# Patient Record
Sex: FEMALE | Race: WHITE | NOT HISPANIC OR LATINO | Employment: OTHER | URBAN - METROPOLITAN AREA
[De-identification: names, ages, dates, MRNs, and addresses within clinical notes are randomized per-mention and may not be internally consistent; named-entity substitution may affect disease eponyms.]

---

## 2017-01-17 ENCOUNTER — OFFICE VISIT (OUTPATIENT)
Dept: PSYCHIATRY | Facility: CLINIC | Age: 62
End: 2017-01-17
Payer: COMMERCIAL

## 2017-01-17 VITALS
HEIGHT: 63 IN | WEIGHT: 164 LBS | DIASTOLIC BLOOD PRESSURE: 72 MMHG | HEART RATE: 80 BPM | BODY MASS INDEX: 29.06 KG/M2 | SYSTOLIC BLOOD PRESSURE: 126 MMHG

## 2017-01-17 DIAGNOSIS — F31.74: Primary | ICD-10-CM

## 2017-01-17 PROCEDURE — 99499 UNLISTED E&M SERVICE: CPT | Mod: S$GLB,,, | Performed by: PSYCHIATRY & NEUROLOGY

## 2017-01-17 PROCEDURE — 99213 OFFICE O/P EST LOW 20 MIN: CPT | Mod: S$GLB,,, | Performed by: PSYCHIATRY & NEUROLOGY

## 2017-01-17 PROCEDURE — 99999 PR PBB SHADOW E&M-EST. PATIENT-LVL III: CPT | Mod: PBBFAC,,, | Performed by: PSYCHIATRY & NEUROLOGY

## 2017-01-17 PROCEDURE — 1159F MED LIST DOCD IN RCRD: CPT | Mod: S$GLB,,, | Performed by: PSYCHIATRY & NEUROLOGY

## 2017-01-17 RX ORDER — HALOPERIDOL 1 MG/1
1 TABLET ORAL NIGHTLY
Qty: 90 TABLET | Refills: 1 | Status: SHIPPED | OUTPATIENT
Start: 2017-01-17 | End: 2017-07-28 | Stop reason: SDUPTHER

## 2017-01-17 RX ORDER — BUPROPION HYDROCHLORIDE 100 MG/1
200 TABLET, EXTENDED RELEASE ORAL EVERY MORNING
Qty: 180 TABLET | Refills: 1 | Status: SHIPPED | OUTPATIENT
Start: 2017-01-17 | End: 2017-07-25 | Stop reason: SDUPTHER

## 2017-01-17 RX ORDER — BUPROPION HYDROCHLORIDE 150 MG/1
150 TABLET, EXTENDED RELEASE ORAL NIGHTLY
Qty: 90 TABLET | Refills: 1 | Status: SHIPPED | OUTPATIENT
Start: 2017-01-17 | End: 2017-08-04 | Stop reason: SDUPTHER

## 2017-01-17 RX ORDER — FOLIC ACID 1 MG/1
1000 TABLET ORAL DAILY
Qty: 90 TABLET | Refills: 3 | Status: SHIPPED | OUTPATIENT
Start: 2017-01-17 | End: 2017-08-04 | Stop reason: SDUPTHER

## 2017-01-17 RX ORDER — OXCARBAZEPINE 150 MG/1
150 TABLET, FILM COATED ORAL 2 TIMES DAILY
Qty: 180 TABLET | Refills: 1 | Status: SHIPPED | OUTPATIENT
Start: 2017-01-17 | End: 2017-08-04 | Stop reason: SDUPTHER

## 2017-01-17 NOTE — MR AVS SNAPSHOT
[facility] - Psychiatry  [address]  [facility] [address]  Phone: [phone]  Fax: [phone]                  Silvana Quezada   2017 1:30 PM   Office Visit    Description:  Female : 1955   Provider:  Don Munoz Jr., MD   Department:  [facility] - Psychiatry           Reason for Visit     Mood Swings           Diagnoses this Visit        Comments    Bipolar affective disorder, manic, in full remission    -  Primary            To Do List           Goals (5 Years of Data)     None      Follow-Up and Disposition     Return in about 3 months (around 2017).       These Medications        Disp Refills Start End    buPROPion (WELLBUTRIN SR) 100 MG TBSR 12 hr tablet 180 tablet 1 2017     Take 2 tablets (200 mg total) by mouth every morning. - Oral    Pharmacy: [facility] [place] [address] [address] AT [facility] of [facility] [place] Ph #: [phone]       Notes to Pharmacy: **Patient requests 90 days supply**    buPROPion (WELLBUTRIN SR) 150 MG TBSR 12 hr tablet 90 tablet 1 2017     Take 1 tablet (150 mg total) by mouth every evening. - Oral    Pharmacy: [facility] [place] [address] [address] AT [address] of [address] [place] Ph #: [phone]       Notes to Pharmacy: **Patient requests 90 days supply**    folic acid (FOLVITE) 1 MG tablet 90 tablet 3 2017     Take 1 tablet (1,000 mcg total) by mouth once daily. - Oral    Pharmacy: [facility] [place] [address] [address] AT [address] [facility] [address] [place] Ph #: [phone]       haloperidol (HALDOL) 1 MG tablet 90 tablet 1 2017     Take 1 tablet (1 mg total) by mouth nightly. - Oral    Pharmacy: [facility] [address] [place] [address] [address] AT [address] of [address]  [place] Ph #: [phone]       Notes to Pharmacy: **Patient requests 90 days supply**    oxcarbazepine (TRILEPTAL) 150 MG Tab 180 tablet 1 2017     Take 1 tablet (150 mg total) by  mouth 2 (two) times daily. - Oral    [facility] [id number] [facility] [address] AT [facility] [place] [facility] [place] Ph #: [phone]       Notes to Pharmacy: **Patient requests 90 days supply**      [facility]     [facility] On Call Nurse Care Line - 24/7 Assistance  Registered nurses in the [facility] Center provide clinical advisement, health education, appointment booking, and other advisory services.  Call for this free service at [phone].             Medications           Message regarding Medications     Verify the changes and/or additions to your medication regime listed below are the same as discussed with your clinician today.  If any of these changes or additions are incorrect, please notify your healthcare provider.             Verify that the below list of medications is an accurate representation of the medications you are currently taking.  If none reported, the list may be blank. If incorrect, please contact your healthcare provider. Carry this list with you in case of emergency.           Current Medications     buPROPion (WELLBUTRIN SR) 100 MG TBSR 12 hr tablet Take 2 tablets (200 mg total) by mouth every morning.    buPROPion (WELLBUTRIN SR) 150 MG TBSR 12 hr tablet Take 1 tablet (150 mg total) by mouth every evening.    folic acid (FOLVITE) 1 MG tablet Take 1 tablet (1,000 mcg total) by mouth once daily.    haloperidol (HALDOL) 1 MG tablet Take 1 tablet (1 mg total) by mouth nightly.    magnesium oxide (MAG-OX) 400 mg tablet Take 500 mg by mouth once daily.    multivitamin with minerals tablet Take 1 tablet by mouth once daily.      oxcarbazepine (TRILEPTAL) 150 MG Tab Take 1 tablet (150 mg total) by mouth 2 (two) times daily.    oxycodone-acetaminophen (PERCOCET) 5-325 mg per tablet Take 2 tablets by mouth every 4 (four) hours as needed for Pain.           Clinical Reference Information           Allergies as of 1/17/2017     Adhesive    Keflex [Cephalexin]       Immunizations Administered on Date of Encounter - 1/17/2017     None      Orders Placed During Today's Visit      Normal Orders This Visit    Psy Authorization: Pharm + Psychotherapy,  6 x/yr       [facility] Sign-Up     Activating your [facility] account is as easy as 1-2-3!     1) Visit [email or web address], select Sign Up Now, enter this activation code and your date of birth, then select Next.  [email or web address]  Expires: 3/3/2017  1:53 PM      2) Create a username and password to use when you visit [facility] in the future and select a security question in case you lose your password and select Next.    3) Enter your e-mail address and click Sign Up!    Additional Information  If you have questions, please e-mail [email or web address] or call [phone] to talk to our [facility] staff. Remember, [facility] is NOT to be used for urgent needs. For medical emergencies, dial 911.         Instructions    Call if problems arise.  Go to ER if in crisis.

## 2017-01-21 NOTE — PROGRESS NOTES
Outpatient Psychiatry Follow-Up Visit (MD/NP)    1/17/2017    Clinical Status of Patient:  Outpatient (Ambulatory)    Chief Complaint:  Silvana Quezada is a 61 y.o. female who presents today for follow-up of Mood Swings    Met with patient.      Interval History and Content of Current Session:  Interim Events/Subjective Report/Content of Current Session:  Pt returned at the expected interval.  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  She works one day of he week, which she enjoys.  She reports no new stressors and no mood swings since her last visit.    Medications were reviewed.  Wellbutrin dosing is unusual but works well.  No changes were requested or proposed.    Psychotherapy:  · Target symptoms: mood swings  · Why chosen therapy is appropriate versus another modality: relevant to diagnosis, patient responds to this modality  · Outcome monitoring methods: self-report, observation  · Therapeutic intervention type: insight oriented psychotherapy, supportive psychotherapy  · Topics discussed/themes: illness/death of a loved one, financial stressors, life stage transitional issues  · The patient's response to the intervention is motivated. The patient's progress toward treatment goals is fair.   · Duration of intervention: 17 min.    Review of Systems   · PSYCHIATRIC: Pertinant items are noted in the narrative.    Past Medical, Family and Social History: The patient's past medical, family and social history have been reviewed and updated as appropriate within the electronic medical record - see encounter notes.    Compliance: yes    Side effects: None    Risk Parameters:  Patient reports no suicidal ideation  Patient reports no homicidal ideation  Patient reports no self-injurious behavior  Patient reports no violent behavior    Exam (detailed: at least 9 elements;  comprehensive: all 15 elements)   Constitutional  Vitals:  Most recent vital signs, dated less than 90 days prior to this appointment, were reviewed.    Vitals:    01/17/17 1406   BP: 126/72   Pulse: 80        General:  age appropriate, overweight     Musculoskeletal  Muscle Strength/Tone:  no rigidity, no dyskinesia, no dystonia, no tremor   Gait & Station:  non-ataxic     Psychiatric  Speech:  articulation error, spontaneous   Mood & Affect:  euthymic  congruent and appropriate   Thought Process:  goal-directed, logical   Associations:  intact   Thought Content:  normal, no suicidality, no homicidality, delusions, or paranoia   Insight:  has awareness of illness   Judgement: behavior is adequate to circumstances   Orientation:  grossly intact   Memory: intact for content of interview   Language: grossly intact   Attention Span & Concentration:  able to focus   Fund of Knowledge:  intact and appropriate to age and level of education     Assessment and Diagnosis   Status/Progress: Based on the examination today, the patient's problem(s) is/are well controlled.  New problems have not been presented today.   Comorbidities are not complicating management of the primary condition.  There are no active rule-out diagnoses for this patient at this time.    General Impression:  Pt has adjusted well to a caregiver role.  Mood swings remain absent.    Axis I: MOOD DISORDERS; Bipolar I Disorder, Most Recent Episode Manic: 6.7.6.In Full Remission (296.46).  Axis II: NO DIAGNOSIS ON AXIS II (V71.09)  Axis III: healthy  Axis IV: economic problems and problems with primary support group  Axis V: 61-70 Some mild symptoms (e.g. depressed mood and mild insomnia) OR some difficulty in social, occupational, or school functioning (e.g. occasional truancy, or theft within the household), but generally functioning pretty well, has some meaningful interpersonal relationships    Intervention/Counseling/Treatment Plan   · Medication  Management: Continue current medications.      Return to Clinic: 3 months

## 2017-04-26 ENCOUNTER — OFFICE VISIT (OUTPATIENT)
Dept: ORTHOPEDICS | Facility: CLINIC | Age: 62
End: 2017-04-26
Payer: MEDICARE

## 2017-04-26 ENCOUNTER — HOSPITAL ENCOUNTER (OUTPATIENT)
Dept: RADIOLOGY | Facility: HOSPITAL | Age: 62
Discharge: HOME | End: 2017-04-26
Attending: ORTHOPAEDIC SURGERY
Payer: MEDICARE

## 2017-04-26 DIAGNOSIS — M79.672 BILATERAL FOOT PAIN: ICD-10-CM

## 2017-04-26 DIAGNOSIS — M79.672 BILATERAL FOOT PAIN: Primary | ICD-10-CM

## 2017-04-26 DIAGNOSIS — M79.671 BILATERAL FOOT PAIN: Primary | ICD-10-CM

## 2017-04-26 DIAGNOSIS — M79.671 BILATERAL FOOT PAIN: ICD-10-CM

## 2017-04-26 DIAGNOSIS — S92.352A CLOSED DISPLACED FRACTURE OF FIFTH METATARSAL BONE OF LEFT FOOT, INITIAL ENCOUNTER: ICD-10-CM

## 2017-04-26 PROCEDURE — 73630 X-RAY EXAM OF FOOT: CPT | Mod: 26,50,, | Performed by: RADIOLOGY

## 2017-04-26 PROCEDURE — 99999 PR PBB SHADOW E&M-EST. PATIENT-LVL II: CPT | Mod: PBBFAC,,, | Performed by: PHYSICIAN ASSISTANT

## 2017-04-26 PROCEDURE — 73630 X-RAY EXAM OF FOOT: CPT | Mod: 50,TC

## 2017-04-26 PROCEDURE — 1160F RVW MEDS BY RX/DR IN RCRD: CPT | Mod: S$GLB,,, | Performed by: PHYSICIAN ASSISTANT

## 2017-04-26 PROCEDURE — 99214 OFFICE O/P EST MOD 30 MIN: CPT | Mod: S$GLB,,, | Performed by: PHYSICIAN ASSISTANT

## 2017-04-26 NOTE — PROGRESS NOTES
SUBJECTIVE:     Chief Complaint & History of Present Illness:  Silvana Quezada is a  New  patient 61 y.o. female who is seen here today with a complaint of  left foot pain .  Patient relates she developed sudden pain in the lateral aspect of her left foot while going on her daily walk 2 days ago.  She tried to rest the foot for day but pain became more intense to an urgent care clinic x-rays demonstrated a minimally displaced fracture at the base of the fifth metatarsal she was placed in a short fracture boot is here today for follow on care  On a scale of 1-10, with 10 being worst pain imaginable, he rates this pain as 3 on good days and 5 on bad days.  she describes the pain as tender and sore.    Review of patient's allergies indicates:   Allergen Reactions    Adhesive      blisters    Keflex [cephalexin] Rash         Current Outpatient Prescriptions   Medication Sig Dispense Refill    buPROPion (WELLBUTRIN SR) 100 MG TBSR 12 hr tablet Take 2 tablets (200 mg total) by mouth every morning. 180 tablet 1    buPROPion (WELLBUTRIN SR) 150 MG TBSR 12 hr tablet Take 1 tablet (150 mg total) by mouth every evening. 90 tablet 1    folic acid (FOLVITE) 1 MG tablet Take 1 tablet (1,000 mcg total) by mouth once daily. 90 tablet 3    haloperidol (HALDOL) 1 MG tablet Take 1 tablet (1 mg total) by mouth nightly. 90 tablet 1    magnesium oxide (MAG-OX) 400 mg tablet Take 500 mg by mouth once daily.      multivitamin with minerals tablet Take 1 tablet by mouth once daily.        oxcarbazepine (TRILEPTAL) 150 MG Tab Take 1 tablet (150 mg total) by mouth 2 (two) times daily. 180 tablet 1    oxycodone-acetaminophen (PERCOCET) 5-325 mg per tablet Take 2 tablets by mouth every 4 (four) hours as needed for Pain. 31 tablet 0     No current facility-administered medications for this visit.        Past Medical History:   Diagnosis Date    Bipolar 1 disorder     Breast cancer     right    Depression     History of right  shoulder fracture     MS (multiple sclerosis)     Osteoporosis, unspecified        Past Surgical History:   Procedure Laterality Date    BRAIN SURGERY  1991    BREAST LUMPECTOMY      lymph nodes removed    FOOT SURGERY  october 2013    HYSTERECTOMY         Vital Signs (Most Recent)  There were no vitals filed for this visit.    Review of Systems:  ROS:  Constitutional: no fever or chills  Eyes: no visual changes  ENT: no nasal congestion or sore throat  Respiratory: no cough or shortness of breath  Cardiovascular: no chest pain or palpitations  Gastrointestinal: no nausea or vomiting, tolerating diet  Genitourinary: no hematuria or dysuria  Integument/Breast: no rash or pruritis, Positive history of breast cancer  Hematologic/Lymphatic: no easy bruising or lymphadenopathy  Musculoskeletal: no arthralgias or myalgias  Neurological: no seizures or tremors, Positive for multiple sclerosis  Behavioral/Psych: no auditory or visual hallucinations, Positive for bipolar disorder  Endocrine: no heat or cold intolerance      OBJECTIVE:     PHYSICAL EXAM:     , General Appearance: Well nourished, well developed, in no acute distress.  Neurological: Mood & affect are normal.  left Ankle  Exam Ankle: no effusion, full range of motion, no tenderness.  no bruising noted  ROM Ankle/foot: 90 degrees dorsiflexion, 180 degrees plantarflexion, 30 degrees inversion and 40 degrees eversion    Foot Exam: tenderness of the 5th metatarsal head  Erythema over the lateral aspect of the foot near the fracture site      RADIOGRAPHS:  X-rays taken today films reviewed by me demonstrate minimally displaced fracture the base of the fifth metatarsal in good alignment    ASSESSMENT/PLAN:     Plan:  Patient will continue in a fracture boot minimizing ambulation she may remove it for range of motion exercises 2-3 times a day and for sleep we'll see her back in 2 weeks with new x-rays sooner symptoms dictate    Note this was a low energy fracture  and she is at risk for osteoporotic issues in the future will be evaluated for osteoporosis

## 2017-05-10 ENCOUNTER — HOSPITAL ENCOUNTER (OUTPATIENT)
Dept: RADIOLOGY | Facility: HOSPITAL | Age: 62
Discharge: HOME | End: 2017-05-10
Attending: ORTHOPAEDIC SURGERY
Payer: MEDICARE

## 2017-05-10 ENCOUNTER — OFFICE VISIT (OUTPATIENT)
Dept: ORTHOPEDICS | Facility: CLINIC | Age: 62
End: 2017-05-10
Payer: MEDICARE

## 2017-05-10 VITALS
RESPIRATION RATE: 16 BRPM | HEIGHT: 63 IN | BODY MASS INDEX: 28.9 KG/M2 | WEIGHT: 163.13 LBS | DIASTOLIC BLOOD PRESSURE: 70 MMHG | SYSTOLIC BLOOD PRESSURE: 106 MMHG | HEART RATE: 69 BPM

## 2017-05-10 DIAGNOSIS — M79.672 LEFT FOOT PAIN: ICD-10-CM

## 2017-05-10 DIAGNOSIS — S42.294D OTHER CLOSED NONDISPLACED FRACTURE OF PROXIMAL END OF RIGHT HUMERUS WITH ROUTINE HEALING, SUBSEQUENT ENCOUNTER: ICD-10-CM

## 2017-05-10 DIAGNOSIS — M79.672 LEFT FOOT PAIN: Primary | ICD-10-CM

## 2017-05-10 PROCEDURE — 73630 X-RAY EXAM OF FOOT: CPT | Mod: TC,LT

## 2017-05-10 PROCEDURE — 73630 X-RAY EXAM OF FOOT: CPT | Mod: 26,LT,, | Performed by: RADIOLOGY

## 2017-05-10 PROCEDURE — 1160F RVW MEDS BY RX/DR IN RCRD: CPT | Mod: S$GLB,,, | Performed by: PHYSICIAN ASSISTANT

## 2017-05-10 PROCEDURE — 99213 OFFICE O/P EST LOW 20 MIN: CPT | Mod: S$GLB,,, | Performed by: PHYSICIAN ASSISTANT

## 2017-05-10 PROCEDURE — 99999 PR PBB SHADOW E&M-EST. PATIENT-LVL III: CPT | Mod: PBBFAC,,, | Performed by: PHYSICIAN ASSISTANT

## 2017-05-11 NOTE — PROGRESS NOTES
SUBJECTIVE:     Chief Complaint & History of Present Illness:  Silvana Quezada is a  Established  patient 61 y.o. female who is seen here today with a complaint of    Chief Complaint   Patient presents with    Left Foot - Pain, Follow-up    .  His patient well known to me is here today for continuing care for her left fifth metatarsal fracture last seen treated by me in the clinic for/26/2017 which time she was tolerating her boot well and making good progress she states she's having little to no pain in and about the foot today and has a had no difficulties with ambulating or swelling  On a scale of 1-10, with 10 being worst pain imaginable, he rates this pain as 1 on good days and 3 on bad days.  she describes the pain as sore.    Review of patient's allergies indicates:   Allergen Reactions    Adhesive      blisters    Keflex [cephalexin] Rash         Current Outpatient Prescriptions   Medication Sig Dispense Refill    buPROPion (WELLBUTRIN SR) 100 MG TBSR 12 hr tablet Take 2 tablets (200 mg total) by mouth every morning. 180 tablet 1    buPROPion (WELLBUTRIN SR) 150 MG TBSR 12 hr tablet Take 1 tablet (150 mg total) by mouth every evening. 90 tablet 1    folic acid (FOLVITE) 1 MG tablet Take 1 tablet (1,000 mcg total) by mouth once daily. 90 tablet 3    haloperidol (HALDOL) 1 MG tablet Take 1 tablet (1 mg total) by mouth nightly. 90 tablet 1    magnesium oxide (MAG-OX) 400 mg tablet Take 500 mg by mouth once daily.      multivitamin with minerals tablet Take 1 tablet by mouth once daily.        oxcarbazepine (TRILEPTAL) 150 MG Tab Take 1 tablet (150 mg total) by mouth 2 (two) times daily. 180 tablet 1    oxycodone-acetaminophen (PERCOCET) 5-325 mg per tablet Take 2 tablets by mouth every 4 (four) hours as needed for Pain. 31 tablet 0     No current facility-administered medications for this visit.        Past Medical History:   Diagnosis Date    Bipolar 1 disorder     Breast cancer     right     "Depression     History of right shoulder fracture     MS (multiple sclerosis)     Osteoporosis, unspecified        Past Surgical History:   Procedure Laterality Date    BRAIN SURGERY  1991    BREAST LUMPECTOMY      lymph nodes removed    FOOT SURGERY  october 2013    HYSTERECTOMY         Vital Signs (Most Recent)  Vitals:    05/10/17 1627   BP: 106/70   Pulse: 69   Resp: 16       Review of Systems:  ROS:  Constitutional: no fever or chills  Eyes: no visual changes  ENT: no nasal congestion or sore throat  Respiratory: no cough or shortness of breath  Cardiovascular: no chest pain or palpitations  Gastrointestinal: no nausea or vomiting, tolerating diet  Genitourinary: no hematuria or dysuria  Integument/Breast: no rash or pruritis, Positive history of breast cancer  Hematologic/Lymphatic: no easy bruising or lymphadenopathy  Musculoskeletal: no arthralgias or myalgias  Neurological: no seizures or tremors, Positive for multiple sclerosis  Behavioral/Psych: no auditory or visual hallucinations, Positive for bipolar disorder  Endocrine: no heat or cold intolerance    OBJECTIVE:     PHYSICAL EXAM:  Height: 5' 3" (160 cm) Weight: 74 kg (163 lb 2.3 oz), General Appearance: Well nourished, well developed, in no acute distress.  Neurological: Mood & affect are normal.  Exam Ankle: no effusion, full range of motion, no tenderness.  no bruising noted  ROM Ankle/foot: 90 degrees dorsiflexion, 180 degrees plantarflexion, 30 degrees inversion and 40 degrees eversion     Foot Exam: no  tenderness of the 5th metatarsal head      RADIOGRAPHS:  X-rays taken today films reviewed by me demonstrate fracture has a remained in good alignment unchanged in angulation or rotation no callus formation can be seen within the fracture site    ASSESSMENT/PLAN:     Plan:  We'll progress her out of of the fracture boot into a postoperative shoe weightbearing as tolerated removes to 3 times a day for active range of motion " exercises  Follow-up in 2 weeks with new x-rays with plans to discharge from care

## 2017-05-17 ENCOUNTER — HOSPITAL ENCOUNTER (OUTPATIENT)
Dept: RADIOLOGY | Facility: HOSPITAL | Age: 62
Discharge: HOME | End: 2017-05-17
Attending: ORTHOPAEDIC SURGERY
Payer: MEDICARE

## 2017-05-17 ENCOUNTER — OFFICE VISIT (OUTPATIENT)
Dept: ORTHOPEDICS | Facility: CLINIC | Age: 62
End: 2017-05-17
Payer: MEDICARE

## 2017-05-17 VITALS
RESPIRATION RATE: 16 BRPM | BODY MASS INDEX: 28.9 KG/M2 | WEIGHT: 163.13 LBS | HEART RATE: 76 BPM | DIASTOLIC BLOOD PRESSURE: 72 MMHG | HEIGHT: 63 IN | SYSTOLIC BLOOD PRESSURE: 116 MMHG

## 2017-05-17 DIAGNOSIS — M81.6 LOCALIZED OSTEOPOROSIS OF LEQUESNE: ICD-10-CM

## 2017-05-17 DIAGNOSIS — M80.80XS LOCALIZED OSTEOPOROSIS WITH CURRENT PATHOLOGICAL FRACTURE, SEQUELA: ICD-10-CM

## 2017-05-17 DIAGNOSIS — S92.352A CLOSED DISPLACED FRACTURE OF FIFTH METATARSAL BONE OF LEFT FOOT, INITIAL ENCOUNTER: Primary | ICD-10-CM

## 2017-05-17 DIAGNOSIS — S92.352A CLOSED DISPLACED FRACTURE OF FIFTH METATARSAL BONE OF LEFT FOOT, INITIAL ENCOUNTER: ICD-10-CM

## 2017-05-17 DIAGNOSIS — M80.80XA PATHOLOGICAL FRACTURE DUE TO OSTEOPOROSIS, UNSPECIFIED FRACTURE SITE, UNSPECIFIED OSTEOPOROSIS TYPE, INITIAL ENCOUNTER: ICD-10-CM

## 2017-05-17 PROCEDURE — 73630 X-RAY EXAM OF FOOT: CPT | Mod: 26,LT,, | Performed by: RADIOLOGY

## 2017-05-17 PROCEDURE — 99214 OFFICE O/P EST MOD 30 MIN: CPT | Mod: S$GLB,,, | Performed by: PHYSICIAN ASSISTANT

## 2017-05-17 PROCEDURE — 99499 UNLISTED E&M SERVICE: CPT | Mod: S$GLB,,, | Performed by: PHYSICIAN ASSISTANT

## 2017-05-17 PROCEDURE — 99999 PR PBB SHADOW E&M-EST. PATIENT-LVL III: CPT | Mod: PBBFAC,,, | Performed by: PHYSICIAN ASSISTANT

## 2017-05-17 PROCEDURE — 1160F RVW MEDS BY RX/DR IN RCRD: CPT | Mod: S$GLB,,, | Performed by: PHYSICIAN ASSISTANT

## 2017-05-17 PROCEDURE — 73630 X-RAY EXAM OF FOOT: CPT | Mod: TC,LT

## 2017-05-17 NOTE — PROGRESS NOTES
SUBJECTIVE:     Chief Complaint & History of Present Illness:  Silvana Quezada is a new patient 61 y.o. female who is seen here today for a bone health evaluation for osteoporosis, fracture prevention, and risk evaluation for future fractures.     she was appropriately identified and referred by Shai Bustamante PA* due to concerns for compromised bone quality, and risk of future fractures.  This visit is medically necessary to identify risk, investigate and initiative treatment as appropriate to improve bone quality and strength for the reduction of secondary fractures.     her medical history, medications and fracture history will be reviewed and summarized      Review of patient's allergies indicates:   Allergen Reactions    Adhesive      blisters    Keflex [cephalexin] Rash         Current Outpatient Prescriptions   Medication Sig Dispense Refill    buPROPion (WELLBUTRIN SR) 100 MG TBSR 12 hr tablet Take 2 tablets (200 mg total) by mouth every morning. 180 tablet 1    buPROPion (WELLBUTRIN SR) 150 MG TBSR 12 hr tablet Take 1 tablet (150 mg total) by mouth every evening. 90 tablet 1    folic acid (FOLVITE) 1 MG tablet Take 1 tablet (1,000 mcg total) by mouth once daily. 90 tablet 3    haloperidol (HALDOL) 1 MG tablet Take 1 tablet (1 mg total) by mouth nightly. 90 tablet 1    magnesium oxide (MAG-OX) 400 mg tablet Take 500 mg by mouth once daily.      multivitamin with minerals tablet Take 1 tablet by mouth once daily.        oxcarbazepine (TRILEPTAL) 150 MG Tab Take 1 tablet (150 mg total) by mouth 2 (two) times daily. 180 tablet 1    oxycodone-acetaminophen (PERCOCET) 5-325 mg per tablet Take 2 tablets by mouth every 4 (four) hours as needed for Pain. 31 tablet 0     No current facility-administered medications for this visit.        Past Medical History:   Diagnosis Date    Bipolar 1 disorder     Breast cancer     right    Depression     History of right shoulder fracture     MS (multiple  sclerosis)     Osteoporosis, unspecified        Past Surgical History:   Procedure Laterality Date    BRAIN SURGERY  1991    BREAST LUMPECTOMY      lymph nodes removed    FOOT SURGERY  october 2013    HYSTERECTOMY         Lab Results   Component Value Date     08/17/2015    K 4.7 08/17/2015     08/17/2015    CO2 30 (H) 08/17/2015    GLU 87 08/17/2015    BUN 18 08/17/2015    CREATININE 0.8 08/17/2015    CALCIUM 10.2 08/17/2015    PROT 6.6 06/14/2013    ALBUMIN 3.7 06/14/2013    BILITOT 0.5 06/14/2013    ALKPHOS 59 06/14/2013    AST 17 06/14/2013    ALT 16 06/14/2013    ANIONGAP 9 08/17/2015    ESTGFRAFRICA >60.0 08/17/2015    EGFRNONAA >60.0 08/17/2015      Lab Results   Component Value Date    WBC 4.53 (L) 06/14/2013    RBC 4.26 06/14/2013    HGB 13.0 06/14/2013    HCT 38.7 06/14/2013    MCV 90.8 06/14/2013    MCH 30.5 06/14/2013    MCHC 33.6 06/14/2013    RDW 12.4 06/14/2013     06/14/2013    MPV 10.7 06/14/2013    GRAN 2.7 06/14/2013    GRAN 59.4 06/14/2013    LYMPH 28.3 06/14/2013    LYMPH 1.3 06/14/2013    MONO 9.9 (H) 06/14/2013    MONO 0.5 06/14/2013    EOS 0.1 06/14/2013    BASO 0.0 06/14/2013    EOSINOPHIL 1.8 06/14/2013    BASOPHIL 0.4 06/14/2013      No results found for: MG   No results found for: PHOS   Lab Results   Component Value Date    [id number] 37 11/18/2013      No results found for: PTH   Lab Results   Component Value Date    TSH 1.408 06/14/2013      Lab Results   Component Value Date    FREET4 1.11 06/14/2013      No results found for: HGBA1C, ESTIMATEDAVG   No results found for: FREETESTOSTE         Vital Signs (Most Recent)  Vitals:    05/17/17 1538   BP: 116/72   Pulse: 76   Resp: 16         Today's Visit and Bone Health History     Have you had any loss of height or gotten shorter since your 20's?   2    Are you postmenopausal?   Surgical hysterectomy with overies removed    Please indicate at what age you became postmenopausal.  44    Have you ever taken any type  of hormone replacement therapy?  No    If you have had hormone replacement therapy please indicate which hormone therapy was used and for how long.  na    Do you currently smoke?  No    Have you ever smoked in the past?  No    How many alcoholic beverages do you drink per day?  1 to 3    How many caffeinated beverages do you drink per day?  more than 3    Have you had 2 or more falls in the past 12 months?  No    How active have you been in the past 12 months?  Somewhat active ( walk up to 1 mile per day )    Did either of your parents have a hip fracture after the age of 50?  Yes    Have you ever been diagnosed with any of the following?  Fracture    Do you currently have a fracture?  Yes    If you currently have a fracture please indicate where and when the fracture occured.  walking in my neighborhood    Have you broken any other bones since you turned 49 or older?  Yes    Please list all bones broken since you turned 49 or older.  broken sholder from fall in hospital lobby    Have you ever had a bone density test or DXA scan?  Yes    Are you currently taking or have you ever taken any of the following medications?  Actonel (Risedronate)    Do you take Calcium?  Yes    If you take Calcium what dose?  1200 mg for the last 2 weeks    Do you take Vitamin D?  Yes    If you take Vitamin D what dose?  1000 mgs    Have you ever been diagnosed with cancer?  Yes    Please indicate what type of cancer and when you were diagnosed.  breast cancer radiation and chemo    Have you ever been treated for cancer with high beam radiation or had radioactive implants?  Yes    If you have been treated for cancer with high beam radiation or had radioactive implants please indicate what type.  chemotherapy             she has medical history and medication use known to be associated with bone loss and osteoporosis to include low energy fractures right humerus, low energy fractures of the left metatarsal, as of history of chemotherapy,  "previous diagnosis of osteoporosis opioid pain medications, SSRIs,.     The last DXA was performed in 11/18/2013.          T-Score Hip: -2.2     T-Score Spine: -2.9           Review of Systems:  ROS:  Constitutional: no fever or chills  Eyes: no visual changes  ENT: no nasal congestion or sore throat  Respiratory: no cough or shortness of breath  Cardiovascular: no chest pain or palpitations  Gastrointestinal: no nausea or vomiting, tolerating diet  Genitourinary: no hematuria or dysuria  Integument/Breast: no rash or pruritis, Positive history of breast cancer  Hematologic/Lymphatic: no easy bruising or lymphadenopathy  Musculoskeletal: no arthralgias or myalgias  Neurological: no seizures or tremors, Positive for multiple sclerosis  Behavioral/Psych: no auditory or visual hallucinations, Positive for bipolar disorder  Endocrine: no heat or cold intolerance      OBJECTIVE:     PHYSICAL EXAM:  Height: 5' 3" (160 cm) Weight: 74 kg (163 lb 2.3 oz),                  General: no acute distress and well developed/well nourished  Behavioral/Psych: normal mood/affect  Skin: no rash  Head/Neck: atraumatic, normocephalic, without obvious abnormality  Eyes: pupil equally round and reactive to light (PERRL)  bilaterally  ENT: ENT exam normal, no neck nodes or sinus tenderness  Lymphatic: No abnormally enlarged lymph nodes.  Respiratory: normal respiratory effort  Cardiac: regular rate and rhythm  Vascular: normal  Abdomen: soft, non-tender  Musculoskeletal: no joint tenderness, deformity or swelling      Radiograph:  X-rays of the left foot taken today films reviewed by medium straight fracture remains in very good alignment with no change in angulation or rotation no new callus formation can begin to be seen within the fracture site    ASSESSMENT/PLAN:     Assessment:    Osteoporosis, at high risk for future fractures, history of low energy fracture.    Plan:   30-45 minutes spent in face-to-face consultation with patient and " her family members today, discussing the disease management of osteoporosis, for the reduction of future fractures.  I have explained that bone strength is equal to bone quality. A bone density / DEXA scan is important to complement her care since her fracture was by definition a fragility fracture/traumatic fracture.  Over half of the encounter was spent (50%) counseling the patient on the disease of osteoporosis evidence-based and best practice treatment options available as well as recommendations for improvement of bone quality, the importance of nutritional supplements to include:  Calcium 5633-9069 mg daily in divided doses   Vitamin D3  9800-7375 units daily in divided doses.   Fall prevention and personal safety for the reduction of future fractures.    Clinicians Guidelines for the treatment of Osteoporosis 2014 as part of the National Osteoporosis Foundation were utilized as the evidence-based information provided.    Discussed pharmaceutical treatment options to include Biphosphatases, Denosumab, and Teriparatide. Information to include indications for therapy, risks and benefits to treatment, and important safety information related to these treatments was provided and discussed.  Handouts were given to the patient for her review on osteoporosis and other pharmacological treatment information related to other available treatment options.    The importance of diet, impact exercise, and core strengthening with gait and balance exercise, through  both formal physical therapy programs and home exercise programs was discussed.     Bone density test recommended and ordered  Bone health labs recommended and ordered    She will remain in her postop shoe for an additional week to 10 days we'll see her back at that time    We will see her back following testing to discuss treatment options

## 2017-05-19 ENCOUNTER — TELEPHONE (OUTPATIENT)
Dept: UROLOGY | Facility: CLINIC | Age: 62
End: 2017-05-19

## 2017-05-19 NOTE — TELEPHONE ENCOUNTER
----- Message from Kanchan Yusuf sent at 5/19/2017  3:54 PM CDT -----  Contact: pt [phone]  Pt states she would like to schedule botox injection sometime in July. Please call pt to schedule.

## 2017-05-29 ENCOUNTER — TELEPHONE (OUTPATIENT)
Dept: NEUROLOGY | Facility: CLINIC | Age: 62
End: 2017-05-29

## 2017-05-30 ENCOUNTER — HOSPITAL ENCOUNTER (OUTPATIENT)
Dept: RADIOLOGY | Facility: HOSPITAL | Age: 62
Discharge: HOME | End: 2017-05-30
Attending: ORTHOPAEDIC SURGERY
Payer: MEDICARE

## 2017-05-30 ENCOUNTER — OFFICE VISIT (OUTPATIENT)
Dept: ORTHOPEDICS | Facility: CLINIC | Age: 62
End: 2017-05-30
Payer: MEDICARE

## 2017-05-30 ENCOUNTER — HOSPITAL ENCOUNTER (OUTPATIENT)
Dept: RADIOLOGY | Facility: CLINIC | Age: 62
Discharge: HOME | End: 2017-05-30
Attending: ORTHOPAEDIC SURGERY
Payer: MEDICARE

## 2017-05-30 VITALS
HEART RATE: 76 BPM | RESPIRATION RATE: 16 BRPM | HEIGHT: 63 IN | BODY MASS INDEX: 28.9 KG/M2 | SYSTOLIC BLOOD PRESSURE: 117 MMHG | DIASTOLIC BLOOD PRESSURE: 75 MMHG | WEIGHT: 163.13 LBS

## 2017-05-30 DIAGNOSIS — M80.80XS LOCALIZED OSTEOPOROSIS WITH CURRENT PATHOLOGICAL FRACTURE, SEQUELA: ICD-10-CM

## 2017-05-30 DIAGNOSIS — S92.352D CLOSED DISPLACED FRACTURE OF FIFTH METATARSAL BONE OF LEFT FOOT WITH ROUTINE HEALING, SUBSEQUENT ENCOUNTER: ICD-10-CM

## 2017-05-30 DIAGNOSIS — M80.80XA PATHOLOGICAL FRACTURE DUE TO OSTEOPOROSIS, UNSPECIFIED FRACTURE SITE, UNSPECIFIED OSTEOPOROSIS TYPE, INITIAL ENCOUNTER: ICD-10-CM

## 2017-05-30 DIAGNOSIS — S92.352D CLOSED DISPLACED FRACTURE OF FIFTH METATARSAL BONE OF LEFT FOOT WITH ROUTINE HEALING, SUBSEQUENT ENCOUNTER: Primary | ICD-10-CM

## 2017-05-30 DIAGNOSIS — S92.352A CLOSED DISPLACED FRACTURE OF FIFTH METATARSAL BONE OF LEFT FOOT, INITIAL ENCOUNTER: ICD-10-CM

## 2017-05-30 DIAGNOSIS — M81.6 LOCALIZED OSTEOPOROSIS OF LEQUESNE: ICD-10-CM

## 2017-05-30 PROCEDURE — 77080 DXA BONE DENSITY AXIAL: CPT | Mod: 26,,, | Performed by: INTERNAL MEDICINE

## 2017-05-30 PROCEDURE — 99499 UNLISTED E&M SERVICE: CPT | Mod: S$GLB,,, | Performed by: PHYSICIAN ASSISTANT

## 2017-05-30 PROCEDURE — 99999 PR PBB SHADOW E&M-EST. PATIENT-LVL III: CPT | Mod: PBBFAC,,, | Performed by: PHYSICIAN ASSISTANT

## 2017-05-30 PROCEDURE — 73630 X-RAY EXAM OF FOOT: CPT | Mod: TC,LT

## 2017-05-30 PROCEDURE — 73630 X-RAY EXAM OF FOOT: CPT | Mod: 26,LT,, | Performed by: RADIOLOGY

## 2017-05-30 PROCEDURE — 99213 OFFICE O/P EST LOW 20 MIN: CPT | Mod: S$GLB,,, | Performed by: PHYSICIAN ASSISTANT

## 2017-05-30 PROCEDURE — 77080 DXA BONE DENSITY AXIAL: CPT | Mod: TC

## 2017-05-30 NOTE — PROGRESS NOTES
Chief Complaint & History of Present Illness:  Silvana Quezada is a established patient 61 y.o. female who is seen here today for review of lab results, DEXA scan results, and determine a treatment plan for her osteoporosis.  she has reviewed the information provided at her initial visit.  After review of treatment options together we has elected to proceed with Denosumab as her treatment option today for her osteoporosis and to reduce risk of future fractures.        Review of patient's allergies indicates:   Allergen Reactions    Adhesive      blisters    Keflex [cephalexin] Rash         Current Outpatient Prescriptions   Medication Sig Dispense Refill    buPROPion (WELLBUTRIN SR) 100 MG TBSR 12 hr tablet Take 2 tablets (200 mg total) by mouth every morning. 180 tablet 1    buPROPion (WELLBUTRIN SR) 150 MG TBSR 12 hr tablet Take 1 tablet (150 mg total) by mouth every evening. 90 tablet 1    folic acid (FOLVITE) 1 MG tablet Take 1 tablet (1,000 mcg total) by mouth once daily. 90 tablet 3    haloperidol (HALDOL) 1 MG tablet Take 1 tablet (1 mg total) by mouth nightly. 90 tablet 1    magnesium oxide (MAG-OX) 400 mg tablet Take 500 mg by mouth once daily.      multivitamin with minerals tablet Take 1 tablet by mouth once daily.        oxcarbazepine (TRILEPTAL) 150 MG Tab Take 1 tablet (150 mg total) by mouth 2 (two) times daily. 180 tablet 1    oxycodone-acetaminophen (PERCOCET) 5-325 mg per tablet Take 2 tablets by mouth every 4 (four) hours as needed for Pain. 31 tablet 0     No current facility-administered medications for this visit.        Past Medical History:   Diagnosis Date    Bipolar 1 disorder     Breast cancer     right    Depression     History of right shoulder fracture     MS (multiple sclerosis)     Osteoporosis, unspecified        Past Surgical History:   Procedure Laterality Date    BRAIN SURGERY  1991    BREAST LUMPECTOMY      lymph nodes removed    FOOT SURGERY  october 2013     HYSTERECTOMY         Lab Results   Component Value Date     05/17/2017    K 4.5 05/17/2017     05/17/2017    CO2 29 05/17/2017    GLU 77 05/17/2017    BUN 12 05/17/2017    CREATININE 0.7 05/17/2017    CALCIUM 9.6 05/17/2017    PROT 6.6 05/17/2017    ALBUMIN 3.5 05/17/2017    BILITOT 0.2 05/17/2017    ALKPHOS 73 05/17/2017    AST 15 05/17/2017    ALT 13 05/17/2017    ANIONGAP 9 05/17/2017    ESTGFRAFRICA >60.0 05/17/2017    EGFRNONAA >60.0 05/17/2017      Lab Results   Component Value Date    WBC 4.53 (L) 06/14/2013    RBC 4.26 06/14/2013    HGB 13.0 06/14/2013    HCT 38.7 06/14/2013    MCV 90.8 06/14/2013    MCH 30.5 06/14/2013    MCHC 33.6 06/14/2013    RDW 12.4 06/14/2013     06/14/2013    MPV 10.7 06/14/2013    GRAN 2.7 06/14/2013    GRAN 59.4 06/14/2013    LYMPH 28.3 06/14/2013    LYMPH 1.3 06/14/2013    MONO 9.9 (H) 06/14/2013    MONO 0.5 06/14/2013    EOS 0.1 06/14/2013    BASO 0.0 06/14/2013    EOSINOPHIL 1.8 06/14/2013    BASOPHIL 0.4 06/14/2013      No results found for: MG   No results found for: PHOS   Lab Results   Component Value Date    [id number] 33 05/17/2017      Lab Results   Component Value Date    PTH 46.0 05/17/2017      Lab Results   Component Value Date    TSH 1.683 05/17/2017      Lab Results   Component Value Date    FREET4 0.93 05/17/2017      No results found for: HGBA1C, ESTIMATEDAVG   No results found for: FREETESTOSTE     DEXA Scan was reviewed and demonstrates :     T-Score Hip: -2.3     T-Score Spine: -2.6      FRAX:      Major Fx.: 11%         Hip Fx.: 1.7%      Vital Signs (Most Recent)  Vitals:    05/30/17 1441   BP: 117/75   Pulse: 76   Resp: 16         Review of Systems:  ROS:  Constitutional: no fever or chills  Eyes: no visual changes  ENT: no nasal congestion or sore throat  Respiratory: no cough or shortness of breath  Cardiovascular: no chest pain or palpitations  Gastrointestinal: no nausea or vomiting, tolerating diet  Genitourinary: no hematuria or  dysuria  Integument/Breast: no rash or pruritis, Positive history of breast cancer  Hematologic/Lymphatic: no easy bruising or lymphadenopathy  Musculoskeletal: no arthralgias or myalgias  Neurological: no seizures or tremors, Positive for multiple sclerosis  Behavioral/Psych: no auditory or visual hallucinations, Positive for bipolar disorder  Endocrine: no heat or cold intolerance      Silvana Quezada was given instructions on administration of Denosumab today.  She will be contacted by the speciality pharmacy when her medication is available, and will be scheduled to receive further detailed  instruction on  administration or when and where to receive her treatment.     She will continue with her calcium and vitamin D.  Fall prevention and personal safety have been reviewed.    .  Radiographs:  X-rays taken today films reviewed by me demonstrate fracture remains in good alignment with good callus formation around the site no changes in angulation or rotation    Assessment and plan:    Osteoporosis    Prolia ordered    Follow up 1-2 weeks after initiation of treatment to check Calcium, Vitamin D levels and access tolerance to medication.     With regards to the fracture itself she may discontinue the postop shoe wear good supportive lace up shoes for lateral protection for the next 2-3 weeks

## 2017-06-22 ENCOUNTER — OFFICE VISIT (OUTPATIENT)
Dept: INTERNAL MEDICINE | Facility: CLINIC | Age: 62
End: 2017-06-22
Payer: MEDICARE

## 2017-06-22 VITALS
RESPIRATION RATE: 18 BRPM | WEIGHT: 161.19 LBS | BODY MASS INDEX: 28.56 KG/M2 | HEIGHT: 63 IN | OXYGEN SATURATION: 97 % | SYSTOLIC BLOOD PRESSURE: 110 MMHG | HEART RATE: 89 BPM | TEMPERATURE: 98 F | DIASTOLIC BLOOD PRESSURE: 86 MMHG

## 2017-06-22 DIAGNOSIS — G35 MS (MULTIPLE SCLEROSIS): ICD-10-CM

## 2017-06-22 DIAGNOSIS — Z12.11 COLON CANCER SCREENING: ICD-10-CM

## 2017-06-22 DIAGNOSIS — Z12.31 OTHER SCREENING MAMMOGRAM: ICD-10-CM

## 2017-06-22 DIAGNOSIS — M81.8 OTHER OSTEOPOROSIS: ICD-10-CM

## 2017-06-22 DIAGNOSIS — S80.01XD CONTUSION OF RIGHT KNEE, SUBSEQUENT ENCOUNTER: Primary | ICD-10-CM

## 2017-06-22 PROBLEM — M81.0 OSTEOPOROSIS: Status: ACTIVE | Noted: 2017-06-22

## 2017-06-22 PROCEDURE — 99203 OFFICE O/P NEW LOW 30 MIN: CPT | Mod: S$GLB,,, | Performed by: INTERNAL MEDICINE

## 2017-06-22 PROCEDURE — 99999 PR PBB SHADOW E&M-EST. PATIENT-LVL III: CPT | Mod: PBBFAC,,, | Performed by: INTERNAL MEDICINE

## 2017-06-27 NOTE — PROGRESS NOTES
This office note has been dictated.  HISTORY OF PRESENT ILLNESS:  A 61-year-old female who we have not seen since   2013, who is in today following up on a couple of issues.  She fell about two   weeks ago having tripped, kind of a fall, sustained contusion on the right knee,   had a minor laceration on the left.  Seen at Urgent Care and she had x-rays   performed of the knee.  She reports improvement in that regard, but still sore.    No symptoms of instability.  No significant swelling.  She has also recently   been seeing Orthopedics regarding foot fracture and is now on Prolia injections   for osteoporosis.  Otherwise, she is generally doing well with no chest pain,   palpitations, syncope, cough, or shortness of breath.    CURRENT MEDICATIONS:  All noted and reviewed in the electronic medical record   medication list.    REVIEW OF SYSTEMS:  CONSTITUTIONAL:  No fever, no chills, no general body aches.  CARDIOVASCULAR:  No chest pain, palpitations or syncope.  RESPIRATORY:  No cough, no shortness of breath.  GASTROINTESTINAL:  No nausea, vomiting, abdominal pain or diarrhea, change in   bowel habits.  GENITOURINARY:  No dysuria, frequency, change in color or character of urine.    PAST MEDICAL HISTORY, PAST SURGICAL HISTORY, FAMILY AND SOCIAL HISTORY:  All   noted and reviewed in the electronic medical record history section.    PHYSICAL EXAMINATION:  GENERAL:  Alert, appropriately groomed lady in no acute distress.  VITAL SIGNS:  All noted and reviewed as normal.  HEENT:  Normocephalic.  NECK:  Supple.  No masses, no thyromegaly.  LUNGS:  Clear to auscultation and normal to percussion.  CARDIOVASCULAR:  Regular rate and rhythm.  There is no significant murmur.    Carotids are full bilaterally without bruits.  No peripheral extremity edema, 1+   pedal pulses.  MUSCULOSKELETAL:  Right knee, no gross deformity.  There is mild tenderness to   palpation over the inferior edge of the patella.  There is no swelling, no    crepitus.  The knee executes full range of motion, stability and strength.  MENTAL STATUS:  Alert and cooperative.  No psychotic thinking demonstrated.    LABORATORY DATA:  Reviewed lab data from 05/17/2017 including TSH, PTH,   metabolic profile, vitamin D.    IMPRESSION:  1.  Knee contusion, uncomplicated.  2.  Osteoporosis, currently under pharmacologic therapy.  Multiple sclerosis,   primarily involving  system, followed by Urology, appears to be stable   clinically.  3.  Bipolar disorder, followed by Psychiatry.  4.  History of breast cancer with no evidence of recurrent disease, followed by   Hematology/Oncology.    PLAN:    1.  Update mammogram.  2.  Colonoscopy screening.      PB/IN  dd: 06/26/2017 21:17:50 (CDT)  td: 07/12/2017 23:53:51 (CDT)  Doc ID   #[id number]  Job ID #[id number]    CC:

## 2017-07-14 ENCOUNTER — TELEPHONE (OUTPATIENT)
Dept: ENDOSCOPY | Facility: HOSPITAL | Age: 62
End: 2017-07-14

## 2017-07-21 ENCOUNTER — TELEPHONE (OUTPATIENT)
Dept: UROLOGY | Facility: CLINIC | Age: 62
End: 2017-07-21

## 2017-07-21 ENCOUNTER — OFFICE VISIT (OUTPATIENT)
Dept: UROLOGY | Facility: CLINIC | Age: 62
End: 2017-07-21
Payer: MEDICARE

## 2017-07-21 VITALS
WEIGHT: 164.88 LBS | HEART RATE: 75 BPM | HEIGHT: 63 IN | BODY MASS INDEX: 29.21 KG/M2 | DIASTOLIC BLOOD PRESSURE: 77 MMHG | SYSTOLIC BLOOD PRESSURE: 107 MMHG

## 2017-07-21 DIAGNOSIS — N32.81 URGENCY-FREQUENCY SYNDROME: Primary | ICD-10-CM

## 2017-07-21 DIAGNOSIS — R33.9 INCOMPLETE BLADDER EMPTYING: ICD-10-CM

## 2017-07-21 DIAGNOSIS — N39.0 ACUTE UTI: Primary | ICD-10-CM

## 2017-07-21 DIAGNOSIS — G35 MS (MULTIPLE SCLEROSIS): ICD-10-CM

## 2017-07-21 DIAGNOSIS — N32.81 URGENCY-FREQUENCY SYNDROME: ICD-10-CM

## 2017-07-21 DIAGNOSIS — G35 MULTIPLE SCLEROSIS: ICD-10-CM

## 2017-07-21 DIAGNOSIS — Z01.818 PREOP EXAMINATION: ICD-10-CM

## 2017-07-21 PROCEDURE — 99499 UNLISTED E&M SERVICE: CPT | Mod: S$GLB,,, | Performed by: UROLOGY

## 2017-07-21 PROCEDURE — 87088 URINE BACTERIA CULTURE: CPT

## 2017-07-21 PROCEDURE — 99214 OFFICE O/P EST MOD 30 MIN: CPT | Mod: 25,S$GLB,, | Performed by: UROLOGY

## 2017-07-21 PROCEDURE — 99999 PR PBB SHADOW E&M-EST. PATIENT-LVL III: CPT | Mod: PBBFAC,,, | Performed by: UROLOGY

## 2017-07-21 PROCEDURE — 87086 URINE CULTURE/COLONY COUNT: CPT

## 2017-07-21 PROCEDURE — 51701 INSERT BLADDER CATHETER: CPT | Mod: S$GLB,,, | Performed by: UROLOGY

## 2017-07-21 PROCEDURE — 87186 SC STD MICRODIL/AGAR DIL: CPT

## 2017-07-21 PROCEDURE — 87077 CULTURE AEROBIC IDENTIFY: CPT

## 2017-07-21 NOTE — PROGRESS NOTES
History & Physical  Surgery      SUBJECTIVE:     Chief Complaint/Reason for Admission: neurogenic overactive bladder    History of Present Illness: Silvana Quezada is a 61 y.o. female with history of MS who is here to discuss botox injections. She performs CIC at home and remains continent in between catheterizations. She does have occasional symptoms of urgency. She has received botox injections to her bladder before with very good results. Her last injection was in Sept. 2016. She would like to have the procedure done before school starts back up in mid August. She teaches PreK 3 days a week. She does not have any additional urinary symptoms at this time but her UA did dip nitrite and leukocyte positive in clinic this morning.           Current Outpatient Prescriptions on File Prior to Visit   Medication Sig    buPROPion (WELLBUTRIN SR) 100 MG TBSR 12 hr tablet Take 2 tablets (200 mg total) by mouth every morning.    buPROPion (WELLBUTRIN SR) 150 MG TBSR 12 hr tablet Take 1 tablet (150 mg total) by mouth every evening.    folic acid (FOLVITE) 1 MG tablet Take 1 tablet (1,000 mcg total) by mouth once daily.    haloperidol (HALDOL) 1 MG tablet Take 1 tablet (1 mg total) by mouth nightly.    multivitamin with minerals tablet Take 1 tablet by mouth once daily.      oxcarbazepine (TRILEPTAL) 150 MG Tab Take 1 tablet (150 mg total) by mouth 2 (two) times daily.    magnesium oxide (MAG-OX) 400 mg tablet Take 500 mg by mouth once daily.    [DISCONTINUED] oxycodone-acetaminophen (PERCOCET) 5-325 mg per tablet Take 2 tablets by mouth every 4 (four) hours as needed for Pain.     No current facility-administered medications on file prior to visit.        Review of patient's allergies indicates:   Allergen Reactions    Adhesive      blisters    Keflex [cephalexin] Rash       Past Medical History:   Diagnosis Date    Bipolar 1 disorder     Breast cancer     right    Depression     History of right shoulder fracture      MS (multiple sclerosis)     Osteoporosis, unspecified      Past Surgical History:   Procedure Laterality Date    BRAIN SURGERY  1991    BREAST LUMPECTOMY      lymph nodes removed    FOOT SURGERY  october 2013    HYSTERECTOMY       Family History   Problem Relation Age of Onset    Skin cancer Mother     Emphysema Father      Social History   Substance Use Topics    Smoking status: Never Smoker    Smokeless tobacco: Never Used    Alcohol use 1.0 oz/week     2 Standard drinks or equivalent per week      Comment: occaisionally        Review of Systems   Constitutional: Negative for activity change, appetite change, chills and fever.   HENT: Negative.    Eyes: Negative.    Respiratory: Negative for shortness of breath.    Cardiovascular: Negative for chest pain.   Gastrointestinal: Negative for abdominal distention, abdominal pain, nausea and vomiting.   Genitourinary: Positive for urgency. Negative for dysuria and frequency.   Musculoskeletal: Negative.    Skin: Negative.    Neurological: Negative.    Psychiatric/Behavioral: Negative.      OBJECTIVE:     Vital Signs (Most Recent)  Pulse: 75 (07/21/17 1011)  BP: 107/77 (07/21/17 1011)    Physical Exam   Constitutional: She is oriented to person, place, and time. She appears well-developed and well-nourished. No distress.   HENT:   Head: Normocephalic and atraumatic.   Eyes: EOM are normal. No scleral icterus.   Neck: Normal range of motion. Neck supple.   Cardiovascular: Normal rate and regular rhythm.    Pulmonary/Chest: Effort normal and breath sounds normal. No respiratory distress.   Abdominal: Soft. Bowel sounds are normal. She exhibits no distension. There is no tenderness.   Musculoskeletal: Normal range of motion.   Neurological: She is alert and oriented to person, place, and time.   Skin: Skin is warm and dry.   Psychiatric: She has a normal mood and affect. Her behavior is normal.       ASSESSMENT/PLAN:     A/P: 62yo F with history of MS and  neurogenic overactive bladder who presents to discuss botox injections    - will collect catheterized urine sample and send for culture  - if culture is positive will send patient appropriate antibiotics  - will set up for cystoscopy with botox injections      Yogi Chang MD    The patient indicates understanding of these issues and agrees with the plan.     Addendum:  Diagnoses and all orders for this visit:    Acute UTI  -     POCT urine dipstick without microscope  -     Urine culture    Urgency-frequency syndrome  -     EKG 12-lead; Future    MS (multiple sclerosis)    Preop examination    Incomplete bladder emptying      Seen and examed today.  Has been doing CIC 4 x a day.  Continue CIC 4 x a day indefinitely.    PVR per cath urine 100 ml  Showed persistent UTI on cath urine  Treat UTI first.  She would like to get cysto botox injection on 8/9/17.

## 2017-07-21 NOTE — TELEPHONE ENCOUNTER
Urgency-frequency syndrome  -     Case Request Operating Room: CYSTOSCOPY, INJECTION-BOTOX 200 UNITS    Multiple sclerosis  -     Case Request Operating Room: CYSTOSCOPY, INJECTION-BOTOX 200 UNITS

## 2017-07-24 ENCOUNTER — TELEPHONE (OUTPATIENT)
Dept: UROLOGY | Facility: CLINIC | Age: 62
End: 2017-07-24

## 2017-07-24 ENCOUNTER — TELEPHONE (OUTPATIENT)
Dept: UROLOGY | Facility: HOSPITAL | Age: 62
End: 2017-07-24

## 2017-07-24 DIAGNOSIS — N39.0 URINARY TRACT INFECTION WITHOUT HEMATURIA, SITE UNSPECIFIED: Primary | ICD-10-CM

## 2017-07-24 DIAGNOSIS — N39.0 RECURRENT UTI: Primary | ICD-10-CM

## 2017-07-24 LAB — BACTERIA UR CULT: NORMAL

## 2017-07-24 RX ORDER — SULFAMETHOXAZOLE AND TRIMETHOPRIM 800; 160 MG/1; MG/1
1 TABLET ORAL 2 TIMES DAILY
Qty: 20 TABLET | Refills: 0 | Status: SHIPPED | OUTPATIENT
Start: 2017-07-24 | End: 2017-08-03

## 2017-07-24 RX ORDER — NITROFURANTOIN (MACROCRYSTALS) 100 MG/1
100 CAPSULE ORAL DAILY
Qty: 30 CAPSULE | Refills: 0 | Status: SHIPPED | OUTPATIENT
Start: 2017-07-24 | End: 2017-08-23

## 2017-07-24 NOTE — TELEPHONE ENCOUNTER
----- Message from Alexandra Littlejohn LPN sent at 7/24/2017  9:54 AM CDT -----  Culture back-allergic to keflex   ----- Message -----  From: Rohit Garcia MA  Sent: 7/24/2017   9:51 AM  To: [facility] Staff    Pt calling about getting an antibiotic for her UTI

## 2017-07-24 NOTE — TELEPHONE ENCOUNTER
Diagnoses and all orders for this visit:    Urinary tract infection without hematuria, site unspecified  -     sulfamethoxazole-trimethoprim 800-160mg (BACTRIM DS) 800-160 mg Tab; Take 1 tablet by mouth 2 (two) times daily.  -     nitrofurantoin (MACRODANTIN) 100 MG capsule; Take 1 capsule (100 mg total) by mouth once daily.    please have her take Bactrim DS BID for 10 days, then take daily macrodantin as suppressive abx for her surgery.

## 2017-07-25 DIAGNOSIS — F31.74: ICD-10-CM

## 2017-07-25 RX ORDER — BUPROPION HYDROCHLORIDE 100 MG/1
200 TABLET, EXTENDED RELEASE ORAL EVERY MORNING
Qty: 60 TABLET | Refills: 0 | Status: SHIPPED | OUTPATIENT
Start: 2017-07-25 | End: 2017-08-04 | Stop reason: SDUPTHER

## 2017-07-25 RX ORDER — BUPROPION HYDROCHLORIDE 100 MG/1
TABLET, EXTENDED RELEASE ORAL
Qty: 180 TABLET | Refills: 0 | OUTPATIENT
Start: 2017-07-25

## 2017-07-28 DIAGNOSIS — F31.74: ICD-10-CM

## 2017-07-28 RX ORDER — HALOPERIDOL 1 MG/1
1 TABLET ORAL NIGHTLY
Qty: 30 TABLET | Refills: 0 | Status: SHIPPED | OUTPATIENT
Start: 2017-07-28 | End: 2017-07-28 | Stop reason: SDUPTHER

## 2017-07-28 RX ORDER — HALOPERIDOL 1 MG/1
TABLET ORAL
Qty: 30 TABLET | Refills: 0 | Status: SHIPPED | OUTPATIENT
Start: 2017-07-28 | End: 2017-08-04 | Stop reason: SDUPTHER

## 2017-08-03 ENCOUNTER — ANESTHESIA EVENT (OUTPATIENT)
Dept: SURGERY | Facility: HOSPITAL | Age: 62
End: 2017-08-03
Payer: MEDICARE

## 2017-08-03 RX ORDER — ASCORBIC ACID 500 MG
500 TABLET ORAL DAILY
COMMUNITY
End: 2019-03-27 | Stop reason: CLARIF

## 2017-08-03 RX ORDER — IBUPROFEN 200 MG
200 TABLET ORAL EVERY 6 HOURS PRN
COMMUNITY
End: 2020-02-10

## 2017-08-03 NOTE — ANESTHESIA PREPROCEDURE EVALUATION
Pre Admission Screening  Jacque Young RN      []Hide copied text  Anesthesia Assessment: Preoperative EQUATION     Planned Procedure: Procedure(s) (LRB):  CYSTOSCOPY (N/A)  INJECTION-BOTOX 200 UNITS (N/A)  Requested Anesthesia Type:Monitor Anesthesia Care  Surgeon: Jayesh Ross MD  Service: Urology  Known or anticipated Date of Surgery:8/9/2017     Surgeon notes: reviewed     Electronic QUestionnaire Assessment completed via nurse interview with patient.         NO AQ        Triage considerations:      The patient has no apparent active cardiac condition (No unstable coronary Syndrome such as severe unstable angina or recent [<1 month] myocardial infarction, decompensated CHF, severe valvular   disease or significant arrhythmia)     Previous anesthesia records:GETA, MAC and No problems cysto 9/2016  Airway/Jaw/Neck:  Airway Findings: Mouth Opening: Normal General Airway Assessment: Adult  Mallampati: II  Improves to II with phonation.  Jaw/Neck Findings:  Limited Ability to Prognath  Neck ROM: Normal ROM        Last PCP note: within 3 months , within [facility]   Subspecialty notes: n/a     Other important co-morbidities: MS     Tests already available:  Available tests,  within 3 months , within [facility] . 5/17/17 CMP                                                Instructions given. (See in Nurse's note)     Optimization:  Anesthesia Preop Clinic Assessment  Indicated-not required for this procedure                             Plan:              Testing:  none                                               Patient  has previously scheduled Medical Appointment:none     Navigation:                                    Straight Line to surgery.                                    No tests, anesthesia preop clinic visit, or consult required.                                                                                      Electronically signed by Jacque Young RN at 8/3/2017  8:27 AM        Pre-admit on 8/9/2017             Detailed Report                                                                                                                      08/03/2017  Silvana Quezada is a 61 y.o., female.    Anesthesia Evaluation         Review of Systems  Anesthesia Hx:  No problems with previous Anesthesia History of prior surgery of interest to airway management or planning: Previous anesthesia: MAC  9/2016 cysto with MAC.  Procedure performed at an [facility]. Denies Family Hx of Anesthesia complications.   Denies Personal Hx of Anesthesia complications.   Hematology/Oncology:  Hematology Normal       -- Cancer in past history:  Breast   EENT/Dental:EENT/Dental Normal   Cardiovascular:  Cardiovascular Normal Exercise tolerance: good   Denies Angina.    Pulmonary:  Pulmonary Normal  Denies Shortness of breath.  Denies Recent URI.    Renal/:  Renal Symptoms/Infections/Stones: frequency, urgency.  Urinary Tract Infection (UTI) Other Renal / Gu Conditions: (overactive bladder)   Hepatic/GI:  Hepatic/GI Normal    Musculoskeletal:  Musculoskeletal Normal    Neurological:  Neuromuscular Disease, Multiple Sclerosis   Endocrine:  Endocrine Normal    Psych:   Psychiatric History  Depression.  Psychotic Disorder and Bipolar disorder.          Physical Exam  General:  Well nourished    Airway/Jaw/Neck:  Airway Findings: Mouth Opening: Normal Tongue: Normal  General Airway Assessment: Adult  Mallampati: II  TM Distance: Normal, at least 6 cm        Eyes/Ears/Nose:  EYES/EARS/NOSE FINDINGS: Normal   Dental:  Dental Findings: In tact   Chest/Lungs:  Chest/Lungs Clear    Heart/Vascular:  Heart Findings: Normal Heart murmur: negative Vascular Findings: Normal    Abdomen:  Abdomen Findings: Normal    Musculoskeletal:  Musculoskeletal Findings: Normal   Skin:  Skin Findings: Normal    Mental Status:  Mental Status Findings: Normal        Anesthesia Plan  Type of Anesthesia, risks & benefits discussed:  Anesthesia Type:  general,  MAC  Patient's Preference:   Intra-op Monitoring Plan:   Intra-op Monitoring Plan Comments:   Post Op Pain Control Plan:   Post Op Pain Control Plan Comments:   Induction:   IV  Beta Blocker:  Patient is not currently on a Beta-Blocker (No further documentation required).       Informed Consent: Patient understands risks and agrees with Anesthesia plan.  Questions answered. Anesthesia consent signed with patient.  ASA Score: 3     Day of Surgery Review of History & Physical:    H&P update referred to the surgeon.         Ready For Surgery From Anesthesia Perspective.

## 2017-08-03 NOTE — PRE ADMISSION SCREENING
Anesthesia Assessment: Preoperative EQUATION    Planned Procedure: Procedure(s) (LRB):  CYSTOSCOPY (N/A)  INJECTION-BOTOX 200 UNITS (N/A)  Requested Anesthesia Type:Monitor Anesthesia Care  Surgeon: Jayesh Ross MD  Service: Urology  Known or anticipated Date of Surgery:8/9/2017    Surgeon notes: reviewed    Electronic QUestionnaire Assessment completed via nurse interview with patient.        NO AQ      Triage considerations:     The patient has no apparent active cardiac condition (No unstable coronary Syndrome such as severe unstable angina or recent [<1 month] myocardial infarction, decompensated CHF, severe valvular   disease or significant arrhythmia)    Previous anesthesia records:GETA, MAC and No problems cysto 9/2016  Airway/Jaw/Neck:  Airway Findings: Mouth Opening: Normal General Airway Assessment: Adult  Mallampati: II  Improves to II with phonation.  Jaw/Neck Findings:  Limited Ability to Prognath  Neck ROM: Normal ROM       [facility]   Subspecialty notes: n/a    Other important co-morbidities: MS     Tests already available:  Available tests,  within 3 months , [facility] . 5/17/17 CMP            Instructions given. (See in Nurse's note)    Optimization:  Anesthesia Preop Clinic Assessment  Indicated-not required for this procedure        Plan:    Testing:  none     Patient  has previously scheduled Medical Appointment:none    Navigation:                Straight Line to surgery.               No tests, anesthesia preop clinic visit, or consult required.

## 2017-08-04 ENCOUNTER — OFFICE VISIT (OUTPATIENT)
Dept: PSYCHIATRY | Facility: CLINIC | Age: 62
End: 2017-08-04
Payer: COMMERCIAL

## 2017-08-04 VITALS
SYSTOLIC BLOOD PRESSURE: 141 MMHG | DIASTOLIC BLOOD PRESSURE: 95 MMHG | HEIGHT: 63 IN | WEIGHT: 163 LBS | HEART RATE: 78 BPM | BODY MASS INDEX: 28.88 KG/M2

## 2017-08-04 DIAGNOSIS — F31.74: Primary | ICD-10-CM

## 2017-08-04 PROCEDURE — 90833 PSYTX W PT W E/M 30 MIN: CPT | Mod: S$GLB,,, | Performed by: PSYCHIATRY & NEUROLOGY

## 2017-08-04 PROCEDURE — 99499 UNLISTED E&M SERVICE: CPT | Mod: S$GLB,,, | Performed by: PSYCHIATRY & NEUROLOGY

## 2017-08-04 PROCEDURE — 99213 OFFICE O/P EST LOW 20 MIN: CPT | Mod: S$GLB,,, | Performed by: PSYCHIATRY & NEUROLOGY

## 2017-08-04 PROCEDURE — 99999 PR PBB SHADOW E&M-EST. PATIENT-LVL III: CPT | Mod: PBBFAC,,, | Performed by: PSYCHIATRY & NEUROLOGY

## 2017-08-04 PROCEDURE — 3008F BODY MASS INDEX DOCD: CPT | Mod: S$GLB,,, | Performed by: PSYCHIATRY & NEUROLOGY

## 2017-08-04 RX ORDER — BUPROPION HYDROCHLORIDE 150 MG/1
150 TABLET, EXTENDED RELEASE ORAL NIGHTLY
Qty: 90 TABLET | Refills: 1 | Status: SHIPPED | OUTPATIENT
Start: 2017-08-04 | End: 2017-11-03 | Stop reason: SDUPTHER

## 2017-08-04 RX ORDER — OXCARBAZEPINE 150 MG/1
150 TABLET, FILM COATED ORAL 2 TIMES DAILY
Qty: 180 TABLET | Refills: 1 | Status: SHIPPED | OUTPATIENT
Start: 2017-08-04 | End: 2017-11-03 | Stop reason: SDUPTHER

## 2017-08-04 RX ORDER — BUPROPION HYDROCHLORIDE 100 MG/1
200 TABLET, EXTENDED RELEASE ORAL EVERY MORNING
Qty: 180 TABLET | Refills: 1 | Status: SHIPPED | OUTPATIENT
Start: 2017-08-04 | End: 2017-11-03 | Stop reason: SDUPTHER

## 2017-08-04 RX ORDER — FOLIC ACID 1 MG/1
1000 TABLET ORAL DAILY
Qty: 90 TABLET | Refills: 3 | Status: SHIPPED | OUTPATIENT
Start: 2017-08-04 | End: 2017-11-03 | Stop reason: SDUPTHER

## 2017-08-04 RX ORDER — HALOPERIDOL 1 MG/1
1 TABLET ORAL NIGHTLY
Qty: 90 TABLET | Refills: 1 | Status: SHIPPED | OUTPATIENT
Start: 2017-08-04 | End: 2017-11-03 | Stop reason: SDUPTHER

## 2017-08-08 ENCOUNTER — TELEPHONE (OUTPATIENT)
Dept: UROLOGY | Facility: CLINIC | Age: 62
End: 2017-08-08

## 2017-08-09 ENCOUNTER — SURGERY (OUTPATIENT)
Age: 62
End: 2017-08-09

## 2017-08-09 ENCOUNTER — HOSPITAL ENCOUNTER (OUTPATIENT)
Facility: HOSPITAL | Age: 62
Discharge: HOME | End: 2017-08-09
Attending: UROLOGY | Admitting: UROLOGY
Payer: MEDICARE

## 2017-08-09 ENCOUNTER — ANESTHESIA (OUTPATIENT)
Dept: SURGERY | Facility: HOSPITAL | Age: 62
End: 2017-08-09
Payer: MEDICARE

## 2017-08-09 DIAGNOSIS — G35 MS (MULTIPLE SCLEROSIS): Primary | ICD-10-CM

## 2017-08-09 DIAGNOSIS — N32.81 OVERACTIVE BLADDER: ICD-10-CM

## 2017-08-09 PROCEDURE — D9220A PRA ANESTHESIA: Mod: CRNA,,, | Performed by: NURSE ANESTHETIST, CERTIFIED REGISTERED

## 2017-08-09 PROCEDURE — 52287 CYSTOSCOPY CHEMODENERVATION: CPT | Mod: ,,, | Performed by: UROLOGY

## 2017-08-09 PROCEDURE — 71000033 HC RECOVERY, INTIAL HOUR: Performed by: UROLOGY

## 2017-08-09 PROCEDURE — 63600175 PHARM REV CODE 636 W HCPCS: Performed by: STUDENT IN AN ORGANIZED HEALTH CARE EDUCATION/TRAINING PROGRAM

## 2017-08-09 PROCEDURE — 37000009 HC ANESTHESIA EA ADD 15 MINS: Performed by: UROLOGY

## 2017-08-09 PROCEDURE — 25000003 PHARM REV CODE 250: Performed by: NURSE ANESTHETIST, CERTIFIED REGISTERED

## 2017-08-09 PROCEDURE — 63600175 PHARM REV CODE 636 W HCPCS: Performed by: NURSE ANESTHETIST, CERTIFIED REGISTERED

## 2017-08-09 PROCEDURE — 63600175 PHARM REV CODE 636 W HCPCS: Performed by: UROLOGY

## 2017-08-09 PROCEDURE — 36000706: Performed by: UROLOGY

## 2017-08-09 PROCEDURE — 71000015 HC POSTOP RECOV 1ST HR: Performed by: UROLOGY

## 2017-08-09 PROCEDURE — 36000707: Performed by: UROLOGY

## 2017-08-09 PROCEDURE — 37000008 HC ANESTHESIA 1ST 15 MINUTES: Performed by: UROLOGY

## 2017-08-09 PROCEDURE — 25000003 PHARM REV CODE 250: Performed by: STUDENT IN AN ORGANIZED HEALTH CARE EDUCATION/TRAINING PROGRAM

## 2017-08-09 PROCEDURE — D9220A PRA ANESTHESIA: Mod: ANES,,, | Performed by: ANESTHESIOLOGY

## 2017-08-09 RX ORDER — SODIUM CHLORIDE 9 MG/ML
INJECTION, SOLUTION INTRAVENOUS CONTINUOUS PRN
Status: DISCONTINUED | OUTPATIENT
Start: 2017-08-09 | End: 2017-08-09

## 2017-08-09 RX ORDER — GENTAMICIN SULFATE 40 MG/ML
INJECTION, SOLUTION INTRAMUSCULAR; INTRAVENOUS
Status: DISCONTINUED
Start: 2017-08-09 | End: 2017-08-09 | Stop reason: HOSPADM

## 2017-08-09 RX ORDER — LIDOCAINE HCL/PF 100 MG/5ML
SYRINGE (ML) INTRAVENOUS
Status: DISCONTINUED | OUTPATIENT
Start: 2017-08-09 | End: 2017-08-09

## 2017-08-09 RX ORDER — HYDROCODONE BITARTRATE AND ACETAMINOPHEN 5; 325 MG/1; MG/1
1 TABLET ORAL EVERY 6 HOURS PRN
Qty: 15 TABLET | Refills: 0 | Status: SHIPPED | OUTPATIENT
Start: 2017-08-09 | End: 2017-08-19

## 2017-08-09 RX ORDER — PROPOFOL 10 MG/ML
VIAL (ML) INTRAVENOUS CONTINUOUS PRN
Status: DISCONTINUED | OUTPATIENT
Start: 2017-08-09 | End: 2017-08-09

## 2017-08-09 RX ORDER — MIDAZOLAM HYDROCHLORIDE 1 MG/ML
INJECTION, SOLUTION INTRAMUSCULAR; INTRAVENOUS
Status: DISCONTINUED | OUTPATIENT
Start: 2017-08-09 | End: 2017-08-09

## 2017-08-09 RX ORDER — GENTAMICIN SULFATE 80 MG/100ML
INJECTION, SOLUTION INTRAVENOUS
Status: DISCONTINUED
Start: 2017-08-09 | End: 2017-08-09 | Stop reason: HOSPADM

## 2017-08-09 RX ADMIN — ONABOTULINUMTOXINA 200 UNITS: 100 INJECTION, POWDER, LYOPHILIZED, FOR SOLUTION INTRADERMAL; INTRAMUSCULAR at 08:08

## 2017-08-09 RX ADMIN — MIDAZOLAM HYDROCHLORIDE 2 MG: 1 INJECTION, SOLUTION INTRAMUSCULAR; INTRAVENOUS at 07:08

## 2017-08-09 RX ADMIN — PROPOFOL 125 MCG/KG/MIN: 10 INJECTION, EMULSION INTRAVENOUS at 08:08

## 2017-08-09 RX ADMIN — SODIUM CHLORIDE: 0.9 INJECTION, SOLUTION INTRAVENOUS at 07:08

## 2017-08-09 RX ADMIN — LIDOCAINE HYDROCHLORIDE 50 MG: 20 INJECTION, SOLUTION INTRAVENOUS at 08:08

## 2017-08-09 RX ADMIN — GENTAMICIN SULFATE 240 MG: 40 INJECTION, SOLUTION INTRAMUSCULAR; INTRAVENOUS at 07:08

## 2017-08-09 NOTE — OP NOTE
[facility]  Operative Note    Date: 08/09/2017    Pre-Op Diagnosis: Multiple sclerosis, neurogenic bladder  Patient Active Problem List   Diagnosis    Frequency of urination    Urinary urgency    MS (multiple sclerosis)    Neurogenic bladder    Urgency-frequency syndrome    Malignant neoplasm of other specified sites of female breast    Closed fracture of proximal end of right humerus    Acute UTI    Localized osteoporosis with current pathological fracture    Osteoporosis    Incomplete bladder emptying    Overactive bladder       Post-Op Diagnosis: same    Procedure(s) Performed:   1.  Cystoscopy with bladder botox injection    Specimen(s): none    Staff Surgeon:  Dr. Jayesh Ross MD    Assistant Surgeon: Yogi Chang MD    Anesthesia: Monitored Local Anesthesia with Sedation    Indications: Silvana Quezada is a 61 y.o. female with MS who has a neurogenic bladder and performs CIC 4x/day. She has had good relief from bladder spasms with botox injections in the past.    Findings:   1. Cystoscopy significant for grade III trabeculations. Erythematous changes diffusely throughout bladder.   2. 200u Botox injected throughout bladder detrusor. Good wheals raised.     Estimated Blood Loss: min    Drains: none    Procedure in Detail:  After informed consent was obtained the patient was brought to the cystoscopy suite and placed in the supine position.  SCDs were applied and working.  Anesthesia was administered.  When the patient was adequately sedated she was placed in the dorsal lithotomy position and prepped and draped in the usual sterile fashion.      A rigid cystoscope in a 22 Fr sheath was introduced into the patients's bladder via the urethra.  This passed easily.  Cystoscopy was performed which revealed the ureteral orifices in their normal anatomic position bilaterally.  There was evidence of grade III trabeculations and diffuse erythematous changes to the bladder mucosa.     200  units of botox was injected into the detrusor muscle throughout the bladder.  Good wheals were raised.  The patient's bladder was drained and the cystoscope was removed.     The patient tolerated the procedure well and was transferred to the recovery room in stable condition.      Disposition:  The patient will follow up with  6 months. She was given prescriptions for hydrocodone.  She knows how to self-cath should she have any issues with retention.      Yogi Chang MD

## 2017-08-09 NOTE — PLAN OF CARE
Discharge instructions reviewed with pt and spouse, handouts and prescription given,  verbalized understanding with no further questions at this time.  Pt will follow up with Dr. Ross in 6 months per AVS sheet with MD telephone number provided. VSS on RA, no pain or nausea noted, tolerating po fluids without difficulty, no other complaints noted.  Pt self caths daily at home, no need to urinate prior to discharge per Dr. Ross. Fall precautions reviewed, consents in chart, PIV removed.

## 2017-08-09 NOTE — ANESTHESIA POSTPROCEDURE EVALUATION
"Anesthesia Post Evaluation    Patient: Silvana Quezada    Procedure(s) Performed: Procedure(s) (LRB):  CYSTOSCOPY (N/A)  INJECTION-BOTOX 200 UNITS (N/A)    Final Anesthesia Type: general  Patient location during evaluation: PACU  Patient participation: Yes- Able to Participate  Level of consciousness: awake and alert  Post-procedure vital signs: reviewed and stable  Pain management: adequate  Airway patency: patent  PONV status at discharge: No PONV  Anesthetic complications: no      Cardiovascular status: blood pressure returned to baseline  Respiratory status: spontaneous ventilation and room air  Hydration status: euvolemic  Follow-up not needed.        Visit Vitals  /86 (BP Location: Left arm, Patient Position: Sitting, BP Method: Automatic)   Pulse 71   Temp 36.7 °C (98.1 °F) (Temporal)   Resp 15   Ht 5' 3" (1.6 m)   Wt 73.9 kg (163 lb)   SpO2 100%   Breastfeeding? No   BMI 28.87 kg/m²       Pain/Yaakov Score: Pain Assessment Performed: Yes (8/9/2017  8:37 AM)  Presence of Pain: non-verbal indicators absent (8/9/2017  8:37 AM)  Yaakov Score: 9 (8/9/2017  8:37 AM)      "

## 2017-08-09 NOTE — ANESTHESIA RELEASE NOTE
Post Anesthetic Evaluation    Patient: Silvana Quezada    Procedure(s) Performed: Procedure(s) (LRB):  CYSTOSCOPY (N/A)  INJECTION-BOTOX 200 UNITS (N/A)    Anesthesia type: GA    Patient location: PACU    Post pain: Adequate analgesia    Post assessment: no apparent anesthetic complications    Last Vitals:   Vitals:    08/09/17 0900   BP: 126/86   Pulse: 71   Resp: 15   Temp:        Post vital signs: stable    Level of consciousness: awake    Complications: none    Follow-up Needed: No

## 2017-08-09 NOTE — INTERVAL H&P NOTE
The patient has been examined and the H&P has been reviewed:    I concur with the findings and no changes have occurred since H&P was written.     UA negative for all components. Patient still performing CIC 4x/day.     Anesthesia/Surgery risks, benefits and alternative options discussed and understood by patient/family.          Active Hospital Problems    Diagnosis  POA    Overactive bladder [N32.81]  Yes      Resolved Hospital Problems    Diagnosis Date Resolved POA   No resolved problems to display.

## 2017-08-09 NOTE — TRANSFER OF CARE
"Anesthesia Transfer of Care Note    Patient: Silvana Quezada    Procedure(s) Performed: Procedure(s) (LRB):  CYSTOSCOPY (N/A)  INJECTION-BOTOX 200 UNITS (N/A)    Patient location: [facility]    Anesthesia Type: general    Transport from OR: Transported from OR on room air with adequate spontaneous ventilation    Post pain: adequate analgesia    Post assessment: no apparent anesthetic complications and tolerated procedure well    Post vital signs: stable    Level of consciousness: awake    Nausea/Vomiting: no nausea/vomiting    Complications: none    Transfer of care protocol was followed      Last vitals:   Visit Vitals  /67 (BP Location: Left arm, Patient Position: Lying, BP Method: Automatic)   Pulse 86   Temp 36.7 °C (98.1 °F) (Temporal)   Resp 14   Ht 5' 3" (1.6 m)   Wt 73.9 kg (163 lb)   SpO2 98%   Breastfeeding? No   BMI 28.87 kg/m²     "

## 2017-08-09 NOTE — DISCHARGE INSTRUCTIONS
Cystoscopy    Cystoscopy is a procedure that lets your doctor look directly inside your urethra and bladder. It can be used to:  · Help diagnose a problem with your urethra, bladder, or kidneys.  · Take a sample (biopsy) of bladder or urethral tissue.  · Treat certain problems (such as removing kidney stones).  · Place a stent to bypass an obstruction.  · Take special X-rays of the kidneys.  Based on the findings, your doctor may recommend other tests or treatments.  What is a cystoscope?  A cystoscope is a telescope-like instrument that contains lenses and fiberoptics (small glass wires that make bright light). The cystoscope may be straight and rigid, or flexible to bend around curves in the urethra. The doctor may look directly into the cystoscope, or project the image onto a monitor.  Getting ready  · Ask your doctor if you should stop taking any medications prior to the procedure.  · Ask whether you should avoid eating or drinking anything after midnight before the procedure.  · Follow any other instructions your doctor gives you.  Tell your doctor before the exam if you:  · Take any medications, such as aspirin or blood thinners  · Have allergies to any medications  · Are pregnant   The procedure  Cystoscopy is done in the doctors office or hospital. The doctor and a nurse are present during the procedure. It takes only a few minutes, longer if a biopsy, X-ray, or treatment needs to be done.  During the procedure:  · You lie on an exam table on your back, knees bent and legs apart. You are covered with a drape.  · Your urethra and the area around it are washed. Anesthetic jelly may be applied to numb the urethra. Other pain medication is usually not needed. In some cases, you may be offered a mild sedative to help you relax. If a more extensive procedure is to be done, such as a biopsy or kidney stone removal, general anesthesia may be needed.  · The cystoscope is inserted. A sterile fluid is put into the  bladder to expand it. You may feel pressure from this fluid.  · When the procedure is done, the cystoscope is removed.  After the procedure  If you had a sedative, general anesthesia, or spinal anesthesia, you must have someone drive you home. Once youre home:  · Drink plenty of fluids.  · You may have burning or light bleeding when you urinate--this is normal.  · Medications may be prescribed to ease any discomfort or prevent infection. Take these as directed.  · Call your doctor if you have heavy bleeding or blood clots, burning that lasts more than a day, a fever over 100°F  (38° C), or trouble urinating.  Date Last Reviewed: 9/8/2014  © 8061-2638 [employer]. [address] [place] [address], [place] [address]. All rights reserved. This information is not intended as a substitute for professional medical care. Always follow your healthcare professional's instructions.      Discharge Instructions: Self-Catheterization for Women  Your doctor has prescribed self-catheterization for you because you are having trouble urinating naturally. This problem can be caused by injury, disease, infection, recent surgery (especially urinary incontinence or prolapse procedures) hysterectomy, or other conditions.  Many people urinate by self-catheterization (also called intermittent catheterization). Self-catheterization simply means inserting a clean catheter (a thin, flexible tube) into the bladder to empty urine. This helps you empty your bladder when it wont empty by itself or empty all the way. You were shown in the hospital how to do this procedure. The steps below should help you remember how to do it properly.     Lubricate catheter       Insert catheter       Empty urine      Gather your supplies  You will need the following:  · Soap and warm water or a moist towelette  · Clean catheter  · Water-soluble lubricating jelly (not petroleum jelly)  · Mirror  · Toilet or basin  Get ready  · Wash your hands and your  genital area. Use warm soapy water. You can also use a moist towelette. As always, wash from front to back.  · Lubricate the catheter with the water-soluble lubricating jelly.  ¨ Lubricate 2 to 4 inches of the catheter tip.  ¨ Place the other end of the catheter over the toilet or basin.  Empty your bladder  · Spread the labia (the lips or folds at the opening of your vagina). Use a mirror or your index finger to find the urethra (urinary tract opening).  · Slowly insert the catheter into your urethra. If it doesnt go in, take a deep breath and bear down as if to trying to urinate.  · If you feel a sharp pain, remove the catheter and try again.  · Empty your bladder.  ¨ When the urine starts to flow, stop inserting the catheter.  ¨ When the urine stops flowing, slowly remove the catheter.  Clean up  · Wash the catheter in mild soap and water.  · Rinse the catheter well.  · Run water through the catheter. Then let it air-dry.  · Wash your hands. If you used a basin, wash it out.  Follow-up care  Make a follow-up appointment as directed by our staff.  When to seek medical care  Call your doctor right away if you have any of the following:  · Fever of 100.4°F (38.0°C) or higher, or chills  · Burning in the urinary tract or pubic area  · Nausea and vomiting  · Aching in the lower back  · Sediment or mucus in the urine  · Cloudy urine  · Bloody (pink or red) or foul-smelling urine   Date Last Reviewed: 9/29/2014 © 2000-2016 [employer]. [address] [place] [address], [place] [address]. All rights reserved. This information is not intended as a substitute for professional medical care. Always follow your healthcare professional's instructions.

## 2017-08-09 NOTE — DISCHARGE SUMMARY
[facility]  Discharge Note  Short Stay    Admit Date: 8/9/2017    Discharge Date and Time: 08/09/2017 8:35 AM     Attending Physician: Jayesh Ross MD     Discharge Provider: Yogi Chang MD    Diagnoses:  Active Hospital Problems    Diagnosis  POA    *Overactive bladder [N32.81]  Yes      Resolved Hospital Problems    Diagnosis Date Resolved POA   No resolved problems to display.       Discharged Condition: good    Hospital Course: Patient was admitted for an outpatient procedure and tolerated the procedure well with no complications.    Final Diagnoses: Same as principal problem.    Disposition: Home or Self Care    Follow up/Patient Instructions:    Medications:  Reconciled Home Medications:   Current Discharge Medication List      START taking these medications    Details   hydrocodone-acetaminophen 5-325mg (NORCO) 5-325 mg per tablet Take 1 tablet by mouth every 6 (six) hours as needed for Pain.  Qty: 15 tablet, Refills: 0         CONTINUE these medications which have NOT CHANGED    Details   ascorbic acid, vitamin C, (VITAMIN C) 500 MG tablet Take 500 mg by mouth once daily.      !! buPROPion (WELLBUTRIN SR) 100 MG TBSR 12 hr tablet Take 2 tablets (200 mg total) by mouth every morning.  Qty: 180 tablet, Refills: 1    Associated Diagnoses: Bipolar affective disorder, manic, in full remission      !! buPROPion (WELLBUTRIN SR) 150 MG TBSR 12 hr tablet Take 1 tablet (150 mg total) by mouth every evening.  Qty: 90 tablet, Refills: 1    Comments: **Patient requests 90 days supply**  Associated Diagnoses: Bipolar affective disorder, manic, in full remission      folic acid (FOLVITE) 1 MG tablet Take 1 tablet (1,000 mcg total) by mouth once daily.  Qty: 90 tablet, Refills: 3    Associated Diagnoses: Bipolar affective disorder, manic, in full remission      haloperidol (HALDOL) 1 MG tablet Take 1 tablet (1 mg total) by mouth every evening.  Qty: 90 tablet, Refills: 1    Comments: **Patient requests 90  days supply**  Associated Diagnoses: Bipolar affective disorder, manic, in full remission      ibuprofen (ADVIL,MOTRIN) 200 MG tablet Take 200 mg by mouth every 6 (six) hours as needed for Pain.      magnesium oxide (MAG-OX) 400 mg tablet Take 500 mg by mouth once daily.      multivitamin with minerals tablet Take 1 tablet by mouth once daily.        nitrofurantoin (MACRODANTIN) 100 MG capsule Take 1 capsule (100 mg total) by mouth once daily.  Qty: 30 capsule, Refills: 0    Associated Diagnoses: Urinary tract infection without hematuria, site unspecified      oxcarbazepine (TRILEPTAL) 150 MG Tab Take 1 tablet (150 mg total) by mouth 2 (two) times daily.  Qty: 180 tablet, Refills: 1    Comments: **Patient requests 90 days supply**  Associated Diagnoses: Bipolar affective disorder, manic, in full remission       !! - Potential duplicate medications found. Please discuss with provider.        No discharge procedures on file.      Discharge Procedure Orders (must include Diet, Follow-up, Activity):  No discharge procedures on file.

## 2017-08-10 VITALS
BODY MASS INDEX: 28.88 KG/M2 | HEIGHT: 63 IN | SYSTOLIC BLOOD PRESSURE: 151 MMHG | DIASTOLIC BLOOD PRESSURE: 86 MMHG | OXYGEN SATURATION: 100 % | HEART RATE: 70 BPM | WEIGHT: 163 LBS | RESPIRATION RATE: 16 BRPM | TEMPERATURE: 98 F

## 2017-08-12 NOTE — PROGRESS NOTES
Outpatient Psychiatry Follow-Up Visit (MD/NP)    8/4/2017    Clinical Status of Patient:  Outpatient (Ambulatory)    Chief Complaint:  Silvana Quezada is a 61 y.o. female who presents today for follow-up of Mood Swings    Met with patient.      Interval History and Content of Current Session:  Interim Events/Subjective Report/Content of Current Session:  Pt returned casually dressed and groomed.  She was initially smiling but became tearful as she decribed the frustrations of taking care of her mother at home.  She said that she and her  are exhausted from demands and their inability to lift and move their bedridden relative.  She worries about her 's health.  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      Medications were reviewed, but this was quickly determined not to be a medication issue.  They were refilled unchanged.  Options to provide the safest outcome for all concerned, including nursing home placement, were discussed.  Pt faced similar decisions in the past regarding her son.  Pt will take this conversation home to her .  She was asked to return here in 3 months because of this new stressor.    Psychotherapy:  · Target symptoms: mood swings  · Why chosen therapy is appropriate versus another modality: relevant to diagnosis, patient responds to this modality  · Outcome monitoring methods: self-report, observation  · Therapeutic intervention type: insight oriented psychotherapy, supportive psychotherapy  · Topics discussed/themes: illness/death of a loved one, financial stressors, life stage transitional issues  · The patient's response to the intervention is motivated. The patient's progress toward treatment goals is fair.   · Duration of intervention: 28 min.    Review of Systems   · PSYCHIATRIC: Pertinant items are noted in the narrative.    Past Medical, Family and Social History: The patient's past medical, family and social history have been reviewed and updated as  appropriate within the electronic medical record - see encounter notes.    Compliance: yes    Side effects: None    Risk Parameters:  Patient reports no suicidal ideation  Patient reports no homicidal ideation  Patient reports no self-injurious behavior  Patient reports no violent behavior    Exam (detailed: at least 9 elements; comprehensive: all 15 elements)   Constitutional  Vitals:  Most recent vital signs, dated less than 90 days prior to this appointment, were reviewed.    Vitals:    08/04/17 1358   BP: (!) 141/95   Pulse: 78        General:  age appropriate, overweight     Musculoskeletal  Muscle Strength/Tone:  no rigidity, no dyskinesia, no dystonia, no tremor   Gait & Station:  non-ataxic     Psychiatric  Speech:  articulation error, spontaneous   Mood & Affect:  dysthymic  congruent and appropriate   Thought Process:  goal-directed, logical   Associations:  intact   Thought Content:  normal, no suicidality, no homicidality, delusions, or paranoia   Insight:  has awareness of illness   Judgement: behavior is adequate to circumstances   Orientation:  grossly intact   Memory: intact for content of interview   Language: grossly intact   Attention Span & Concentration:  able to focus   Fund of Knowledge:  intact and appropriate to age and level of education     Assessment and Diagnosis   Status/Progress: Based on the examination today, the patient's problem(s) is/are failing to respond as expected to treatment.  New problems have been presented today.   Comorbidities are not complicating management of the primary condition.  The working differential for this patient includes family problem.    General Impression:  Pt reports that she and her  are mentally and physically stressed by caregiver responsibilities.    Axis I: MOOD DISORDERS; Bipolar I Disorder, Most Recent Episode Manic: 6.7.6.In Full Remission (296.46).  Axis II: NO DIAGNOSIS ON AXIS II (V71.09)  Axis III: healthy  Axis IV: economic problems  and problems with primary support group  Axis V: 61-70 Some mild symptoms (e.g. depressed mood and mild insomnia) OR some difficulty in social, occupational, or school functioning (e.g. occasional truancy, or theft within the household), but generally functioning pretty well, has some meaningful interpersonal relationships    Intervention/Counseling/Treatment Plan   · Medication Management: Continue current medications.   · Pt and her  will discuss other care options for Pt's mother.      Return to Clinic: 3 months

## 2017-08-31 DIAGNOSIS — F31.74: ICD-10-CM

## 2017-08-31 RX ORDER — BUPROPION HYDROCHLORIDE 100 MG/1
TABLET, EXTENDED RELEASE ORAL
Qty: 60 TABLET | Refills: 2 | Status: SHIPPED | OUTPATIENT
Start: 2017-08-31 | End: 2017-11-03

## 2017-09-05 DIAGNOSIS — F31.74: ICD-10-CM

## 2017-09-05 RX ORDER — BUPROPION HYDROCHLORIDE 150 MG/1
TABLET, EXTENDED RELEASE ORAL
Qty: 30 TABLET | Refills: 0 | Status: SHIPPED | OUTPATIENT
Start: 2017-09-05 | End: 2017-11-03

## 2017-09-18 ENCOUNTER — OFFICE VISIT (OUTPATIENT)
Dept: INTERNAL MEDICINE | Facility: CLINIC | Age: 62
End: 2017-09-18
Payer: MEDICARE

## 2017-09-18 VITALS
HEART RATE: 70 BPM | HEIGHT: 63 IN | DIASTOLIC BLOOD PRESSURE: 77 MMHG | SYSTOLIC BLOOD PRESSURE: 102 MMHG | WEIGHT: 168.63 LBS | RESPIRATION RATE: 16 BRPM | BODY MASS INDEX: 29.88 KG/M2 | TEMPERATURE: 98 F

## 2017-09-18 DIAGNOSIS — R31.9 URINARY TRACT INFECTION WITH HEMATURIA, SITE UNSPECIFIED: Primary | ICD-10-CM

## 2017-09-18 DIAGNOSIS — N39.0 URINARY TRACT INFECTION WITH HEMATURIA, SITE UNSPECIFIED: Primary | ICD-10-CM

## 2017-09-18 DIAGNOSIS — R35.0 URINARY FREQUENCY: ICD-10-CM

## 2017-09-18 LAB
BACTERIA #/AREA URNS AUTO: ABNORMAL /HPF
BILIRUB SERPL-MCNC: ABNORMAL MG/DL
BILIRUB UR QL STRIP: NEGATIVE
BLOOD URINE, POC: ABNORMAL
CLARITY UR REFRACT.AUTO: ABNORMAL
COLOR UR AUTO: YELLOW
COLOR, POC UA: ABNORMAL
GLUCOSE UR QL STRIP: ABNORMAL
GLUCOSE UR QL STRIP: NEGATIVE
HGB UR QL STRIP: ABNORMAL
KETONES UR QL STRIP: ABNORMAL
KETONES UR QL STRIP: NEGATIVE
LEUKOCYTE ESTERASE UR QL STRIP: ABNORMAL
LEUKOCYTE ESTERASE URINE, POC: ABNORMAL
MICROSCOPIC COMMENT: ABNORMAL
NITRITE UR QL STRIP: POSITIVE
NITRITE, POC UA: ABNORMAL
PH UR STRIP: 7 [PH] (ref 5–8)
PH, POC UA: 8
PROT UR QL STRIP: NEGATIVE
PROTEIN, POC: ABNORMAL
RBC #/AREA URNS AUTO: 2 /HPF (ref 0–4)
SP GR UR STRIP: 1.01 (ref 1–1.03)
SPECIFIC GRAVITY, POC UA: 1
URN SPEC COLLECT METH UR: ABNORMAL
UROBILINOGEN UR STRIP-ACNC: NEGATIVE EU/DL
UROBILINOGEN, POC UA: ABNORMAL
WBC #/AREA URNS AUTO: >100 /HPF (ref 0–5)

## 2017-09-18 PROCEDURE — 87186 SC STD MICRODIL/AGAR DIL: CPT

## 2017-09-18 PROCEDURE — 87088 URINE BACTERIA CULTURE: CPT

## 2017-09-18 PROCEDURE — 3008F BODY MASS INDEX DOCD: CPT | Mod: S$GLB,,, | Performed by: INTERNAL MEDICINE

## 2017-09-18 PROCEDURE — 99999 PR PBB SHADOW E&M-EST. PATIENT-LVL V: CPT | Mod: PBBFAC,,, | Performed by: INTERNAL MEDICINE

## 2017-09-18 PROCEDURE — 81002 URINALYSIS NONAUTO W/O SCOPE: CPT | Mod: S$GLB,,, | Performed by: INTERNAL MEDICINE

## 2017-09-18 PROCEDURE — 81001 URINALYSIS AUTO W/SCOPE: CPT

## 2017-09-18 PROCEDURE — 87086 URINE CULTURE/COLONY COUNT: CPT

## 2017-09-18 PROCEDURE — 99214 OFFICE O/P EST MOD 30 MIN: CPT | Mod: 25,S$GLB,, | Performed by: INTERNAL MEDICINE

## 2017-09-18 PROCEDURE — 87077 CULTURE AEROBIC IDENTIFY: CPT

## 2017-09-18 RX ORDER — PHENAZOPYRIDINE HYDROCHLORIDE 200 MG/1
200 TABLET, FILM COATED ORAL 3 TIMES DAILY PRN
Qty: 21 TABLET | Refills: 0 | Status: SHIPPED | OUTPATIENT
Start: 2017-09-18 | End: 2017-09-25

## 2017-09-18 RX ORDER — NITROFURANTOIN 25; 75 MG/1; MG/1
100 CAPSULE ORAL 2 TIMES DAILY
Qty: 10 CAPSULE | Refills: 0 | Status: SHIPPED | OUTPATIENT
Start: 2017-09-18 | End: 2017-09-23

## 2017-09-18 NOTE — PROGRESS NOTES
Subjective:       Patient ID: Silvana Quezada is a 61 y.o. female who presents for Urinary Tract Infection      Dysuria    This is a new problem. The current episode started in the past 7 days (started in last 2-3 days). The problem occurs every urination. The problem has been unchanged. The quality of the pain is described as burning. The pain is moderate. There has been no fever. Associated symptoms include frequency. Pertinent negatives include no chills, discharge, flank pain, hematuria, nausea, sweats, urgency, vomiting or rash. She has tried nothing for the symptoms. Her past medical history is significant for recurrent UTIs. There is no history of diabetes mellitus.        Review of Systems   Constitutional: Negative for chills, fatigue and fever.   HENT: Negative for congestion, rhinorrhea and sinus pressure.    Eyes: Negative for visual disturbance.   Respiratory: Negative for cough and shortness of breath.    Cardiovascular: Negative for chest pain and leg swelling.   Gastrointestinal: Negative for abdominal pain, diarrhea, nausea and vomiting.   Genitourinary: Positive for dysuria and frequency. Negative for decreased urine volume, flank pain, hematuria and urgency.   Musculoskeletal: Negative for arthralgias and myalgias.   Skin: Negative for rash.   Neurological: Negative for dizziness, weakness and headaches.       Objective:      Physical Exam   Constitutional: She is oriented to person, place, and time. Vital signs are normal. She appears well-developed and well-nourished. No distress.   HENT:   Head: Normocephalic and atraumatic.   Right Ear: Hearing and external ear normal.   Left Ear: Hearing and external ear normal.   Nose: Nose normal.   Mouth/Throat: Uvula is midline.   Eyes: EOM and lids are normal.   Neck: Normal range of motion.   Cardiovascular: Normal rate, regular rhythm, normal heart sounds and intact distal pulses.    No murmur heard.  Pulmonary/Chest: Effort normal and breath sounds  normal. She has no wheezes.   Abdominal: Soft. Bowel sounds are normal. There is no tenderness. There is no rigidity, no rebound, no guarding and no CVA tenderness.   Musculoskeletal: She exhibits no edema.   Neurological: She is alert and oriented to person, place, and time. She has normal reflexes. Coordination and gait normal.   Skin: Skin is warm, dry and intact. No rash noted. She is not diaphoretic.   Psychiatric: She has a normal mood and affect.   Vitals reviewed.      Assessment:       1. Urinary tract infection with hematuria, site unspecified    2. Urinary frequency        Plan:       -- send for U/A and culture  -- ordered Macrobid 100mg po bid for 5 days  -- Pyridium 200mg po tid as needed for dysuria  -- will adjust antibiotics if needed once C&S results available  -- previously seen by Dr. Ross but unable to complete workup after losing insurance, advised to return for follow up for recurrent UTI's    Tierra Stark MD

## 2017-09-21 LAB — BACTERIA UR CULT: NORMAL

## 2017-10-04 ENCOUNTER — HOSPITAL ENCOUNTER (OUTPATIENT)
Dept: RADIOLOGY | Facility: HOSPITAL | Age: 62
Discharge: HOME | End: 2017-10-04
Attending: INTERNAL MEDICINE
Payer: MEDICARE

## 2017-10-04 VITALS — HEIGHT: 63 IN | WEIGHT: 168 LBS | BODY MASS INDEX: 29.77 KG/M2

## 2017-10-04 DIAGNOSIS — Z12.31 OTHER SCREENING MAMMOGRAM: ICD-10-CM

## 2017-10-04 PROCEDURE — 77067 SCR MAMMO BI INCL CAD: CPT | Mod: TC

## 2017-10-04 PROCEDURE — 77067 SCR MAMMO BI INCL CAD: CPT | Mod: 26,,, | Performed by: RADIOLOGY

## 2017-10-04 PROCEDURE — 77063 BREAST TOMOSYNTHESIS BI: CPT | Mod: 26,,, | Performed by: RADIOLOGY

## 2017-11-03 ENCOUNTER — OFFICE VISIT (OUTPATIENT)
Dept: PSYCHIATRY | Facility: CLINIC | Age: 62
End: 2017-11-03
Payer: MEDICARE

## 2017-11-03 VITALS
HEIGHT: 63 IN | BODY MASS INDEX: 29.95 KG/M2 | DIASTOLIC BLOOD PRESSURE: 78 MMHG | HEART RATE: 95 BPM | WEIGHT: 169 LBS | SYSTOLIC BLOOD PRESSURE: 140 MMHG

## 2017-11-03 DIAGNOSIS — F31.75 BIPOLAR I DISORDER, MOST RECENT EPISODE DEPRESSED, IN PARTIAL REMISSION: Primary | ICD-10-CM

## 2017-11-03 DIAGNOSIS — F31.74: ICD-10-CM

## 2017-11-03 DIAGNOSIS — Z79.899 ENCOUNTER FOR LONG-TERM (CURRENT) USE OF HIGH-RISK MEDICATION: ICD-10-CM

## 2017-11-03 PROCEDURE — 99999 PR PBB SHADOW E&M-EST. PATIENT-LVL III: CPT | Mod: PBBFAC,,, | Performed by: PSYCHIATRY & NEUROLOGY

## 2017-11-03 PROCEDURE — 99213 OFFICE O/P EST LOW 20 MIN: CPT | Mod: S$GLB,,, | Performed by: PSYCHIATRY & NEUROLOGY

## 2017-11-03 PROCEDURE — 99499 UNLISTED E&M SERVICE: CPT | Mod: S$GLB,,, | Performed by: PSYCHIATRY & NEUROLOGY

## 2017-11-03 PROCEDURE — 90833 PSYTX W PT W E/M 30 MIN: CPT | Mod: S$GLB,,, | Performed by: PSYCHIATRY & NEUROLOGY

## 2017-11-03 RX ORDER — OXCARBAZEPINE 150 MG/1
150 TABLET, FILM COATED ORAL 2 TIMES DAILY
Qty: 180 TABLET | Refills: 1 | Status: SHIPPED | OUTPATIENT
Start: 2017-11-03 | End: 2018-08-02 | Stop reason: SDUPTHER

## 2017-11-03 RX ORDER — BUPROPION HYDROCHLORIDE 150 MG/1
150 TABLET, EXTENDED RELEASE ORAL NIGHTLY
Qty: 90 TABLET | Refills: 1 | Status: SHIPPED | OUTPATIENT
Start: 2017-11-03 | End: 2018-08-02 | Stop reason: SDUPTHER

## 2017-11-03 RX ORDER — HALOPERIDOL 1 MG/1
1 TABLET ORAL NIGHTLY
Qty: 90 TABLET | Refills: 1 | Status: SHIPPED | OUTPATIENT
Start: 2017-11-03 | End: 2018-08-02 | Stop reason: SDUPTHER

## 2017-11-03 RX ORDER — FOLIC ACID 1 MG/1
1000 TABLET ORAL DAILY
Qty: 90 TABLET | Refills: 3 | Status: SHIPPED | OUTPATIENT
Start: 2017-11-03 | End: 2018-08-02 | Stop reason: SDUPTHER

## 2017-11-03 RX ORDER — BUPROPION HYDROCHLORIDE 100 MG/1
200 TABLET, EXTENDED RELEASE ORAL EVERY MORNING
Qty: 180 TABLET | Refills: 1 | Status: SHIPPED | OUTPATIENT
Start: 2017-11-03 | End: 2018-05-22 | Stop reason: SDUPTHER

## 2017-11-11 NOTE — PROGRESS NOTES
Outpatient Psychiatry Follow-Up Visit (MD/NP)    11/3/2017    Clinical Status of Patient:  Outpatient (Ambulatory)    Chief Complaint:  Silvana Quezada is a 62 y.o. female who presents today for follow-up of Mood Swings    Met with patient.      Interval History and Content of Current Session:  Interim Events/Subjective Report/Content of Current Session:  Pt returned casually dressed and groomed, with smiling affect.  She reported that she and her  continue to care for her mother in her home.  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  She admitted that economics makes this current arrangement much better than any practical alternative.    Medications were reviewed and were refilled unchanged.  She will return here in 6 months, or call prn problems.    Psychotherapy:  · Target symptoms: mood swings  · Why chosen therapy is appropriate versus another modality: relevant to diagnosis, patient responds to this modality  · Outcome monitoring methods: self-report, observation  · Therapeutic intervention type: insight oriented psychotherapy, supportive psychotherapy  · Topics discussed/themes: illness/death of a loved one, financial stressors, life stage transitional issues  · The patient's response to the intervention is motivated. The patient's progress toward treatment goals is good.   · Duration of intervention: 24 min.    Review of Systems   · PSYCHIATRIC: Pertinant items are noted in the narrative.    Past Medical, Family and Social History: The patient's past medical, family and social history have been reviewed and updated as appropriate within the electronic medical record - see encounter notes.    Compliance: yes    Side effects: None    Risk Parameters:  Patient reports no  suicidal ideation  Patient reports no homicidal ideation  Patient reports no self-injurious behavior  Patient reports no violent behavior    Exam (detailed: at least 9 elements; comprehensive: all 15 elements)   Constitutional  Vitals:  Most recent vital signs, dated less than 90 days prior to this appointment, were reviewed.    Vitals:    11/03/17 1330   BP: (!) 140/78   Pulse: 95        General:  age appropriate, overweight     Musculoskeletal  Muscle Strength/Tone:  no rigidity, no dyskinesia, no dystonia, no tremor   Gait & Station:  non-ataxic     Psychiatric  Speech:  articulation error, spontaneous   Mood & Affect:  euthymic  congruent and appropriate   Thought Process:  goal-directed, logical   Associations:  intact   Thought Content:  normal, no suicidality, no homicidality, delusions, or paranoia   Insight:  has awareness of illness   Judgement: behavior is adequate to circumstances   Orientation:  grossly intact   Memory: intact for content of interview   Language: grossly intact   Attention Span & Concentration:  able to focus   Fund of Knowledge:  intact and appropriate to age and level of education     Assessment and Diagnosis   Status/Progress: Based on the examination today, the patient's problem(s) is/are improved.  New problems have not been presented today.   Comorbidities are not complicating management of the primary condition.  The working differential for this patient includes caregiver status.    General Impression:  Pt says she and her  have reached a comfortable acceptance of their roles as caregivers.    Axis I: MOOD DISORDERS; Bipolar I Disorder, Most Recent Episode Manic: 6.7.6.In Full Remission (296.46).  Axis II: NO DIAGNOSIS ON AXIS II (V71.09)  Axis III: healthy  Axis IV: economic problems and problems with primary support group  Axis V: 81-90 Absent or minimal symptoms (e.g. mild anxiety before an exam), good functioning in all areas, interested in a wide variety of  activities, socially effective, generally satisfied with life, no more than everyday problems or concerns (e.g. an occasional argument with family members)    Intervention/Counseling/Treatment Plan   · Medication Management: Continue current medications.       Return to Clinic: 6 months

## 2018-01-09 DIAGNOSIS — F31.74: ICD-10-CM

## 2018-01-09 RX ORDER — FOLIC ACID 1 MG/1
TABLET ORAL
Qty: 90 TABLET | Refills: 0 | Status: SHIPPED | OUTPATIENT
Start: 2018-01-09 | End: 2018-05-24 | Stop reason: SDUPTHER

## 2018-03-09 DIAGNOSIS — F31.74: ICD-10-CM

## 2018-03-09 RX ORDER — BUPROPION HYDROCHLORIDE 150 MG/1
TABLET, EXTENDED RELEASE ORAL
Qty: 90 TABLET | Refills: 0 | OUTPATIENT
Start: 2018-03-09

## 2018-03-09 RX ORDER — HALOPERIDOL 1 MG/1
TABLET ORAL
Qty: 30 TABLET | Refills: 0 | OUTPATIENT
Start: 2018-03-09

## 2018-05-08 ENCOUNTER — PES CALL (OUTPATIENT)
Dept: ADMINISTRATIVE | Facility: CLINIC | Age: 63
End: 2018-05-08

## 2018-05-22 DIAGNOSIS — F31.75 BIPOLAR I DISORDER, MOST RECENT EPISODE DEPRESSED, IN PARTIAL REMISSION: ICD-10-CM

## 2018-05-22 RX ORDER — BUPROPION HYDROCHLORIDE 100 MG/1
TABLET, EXTENDED RELEASE ORAL
Qty: 180 TABLET | Refills: 0 | Status: SHIPPED | OUTPATIENT
Start: 2018-05-22 | End: 2018-08-02 | Stop reason: SDUPTHER

## 2018-05-24 ENCOUNTER — OFFICE VISIT (OUTPATIENT)
Dept: INTERNAL MEDICINE | Facility: CLINIC | Age: 63
End: 2018-05-24
Payer: MEDICARE

## 2018-05-24 VITALS
SYSTOLIC BLOOD PRESSURE: 118 MMHG | HEART RATE: 68 BPM | WEIGHT: 160.25 LBS | HEIGHT: 63 IN | BODY MASS INDEX: 28.39 KG/M2 | DIASTOLIC BLOOD PRESSURE: 70 MMHG

## 2018-05-24 DIAGNOSIS — Z00.00 ENCOUNTER FOR PREVENTIVE HEALTH EXAMINATION: Primary | ICD-10-CM

## 2018-05-24 DIAGNOSIS — I70.0 THORACIC AORTA ATHEROSCLEROSIS: ICD-10-CM

## 2018-05-24 DIAGNOSIS — N31.9 NEUROGENIC BLADDER: ICD-10-CM

## 2018-05-24 DIAGNOSIS — Z85.3 HISTORY OF BREAST CANCER: ICD-10-CM

## 2018-05-24 DIAGNOSIS — Z12.11 SCREENING FOR COLORECTAL CANCER: ICD-10-CM

## 2018-05-24 DIAGNOSIS — M81.8 OTHER OSTEOPOROSIS, UNSPECIFIED PATHOLOGICAL FRACTURE PRESENCE: ICD-10-CM

## 2018-05-24 DIAGNOSIS — Z11.59 NEED FOR HEPATITIS C SCREENING TEST: ICD-10-CM

## 2018-05-24 DIAGNOSIS — F31.9 BIPOLAR AFFECTIVE DISORDER, REMISSION STATUS UNSPECIFIED: ICD-10-CM

## 2018-05-24 DIAGNOSIS — R33.9 INCOMPLETE BLADDER EMPTYING: ICD-10-CM

## 2018-05-24 DIAGNOSIS — G35 MS (MULTIPLE SCLEROSIS): ICD-10-CM

## 2018-05-24 DIAGNOSIS — Z12.12 SCREENING FOR COLORECTAL CANCER: ICD-10-CM

## 2018-05-24 DIAGNOSIS — N32.81 URGENCY-FREQUENCY SYNDROME: ICD-10-CM

## 2018-05-24 PROCEDURE — 99499 UNLISTED E&M SERVICE: CPT | Mod: S$GLB,,, | Performed by: NURSE PRACTITIONER

## 2018-05-24 PROCEDURE — G0439 PPPS, SUBSEQ VISIT: HCPCS | Mod: S$GLB,,, | Performed by: NURSE PRACTITIONER

## 2018-05-24 PROCEDURE — 99999 PR PBB SHADOW E&M-EST. PATIENT-LVL IV: CPT | Mod: PBBFAC,,, | Performed by: NURSE PRACTITIONER

## 2018-05-24 RX ORDER — FERROUS SULFATE, DRIED 160(50) MG
1 TABLET, EXTENDED RELEASE ORAL 2 TIMES DAILY WITH MEALS
COMMUNITY

## 2018-05-24 NOTE — PATIENT INSTRUCTIONS
Counseling and Referral of Other Preventative  (Italic type indicates deductible and co-insurance are waived)    Patient Name: Silvana Quezada  Today's Date: 5/24/2018    Health Maintenance       Date Due Completion Date    Hepatitis C Screening 1955 ---    Fecal Occult Blood Test (FOBT)/FitKit 1955 ---    TETANUS VACCINE 06/22/2018 (Originally 9/25/1972) ---    Zoster Vaccine 06/29/2018 (Originally 9/25/2014) ---    Influenza Vaccine 08/01/2018 8/30/2017    Lipid Panel 11/18/2018 11/18/2013    Mammogram 10/04/2019 10/4/2017        Orders Placed This Encounter   Procedures    Hepatitis C antibody    Fecal Immunochemical Test (iFOBT)     The following information is provided to all patients.  This information is to help you find resources for any of the problems found today that may be affecting your health:                Living healthy guide: www.[email or web address].[email or web address]      Understanding Diabetes: www.diabetes.org      Eating healthy: www.cdc.gov/healthyweight      [facility] home safety checklist: www.cdc.gov/steadi/patient.html      Agency on Aging: www.goea.[place]      Alcoholics anonymous (AA): www.aa.org      Physical Activity: www.kj.nih.gov/zb0uipl      Tobacco use: www.quitwithusla.org

## 2018-05-24 NOTE — PROGRESS NOTES
"Silvana Quezada presented for a  Medicare AWV and comprehensive Health Risk Assessment today. The following components were reviewed and updated:    · Medical history  · Family History  · Social history  · Allergies and Current Medications  · Health Risk Assessment  · Health Maintenance  · Care Team     ** See Completed Assessments for Annual Wellness Visit within the encounter summary.**       The following assessments were completed:  · Living Situation  · CAGE  · Depression Screening  · Timed Get Up and Go  · Whisper Test  · Cognitive Function Screening  · Nutrition Screening  · ADL Screening  · PAQ Screening            Vitals:    05/24/18 1419   BP: 118/70   BP Location: Left arm   Patient Position: Sitting   Pulse: 68   Weight: 72.7 kg (160 lb 4.4 oz)   Height: 5' 3" (1.6 m)     Body mass index is 28.39 kg/m².     Physical Exam   Constitutional: She is oriented to person, place, and time. She appears well-developed and well-nourished. No distress.   HENT:   Head: Normocephalic and atraumatic.   Cardiovascular: Normal rate, regular rhythm and normal heart sounds.    No murmur heard.  Pulmonary/Chest: Effort normal and breath sounds normal. No respiratory distress. She has no wheezes. She has no rales.   Musculoskeletal: She exhibits no edema, tenderness or deformity.   Neurological: She is alert and oriented to person, place, and time.   Skin: Skin is warm and dry. She is not diaphoretic.   Psychiatric: She has a normal mood and affect. Her behavior is normal. She expresses no homicidal and no suicidal ideation. She expresses no suicidal plans and no homicidal plans.   Vitals reviewed.        Diagnoses and health risks identified today and associated recommendations/orders:    1. Encounter for preventive health examination  Health Maintenance reviewed.  Declined Colonoscopy. Will try Fit/Kit.  Tetanus discussed. Patient reports receiving Td about 4-5 years ago.    2. Thoracic aorta atherosclerosis  Stable.   Noted " on imaging 3/9/2015.  Followed by PCP.     3. Bipolar affective disorder, remission status unspecified  Stable.   Continue current medications.  Followed by Psych.     4. MS (multiple sclerosis)  Stable.   Followed by PCP.     5. Need for hepatitis C screening test  - Hepatitis C antibody; Future    6. Screening for colorectal cancer  - Fecal Immunochemical Test (iFOBT); Future  FitKit was given to patient on 5/24/2018 3:10 PM     7. Neurogenic bladder  Stable.   Followed by Urology.     8. Urgency-frequency syndrome  Stable.   Followed by Urology.     9. History of breast cancer  Stable.   Followed by PCP.     10. Other osteoporosis, unspecified pathological fracture presence  Stable.   Continue current medication.  Followed by PCP.     11. Incomplete bladder emptying  Stable.   Followed by Urology.       Provided Silvana with a 5-10 year written screening schedule and personal prevention plan. Recommendations were developed using the USPSTF age appropriate recommendations. Education, counseling, and referrals were provided as needed. After Visit Summary printed and given to patient which includes a list of additional screenings\tests needed.    Follow-up for annual with PCP. Return in 1 year for AWV.    Janeen Pink NP

## 2018-05-24 NOTE — PROGRESS NOTES
I offered to discuss end of life issues, including information on how to make advance directives that the patient could use to name someone who would make medical decisions on their behalf if they became too ill to make themselves.    ___Patient declined  _X_Patient has Advance Directive in chart.

## 2018-05-31 ENCOUNTER — LAB VISIT (OUTPATIENT)
Dept: LAB | Facility: HOSPITAL | Age: 63
End: 2018-05-31
Attending: INTERNAL MEDICINE
Payer: MEDICARE

## 2018-05-31 DIAGNOSIS — Z12.12 SCREENING FOR COLORECTAL CANCER: ICD-10-CM

## 2018-05-31 DIAGNOSIS — Z12.11 SCREENING FOR COLORECTAL CANCER: ICD-10-CM

## 2018-05-31 LAB — HEMOCCULT STL QL IA: NEGATIVE

## 2018-05-31 PROCEDURE — 82274 ASSAY TEST FOR BLOOD FECAL: CPT

## 2018-06-11 ENCOUNTER — TELEPHONE (OUTPATIENT)
Dept: UROLOGY | Facility: CLINIC | Age: 63
End: 2018-06-11

## 2018-06-11 DIAGNOSIS — N31.9 NEUROGENIC BLADDER: Primary | ICD-10-CM

## 2018-06-11 NOTE — TELEPHONE ENCOUNTER
Neurogenic bladder  -     Case Request Operating Room: CYSTOSCOPY, INJECTION, BOTULINUM TOXIN, TYPE A 200 UNITS

## 2018-06-28 ENCOUNTER — OFFICE VISIT (OUTPATIENT)
Dept: UROLOGY | Facility: CLINIC | Age: 63
End: 2018-06-28
Payer: MEDICARE

## 2018-06-28 VITALS — HEIGHT: 63 IN | BODY MASS INDEX: 28.35 KG/M2 | WEIGHT: 160 LBS

## 2018-06-28 DIAGNOSIS — R39.15 URINARY URGENCY: Primary | ICD-10-CM

## 2018-06-28 DIAGNOSIS — N32.81 OVERACTIVE BLADDER: ICD-10-CM

## 2018-06-28 DIAGNOSIS — N31.9 NEUROGENIC BLADDER: ICD-10-CM

## 2018-06-28 DIAGNOSIS — G35 MS (MULTIPLE SCLEROSIS): ICD-10-CM

## 2018-06-28 PROCEDURE — 87186 SC STD MICRODIL/AGAR DIL: CPT

## 2018-06-28 PROCEDURE — 99214 OFFICE O/P EST MOD 30 MIN: CPT | Mod: S$GLB,,, | Performed by: UROLOGY

## 2018-06-28 PROCEDURE — 87077 CULTURE AEROBIC IDENTIFY: CPT

## 2018-06-28 PROCEDURE — 87086 URINE CULTURE/COLONY COUNT: CPT

## 2018-06-28 PROCEDURE — 99999 PR PBB SHADOW E&M-EST. PATIENT-LVL III: CPT | Mod: PBBFAC,,, | Performed by: UROLOGY

## 2018-06-28 PROCEDURE — 99499 UNLISTED E&M SERVICE: CPT | Mod: S$GLB,,, | Performed by: UROLOGY

## 2018-06-28 PROCEDURE — 3008F BODY MASS INDEX DOCD: CPT | Mod: CPTII,S$GLB,, | Performed by: UROLOGY

## 2018-06-28 PROCEDURE — 87088 URINE BACTERIA CULTURE: CPT

## 2018-06-28 NOTE — PROGRESS NOTES
CC: s/p botox injection on 8/9/17    Silvana Quezada is a 62 y.o. woman who is here for the evaluation of Follow-up (discuss botox)  hx of MS affecting her bladder function.  She is able to void on her own but has been doing CIC 4 x a day.  Reports that she is dry in-between catheterization.    After underwent botox injection to the bladder, she has no problem with bladder control with less urgency and frequency.  Last botox injection was on 8/9/17.  Procedure(s) Performed:   1.  Cystoscopy with bladder botox injection  Findings:   1. Cystoscopy significant for grade III trabeculations. Erythematous changes diffusely throughout bladder.   2. 200u Botox injected throughout bladder detrusor. Good wheals raised.     Currently, she is doing SIC 4 x a day and voids on her own.  She remains perfectly continent between catheterization.  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    Past Medical History:   Diagnosis Date    Bipolar 1 disorder     Breast cancer     right    Closed fracture of proximal end of right humerus 3/9/2015    Depression     History of right shoulder fracture     MS (multiple sclerosis)     Osteoporosis, unspecified      Past Surgical History:   Procedure Laterality Date    BRAIN SURGERY  1991    BREAST BIOPSY Left 2009    core    BREAST LUMPECTOMY Right 2001    FOOT SURGERY  october 2013    HYSTERECTOMY       Social History   Substance Use Topics    Smoking status: Never Smoker    Smokeless tobacco: Never Used    Alcohol use 1.0 oz/week     2 Standard drinks or equivalent per week      Comment: occaisionally     Family History   Problem Relation Age of Onset    Skin cancer Mother     Breast cancer Mother     Stroke Mother     Emphysema Father     Breast cancer Maternal Aunt     Dementia Brother     Diabetes Brother     No Known Problems Daughter     Autism Son     No Known Problems Brother     Autism Daughter      Allergy:  Review of patient's allergies indicates:   Allergen  Reactions    Adhesive      blisters    Keflex [cephalexin] Rash     Outpatient Encounter Prescriptions as of 6/28/2018   Medication Sig Dispense Refill    ascorbic acid, vitamin C, (VITAMIN C) 500 MG tablet Take 500 mg by mouth once daily.      buPROPion (WELLBUTRIN SR) 100 MG TBSR 12 hr tablet TAKE 2 TABLETS BY MOUTH EVERY MORNING 180 tablet 0    buPROPion (WELLBUTRIN SR) 150 MG TBSR 12 hr tablet Take 1 tablet (150 mg total) by mouth every evening. 90 tablet 1    calcium-vitamin D3 (CALCIUM 500 + D) 500 mg(1,250mg) -200 unit per tablet Take 1 tablet by mouth 2 (two) times daily with meals.      folic acid (FOLVITE) 1 MG tablet Take 1 tablet (1,000 mcg total) by mouth once daily. 90 tablet 3    haloperidol (HALDOL) 1 MG tablet Take 1 tablet (1 mg total) by mouth every evening. 90 tablet 1    ibuprofen (ADVIL,MOTRIN) 200 MG tablet Take 200 mg by mouth every 6 (six) hours as needed for Pain.      magnesium oxide (MAG-OX) 400 mg tablet Take 500 mg by mouth once daily.      OXcarbazepine (TRILEPTAL) 150 MG Tab Take 1 tablet (150 mg total) by mouth 2 (two) times daily. 180 tablet 1    [DISCONTINUED] multivitamin with minerals tablet Take 1 tablet by mouth once daily.         No facility-administered encounter medications on file as of 6/28/2018.      Review of Systems   General ROS: GENERAL:  No weight gain or loss  SKIN:  No rashes or lacerations  HEAD:  No headaches  EYES:  No exophthalmos, jaundice or blindness  EARS:  No dizziness, tinnitus or hearing loss  NOSE:  No changes in smell  MOUTH & THROAT:  No dyskinetic movements or obvious goiter  CHEST:  No shortness of breath, hyperventilation or cough  CARDIOVASCULAR:  No tachycardia or chest pain  ABDOMEN:  No nausea, vomiting, pain, constipation or diarrhea  URINARY:  No frequency, dysuria or sexual dysfunction  ENDOCRINE:  No polydipsia, polyuria  MUSCULOSKELETAL:  No pain or stiffness of the joints  NEUROLOGIC:  No weakness, sensory changes, seizures,  confusion, memory loss, tremor or other abnormal movements  Physical Exam     There were no vitals filed for this visit.  Physical Examination:   General appearance - alert, well appearing, and in no distress  Mental status - alert, oriented to person, place, and time  Eyes - pupils equal and reactive, extraocular eye movements intact  Ears - bilateral TM's and external ear canals normal  Nose - normal and patent, no erythema, discharge or polyps  Mouth - mucous membranes moist, pharynx normal without lesions  Neck - supple, no significant adenopathy  Chest - clear to auscultation, no wheezes, rales or rhonchi, symmetric air entry  Breast- no mass or lumps or tenderness  Heart - normal rate, regular rhythm, normal S1, S2, no murmurs, rubs, clicks or gallops  Abdomen - soft, nontender, nondistended, no masses or organomegaly of liver and spleen, no hernia noted.  Pelvic - normal external genitalia   Urethra normal meatus with no lesion or prolapse. No tenderness or discharge  Bladder- no tenderness  atrophic mucosa  Rectal - normal rectal, no masses  Back exam - full range of motion, no tenderness, palpable spasm or pain on motion  LE - No edema noted.  Neurological - alert, oriented, normal speech, no focal findings or movement disorder noted  Musculoskeletal - no joint tenderness, deformity or swelling    LABS:  Lab Results   Component Value Date    CREATININE 0.7 05/17/2017    CREATININE 0.8 08/17/2015    CREATININE 0.9 06/14/2013     Urine Culture, Routine   Date Value Ref Range Status   09/18/2017 ESCHERICHIA COLI  >100,000 cfu/ml    Final     UA positive for leukocytes and nitrites    Assessment and Plan:  Silvana was seen today for follow-up.    Diagnoses and all orders for this visit:    Urinary urgency  -     Urine culture  -     POCT urine dipstick without microscope    Overactive bladder    MS (multiple sclerosis)    Neurogenic bladder  -     US Retroperitoneal Limited; Future  -     Basic metabolic panel;  Future  -     Comprehensive metabolic panel; Future    continue SIC 4 x a day indefinitely.  Initial botox injection in 12/2013, lasted 2 years, and the last one lasted almost 1 year.  Will check her urine for culture today and will schedule her botox injection in July, 2018.  I asked her to keep a voiding diary measuring cath PVR after voiding BID prior to her next botox injection.  Blood tests and US are ordered today.    cysto botox injection (200 Units) discussed in detail.  I spent 25 minutes with the patient of which more than half was spent in direct consultation with the patient in regards to our treatment and plan.      Follow-up:  Follow-up in about 2 weeks (around 7/11/2018) for cysto and botox injection 200 units.

## 2018-06-29 ENCOUNTER — ANESTHESIA EVENT (OUTPATIENT)
Dept: SURGERY | Facility: HOSPITAL | Age: 63
End: 2018-06-29
Payer: MEDICARE

## 2018-06-29 RX ORDER — ASPIRIN 325 MG
325 TABLET ORAL EVERY 4 HOURS PRN
COMMUNITY
End: 2019-12-26

## 2018-06-29 NOTE — ANESTHESIA PREPROCEDURE EVALUATION
Pre Admission Screening  Jacque Young RN      []Hide copied text  Anesthesia Assessment: Preoperative EQUATION     Planned Procedure: Procedure(s) (LRB):  CYSTOSCOPY (N/A)  INJECTION, BOTULINUM TOXIN, TYPE A 200 UNITS (N/A)  Requested Anesthesia Type:Monitor Anesthesia Care  Surgeon: Jayesh Ross MD  Service: Urology  Known or anticipated Date of Surgery:7/11/2018     Surgeon notes: reviewed     Electronic QUestionnaire Assessment completed via nurse interview with patient.         NO AQ     Triage considerations:      The patient has no apparent active cardiac condition (No unstable coronary Syndrome such as severe unstable angina or recent [<1 month] myocardial infarction, decompensated CHF, severe valvular   disease or significant arrhythmia)     Previous anesthesia records:GETA, MAC and No problems8/9/17 cysto  Airway/Jaw/Neck:  Airway Findings: Mouth Opening: Normal Tongue: Normal  General Airway Assessment: Adult  Mallampati: II  TM Distance: Normal, at least 6 cm           Last PCP note: within 3 months , within [facility]   Subspecialty notes: Psychiatry     Other important co-morbidities: HX breast cancer, MS     Tests already available:  Available tests,  > 1 year ago . 5/2017 CMP, TSH, PTH. NO EKG.                            Instructions given. (See in Nurse's note)     Optimization:  Anesthesia Preop Clinic Assessment  Indicated-not required for this procedure                Plan:    Testing:  BMP                           Patient  has previously scheduled Medical Appointment: 7/5 U/S     Navigation: Tests Scheduled. Surgeon ordered CMP-will schedule on 7/5                                    Results will be tracked by Preop Clinic.                                Electronically signed by Jacque Young RN at 6/29/2018  2:37 PM        Pre-admit on 7/11/2018            Detailed Report      7/10/18-lab results noted                                                                                                             06/29/2018  Silvana Quezada is a 62 y.o., female.  Past Medical History:   Diagnosis Date    Bipolar 1 disorder     Breast cancer     right    Closed fracture of proximal end of right humerus 3/9/2015    Depression     History of right shoulder fracture     MS (multiple sclerosis)     Osteoporosis, unspecified      Patient Active Problem List   Diagnosis    Frequency of urination    Urinary urgency    MS (multiple sclerosis)    Neurogenic bladder    Urgency-frequency syndrome    History of breast cancer    Localized osteoporosis with current pathological fracture    Osteoporosis    Incomplete bladder emptying    Overactive bladder    Thoracic aorta atherosclerosis    Bipolar affective disorder     Past Surgical History:   Procedure Laterality Date    BRAIN SURGERY  1991    BREAST BIOPSY Left 2009    core    BREAST LUMPECTOMY Right 2001    FOOT SURGERY  october 2013    HYSTERECTOMY       Review of patient's allergies indicates:   Allergen Reactions    Adhesive      blisters    Keflex [cephalexin] Rash       Anesthesia Evaluation    I have reviewed the Patient Summary Reports.    I have reviewed the Nursing Notes.   I have reviewed the Medications.     Review of Systems  Anesthesia Hx:  No problems with previous Anesthesia  History of prior surgery of interest to airway management or planning: Previous anesthesia: MAC  8/9/17 cysto with MAC.  Procedure performed at an [facility]. Denies Family Hx of Anesthesia complications.   Denies Personal Hx of Anesthesia complications.   Social:  Non-Smoker    Hematology/Oncology:  Hematology Normal       -- Cancer in past history:  Breast   EENT/Dental:EENT/Dental Normal   Cardiovascular:    Denies Angina.  Functional Capacity 3.5 METS    Pulmonary:  Pulmonary Normal  Denies Shortness of breath.  Denies Recent URI.    Renal/:  Other Renal / Gu Conditions: (neurogenic bladder-intermittent caths)   Hepatic/GI:  Hepatic/GI  Normal    Neurological:  Neuromuscular Disease, Multiple Sclerosis   Endocrine:  Endocrine Normal    Psych:  Psychotic Disorder and Bipolar disorder.          Physical Exam  General:  Well nourished    Airway/Jaw/Neck:  Airway Findings: Mouth Opening: Normal Tongue: Normal  General Airway Assessment: Adult  Mallampati: II  TM Distance: Normal, at least 6 cm  Jaw/Neck Findings:  Neck ROM: Normal ROM      Dental:  Dental Findings: In tact        Mental Status:  Mental Status Findings:  Cooperative         Anesthesia Plan  Type of Anesthesia, risks & benefits discussed:  Anesthesia Type:  general, MAC  Patient's Preference:   Intra-op Monitoring Plan: standard ASA monitors  Intra-op Monitoring Plan Comments:   Post Op Pain Control Plan: multimodal analgesia  Post Op Pain Control Plan Comments:   Induction:   IV  Beta Blocker:  Patient is not currently on a Beta-Blocker (No further documentation required).       Informed Consent: Patient understands risks and agrees with Anesthesia plan.  Questions answered.   ASA Score: 3     Day of Surgery Review of History & Physical:    H&P update referred to the surgeon.         Ready For Surgery From Anesthesia Perspective.

## 2018-06-29 NOTE — PRE ADMISSION SCREENING
Anesthesia Assessment: Preoperative EQUATION    Planned Procedure: Procedure(s) (LRB):  CYSTOSCOPY (N/A)  INJECTION, BOTULINUM TOXIN, TYPE A 200 UNITS (N/A)  Requested Anesthesia Type:Monitor Anesthesia Care  Surgeon: Jayesh Ross MD  Service: Urology  Known or anticipated Date of Surgery:7/11/2018    Surgeon notes: reviewed    Electronic QUestionnaire Assessment completed via nurse interview with patient.        NO AQ    Triage considerations:     The patient has no apparent active cardiac condition (No unstable coronary Syndrome such as severe unstable angina or recent [<1 month] myocardial infarction, decompensated CHF, severe valvular   disease or significant arrhythmia)    Previous anesthesia records:GETA, MAC and No problems8/9/17 cysto  Airway/Jaw/Neck:  Airway Findings: Mouth Opening: Normal Tongue: Normal  General Airway Assessment: Adult  Mallampati: II  TM Distance: Normal, at least 6 cm          Last PCP note: within 3 months , within [facility]   Subspecialty notes: Psychiatry    Other important co-morbidities: HX breast cancer, MS     Tests already available:  Available tests,  > 1 year ago . 5/2017 CMP, TSH, PTH. NO EKG.            Instructions given. (See in Nurse's note)    Optimization:  Anesthesia Preop Clinic Assessment  Indicated-not required for this procedure      Plan:    Testing:  BMP     Patient  has previously scheduled Medical Appointment: 7/5 U/S    Navigation: Tests Scheduled. Surgeon ordered CMP-will schedule on 7/5                    Results will be tracked by Preop Clinic.

## 2018-06-30 LAB — BACTERIA UR CULT: NORMAL

## 2018-07-02 ENCOUNTER — TELEPHONE (OUTPATIENT)
Dept: UROLOGY | Facility: CLINIC | Age: 63
End: 2018-07-02

## 2018-07-02 DIAGNOSIS — N39.0 CHRONIC UTI: Primary | ICD-10-CM

## 2018-07-02 RX ORDER — NITROFURANTOIN 25; 75 MG/1; MG/1
100 CAPSULE ORAL 2 TIMES DAILY
Qty: 20 CAPSULE | Refills: 0 | Status: SHIPPED | OUTPATIENT
Start: 2018-07-02 | End: 2018-07-12

## 2018-07-02 RX ORDER — NITROFURANTOIN (MACROCRYSTALS) 100 MG/1
100 CAPSULE ORAL DAILY
Qty: 100 CAPSULE | Refills: 0 | Status: SHIPPED | OUTPATIENT
Start: 2018-07-02 | End: 2018-10-10

## 2018-07-03 NOTE — TELEPHONE ENCOUNTER
Diagnoses and all orders for this visit:    Chronic UTI  -     nitrofurantoin, macrocrystal-monohydrate, (MACROBID) 100 MG capsule; Take 1 capsule (100 mg total) by mouth 2 (two) times daily. for 10 days  -     nitrofurantoin (MACRODANTIN) 100 MG capsule; Take 1 capsule (100 mg total) by mouth once daily.    eRxed to the pharmacy.  Please call and advise the patient to take the medication as given.after 10 days of Macrobid, continue macrodantin 100 mg daily until her botox injection.

## 2018-07-05 ENCOUNTER — HOSPITAL ENCOUNTER (OUTPATIENT)
Dept: RADIOLOGY | Facility: HOSPITAL | Age: 63
Discharge: HOME | End: 2018-07-05
Attending: UROLOGY
Payer: MEDICARE

## 2018-07-05 DIAGNOSIS — N31.9 NEUROGENIC BLADDER: ICD-10-CM

## 2018-07-05 PROCEDURE — 76775 US EXAM ABDO BACK WALL LIM: CPT | Mod: 26,,, | Performed by: RADIOLOGY

## 2018-07-05 PROCEDURE — 76775 US EXAM ABDO BACK WALL LIM: CPT | Mod: TC

## 2018-07-09 ENCOUNTER — LAB VISIT (OUTPATIENT)
Dept: LAB | Facility: HOSPITAL | Age: 63
End: 2018-07-09
Attending: UROLOGY
Payer: MEDICARE

## 2018-07-09 DIAGNOSIS — N31.9 NEUROGENIC BLADDER: ICD-10-CM

## 2018-07-09 LAB
ALBUMIN SERPL BCP-MCNC: 3.8 G/DL
ALP SERPL-CCNC: 76 U/L
ALT SERPL W/O P-5'-P-CCNC: 14 U/L
ANION GAP SERPL CALC-SCNC: 9 MMOL/L
ANION GAP SERPL CALC-SCNC: 9 MMOL/L
AST SERPL-CCNC: 15 U/L
BILIRUB SERPL-MCNC: 0.5 MG/DL
BUN SERPL-MCNC: 14 MG/DL
BUN SERPL-MCNC: 14 MG/DL
CALCIUM SERPL-MCNC: 9.9 MG/DL
CALCIUM SERPL-MCNC: 9.9 MG/DL
CHLORIDE SERPL-SCNC: 105 MMOL/L
CHLORIDE SERPL-SCNC: 105 MMOL/L
CO2 SERPL-SCNC: 27 MMOL/L
CO2 SERPL-SCNC: 27 MMOL/L
CREAT SERPL-MCNC: 0.8 MG/DL
CREAT SERPL-MCNC: 0.8 MG/DL
EST. GFR  (AFRICAN AMERICAN): >60 ML/MIN/1.73 M^2
EST. GFR  (AFRICAN AMERICAN): >60 ML/MIN/1.73 M^2
EST. GFR  (NON AFRICAN AMERICAN): >60 ML/MIN/1.73 M^2
EST. GFR  (NON AFRICAN AMERICAN): >60 ML/MIN/1.73 M^2
GLUCOSE SERPL-MCNC: 96 MG/DL
GLUCOSE SERPL-MCNC: 96 MG/DL
POTASSIUM SERPL-SCNC: 4.3 MMOL/L
POTASSIUM SERPL-SCNC: 4.3 MMOL/L
PROT SERPL-MCNC: 6.9 G/DL
SODIUM SERPL-SCNC: 141 MMOL/L
SODIUM SERPL-SCNC: 141 MMOL/L

## 2018-07-09 PROCEDURE — 36415 COLL VENOUS BLD VENIPUNCTURE: CPT | Mod: PO

## 2018-07-09 PROCEDURE — 80053 COMPREHEN METABOLIC PANEL: CPT

## 2018-07-10 ENCOUNTER — TELEPHONE (OUTPATIENT)
Dept: UROLOGY | Facility: CLINIC | Age: 63
End: 2018-07-10

## 2018-07-11 ENCOUNTER — HOSPITAL ENCOUNTER (OUTPATIENT)
Facility: HOSPITAL | Age: 63
Discharge: HOME | End: 2018-07-11
Attending: UROLOGY | Admitting: UROLOGY
Payer: MEDICARE

## 2018-07-11 ENCOUNTER — ANESTHESIA (OUTPATIENT)
Dept: SURGERY | Facility: HOSPITAL | Age: 63
End: 2018-07-11
Payer: MEDICARE

## 2018-07-11 VITALS
DIASTOLIC BLOOD PRESSURE: 79 MMHG | HEART RATE: 63 BPM | HEIGHT: 63 IN | SYSTOLIC BLOOD PRESSURE: 140 MMHG | WEIGHT: 159 LBS | BODY MASS INDEX: 28.17 KG/M2 | OXYGEN SATURATION: 100 % | RESPIRATION RATE: 16 BRPM | TEMPERATURE: 98 F

## 2018-07-11 DIAGNOSIS — R33.9 INCOMPLETE BLADDER EMPTYING: ICD-10-CM

## 2018-07-11 DIAGNOSIS — N32.81 OVERACTIVE BLADDER: ICD-10-CM

## 2018-07-11 DIAGNOSIS — G35 MS (MULTIPLE SCLEROSIS): ICD-10-CM

## 2018-07-11 DIAGNOSIS — R39.15 URINARY URGENCY: ICD-10-CM

## 2018-07-11 DIAGNOSIS — R35.0 FREQUENCY OF URINATION: Primary | ICD-10-CM

## 2018-07-11 DIAGNOSIS — N31.9 NEUROGENIC BLADDER: ICD-10-CM

## 2018-07-11 PROCEDURE — 37000009 HC ANESTHESIA EA ADD 15 MINS: Performed by: UROLOGY

## 2018-07-11 PROCEDURE — 37000008 HC ANESTHESIA 1ST 15 MINUTES: Performed by: UROLOGY

## 2018-07-11 PROCEDURE — 63600175 PHARM REV CODE 636 W HCPCS: Mod: JG | Performed by: UROLOGY

## 2018-07-11 PROCEDURE — D9220A PRA ANESTHESIA: Mod: CRNA,,, | Performed by: NURSE ANESTHETIST, CERTIFIED REGISTERED

## 2018-07-11 PROCEDURE — 63600175 PHARM REV CODE 636 W HCPCS: Performed by: STUDENT IN AN ORGANIZED HEALTH CARE EDUCATION/TRAINING PROGRAM

## 2018-07-11 PROCEDURE — 25000003 PHARM REV CODE 250: Performed by: STUDENT IN AN ORGANIZED HEALTH CARE EDUCATION/TRAINING PROGRAM

## 2018-07-11 PROCEDURE — 71000015 HC POSTOP RECOV 1ST HR: Performed by: UROLOGY

## 2018-07-11 PROCEDURE — 71000044 HC DOSC ROUTINE RECOVERY FIRST HOUR: Performed by: UROLOGY

## 2018-07-11 PROCEDURE — 36000706: Performed by: UROLOGY

## 2018-07-11 PROCEDURE — D9220A PRA ANESTHESIA: Mod: ANES,,, | Performed by: ANESTHESIOLOGY

## 2018-07-11 PROCEDURE — 63600175 PHARM REV CODE 636 W HCPCS: Performed by: NURSE ANESTHETIST, CERTIFIED REGISTERED

## 2018-07-11 PROCEDURE — 52287 CYSTOSCOPY CHEMODENERVATION: CPT | Mod: ,,, | Performed by: UROLOGY

## 2018-07-11 PROCEDURE — 71000016 HC POSTOP RECOV ADDL HR: Performed by: UROLOGY

## 2018-07-11 PROCEDURE — 36000707: Performed by: UROLOGY

## 2018-07-11 RX ORDER — LIDOCAINE HYDROCHLORIDE 10 MG/ML
1 INJECTION, SOLUTION EPIDURAL; INFILTRATION; INTRACAUDAL; PERINEURAL ONCE
Status: COMPLETED | OUTPATIENT
Start: 2018-07-11 | End: 2018-07-11

## 2018-07-11 RX ORDER — MIDAZOLAM HYDROCHLORIDE 1 MG/ML
INJECTION, SOLUTION INTRAMUSCULAR; INTRAVENOUS
Status: DISCONTINUED | OUTPATIENT
Start: 2018-07-11 | End: 2018-07-11

## 2018-07-11 RX ORDER — ONDANSETRON 8 MG/1
8 TABLET, ORALLY DISINTEGRATING ORAL EVERY 8 HOURS PRN
Status: DISCONTINUED | OUTPATIENT
Start: 2018-07-11 | End: 2018-07-11 | Stop reason: HOSPADM

## 2018-07-11 RX ORDER — FENTANYL CITRATE 50 UG/ML
INJECTION, SOLUTION INTRAMUSCULAR; INTRAVENOUS
Status: DISCONTINUED | OUTPATIENT
Start: 2018-07-11 | End: 2018-07-11

## 2018-07-11 RX ORDER — SODIUM CHLORIDE 9 MG/ML
INJECTION, SOLUTION INTRAVENOUS CONTINUOUS
Status: DISCONTINUED | OUTPATIENT
Start: 2018-07-11 | End: 2018-07-11 | Stop reason: HOSPADM

## 2018-07-11 RX ORDER — PROPOFOL 10 MG/ML
VIAL (ML) INTRAVENOUS
Status: DISCONTINUED | OUTPATIENT
Start: 2018-07-11 | End: 2018-07-11

## 2018-07-11 RX ORDER — CIPROFLOXACIN 2 MG/ML
400 INJECTION, SOLUTION INTRAVENOUS
Status: COMPLETED | OUTPATIENT
Start: 2018-07-11 | End: 2018-07-11

## 2018-07-11 RX ORDER — HYDROMORPHONE HYDROCHLORIDE 1 MG/ML
0.2 INJECTION, SOLUTION INTRAMUSCULAR; INTRAVENOUS; SUBCUTANEOUS EVERY 5 MIN PRN
Status: DISCONTINUED | OUTPATIENT
Start: 2018-07-11 | End: 2018-07-11 | Stop reason: HOSPADM

## 2018-07-11 RX ORDER — TRAMADOL HYDROCHLORIDE 50 MG/1
50 TABLET ORAL EVERY 6 HOURS PRN
Qty: 5 TABLET | Refills: 0 | Status: SHIPPED | OUTPATIENT
Start: 2018-07-11 | End: 2018-07-21

## 2018-07-11 RX ORDER — SODIUM CHLORIDE 0.9 % (FLUSH) 0.9 %
3 SYRINGE (ML) INJECTION
Status: DISCONTINUED | OUTPATIENT
Start: 2018-07-11 | End: 2018-07-11 | Stop reason: HOSPADM

## 2018-07-11 RX ORDER — LIDOCAINE HCL/PF 100 MG/5ML
SYRINGE (ML) INTRAVENOUS
Status: DISCONTINUED | OUTPATIENT
Start: 2018-07-11 | End: 2018-07-11

## 2018-07-11 RX ORDER — PROPOFOL 10 MG/ML
VIAL (ML) INTRAVENOUS CONTINUOUS PRN
Status: DISCONTINUED | OUTPATIENT
Start: 2018-07-11 | End: 2018-07-11

## 2018-07-11 RX ORDER — TRAMADOL HYDROCHLORIDE 50 MG/1
50 TABLET ORAL EVERY 4 HOURS PRN
Status: DISCONTINUED | OUTPATIENT
Start: 2018-07-11 | End: 2018-07-11 | Stop reason: HOSPADM

## 2018-07-11 RX ADMIN — FENTANYL CITRATE 50 MCG: 50 INJECTION, SOLUTION INTRAMUSCULAR; INTRAVENOUS at 01:07

## 2018-07-11 RX ADMIN — SODIUM CHLORIDE: 0.9 INJECTION, SOLUTION INTRAVENOUS at 11:07

## 2018-07-11 RX ADMIN — LIDOCAINE HYDROCHLORIDE 10 MG: 10 INJECTION, SOLUTION EPIDURAL; INFILTRATION; INTRACAUDAL; PERINEURAL at 11:07

## 2018-07-11 RX ADMIN — LIDOCAINE HYDROCHLORIDE 20 MG: 20 INJECTION, SOLUTION INTRAVENOUS at 01:07

## 2018-07-11 RX ADMIN — MIDAZOLAM HYDROCHLORIDE 2 MG: 1 INJECTION, SOLUTION INTRAMUSCULAR; INTRAVENOUS at 12:07

## 2018-07-11 RX ADMIN — LIDOCAINE HYDROCHLORIDE 50 MG: 20 INJECTION, SOLUTION INTRAVENOUS at 01:07

## 2018-07-11 RX ADMIN — PROPOFOL 10 MG: 10 INJECTION, EMULSION INTRAVENOUS at 01:07

## 2018-07-11 RX ADMIN — CIPROFLOXACIN 400 MG: 2 INJECTION, SOLUTION INTRAVENOUS at 12:07

## 2018-07-11 RX ADMIN — PROPOFOL 150 MCG/KG/MIN: 10 INJECTION, EMULSION INTRAVENOUS at 01:07

## 2018-07-11 NOTE — OP NOTE
[facility]  Operative Note    Date: 07/11/2018    Pre-Op Diagnosis: Multiple sclerosis, neurogenic bladder    Patient Active Problem List    Diagnosis Date Noted    Thoracic aorta atherosclerosis 05/24/2018    Bipolar affective disorder 05/24/2018    Overactive bladder 08/09/2017    Incomplete bladder emptying 07/21/2017    Osteoporosis 06/22/2017    Localized osteoporosis with current pathological fracture 05/30/2017    History of breast cancer 08/27/2014    Urgency-frequency syndrome 12/11/2013    Frequency of urination 12/03/2013    Urinary urgency 12/03/2013    MS (multiple sclerosis) 12/03/2013    Neurogenic bladder 12/03/2013       Post-Op Diagnosis: same    Procedure(s) Performed:   1.  Cystoscopy with bladder botox injection    Specimen(s): none    Staff Surgeon:  Dr. Jayesh Ross MD    Assistant Surgeon: Yogi Avila MD, Yogi Chang MD    Anesthesia: Monitored Local Anesthesia with Sedation    Indications: Silvana Quezada is a 62 y.o. female with MS who has a neurogenic bladder and performs CIC 4x/day. She has had good relief from bladder spasms with botox injections in the past.    Findings:   1. Cystoscopy significant for grade III trabeculations. Erythematous changes diffusely throughout bladder as well as cystitis cystica.  2. 200u Botox injected in 20ml throughout bladder detrusor. Good wheals raised.     Estimated Blood Loss: min    Drains: none    Procedure in Detail:  After informed consent was obtained the patient was brought to the cystoscopy suite and placed in the supine position.  SCDs were applied and working.  Anesthesia was administered.  When the patient was adequately sedated she was placed in the dorsal lithotomy position and prepped and draped in the usual sterile fashion.      A rigid cystoscope in a 22 Fr sheath was introduced into the patients's bladder via the urethra.  This passed easily.  Cystoscopy was performed which revealed the ureteral orifices in  their normal anatomic position bilaterally.  There was evidence of grade III trabeculations and diffuse erythematous changes to the bladder mucosa.     200 units of botox was injected into the detrusor muscle throughout the bladder.  Good wheals were raised.  The patient's bladder was drained and the cystoscope was removed.     The patient tolerated the procedure well and was transferred to the recovery room in stable condition.      Disposition:  The patient will follow up with  6 months. She was given prescriptions for tramadol.  She knows how to self-cath should she have any issues with retention.      Yogi Avila MD

## 2018-07-11 NOTE — TRANSFER OF CARE
"Anesthesia Transfer of Care Note    Patient: Silvana Quezada    Procedure(s) Performed: Procedure(s) (LRB):  CYSTOSCOPY (N/A)  INJECTION, BOTULINUM TOXIN, TYPE A 200 UNITS (N/A)    Patient location: PACU    Anesthesia Type: general    Transport from OR: Transported from OR on 6-10 L/min O2 by face mask with adequate spontaneous ventilation    Post pain: adequate analgesia    Post assessment: no apparent anesthetic complications and tolerated procedure well    Post vital signs: stable    Level of consciousness: awake and sedated    Nausea/Vomiting: no nausea/vomiting    Complications: none    Transfer of care protocol was followed      Last vitals:   Visit Vitals  BP (!) 112/52 (BP Location: Right arm, Patient Position: Lying)   Pulse 77   Temp 36.6 °C (97.9 °F) (Temporal)   Resp 18   Ht 5' 3" (1.6 m)   Wt 72.1 kg (159 lb)   SpO2 100%   Breastfeeding? No   BMI 28.17 kg/m²     "

## 2018-07-11 NOTE — PROGRESS NOTES
Pt able to ambulate with minimal assistance to restroom. Reports discomfort has improved since voiding.

## 2018-07-11 NOTE — PLAN OF CARE
Pt exhibiting no s/s of pain or nausea. Pt ready for discharge, AAOx4. Pt tolerating PO fluid intake. Discharge instructions given, reviewed with patient. Parties verbalized understanding of follow-up care and home care.

## 2018-07-11 NOTE — DISCHARGE INSTRUCTIONS
Cystoscopy    Cystoscopy is a procedure that lets your doctor look directly inside your urethra and bladder. It can be used to:  · Help diagnose a problem with your urethra, bladder, or kidneys.  · Take a sample (biopsy) of bladder or urethral tissue.  · Treat certain problems (such as removing kidney stones).  · Place a stent to bypass an obstruction.  · Take special X-rays of the kidneys.  Based on the findings, your doctor may recommend other tests or treatments.  What is a cystoscope?  A cystoscope is a telescope-like instrument that contains lenses and fiberoptics (small glass wires that make bright light). The cystoscope may be straight and rigid, or flexible to bend around curves in the urethra. The doctor may look directly into the cystoscope, or project the image onto a monitor.  Getting ready  · Ask your doctor if you should stop taking any medicines before the procedure.  · Ask whether you should avoid eating or drinking anything after midnight before the procedure.  · Follow any other instructions your doctor gives you.  Tell your doctor before the exam if you:  · Take any medicines, such as aspirin or blood thinners  · Have allergies to any medicines  · Are pregnant   The procedure  Cystoscopy is done in the doctors office, surgery center, or hospital. The doctor and a nurse are present during the procedure. It takes only a few minutes, longer if a biopsy, X-ray, or treatment needs to be done.  During the procedure:  · You lie on an exam table on your back, knees bent and legs apart. You are covered with a drape.  · Your urethra and the area around it are washed. Anesthetic jelly may be applied to numb the urethra. Other pain medicine is usually not needed. In some cases, you may be offered a mild sedative to help you relax. If a more extensive procedure is to be done, such as a biopsy or kidney stone removal, general anesthesia may be needed.  · The cystoscope is inserted. A sterile fluid is put  into the bladder to expand it. You may feel pressure from this fluid.  · When the procedure is done, the cystoscope is removed.  After the procedure  If you had a sedative, general anesthesia, or spinal anesthesia, you must have someone drive you home. Once youre home:  · Drink plenty of fluids.  · You may have burning or light bleeding when you urinate--this is normal.  · Medicines may be prescribed to ease any discomfort or prevent infection. Take these as directed.  · Call your doctor if you have heavy bleeding or blood clots, burning that lasts more than a day, a fever over 100°F  (38° C), or trouble urinating.  Date Last Reviewed: 1/1/2017 © 2000-2017 [employer]. [address] [place] [address], [place] [address]. All rights reserved. This information is not intended as a substitute for professional medical care. Always follow your healthcare professional's instructions.          Recovery After Procedural Sedation (Adult)  You have been given medicine by vein to make you sleep during your surgery. This may have included both a pain medicine and sleeping medicine. Most of the effects have worn off. But you may still have some drowsiness for the next 6 to 8 hours.  Home care  Follow these guidelines when you get home:  · For the next 8 hours, you should be watched by a responsible adult. This person should make sure your condition is not getting worse.  · Don't drink any alcohol for the next 24 hours.  · Don't drive, operate dangerous machinery, or make important business or personal decisions during the next 24 hours.  Note: Your healthcare provider may tell you not to take any medicine by mouth for pain or sleep in the next 4 hours. These medicines may react with the medicines you were given in the hospital. This could cause a much stronger response than usual.  Follow-up care  Follow up with your healthcare provider if you are not alert and back to your usual level of activity within 12 hours.  When to  seek medical advice  Call your healthcare provider right away if any of these occur:  · Drowsiness gets worse  · Weakness or dizziness gets worse  · Repeated vomiting  · You can't be awakened   Date Last Reviewed: 10/18/2016  © 2616-2531 [employer]. [address] [place] [address], [place] [address]. All rights reserved. This information is not intended as a substitute for professional medical care. Always follow your healthcare professional's instructions.

## 2018-07-11 NOTE — INTERVAL H&P NOTE
The patient has been examined and the H&P has been reviewed:    I concur with the findings and no changes have occurred since H&P was written.     Urine dipstick negative for all components    Anesthesia/Surgery risks, benefits and alternative options discussed and understood by patient/family.          Active Hospital Problems    Diagnosis  POA    Urinary urgency [R39.15]  Yes      Resolved Hospital Problems    Diagnosis Date Resolved POA   No resolved problems to display.

## 2018-07-11 NOTE — DISCHARGE SUMMARY
[facility]  Discharge Note  Short Stay    Admit Date: 7/11/2018    Discharge Date and Time: 07/11/2018 1:40 PM      Attending Physician: Jayesh Ross MD     Discharge Provider: Yogi Avila    Diagnoses:  Active Hospital Problems    Diagnosis  POA    Urinary urgency [R39.15]  Yes      Resolved Hospital Problems    Diagnosis Date Resolved POA   No resolved problems to display.       Discharged Condition: good    Hospital Course: Patient was admitted for cystoscopy, bladder botox injections and tolerated the procedure well with no complications. The patient was discharged home in good condition on the same day.       Final Diagnoses: Same as principal problem.    Disposition: Home or Self Care    Follow up/Patient Instructions:    Medications:  Reconciled Home Medications: Current Discharge Medication List      START taking these medications    Details   traMADol (ULTRAM) 50 mg tablet Take 1 tablet (50 mg total) by mouth every 6 (six) hours as needed for Pain.  Qty: 5 tablet, Refills: 0         CONTINUE these medications which have NOT CHANGED    Details   aspirin 325 MG tablet Take 325 mg by mouth every 4 (four) hours as needed for Pain.      !! buPROPion (WELLBUTRIN SR) 100 MG TBSR 12 hr tablet TAKE 2 TABLETS BY MOUTH EVERY MORNING  Qty: 180 tablet, Refills: 0    Associated Diagnoses: Bipolar I disorder, most recent episode depressed, in partial remission      !! buPROPion (WELLBUTRIN SR) 150 MG TBSR 12 hr tablet Take 1 tablet (150 mg total) by mouth every evening.  Qty: 90 tablet, Refills: 1    Comments: **Patient requests 90 days supply**  Associated Diagnoses: Bipolar I disorder, most recent episode depressed, in partial remission      calcium-vitamin D3 (CALCIUM 500 + D) 500 mg(1,250mg) -200 unit per tablet Take 1 tablet by mouth 2 (two) times daily with meals.      folic acid (FOLVITE) 1 MG tablet Take 1 tablet (1,000 mcg total) by mouth once daily.  Qty: 90 tablet, Refills: 3    Associated  Diagnoses: Bipolar I disorder, most recent episode depressed, in partial remission      haloperidol (HALDOL) 1 MG tablet Take 1 tablet (1 mg total) by mouth every evening.  Qty: 90 tablet, Refills: 1    Comments: **Patient requests 90 days supply**  Associated Diagnoses: Bipolar I disorder, most recent episode depressed, in partial remission      ibuprofen (ADVIL,MOTRIN) 200 MG tablet Take 200 mg by mouth every 6 (six) hours as needed for Pain.      magnesium oxide (MAG-OX) 400 mg tablet Take 500 mg by mouth once daily.      OXcarbazepine (TRILEPTAL) 150 MG Tab Take 1 tablet (150 mg total) by mouth 2 (two) times daily.  Qty: 180 tablet, Refills: 1    Comments: **Patient requests 90 days supply**  Associated Diagnoses: Bipolar I disorder, most recent episode depressed, in partial remission      ascorbic acid, vitamin C, (VITAMIN C) 500 MG tablet Take 500 mg by mouth once daily.      nitrofurantoin (MACRODANTIN) 100 MG capsule Take 1 capsule (100 mg total) by mouth once daily.  Qty: 100 capsule, Refills: 0    Associated Diagnoses: Chronic UTI      nitrofurantoin, macrocrystal-monohydrate, (MACROBID) 100 MG capsule Take 1 capsule (100 mg total) by mouth 2 (two) times daily. for 10 days  Qty: 20 capsule, Refills: 0    Associated Diagnoses: Chronic UTI       !! - Potential duplicate medications found. Please discuss with provider.          Discharge Procedure Orders  Diet general     Call MD for:  temperature >100.4     Call MD for:  persistent nausea and vomiting     Call MD for:  severe uncontrolled pain     Call MD for:  difficulty breathing, headache or visual disturbances     Call MD for:  hives     Call MD for:  persistent dizziness or light-headedness     Call MD for:  extreme fatigue     Activity as tolerated       Follow-up Information     Jayesh Ross MD In 6 months.    Specialty:  Urology  Why:  botox injection  Contact information:  [address] [place] [id number] [phone]

## 2018-07-13 NOTE — ANESTHESIA POSTPROCEDURE EVALUATION
"Anesthesia Post Evaluation    Patient: Silvana Quezada    Procedure(s) Performed: Procedure(s) (LRB):  CYSTOSCOPY (N/A)  INJECTION, BOTULINUM TOXIN, TYPE A 200 UNITS (N/A)    Final Anesthesia Type: general  Patient location during evaluation: PACU  Patient participation: Yes- Able to Participate  Level of consciousness: awake and alert and oriented  Post-procedure vital signs: reviewed and stable  Pain management: adequate  Airway patency: patent  PONV status at discharge: No PONV  Anesthetic complications: no      Cardiovascular status: hemodynamically stable  Respiratory status: unassisted and spontaneous ventilation  Hydration status: euvolemic  Follow-up not needed.        Visit Vitals  BP (!) 140/79 (BP Location: Right arm, Patient Position: Lying)   Pulse 63   Temp 36.6 °C (97.9 °F) (Temporal)   Resp 16   Ht 5' 3" (1.6 m)   Wt 72.1 kg (159 lb)   SpO2 100%   Breastfeeding? No   BMI 28.17 kg/m²       Pain/Yaakov Score: No Data Recorded      "

## 2018-08-02 ENCOUNTER — OFFICE VISIT (OUTPATIENT)
Dept: PSYCHIATRY | Facility: CLINIC | Age: 63
End: 2018-08-02
Payer: MEDICARE

## 2018-08-02 VITALS
HEART RATE: 68 BPM | SYSTOLIC BLOOD PRESSURE: 138 MMHG | HEIGHT: 63 IN | DIASTOLIC BLOOD PRESSURE: 87 MMHG | BODY MASS INDEX: 28.46 KG/M2 | WEIGHT: 160.63 LBS

## 2018-08-02 DIAGNOSIS — F31.75 BIPOLAR I DISORDER, MOST RECENT EPISODE DEPRESSED, IN PARTIAL REMISSION: Primary | ICD-10-CM

## 2018-08-02 PROCEDURE — 3008F BODY MASS INDEX DOCD: CPT | Mod: CPTII,S$GLB,, | Performed by: PSYCHIATRY & NEUROLOGY

## 2018-08-02 PROCEDURE — 99213 OFFICE O/P EST LOW 20 MIN: CPT | Mod: S$GLB,,, | Performed by: PSYCHIATRY & NEUROLOGY

## 2018-08-02 PROCEDURE — 99999 PR PBB SHADOW E&M-EST. PATIENT-LVL III: CPT | Mod: PBBFAC,,, | Performed by: PSYCHIATRY & NEUROLOGY

## 2018-08-02 RX ORDER — OXCARBAZEPINE 150 MG/1
150 TABLET, FILM COATED ORAL 2 TIMES DAILY
Qty: 180 TABLET | Refills: 1 | Status: SHIPPED | OUTPATIENT
Start: 2018-08-02 | End: 2018-11-09 | Stop reason: SDUPTHER

## 2018-08-02 RX ORDER — FOLIC ACID 1 MG/1
1000 TABLET ORAL DAILY
Qty: 90 TABLET | Refills: 3 | Status: SHIPPED | OUTPATIENT
Start: 2018-08-02 | End: 2018-11-09 | Stop reason: SDUPTHER

## 2018-08-02 RX ORDER — HALOPERIDOL 1 MG/1
1 TABLET ORAL NIGHTLY
Qty: 90 TABLET | Refills: 1 | Status: SHIPPED | OUTPATIENT
Start: 2018-08-02 | End: 2018-11-09 | Stop reason: SDUPTHER

## 2018-08-02 RX ORDER — BUPROPION HYDROCHLORIDE 100 MG/1
200 TABLET, EXTENDED RELEASE ORAL EVERY MORNING
Qty: 180 TABLET | Refills: 1 | Status: SHIPPED | OUTPATIENT
Start: 2018-08-02 | End: 2018-11-09 | Stop reason: SDUPTHER

## 2018-08-02 RX ORDER — BUPROPION HYDROCHLORIDE 150 MG/1
150 TABLET, EXTENDED RELEASE ORAL NIGHTLY
Qty: 90 TABLET | Refills: 1 | Status: SHIPPED | OUTPATIENT
Start: 2018-08-02 | End: 2018-11-09 | Stop reason: SDUPTHER

## 2018-08-09 NOTE — PROGRESS NOTES
Outpatient Psychiatry Follow-Up Visit (MD/NP)    8/2/2018    Clinical Status of Patient:  Outpatient (Ambulatory)    Chief Complaint:  Silvana Quezada is a 62 y.o. female who presents today for follow-up of Mood Swings    Met with patient.      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  Pt admits that this has turned into a much more difficult job than anticipated.  She was told that some persons are born caregivers and some are not.  She was asked to accept her lack of desire for this role without shame.  Other options including sitters and assisted living were discussed.  Pt would be able to spend more time away from the house.  She has an online presence but she has not been working lately.  She intends to interview with the [employer] program again.    Medications were reviewed.  Pt agrees that they are effective and they were refilled unchanged.  Pt was asked to return in 3 months -- earlier than usual due to above stressor.    Psychotherapy:  · Target symptoms: mood swings  · Why chosen therapy is appropriate versus another modality: relevant to diagnosis, patient responds to this modality  · Outcome monitoring methods: self-report, observation  · Therapeutic intervention type: insight oriented psychotherapy, supportive psychotherapy  · Topics discussed/themes: illness/death of a loved one, financial stressors, life stage transitional issues  · The patient's response to the intervention is motivated. The patient's progress toward treatment goals is good.   · Duration of intervention: 27 min.    Review of Systems   · PSYCHIATRIC: Pertinant items are noted in the narrative.    Past Medical, Family and Social History: The patient's past medical, family and social history have  been reviewed and updated as appropriate within the electronic medical record - see encounter notes.    Compliance: yes    Side effects: None    Risk Parameters:  Patient reports no suicidal ideation  Patient reports no homicidal ideation  Patient reports no self-injurious behavior  Patient reports no violent behavior    Exam (detailed: at least 9 elements; comprehensive: all 15 elements)   Constitutional  Vitals:  Most recent vital signs, dated less than 90 days prior to this appointment, were reviewed.    Vitals:    08/02/18 1417   BP: 138/87   Pulse: 68        General:  age appropriate, overweight     Musculoskeletal  Muscle Strength/Tone:  no rigidity, no dyskinesia, no dystonia, no tremor   Gait & Station:  non-ataxic     Psychiatric  Speech:  articulation error, spontaneous   Mood & Affect:  serious  appropriate to context   Thought Process:  goal-directed, logical   Associations:  intact   Thought Content:  normal, no suicidality, no homicidality, delusions, or paranoia   Insight:  has awareness of illness   Judgement: behavior is adequate to circumstances   Orientation:  grossly intact   Memory: intact for content of interview   Language: grossly intact   Attention Span & Concentration:  able to focus   Fund of Knowledge:  intact and appropriate to age and level of education     Assessment and Diagnosis   Status/Progress: Based on the examination today, the patient's problem(s) is/are adequately but not ideally controlled.  New problems have not been presented today.   Comorbidities are not complicating management of the primary condition.  The working differential for this patient includes caregiver status.    General Impression:  Pt is currently unhappy in her caregiver role.    Axis I: MOOD DISORDERS; Bipolar I Disorder, Most Recent Episode Manic: 6.7.6.In Full Remission (296.46).  Axis II: NO DIAGNOSIS ON AXIS II (V71.09)  Axis III: healthy  Axis IV: economic problems and problems with primary support  group  Axis V: 81-90 Absent or minimal symptoms (e.g. mild anxiety before an exam), good functioning in all areas, interested in a wide variety of activities, socially effective, generally satisfied with life, no more than everyday problems or concerns (e.g. an occasional argument with family members)    Intervention/Counseling/Treatment Plan   · Medication Management: Continue current medications.       Return to Clinic: 3 months

## 2018-08-30 ENCOUNTER — OFFICE VISIT (OUTPATIENT)
Dept: UROLOGY | Facility: CLINIC | Age: 63
End: 2018-08-30
Payer: MEDICARE

## 2018-08-30 VITALS
HEIGHT: 63 IN | HEART RATE: 72 BPM | WEIGHT: 160 LBS | BODY MASS INDEX: 28.35 KG/M2 | DIASTOLIC BLOOD PRESSURE: 67 MMHG | SYSTOLIC BLOOD PRESSURE: 103 MMHG

## 2018-08-30 DIAGNOSIS — G35 MS (MULTIPLE SCLEROSIS): ICD-10-CM

## 2018-08-30 DIAGNOSIS — N30.00 ACUTE CYSTITIS WITHOUT HEMATURIA: ICD-10-CM

## 2018-08-30 DIAGNOSIS — N32.81 OVERACTIVE BLADDER: Primary | ICD-10-CM

## 2018-08-30 DIAGNOSIS — N31.9 NEUROGENIC BLADDER: ICD-10-CM

## 2018-08-30 PROCEDURE — 87186 SC STD MICRODIL/AGAR DIL: CPT

## 2018-08-30 PROCEDURE — 87086 URINE CULTURE/COLONY COUNT: CPT

## 2018-08-30 PROCEDURE — 87077 CULTURE AEROBIC IDENTIFY: CPT

## 2018-08-30 PROCEDURE — 99999 PR PBB SHADOW E&M-EST. PATIENT-LVL III: CPT | Mod: PBBFAC,,, | Performed by: UROLOGY

## 2018-08-30 PROCEDURE — 99214 OFFICE O/P EST MOD 30 MIN: CPT | Mod: S$GLB,,, | Performed by: UROLOGY

## 2018-08-30 PROCEDURE — 87088 URINE BACTERIA CULTURE: CPT

## 2018-08-30 PROCEDURE — 3008F BODY MASS INDEX DOCD: CPT | Mod: CPTII,S$GLB,, | Performed by: UROLOGY

## 2018-08-30 RX ORDER — SULFAMETHOXAZOLE AND TRIMETHOPRIM 800; 160 MG/1; MG/1
1 TABLET ORAL 2 TIMES DAILY
Qty: 14 TABLET | Refills: 0 | Status: SHIPPED | OUTPATIENT
Start: 2018-08-30 | End: 2018-09-06

## 2018-08-30 NOTE — PROGRESS NOTES
CC: s/p botox injection on 7/11/18    Silvana Quezada is a 62 y.o. woman who is here for the evaluation of Post-op Evaluation and Urinary Frequency  hx of MS affecting her bladder function.  She is able to void on her own but has been doing CIC 4 x a day.  Reports that she is dry in-between catheterization.    After underwent botox injection to the bladder, she has no problem with bladder control with less urgency and frequency.  Last botox injection was on 7/11/18.  Procedure(s) Performed:   1.  Cystoscopy with bladder botox injection  Findings:   1. Cystoscopy significant for grade III trabeculations. Erythematous changes diffusely throughout bladder as well as cystitis cystica.  2. 200u Botox injected in 20ml throughout bladder detrusor. Good wheals raised.     Currently, she is doing SIC 4 x a day and voids on her own.  She remains perfectly continent between catheterization.  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    Lately she has been drinking more iced tea in order to stay awake, so that she can take care of her elderly mother.  With drinking more iced tea, she noticed that she has to use a bathroom more often.  Denies fever or chills.  No dysuria.    Past Medical History:   Diagnosis Date    Bipolar 1 disorder     Breast cancer     right    Closed fracture of proximal end of right humerus 3/9/2015    Depression     History of right shoulder fracture     MS (multiple sclerosis)     Osteoporosis, unspecified      Past Surgical History:   Procedure Laterality Date    BRAIN SURGERY  1991    BREAST BIOPSY Left 2009    core    BREAST LUMPECTOMY Right 2001    FOOT SURGERY  october 2013    HYSTERECTOMY       Social History     Tobacco Use    Smoking status: Never Smoker    Smokeless tobacco: Never Used   Substance Use Topics    Alcohol use: Yes     Alcohol/week: 1.0 oz     Types: 2 Standard drinks or equivalent per week     Comment: occaisionally    Drug use: No     Family History   Problem  Relation Age of Onset    Skin cancer Mother     Breast cancer Mother     Stroke Mother     Emphysema Father     Breast cancer Maternal Aunt     Dementia Brother     Diabetes Brother     No Known Problems Daughter     Autism Son     No Known Problems Brother     Autism Daughter      Allergy:  Review of patient's allergies indicates:   Allergen Reactions    Adhesive      blisters    Keflex [cephalexin] Rash     Outpatient Encounter Medications as of 8/30/2018   Medication Sig Dispense Refill    ascorbic acid, vitamin C, (VITAMIN C) 500 MG tablet Take 500 mg by mouth once daily.      aspirin 325 MG tablet Take 325 mg by mouth every 4 (four) hours as needed for Pain.      buPROPion (WELLBUTRIN SR) 100 MG TBSR 12 hr tablet Take 2 tablets (200 mg total) by mouth every morning. 180 tablet 1    buPROPion (WELLBUTRIN SR) 150 MG TBSR 12 hr tablet Take 1 tablet (150 mg total) by mouth every evening. 90 tablet 1    calcium-vitamin D3 (CALCIUM 500 + D) 500 mg(1,250mg) -200 unit per tablet Take 1 tablet by mouth 2 (two) times daily with meals.      folic acid (FOLVITE) 1 MG tablet Take 1 tablet (1,000 mcg total) by mouth once daily. 90 tablet 3    haloperidol (HALDOL) 1 MG tablet Take 1 tablet (1 mg total) by mouth every evening. 90 tablet 1    ibuprofen (ADVIL,MOTRIN) 200 MG tablet Take 200 mg by mouth every 6 (six) hours as needed for Pain.      magnesium oxide (MAG-OX) 400 mg tablet Take 500 mg by mouth once daily.      nitrofurantoin (MACRODANTIN) 100 MG capsule Take 1 capsule (100 mg total) by mouth once daily. 100 capsule 0    OXcarbazepine (TRILEPTAL) 150 MG Tab Take 1 tablet (150 mg total) by mouth 2 (two) times daily. 180 tablet 1    sulfamethoxazole-trimethoprim 800-160mg (BACTRIM DS) 800-160 mg Tab Take 1 tablet by mouth 2 (two) times daily. for 7 days 14 tablet 0     No facility-administered encounter medications on file as of 8/30/2018.      Review of Systems   General ROS: GENERAL:  No  weight gain or loss  SKIN:  No rashes or lacerations  HEAD:  No headaches  EYES:  No exophthalmos, jaundice or blindness  EARS:  No dizziness, tinnitus or hearing loss  NOSE:  No changes in smell  MOUTH & THROAT:  No dyskinetic movements or obvious goiter  CHEST:  No shortness of breath, hyperventilation or cough  CARDIOVASCULAR:  No tachycardia or chest pain  ABDOMEN:  No nausea, vomiting, pain, constipation or diarrhea  URINARY:  No frequency, dysuria or sexual dysfunction  ENDOCRINE:  No polydipsia, polyuria  MUSCULOSKELETAL:  No pain or stiffness of the joints  NEUROLOGIC:  No weakness, sensory changes, seizures, confusion, memory loss, tremor or other abnormal movements  Physical Exam     Vitals:    08/30/18 1332   BP: 103/67   Pulse: 72     Physical Examination:   General appearance - alert, well appearing, and in no distress  Mental status - alert, oriented to person, place, and time  Eyes - pupils equal and reactive, extraocular eye movements intact  Ears - bilateral TM's and external ear canals normal  Nose - normal and patent, no erythema, discharge or polyps  Mouth - mucous membranes moist, pharynx normal without lesions  Neck - supple, no significant adenopathy  Chest - clear to auscultation, no wheezes, rales or rhonchi, symmetric air entry  Breast- no mass or lumps or tenderness  Heart - normal rate, regular rhythm, normal S1, S2, no murmurs, rubs, clicks or gallops  Abdomen - soft, nontender, nondistended, no masses or organomegaly of liver and spleen, no hernia noted.  Pelvic - normal external genitalia   Urethra normal meatus with no lesion or prolapse. No tenderness or discharge  Bladder- no tenderness  atrophic mucosa  Rectal - normal rectal, no masses  Back exam - full range of motion, no tenderness, palpable spasm or pain on motion  LE - No edema noted.  Neurological - alert, oriented, normal speech, no focal findings or movement disorder noted  Musculoskeletal - no joint tenderness, deformity  or swelling    LABS:  Lab Results   Component Value Date    CREATININE 0.8 07/09/2018    CREATININE 0.8 07/09/2018    CREATININE 0.7 05/17/2017     Urine Culture, Routine   Date Value Ref Range Status   06/28/2018 KLEBSIELLA OXYTOCA  >100,000 cfu/ml    Final     UA positive for leukocytes and nitrites    Assessment and Plan:  Silvana was seen today for post-op evaluation and urinary frequency.    Diagnoses and all orders for this visit:    Overactive bladder    Neurogenic bladder    MS (multiple sclerosis)    Acute cystitis without hematuria  -     sulfamethoxazole-trimethoprim 800-160mg (BACTRIM DS) 800-160 mg Tab; Take 1 tablet by mouth 2 (two) times daily. for 7 days  -     Urine culture    continue SIC 4 x a day indefinitely.  Treat her for UTI  Urine culture today.  I spent 25 minutes with the patient of which more than half was spent in direct consultation with the patient in regards to our treatment and plan.      Follow-up:  Follow-up in about 6 months (around 2/28/2019).

## 2018-09-02 LAB — BACTERIA UR CULT: NORMAL

## 2018-10-25 ENCOUNTER — TELEPHONE (OUTPATIENT)
Dept: INTERNAL MEDICINE | Facility: CLINIC | Age: 63
End: 2018-10-25

## 2018-10-25 DIAGNOSIS — Z12.39 BREAST CANCER SCREENING: Primary | ICD-10-CM

## 2018-10-25 NOTE — TELEPHONE ENCOUNTER
----- Message from Vanessa Skelton sent at 10/25/2018 12:45 PM CDT -----  Contact: [phone]  Caller is requesting to schedule their annual screening mammogram appointment. Order is not listed in Epic.  Please enter order and contact patient to schedule.    Where would they like the mammogram performed?:  [place]  Additional information:

## 2018-10-29 ENCOUNTER — HOSPITAL ENCOUNTER (OUTPATIENT)
Dept: RADIOLOGY | Facility: HOSPITAL | Age: 63
Discharge: HOME | End: 2018-10-29
Attending: INTERNAL MEDICINE
Payer: MEDICARE

## 2018-10-29 DIAGNOSIS — Z12.39 BREAST CANCER SCREENING: ICD-10-CM

## 2018-10-29 PROCEDURE — 77067 SCR MAMMO BI INCL CAD: CPT | Mod: 26,,, | Performed by: RADIOLOGY

## 2018-10-29 PROCEDURE — 77063 BREAST TOMOSYNTHESIS BI: CPT | Mod: TC,PO

## 2018-10-29 PROCEDURE — 77063 BREAST TOMOSYNTHESIS BI: CPT | Mod: 26,,, | Performed by: RADIOLOGY

## 2018-10-29 PROCEDURE — 77067 SCR MAMMO BI INCL CAD: CPT | Mod: TC,PO

## 2018-11-09 ENCOUNTER — OFFICE VISIT (OUTPATIENT)
Dept: PSYCHIATRY | Facility: CLINIC | Age: 63
End: 2018-11-09
Payer: MEDICARE

## 2018-11-09 VITALS
BODY MASS INDEX: 28.71 KG/M2 | DIASTOLIC BLOOD PRESSURE: 69 MMHG | HEART RATE: 82 BPM | HEIGHT: 63 IN | SYSTOLIC BLOOD PRESSURE: 128 MMHG | WEIGHT: 162.06 LBS

## 2018-11-09 DIAGNOSIS — F31.32 BIPOLAR AFFECTIVE DISORDER, DEPRESSED, MODERATE DEGREE: Primary | ICD-10-CM

## 2018-11-09 DIAGNOSIS — Z79.899 ENCOUNTER FOR LONG-TERM (CURRENT) USE OF HIGH-RISK MEDICATION: ICD-10-CM

## 2018-11-09 PROCEDURE — 99999 PR PBB SHADOW E&M-EST. PATIENT-LVL III: CPT | Mod: PBBFAC,,, | Performed by: PSYCHIATRY & NEUROLOGY

## 2018-11-09 PROCEDURE — 3008F BODY MASS INDEX DOCD: CPT | Mod: CPTII,S$GLB,, | Performed by: PSYCHIATRY & NEUROLOGY

## 2018-11-09 PROCEDURE — 90833 PSYTX W PT W E/M 30 MIN: CPT | Mod: S$GLB,,, | Performed by: PSYCHIATRY & NEUROLOGY

## 2018-11-09 PROCEDURE — 99213 OFFICE O/P EST LOW 20 MIN: CPT | Mod: S$GLB,,, | Performed by: PSYCHIATRY & NEUROLOGY

## 2018-11-09 RX ORDER — OXCARBAZEPINE 150 MG/1
150 TABLET, FILM COATED ORAL 2 TIMES DAILY
Qty: 180 TABLET | Refills: 1 | Status: SHIPPED | OUTPATIENT
Start: 2018-11-09 | End: 2019-08-27 | Stop reason: SDUPTHER

## 2018-11-09 RX ORDER — BUPROPION HYDROCHLORIDE 150 MG/1
150 TABLET, EXTENDED RELEASE ORAL NIGHTLY
Qty: 90 TABLET | Refills: 1 | Status: SHIPPED | OUTPATIENT
Start: 2018-11-09 | End: 2019-08-27 | Stop reason: SDUPTHER

## 2018-11-09 RX ORDER — HALOPERIDOL 1 MG/1
2 TABLET ORAL NIGHTLY
Qty: 180 TABLET | Refills: 1 | Status: SHIPPED | OUTPATIENT
Start: 2018-11-09 | End: 2019-04-23 | Stop reason: RX

## 2018-11-09 RX ORDER — FOLIC ACID 1 MG/1
1000 TABLET ORAL DAILY
Qty: 90 TABLET | Refills: 3 | Status: SHIPPED | OUTPATIENT
Start: 2018-11-09 | End: 2019-08-27 | Stop reason: SDUPTHER

## 2018-11-09 RX ORDER — BUPROPION HYDROCHLORIDE 100 MG/1
200 TABLET, EXTENDED RELEASE ORAL EVERY MORNING
Qty: 180 TABLET | Refills: 1 | Status: SHIPPED | OUTPATIENT
Start: 2018-11-09 | End: 2019-08-14 | Stop reason: SDUPTHER

## 2018-11-09 NOTE — PATIENT INSTRUCTIONS
Have fasting lab drawn one morning next month.    Schedule appointment for individual psychotherapy.    Call if problems arise.  Go to ER if in crisis.

## 2018-11-17 NOTE — PROGRESS NOTES
Outpatient Psychiatry Follow-Up Visit (MD/NP)    11/9/2018    Clinical Status of Patient:  Outpatient (Ambulatory)    Chief Complaint:  Silvana Quezada is a 63 y.o. female who presents today for follow-up of Depression; Anxiety; and Thoughts Of Death/suicide    Met with patient.      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  She is not currently working, and it was suggested that she find some job outside of the house, while her  is available to help her mother.  She feels that they are all economically locked into their present arrangement, however.  A separate appointment with an individual psychotherapist was recommended.    Medications were reviewed.  Pt has a new c/o insomnia, possibly secondary to ruminations at night.  Possible med changes were discussed.  She agreed to a trial increase of Haldol at bedtime.  She is aware of potential adverse effects.  The need for new lab was emphasized.  Pt will have this drawn within the next month.      Psychotherapy:  · Target symptoms: mood swings  · Why chosen therapy is appropriate versus another modality: relevant to diagnosis, patient responds to this modality  · Outcome monitoring methods: self-report, observation  · Therapeutic intervention type: insight oriented psychotherapy, supportive psychotherapy  · Topics discussed/themes: illness/death of a loved one, financial stressors, life stage transitional issues  · The patient's response to the intervention is motivated. The patient's progress toward treatment goals is fair.   · Duration of intervention: 28 min.    Review of Systems   · PSYCHIATRIC: Pertinant items are noted in the narrative.    Past Medical, Family and Social History: The patient's past medical, family and social history have been reviewed and updated as  appropriate within the electronic medical record - see encounter notes.    Compliance: yes    Side effects: None    Risk Parameters:  Patient reports no suicidal ideation  Patient reports no homicidal ideation  Patient reports no self-injurious behavior  Patient reports no violent behavior    Exam (detailed: at least 9 elements; comprehensive: all 15 elements)   Constitutional  Vitals:  Most recent vital signs, dated less than 90 days prior to this appointment, were reviewed.    Vitals:    11/09/18 1304   BP: 128/69   Pulse: 82        General:  age appropriate, overweight     Musculoskeletal  Muscle Strength/Tone:  no rigidity, no dyskinesia, no dystonia, no tremor   Gait & Station:  non-ataxic     Psychiatric  Speech:  articulation error, spontaneous   Mood & Affect:  sad  mood-congruent   Thought Process:  goal-directed, logical   Associations:  intact   Thought Content:  normal, no suicidality, no homicidality, delusions, or paranoia   Insight:  has awareness of illness   Judgement: behavior is adequate to circumstances   Orientation:  grossly intact   Memory: intact for content of interview   Language: grossly intact   Attention Span & Concentration:  able to focus   Fund of Knowledge:  intact and appropriate to age and level of education     Assessment and Diagnosis   Status/Progress: Based on the examination today, the patient's problem(s) is/are failing to respond as expected to treatment.  New problems have not been presented today.   Comorbidities are not complicating management of the primary condition.  The working differential for this patient includes caregiver status.    General Impression:  Pt is currently unhappy in her caregiver role -- a problem that medications alone cannot overcome.    Axis I: MOOD DISORDERS; Bipolar I Disorder, Most Recent Episode Manic: 6.7.6.In Full Remission (296.46).  Axis II: NO DIAGNOSIS ON AXIS II (V71.09)  Axis III: healthy  Axis IV: economic problems and problems with  primary support group  Axis V: 61-70 Some mild symptoms (e.g. depressed mood and mild insomnia) OR some difficulty in social, occupational, or school functioning (e.g. occasional truancy, or theft within the household), but generally functioning pretty well, has some meaningful interpersonal relationships    Intervention/Counseling/Treatment Plan   · Medication Management: Increase Haldol to 2 mg qHS.  Continue other medications unchanged.   · Schedule an appointment with an individual psychotherapist.   · Lab:  CMP      Return to Clinic: 3 months

## 2018-12-12 DIAGNOSIS — F31.75 BIPOLAR I DISORDER, MOST RECENT EPISODE DEPRESSED, IN PARTIAL REMISSION: ICD-10-CM

## 2018-12-12 RX ORDER — FOLIC ACID 1 MG/1
TABLET ORAL
Qty: 90 TABLET | Refills: 0 | Status: SHIPPED | OUTPATIENT
Start: 2018-12-12 | End: 2019-04-25

## 2019-01-28 NOTE — TELEPHONE ENCOUNTER
----- Message from Alexandra Littlejohn LPN sent at 1/25/2019  3:21 PM CST -----  Contact: Self- [phone]      ----- Message -----  From: Mariajose Carlton  Sent: 1/25/2019   2:58 PM  To: Cody ROSALES Staff    Ross- pt called to have catheter order switched from [employer] to [facility]- new insurance plan- only number given is [phone] and the name is Sara with [facility]- please contact pt at [phone]

## 2019-01-28 NOTE — TELEPHONE ENCOUNTER
Pt notified that I will send note and rx . She prefers 12 fr speedicath compact and does sic four times a day. She is aware that I will request that brand, but it is up to her insurance for coverage.

## 2019-02-12 ENCOUNTER — OFFICE VISIT (OUTPATIENT)
Dept: PSYCHIATRY | Facility: CLINIC | Age: 64
End: 2019-02-12
Payer: COMMERCIAL

## 2019-02-12 DIAGNOSIS — F31.32 BIPOLAR I DISORDER, MOST RECENT EPISODE DEPRESSED, MODERATE: Primary | ICD-10-CM

## 2019-02-12 PROCEDURE — 90791 PR PSYCHIATRIC DIAGNOSTIC EVALUATION: ICD-10-PCS | Mod: S$GLB,,, | Performed by: SOCIAL WORKER

## 2019-02-12 PROCEDURE — 90791 PSYCH DIAGNOSTIC EVALUATION: CPT | Mod: S$GLB,,, | Performed by: SOCIAL WORKER

## 2019-02-12 PROCEDURE — 99499 UNLISTED E&M SERVICE: CPT | Mod: S$GLB,,, | Performed by: SOCIAL WORKER

## 2019-02-12 PROCEDURE — 99499 RISK ADDL DX/OHS AUDIT: ICD-10-PCS | Mod: S$GLB,,, | Performed by: SOCIAL WORKER

## 2019-02-12 NOTE — PROGRESS NOTES
"Psychiatry Initial Visit (PhD/LCSW)  Diagnostic Interview - CPT 47154    Date: 2/12/2019    [facility]    Referral source: Dr. Munoz    Clinical status of patient: Outpatient    Silvana Quezada, a 63 y.o. female, for initial evaluation visit.  Met with patient.    Chief complaint/reason for encounter: depression and mood swings    History of present illness: Patient presents today due to "very stressful" living situation.  She explains she lives with 93 year old mother and .  They live in mother's home.  Patient speaks about various jobs she's held and says she hopes to eventually return to work.  She stopped working 7-8 years ago to care for mother, who had a stroke.  Patient notes she's had her own health issues, as she was diagnosed with multiple sclerosis in 1991.  Patient says she has hx bipolar disorder - was diagnosed in 1994, "after a nervous breakdown."  She states, "It was too much for me - I had two children at home and then brought the new baby home."  She recalls being suicidal when she was pregnant with her daughter, but wouldn't do it, because she realized she would also kill her unborn baby.  She denies ever having any SAs.  She denies current SI, and denies ever having any intent or plan when she's had suicidal thoughts.  She speaks about learning rap songs, and at one point, raps a song she knows.  She mentions she is a "fixture in her neighborhood, and likes that."  She explains she sometimes worries about things, and may have had panic attacks previously.  She does not feel worry interferes with her daily life.  Patient endorses depressive symptoms and attributes these symptoms to the stress of caregiving responsibilities with mother - "My  handles it better than I do."  Patient says she has noticed depressive sx recently - "I cried in Dr. Munoz's office, and he thought I should come to counseling."  She is occasionally tearful today, but says, "I feel better I was " "able to cry in front of you."  Patient recalls a hx being bullied when she was a child.  She denies any hx trauma or abuse.  She feels she can benefit from counseling.              Pain: 0    Symptoms:   · Mood: depressed mood, diminished interest, insomnia, fatigue, worthlessness/guilt, poor concentration, catherine, and tearfulness  · Anxiety: denied; sometimes worries, but does not feel it is excessive  · Substance abuse: denied  · Cognitive functioning: denied  · Health behaviors: multiple sclerosis, osteoporosis, hx breast cancer, bladder issues     Psychiatric history: prior inpatient treatment, has participated in counseling/psychotherapy on an outpatient basis in the past and currently under psychiatric care with Dr. Munoz; hx one psych hospitalization in  at [facility], due to a manic episode ("was walking around the street without any panties on"); denies ever having SA; has hx suicidal thoughts, but denies ever having intent or plan; denies current SI; last counseling was many years ago; denies hx psychosis; mother has a gun in the home, but patient says she doesn't have access - "I don't know where it is and wouldn't want to use it."     Medical history: multiple sclerosis, osteoporosis, hx breast cancer, bladder issues     Family history of psychiatric illness: daughter and late son with autism    Social history (marriage, employment, etc.): Patient lives with  and 93 year old mother.  She has 2 children - 35 year old daughter and 24 year old daughter.  Patient explains older daughter is gifted, and youngest daughter is diagnosed with autism.  Patient had a son who passed away 9 years ago - he was 33 years old.  He  due to complications from seizures - had been hit by a small truck when he was 16 years old.  Son was diagnosed with autism, prior to the accident.  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  She expresses interest in getting another job - maybe working with special ed, as she has worked with [employer] previously.  Patient stopped working to care for her mother, who had a stroke 7-8 years ago.  Patient grew up as an only child.  Patient graduated from high school and obtained a music degree from [employer] in 1977.  She later attempted to return to school to study early education, but did not complete degree.     Substance use:   Alcohol: denied   Drugs: denied   Tobacco: none   Caffeine: drinks iced tea daily and some soft drinks     Current medications and drug reactions (include OTC, herbal): see medication list; wellbutrin, trileptal, and haldol     Strengths and liabilities: Strength: Patient accepts guidance/feedback, Strength: Patient is expressive/articulate., Strength: Patient is intelligent., Strength: Patient has positive support network., Liability: Patient lacks coping skills.    Current Evaluation:     Mental Status Exam:  General Appearance:  age appropriate, well nourished, casually dressed   Speech: normal tone, normal rate, normal pitch, normal volume      Level of Cooperation: cooperative      Thought Processes: circumstantial   Mood: depressed      Thought Content: normal, no suicidality, no homicidality, delusions, or paranoia   Affect: congruent and appropriate   Orientation: Oriented x3   Memory: recent >  intact, remote >  intact   Attention Span & Concentration: intact   Fund of General Knowledge: intact and appropriate to age and level of education   Abstract Reasoning: not assessed   Judgment & Insight: fair     Language  intact     Diagnostic Impression - Plan:       ICD-10-CM ICD-9-CM   1. Bipolar I disorder, most recent episode depressed, moderate F31.32 296.52       Plan:individual psychotherapy and medication management by physician    Return to Clinic: as scheduled    Length of Service (minutes): 45

## 2019-02-19 ENCOUNTER — OFFICE VISIT (OUTPATIENT)
Dept: UROLOGY | Facility: CLINIC | Age: 64
End: 2019-02-19
Payer: MEDICARE

## 2019-02-19 VITALS — WEIGHT: 163.13 LBS | BODY MASS INDEX: 28.9 KG/M2 | HEIGHT: 63 IN

## 2019-02-19 DIAGNOSIS — R82.71 BACTERIA IN URINE: ICD-10-CM

## 2019-02-19 DIAGNOSIS — N39.44 NOCTURNAL ENURESIS: Primary | ICD-10-CM

## 2019-02-19 PROCEDURE — 99999 PR PBB SHADOW E&M-EST. PATIENT-LVL III: CPT | Mod: PBBFAC,,, | Performed by: NURSE PRACTITIONER

## 2019-02-19 PROCEDURE — 99214 OFFICE O/P EST MOD 30 MIN: CPT | Mod: 25,S$GLB,, | Performed by: NURSE PRACTITIONER

## 2019-02-19 PROCEDURE — 99214 PR OFFICE/OUTPT VISIT, EST, LEVL IV, 30-39 MIN: ICD-10-PCS | Mod: 25,S$GLB,, | Performed by: NURSE PRACTITIONER

## 2019-02-19 PROCEDURE — 99999 PR PBB SHADOW E&M-EST. PATIENT-LVL III: ICD-10-PCS | Mod: PBBFAC,,, | Performed by: NURSE PRACTITIONER

## 2019-02-19 PROCEDURE — 81001 URINALYSIS AUTO W/SCOPE: CPT | Mod: S$GLB,,, | Performed by: NURSE PRACTITIONER

## 2019-02-19 PROCEDURE — 87186 SC STD MICRODIL/AGAR DIL: CPT

## 2019-02-19 PROCEDURE — 3008F PR BODY MASS INDEX (BMI) DOCUMENTED: ICD-10-PCS | Mod: CPTII,S$GLB,, | Performed by: NURSE PRACTITIONER

## 2019-02-19 PROCEDURE — 51701 INSERT BLADDER CATHETER: CPT | Mod: S$GLB,,, | Performed by: NURSE PRACTITIONER

## 2019-02-19 PROCEDURE — 87077 CULTURE AEROBIC IDENTIFY: CPT

## 2019-02-19 PROCEDURE — 87088 URINE BACTERIA CULTURE: CPT

## 2019-02-19 PROCEDURE — 51701 PR INSERTION OF NON-INDWELLING BLADDER CATHETERIZATION FOR RESIDUAL UR: ICD-10-PCS | Mod: S$GLB,,, | Performed by: NURSE PRACTITIONER

## 2019-02-19 PROCEDURE — 87086 URINE CULTURE/COLONY COUNT: CPT

## 2019-02-19 PROCEDURE — 81001 PR  URINALYSIS, AUTO, W/SCOPE: ICD-10-PCS | Mod: S$GLB,,, | Performed by: NURSE PRACTITIONER

## 2019-02-19 PROCEDURE — 3008F BODY MASS INDEX DOCD: CPT | Mod: CPTII,S$GLB,, | Performed by: NURSE PRACTITIONER

## 2019-02-19 RX ORDER — SULFAMETHOXAZOLE AND TRIMETHOPRIM 800; 160 MG/1; MG/1
1 TABLET ORAL 2 TIMES DAILY
Qty: 14 TABLET | Refills: 0 | Status: SHIPPED | OUTPATIENT
Start: 2019-02-19 | End: 2019-02-26

## 2019-02-19 NOTE — PROGRESS NOTES
CHIEF COMPLAINT:    Mrs Quezada is a 63 y.o. female presenting for nocturnal enuresis.  PRESENTING ILLNESS:    Silvana Quezada is a 63 y.o. female with a PMH of MS affecting her bladder function who presents for nocturnal enuresis. Her last clinic visit was 8/30/18 with Dr. Ross. Pt has been performing CIC 4 x per day and is able to void between catheterizations. For the past 2-3 months, she has been experiencing nocturnal enuresis and is waking up with a saturated depends and pad. Denies daytime incontinence, frequency or urgency. Denies dysuria, hematuria or flank pain. Denies fever or chills.   She drinks ice tea throughout the daytime.  She is interested in another botox injection if possible.    After underwent botox injection to the bladder, she has no problem with bladder control with less urgency and frequency.  Last botox injection was on 7/11/18.  Procedure(s) Performed:   1.  Cystoscopy with bladder botox injection  Findings:   1. Cystoscopy significant for grade III trabeculations. Erythematous changes diffusely throughout bladder as well as cystitis cystica.  2. 200u Botox injected in 20ml throughout bladder detrusor. Good wheals raised.     Last + Urine culture was 8/30/18 ENTEROBACTER CLOACAE.       REVIEW OF SYSTEMS:    Review of Systems    Constitutional: Negative for fever and chills.   HENT: Negative for hearing loss.   Eyes: Negative for visual disturbance.   Respiratory: Negative for shortness of breath.   Cardiovascular: Negative for chest pain.   Gastrointestinal: Negative for nausea, vomiting, and constipation.   Genitourinary:  See above  Neurological: Negative for dizziness.   Hematological: Does not bruise/bleed easily.   Psychiatric/Behavioral: Negative for confusion.     PATIENT HISTORY:    Past Medical History:   Diagnosis Date    Bipolar 1 disorder     Breast cancer     right    Breast cyst     Closed fracture of proximal end of right humerus 3/9/2015    Depression     History of  right shoulder fracture     MS (multiple sclerosis)     Osteoporosis, unspecified        Past Surgical History:   Procedure Laterality Date    BRAIN SURGERY  1991    BREAST BIOPSY Left 2009    core    BREAST LUMPECTOMY Right 2001    CYSTOSCOPY N/A 7/11/2018    [facility]    CYSTOSCOPY N/A 8/9/2017    [facility]    CYSTOSCOPY N/A 9/15/2016    [facility]    CYSTOSCOPY N/A 9/9/2015    [facility]    CYSTOSCOPY N/A 12/11/2013    [facility] [address] [place] [address]    FOOT SURGERY  october 2013    HYSTERECTOMY      INJECTION, BOTULINUM TOXIN, TYPE A N/A 12/11/2013    [facility]    INJECTION, BOTULINUM TOXIN, TYPE A 200 UNITS N/A 7/11/2018    [facility]    INJECTION-BOTOX N/A 9/15/2016    [facility]    INJECTION-BOTOX 200 UNITS N/A 8/9/2017    [facility]    INJECTION-BOTOX-200 units N/A 9/9/2015    [facility]       Family History   Problem Relation Age of Onset    Skin cancer Mother     Breast cancer Mother     Stroke Mother     Emphysema Father     Breast cancer Maternal Aunt     Dementia Brother     Diabetes Brother     No Known Problems Daughter     Autism Son     No Known Problems Brother     Autism Daughter        Social History     Socioeconomic History    Marital status:      Spouse name: Not on file    Number of children: Not on file    Years of education: Not on file    Highest education level: Not on file   Social Needs    Financial resource strain: Not on file    Food insecurity - worry: Not on file    Food insecurity - inability: Not on file    Transportation needs - medical: Not on file    Transportation  needs - non-medical: Not on file   Occupational History    Occupation: [employer]   Tobacco Use    Smoking status: Never Smoker    Smokeless tobacco: Never Used   Substance and Sexual Activity    Alcohol use: Yes     Alcohol/week: 1.0 oz     Types: 2 Standard drinks or equivalent per week     Comment: occaisionally    Drug use: No    Sexual activity: Yes     Partners: Male   Other Topics Concern    Are you pregnant or think you may be? Not Asked    Breast-feeding Not Asked   Social History Narrative    Not on file       Allergies:  Adhesive and Keflex [cephalexin]    Medications:    Current Outpatient Medications:     ascorbic acid, vitamin C, (VITAMIN C) 500 MG tablet, Take 500 mg by mouth once daily., Disp: , Rfl:     aspirin 325 MG tablet, Take 325 mg by mouth every 4 (four) hours as needed for Pain., Disp: , Rfl:     buPROPion (WELLBUTRIN SR) 100 MG TBSR 12 hr tablet, Take 2 tablets (200 mg total) by mouth every morning., Disp: 180 tablet, Rfl: 1    buPROPion (WELLBUTRIN SR) 150 MG TBSR 12 hr tablet, Take 1 tablet (150 mg total) by mouth every evening., Disp: 90 tablet, Rfl: 1    calcium-vitamin D3 (CALCIUM 500 + D) 500 mg(1,250mg) -200 unit per tablet, Take 1 tablet by mouth 2 (two) times daily with meals., Disp: , Rfl:     catheter (FEMALE CATHETER) 14 Fr Misc, Patient is to self catheterize four times a day indefinitley using female 12 fr in and out catheter for incomplete bladder emptying.  Dispense 120 month with 11 refills or 360 every 3 months with 3 refills depending on patient's preference, Disp: 120 each, Rfl: 11    folic acid (FOLVITE) 1 MG tablet, Take 1 tablet (1,000 mcg total) by mouth once daily., Disp: 90 tablet, Rfl: 3    folic acid (FOLVITE) 1 MG tablet, TAKE 1 TABLET BY MOUTH ONCE DAILY, Disp: 90 tablet, Rfl: 0    haloperidol (HALDOL) 1 MG tablet, Take 2 tablets (2 mg total) by mouth every evening., Disp: 180 tablet, Rfl: 1    ibuprofen (ADVIL,MOTRIN) 200 MG tablet, Take 200  mg by mouth every 6 (six) hours as needed for Pain., Disp: , Rfl:     magnesium oxide (MAG-OX) 400 mg tablet, Take 500 mg by mouth once daily., Disp: , Rfl:     OXcarbazepine (TRILEPTAL) 150 MG Tab, Take 1 tablet (150 mg total) by mouth 2 (two) times daily., Disp: 180 tablet, Rfl: 1    sulfamethoxazole-trimethoprim 800-160mg (BACTRIM DS) 800-160 mg Tab, Take 1 tablet by mouth 2 (two) times daily. for 7 days, Disp: 14 tablet, Rfl: 0    PHYSICAL EXAMINATION:    Constitutional: She is oriented to person, place, and time. She appears well-developed and well-nourished.  She is in no apparent distress.    Neck: Normal ROM.     Cardiovascular: Normal rate.      Pulmonary/Chest: Effort normal. No respiratory distress.     Abdominal:  She exhibits no distension.  There is no CVA tenderness.     Lymphadenopathy:          Right: No supraclavicular adenopathy present.        Left: No supraclavicular adenopathy present.     Neurological: She is alert and oriented to person, place, and time.     Skin: Skin is warm and dry.     Extremities: Normal ROM    Psych: Cooperative with normal affect.    Genitourinary: Normal external female genitalia  Urethral meatus is normal    Physical Exam      LABS:    U/a: sp grav 1.010, pH 6, + leukocytes, + nitrites, trace blood, otherwise negative    PVR: Consent verbally obtained.  Betadine prep was applied to the urethral meatus. An in and out cath was performed after voiding.  The PVR was 180 ml.  Pt last CIC was performed this morning at home.      IMPRESSION:    Encounter Diagnoses   Name Primary?    Nocturnal enuresis Yes    Bacteria in urine        PLAN:  -Catheterized urine specimen sent for urine culture  -Will start bactrim based on POCT UA. Discussed side effects, indications, and MOA for bactrim. Prescription sent to the pharmacy. Pt verbalized understanding.  -Avoid Bladder Irritants: Tea, coffee, caffeine, alcohol, artificial sweeteners, citrus, spicy foods, acidic  foods,chocolate, tomato-based foods, smoking  -Maintain good water intake  -Continue CIC 4 x per day indefinitely   -RTC as scheduled with Dr. Ross to discuss cysto with botox injection        I spent 25 minutes with the patient of which more than half was spent in coordinating the patient's care as well as in direct consultation with the patient in regards to our treatment and plan.

## 2019-02-21 ENCOUNTER — TELEPHONE (OUTPATIENT)
Dept: UROLOGY | Facility: CLINIC | Age: 64
End: 2019-02-21

## 2019-02-21 LAB — BACTERIA UR CULT: NORMAL

## 2019-02-21 NOTE — TELEPHONE ENCOUNTER
Notified pt of positive urine cx and to continue to take bactrim as ordered. Encouraged pt to call for any questions or concerns. Voiced understanding      ----- Message from Teena Harris NP sent at 2/21/2019 11:30 AM CST -----  Please notify patient her urine culture was + for infection  Sensitive to bactrim.  She can complete course of bactrim as ordered.

## 2019-03-06 ENCOUNTER — PES CALL (OUTPATIENT)
Dept: ADMINISTRATIVE | Facility: CLINIC | Age: 64
End: 2019-03-06

## 2019-03-12 ENCOUNTER — OFFICE VISIT (OUTPATIENT)
Dept: UROLOGY | Facility: CLINIC | Age: 64
End: 2019-03-12
Payer: MEDICARE

## 2019-03-12 VITALS
HEIGHT: 63 IN | HEART RATE: 71 BPM | WEIGHT: 165.38 LBS | BODY MASS INDEX: 29.3 KG/M2 | SYSTOLIC BLOOD PRESSURE: 113 MMHG | DIASTOLIC BLOOD PRESSURE: 73 MMHG

## 2019-03-12 DIAGNOSIS — R39.15 URINARY URGENCY: Primary | ICD-10-CM

## 2019-03-12 DIAGNOSIS — N32.81 URGENCY-FREQUENCY SYNDROME: ICD-10-CM

## 2019-03-12 DIAGNOSIS — R35.0 FREQUENCY OF URINATION: ICD-10-CM

## 2019-03-12 DIAGNOSIS — G35 MS (MULTIPLE SCLEROSIS): ICD-10-CM

## 2019-03-12 PROCEDURE — 3008F PR BODY MASS INDEX (BMI) DOCUMENTED: ICD-10-PCS | Mod: CPTII,S$GLB,, | Performed by: UROLOGY

## 2019-03-12 PROCEDURE — 87088 URINE BACTERIA CULTURE: CPT

## 2019-03-12 PROCEDURE — 3008F BODY MASS INDEX DOCD: CPT | Mod: CPTII,S$GLB,, | Performed by: UROLOGY

## 2019-03-12 PROCEDURE — 99499 RISK ADDL DX/OHS AUDIT: ICD-10-PCS | Mod: S$GLB,,, | Performed by: UROLOGY

## 2019-03-12 PROCEDURE — 99499 UNLISTED E&M SERVICE: CPT | Mod: S$GLB,,, | Performed by: UROLOGY

## 2019-03-12 PROCEDURE — 87186 SC STD MICRODIL/AGAR DIL: CPT

## 2019-03-12 PROCEDURE — 99214 PR OFFICE/OUTPT VISIT, EST, LEVL IV, 30-39 MIN: ICD-10-PCS | Mod: S$GLB,,, | Performed by: UROLOGY

## 2019-03-12 PROCEDURE — 87077 CULTURE AEROBIC IDENTIFY: CPT

## 2019-03-12 PROCEDURE — 99214 OFFICE O/P EST MOD 30 MIN: CPT | Mod: S$GLB,,, | Performed by: UROLOGY

## 2019-03-12 PROCEDURE — 87086 URINE CULTURE/COLONY COUNT: CPT

## 2019-03-12 PROCEDURE — 99999 PR PBB SHADOW E&M-EST. PATIENT-LVL IV: CPT | Mod: PBBFAC,,, | Performed by: UROLOGY

## 2019-03-12 PROCEDURE — 87184 SC STD DISK METHOD PER PLATE: CPT

## 2019-03-12 PROCEDURE — 99999 PR PBB SHADOW E&M-EST. PATIENT-LVL IV: ICD-10-PCS | Mod: PBBFAC,,, | Performed by: UROLOGY

## 2019-03-12 NOTE — PROGRESS NOTES
CC: s/p botox injection on 7/11/18    Silvana Quezada is a 63 y.o. woman who is here for the evaluation of No chief complaint on file.  hx of MS affecting her bladder function.  She is able to void on her own but has been doing CIC 4 x a day.  Reports that she is dry in-between catheterization.    After underwent botox injection to the bladder, she has no problem with bladder control with less urgency and frequency.  Now she presents today to discuss another botox injection to the bladder.    Last botox injection was on 7/11/18.  Procedure(s) Performed:   1.  Cystoscopy with bladder botox injection  Findings:   1. Cystoscopy significant for grade III trabeculations. Erythematous changes diffusely throughout bladder as well as cystitis cystica.  2. 200u Botox injected in 20ml throughout bladder detrusor. Good wheals raised.     Currently, she is doing SIC 4 x a day and voids on her own.  She remains perfectly continent between catheterization.  She works at a [employer] every Monday.    Lately she has been drinking more iced tea in order to stay awake, so that she can take care of her elderly mother.  With drinking more iced tea, she noticed that she has to use a bathroom more often.  Denies fever or chills.  No dysuria.    Past Medical History:   Diagnosis Date    Bipolar 1 disorder     Breast cancer     right    Breast cyst     Closed fracture of proximal end of right humerus 3/9/2015    Depression     History of right shoulder fracture     MS (multiple sclerosis)     Osteoporosis, unspecified      Past Surgical History:   Procedure Laterality Date    BRAIN SURGERY  1991    BREAST BIOPSY Left 2009    core    BREAST LUMPECTOMY Right 2001    CYSTOSCOPY N/A 7/11/2018    [facility]    CYSTOSCOPY N/A 8/9/2017    [facility]    CYSTOSCOPY N/A 9/15/2016    [facility]    CYSTOSCOPY N/A  9/9/2015    [facility]    CYSTOSCOPY N/A 12/11/2013    [facility]    FOOT SURGERY  october 2013    HYSTERECTOMY      INJECTION, BOTULINUM TOXIN, TYPE A N/A 12/11/2013    [facility]    INJECTION, BOTULINUM TOXIN, TYPE A 200 UNITS N/A 7/11/2018    [facility]    INJECTION-BOTOX N/A 9/15/2016    [facility]    INJECTION-BOTOX 200 UNITS N/A 8/9/2017    [facility]    INJECTION-BOTOX-200 units N/A 9/9/2015    [facility] OR [address] [place] [address]     Social History     Tobacco Use    Smoking status: Never Smoker    Smokeless tobacco: Never Used   Substance Use Topics    Alcohol use: Yes     Alcohol/week: 1.0 oz     Types: 2 Standard drinks or equivalent per week     Comment: occaisionally    Drug use: No     Family History   Problem Relation Age of Onset    Skin cancer Mother     Breast cancer Mother     Stroke Mother     Emphysema Father     Breast cancer Maternal Aunt     Dementia Brother     Diabetes Brother     No Known Problems Daughter     Autism Son     No Known Problems Brother     Autism Daughter      Allergy:  Review of patient's allergies indicates:   Allergen Reactions    Adhesive      blisters    Keflex [cephalexin] Rash     Outpatient Encounter Medications as of 3/12/2019   Medication Sig Dispense Refill    ascorbic acid, vitamin C, (VITAMIN C) 500 MG tablet Take 500 mg by mouth once daily.      aspirin 325 MG tablet Take 325 mg by mouth every 4 (four) hours as needed for Pain.      buPROPion (WELLBUTRIN SR) 100 MG TBSR 12 hr tablet Take 2 tablets (200 mg total) by mouth every morning. 180 tablet 1    buPROPion (WELLBUTRIN SR) 150 MG TBSR 12 hr tablet Take 1 tablet (150 mg total) by mouth every evening. 90 tablet 1    calcium-vitamin D3  (CALCIUM 500 + D) 500 mg(1,250mg) -200 unit per tablet Take 1 tablet by mouth 2 (two) times daily with meals.      catheter (FEMALE CATHETER) 14 Fr [facility] Patient is to self catheterize four times a day indefinitley using female 12 fr in and out catheter for incomplete bladder emptying.   Dispense 120 month with 11 refills or 360 every 3 months with 3 refills depending on patient's preference 120 each 11    folic acid (FOLVITE) 1 MG tablet Take 1 tablet (1,000 mcg total) by mouth once daily. 90 tablet 3    folic acid (FOLVITE) 1 MG tablet TAKE 1 TABLET BY MOUTH ONCE DAILY 90 tablet 0    haloperidol (HALDOL) 1 MG tablet Take 2 tablets (2 mg total) by mouth every evening. 180 tablet 1    ibuprofen (ADVIL,MOTRIN) 200 MG tablet Take 200 mg by mouth every 6 (six) hours as needed for Pain.      magnesium oxide (MAG-OX) 400 mg tablet Take 500 mg by mouth once daily.      OXcarbazepine (TRILEPTAL) 150 MG Tab Take 1 tablet (150 mg total) by mouth 2 (two) times daily. 180 tablet 1     No facility-administered encounter medications on file as of 3/12/2019.      Review of Systems   General ROS: GENERAL:  No weight gain or loss  SKIN:  No rashes or lacerations  HEAD:  No headaches  EYES:  No exophthalmos, jaundice or blindness  EARS:  No dizziness, tinnitus or hearing loss  NOSE:  No changes in smell  MOUTH & THROAT:  No dyskinetic movements or obvious goiter  CHEST:  No shortness of breath, hyperventilation or cough  CARDIOVASCULAR:  No tachycardia or chest pain  ABDOMEN:  No nausea, vomiting, pain, constipation or diarrhea  URINARY:  No frequency, dysuria or sexual dysfunction  ENDOCRINE:  No polydipsia, polyuria  MUSCULOSKELETAL:  No pain or stiffness of the joints  NEUROLOGIC:  No weakness, sensory changes, seizures, confusion, memory loss, tremor or other abnormal movements  Physical Exam     Vitals:    03/12/19 1603   BP: 113/73   Pulse: 71     Physical Examination:   General appearance - alert, well appearing, and in  no distress  Mental status - alert, oriented to person, place, and time  Eyes - pupils equal and reactive, extraocular eye movements intact  Ears - bilateral TM's and external ear canals normal  Nose - normal and patent, no erythema, discharge or polyps  Mouth - mucous membranes moist, pharynx normal without lesions  Neck - supple, no significant adenopathy  Chest - clear to auscultation, no wheezes, rales or rhonchi, symmetric air entry  Breast- no mass or lumps or tenderness  Heart - normal rate, regular rhythm, normal S1, S2, no murmurs, rubs, clicks or gallops  Abdomen - soft, nontender, nondistended, no masses or organomegaly of liver and spleen, no hernia noted.  Pelvic - normal external genitalia   Urethra normal meatus with no lesion or prolapse. No tenderness or discharge  Bladder- no tenderness  atrophic mucosa  Rectal - normal rectal, no masses  Back exam - full range of motion, no tenderness, palpable spasm or pain on motion  LE - No edema noted.  Neurological - alert, oriented, normal speech, no focal findings or movement disorder noted  Musculoskeletal - no joint tenderness, deformity or swelling    LABS:  Lab Results   Component Value Date    CREATININE 0.8 07/09/2018    CREATININE 0.8 07/09/2018    CREATININE 0.7 05/17/2017     Urine Culture, Routine   Date Value Ref Range Status   02/19/2019 KLEBSIELLA PNEUMONIAE  >100,000 cfu/ml    Final     UA : clear today    Assessment and Plan:  Diagnoses and all orders for this visit:    Urinary urgency  -     Urine culture    Frequency of urination    Urgency-frequency syndrome    MS (multiple sclerosis)    continue SIC 4 x a day indefinitely.    Urine culture today.  She would like to do cysto botox injection first thing in the morning.  Stop ASA 1 wk before.  I spent 25 minutes with the patient of which more than half was spent in direct consultation with the patient in regards to our treatment and plan.      Follow-up:  Follow-up in about 4 weeks (around  4/10/2019) for cysto botox injection 200 units.

## 2019-03-12 NOTE — H&P (VIEW-ONLY)
CC: s/p botox injection on 7/11/18    Silvana Quezada is a 63 y.o. woman who is here for the evaluation of No chief complaint on file.  hx of MS affecting her bladder function.  She is able to void on her own but has been doing CIC 4 x a day.  Reports that she is dry in-between catheterization.    After underwent botox injection to the bladder, she has no problem with bladder control with less urgency and frequency.  Now she presents today to discuss another botox injection to the bladder.    Last botox injection was on 7/11/18.  Procedure(s) Performed:   1.  Cystoscopy with bladder botox injection  Findings:   1. Cystoscopy significant for grade III trabeculations. Erythematous changes diffusely throughout bladder as well as cystitis cystica.  2. 200u Botox injected in 20ml throughout bladder detrusor. Good wheals raised.     Currently, she is doing SIC 4 x a day and voids on her own.  She remains perfectly continent between catheterization.  She works at a [employer] every Monday.    Lately she has been drinking more iced tea in order to stay awake, so that she can take care of her elderly mother.  With drinking more iced tea, she noticed that she has to use a bathroom more often.  Denies fever or chills.  No dysuria.    Past Medical History:   Diagnosis Date    Bipolar 1 disorder     Breast cancer     right    Breast cyst     Closed fracture of proximal end of right humerus 3/9/2015    Depression     History of right shoulder fracture     MS (multiple sclerosis)     Osteoporosis, unspecified      Past Surgical History:   Procedure Laterality Date    BRAIN SURGERY  1991    BREAST BIOPSY Left 2009    core    BREAST LUMPECTOMY Right 2001    CYSTOSCOPY N/A 7/11/2018    [facility]    CYSTOSCOPY N/A 8/9/2017    [facility]    CYSTOSCOPY N/A 9/15/2016    [facility]    CYSTOSCOPY N/A  9/9/2015    [facility]    CYSTOSCOPY N/A 12/11/2013    [facility]    FOOT SURGERY  october 2013    HYSTERECTOMY      INJECTION, BOTULINUM TOXIN, TYPE A N/A 12/11/2013    [facility]    INJECTION, BOTULINUM TOXIN, TYPE A 200 UNITS N/A 7/11/2018    [facility]    INJECTION-BOTOX N/A 9/15/2016    [facility]    INJECTION-BOTOX 200 UNITS N/A 8/9/2017    [facility]    INJECTION-BOTOX-200 units N/A 9/9/2015    [facility] at [facility] OR [address] [place] [address]     Social History     Tobacco Use    Smoking status: Never Smoker    Smokeless tobacco: Never Used   Substance Use Topics    Alcohol use: Yes     Alcohol/week: 1.0 oz     Types: 2 Standard drinks or equivalent per week     Comment: occaisionally    Drug use: No     Family History   Problem Relation Age of Onset    Skin cancer Mother     Breast cancer Mother     Stroke Mother     Emphysema Father     Breast cancer Maternal Aunt     Dementia Brother     Diabetes Brother     No Known Problems Daughter     Autism Son     No Known Problems Brother     Autism Daughter      Allergy:  Review of patient's allergies indicates:   Allergen Reactions    Adhesive      blisters    Keflex [cephalexin] Rash     Outpatient Encounter Medications as of 3/12/2019   Medication Sig Dispense Refill    ascorbic acid, vitamin C, (VITAMIN C) 500 MG tablet Take 500 mg by mouth once daily.      aspirin 325 MG tablet Take 325 mg by mouth every 4 (four) hours as needed for Pain.      buPROPion (WELLBUTRIN SR) 100 MG TBSR 12 hr tablet Take 2 tablets (200 mg total) by mouth every morning. 180 tablet 1    buPROPion (WELLBUTRIN SR) 150 MG TBSR 12 hr tablet Take 1 tablet (150 mg total) by mouth every evening. 90 tablet 1    calcium-vitamin D3  (CALCIUM 500 + D) 500 mg(1,250mg) -200 unit per tablet Take 1 tablet by mouth 2 (two) times daily with meals.      catheter (FEMALE CATHETER) 14 Fr [facility] [place] is to self catheterize four times a day indefinitley using female 12 fr in and out catheter for incomplete bladder emptying.   Dispense 120 month with 11 refills or 360 every 3 months with 3 refills depending on patient's preference 120 each 11    folic acid (FOLVITE) 1 MG tablet Take 1 tablet (1,000 mcg total) by mouth once daily. 90 tablet 3    folic acid (FOLVITE) 1 MG tablet TAKE 1 TABLET BY MOUTH ONCE DAILY 90 tablet 0    haloperidol (HALDOL) 1 MG tablet Take 2 tablets (2 mg total) by mouth every evening. 180 tablet 1    ibuprofen (ADVIL,MOTRIN) 200 MG tablet Take 200 mg by mouth every 6 (six) hours as needed for Pain.      magnesium oxide (MAG-OX) 400 mg tablet Take 500 mg by mouth once daily.      OXcarbazepine (TRILEPTAL) 150 MG Tab Take 1 tablet (150 mg total) by mouth 2 (two) times daily. 180 tablet 1     No facility-administered encounter medications on file as of 3/12/2019.      Review of Systems   General ROS: GENERAL:  No weight gain or loss  SKIN:  No rashes or lacerations  HEAD:  No headaches  EYES:  No exophthalmos, jaundice or blindness  EARS:  No dizziness, tinnitus or hearing loss  NOSE:  No changes in smell  MOUTH & THROAT:  No dyskinetic movements or obvious goiter  CHEST:  No shortness of breath, hyperventilation or cough  CARDIOVASCULAR:  No tachycardia or chest pain  ABDOMEN:  No nausea, vomiting, pain, constipation or diarrhea  URINARY:  No frequency, dysuria or sexual dysfunction  ENDOCRINE:  No polydipsia, polyuria  MUSCULOSKELETAL:  No pain or stiffness of the joints  NEUROLOGIC:  No weakness, sensory changes, seizures, confusion, memory loss, tremor or other abnormal movements  Physical Exam     Vitals:    03/12/19 1603   BP: 113/73   Pulse: 71     Physical Examination:   General appearance - alert, well appearing, and in  no distress  Mental status - alert, oriented to person, place, and time  Eyes - pupils equal and reactive, extraocular eye movements intact  Ears - bilateral TM's and external ear canals normal  Nose - normal and patent, no erythema, discharge or polyps  Mouth - mucous membranes moist, pharynx normal without lesions  Neck - supple, no significant adenopathy  Chest - clear to auscultation, no wheezes, rales or rhonchi, symmetric air entry  Breast- no mass or lumps or tenderness  Heart - normal rate, regular rhythm, normal S1, S2, no murmurs, rubs, clicks or gallops  Abdomen - soft, nontender, nondistended, no masses or organomegaly of liver and spleen, no hernia noted.  Pelvic - normal external genitalia   Urethra normal meatus with no lesion or prolapse. No tenderness or discharge  Bladder- no tenderness  atrophic mucosa  Rectal - normal rectal, no masses  Back exam - full range of motion, no tenderness, palpable spasm or pain on motion  LE - No edema noted.  Neurological - alert, oriented, normal speech, no focal findings or movement disorder noted  Musculoskeletal - no joint tenderness, deformity or swelling    LABS:  Lab Results   Component Value Date    CREATININE 0.8 07/09/2018    CREATININE 0.8 07/09/2018    CREATININE 0.7 05/17/2017     Urine Culture, Routine   Date Value Ref Range Status   02/19/2019 KLEBSIELLA PNEUMONIAE  >100,000 cfu/ml    Final     UA : clear today    Assessment and Plan:  Diagnoses and all orders for this visit:    Urinary urgency  -     Urine culture    Frequency of urination    Urgency-frequency syndrome    MS (multiple sclerosis)    continue SIC 4 x a day indefinitely.    Urine culture today.  She would like to do cysto botox injection first thing in the morning.  Stop ASA 1 wk before.  I spent 25 minutes with the patient of which more than half was spent in direct consultation with the patient in regards to our treatment and plan.      Follow-up:  Follow-up in about 4 weeks (around  4/10/2019) for cysto botox injection 200 units.

## 2019-03-13 ENCOUNTER — TELEPHONE (OUTPATIENT)
Dept: UROLOGY | Facility: CLINIC | Age: 64
End: 2019-03-13

## 2019-03-13 DIAGNOSIS — N39.41 URGE INCONTINENCE: ICD-10-CM

## 2019-03-13 DIAGNOSIS — N32.81 OVERACTIVE BLADDER: Primary | ICD-10-CM

## 2019-03-13 NOTE — TELEPHONE ENCOUNTER
Overactive bladder  -     Case Request Operating Room: CYSTOSCOPY, INJECTION, BOTULINUM TOXIN, TYPE A 200 UNITS    Urge incontinence  -     Case Request Operating Room: CYSTOSCOPY, INJECTION, BOTULINUM TOXIN, TYPE A 200 UNITS

## 2019-03-18 ENCOUNTER — OFFICE VISIT (OUTPATIENT)
Dept: PSYCHIATRY | Facility: CLINIC | Age: 64
End: 2019-03-18
Payer: COMMERCIAL

## 2019-03-18 ENCOUNTER — TELEPHONE (OUTPATIENT)
Dept: UROLOGY | Facility: CLINIC | Age: 64
End: 2019-03-18

## 2019-03-18 DIAGNOSIS — N30.00 ACUTE CYSTITIS WITHOUT HEMATURIA: Primary | ICD-10-CM

## 2019-03-18 DIAGNOSIS — F31.32 BIPOLAR I DISORDER, MOST RECENT EPISODE DEPRESSED, MODERATE: Primary | ICD-10-CM

## 2019-03-18 PROCEDURE — 90834 PR PSYCHOTHERAPY W/PATIENT, 45 MIN: ICD-10-PCS | Mod: S$GLB,,, | Performed by: SOCIAL WORKER

## 2019-03-18 PROCEDURE — 90834 PSYTX W PT 45 MINUTES: CPT | Mod: S$GLB,,, | Performed by: SOCIAL WORKER

## 2019-03-18 RX ORDER — DOXYCYCLINE 100 MG/1
100 CAPSULE ORAL 2 TIMES DAILY
Qty: 14 CAPSULE | Refills: 0 | Status: SHIPPED | OUTPATIENT
Start: 2019-03-18 | End: 2019-03-25

## 2019-03-18 NOTE — TELEPHONE ENCOUNTER
Acute cystitis without hematuria  -     doxycycline (VIBRAMYCIN) 100 MG Cap; Take 1 capsule (100 mg total) by mouth 2 (two) times daily. for 14 doses  Dispense: 14 capsule; Refill: 0    please ask her to take the abx 5 days before her cysto botox injection 4/10/19.

## 2019-03-19 LAB — BACTERIA UR CULT: NORMAL

## 2019-03-27 ENCOUNTER — ANESTHESIA EVENT (OUTPATIENT)
Dept: SURGERY | Facility: HOSPITAL | Age: 64
End: 2019-03-27
Payer: MEDICARE

## 2019-03-27 DIAGNOSIS — Z01.818 PREOP TESTING: Primary | ICD-10-CM

## 2019-03-27 NOTE — PRE ADMISSION SCREENING
Anesthesia Assessment: Preoperative EQUATION    Planned Procedure: Procedure(s) (LRB):  CYSTOSCOPY (N/A)  INJECTION, BOTULINUM TOXIN, TYPE A 200 UNITS (N/A)  Requested Anesthesia Type:Monitor Anesthesia Care  Surgeon: Jayesh Ross MD  Service: Urology  Known or anticipated Date of Surgery:4/10/2019    Surgeon notes: reviewed    Electronic QUestionnaire Assessment completed via nurse interview with patient.        No aq        Triage considerations:       Previous anesthesia records:MAC7/11/18    Last PCP note: 6-12 months ago , within [facility]  Dr. GUICHO Underwood  Subspecialty notes: Hematology/Oncology, Neurology, Psychiatry, urology    Other important co-morbidities:     Multiple Sclerosis  Bipolar affective disorder  Thoracic aorta atherosclerosis  Urinary frequency  Neurogenic bladder  Overactive bladder  Hx breast cancer  osteoporosis     Tests already available:  No recent tests.            Instructions given. (See in Nurse's note)    Optimization:  Pt. Denies any issues with anesthesia, has had multiple procedures      Plan:    Testing:  BMP----- [place]   Pre-anesthesia  visit       Visit focus: none     Consultation:none       Navigation: Tests Scheduled.              Results will be tracked by Preop Clinic.

## 2019-03-27 NOTE — ANESTHESIA PREPROCEDURE EVALUATION
Anesthesia Assessment: Preoperative EQUATION     Planned Procedure: Procedure(s) (LRB):  CYSTOSCOPY (N/A)  INJECTION, BOTULINUM TOXIN, TYPE A 200 UNITS (N/A)  Requested Anesthesia Type:Monitor Anesthesia Care  Surgeon: Jayesh Ross MD  Service: Urology  Known or anticipated Date of Surgery:4/10/2019     Surgeon notes: reviewed     Electronic QUestionnaire Assessment completed via nurse interview with patient.         No aq           Triage considerations:         Previous anesthesia records:MAC7/11/18     Last PCP note: 6-12 months ago , within [facility]  Dr. GUICHO Underwood  Subspecialty notes: Hematology/Oncology, Neurology, Psychiatry, urology     Other important co-morbidities:      Multiple Sclerosis  Bipolar affective disorder  Thoracic aorta atherosclerosis  Urinary frequency  Neurogenic bladder  Overactive bladder  Hx breast cancer  osteoporosis     Tests already available:  No recent tests.                            Instructions given. (See in Nurse's note)     Optimization:  Pt. Denies any issues with anesthesia, has had multiple procedures                Plan:    Testing:  BMP----- [place]   Pre-anesthesia  visit                                        Visit focus: none                           Consultation:none                             Navigation: Tests Scheduled.                         Results will be tracked by Preop Clinic.                                                                                                               03/27/2019  Silvana Quezada is a 63 y.o., female.  Past Medical History:   Diagnosis Date    Bipolar 1 disorder     Breast cancer     right    Breast cyst     Closed fracture of proximal end of right humerus 3/9/2015    Depression     History of right shoulder fracture     MS (multiple sclerosis)     Osteoporosis, unspecified      Past Surgical History:   Procedure Laterality Date    BRAIN SURGERY  1991    BREAST BIOPSY Left 2009    core    BREAST  LUMPECTOMY Right 2001    CYSTOSCOPY N/A 7/11/2018    [facility]    CYSTOSCOPY N/A 8/9/2017    [facility]    CYSTOSCOPY N/A 9/15/2016    [facility]    CYSTOSCOPY N/A 9/9/2015    [facility]    CYSTOSCOPY N/A 12/11/2013    [facility]    FOOT SURGERY  october 2013    HYSTERECTOMY      INJECTION, BOTULINUM TOXIN, TYPE A N/A 12/11/2013    [facility]    INJECTION, BOTULINUM TOXIN, TYPE A 200 UNITS N/A 7/11/2018    [facility]    INJECTION-BOTOX N/A 9/15/2016    [facility]    INJECTION-BOTOX 200 UNITS N/A 8/9/2017    [facility]    INJECTION-BOTOX-200 units N/A 9/9/2015    [facility] by [facility] at [facility] OR [address] [place] [address]    TONSILLECTOMY           Anesthesia Evaluation    I have reviewed the Patient Summary Reports.    I have reviewed the Nursing Notes.   I have reviewed the Medications.     Review of Systems  Anesthesia Hx:  No problems with previous Anesthesia  History of prior surgery of interest to airway management or planning: Previous anesthesia: MAC  botox 7/11/18 with MAC.  Denies Family Hx of Anesthesia complications.   Denies Personal Hx of Anesthesia complications.   Social:  Alcohol Use, Non-Smoker Rare alcohol use   Hematology/Oncology:  Hematology Normal       -- Cancer in past history:  Breast   EENT/Dental:EENT/Dental Normal   Cardiovascular:   Exercise tolerance: good    Pulmonary:  Pulmonary Normal    Renal/:   Urinary incontinence  Neurogenic bladder    Pt does CIC  x4 day   Hepatic/GI:  Hepatic/GI Normal    Musculoskeletal:   Arthritis  osteoporosis   Neurological:   Overactive bladder  Multiple sclerosis Denies Movement Disorder Dx  Neuromuscular Disease, Multiple Sclerosis   Endocrine:  Endocrine Normal    Psych:   depression bipolar         Physical Exam  General:  Well nourished    Airway/Jaw/Neck:  Airway Findings: Mouth Opening: Normal Tongue: Normal  General Airway Assessment: Adult  Mallampati: I  TM Distance: 4 - 6 cm  Jaw/Neck Findings:  Neck ROM: Normal ROM      Dental:  Dental Findings: In tact   Chest/Lungs:  Chest/Lungs Findings: Normal Respiratory Rate     Heart/Vascular:  Heart Findings: Rate: Normal        Mental Status:  Mental Status Findings:  Cooperative, Alert and Oriented         Anesthesia Plan  Type of Anesthesia, risks & benefits discussed:  Anesthesia Type:  general, MAC  Patient's Preference:   Intra-op Monitoring Plan: standard ASA monitors  Intra-op Monitoring Plan Comments:   Post Op Pain Control Plan: multimodal analgesia  Post Op Pain Control Plan Comments:   Induction:   IV  Beta Blocker:  Patient is not currently on a Beta-Blocker (No further documentation required).       Informed Consent: Patient understands risks and agrees with Anesthesia plan.  Questions answered. Anesthesia consent signed with patient.  ASA Score: 3     Day of Surgery Review of History & Physical:    H&P update referred to the surgeon.         Ready For Surgery From Anesthesia Perspective.

## 2019-03-28 ENCOUNTER — TELEPHONE (OUTPATIENT)
Dept: PREADMISSION TESTING | Facility: HOSPITAL | Age: 64
End: 2019-03-28

## 2019-03-28 NOTE — TELEPHONE ENCOUNTER
----- Message from Landy Noble RN sent at 3/27/2019  1:37 PM CDT -----  Needs lab ordered at [facility] --orders in

## 2019-03-29 ENCOUNTER — TELEPHONE (OUTPATIENT)
Dept: UROLOGY | Facility: CLINIC | Age: 64
End: 2019-03-29

## 2019-03-29 ENCOUNTER — LAB VISIT (OUTPATIENT)
Dept: LAB | Facility: HOSPITAL | Age: 64
End: 2019-03-29
Attending: ANESTHESIOLOGY
Payer: MEDICARE

## 2019-03-29 DIAGNOSIS — Z01.818 PREOP TESTING: ICD-10-CM

## 2019-03-29 LAB
ANION GAP SERPL CALC-SCNC: 6 MMOL/L (ref 8–16)
BUN SERPL-MCNC: 19 MG/DL (ref 8–23)
CALCIUM SERPL-MCNC: 9.9 MG/DL (ref 8.7–10.5)
CHLORIDE SERPL-SCNC: 106 MMOL/L (ref 95–110)
CO2 SERPL-SCNC: 31 MMOL/L (ref 23–29)
CREAT SERPL-MCNC: 0.9 MG/DL (ref 0.5–1.4)
EST. GFR  (AFRICAN AMERICAN): >60 ML/MIN/1.73 M^2
EST. GFR  (NON AFRICAN AMERICAN): >60 ML/MIN/1.73 M^2
GLUCOSE SERPL-MCNC: 60 MG/DL (ref 70–110)
POTASSIUM SERPL-SCNC: 4.2 MMOL/L (ref 3.5–5.1)
SODIUM SERPL-SCNC: 143 MMOL/L (ref 136–145)

## 2019-03-29 PROCEDURE — 80048 BASIC METABOLIC PNL TOTAL CA: CPT

## 2019-03-29 PROCEDURE — 36415 COLL VENOUS BLD VENIPUNCTURE: CPT | Mod: PO

## 2019-03-29 NOTE — TELEPHONE ENCOUNTER
Spoke to patient , she is asymptomatic at this time. Re-instructed to start doxycycline on 4/5/2019. Five days before her botox

## 2019-03-29 NOTE — TELEPHONE ENCOUNTER
----- Message from Constance Espino sent at 3/29/2019  8:49 AM CDT -----  Contact: pt: [phone]  Needs Advice    Reason for call: pt would like to speak with someone re procedure scheduled for 4/10        Communication Preference: pt: [phone]

## 2019-04-09 ENCOUNTER — TELEPHONE (OUTPATIENT)
Dept: UROLOGY | Facility: CLINIC | Age: 64
End: 2019-04-09

## 2019-04-10 ENCOUNTER — ANESTHESIA (OUTPATIENT)
Dept: SURGERY | Facility: HOSPITAL | Age: 64
End: 2019-04-10
Payer: MEDICARE

## 2019-04-10 ENCOUNTER — HOSPITAL ENCOUNTER (OUTPATIENT)
Facility: HOSPITAL | Age: 64
Discharge: HOME | End: 2019-04-10
Attending: UROLOGY | Admitting: UROLOGY
Payer: MEDICARE

## 2019-04-10 VITALS
DIASTOLIC BLOOD PRESSURE: 66 MMHG | RESPIRATION RATE: 20 BRPM | HEIGHT: 63 IN | HEART RATE: 66 BPM | OXYGEN SATURATION: 99 % | WEIGHT: 167 LBS | SYSTOLIC BLOOD PRESSURE: 119 MMHG | BODY MASS INDEX: 29.59 KG/M2 | TEMPERATURE: 98 F

## 2019-04-10 DIAGNOSIS — N31.9 NEUROGENIC BLADDER: Primary | ICD-10-CM

## 2019-04-10 PROCEDURE — 25000003 PHARM REV CODE 250: Performed by: STUDENT IN AN ORGANIZED HEALTH CARE EDUCATION/TRAINING PROGRAM

## 2019-04-10 PROCEDURE — 37000008 HC ANESTHESIA 1ST 15 MINUTES: Performed by: UROLOGY

## 2019-04-10 PROCEDURE — 63600175 PHARM REV CODE 636 W HCPCS: Performed by: STUDENT IN AN ORGANIZED HEALTH CARE EDUCATION/TRAINING PROGRAM

## 2019-04-10 PROCEDURE — 37000009 HC ANESTHESIA EA ADD 15 MINS: Performed by: UROLOGY

## 2019-04-10 PROCEDURE — 36000706: Performed by: UROLOGY

## 2019-04-10 PROCEDURE — D9220A PRA ANESTHESIA: Mod: CRNA,,, | Performed by: NURSE ANESTHETIST, CERTIFIED REGISTERED

## 2019-04-10 PROCEDURE — 71000015 HC POSTOP RECOV 1ST HR: Performed by: UROLOGY

## 2019-04-10 PROCEDURE — D9220A PRA ANESTHESIA: ICD-10-PCS | Mod: ANES,,, | Performed by: ANESTHESIOLOGY

## 2019-04-10 PROCEDURE — 52287 CYSTOSCOPY CHEMODENERVATION: CPT | Mod: ,,, | Performed by: UROLOGY

## 2019-04-10 PROCEDURE — 52287 PR CYSTOURETHROSCOPY WITH INJ FOR CHEMODENERVATION: ICD-10-PCS | Mod: ,,, | Performed by: UROLOGY

## 2019-04-10 PROCEDURE — 71000044 HC DOSC ROUTINE RECOVERY FIRST HOUR: Performed by: UROLOGY

## 2019-04-10 PROCEDURE — D9220A PRA ANESTHESIA: Mod: ANES,,, | Performed by: ANESTHESIOLOGY

## 2019-04-10 PROCEDURE — D9220A PRA ANESTHESIA: ICD-10-PCS | Mod: CRNA,,, | Performed by: NURSE ANESTHETIST, CERTIFIED REGISTERED

## 2019-04-10 PROCEDURE — 36000707: Performed by: UROLOGY

## 2019-04-10 PROCEDURE — 63600175 PHARM REV CODE 636 W HCPCS: Performed by: NURSE ANESTHETIST, CERTIFIED REGISTERED

## 2019-04-10 RX ORDER — ONDANSETRON 8 MG/1
8 TABLET, ORALLY DISINTEGRATING ORAL EVERY 8 HOURS PRN
Status: DISCONTINUED | OUTPATIENT
Start: 2019-04-10 | End: 2019-04-10 | Stop reason: HOSPADM

## 2019-04-10 RX ORDER — FENTANYL CITRATE 50 UG/ML
INJECTION, SOLUTION INTRAMUSCULAR; INTRAVENOUS
Status: DISCONTINUED | OUTPATIENT
Start: 2019-04-10 | End: 2019-04-10

## 2019-04-10 RX ORDER — FENTANYL CITRATE 50 UG/ML
25 INJECTION, SOLUTION INTRAMUSCULAR; INTRAVENOUS EVERY 5 MIN PRN
Status: DISCONTINUED | OUTPATIENT
Start: 2019-04-10 | End: 2019-04-10 | Stop reason: HOSPADM

## 2019-04-10 RX ORDER — PROPOFOL 10 MG/ML
VIAL (ML) INTRAVENOUS CONTINUOUS PRN
Status: DISCONTINUED | OUTPATIENT
Start: 2019-04-10 | End: 2019-04-10

## 2019-04-10 RX ORDER — SODIUM CHLORIDE 0.9 % (FLUSH) 0.9 %
10 SYRINGE (ML) INJECTION
Status: DISCONTINUED | OUTPATIENT
Start: 2019-04-10 | End: 2019-04-10 | Stop reason: HOSPADM

## 2019-04-10 RX ORDER — MIDAZOLAM HYDROCHLORIDE 1 MG/ML
INJECTION, SOLUTION INTRAMUSCULAR; INTRAVENOUS
Status: DISCONTINUED | OUTPATIENT
Start: 2019-04-10 | End: 2019-04-10

## 2019-04-10 RX ORDER — CIPROFLOXACIN 2 MG/ML
400 INJECTION, SOLUTION INTRAVENOUS
Status: COMPLETED | OUTPATIENT
Start: 2019-04-10 | End: 2019-04-10

## 2019-04-10 RX ORDER — SODIUM CHLORIDE 9 MG/ML
INJECTION, SOLUTION INTRAVENOUS CONTINUOUS
Status: DISCONTINUED | OUTPATIENT
Start: 2019-04-10 | End: 2019-04-10 | Stop reason: HOSPADM

## 2019-04-10 RX ORDER — HYDROCODONE BITARTRATE AND ACETAMINOPHEN 5; 325 MG/1; MG/1
1 TABLET ORAL EVERY 4 HOURS PRN
Status: DISCONTINUED | OUTPATIENT
Start: 2019-04-10 | End: 2019-04-10 | Stop reason: HOSPADM

## 2019-04-10 RX ADMIN — FENTANYL CITRATE 25 MCG: 50 INJECTION, SOLUTION INTRAMUSCULAR; INTRAVENOUS at 08:04

## 2019-04-10 RX ADMIN — PROPOFOL 50 MCG/KG/MIN: 10 INJECTION, EMULSION INTRAVENOUS at 08:04

## 2019-04-10 RX ADMIN — SODIUM CHLORIDE: 0.9 INJECTION, SOLUTION INTRAVENOUS at 07:04

## 2019-04-10 RX ADMIN — CIPROFLOXACIN 400 MG: 2 INJECTION, SOLUTION INTRAVENOUS at 08:04

## 2019-04-10 RX ADMIN — MIDAZOLAM HYDROCHLORIDE 2 MG: 1 INJECTION, SOLUTION INTRAMUSCULAR; INTRAVENOUS at 08:04

## 2019-04-10 NOTE — ANESTHESIA RELEASE NOTE
"Anesthesia Release from PACU Note    Patient: Silvana Quezada    Procedure(s) Performed: Procedure(s) (LRB):  CYSTOSCOPY (N/A)  INJECTION, BOTULINUM TOXIN, TYPE A 200 UNITS (N/A)    Anesthesia type: general    Post pain: Adequate analgesia    Post assessment: no apparent anesthetic complications and tolerated procedure well    Last Vitals:   Visit Vitals  /66 (BP Location: Left arm, Patient Position: Lying)   Pulse 66   Temp 36.4 °C (97.5 °F) (Temporal)   Resp 20   Ht 5' 3" (1.6 m)   Wt 75.8 kg (167 lb)   SpO2 99%   Breastfeeding? No   BMI 29.58 kg/m²       Post vital signs: stable    Level of consciousness: awake and alert     Nausea/Vomiting: no nausea/no vomiting    Complications: none    Airway Patency: patent    Respiratory: unassisted, spontaneous ventilation, room air    Cardiovascular: stable and blood pressure at baseline    Hydration: euvolemic  "

## 2019-04-10 NOTE — TRANSFER OF CARE
"Anesthesia Transfer of Care Note    Patient: Silvana Quezada    Procedure(s) Performed: Procedure(s) (LRB):  CYSTOSCOPY (N/A)  INJECTION, BOTULINUM TOXIN, TYPE A 200 UNITS (N/A)    Patient location: PACU    Anesthesia Type: general    Transport from OR: Transported from OR on 6-10 L/min O2 by face mask with adequate spontaneous ventilation    Post pain: adequate analgesia    Post assessment: no apparent anesthetic complications and tolerated procedure well    Post vital signs: stable    Level of consciousness: awake    Nausea/Vomiting: no nausea/vomiting    Complications: none    Transfer of care protocol was followed      Last vitals:   Visit Vitals  BP (!) 111/59 (BP Location: Left arm, Patient Position: Lying)   Pulse 68   Temp 36.3 °C (97.3 °F) (Temporal)   Resp 20   Ht 5' 3" (1.6 m)   Wt 75.8 kg (167 lb)   SpO2 100%   Breastfeeding? No   BMI 29.58 kg/m²     "

## 2019-04-10 NOTE — INTERVAL H&P NOTE
The patient has been examined and the H&P has been reviewed:    I concur with the findings and no changes have occurred since H&P was written.     Urine dipstick today negative for all components    Anesthesia/Surgery risks, benefits and alternative options discussed and understood by patient/family.          Active Hospital Problems    Diagnosis  POA    Neurogenic bladder [N31.9]  Yes      Resolved Hospital Problems   No resolved problems to display.

## 2019-04-10 NOTE — OP NOTE
[facility]  Operative Note    Date: 04/10/2019    Pre-Op Diagnosis: Multiple sclerosis, neurogenic bladder    Patient Active Problem List    Diagnosis Date Noted    Acute cystitis 08/30/2018    Thoracic aorta atherosclerosis 05/24/2018    Bipolar affective disorder 05/24/2018    Overactive bladder 08/09/2017    Incomplete bladder emptying 07/21/2017    Osteoporosis 06/22/2017    Localized osteoporosis with current pathological fracture 05/30/2017    History of breast cancer 08/27/2014    Urgency-frequency syndrome 12/11/2013    Frequency of urination 12/03/2013    Urinary urgency 12/03/2013    MS (multiple sclerosis) 12/03/2013    Neurogenic bladder 12/03/2013       Post-Op Diagnosis: same    Procedure(s) Performed:   1.  Cystoscopy with bladder botox injection    Specimen(s): none    Staff Surgeon:  Dr. Jayesh Ross MD    Assistant Surgeon: Yogi Avila MD    Anesthesia: Monitored Local Anesthesia with Sedation    Indications: Silvana Quezada is a 63 y.o. female with MS who has a neurogenic bladder and performs CIC 4x/day. She has had good relief from bladder spasms with botox injections in the past.    Findings:   1. Cystoscopy significant for grade III trabeculations. Erythematous changes diffusely throughout bladder  2. 200u Botox injected in 20ml throughout bladder detrusor. Good wheals raised.     Estimated Blood Loss: min    Drains: none    Procedure in Detail:  After informed consent was obtained the patient was brought to the cystoscopy suite and placed in the supine position.  SCDs were applied and working.  Anesthesia was administered.  When the patient was adequately sedated she was placed in the dorsal lithotomy position and prepped and draped in the usual sterile fashion.      A rigid cystoscope in a 22 Fr sheath was introduced into the patients's bladder via the urethra.  This passed easily.  Cystoscopy was performed which revealed the ureteral orifices in their normal  anatomic position bilaterally.  There was evidence of grade III trabeculations and diffuse erythematous changes to the bladder mucosa.     200 units of botox was injected into the detrusor muscle throughout the bladder.  Good wheals were raised.  The patient's bladder was drained and the cystoscope was removed.     The patient tolerated the procedure well and was transferred to the recovery room in stable condition.      Disposition:  The patient will follow up with  6 months. She was given prescriptions for tramadol.  She knows how to self-cath should she have any issues with retention.      Yogi Avila MD

## 2019-04-10 NOTE — DISCHARGE INSTRUCTIONS
Cystoscopy    Cystoscopy is a procedure that lets your doctor look directly inside your urethra and bladder. It can be used to:  · Help diagnose a problem with your urethra, bladder, or kidneys.  · Take a sample (biopsy) of bladder or urethral tissue.  · Treat certain problems (such as removing kidney stones).  · Place a stent to bypass an obstruction.  · Take special X-rays of the kidneys.  Based on the findings, your doctor may recommend other tests or treatments.  What is a cystoscope?  A cystoscope is a telescope-like instrument that contains lenses and fiberoptics (small glass wires that make bright light). The cystoscope may be straight and rigid, or flexible to bend around curves in the urethra. The doctor may look directly into the cystoscope, or project the image onto a monitor.  Getting ready  · Ask your doctor if you should stop taking any medicines before the procedure.  · Ask whether you should avoid eating or drinking anything after midnight before the procedure.  · Follow any other instructions your doctor gives you.  Tell your doctor before the exam if you:  · Take any medicines, such as aspirin or blood thinners  · Have allergies to any medicines  · Are pregnant   The procedure  Cystoscopy is done in the doctors office, surgery center, or hospital. The doctor and a nurse are present during the procedure. It takes only a few minutes, longer if a biopsy, X-ray, or treatment needs to be done.  During the procedure:  · You lie on an exam table on your back, knees bent and legs apart. You are covered with a drape.  · Your urethra and the area around it are washed. Anesthetic jelly may be applied to numb the urethra. Other pain medicine is usually not needed. In some cases, you may be offered a mild sedative to help you relax. If a more extensive procedure is to be done, such as a biopsy or kidney stone removal, general anesthesia may be needed.  · The cystoscope is inserted. A sterile fluid is put  into the bladder to expand it. You may feel pressure from this fluid.  · When the procedure is done, the cystoscope is removed.  After the procedure  If you had a sedative, general anesthesia, or spinal anesthesia, you must have someone drive you home. Once youre home:  · Drink plenty of fluids.  · You may have burning or light bleeding when you urinate--this is normal.  · Medicines may be prescribed to ease any discomfort or prevent infection. Take these as directed.  · Call your doctor if you have heavy bleeding or blood clots, burning that lasts more than a day, a fever over 100°F  (38° C), or trouble urinating.  Date Last Reviewed: 1/1/2017 © 2000-2017 [employer]. [address] [place] [address], [place] [address]. All rights reserved. This information is not intended as a substitute for professional medical care. Always follow your healthcare professional's instructions.        Anesthesia: General Anesthesia     You are watched continuously during your procedure by your anesthesia provider.     Youre due to have surgery. During surgery, youll be given medicine called anesthesia or anesthetic. This will keep you comfortable and pain-free. Your anesthesia provider will use general anesthesia.  What is general anesthesia?  General anesthesia puts you into a state like deep sleep. It goes into the bloodstream (IV anesthetics), into the lungs (gas anesthetics), or both. You feel nothing during the procedure. You will not remember it. During the procedure, the anesthesia provider monitors you continuously. He or she checks your heart rate and rhythm, blood pressure, breathing, and blood oxygen.  · IV anesthetics. IV anesthetics are given through an IV line in your arm. Theyre often given first. This is so you are asleep before a gas anesthetic is started. Some kinds of IV anesthetics relieve pain. Others relax you. Your doctor will decide which kind is best in your case.  · Gas anesthetics. Gas anesthetics  are breathed into the lungs. They are often used to keep you asleep. They can be given through a facemask or a tube placed in your larynx or trachea (breathing tube).  ¨ If you have a facemask, your anesthesia provider will most likely place it over your nose and mouth while youre still awake. Youll breathe oxygen through the mask as your IV anesthetic is started. Gas anesthetic may be added through the mask.  ¨ If you have a tube in the larynx or trachea, it will be inserted into your throat after youre asleep.  Anesthesia tools and medicines  You will likely have:  · IV anesthetics. These are put into an IV line into your bloodstream.  · Gas anesthetics. You breathe these anesthetics into your lungs, where they pass into your bloodstream.  · Pulse oximeter. This is a small clip that is attached to the end of your finger. This measures your blood oxygen level.  · Electrocardiography leads (electrodes). These are small sticky pads that are placed on your chest. They record your heart rate and rhythm.  · Blood pressure cuff. This reads your blood pressure.  Risks and possible complications  General anesthesia has some risks. These include:  · Breathing problems  · Nausea and vomiting  · Sore throat or hoarseness (usually temporary)  · Allergic reaction to the anesthetic  · Irregular heartbeat (rare)  · Cardiac arrest (rare)   Anesthesia safety  · Follow all instructions you are given for how long not to eat or drink before your procedure.  · Be sure your doctor knows what medicines and drugs you take. This includes over-the-counter medicines, herbs, supplements, alcohol or other drugs. You will be asked when those were last taken.  · Have an adult family member or friend drive you home after the procedure.  · For the first 24 hours after your surgery:  ¨ Do not drive or use heavy equipment.  ¨ Do not make important decisions or sign legal documents. If important decisions or signing legal documents is necessary  during the first 24 hours after surgery, have a trusted family member or spouse act on your behalf.  ¨ Avoid alcohol.  ¨ Have a responsible adult stay with you. He or she can watch for problems and help keep you safe.  Date Last Reviewed: 12/1/2016  © 9027-4601 [employer]. [address] [place] [address], [place] [address]. All rights reserved. This information is not intended as a substitute for professional medical care. Always follow your healthcare professional's instructions.

## 2019-04-10 NOTE — DISCHARGE SUMMARY
[facility]  Discharge Note  Short Stay    Admit Date: 4/10/2019    Discharge Date and Time: 04/10/2019 8:38 AM      Attending Physician: Jayesh Ross MD     Discharge Provider: Yogi Avila    Diagnoses:  Active Hospital Problems    Diagnosis  POA    Neurogenic bladder [N31.9]  Yes      Resolved Hospital Problems   No resolved problems to display.       Discharged Condition: good    Hospital Course: Patient was admitted for cystoscopy, bladder botox injections and tolerated the procedure well with no complications. The patient was discharged home in good condition on the same day.       Final Diagnoses: Same as principal problem.    Disposition: Home or Self Care    Follow up/Patient Instructions:    Medications:  Reconciled Home Medications:   Current Discharge Medication List      CONTINUE these medications which have NOT CHANGED    Details   !! buPROPion (WELLBUTRIN SR) 100 MG TBSR 12 hr tablet Take 2 tablets (200 mg total) by mouth every morning.  Qty: 180 tablet, Refills: 1    Associated Diagnoses: Bipolar affective disorder, depressed, moderate degree      !! buPROPion (WELLBUTRIN SR) 150 MG TBSR 12 hr tablet Take 1 tablet (150 mg total) by mouth every evening.  Qty: 90 tablet, Refills: 1    Comments: **Patient requests 90 days supply**  Associated Diagnoses: Bipolar affective disorder, depressed, moderate degree      !! folic acid (FOLVITE) 1 MG tablet Take 1 tablet (1,000 mcg total) by mouth once daily.  Qty: 90 tablet, Refills: 3    Associated Diagnoses: Bipolar affective disorder, depressed, moderate degree      !! folic acid (FOLVITE) 1 MG tablet TAKE 1 TABLET BY MOUTH ONCE DAILY  Qty: 90 tablet, Refills: 0    Associated Diagnoses: Bipolar I disorder, most recent episode depressed, in partial remission      haloperidol (HALDOL) 1 MG tablet Take 2 tablets (2 mg total) by mouth every evening.  Qty: 180 tablet, Refills: 1    Comments: **Patient requests 90 days supply**  Associated Diagnoses:  Bipolar affective disorder, depressed, moderate degree      ibuprofen (ADVIL,MOTRIN) 200 MG tablet Take 200 mg by mouth every 6 (six) hours as needed for Pain.      magnesium oxide (MAG-OX) 400 mg tablet Take 500 mg by mouth once daily.      OXcarbazepine (TRILEPTAL) 150 MG Tab Take 1 tablet (150 mg total) by mouth 2 (two) times daily.  Qty: 180 tablet, Refills: 1    Comments: **Patient requests 90 days supply**  Associated Diagnoses: Bipolar affective disorder, depressed, moderate degree      aspirin 325 MG tablet Take 325 mg by mouth every 4 (four) hours as needed for Pain.      calcium-vitamin D3 (CALCIUM 500 + D) 500 mg(1,250mg) -200 unit per tablet Take 1 tablet by mouth 2 (two) times daily with meals.      catheter (FEMALE CATHETER) 14 Fr [facility] Patient is to self catheterize four times a day indefinitley using female 12 fr in and out catheter for incomplete bladder emptying.   Dispense 120 month with 11 refills or 360 every 3 months with 3 refills depending on patient's preference  Qty: 120 each, Refills: 11       !! - Potential duplicate medications found. Please discuss with provider.        Discharge Procedure Orders   Diet general     Call MD for:  temperature >100.4     Call MD for:  persistent nausea and vomiting     Call MD for:  severe uncontrolled pain     Call MD for:  difficulty breathing, headache or visual disturbances     Call MD for:  redness, tenderness, or signs of infection (pain, swelling, redness, odor or green/yellow discharge around incision site)     Call MD for:  hives     Call MD for:  persistent dizziness or light-headedness     Call MD for:  extreme fatigue     Follow-up Information     Jayesh Ross MD In 6 months.    Specialty:  Urology  Why:  as instructed by Dr Ross  Contact information:  [address]  [place] [id number] [phone]

## 2019-04-10 NOTE — ANESTHESIA POSTPROCEDURE EVALUATION
Anesthesia Post Evaluation    Patient: Silvana Quezada    Procedure(s) Performed: Procedure(s) (LRB):  CYSTOSCOPY (N/A)  INJECTION, BOTULINUM TOXIN, TYPE A 200 UNITS (N/A)    Final Anesthesia Type: general  Patient location during evaluation: PACU  Patient participation: Yes- Able to Participate  Level of consciousness: awake and alert  Post-procedure vital signs: reviewed and stable  Pain management: adequate  Airway patency: patent  PONV status at discharge: No PONV  Anesthetic complications: no      Cardiovascular status: hemodynamically stable  Respiratory status: unassisted, spontaneous ventilation and room air  Hydration status: euvolemic  Follow-up not needed.          Vitals Value Taken Time   /66 4/10/2019  9:08 AM   Temp 36.4 °C (97.5 °F) 4/10/2019  9:08 AM   Pulse 66 4/10/2019  9:08 AM   Resp 20 4/10/2019  9:08 AM   SpO2 99 % 4/10/2019  9:08 AM         No case tracking events are documented in the log.      Pain/Yaakov Score: Yaakov Score: 10 (4/10/2019  8:44 AM)

## 2019-04-22 ENCOUNTER — TELEPHONE (OUTPATIENT)
Dept: INTERNAL MEDICINE | Facility: CLINIC | Age: 64
End: 2019-04-22

## 2019-04-22 ENCOUNTER — TELEPHONE (OUTPATIENT)
Dept: PSYCHIATRY | Facility: CLINIC | Age: 64
End: 2019-04-22

## 2019-04-22 RX ORDER — SULFAMETHOXAZOLE AND TRIMETHOPRIM 800; 160 MG/1; MG/1
1 TABLET ORAL 2 TIMES DAILY
Qty: 6 TABLET | Refills: 0 | Status: SHIPPED | OUTPATIENT
Start: 2019-04-22 | End: 2019-04-25

## 2019-04-22 NOTE — TELEPHONE ENCOUNTER
Spoke with Pt who discovered that Haldol 1 mg and 2 mg tablets are unavailable until June.    Recommended check with some additional pharmacies, then call back within 2 days if a switch to Risperdal is needed.

## 2019-04-22 NOTE — TELEPHONE ENCOUNTER
----- Message from Nataliya Zurita sent at 4/22/2019 12:41 PM CDT -----  Contact: Self [phone]  Patient would like to speak with you about having a UTI and needs medication. Please advise

## 2019-04-22 NOTE — TELEPHONE ENCOUNTER
----- Message from Rito Elise sent at 4/22/2019 12:34 PM CDT -----  Contact: Self  Pt ask if you can please give her a call concerning   haloperidol (HALDOL) 1 MG tablet. Pt said she was told the company won't have this Medication until June.

## 2019-04-23 ENCOUNTER — TELEPHONE (OUTPATIENT)
Dept: INTERNAL MEDICINE | Facility: CLINIC | Age: 64
End: 2019-04-23

## 2019-04-23 ENCOUNTER — TELEPHONE (OUTPATIENT)
Dept: PSYCHIATRY | Facility: CLINIC | Age: 64
End: 2019-04-23

## 2019-04-23 PROBLEM — N30.00 ACUTE CYSTITIS: Status: RESOLVED | Noted: 2018-08-30 | Resolved: 2019-04-23

## 2019-04-23 PROBLEM — I77.1 TORTUOUS AORTA: Status: ACTIVE | Noted: 2019-04-23

## 2019-04-23 PROBLEM — M81.0 OSTEOPOROSIS: Status: RESOLVED | Noted: 2017-06-22 | Resolved: 2019-04-23

## 2019-04-23 RX ORDER — RISPERIDONE 2 MG/1
2 TABLET ORAL NIGHTLY
Qty: 90 TABLET | Refills: 0 | Status: SHIPPED | OUTPATIENT
Start: 2019-04-23 | End: 2019-05-01 | Stop reason: SINTOL

## 2019-04-23 NOTE — TELEPHONE ENCOUNTER
----- Message from Alka Ledezma MA sent at 4/23/2019  9:58 AM CDT -----  Contact: Patient [phone]  Patient called to let you know that [facility] does not have the dosage of medication  haloperidol (HALDOL) 1 MG tablet that she needs because the  stopped making it. Patient can be reached at [phone].

## 2019-04-23 NOTE — TELEPHONE ENCOUNTER
Called Pt.  She was unable to locate any Haldol.  She agreed to change to equivalent dose of Risperdal.  Rx was sent to pharmacy.  Vacation coverage was expained.

## 2019-04-23 NOTE — TELEPHONE ENCOUNTER
Noted.    Attempted to contact the pt to advise.    No answer. No voicemail, unable to leave a message to advise.

## 2019-04-24 ENCOUNTER — HOSPITAL ENCOUNTER (OUTPATIENT)
Dept: RADIOLOGY | Facility: HOSPITAL | Age: 64
Discharge: HOME | End: 2019-04-24
Attending: PHYSICIAN ASSISTANT
Payer: MEDICARE

## 2019-04-24 ENCOUNTER — OFFICE VISIT (OUTPATIENT)
Dept: URGENT CARE | Facility: CLINIC | Age: 64
End: 2019-04-24
Payer: MEDICARE

## 2019-04-24 VITALS
OXYGEN SATURATION: 96 % | DIASTOLIC BLOOD PRESSURE: 72 MMHG | SYSTOLIC BLOOD PRESSURE: 130 MMHG | WEIGHT: 164 LBS | BODY MASS INDEX: 29.06 KG/M2 | RESPIRATION RATE: 18 BRPM | HEIGHT: 63 IN | HEART RATE: 86 BPM
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
## 2019-04-24 DIAGNOSIS — S02.2XXA CLOSED FRACTURE OF NASAL BONE, INITIAL ENCOUNTER: ICD-10-CM

## 2019-04-24 PROCEDURE — 70160 XR NASAL BONES: ICD-10-PCS | Mod: FY,S$GLB,, | Performed by: RADIOLOGY

## 2019-04-24 PROCEDURE — 3008F PR BODY MASS INDEX (BMI) DOCUMENTED: ICD-10-PCS | Mod: CPTII,S$GLB,, | Performed by: PHYSICIAN ASSISTANT

## 2019-04-24 PROCEDURE — 99204 PR OFFICE/OUTPT VISIT, NEW, LEVL IV, 45-59 MIN: ICD-10-PCS | Mod: S$GLB,,, | Performed by: PHYSICIAN ASSISTANT

## 2019-04-24 PROCEDURE — 99204 OFFICE O/P NEW MOD 45 MIN: CPT | Mod: S$GLB,,, | Performed by: PHYSICIAN ASSISTANT

## 2019-04-24 PROCEDURE — 3008F BODY MASS INDEX DOCD: CPT | Mod: CPTII,S$GLB,, | Performed by: PHYSICIAN ASSISTANT

## 2019-04-24 PROCEDURE — 70486 CT MAXILLOFACIAL W/O DYE: CPT | Mod: 26,,, | Performed by: RADIOLOGY

## 2019-04-24 PROCEDURE — 70486 CT MAXILLOFACIAL WITHOUT CONTRAST: ICD-10-PCS | Mod: 26,,, | Performed by: RADIOLOGY

## 2019-04-24 PROCEDURE — 70486 CT MAXILLOFACIAL W/O DYE: CPT | Mod: TC

## 2019-04-24 PROCEDURE — 70160 X-RAY EXAM OF NASAL BONES: CPT | Mod: FY,S$GLB,, | Performed by: RADIOLOGY

## 2019-04-24 NOTE — PROGRESS NOTES
"Subjective:       Patient ID: Silvana Quezada is a 63 y.o. female.    Vitals:  height is 5' 3" (1.6 m) and weight is 74.4 kg (164 lb). Her blood pressure is 130/72 and her pulse is 86. Her respiration is 18 and oxygen saturation is 96%.     Chief Complaint: Facial Injury    CC: Fall    HPI:  63-year-old female with pertinent past medical history multiple sclerosis and bipolar disorder presents for consideration of fall.  Patient reports falling onto her right face and knees secondary to dizziness sensation while walking in a parking lot this morning.  She reports that she was initiated on Risperdal yesterday, and felt dizziness after taking it, but also took a dose this morning.  She denies loss of consciousness, confusion, emesis, vomiting, syncope or headache. She denies further symptoms. She complains of nasal pain, right cheek pain and bilateral knee abrasions since falling. She denies anti-coagulant use.     Facial Injury    The incident occurred 1 to 3 hours ago. The injury mechanism was a fall. There was no loss of consciousness. The volume of blood lost was minimal. The quality of the pain is described as aching. The pain is at a severity of 2/10. The pain is mild. Pertinent negatives include no blurred vision, disorientation, headaches, numbness, vomiting or weakness. She has tried nothing for the symptoms.       Constitution: Negative for appetite change and fatigue.   HENT: Positive for facial trauma. Negative for ear pain, ear discharge, foreign body in ear, facial swelling, sinus pain and sinus pressure.    Neck: Negative for neck stiffness.   Cardiovascular: Negative for chest trauma and chest pain.   Eyes: Negative for eye trauma, foreign body in eye, eye pain, double vision and blurred vision.   Respiratory: Negative for cough.    Gastrointestinal: Negative for abdominal trauma, abdominal pain, nausea, vomiting, constipation, diarrhea and rectal bleeding.   Genitourinary: Negative for dysuria, " hematuria, missed menses, genital trauma and pelvic pain.   Musculoskeletal: Positive for pain and trauma. Negative for joint swelling and abnormal ROM of joint.   Skin: Negative for color change, wound, abrasion, laceration, erythema and bruising.   Neurological: Positive for dizziness. Negative for history of vertigo, light-headedness, passing out, facial drooping, speech difficulty, coordination disturbances, loss of balance, headaches, history of migraines, disorientation, altered mental status, loss of consciousness, numbness, tingling, seizures and tremors.   Hematologic/Lymphatic: Negative for history of bleeding disorder.   Psychiatric/Behavioral: Negative for altered mental status and disorientation.       Objective:      Physical Exam   Constitutional: She is oriented to person, place, and time. Vital signs are normal. She appears well-developed and well-nourished. She is cooperative.  Non-toxic appearance. She does not have a sickly appearance. She does not appear ill. No distress.   HENT:   Head: Normocephalic. Head is with contusion. Head is without raccoon's eyes, without Teran's sign, without abrasion, without laceration, without right periorbital erythema and without left periorbital erythema. Hair is normal.       Right Ear: Tympanic membrane, external ear and ear canal normal.   Left Ear: Tympanic membrane, external ear and ear canal normal.   Nose: Sinus tenderness and nasal deformity present. No mucosal edema, rhinorrhea, nose lacerations, septal deviation or nasal septal hematoma. Epistaxis is observed.  No foreign bodies. Right sinus exhibits no maxillary sinus tenderness and no frontal sinus tenderness. Left sinus exhibits no maxillary sinus tenderness and no frontal sinus tenderness.   Mouth/Throat: Uvula is midline, oropharynx is clear and moist and mucous membranes are normal. No oral lesions. No trismus in the jaw. Normal dentition. No uvula swelling. No oropharyngeal exudate, posterior  oropharyngeal edema or posterior oropharyngeal erythema.   The nose deviates to the left. There is dried blood noted. There is TTP of the nasal bridge and right orbital rim with associated bruising. See picture. No open wounds of the face. No facial droop.    Eyes: Pupils are equal, round, and reactive to light. Conjunctivae, EOM and lids are normal. Lids are everted and swept, no foreign bodies found. Right eye exhibits no discharge. Left eye exhibits no discharge. Right conjunctiva has no hemorrhage. Left conjunctiva has no hemorrhage. No scleral icterus.   Sclera clear bilat   Neck: Trachea normal, normal range of motion, full passive range of motion without pain and phonation normal. Neck supple. No spinous process tenderness and no muscular tenderness present. No neck rigidity. Normal range of motion present.   Cardiovascular: Normal rate, regular rhythm, normal heart sounds, intact distal pulses and normal pulses.   Pulmonary/Chest: Effort normal and breath sounds normal. No respiratory distress.   Abdominal: Soft. Normal appearance and bowel sounds are normal. She exhibits no distension, no pulsatile midline mass and no mass. There is no tenderness.   Musculoskeletal: Normal range of motion. She exhibits no edema or deformity.        Right knee: Normal. She exhibits normal range of motion, no swelling, no effusion, no ecchymosis, no deformity, no laceration, no erythema, normal alignment, no LCL laxity, normal patellar mobility, no bony tenderness, normal meniscus and no MCL laxity.        Left knee: Normal. She exhibits normal range of motion, no swelling, no effusion, no ecchymosis, no deformity, no laceration, no erythema, normal alignment, no LCL laxity, normal patellar mobility, no bony tenderness, normal meniscus and no MCL laxity.   Patient ambulates with a cane.  No obvious deformity of the extremities. Peripheral sensation and strength intact.  Peripheral pulses are intact. No extremity weakness.     Neurological: She is alert and oriented to person, place, and time. She has normal strength. She displays no atrophy and no tremor. No cranial nerve deficit or sensory deficit. She exhibits normal muscle tone. She displays a negative Romberg sign. She displays no seizure activity. Coordination and gait normal. GCS eye subscore is 4. GCS verbal subscore is 5. GCS motor subscore is 6.   Skin: Skin is warm and dry. Abrasion and bruising (right lower orbital rim) noted. No burn, no ecchymosis, no laceration, no lesion, no petechiae and no rash noted. She is not diaphoretic. No erythema. No pallor.        Abrasions of the knees bilaterally. No laceration or foreign body.    Psychiatric: She has a normal mood and affect. Her speech is normal and behavior is normal. Judgment and thought content normal. Cognition and memory are normal.   Nursing note and vitals reviewed.            X-ray Nasal Bones    Result Date: 4/24/2019  EXAMINATION: XR NASAL BONES CLINICAL HISTORY: Unspecified fall, initial encounter COMPARISON: None FINDINGS: There is a fracture identified involving the base of the nasal bone.  Opacification of the nasal turbinates identified.  The maxillary sinuses are clear.     See above Electronically signed by: Rosales Rios MD Date:    04/24/2019 Time:    12:06     Assessment:       1. Fall, initial encounter    2. Closed fracture of nasal bone, initial encounter        Plan:         Fall, initial encounter  -     X-Ray Nasal Bones; Future; Expected date: 04/24/2019  -     CT Maxillofacial Without Contrast; Future; Expected date: 04/24/2019    Closed fracture of nasal bone, initial encounter  -     CT Maxillofacial Without Contrast; Future; Expected date: 04/24/2019  -     Ambulatory referral to ENT       Vitals are reassuring. I have discussed diagnostic findings with patient. I will encourage patient to have CT maxillofacial exam today at 2:30. I instructed patient to avoid anti-coagulants. I also instructed  patient not to blow her nosed. Patient is accompanied by her , who will be driving her. Repeat neuro exam normal. I will also recommend follow-up with ENT, as well as PCP regarding recent medication change. Patient is stable for discharge.

## 2019-04-24 NOTE — PATIENT INSTRUCTIONS
Do not take any blood thinners.  You can Tylenol for pain.  Go for your CT today at 2:30 at [facility] [place].  Follow-up with ENT or plastic surgery as discussed.  Return to urgent care or go to the ER for any concerns.   Fall Due to Dizziness, Weakness, or Loss of Balance  The symptoms that led to your fall have been evaluated. Your doctor feels it is safe for you to return home.  Many things can cause you to become dizzy. Everyone means a little something different by the word dizzy. People may describe their symptoms using these words:  · It doesnt feel right in my head  · It feels like spinning in my head  · It seems like the room is spinning  · My balance feels off  · I feel lightheaded, like I am going to pass out  All these descriptions can have real causes.     Your balance mechanism is in your inner ear. Anything disturbing it can make you feel dizzy, whether it is from a cold, an injury, or many other things. Anything that causes your blood pressure to drop suddenly can make you feel lightheaded, or like you are going to faint. This is because at that moment there might not be enough blood flowing to your brain. Causes include:  · Medicines  · Dehydration  · Standing up or bending over too quickly  · Becoming overheated  · Taking a hot shower or bath  · Straining hard while lifting something or using the toilet  · Strokes, heart attack, heart valve disease, very slow or very fast heart rate  · Low blood sugar  · Ear infection  · Hyperventilation  · Anemia  · Trauma  · Infection  · Panic attack  · Pregnancy  You may be at risk of repeat falls. Take precautions described below to prevent another fall.  Home care  · If you become lightheaded or dizzy, lie down immediately or sit and lean forward with your head down. It is better to do this than fall and seriously hurt or injure yourself.  · Rest today. When changing position, take a moment to be sure any dizziness goes away before standing  and walking.  · If you have been prescribed a walker, be sure to use it whenever you walk, even if it is a short distance.  · If you were injured during the fall, follow the advice from your doctor regarding care of your injury.  · Be sure your doctor knows all the medicines, herbs, and supplements you take. Some medicines can cause dizziness.  Follow-up care  Unless given other advice, call your doctor on the next office day to advise of your fall and to schedule an appointment. You may require further treatment for the underlying condition that caused todays fall.  If X-ray or a CT scan were done, you will be notified of the results, especially if it affects treatment.  Call 911  Call 911 if any of these occur:  · Trouble breathing  · Confused or difficulty arousing  · Fainting or loss of consciousness  · Rapid or very slow heart rate  · Seizure  · Difficulty with speech or vision, weakness of an arm or leg  · Difficulty walking or talking, loss of balance  · Numbness or weakness in one side of your body, facial droop  When to seek medical advice  Call your healthcare provider right away if any of the following occur:  · Another fall  · Continued dizzy spells  · Severe headache  · Blood in vomit or stools (black or red color)  Date Last Reviewed: 11/5/2015  © 2530-9373 [employer]. [address] [place] [address], [place] [address]. All rights reserved. This information is not intended as a substitute for professional medical care. Always follow your healthcare professional's instructions.        Nose Fracture, with X-Ray  A broken bone, or fracture, of the nose may be a minor crack. Or it may be a major break, with the parts of your nose pushed out of place. A fractured nose causes pain, swelling, and nasal stuffiness. You may have bleeding from your nose. By tomorrow, you may have bruising around your eyes.  A minor fracture will heal in 3 to 4 weeks, with no more treatment needed. A major break that  changes the shape of your nose must be treated by a nose specialist, called an ENT (ear, nose, and throat) doctor.The ENT doctor will straighten the bones in your nose. This is called a reduction. Some fractures may need a reduction as soon as possible, such as when bleeding from the nose won't stop. Otherwise, it is best to wait a few days until the swelling has gone down. The doctor will then be able to easily see when your nose is back in the right position.    Home care  · Use an ice pack on your nose for no more than 15 to 20 minutes at a time. Do this every 1 to 2 hours for the first 24 to 48 hours. Then use the ice as needed to ease pain and swelling. To make an ice pack, put ice cubes in a plastic bag that seals at the top. Wrap the bag in a clean, thin towel or cloth. Never put ice or an ice pack directly on the skin.  · Tell your provider if you are taking aspirin or blood-thinning medicine. These medicines make it more likely that your nose will bleed. Your provider may need to change your dose.  · You may use over-the-counter pain medicine to control pain, unless another medicine was prescribed. If you have chronic liver or kidney disease, talk with your provider before using this medicine.  · Dont drink alcohol or hot liquids for the next 2 days. Alcohol and hot liquids can dilate blood vessels in your nose. This can cause bleeding.  · Dont blow your nose for the first 2 days. Then, do so gently so you don't cause bleeding.  · Dont play contact sports in the next 6 weeks unless you can protect your nose from getting injured again. You can wear a special custom-fitted plastic face mask to protect your nose.  Special note on concussions  If you had any symptoms of a concussion today, dont return to sports or any activity that could result in another head injury.  These are symptoms of a concussion:  · Nausea  · Vomiting  · Dizziness  · Confusion  · Headache  · Memory loss  · Loss of  consciousness  Wait until all of your symptoms are gone and your provider says its OK to resume your activity. Having a second head injury before you fully recover from the first one can lead to serious brain injury.  Follow-up care  Follow up with your healthcare provider, or as advised. If your nose looks crooked after the swelling goes down, call the ENT doctor for an appointment within the next 10 days. Also make an appointment if its still hard to breathe through 1 or both sides of your nose. If you have trouble getting an ENT appointment, call your regular provider.  If the bones are out of place, a reduction should be done 6 to 10 days after the injury. In children, the reduction should be done 3 to 7 days after the injury. After that time, the bones are more difficult to move back into place.  If you had X-rays taken, you will be told of any new findings that may affect your care.  When to seek medical advice  Call your healthcare provider right away if any of these occur:  · Bleeding from your nose even after you have pinched your nostrils together for 15 minutes without stopping  · Swelling, pain, or redness on your face that gets worse  · Fever of 100.4°F (38°C) or above lasting for 24 to 48 hours  · Can't breathe from both sides of your nose after swelling goes down  · Sinus pain  Call 911  Call 911 if you have:  · Repeated vomiting  · Severe headache or dizziness  · Headache or dizziness that gets worse  · Abnormal drowsiness, or unable to wake up as usual  · Confusion or change in behavior or speech  · Convulsion, or seizure  Date Last Reviewed: 12/3/2015  © 5028-7837 [employer]. [address] [place] [address], [place] [address]. All rights reserved. This information is not intended as a substitute for professional medical care. Always follow your healthcare professional's instructions.

## 2019-04-25 ENCOUNTER — OFFICE VISIT (OUTPATIENT)
Dept: INTERNAL MEDICINE | Facility: CLINIC | Age: 64
End: 2019-04-25
Payer: MEDICARE

## 2019-04-25 ENCOUNTER — TELEPHONE (OUTPATIENT)
Dept: OTOLARYNGOLOGY | Facility: CLINIC | Age: 64
End: 2019-04-25

## 2019-04-25 ENCOUNTER — TELEPHONE (OUTPATIENT)
Dept: INTERNAL MEDICINE | Facility: CLINIC | Age: 64
End: 2019-04-25

## 2019-04-25 ENCOUNTER — HOSPITAL ENCOUNTER (EMERGENCY)
Facility: HOSPITAL | Age: 64
Discharge: HOME | End: 2019-04-25
Attending: EMERGENCY MEDICINE
Payer: MEDICARE

## 2019-04-25 VITALS
SYSTOLIC BLOOD PRESSURE: 120 MMHG | BODY MASS INDEX: 29.92 KG/M2 | DIASTOLIC BLOOD PRESSURE: 80 MMHG | HEIGHT: 63 IN | HEART RATE: 68 BPM | WEIGHT: 168.88 LBS | RESPIRATION RATE: 15 BRPM | TEMPERATURE: 98 F

## 2019-04-25 VITALS
TEMPERATURE: 98 F | SYSTOLIC BLOOD PRESSURE: 114 MMHG | OXYGEN SATURATION: 98 % | HEIGHT: 63 IN | DIASTOLIC BLOOD PRESSURE: 68 MMHG | BODY MASS INDEX: 29.77 KG/M2 | WEIGHT: 168 LBS | HEART RATE: 81 BPM | RESPIRATION RATE: 18 BRPM

## 2019-04-25 DIAGNOSIS — S02.2XXK: Primary | ICD-10-CM

## 2019-04-25 DIAGNOSIS — S02.2XXD CLOSED FRACTURE OF NASAL BONE WITH ROUTINE HEALING, SUBSEQUENT ENCOUNTER: Primary | ICD-10-CM

## 2019-04-25 PROCEDURE — 99214 OFFICE O/P EST MOD 30 MIN: CPT | Mod: S$GLB,,, | Performed by: FAMILY MEDICINE

## 2019-04-25 PROCEDURE — 99214 PR OFFICE/OUTPT VISIT, EST, LEVL IV, 30-39 MIN: ICD-10-PCS | Mod: S$GLB,,, | Performed by: FAMILY MEDICINE

## 2019-04-25 PROCEDURE — 3008F BODY MASS INDEX DOCD: CPT | Mod: CPTII,S$GLB,, | Performed by: FAMILY MEDICINE

## 2019-04-25 PROCEDURE — 99284 PR EMERGENCY DEPT VISIT,LEVEL IV: ICD-10-PCS | Mod: ,,, | Performed by: PHYSICIAN ASSISTANT

## 2019-04-25 PROCEDURE — 99284 EMERGENCY DEPT VISIT MOD MDM: CPT | Mod: ,,, | Performed by: PHYSICIAN ASSISTANT

## 2019-04-25 PROCEDURE — 99284 EMERGENCY DEPT VISIT MOD MDM: CPT

## 2019-04-25 PROCEDURE — 3008F PR BODY MASS INDEX (BMI) DOCUMENTED: ICD-10-PCS | Mod: CPTII,S$GLB,, | Performed by: FAMILY MEDICINE

## 2019-04-25 PROCEDURE — 99999 PR PBB SHADOW E&M-EST. PATIENT-LVL III: CPT | Mod: PBBFAC,,, | Performed by: FAMILY MEDICINE

## 2019-04-25 PROCEDURE — 99999 PR PBB SHADOW E&M-EST. PATIENT-LVL III: ICD-10-PCS | Mod: PBBFAC,,, | Performed by: FAMILY MEDICINE

## 2019-04-25 RX ORDER — HYDROCODONE BITARTRATE AND ACETAMINOPHEN 5; 325 MG/1; MG/1
1 TABLET ORAL EVERY 6 HOURS PRN
Qty: 10 TABLET | Refills: 0 | Status: SHIPPED | OUTPATIENT
Start: 2019-04-25 | End: 2019-12-13

## 2019-04-25 RX ORDER — AMOXICILLIN 500 MG/1
500 TABLET, FILM COATED ORAL 2 TIMES DAILY
Qty: 14 TABLET | Refills: 0 | Status: SHIPPED | OUTPATIENT
Start: 2019-04-25 | End: 2019-05-02

## 2019-04-25 RX ORDER — OXYMETAZOLINE HCL 0.05 %
1 SPRAY, NON-AEROSOL (ML) NASAL 2 TIMES DAILY
Qty: 15 ML | Refills: 0 | Status: SHIPPED | OUTPATIENT
Start: 2019-04-25 | End: 2019-04-28

## 2019-04-25 NOTE — ED TRIAGE NOTES
Silvana Quezada, a 63 y.o. female presents to the ED w/ complaint of lost her bearings and fell on her face yesterday. Seen at MD office this morning and sent to ED for assessment of possible nose fx  Triage note:  Chief Complaint   Patient presents with    Facial Injury     Sent from PCP for ENT consult and fall yesterday reveal possible open nasal fracture.     Review of patient's allergies indicates:   Allergen Reactions    Adhesive      blisters    Keflex [cephalexin] Rash     Past Medical History:   Diagnosis Date    Bipolar 1 disorder     Breast cancer     right    Breast cyst     Closed fracture of proximal end of right humerus 3/9/2015    Depression     History of right shoulder fracture     MS (multiple sclerosis)     Osteoporosis, unspecified      Patient's name and date of birth checked and is correct.     Pain:nose and lip pain 4/10     LOC: The patient is awake, alert, and oriented to person, place, time, situation. Aware of environment with an appropriate affect. Speech is appropriate and clear.      APPEARANCE: Patient appears clean, well groomed, with clothing appropriate to environment.    Psychosocial:  Patient is calm and cooperative.  Patients insight and judgement are appropriate to situation. No evidence of delusions, hallucinations, or psychosis.     SKIN: The skin is clean, warm, dry, intact and color consistent with ethnicity. Patient has normal skin turgor and moist mucus membranes. No open areas, bruising and abrasions to face on nose and around eyes, abrasions to B knees noted. Capillary refill < 3 seconds.      MUSCULOSKELETAL: Patient moving upper and lower extremities without difficulty. No obvious swelling, weakness or deformities observed or reported.     RESPIRATORY: Airway is open. Respirations spontaneous, even, and non-labored, with normal effort and rate. No accessory muscle use observed. Denies cough.       CARDIAC:  No complaints of chest pain. Patient has a normal  rate and rhythm, no periphreal edema observed, peripheral pulses 2+, capillary refill < 3 seconds.    ABDOMEN: Soft, non-tender and no distention noted.      NEUROLOGIC: Eyes open spontaneously.  Behavior appropriate to situation.  Follows commands; facial expression symmetrical.  Purposeful motor response noted; normal sensation in all extremities when touched with a finger.      URINARY:  Patient reports routine urination without pain, frequency, or urgency.  Voids independently.

## 2019-04-25 NOTE — PROGRESS NOTES
Subjective:       Patient ID: Silvana Quezada is a 63 y.o. female.    Chief Complaint: Follow-up (u/c broken nose)    HPI 63-year-old white female presents to clinic today for follow-up from an urgent care visit yesterday after she fell while on her daily walk and landed on her face suffering a nasal fracture.  She was seen in urgent care and facial x-ray and CT were performed revealing a facial fracture with suspected open type fracture of the right nasal bone.  It has been recommended that the patient follow up with ENT for further evaluation; however, the earliest available appointment was May 22nd.  The patient presents today with hopes to get seen earlier.  Review of Systems   Constitutional: Negative for appetite change, chills, fatigue and fever.   HENT: Positive for facial swelling. Negative for congestion, ear pain, hearing loss, postnasal drip, rhinorrhea, sinus pressure, sore throat and tinnitus.    Eyes: Negative for redness, itching and visual disturbance.   Respiratory: Negative for cough, chest tightness and shortness of breath.    Cardiovascular: Negative for chest pain and palpitations.   Gastrointestinal: Negative for abdominal pain, constipation, diarrhea, nausea and vomiting.   Genitourinary: Negative for decreased urine volume, difficulty urinating, dysuria, frequency, hematuria and urgency.   Musculoskeletal: Positive for joint swelling. Negative for back pain, myalgias, neck pain and neck stiffness.   Skin: Negative for rash.   Neurological: Positive for headaches. Negative for dizziness and light-headedness.   Psychiatric/Behavioral: Negative.        Objective:      Physical Exam   Constitutional: She is oriented to person, place, and time. She appears well-developed and well-nourished. No distress.   HENT:   Head: Normocephalic. Head is with raccoon's eyes and with Teran's sign.   Right Ear: External ear normal.   Left Ear: External ear normal.   Nose: Nasal deformity and septal deviation  present.   Mouth/Throat: Oropharynx is clear and moist. No oropharyngeal exudate.   Eyes: Pupils are equal, round, and reactive to light. Conjunctivae and EOM are normal. Right eye exhibits no discharge. Left eye exhibits no discharge. No scleral icterus.   Neck: Normal range of motion. Neck supple. No JVD present. No tracheal deviation present. No thyromegaly present.   Cardiovascular: Normal rate, regular rhythm, normal heart sounds and intact distal pulses. Exam reveals no gallop and no friction rub.   No murmur heard.  Pulmonary/Chest: Effort normal and breath sounds normal. No stridor. No respiratory distress. She has no wheezes. She has no rales.   Abdominal: Soft. Bowel sounds are normal. She exhibits no distension and no mass. There is no tenderness. There is no rebound and no guarding.   Musculoskeletal: Normal range of motion. She exhibits no edema or tenderness.   Lymphadenopathy:     She has no cervical adenopathy.   Neurological: She is alert and oriented to person, place, and time.   Skin: Skin is warm and dry. No rash noted. She is not diaphoretic. No erythema. No pallor.   Psychiatric: She has a normal mood and affect. Her behavior is normal. Judgment and thought content normal.   Nursing note and vitals reviewed.      Assessment:       1. 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

## 2019-04-25 NOTE — TELEPHONE ENCOUNTER
Attempted to contact the pt to discuss her message, however, no answer and noted that pt has an appt with Dr. Munoz today.

## 2019-04-25 NOTE — DISCHARGE INSTRUCTIONS
Future Appointments   Date Time Provider Department Center   4/30/2019 11:00 AM Harish Mckeon LCSW [facility] SOCL [facility]   5/2/2019 11:00 AM Jimmie Warner MD [facility] PLASTIC [facility]   5/22/2019  2:30 PM Moneta SUDHAKAR Loyola III, MD [facility] ENT [facility]

## 2019-04-25 NOTE — ED PROVIDER NOTES
Encounter Date: 4/25/2019       History     Chief Complaint   Patient presents with    Facial Injury     Sent from PCP for ENT consult and fall yesterday reveal possible open nasal fracture.     64 yo F presents to the ED sent from [facility] [address] for urgent evaluation by ENT for suspected open nasal fracture.  Pt had a fall 2 days ago after feeling dizzy from her new medication.  She denies LOC.  Pt was seen at urgent care for facial trauma. Xray of the nasal bones revealed a fracture and patient was sent for CT max/face. Pt was urged by her  to see her doctor today because of her facial bruising.  Provider was concerned about open fracture and no quick ENT appointment was available, so he sat patient to the ER for ENT evaluation.  Pt denies headache, nausea, vomiting. She does reports some facial pain and nasal congestion.         Review of patient's allergies indicates:   Allergen Reactions    Adhesive      blisters    Keflex [cephalexin] Rash     Past Medical History:   Diagnosis Date    Bipolar 1 disorder     Breast cancer     right    Breast cyst     Closed fracture of proximal end of right humerus 3/9/2015    Depression     History of right shoulder fracture     MS (multiple sclerosis)     Osteoporosis, unspecified      Past Surgical History:   Procedure Laterality Date    BRAIN SURGERY  1991    BREAST BIOPSY Left 2009    core    BREAST LUMPECTOMY Right 2001    CYSTOSCOPY N/A 4/10/2019    [facility]    CYSTOSCOPY N/A 7/11/2018    [facility]    CYSTOSCOPY N/A 8/9/2017    [facility]    CYSTOSCOPY N/A 9/15/2016    [facility]    CYSTOSCOPY N/A 9/9/2015    [facility]    CYSTOSCOPY N/A 12/11/2013    [facility]    FOOT SURGERY  october 2013    HYSTERECTOMY       INJECTION, BOTULINUM TOXIN, TYPE A N/A 12/11/2013    [facility]    INJECTION, BOTULINUM TOXIN, TYPE A 200 UNITS N/A 4/10/2019    [facility]    INJECTION, BOTULINUM TOXIN, TYPE A 200 UNITS N/A 7/11/2018    [facility]    INJECTION-BOTOX N/A 9/15/2016    [facility]    INJECTION-BOTOX 200 UNITS N/A 8/9/2017    [facility]    INJECTION-BOTOX-200 units N/A 9/9/2015    [facility]    TONSILLECTOMY       Family History   Problem Relation Age of Onset    Skin cancer Mother     Breast cancer Mother     Stroke Mother     Emphysema Father     Breast cancer Maternal Aunt     Dementia Brother     Diabetes Brother     No Known Problems Daughter     Autism Son     No Known Problems Brother     Autism Daughter      Social History     Tobacco Use    Smoking status: Never Smoker    Smokeless tobacco: Never Used   Substance Use Topics    Alcohol use: Yes     Alcohol/week: 1.0 oz     Types: 2 Standard drinks or equivalent per week     Comment: occasionally    Drug use: No     Review of Systems   Constitutional: Negative for fever.   HENT: Positive for facial swelling. Negative for sore throat.         Facial pain, known nasal fracture   Respiratory: Negative for shortness of breath.    Cardiovascular: Negative for chest pain.   Gastrointestinal: Negative for nausea and vomiting.   Genitourinary: Negative for dysuria.   Musculoskeletal: Negative for back pain.   Skin: Negative for rash.   Neurological: Negative for syncope, weakness and headaches.   Hematological: Does not bruise/bleed easily.       Physical Exam     Initial Vitals [04/25/19 1246]   BP Pulse Resp Temp SpO2   114/68 81 18 97.8 °F (36.6 °C) 98 %      MAP       --         Physical Exam    Nursing note and vitals  reviewed.  Constitutional: She appears well-developed and well-nourished. She is not diaphoretic.  Non-toxic appearance. She does not appear ill. No distress.   HENT:   Head: Normocephalic and atraumatic.   Nose: Mucosal edema, sinus tenderness, nasal deformity and nasal septal hematoma present. No epistaxis.   Patient has some periorbital ecchymosis bilaterally, but no raccoon's eyes.  There is NO Teran's sign or mastoid tenderness.    Neck: Neck supple.   Cardiovascular: Normal rate and regular rhythm. Exam reveals no gallop and no friction rub.    No murmur heard.  Pulmonary/Chest: Effort normal and breath sounds normal. No accessory muscle usage. No tachypnea. No respiratory distress. She has no decreased breath sounds. She has no wheezes. She has no rhonchi. She has no rales.   Abdominal: She exhibits no distension.   Neurological: She is alert.   Skin: No rash noted.   Psychiatric: She has a normal mood and affect. Her behavior is normal.         ED Course   Procedures  Labs Reviewed - No data to display       Imaging Results    None          Medical Decision Making:   History:   Old Medical Records: I decided to obtain old medical records.       APC / Resident Notes:   63-year-old female with recent nasal fracture presents the ED sent by her PCP. On my exam, there is no Teran's sign or mastoid tenderness. No hemotympanum.  Patient has some periorbital ecchymosis without juan carlos raccoon eyes.  There is is an abrasion over the bridge of the nose, but no open wound.  Intranasal mucosal edema with a small septal hematoma.  Based on this exam, I do not feel that emergent consult with ENT is indicated at this time.  Patient has follow-up next week with plastics.  She is advised to keep this appointment.  I will start patient on antibiotics, intranasal Afrin and will provide pain medication.  She is stable for discharge.   This patient was also evaluated by my attending who is in agreement with the above exam and  plan of care.            Attending Attestation:     Physician Attestation Statement for NP/PA:   I have conducted a face to face encounter with this patient in addition to the NP/PA, due to NP/PA Request          Attending ED Notes:   63y F with facial trauma. Clinic note reviewed. There is no carrington or racoon on exam. Patient had ct max/face already that demonstrated nasal bone fracture. Will treat with antbiotics, afrin and pain medication. Has follow up appointment already schedule. No indication for further emergent work up.             Clinical Impression:       ICD-10-CM ICD-9-CM   1. Closed fracture of nasal bone with routine healing, subsequent encounter S02.2XXD V54.19         Disposition:   Disposition: Discharged  Condition: Stable                        Madina Blanton PA-C  04/25/19 1522       Peg Velasco MD  04/25/19 1612

## 2019-04-25 NOTE — TELEPHONE ENCOUNTER
----- Message from Meseret Hull RN sent at 4/25/2019  3:11 PM CDT -----  Anne,    I tried to reach her 5 times. Since she never answered, I left her a voicemail informing her I placed her appt with Dr. White for next Wed, 5/1.     -Meseret CANSECO  ----- Message -----  From: Anne Cook LPN  Sent: 4/25/2019   1:32 PM  To: ALBERTO [address] Dr. doesn't have anything until the 8th, Can anyone see her today. The ED sent her to her primary and they are calling us saying she needs to be seen today, but Dr. Loyola is not in clinic today  ----- Message -----  From: Leslie Lala RN  Sent: 4/25/2019  12:47 PM  To: Anne Cook LPN    Patient needs appt.appt. for Nasal Fx. Please.

## 2019-04-25 NOTE — TELEPHONE ENCOUNTER
----- Message from Hien Solorzano sent at 4/25/2019  8:12 AM CDT -----  Contact: Pt self Home [phone] or Mobile [phone]  Patient is calling in regards to her having a fall on yesterday and she broke her nose and she have two black eyes and bruises all over her face. She said she was seen at [facility] on yesterday and the doctor told her to wait a month before she can be seen again. She would like a call back from you to speak with you about this please.

## 2019-04-26 ENCOUNTER — TELEPHONE (OUTPATIENT)
Dept: PSYCHIATRY | Facility: CLINIC | Age: 64
End: 2019-04-26

## 2019-04-26 RX ORDER — HALOPERIDOL 1 MG/1
TABLET ORAL
Qty: 60 TABLET | Refills: 3 | Status: SHIPPED | OUTPATIENT
Start: 2019-04-26 | End: 2019-07-09 | Stop reason: CLARIF

## 2019-04-26 NOTE — TELEPHONE ENCOUNTER
Pt. Took Risperdal 2 mg[only took one dose], then fell and injured herself[possible orthostatic hypotension]. Until about 2 weeks ago was taking Haldol 2 mg at bedtime, then ran out.  Will re-start Haldol and titrate to 2 mg at bedtime. Electronic prescription sent to [facility][they have the 1 mg tablets of Haldol].

## 2019-04-30 ENCOUNTER — OFFICE VISIT (OUTPATIENT)
Dept: PSYCHIATRY | Facility: CLINIC | Age: 64
End: 2019-04-30
Payer: MEDICARE

## 2019-04-30 DIAGNOSIS — F31.32 BIPOLAR I DISORDER, MOST RECENT EPISODE DEPRESSED, MODERATE: Primary | ICD-10-CM

## 2019-04-30 PROCEDURE — 90834 PR PSYCHOTHERAPY W/PATIENT, 45 MIN: ICD-10-PCS | Mod: S$GLB,,, | Performed by: SOCIAL WORKER

## 2019-04-30 PROCEDURE — 90834 PSYTX W PT 45 MINUTES: CPT | Mod: S$GLB,,, | Performed by: SOCIAL WORKER

## 2019-05-01 ENCOUNTER — TELEPHONE (OUTPATIENT)
Dept: PLASTIC SURGERY | Facility: CLINIC | Age: 64
End: 2019-05-01

## 2019-05-01 DIAGNOSIS — F31.74 BIPOLAR I DISORDER, MOST RECENT EPISODE MANIC, IN REMISSION: Primary | ICD-10-CM

## 2019-05-01 DIAGNOSIS — F31.74 BIPOLAR I DISORDER, MOST RECENT EPISODE MANIC, IN REMISSION: ICD-10-CM

## 2019-05-01 RX ORDER — FLUPHENAZINE HYDROCHLORIDE 1 MG/1
TABLET ORAL
Qty: 60 TABLET | Refills: 1 | Status: SHIPPED | OUTPATIENT
Start: 2019-05-01 | End: 2019-07-16 | Stop reason: SDUPTHER

## 2019-05-01 NOTE — TELEPHONE ENCOUNTER
spoke with pt and confirmed her appt and appt time and location. pt verbalized understanding and said she would be there.

## 2019-05-01 NOTE — TELEPHONE ENCOUNTER
STAFF NOTE:  Received a message that oral Haldol is currently on back order.    Pt did not tolerate change to Risperdal.    Will try Prolixin instead (1:1 dosing in place of Haldol).

## 2019-05-02 ENCOUNTER — TELEPHONE (OUTPATIENT)
Dept: PLASTIC SURGERY | Facility: CLINIC | Age: 64
End: 2019-05-02

## 2019-05-02 ENCOUNTER — OFFICE VISIT (OUTPATIENT)
Dept: PLASTIC SURGERY | Facility: CLINIC | Age: 64
End: 2019-05-02
Payer: MEDICARE

## 2019-05-02 VITALS — BODY MASS INDEX: 29.41 KG/M2 | WEIGHT: 166 LBS

## 2019-05-02 DIAGNOSIS — Z85.3 HISTORY OF BREAST CANCER: ICD-10-CM

## 2019-05-02 DIAGNOSIS — N64.89 BREAST ASYMMETRY: ICD-10-CM

## 2019-05-02 DIAGNOSIS — Z85.3 HISTORY OF BREAST CANCER: Primary | ICD-10-CM

## 2019-05-02 DIAGNOSIS — Z12.39 SCREENING BREAST EXAMINATION: Primary | ICD-10-CM

## 2019-05-02 PROCEDURE — 3008F PR BODY MASS INDEX (BMI) DOCUMENTED: ICD-10-PCS | Mod: CPTII,S$GLB,, | Performed by: SURGERY

## 2019-05-02 PROCEDURE — 99999 PR PBB SHADOW E&M-EST. PATIENT-LVL III: CPT | Mod: PBBFAC,,, | Performed by: SURGERY

## 2019-05-02 PROCEDURE — 99204 OFFICE O/P NEW MOD 45 MIN: CPT | Mod: S$GLB,,, | Performed by: SURGERY

## 2019-05-02 PROCEDURE — 3008F BODY MASS INDEX DOCD: CPT | Mod: CPTII,S$GLB,, | Performed by: SURGERY

## 2019-05-02 PROCEDURE — 99204 PR OFFICE/OUTPT VISIT, NEW, LEVL IV, 45-59 MIN: ICD-10-PCS | Mod: S$GLB,,, | Performed by: SURGERY

## 2019-05-02 PROCEDURE — 99499 UNLISTED E&M SERVICE: CPT | Mod: S$GLB,,, | Performed by: SURGERY

## 2019-05-02 PROCEDURE — 99999 PR PBB SHADOW E&M-EST. PATIENT-LVL III: ICD-10-PCS | Mod: PBBFAC,,, | Performed by: SURGERY

## 2019-05-02 PROCEDURE — 99499 RISK ADDL DX/OHS AUDIT: ICD-10-PCS | Mod: S$GLB,,, | Performed by: SURGERY

## 2019-05-02 NOTE — TELEPHONE ENCOUNTER
spoke with pt and had her scheduled for mammo and labs. pt verbalized understanding of both appt time dates and locations.                 ----- Message from Jimmie Warner MD sent at 5/2/2019  2:07 PM CDT -----  Jameson Howard Please schedule her a mammogram

## 2019-05-02 NOTE — TELEPHONE ENCOUNTER
called pt and asked her to come in earlier of possible. Pt agreed stated she would be on her way as soon as she got up. I told patient thank you. Pt verbalized understanding.

## 2019-05-02 NOTE — PROGRESS NOTES
"CC:     HPI: This is a 63 y.o. {MALE/FEMALE:15116} with a history of *** that has been present {Blank single:91567::"since birth","for weeks","for months","for years"}.    The location of the {Blank single:29527::"skin lesion","pain","wound","skin nevus","cleft","abnormality"} is focal to the *** and is {Blank single:36922::"congenital","post-traumatic"} in context. There are no modifying factors and there are no systemic associated signs and symptoms.     Med/Surg/Accidents:                See ROS                                                   CV: no congenital abn                                                     Pulm: no asthma, no chronic diseases                                                     Past Medical History:   Diagnosis Date    Bipolar 1 disorder     Breast cancer     right    Breast cyst     Closed fracture of proximal end of right humerus 3/9/2015    Depression     History of right shoulder fracture     MS (multiple sclerosis)     Osteoporosis, unspecified        Past Surgical History:   Procedure Laterality Date    BRAIN SURGERY  1991    BREAST BIOPSY Left 2009    core    BREAST LUMPECTOMY Right 2001    CYSTOSCOPY N/A 4/10/2019    [facility]    CYSTOSCOPY N/A 7/11/2018    [facility]    CYSTOSCOPY N/A 8/9/2017    [facility]    CYSTOSCOPY N/A 9/15/2016    [facility]    CYSTOSCOPY N/A 9/9/2015    [facility]    CYSTOSCOPY N/A 12/11/2013    [facility]    FOOT SURGERY  october 2013    HYSTERECTOMY      INJECTION, BOTULINUM TOXIN, TYPE A N/A 12/11/2013    [facility]    INJECTION, BOTULINUM TOXIN, TYPE A 200 UNITS N/A 4/10/2019    [facility]    INJECTION, BOTULINUM TOXIN, " TYPE A 200 UNITS N/A 7/11/2018    [facility]    INJECTION-BOTOX N/A 9/15/2016    [facility]    INJECTION-BOTOX 200 UNITS N/A 8/9/2017    [facility]    INJECTION-BOTOX-200 units N/A 9/9/2015    Per[facility]    TONSILLECTOMY           Current Outpatient Medications:     amoxicillin (AMOXIL) 500 MG Tab, Take 1 tablet (500 mg total) by mouth 2 (two) times daily. for 7 days, Disp: 14 tablet, Rfl: 0    aspirin 325 MG tablet, Take 325 mg by mouth every 4 (four) hours as needed for Pain., Disp: , Rfl:     buPROPion (WELLBUTRIN SR) 100 MG TBSR 12 hr tablet, Take 2 tablets (200 mg total) by mouth every morning., Disp: 180 tablet, Rfl: 1    buPROPion (WELLBUTRIN SR) 150 MG TBSR 12 hr tablet, Take 1 tablet (150 mg total) by mouth every evening., Disp: 90 tablet, Rfl: 1    calcium-vitamin D3 (CALCIUM 500 + D) 500 mg(1,250mg) -200 unit per tablet, Take 1 tablet by mouth 2 (two) times daily with meals., Disp: , Rfl:     catheter (FEMALE CATHETER) 14 Fr Misc, Patient is to self catheterize four times a day indefinitley using female 12 fr in and out catheter for incomplete bladder emptying.  Dispense 120 month with 11 refills or 360 every 3 months with 3 refills depending on patient's preference, Disp: 120 each, Rfl: 11    fluphenazine (PROLIXIN) 1 MG tablet, Take oral 1 tablet nightly for 7 days, then increase to 2 tablets nightly., Disp: 60 tablet, Rfl: 1    folic acid (FOLVITE) 1 MG tablet, Take 1 tablet (1,000 mcg total) by mouth once daily., Disp: 90 tablet, Rfl: 3    HYDROcodone-acetaminophen (NORCO) 5-325 mg per tablet, Take 1 tablet by mouth every 6 (six) hours as needed for Pain. No alcohol, no driving, no operating machinery, no working, no swimming, no extra Tylenol (acetaminophen) while taking this medication., Disp: 10 tablet, Rfl: 0    ibuprofen  (ADVIL,MOTRIN) 200 MG tablet, Take 200 mg by mouth every 6 (six) hours as needed for Pain., Disp: , Rfl:     magnesium oxide (MAG-OX) 400 mg tablet, Take 500 mg by mouth once daily., Disp: , Rfl:     OXcarbazepine (TRILEPTAL) 150 MG Tab, Take 1 tablet (150 mg total) by mouth 2 (two) times daily., Disp: 180 tablet, Rfl: 1    haloperidol (HALDOL) 1 MG tablet, For 7 days take one tablet every evening, then take two tablets every evening., Disp: 60 tablet, Rfl: 3    Review of patient's allergies indicates:   Allergen Reactions    Adhesive      blisters    Keflex [cephalexin] Rash       Family History   Problem Relation Age of Onset    Skin cancer Mother     Breast cancer Mother     Stroke Mother     Emphysema Father     Breast cancer Maternal Aunt     Dementia Brother     Diabetes Brother     No Known Problems Daughter     Autism Son     No Known Problems Brother     Autism Daughter        FH:  Bleeding disorders:                         none          MH/anesthetic problems:                 none                   Sickle Cell:                                      none          Allergy/Asthma:                              None      SocHx:   - Work:  - Tobacco Use/Exposure: {POSITIVE/NEGATIVE:34789}  - Alcohol:      ROS  Review of Systems   Constitutional: Negative.  Negative for activity change, appetite change, fatigue, fever and unexpected weight change.   Respiratory: Negative for shortness of breath.         Denies history of asthma or chronic respiratory disease    Cardiovascular: Negative.  Negative for chest pain and palpitations.        Denies history of congenital abnormalities or HTN.   Gastrointestinal: Negative.  Negative for abdominal distention and abdominal pain.   Endocrine: Negative.         Denies history of diabetes or endocrine disorder   Musculoskeletal: Negative.  Negative for arthralgias, back pain and joint swelling.   Skin: Negative.    Neurological: Negative.  Negative for  dizziness, light-headedness and headaches.   Hematological: Negative for adenopathy. Does not bruise/bleed easily.        Denies history of bleeding disorders   All other systems reviewed and are negative.        PE    Physical Exam     Imaging Studies      Assessment:  Assessment           Plan  Plan

## 2019-05-02 NOTE — PROGRESS NOTES
PLASTIC & RECONSTRUCTIVE CONSULTATION NOTE    CC  No chief complaint on file.  History of right breast cancer treated at [facility]  Wants left breast lifted to match her right side    Referring Provider- Self, Dickson Underwood MD  PCP: JASPAL Underwood MD    HPI  History from patient, chart, referring provider  Silvana Quezada is a 63 y.o. female presenting with breast asymmetry in setting of breast cancer.  States in 2002 had lumpectomy and radiation of her right breast.  Says she cannot fit comfortably into clothes.  She notes some rashes under her left breast.  She would like an improvement in the size of her right breast.  She has no desire to be larger on either side.  She recently fell and sustained a nasal bone fracture.  She attributes the fall to a change in her psychiatric medication.  She used to be on haldol.  She does not smoke.  Her care taker is her .  She receives her psychiatric care at [facility].      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    MS (multiple sclerosis) 12/03/2013    Neurogenic bladder 12/03/2013       [facility]  Past Surgical History:   Procedure Laterality Date    BRAIN SURGERY  1991    BREAST BIOPSY Left 2009    core    BREAST LUMPECTOMY Right 2001    CYSTOSCOPY N/A 4/10/2019    [facility]    CYSTOSCOPY N/A 7/11/2018    [facility]    CYSTOSCOPY N/A 8/9/2017    [facility]    CYSTOSCOPY N/A 9/15/2016    [facility]    CYSTOSCOPY N/A 9/9/2015    Performed by Jayesh GRAMAJO  [facility]    CYSTOSCOPY N/A 12/11/2013    [facility]    FOOT SURGERY  october 2013    HYSTERECTOMY      INJECTION, BOTULINUM TOXIN, TYPE A N/A 12/11/2013    [facility]    INJECTION, BOTULINUM TOXIN, TYPE A 200 UNITS N/A 4/10/2019    [facility]    INJECTION, BOTULINUM TOXIN, TYPE A 200 UNITS N/A 7/11/2018    [facility]    INJECTION-BOTOX N/A 9/15/2016    [facility]    INJECTION-BOTOX 200 UNITS N/A 8/9/2017    [facility]    INJECTION-BOTOX-200 units N/A 9/9/2015    [facility]    TONSILLECTOMY         FH  Family History   Problem Relation Age of Onset    Skin cancer Mother     Breast cancer Mother     Stroke Mother     Emphysema Father     Breast cancer Maternal Aunt     Dementia Brother     Diabetes Brother     No Known Problems Daughter     Autism Son     No Known Problems Brother     Autism Daughter        MEDICATIONS  Outpatient Medications Marked as Taking for the 5/2/19 encounter (Office Visit) with Jimmie Warner MD   Medication Sig Dispense Refill    amoxicillin (AMOXIL) 500 MG Tab Take 1 tablet (500 mg total) by mouth 2 (two) times daily. for 7 days 14 tablet 0    aspirin 325 MG tablet Take 325 mg by mouth every 4 (four) hours as needed for Pain.      buPROPion (WELLBUTRIN SR) 100 MG TBSR 12 hr tablet Take 2 tablets (200 mg total) by mouth every morning. 180 tablet 1    buPROPion (WELLBUTRIN SR) 150 MG TBSR 12 hr tablet Take 1 tablet (150 mg total) by mouth every evening. 90 tablet 1    calcium-vitamin D3 (CALCIUM 500 + D) 500 mg(1,250mg) -200 unit per tablet Take 1 tablet by mouth 2 (two) times daily with meals.      catheter (FEMALE CATHETER) 14 Fr [facility] Patient is to self catheterize four  times a day indefinitley using female 12 fr in and out catheter for incomplete bladder emptying.   Dispense 120 month with 11 refills or 360 every 3 months with 3 refills depending on patient's preference 120 each 11    fluphenazine (PROLIXIN) 1 MG tablet Take oral 1 tablet nightly for 7 days, then increase to 2 tablets nightly. 60 tablet 1    folic acid (FOLVITE) 1 MG tablet Take 1 tablet (1,000 mcg total) by mouth once daily. 90 tablet 3    HYDROcodone-acetaminophen (NORCO) 5-325 mg per tablet Take 1 tablet by mouth every 6 (six) hours as needed for Pain. No alcohol, no driving, no operating machinery, no working, no swimming, no extra Tylenol (acetaminophen) while taking this medication. 10 tablet 0    ibuprofen (ADVIL,MOTRIN) 200 MG tablet Take 200 mg by mouth every 6 (six) hours as needed for Pain.      magnesium oxide (MAG-OX) 400 mg tablet Take 500 mg by mouth once daily.      OXcarbazepine (TRILEPTAL) 150 MG Tab Take 1 tablet (150 mg total) by mouth 2 (two) times daily. 180 tablet 1       ALLERGIES   Review of patient's allergies indicates:   Allergen Reactions    Adhesive      blisters    Keflex [cephalexin] Rash       SOCIAL HISTORY  Tobacco:   Social History     Tobacco Use   Smoking Status Never Smoker   Smokeless Tobacco Never Used     EtOH:   Social History     Substance and Sexual Activity   Alcohol Use Yes    Alcohol/week: 1.0 oz    Types: 2 Standard drinks or equivalent per week    Comment: occasionally       ROS  Pertinent otherwise negative except per HPI and ROS      Physical Exam  Physical Exam   Constitutional: She appears well-developed and well-nourished. She is active. No distress.   HENT:   Head: Hair is abnormal.   Right Ear: External ear normal.   Eyes: Pupils are equal, round, and reactive to light. EOM are normal. Right eye exhibits no chemosis. Left eye exhibits no chemosis.   Neck: Trachea normal and normal range of motion. No thyroid mass present.   Pulmonary/Chest: No  tachypnea and no bradypnea. No respiratory distress. She has no wheezes. She has no rhonchi. She has no rales. She exhibits no tenderness.   Abdominal: Soft. Bowel sounds are normal. She exhibits no pulsatile midline mass.   Musculoskeletal: Normal range of motion.        Right shoulder: She exhibits normal range of motion and no tenderness.   Lymphadenopathy:        Right cervical: No superficial cervical and no deep cervical adenopathy present.       Left cervical: No superficial cervical, no deep cervical and no posterior cervical adenopathy present.        Right: No supraclavicular adenopathy present.        Left: No supraclavicular adenopathy present.   Skin: Skin is warm, dry and intact. Capillary refill takes less than 2 seconds. No bruising and no ecchymosis noted.       Grade 3 ptosis with NAC symmetry bilaterally  Skin of right breast is supple, without significant radiation changes to skin  Bilateral breasts are soft    LABS  Lab Results   Component Value Date    WBC 4.53 (L) 06/14/2013    HGB 13.0 06/14/2013    HCT 38.7 06/14/2013    MCV 90.8 06/14/2013     06/14/2013     Lab Results   Component Value Date     03/29/2019    K 4.2 03/29/2019     03/29/2019    CO2 31 (H) 03/29/2019     Lab Results   Component Value Date    ALBUMIN 3.8 07/09/2018     Lab Results   Component Value Date    CREATININE 0.9 03/29/2019     No results found for: LABA1C, HGBA1C  [unfilled]    ASSESSMENT  No diagnosis found.  1.  History of right breast conservation therapy for breast cancer  2.  Breast asymmetry  3.  Bipolar disorder, recent medication changes  4.  Nasal bone deformity after recent fall from standing  5.  Unclear support system at home  ?  INFORMED CONSENT FOR SURGERY  Risks, benefits, outcomes, possible complications, perioperative restrictions, recovery, and potential disability were reviewed. Permission to obtain photographs before, during, and after surgery was also granted. Questions were  answered. Written preoperative instructions and potential complications were given.    PLAN    She wishes to pursue breast symmetry procedure.  She is ok to have operations on both breasts.  I recommend bilateral mastopexies for nipple symmetry, reduction of left breast, less than 200 grams.  I explained the procedure in detail the location of incisions and the nature of the recover.  All of her questions were answered.    The bilateral mastopexy/reduction was discussed in detail with the patient as were the risks and possible complications which include but are not limited to bleeding, scarring, infection, pain, numbness, asymmetry, deformity, large open wound, skin/nipple necrosis, wound dehiscence, permanent or temporary loss of sensation to the nipple(s), partial or total loss of the nipple(s), free nipple graft, death, brain damage, pulmonary embolus and need for further surgery.     All of her questions were answered.       Keep follow up with Dr. White    Will message her psychiatry team to obtain clearance prior to surgery    Will need cardiology clearance before surgery    CPT codes 92475-76, 33072    Needs CBC, CMP pre op    Needs mammogram pre-op    Photo order submitted    45  minutes of face to face time, of which greater than fifty percent of the total visit was  counseling/coordinating care

## 2019-05-03 RX ORDER — FLUPHENAZINE HYDROCHLORIDE 1 MG/1
TABLET ORAL
Qty: 180 TABLET | Refills: 1 | OUTPATIENT
Start: 2019-05-03

## 2019-05-06 ENCOUNTER — HOSPITAL ENCOUNTER (OUTPATIENT)
Dept: RADIOLOGY | Facility: HOSPITAL | Age: 64
Discharge: HOME | End: 2019-05-06
Attending: SURGERY
Payer: MEDICARE

## 2019-05-06 DIAGNOSIS — N64.89 BREAST ASYMMETRY: ICD-10-CM

## 2019-05-06 DIAGNOSIS — Z12.39 SCREENING BREAST EXAMINATION: ICD-10-CM

## 2019-05-06 DIAGNOSIS — Z85.3 HISTORY OF BREAST CANCER: ICD-10-CM

## 2019-05-06 PROCEDURE — 77066 MAMMO DIGITAL DIAGNOSTIC BILAT WITH CAD: ICD-10-PCS | Mod: 26,,, | Performed by: RADIOLOGY

## 2019-05-06 PROCEDURE — 77066 DX MAMMO INCL CAD BI: CPT | Mod: TC,PO

## 2019-05-06 PROCEDURE — 77066 DX MAMMO INCL CAD BI: CPT | Mod: 26,,, | Performed by: RADIOLOGY

## 2019-05-07 ENCOUNTER — TELEPHONE (OUTPATIENT)
Dept: PSYCHIATRY | Facility: CLINIC | Age: 64
End: 2019-05-07

## 2019-05-07 NOTE — TELEPHONE ENCOUNTER
----- Message from Carli Barrera RN sent at 5/2/2019  3:51 PM CDT -----      ----- Message -----  From: Jimmie Warner MD  Sent: 5/2/2019   2:38 PM  To: [facility] Staff    Hello:    Wanted to reach out regarding a mutual patient.  She is requesting breast symmetry procedure.  I understand she is in the process of medication changes.  Since I just met her, I'd like to get your clearance before proceeding with treatment.  I've met her this once and understand that she has had some difficulty with recent medication changes.  I'm also available by phone to chat if that is easier.  [phone].  Thanks for your time.    Lamont Warner (plastic surgeon)

## 2019-05-07 NOTE — TELEPHONE ENCOUNTER
I called Pt and spoke with her about her current mental status and her expectations regarding surgery. She is appropriately seeking both comfort and reasonable cosmetic benefit.    Psychiatrically, she is stable and is competent to give informed consent and cooperate with aftercare instructions.    Don Munoz MD

## 2019-05-14 ENCOUNTER — INITIAL CONSULT (OUTPATIENT)
Dept: OTOLARYNGOLOGY | Facility: CLINIC | Age: 64
End: 2019-05-14
Payer: MEDICARE

## 2019-05-14 VITALS — SYSTOLIC BLOOD PRESSURE: 124 MMHG | HEART RATE: 89 BPM | DIASTOLIC BLOOD PRESSURE: 81 MMHG

## 2019-05-14 DIAGNOSIS — S02.2XXA CLOSED FRACTURE OF NASAL BONE, INITIAL ENCOUNTER: Primary | ICD-10-CM

## 2019-05-14 PROCEDURE — 99203 PR OFFICE/OUTPT VISIT, NEW, LEVL III, 30-44 MIN: ICD-10-PCS | Mod: S$GLB,,, | Performed by: OTOLARYNGOLOGY

## 2019-05-14 PROCEDURE — 99203 OFFICE O/P NEW LOW 30 MIN: CPT | Mod: S$GLB,,, | Performed by: OTOLARYNGOLOGY

## 2019-05-14 PROCEDURE — 99999 PR PBB SHADOW E&M-EST. PATIENT-LVL III: CPT | Mod: PBBFAC,,, | Performed by: OTOLARYNGOLOGY

## 2019-05-14 PROCEDURE — 99999 PR PBB SHADOW E&M-EST. PATIENT-LVL III: ICD-10-PCS | Mod: PBBFAC,,, | Performed by: OTOLARYNGOLOGY

## 2019-05-14 NOTE — PROGRESS NOTES
Head and Neck Surgery Consult    Seen in consultation from PITA Cardona    HPI: Silvana Quezada is a 63 y.o. female presenting with a nasal fracture sustained after a fall related to orthostatic hypotension. No LOC. Was seen in ER on 4/25 and referred here for evaluation. Denies nasal obstruction, epistaxis, dental numbness or any pain.    Past Medical History:   Diagnosis Date    Bipolar 1 disorder     Breast cancer     right    Breast cyst     Closed fracture of proximal end of right humerus 3/9/2015    Depression     History of right shoulder fracture     MS (multiple sclerosis)     Osteoporosis, unspecified        Past Surgical History:   Procedure Laterality Date    BRAIN SURGERY  1991    BREAST BIOPSY Left 2009    core    BREAST LUMPECTOMY Right 2001    CYSTOSCOPY N/A 4/10/2019    [facility]    CYSTOSCOPY N/A 7/11/2018    [facility]    CYSTOSCOPY N/A 8/9/2017    [facility]    CYSTOSCOPY N/A 9/15/2016    [facility]    CYSTOSCOPY N/A 9/9/2015    [facility]    CYSTOSCOPY N/A 12/11/2013    [facility] OR [address] [place] [address]    FOOT SURGERY  october 2013    HYSTERECTOMY      INJECTION, BOTULINUM TOXIN, TYPE A N/A 12/11/2013    [facility]    INJECTION, BOTULINUM TOXIN, TYPE A 200 UNITS N/A 4/10/2019    [facility]    INJECTION, BOTULINUM TOXIN, TYPE A 200 UNITS N/A 7/11/2018    [facility]    INJECTION-BOTOX N/A 9/15/2016    [facility]    INJECTION-BOTOX 200 UNITS N/A 8/9/2017    [facility]    INJECTION-BOTOX-200 units N/A 9/9/2015    [facility]     TONSILLECTOMY           Current Outpatient Medications:     aspirin 325 MG tablet, Take 325 mg by mouth every 4 (four) hours as needed for Pain., Disp: , Rfl:     buPROPion (WELLBUTRIN SR) 100 MG TBSR 12 hr tablet, Take 2 tablets (200 mg total) by mouth every morning., Disp: 180 tablet, Rfl: 1    buPROPion (WELLBUTRIN SR) 150 MG TBSR 12 hr tablet, Take 1 tablet (150 mg total) by mouth every evening., Disp: 90 tablet, Rfl: 1    calcium-vitamin D3 (CALCIUM 500 + D) 500 mg(1,250mg) -200 unit per tablet, Take 1 tablet by mouth 2 (two) times daily with meals., Disp: , Rfl:     catheter (FEMALE CATHETER) 14 Fr Misc, Patient is to self catheterize four times a day indefinitley using female 12 fr in and out catheter for incomplete bladder emptying.  Dispense 120 month with 11 refills or 360 every 3 months with 3 refills depending on patient's preference, Disp: 120 each, Rfl: 11    fluphenazine (PROLIXIN) 1 MG tablet, Take oral 1 tablet nightly for 7 days, then increase to 2 tablets nightly., Disp: 60 tablet, Rfl: 1    folic acid (FOLVITE) 1 MG tablet, Take 1 tablet (1,000 mcg total) by mouth once daily., Disp: 90 tablet, Rfl: 3    haloperidol (HALDOL) 1 MG tablet, For 7 days take one tablet every evening, then take two tablets every evening., Disp: 60 tablet, Rfl: 3    HYDROcodone-acetaminophen (NORCO) 5-325 mg per tablet, Take 1 tablet by mouth every 6 (six) hours as needed for Pain. No alcohol, no driving, no operating machinery, no working, no swimming, no extra Tylenol (acetaminophen) while taking this medication., Disp: 10 tablet, Rfl: 0    ibuprofen (ADVIL,MOTRIN) 200 MG tablet, Take 200 mg by mouth every 6 (six) hours as needed for Pain., Disp: , Rfl:     magnesium oxide (MAG-OX) 400 mg tablet, Take 500 mg by mouth once daily., Disp: , Rfl:     OXcarbazepine (TRILEPTAL) 150 MG Tab, Take 1 tablet (150 mg total) by mouth 2 (two) times daily., Disp: 180 tablet, Rfl: 1    Review of patient's allergies  indicates:   Allergen Reactions    Adhesive      blisters    Keflex [cephalexin] Rash       Family History   Problem Relation Age of Onset    Skin cancer Mother     Breast cancer Mother     Stroke Mother     Emphysema Father     Breast cancer Maternal Aunt     Dementia Brother     Diabetes Brother     No Known Problems Daughter     Autism Son     No Known Problems Brother     Autism Daughter        Social History     Socioeconomic History    Marital status:      Spouse name: Not on file    Number of children: Not on file    Years of education: Not on file    Highest education level: Not on file   Occupational History    Occupation: [employer]   Social Needs    Financial resource strain: Not on file    Food insecurity:     Worry: Not on file     Inability: Not on file    Transportation needs:     Medical: Not on file     Non-medical: Not on file   Tobacco Use    Smoking status: Never Smoker    Smokeless tobacco: Never Used   Substance and Sexual Activity    Alcohol use: Yes     Alcohol/week: 1.0 oz     Types: 2 Standard drinks or equivalent per week     Comment: occasionally    Drug use: No    Sexual activity: Yes     Partners: Male   Lifestyle    Physical activity:     Days per week: Not on file     Minutes per session: Not on file    Stress: Not on file   Relationships    Social connections:     Talks on phone: Not on file     Gets together: Not on file     Attends Episcopal service: Not on file     Active member of club or organization: Not on file     Attends meetings of clubs or organizations: Not on file     Relationship status: Not on file   Other Topics Concern    Are you pregnant or think you may be? Not Asked    Breast-feeding Not Asked   Social History Narrative    Not on file       Review of Systems -  Constitutional: Denies having night sweats, constant fatigue, loss of appetite or recent substantial weight loss.  Eyes: Denies blurred vision or double  vision.  Respiratory: Denies symptoms of shortness of breath, noisy breathing, hoarseness or chronic cough.  GI: Denies symptoms of heartburn, acid regurgitation, or the known presence of a hiatal hernia.  The remainder of a 10-point review of systems is negative    REVIEW OF RADIOLOGICAL FILMS AND RECORDS (PERSONALLY REVIEWED):  Comminuted nasal fracture    REVIEW OF LABS (PERSONALLY REVIEWED)  Noncontributory    PHYSICAL EXAM:  Vitals - There were no vitals taken for this visit.  Constitutional -      General Appearance: well developed, well nourished, without obvious deformities     Communication: speaks with a normal voice without hoarseness  Head & Face -     Overall: no obvious scars, lesions or masses     Parotid and submandibular glands: no masses or tenderness     Facial strength: normal and equal bilaterally  Eyes -      EOM intact  Ear, Nose, Mouth & Throat -     Ears: both left and right external auditory canals and TM's are normal, no external deformities     Nasal exam: mucosa is pink, septum is R-deviated, visible turbinates are 4+ and blue on anterior rhinoscopy     Mastication: teeth appear present     Oral Cavity and oropharynx: mucosa, hard and soft palates, tongue, posterior pharyngeal wall, lips and gums are wikthout lesions. Tonsils appear minimal  Respiratory:     Breathing unlabored, no stridor  Cardiovascular:     No JVD, capillary refill normal  Larynx: using the mirror for indirect laryngoscopy, the epiglottic, false cords, true cords, and pyriform sinuses are without lesions and the true vocal cords move normally  Neck: appears symmetric, and on palpation is without masses   Endocrine:     Thyroid: no asymmetry, thyromegaly, or thyroid nodules on palpation  Lymphatic:     No cervical lymphadenopathy  Cranial Nerves:      II: Pupillary reflexes normal     III, IV, VI: EOM normal     V: 1,2,3: normal sensation     VII: Normal strength in all divisions     IX, X: Normal voice, palatal  elevation and sensation     XI: Shoulder strength normal       XII: Tongue mobility normal  Psychiatric:     Appropriate affect    ASSESSMENT: Well-healed nasal fracture    PLAN: Given she is asymptomatic I would not advise any intervention at this time.       Gunnar White

## 2019-05-14 NOTE — LETTER
May 14, 2019      Sybil Roberson PA-C  [address] [address]           [facility]  [address]  [facility] [address]  Phone: [phone]  Fax: [phone]          Patient: Silvana Quezada   MR Number: [id number]   YOB: 1955   Date of Visit: 5/14/2019       Dear Sybil Roberson:    Thank you for referring Silvana Quezada to me for evaluation. Attached you will find relevant portions of my assessment and plan of care.    If you have questions, please do not hesitate to call me. I look forward to following Silvana Quezada along with you.    Sincerely,    Gunnar White MD    Enclosure  CC:  No Recipients    If you would like to receive this communication electronically, please contact [email or web address] or [phone] to request more information on [employer] access.    For providers and/or their staff who would like to refer a patient to [facility], please contact us through our one-stop-shop provider referral line, [facility], at [phone].    If you feel you have received this communication in error or would no longer like to receive these types of communications, please e-mail [email or web address]

## 2019-05-16 DIAGNOSIS — F31.75 BIPOLAR I DISORDER, MOST RECENT EPISODE DEPRESSED, IN PARTIAL REMISSION: ICD-10-CM

## 2019-05-16 RX ORDER — OXCARBAZEPINE 150 MG/1
TABLET, FILM COATED ORAL
Qty: 180 TABLET | Refills: 0 | Status: SHIPPED | OUTPATIENT
Start: 2019-05-16 | End: 2019-08-27 | Stop reason: SDUPTHER

## 2019-05-16 RX ORDER — BUPROPION HYDROCHLORIDE 150 MG/1
TABLET, EXTENDED RELEASE ORAL
Qty: 90 TABLET | Refills: 0 | Status: SHIPPED | OUTPATIENT
Start: 2019-05-16 | End: 2019-08-27 | Stop reason: SDUPTHER

## 2019-05-20 ENCOUNTER — TELEPHONE (OUTPATIENT)
Dept: PLASTIC SURGERY | Facility: CLINIC | Age: 64
End: 2019-05-20

## 2019-05-20 DIAGNOSIS — Z85.3 HISTORY OF BREAST CANCER: Primary | ICD-10-CM

## 2019-05-20 NOTE — TELEPHONE ENCOUNTER
----- Message from Coral Carlisle sent at 5/20/2019 12:49 PM CDT -----  Regarding: Surgery date   From: Coral Carlisle   Sent: Monday, May 20, 2019 12:48 PM  To: Jimmie Warner <[email or web address]>; Yomaira Rios <[email or web address]>  Cc: Anita Bach [email or web address]>  Subject: RE: 5/2 Clinic    Good afternoon     The patient Silvana Quezada, for Mastopexy, was approved from surgery.  Please reach out to the patient to obtain surgery date.    Coral Carlisle  Senior Financial Counselor  Plastic and Reconstructive Surgery   [phone]

## 2019-05-31 ENCOUNTER — TELEPHONE (OUTPATIENT)
Dept: INTERNAL MEDICINE | Facility: CLINIC | Age: 64
End: 2019-05-31

## 2019-05-31 NOTE — TELEPHONE ENCOUNTER
----- Message from Marci Ochoa sent at 5/30/2019  3:00 PM CDT -----  [facility] [place] [phone]  Haley called in states patient need order for a urinary catheter please advise.

## 2019-05-31 NOTE — TELEPHONE ENCOUNTER
[facility] is requesting authorization for urinary caths.    However, she is being treated for this issue by Dr. Ross and Dr. Underwood would like to defer such.    Please advise if unable to provide orders for such.    Thanks.

## 2019-06-13 ENCOUNTER — TELEPHONE (OUTPATIENT)
Dept: UROLOGY | Facility: CLINIC | Age: 64
End: 2019-06-13

## 2019-06-13 NOTE — TELEPHONE ENCOUNTER
I called [facility] at [phone], the catheter order was approved for [facility] to dispense on 6/10

## 2019-06-13 NOTE — TELEPHONE ENCOUNTER
----- Message from Jodi Loyola RN sent at 6/12/2019  4:49 PM CDT -----  Contact: pt       ----- Message -----  From: Neda Potter  Sent: 6/12/2019   4:19 PM  To: Cody ROSALES Staff    Needs Advice    Reason for call: pt called stated she need more cath. Can you put in orders        Communication Preference: [phone]    Additional Information:

## 2019-07-05 ENCOUNTER — TELEPHONE (OUTPATIENT)
Dept: INTERNAL MEDICINE | Facility: CLINIC | Age: 64
End: 2019-07-05

## 2019-07-05 NOTE — TELEPHONE ENCOUNTER
----- Message from Hien Solorzano sent at 7/5/2019  1:33 PM CDT -----  Contact: Pt self Home [phone] or Mobile [phone]  Patient is calling in regards to her saying that her  had hepatitis C. She want to put in an order to be checked for Hepatitis C but I see an order in the system the was put in by another doctor to be checked for it. I was not sure if I could use that order for her to be checked? She said that she wanted to speak with you about this because she did not know anything about the order that is in the system.

## 2019-07-09 ENCOUNTER — TELEPHONE (OUTPATIENT)
Dept: PLASTIC SURGERY | Facility: CLINIC | Age: 64
End: 2019-07-09

## 2019-07-09 ENCOUNTER — TELEPHONE (OUTPATIENT)
Dept: INTERNAL MEDICINE | Facility: CLINIC | Age: 64
End: 2019-07-09

## 2019-07-09 DIAGNOSIS — Z85.3 HISTORY OF BREAST CANCER: Primary | ICD-10-CM

## 2019-07-09 NOTE — TELEPHONE ENCOUNTER
Called pt and she explained that she was not going to move forward as her mother just  and she was not going to be able to make surgery, she asked that we call her on next week afetr everything is done with mother. I told pt that was fine.             ----- Message from Dianna Fong sent at 2019 10:26 AM CDT -----  Contact: Self  She needs to reschedule her surgery to another date. Please call her back to discuss.

## 2019-07-09 NOTE — TELEPHONE ENCOUNTER
----- Message from Jayde Yeh RN sent at 7/9/2019  9:06 AM CDT -----  Pt is scheduled for Mastopexy with Dr. Warner on 7/17/2019.  (General anesthesia, 240 minutes).  Anesthesia review is in progress.    Is pt cleared/optimized for surgery?  Please advise.    Thank you,  Jayde Yeh RN  [facility]

## 2019-07-09 NOTE — TELEPHONE ENCOUNTER
Spoke with pt who advises that she will not be able to come in for an appt with Dr. Underwood as her mother passed away Saturday and they are making  arrangements.    She will call after the arrangements have been made to schedule with Dr. Underwood for preop.    Dr. Underwood advised.

## 2019-07-16 DIAGNOSIS — F31.74 BIPOLAR I DISORDER, MOST RECENT EPISODE MANIC, IN REMISSION: ICD-10-CM

## 2019-07-16 RX ORDER — FLUPHENAZINE HYDROCHLORIDE 1 MG/1
TABLET ORAL
Qty: 60 TABLET | Refills: 0 | Status: SHIPPED | OUTPATIENT
Start: 2019-07-16 | End: 2019-08-27 | Stop reason: SDUPTHER

## 2019-07-18 DIAGNOSIS — Z01.818 PREOP EXAMINATION: Primary | ICD-10-CM

## 2019-07-19 ENCOUNTER — LAB VISIT (OUTPATIENT)
Dept: LAB | Facility: HOSPITAL | Age: 64
End: 2019-07-19
Attending: INTERNAL MEDICINE
Payer: MEDICARE

## 2019-07-19 DIAGNOSIS — Z11.59 NEED FOR HEPATITIS C SCREENING TEST: ICD-10-CM

## 2019-07-19 LAB — HCV AB SERPL QL IA: NEGATIVE

## 2019-07-19 PROCEDURE — 36415 COLL VENOUS BLD VENIPUNCTURE: CPT | Mod: PO

## 2019-07-19 PROCEDURE — 86803 HEPATITIS C AB TEST: CPT

## 2019-07-31 ENCOUNTER — TELEPHONE (OUTPATIENT)
Dept: PLASTIC SURGERY | Facility: CLINIC | Age: 64
End: 2019-07-31

## 2019-07-31 NOTE — TELEPHONE ENCOUNTER
spoke with pt and she stated that she was no longer wanting surgery. Due to her mothers passing she has had a lot of time to put things in perspective and moving forward she is choosing not to be operated on. I offered pt help if she needed a professional help or someone to speak to to get thru this time. She verbalized understanding.

## 2019-08-14 DIAGNOSIS — F31.32 BIPOLAR AFFECTIVE DISORDER, DEPRESSED, MODERATE DEGREE: ICD-10-CM

## 2019-08-14 RX ORDER — BUPROPION HYDROCHLORIDE 100 MG/1
TABLET, EXTENDED RELEASE ORAL
Qty: 120 TABLET | Refills: 0 | Status: SHIPPED | OUTPATIENT
Start: 2019-08-14 | End: 2019-08-27 | Stop reason: SDUPTHER

## 2019-08-14 RX ORDER — BUPROPION HYDROCHLORIDE 100 MG/1
TABLET, EXTENDED RELEASE ORAL
Qty: 180 TABLET | Refills: 0 | OUTPATIENT
Start: 2019-08-14

## 2019-08-17 DIAGNOSIS — F31.32 BIPOLAR AFFECTIVE DISORDER, DEPRESSED, MODERATE DEGREE: ICD-10-CM

## 2019-08-19 RX ORDER — BUPROPION HYDROCHLORIDE 100 MG/1
TABLET, EXTENDED RELEASE ORAL
Qty: 120 TABLET | Refills: 0 | OUTPATIENT
Start: 2019-08-19

## 2019-08-26 DIAGNOSIS — F31.74 BIPOLAR I DISORDER, MOST RECENT EPISODE MANIC, IN REMISSION: ICD-10-CM

## 2019-08-26 RX ORDER — FLUPHENAZINE HYDROCHLORIDE 1 MG/1
TABLET ORAL
Qty: 60 TABLET | Refills: 0 | OUTPATIENT
Start: 2019-08-26

## 2019-08-27 ENCOUNTER — OFFICE VISIT (OUTPATIENT)
Dept: PSYCHIATRY | Facility: CLINIC | Age: 64
End: 2019-08-27
Payer: MEDICARE

## 2019-08-27 VITALS
HEIGHT: 63 IN | SYSTOLIC BLOOD PRESSURE: 112 MMHG | DIASTOLIC BLOOD PRESSURE: 61 MMHG | BODY MASS INDEX: 29.55 KG/M2 | WEIGHT: 166.75 LBS | HEART RATE: 89 BPM

## 2019-08-27 DIAGNOSIS — F31.75 BIPOLAR I DISORDER, MOST RECENT EPISODE DEPRESSED, IN PARTIAL REMISSION: ICD-10-CM

## 2019-08-27 DIAGNOSIS — F31.9 BIPOLAR AFFECTIVE DISORDER, REMISSION STATUS UNSPECIFIED: Primary | ICD-10-CM

## 2019-08-27 DIAGNOSIS — F31.74 BIPOLAR I DISORDER, MOST RECENT EPISODE MANIC, IN REMISSION: ICD-10-CM

## 2019-08-27 DIAGNOSIS — F31.32 BIPOLAR AFFECTIVE DISORDER, DEPRESSED, MODERATE DEGREE: ICD-10-CM

## 2019-08-27 PROCEDURE — 3008F BODY MASS INDEX DOCD: CPT | Mod: CPTII,S$GLB,, | Performed by: PSYCHIATRY & NEUROLOGY

## 2019-08-27 PROCEDURE — 99999 PR PBB SHADOW E&M-EST. PATIENT-LVL III: CPT | Mod: PBBFAC,,,

## 2019-08-27 PROCEDURE — 99999 PR PBB SHADOW E&M-EST. PATIENT-LVL III: ICD-10-PCS | Mod: PBBFAC,,,

## 2019-08-27 PROCEDURE — 90833 PSYTX W PT W E/M 30 MIN: CPT | Mod: S$GLB,,, | Performed by: PSYCHIATRY & NEUROLOGY

## 2019-08-27 PROCEDURE — 3008F PR BODY MASS INDEX (BMI) DOCUMENTED: ICD-10-PCS | Mod: CPTII,S$GLB,, | Performed by: PSYCHIATRY & NEUROLOGY

## 2019-08-27 PROCEDURE — 99213 PR OFFICE/OUTPT VISIT, EST, LEVL III, 20-29 MIN: ICD-10-PCS | Mod: S$GLB,,, | Performed by: PSYCHIATRY & NEUROLOGY

## 2019-08-27 PROCEDURE — 99213 OFFICE O/P EST LOW 20 MIN: CPT | Mod: S$GLB,,, | Performed by: PSYCHIATRY & NEUROLOGY

## 2019-08-27 PROCEDURE — 90833 PR PSYCHOTHERAPY W/PATIENT W/E&M, 30 MIN (ADD ON): ICD-10-PCS | Mod: S$GLB,,, | Performed by: PSYCHIATRY & NEUROLOGY

## 2019-08-27 RX ORDER — OXCARBAZEPINE 150 MG/1
150 TABLET, FILM COATED ORAL 2 TIMES DAILY
Qty: 180 TABLET | Refills: 0 | Status: SHIPPED | OUTPATIENT
Start: 2019-08-27 | End: 2019-12-02 | Stop reason: SDUPTHER

## 2019-08-27 RX ORDER — BUPROPION HYDROCHLORIDE 150 MG/1
150 TABLET, EXTENDED RELEASE ORAL NIGHTLY
Qty: 90 TABLET | Refills: 0 | Status: SHIPPED | OUTPATIENT
Start: 2019-08-27 | End: 2019-12-02 | Stop reason: SDUPTHER

## 2019-08-27 RX ORDER — FLUPHENAZINE HYDROCHLORIDE 1 MG/1
TABLET ORAL
Qty: 60 TABLET | Refills: 2 | Status: SHIPPED | OUTPATIENT
Start: 2019-08-27 | End: 2019-12-02 | Stop reason: SDUPTHER

## 2019-08-27 RX ORDER — FLUPHENAZINE HYDROCHLORIDE 1 MG/1
TABLET ORAL
Qty: 60 TABLET | Refills: 1 | Status: SHIPPED | OUTPATIENT
Start: 2019-08-27 | End: 2019-08-27

## 2019-08-27 RX ORDER — BUPROPION HYDROCHLORIDE 100 MG/1
200 TABLET, EXTENDED RELEASE ORAL EVERY MORNING
Qty: 120 TABLET | Refills: 0 | Status: SHIPPED | OUTPATIENT
Start: 2019-08-27 | End: 2019-12-02 | Stop reason: SDUPTHER

## 2019-08-27 RX ORDER — FOLIC ACID 1 MG/1
1000 TABLET ORAL DAILY
Qty: 90 TABLET | Refills: 1 | Status: SHIPPED | OUTPATIENT
Start: 2019-08-27 | End: 2019-12-02 | Stop reason: SDUPTHER

## 2019-08-27 NOTE — PROGRESS NOTES
"  2019  10:55 AM  Silvana Quezada  [id number]    Outpatient Psychiatry Follow-Up Visit (MD/NP)    2019    Clinical Status of Patient:  Outpatient (Ambulatory)    Chief Complaint:  Silvana Quezada is a 63 y.o. female who presents today for follow-up of mood disorder.  Met with patient.      Interval History and Content of Current Session:  Interim Events/Subjective Report/Content of Current Session:     Patient states her mother  a month and a half ago at 89 years of age. Patient and her  had been living with the patient's mother. She states she feels "destiny relieved," when her mother passed away in hospice care. She states "it took up a lot of time caring for her that I could have been working." She states she and her  are considering selling their house, may renovate it first. She states her mood has been "pretty good," but I feel like "time has passed me by... I feel like I'm good for nothing." She states she feels stressed by her "MS" diagnosis, "I have myelin damage, things take more energy for me to do... And I have heat sensitive." She wanted to return to work but did not know how to use email to send in her resume. She states she is sleeping poorly, "didn't fall asleep until 3 or 4 and got up at 7:30." She states that this has occurred for just the last 2 or 3 nights, other times, "as soon as my head hits the pillow I fall asleep." She states she takes Fluphenazine at bedtime which she finds effective, denies any recent manic episodes, "no it's been years," denies impulsivity, excessive spending, violence aggression, risk behaviors. No recent psychiatric hospitalizations. She states she enjoys playing piano, watching [employer], "I miss Obama," goes to [employer], and plans to get back to walking for exercise in the mornings as it it isn't as hot at that time. She states she has 2 living daughters, "one is a senior in college," and another is working and living in [place], "does something with " "software." She states her relationship with them is "good." She states she gets along with her  "very well," been  43 years.      Psychiatric Review Of Systems - Is patient experiencing or having changes in:  sleep: yes  appetite: no  energy/anergy: no  interest/pleasure/anhedonia: no  anxiety/panic: no  guilty/hopelessness: no  concentration: no  Substance abuse: no  Racing thoughts: no  Impulsive behaviors: no  Paranoia: no  AVH: no    Psychotherapy:  · Target symptoms: mood disorder, grief  · Why chosen therapy is appropriate versus another modality: relevant to diagnosis  · Outcome monitoring methods: self-report  · Therapeutic intervention type: supportive psychotherapy  · Topics discussed/themes: building skills sets for symptom management  · The patient's response to the intervention is accepting. The patient's progress toward treatment goals is fair.   · Duration of intervention: 22 minutes.    Review of Systems   Review of Systems   Constitutional: Negative for chills and fever.   Respiratory: Negative for shortness of breath.    Cardiovascular: Negative for chest pain and palpitations.   Gastrointestinal: Positive for constipation. Negative for diarrhea, nausea and vomiting.   Skin: Negative for rash.   Neurological: Negative for seizures and loss of consciousness.       Past Medical, Family and Social History: The patient's past medical, family and social history have been reviewed and updated as appropriate within the electronic medical record - see encounter notes.    Social History     Socioeconomic History    Marital status:      Spouse name: Not on file    Number of children: Not on file    Years of education: Not on file    Highest education level: Not on file   Occupational History    Occupation: [employer]   Social Needs    Financial resource strain: Not on file    Food insecurity:     Worry: Not on file     Inability: Not on file    Transportation needs:     Medical: " "Not on file     Non-medical: Not on file   Tobacco Use    Smoking status: Never Smoker    Smokeless tobacco: Never Used   Substance and Sexual Activity    Alcohol use: Yes     Alcohol/week: 1.0 oz     Types: 2 Standard drinks or equivalent per week     Comment: occasionally    Drug use: No    Sexual activity: Yes     Partners: Male   Lifestyle    Physical activity:     Days per week: Not on file     Minutes per session: Not on file    Stress: Not on file   Relationships    Social connections:     Talks on phone: Not on file     Gets together: Not on file     Attends Sikh service: Not on file     Active member of club or organization: Not on file     Attends meetings of clubs or organizations: Not on file     Relationship status: Not on file   Other Topics Concern    Are you pregnant or think you may be? Not Asked    Breast-feeding Not Asked   Social History Narrative    Not on file       Compliance: yes    Side effects: None    Risk Parameters:  Patient reports no suicidal ideation  Patient reports no homicidal ideation  Patient reports no self-injurious behavior  Patient reports no violent behavior      Exam (detailed: at least 9 elements; comprehensive: all 15 elements)     Vitals:    Vitals:    08/27/19 1016   BP: 112/61   Pulse: 89   Weight: 75.7 kg (166 lb 12.5 oz)   Height: 5' 3" (1.6 m)          CONSTITUTIONAL  General Appearance: in casual dress, NAD, calm, cooperative, appears stated age      PSYCHIATRIC   Level of Consciousness: alert  Orientation: oriented to person, place, situation  Grooming: fair  Psychomotor Behavior: no agitation, retardation  Speech: normal rate, volume, tone  Language: English, no asphasia  Mood: "pretty good"  Affect: tearful at times, full range, smiles appropriately  Thought Process: linear, logical  Associations: cohesive  Thought Content: denies SI, HI  Memory: intact to recent and remote events  Attention: not distracted  Fund of Knowledge: appropriate for " education level  Insight: fair  Judgment: fair      Assessment and Diagnosis   Status/Progress: Based on the examination today, the patient's problem(s) is/are controlled.  New problems have been presented today.   Co-morbidities are complicating management of the primary condition.     General Impression:     Bipolar disorder  Grief    Intervention/Counseling/Treatment Plan     - continue psychiatric medication as prescribed; patient tolerating w/o ASE, symptoms controlled    - provided supportive psychotherapy    - metabolic panel, CBC, reviewed, no psychiatric concerns noted    - continue psychotherapy with LCSW    - Instructed patient to keep all scheduled appointments, take medications as prescribed and abstain from substance abuse. Instructed to call 911 or present to ER for emergency including SI or HI.    - Discussed diagnosis, risks and benefits of proposed treatment above vs alternative treatments vs no treatment, and potential side effects of these treatments. The patient expresses understanding of the above and displays the capacity to agree with this treatment given said understanding. Patient also agrees that, currently, the benefits outweigh the risks and would like to pursue treatment at this time.         Mitchell Venegas MD PGY-4    Case discussed with attending, Dr. Grace, who agrees with A/P as above    Return to Clinic: 3 months

## 2019-08-28 NOTE — PROGRESS NOTES
Staff Note:     Pt interviewed and case discussed.  I agree with Resident's findings and treatment plan.    Pt returned following the death of her mother.  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  The 10 year anniversary of her son's death is also approaching next month.  She talked about plans to move to her mother's house permanently and sell their old home.  Pt feels that she will recover from her losses and will regain her previous energy and motivation.  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  Medications were reviewed and left unchanged.

## 2019-09-01 PROBLEM — F31.32 BIPOLAR I DISORDER, MOST RECENT EPISODE DEPRESSED, MODERATE: Status: ACTIVE | Noted: 2019-09-01

## 2019-09-04 NOTE — PROGRESS NOTES
"Individual Psychotherapy (PhD/LCSW)    4/30/2019    Site:  [facility]         Therapeutic Intervention: Met with patient.  Outpatient - Insight oriented psychotherapy 45 min - CPT code 43610 and Outpatient - Supportive psychotherapy 45 min - CPT Code 81877    Chief complaint/reason for encounter: depression and mood swings     Interval history and content of current session: Patient was last seen for therapy last month.   She reports she is doing "ok" today, but cites some sporadic depressive symptoms and increased worry.  She has bruising on her face, and explains she fell and broke her nose.  She has been following with providers and plans to consult with a plastic surgeon to see if any surgery needed for her nose.  Patient says she has been taking Risperdal, instead of Haldol, due to a shortage of Haldol.  MD is aware, per patient, and is trying to find a pharmacy that can get the medication.  93 year old mother had to be rushed to the ER.  She accidentally slammed the car door on her leg and began bleeding profusely.  She required stitches.  Mother is now recovering, but has been somewhat demanding.  Further processed anxiety and discussed coping skills, so she can avoid caregiver burnout.  Also, emphasized the importance of making good self care a priority.      Treatment plan:  · Target symptoms: depression, mood swings  · Why chosen therapy is appropriate versus another modality: relevant to diagnosis  · Outcome monitoring methods: self-report, observation  · Therapeutic intervention type: insight oriented psychotherapy, supportive psychotherapy    Risk parameters:  Patient reports no suicidal ideation  Patient reports no homicidal ideation  Patient reports no self-injurious behavior  Patient reports no violent behavior    Verbal deficits: None    Patient's response to intervention:  The patient's response to intervention is accepting.    Progress toward goals and other mental status changes:  The " patient's progress toward goals is fair .    Diagnosis:     ICD-10-CM ICD-9-CM   1. Bipolar I disorder, most recent episode depressed, moderate F31.32 296.52       Plan:  individual psychotherapy and medication management by physician    Return to clinic: as scheduled    Length of Service (minutes): 45

## 2019-09-24 ENCOUNTER — TELEPHONE (OUTPATIENT)
Dept: PLASTIC SURGERY | Facility: CLINIC | Age: 64
End: 2019-09-24

## 2019-09-24 NOTE — TELEPHONE ENCOUNTER
Spoke with pt and she stated that she would like to have surgery, I explained to pt that we may have to start the process over as she was previously approved & cancelled for personal reasons, she may have to be resubmitted for approval. Pt verbalized understanding.                 ----- Message from Elena Montero RN sent at 9/23/2019  5:14 PM CDT -----      ----- Message -----  From: Chauncey Scott  Sent: 9/23/2019   9:05 AM CDT  To: Alana Samuel Staff    Pt calling to schedule surgery.    [phone]

## 2019-10-04 ENCOUNTER — PES CALL (OUTPATIENT)
Dept: ADMINISTRATIVE | Facility: CLINIC | Age: 64
End: 2019-10-04

## 2019-10-11 ENCOUNTER — PATIENT OUTREACH (OUTPATIENT)
Dept: ADMINISTRATIVE | Facility: OTHER | Age: 64
End: 2019-10-11

## 2019-10-11 DIAGNOSIS — Z12.11 ENCOUNTER FOR FIT (FECAL IMMUNOCHEMICAL TEST) SCREENING: Primary | ICD-10-CM

## 2019-10-14 ENCOUNTER — OFFICE VISIT (OUTPATIENT)
Dept: PLASTIC SURGERY | Facility: CLINIC | Age: 64
End: 2019-10-14
Payer: MEDICARE

## 2019-10-14 VITALS
SYSTOLIC BLOOD PRESSURE: 135 MMHG | DIASTOLIC BLOOD PRESSURE: 87 MMHG | WEIGHT: 169.06 LBS | HEART RATE: 79 BPM | BODY MASS INDEX: 29.95 KG/M2

## 2019-10-14 DIAGNOSIS — N64.81 PTOSIS OF BREAST: Primary | ICD-10-CM

## 2019-10-14 PROCEDURE — 99213 OFFICE O/P EST LOW 20 MIN: CPT | Mod: S$GLB,,, | Performed by: SURGERY

## 2019-10-14 PROCEDURE — 3008F BODY MASS INDEX DOCD: CPT | Mod: CPTII,S$GLB,, | Performed by: SURGERY

## 2019-10-14 PROCEDURE — 1101F PR PT FALLS ASSESS DOC 0-1 FALLS W/OUT INJ PAST YR: ICD-10-PCS | Mod: CPTII,S$GLB,, | Performed by: SURGERY

## 2019-10-14 PROCEDURE — 1101F PT FALLS ASSESS-DOCD LE1/YR: CPT | Mod: CPTII,S$GLB,, | Performed by: SURGERY

## 2019-10-14 PROCEDURE — 3008F PR BODY MASS INDEX (BMI) DOCUMENTED: ICD-10-PCS | Mod: CPTII,S$GLB,, | Performed by: SURGERY

## 2019-10-14 PROCEDURE — 99213 PR OFFICE/OUTPT VISIT, EST, LEVL III, 20-29 MIN: ICD-10-PCS | Mod: S$GLB,,, | Performed by: SURGERY

## 2019-10-14 PROCEDURE — 99999 PR PBB SHADOW E&M-EST. PATIENT-LVL III: CPT | Mod: PBBFAC,,, | Performed by: SURGERY

## 2019-10-14 PROCEDURE — 99999 PR PBB SHADOW E&M-EST. PATIENT-LVL III: ICD-10-PCS | Mod: PBBFAC,,, | Performed by: SURGERY

## 2019-10-14 NOTE — PROGRESS NOTES
PreOp Visit/ Informed Consent    Patient had to reschedule last procedure because her mother passed.  I will resubmit for left mastopexy.  She is ok with pursuing a symmetry procedure.  I advised her not to touch her left breast.  She already completed her mammogram earlier this year so no further imaging is required.    She denies any recent falls, loss of consciousness.      I explained the nature of the procedure, location of incisions, expected duration, and expected recovery.      She assured me that she wishes to proceed with this surgery.  Her goal is to look symmetric in clothing.  She is aware that I will not be altering the nipple position of her right breast.  All of her questions were answered.            Left Mastopexy  CPT 15055  2 hours    CC:  Dr. Munoz for clearance before procedure, her psychiatrist  Dr. GUICHO Underwood for clearance before her procedure, her PCP    20 minutes was spend with patient, more than 50% was spent explaining the nature of the procedure, location of incisions, expected recovery.      Plastic & Reconstructive Surgery  [facility]  c/o Jimmie Warner M.D.  [facility]  [address]  [address] [place] [address]    Work [phone]  Toll free [phone]  If no answer [phone]

## 2019-10-29 ENCOUNTER — TELEPHONE (OUTPATIENT)
Dept: PLASTIC SURGERY | Facility: CLINIC | Age: 64
End: 2019-10-29

## 2019-10-29 DIAGNOSIS — N64.81 PTOSIS OF BREAST: Primary | ICD-10-CM

## 2019-10-29 DIAGNOSIS — Z85.3 PERSONAL HISTORY OF BREAST CANCER: ICD-10-CM

## 2019-10-29 NOTE — TELEPHONE ENCOUNTER
spoke with pt and offered her a surgery date of december 16th . She said that date was good for her. I told her I would call back and confirm . Pt verbalized understanding.

## 2019-11-05 ENCOUNTER — TELEPHONE (OUTPATIENT)
Dept: FAMILY MEDICINE | Facility: CLINIC | Age: 64
End: 2019-11-05

## 2019-11-23 NOTE — PROGRESS NOTES
"Subjective:       Patient ID: Silvana Quezada is a 64 y.o. female.    Chief Complaint: Establish Care; Multiple Sclerosis; and Fall (@ street/ sidewalk x 2 wks ago  ED follow up)    #Last visit/Previous Provider  This patient is new to me  Previously seen by Dr Underwood   Last visit was >1 yr   Last CPE was >1 yr        #Interim Hx  11/1/2019  - EJ fall 2 weeks ago . Mechanical.   10/14/2019 Seen by plastic for mastopexy  8/2019 -psych bipolar grief return to clinic 11/2019 5/14/2019 - ent - nasal fx 2/2 fall no intervention  3/2019 - urology - on Suprapubic intermittent cath x4 day - botox injection urinary urgency/freq syndrome    #Concerns Today    Request referral to [facility]     #Chronic Problems      MS  C/b neurogenic bladder  Not followed by neurology     H/o BCa  BIRADS2- 5/2019     Osteopersosis cb pathologic fracture  Last dexa 6/2017 osteoperosis    Bipolar   Seen by psych       Health Maintenance   Topic Date Due    TETANUS VACCINE  09/25/1972    High Dose Statin  09/25/1975    Lipid Panel  11/18/2018    Fecal Occult Blood Test (FOBT)/FitKit - neg 5/31/2018 05/31/2019    DEXA SCAN  05/30/2020    Mammogram  05/06/2021    Hepatitis C Screening  Completed    Pneumococcal Vaccine (65+ Low/Medium Risk)  Completed     HPI  Review of Systems   Constitutional: Negative for activity change, chills, diaphoresis, fatigue, fever and unexpected weight change.   Eyes: Negative for redness and visual disturbance.   Respiratory: Negative for shortness of breath.    Cardiovascular: Negative for chest pain and palpitations.   Gastrointestinal: Negative for abdominal distention and blood in stool.   Genitourinary: Positive for difficulty urinating. Negative for dysuria, frequency and menstrual problem.   Neurological: Negative for syncope and headaches.       Objective:       BP 98/60 (BP Location: Right arm, Patient Position: Sitting, BP Method: Medium (Manual))   Pulse 82   Ht 5' 3" (1.6 m)   Wt 77.6 " kg (171 lb 1.2 oz)   SpO2 97%   BMI 30.30 kg/m²     Physical Exam   Constitutional: She is oriented to person, place, and time. She appears well-developed and well-nourished. No distress.   HENT:   Head: Normocephalic.   Nose: Nose normal.   Mouth/Throat: Oropharynx is clear and moist.   Eyes: Pupils are equal, round, and reactive to light. Conjunctivae and EOM are normal. Right eye exhibits no discharge. Left eye exhibits no discharge.   Neck: Normal range of motion. Neck supple.   Cardiovascular: Normal rate, regular rhythm and normal heart sounds. Exam reveals no gallop and no friction rub.   No murmur heard.  Pulmonary/Chest: Effort normal. No respiratory distress.   Abdominal: Soft. Bowel sounds are normal. She exhibits no distension.   Musculoskeletal: Normal range of motion. She exhibits no edema or deformity.   Lymphadenopathy:     She has no cervical adenopathy.   Neurological: She is alert and oriented to person, place, and time. She displays normal reflexes. No cranial nerve deficit or sensory deficit. Coordination normal.   5/5 UE  3/5 strenght in LE flexors, extensor  4/5 strenght in calf     Skin: Skin is warm. She is not diaphoretic.   Psychiatric: She has a normal mood and affect.   Nursing note and vitals reviewed.          Basic Labs  CMP  Sodium   Date Value Ref Range Status   05/06/2019 142 136 - 145 mmol/L Final     Potassium   Date Value Ref Range Status   05/06/2019 3.9 3.5 - 5.1 mmol/L Final     Chloride   Date Value Ref Range Status   05/06/2019 104 95 - 110 mmol/L Final     CO2   Date Value Ref Range Status   05/06/2019 29 23 - 29 mmol/L Final     Glucose   Date Value Ref Range Status   05/06/2019 90 70 - 110 mg/dL Final     BUN, Bld   Date Value Ref Range Status   05/06/2019 21 8 - 23 mg/dL Final     Creatinine   Date Value Ref Range Status   05/06/2019 0.9 0.5 - 1.4 mg/dL Final     Calcium   Date Value Ref Range Status   05/06/2019 9.9 8.7 - 10.5 mg/dL Final     Total Protein   Date  Value Ref Range Status   05/06/2019 6.9 6.0 - 8.4 g/dL Final     Albumin   Date Value Ref Range Status   05/06/2019 3.8 3.5 - 5.2 g/dL Final     Total Bilirubin   Date Value Ref Range Status   05/06/2019 0.4 0.1 - 1.0 mg/dL Final     Comment:     For infants and newborns, interpretation of results should be based  on gestational age, weight and in agreement with clinical  observations.  Premature Infant recommended reference ranges:  Up to 24 hours.............<8.0 mg/dL  Up to 48 hours............<12.0 mg/dL  3-5 days..................<15.0 mg/dL  6-29 days.................<15.0 mg/dL       Alkaline Phosphatase   Date Value Ref Range Status   05/06/2019 83 55 - 135 U/L Final     AST   Date Value Ref Range Status   05/06/2019 14 10 - 40 U/L Final     ALT   Date Value Ref Range Status   05/06/2019 16 10 - 44 U/L Final     Anion Gap   Date Value Ref Range Status   05/06/2019 9 8 - 16 mmol/L Final     eGFR if    Date Value Ref Range Status   05/06/2019 >60.0 >60 mL/min/1.73 m^2 Final     eGFR if non    Date Value Ref Range Status   05/06/2019 >60.0 >60 mL/min/1.73 m^2 Final     Comment:     Calculation used to obtain the estimated glomerular filtration  rate (eGFR) is the CKD-EPI equation.        Lab Results   Component Value Date    WBC 4.57 05/06/2019    HGB 13.4 05/06/2019    HCT 40.8 05/06/2019    MCV 93 05/06/2019     05/06/2019       Lab Results   Component Value Date    TSH 1.683 05/17/2017         Lab Results   Component Value Date    HEPCAB Negative 07/19/2019       Lipids  Lab Results   Component Value Date    CHOL 177 11/18/2013     Lab Results   Component Value Date    HDL 60 11/18/2013     Lab Results   Component Value Date    LDLCALC 104.2 11/18/2013     Lab Results   Component Value Date    TRIG 64 11/18/2013     Lab Results   Component Value Date    CHOLHDL 33.9 11/18/2013           Assessment:       1. MS (multiple sclerosis)    2. Weakness of lower extremity,  unspecified laterality    3. Breast cancer screening    4. Neurogenic bladder    5. Bipolar affective disorder, remission status unspecified    6. Encounter for preventive health examination    7. Screening for colorectal cancer    8. Other osteoporosis, unspecified pathological fracture presence    9. Need for vaccination    10. Screening for hyperlipidemia    11. Abnormal finding of blood chemistry, unspecified         Plan:        Silvana was seen today for establish care, multiple sclerosis and fall.    Diagnoses and all orders for this visit:    MS (multiple sclerosis)  Does not follow with neurology . Provided referral  -     Ambulatory Referral to Neurology    Weakness of lower extremity, unspecified laterality  -     Ambulatory Referral to Physical/Occupational Therapy    Neurogenic bladder  Follow with urogyn    Bipolar affective disorder, remission status unspecified  Follows with [facility]     Encounter for preventive health examination  Flu completed. Will obtain tetanus and shingles at pharmacy    Screening for colorectal cancer  -     Fecal Immunochemical Test (iFOBT); Future    Screening for hyperlipidemia  -     Lipid panel; Future    Abnormal finding of blood chemistry, unspecified   -     Lipid panel; Future        Current Outpatient Medications on File Prior to Visit   Medication Sig Dispense Refill    aspirin 325 MG tablet Take 325 mg by mouth every 4 (four) hours as needed for Pain.      buPROPion (WELLBUTRIN SR) 100 MG TBSR 12 hr tablet Take 2 tablets (200 mg total) by mouth every morning. 120 tablet 0    buPROPion (WELLBUTRIN SR) 150 MG TBSR 12 hr tablet Take 1 tablet (150 mg total) by mouth every evening. 90 tablet 0    calcium-vitamin D3 (CALCIUM 500 + D) 500 mg(1,250mg) -200 unit per tablet Take 1 tablet by mouth 2 (two) times daily with meals.      catheter (FEMALE CATHETER) 14 [place] [facility] [place] to self catheterize four times a day indefinitley using female 12 fr in and out catheter for  incomplete bladder emptying.   Dispense 120 month with 11 refills or 360 every 3 months with 3 refills depending on patient's preference 120 each 11    fluphenazine (PROLIXIN) 1 MG tablet TAKE 2 TABLETS NIGHTLY 60 tablet 2    folic acid (FOLVITE) 1 MG tablet Take 1 tablet (1,000 mcg total) by mouth once daily. 90 tablet 1    HYDROcodone-acetaminophen (NORCO) 5-325 mg per tablet Take 1 tablet by mouth every 6 (six) hours as needed for Pain. No alcohol, no driving, no operating machinery, no working, no swimming, no extra Tylenol (acetaminophen) while taking this medication. 10 tablet 0    ibuprofen (ADVIL,MOTRIN) 200 MG tablet Take 200 mg by mouth every 6 (six) hours as needed for Pain.      magnesium oxide (MAG-OX) 400 mg tablet Take 500 mg by mouth once daily.      OXcarbazepine (TRILEPTAL) 150 MG Tab Take 1 tablet (150 mg total) by mouth 2 (two) times daily. 180 tablet 0     No current facility-administered medications on file prior to visit.

## 2019-11-25 ENCOUNTER — OFFICE VISIT (OUTPATIENT)
Dept: INTERNAL MEDICINE | Facility: CLINIC | Age: 64
End: 2019-11-25
Payer: MEDICARE

## 2019-11-25 ENCOUNTER — TELEPHONE (OUTPATIENT)
Dept: UROLOGY | Facility: CLINIC | Age: 64
End: 2019-11-25

## 2019-11-25 VITALS
HEIGHT: 63 IN | DIASTOLIC BLOOD PRESSURE: 60 MMHG | HEART RATE: 82 BPM | OXYGEN SATURATION: 97 % | WEIGHT: 171.06 LBS | SYSTOLIC BLOOD PRESSURE: 98 MMHG | BODY MASS INDEX: 30.31 KG/M2

## 2019-11-25 DIAGNOSIS — R79.9 ABNORMAL FINDING OF BLOOD CHEMISTRY, UNSPECIFIED: ICD-10-CM

## 2019-11-25 DIAGNOSIS — G35 MS (MULTIPLE SCLEROSIS): Primary | ICD-10-CM

## 2019-11-25 DIAGNOSIS — Z12.11 SCREENING FOR COLORECTAL CANCER: ICD-10-CM

## 2019-11-25 DIAGNOSIS — F31.9 BIPOLAR AFFECTIVE DISORDER, REMISSION STATUS UNSPECIFIED: ICD-10-CM

## 2019-11-25 DIAGNOSIS — N32.81 OVERACTIVE BLADDER: Primary | ICD-10-CM

## 2019-11-25 DIAGNOSIS — Z12.12 SCREENING FOR COLORECTAL CANCER: ICD-10-CM

## 2019-11-25 DIAGNOSIS — Z23 NEED FOR VACCINATION: ICD-10-CM

## 2019-11-25 DIAGNOSIS — Z13.220 SCREENING FOR HYPERLIPIDEMIA: ICD-10-CM

## 2019-11-25 DIAGNOSIS — N31.9 NEUROGENIC BLADDER: ICD-10-CM

## 2019-11-25 DIAGNOSIS — M81.8 OTHER OSTEOPOROSIS, UNSPECIFIED PATHOLOGICAL FRACTURE PRESENCE: ICD-10-CM

## 2019-11-25 DIAGNOSIS — Z00.00 ENCOUNTER FOR PREVENTIVE HEALTH EXAMINATION: ICD-10-CM

## 2019-11-25 DIAGNOSIS — R29.898 WEAKNESS OF LOWER EXTREMITY, UNSPECIFIED LATERALITY: ICD-10-CM

## 2019-11-25 DIAGNOSIS — Z12.39 BREAST CANCER SCREENING: ICD-10-CM

## 2019-11-25 PROCEDURE — 99499 UNLISTED E&M SERVICE: CPT | Mod: S$GLB,,, | Performed by: INTERNAL MEDICINE

## 2019-11-25 PROCEDURE — 99999 PR PBB SHADOW E&M-EST. PATIENT-LVL IV: ICD-10-PCS | Mod: PBBFAC,,, | Performed by: INTERNAL MEDICINE

## 2019-11-25 PROCEDURE — 99214 PR OFFICE/OUTPT VISIT, EST, LEVL IV, 30-39 MIN: ICD-10-PCS | Mod: S$GLB,,, | Performed by: INTERNAL MEDICINE

## 2019-11-25 PROCEDURE — 99999 PR PBB SHADOW E&M-EST. PATIENT-LVL IV: CPT | Mod: PBBFAC,,, | Performed by: INTERNAL MEDICINE

## 2019-11-25 PROCEDURE — 3008F BODY MASS INDEX DOCD: CPT | Mod: CPTII,S$GLB,, | Performed by: INTERNAL MEDICINE

## 2019-11-25 PROCEDURE — 1101F PT FALLS ASSESS-DOCD LE1/YR: CPT | Mod: CPTII,S$GLB,, | Performed by: INTERNAL MEDICINE

## 2019-11-25 PROCEDURE — 99499 RISK ADDL DX/OHS AUDIT: ICD-10-PCS | Mod: S$GLB,,, | Performed by: INTERNAL MEDICINE

## 2019-11-25 PROCEDURE — 1101F PR PT FALLS ASSESS DOC 0-1 FALLS W/OUT INJ PAST YR: ICD-10-PCS | Mod: CPTII,S$GLB,, | Performed by: INTERNAL MEDICINE

## 2019-11-25 PROCEDURE — 3008F PR BODY MASS INDEX (BMI) DOCUMENTED: ICD-10-PCS | Mod: CPTII,S$GLB,, | Performed by: INTERNAL MEDICINE

## 2019-11-25 PROCEDURE — 99214 OFFICE O/P EST MOD 30 MIN: CPT | Mod: S$GLB,,, | Performed by: INTERNAL MEDICINE

## 2019-11-25 NOTE — PATIENT INSTRUCTIONS
WE were happy to see you today    Please have your labs done in 3 months     Please return your fit kit for stool testing      Please obtain the following vaccines  Tetanus     Please return in 3 months for follow up

## 2019-11-25 NOTE — TELEPHONE ENCOUNTER
Overactive bladder  -     Case Request Operating Room: CYSTOSCOPY,WITH BOTULINUM TOXIN INJECTION

## 2019-11-27 ENCOUNTER — TELEPHONE (OUTPATIENT)
Dept: NEUROLOGY | Facility: CLINIC | Age: 64
End: 2019-11-27

## 2019-11-27 ENCOUNTER — LAB VISIT (OUTPATIENT)
Dept: LAB | Facility: HOSPITAL | Age: 64
End: 2019-11-27
Attending: INTERNAL MEDICINE
Payer: MEDICARE

## 2019-11-27 DIAGNOSIS — Z12.12 SCREENING FOR COLORECTAL CANCER: ICD-10-CM

## 2019-11-27 DIAGNOSIS — Z12.11 SCREENING FOR COLORECTAL CANCER: ICD-10-CM

## 2019-11-27 NOTE — TELEPHONE ENCOUNTER
"----- Message from Rosmery Baum sent at 11/27/2019  2:25 PM CST -----  Good afternoon,   The patient has a new referral from Dr. Jhonatan Avila ([place]), the diagnosis is MS (multiple sclerosis). Can someone contact her to schedule an appointment?    Thank you  "

## 2019-11-29 ENCOUNTER — PATIENT OUTREACH (OUTPATIENT)
Dept: ADMINISTRATIVE | Facility: OTHER | Age: 64
End: 2019-11-29

## 2019-12-02 ENCOUNTER — OFFICE VISIT (OUTPATIENT)
Dept: PSYCHIATRY | Facility: CLINIC | Age: 64
End: 2019-12-02
Payer: MEDICARE

## 2019-12-02 VITALS
SYSTOLIC BLOOD PRESSURE: 126 MMHG | BODY MASS INDEX: 30.18 KG/M2 | WEIGHT: 170.31 LBS | HEART RATE: 78 BPM | HEIGHT: 63 IN | DIASTOLIC BLOOD PRESSURE: 60 MMHG

## 2019-12-02 DIAGNOSIS — F31.32 BIPOLAR AFFECTIVE DISORDER, DEPRESSED, MODERATE DEGREE: ICD-10-CM

## 2019-12-02 DIAGNOSIS — F31.75 BIPOLAR I DISORDER, MOST RECENT EPISODE DEPRESSED, IN PARTIAL REMISSION: ICD-10-CM

## 2019-12-02 DIAGNOSIS — F31.74 BIPOLAR I DISORDER, MOST RECENT EPISODE MANIC, IN REMISSION: ICD-10-CM

## 2019-12-02 DIAGNOSIS — Z79.899 ENCOUNTER FOR LONG-TERM (CURRENT) USE OF HIGH-RISK MEDICATION: ICD-10-CM

## 2019-12-02 DIAGNOSIS — F31.9 BIPOLAR AFFECTIVE DISORDER, REMISSION STATUS UNSPECIFIED: Primary | ICD-10-CM

## 2019-12-02 PROCEDURE — 3008F BODY MASS INDEX DOCD: CPT | Mod: CPTII,S$GLB,, | Performed by: PSYCHIATRY & NEUROLOGY

## 2019-12-02 PROCEDURE — 90833 PR PSYCHOTHERAPY W/PATIENT W/E&M, 30 MIN (ADD ON): ICD-10-PCS | Mod: S$GLB,,, | Performed by: PSYCHIATRY & NEUROLOGY

## 2019-12-02 PROCEDURE — 99499 RISK ADDL DX/OHS AUDIT: ICD-10-PCS | Mod: S$GLB,,, | Performed by: PSYCHIATRY & NEUROLOGY

## 2019-12-02 PROCEDURE — 99999 PR PBB SHADOW E&M-EST. PATIENT-LVL III: ICD-10-PCS | Mod: PBBFAC,,, | Performed by: PSYCHIATRY & NEUROLOGY

## 2019-12-02 PROCEDURE — 1101F PR PT FALLS ASSESS DOC 0-1 FALLS W/OUT INJ PAST YR: ICD-10-PCS | Mod: CPTII,S$GLB,, | Performed by: PSYCHIATRY & NEUROLOGY

## 2019-12-02 PROCEDURE — 99213 OFFICE O/P EST LOW 20 MIN: CPT | Mod: S$GLB,,, | Performed by: PSYCHIATRY & NEUROLOGY

## 2019-12-02 PROCEDURE — 3008F PR BODY MASS INDEX (BMI) DOCUMENTED: ICD-10-PCS | Mod: CPTII,S$GLB,, | Performed by: PSYCHIATRY & NEUROLOGY

## 2019-12-02 PROCEDURE — 99213 PR OFFICE/OUTPT VISIT, EST, LEVL III, 20-29 MIN: ICD-10-PCS | Mod: S$GLB,,, | Performed by: PSYCHIATRY & NEUROLOGY

## 2019-12-02 PROCEDURE — 90833 PSYTX W PT W E/M 30 MIN: CPT | Mod: S$GLB,,, | Performed by: PSYCHIATRY & NEUROLOGY

## 2019-12-02 PROCEDURE — 99999 PR PBB SHADOW E&M-EST. PATIENT-LVL III: CPT | Mod: PBBFAC,,, | Performed by: PSYCHIATRY & NEUROLOGY

## 2019-12-02 PROCEDURE — 99499 UNLISTED E&M SERVICE: CPT | Mod: S$GLB,,, | Performed by: PSYCHIATRY & NEUROLOGY

## 2019-12-02 PROCEDURE — 1101F PT FALLS ASSESS-DOCD LE1/YR: CPT | Mod: CPTII,S$GLB,, | Performed by: PSYCHIATRY & NEUROLOGY

## 2019-12-02 RX ORDER — FLUPHENAZINE HYDROCHLORIDE 1 MG/1
TABLET ORAL
Qty: 180 TABLET | Refills: 1 | Status: SHIPPED | OUTPATIENT
Start: 2019-12-02 | End: 2020-07-16

## 2019-12-02 RX ORDER — BUPROPION HYDROCHLORIDE 150 MG/1
150 TABLET, EXTENDED RELEASE ORAL NIGHTLY
Qty: 90 TABLET | Refills: 1 | Status: SHIPPED | OUTPATIENT
Start: 2019-12-02 | End: 2020-06-05

## 2019-12-02 RX ORDER — BUPROPION HYDROCHLORIDE 100 MG/1
200 TABLET, EXTENDED RELEASE ORAL EVERY MORNING
Qty: 180 TABLET | Refills: 1 | Status: SHIPPED | OUTPATIENT
Start: 2019-12-02 | End: 2020-06-20

## 2019-12-02 RX ORDER — OXCARBAZEPINE 150 MG/1
150 TABLET, FILM COATED ORAL 2 TIMES DAILY
Qty: 180 TABLET | Refills: 2 | Status: SHIPPED | OUTPATIENT
Start: 2019-12-02 | End: 2020-09-08

## 2019-12-02 RX ORDER — FOLIC ACID 1 MG/1
1000 TABLET ORAL DAILY
Qty: 90 TABLET | Refills: 1 | Status: SHIPPED | OUTPATIENT
Start: 2019-12-02 | End: 2020-11-24 | Stop reason: SDUPTHER

## 2019-12-03 ENCOUNTER — OFFICE VISIT (OUTPATIENT)
Dept: UROLOGY | Facility: CLINIC | Age: 64
End: 2019-12-03
Payer: MEDICARE

## 2019-12-03 VITALS
HEART RATE: 76 BPM | SYSTOLIC BLOOD PRESSURE: 124 MMHG | BODY MASS INDEX: 30.16 KG/M2 | WEIGHT: 170.19 LBS | HEIGHT: 63 IN | DIASTOLIC BLOOD PRESSURE: 74 MMHG

## 2019-12-03 DIAGNOSIS — R82.71 BACTERIA IN URINE: ICD-10-CM

## 2019-12-03 DIAGNOSIS — Z87.440 HISTORY OF RECURRENT UTIS: ICD-10-CM

## 2019-12-03 DIAGNOSIS — R39.15 URINARY URGENCY: ICD-10-CM

## 2019-12-03 DIAGNOSIS — Z01.818 PRE-OP EVALUATION: Primary | ICD-10-CM

## 2019-12-03 DIAGNOSIS — R35.0 URINARY FREQUENCY: ICD-10-CM

## 2019-12-03 PROCEDURE — 99999 PR PBB SHADOW E&M-EST. PATIENT-LVL III: ICD-10-PCS | Mod: PBBFAC,,, | Performed by: NURSE PRACTITIONER

## 2019-12-03 PROCEDURE — 87077 CULTURE AEROBIC IDENTIFY: CPT

## 2019-12-03 PROCEDURE — 99213 OFFICE O/P EST LOW 20 MIN: CPT | Mod: 25,S$GLB,, | Performed by: NURSE PRACTITIONER

## 2019-12-03 PROCEDURE — 3288F PR FALLS RISK ASSESSMENT DOCUMENTED: ICD-10-PCS | Mod: CPTII,S$GLB,, | Performed by: NURSE PRACTITIONER

## 2019-12-03 PROCEDURE — 81002 PR URINALYSIS NONAUTO W/O SCOPE: ICD-10-PCS | Mod: S$GLB,,, | Performed by: NURSE PRACTITIONER

## 2019-12-03 PROCEDURE — 87186 SC STD MICRODIL/AGAR DIL: CPT

## 2019-12-03 PROCEDURE — 3288F FALL RISK ASSESSMENT DOCD: CPT | Mod: CPTII,S$GLB,, | Performed by: NURSE PRACTITIONER

## 2019-12-03 PROCEDURE — 99213 PR OFFICE/OUTPT VISIT, EST, LEVL III, 20-29 MIN: ICD-10-PCS | Mod: 25,S$GLB,, | Performed by: NURSE PRACTITIONER

## 2019-12-03 PROCEDURE — 3008F BODY MASS INDEX DOCD: CPT | Mod: CPTII,S$GLB,, | Performed by: NURSE PRACTITIONER

## 2019-12-03 PROCEDURE — 1100F PTFALLS ASSESS-DOCD GE2>/YR: CPT | Mod: CPTII,S$GLB,, | Performed by: NURSE PRACTITIONER

## 2019-12-03 PROCEDURE — 87086 URINE CULTURE/COLONY COUNT: CPT

## 2019-12-03 PROCEDURE — 1100F PR PT FALLS ASSESS DOC 2+ FALLS/FALL W/INJURY/YR: ICD-10-PCS | Mod: CPTII,S$GLB,, | Performed by: NURSE PRACTITIONER

## 2019-12-03 PROCEDURE — 87088 URINE BACTERIA CULTURE: CPT

## 2019-12-03 PROCEDURE — 99999 PR PBB SHADOW E&M-EST. PATIENT-LVL III: CPT | Mod: PBBFAC,,, | Performed by: NURSE PRACTITIONER

## 2019-12-03 PROCEDURE — 81002 URINALYSIS NONAUTO W/O SCOPE: CPT | Mod: S$GLB,,, | Performed by: NURSE PRACTITIONER

## 2019-12-03 PROCEDURE — 3008F PR BODY MASS INDEX (BMI) DOCUMENTED: ICD-10-PCS | Mod: CPTII,S$GLB,, | Performed by: NURSE PRACTITIONER

## 2019-12-03 RX ORDER — DOXYCYCLINE 100 MG/1
100 CAPSULE ORAL EVERY 12 HOURS
Qty: 20 CAPSULE | Refills: 0 | Status: SHIPPED | OUTPATIENT
Start: 2019-12-03 | End: 2019-12-13

## 2019-12-03 NOTE — PROGRESS NOTES
CHIEF COMPLAINT:    Mrs Quezada is a 64 y.o. female presenting for urine culture.  PRESENTING ILLNESS:    Silvana Quezada is a 64 y.o. female with a PMH of MS, neurogenic bladder who presents for urine culture prior to cysto with botox injection. Her last clinic visit was 3/12/19 with Dr. Ross.     4/10/19   Pre-Op Diagnosis: Multiple sclerosis, neurogenic bladder  Procedure(s) Performed:   1.  Cystoscopy with bladder botox injection  Findings:   1. Cystoscopy significant for grade III trabeculations. Erythematous changes diffusely throughout bladder  2. 200u Botox injected in 20ml throughout bladder detrusor. Good wheals raised.     Today patient presents to clinic for urine culture prior to cysto with botox injection, which is scheduled for 12/11/19 with Dr. Ross. Patient voids on her own and performs CIC at home 4 times per day. She stays dry between catheterizations. Botox injections keep frequency, urgency and nocturia under control. Denies dysuria, hematuria or flank pain. Denies fever or chills. Denies complaints today in clinic.      REVIEW OF SYSTEMS:    Review of Systems    Constitutional: Negative for fever and chills.   HENT: Negative for hearing loss.   Eyes: Wears glasses. Negative for eye discharge.   Respiratory: Negative for shortness of breath.   Cardiovascular: Negative for chest pain.   Gastrointestinal: Negative for nausea, vomiting.   Genitourinary:  See HPI   Neurological: Negative for dizziness.   Hematological: Does not bruise/bleed easily.   Psychiatric/Behavioral: Negative for confusion.     PATIENT HISTORY:    Past Medical History:   Diagnosis Date    Bipolar 1 disorder     Breast cancer     right    Breast cyst     Closed fracture of proximal end of right humerus 3/9/2015    Depression     History of right shoulder fracture     MS (multiple sclerosis)     presented as dizzines, dx vision,     Osteoporosis, unspecified        Past Surgical History:   Procedure Laterality Date     BRAIN SURGERY  1991    BREAST BIOPSY Left 2009    core    BREAST LUMPECTOMY Right 2001    CYSTOSCOPY N/A 7/11/2018    Procedure: CYSTOSCOPY;  Surgeon: Jayesh Ross MD;  Location: [address];  Service: Urology;  Laterality: N/A;  30 min    CYSTOSCOPY N/A 4/10/2019    Procedure: CYSTOSCOPY;  Surgeon: Jayesh Ross MD;  Location: [facility] OR [address] [place] [address];  Service: Urology;  Laterality: N/A;  30 MIN    FOOT SURGERY  october 2013    HYSTERECTOMY      INJECTION OF BOTULINUM TOXIN TYPE A N/A 7/11/2018    Procedure: INJECTION, BOTULINUM TOXIN, TYPE A 200 UNITS;  Surgeon: Jayesh Ross MD;  Location: [facility] OR [address] [place] [address];  Service: Urology;  Laterality: N/A;    INJECTION OF BOTULINUM TOXIN TYPE A N/A 4/10/2019    Procedure: INJECTION, BOTULINUM TOXIN, TYPE A 200 UNITS;  Surgeon: Jayesh Ross MD;  Location: [address];  Service: Urology;  Laterality: N/A;    TONSILLECTOMY         Family History   Problem Relation Age of Onset    Skin cancer Mother     Breast cancer Mother     Stroke Mother     Emphysema Father     Breast cancer Maternal Aunt     Dementia Brother     Diabetes Brother     No Known Problems Daughter     Autism Son     No Known Problems Brother     Autism Daughter        Social History     Socioeconomic History    Marital status:      Spouse name: Not on file    Number of children: Not on file    Years of education: Not on file    Highest education level: Not on file   Occupational History    Occupation: [employer]   Social Needs    Financial resource strain: Not on file    Food insecurity:     Worry: Not on file     Inability: Not on file    Transportation needs:     Medical: Not on file     Non-medical: Not on file   Tobacco Use    Smoking status: Never Smoker    Smokeless tobacco: Never Used   Substance and Sexual Activity    Alcohol use: Yes     Alcohol/week: 1.7 standard drinks     Types: 2 Standard drinks or equivalent per week     Comment: occasionally    Drug use:  No    Sexual activity: Yes     Partners: Male   Lifestyle    Physical activity:     Days per week: Not on file     Minutes per session: Not on file    Stress: Not on file   Relationships    Social connections:     Talks on phone: Not on file     Gets together: Not on file     Attends [employer]oration[employer] service: Not on file     Active member of club or organization: Not on file     Attends meetings of clubs or organizations: Not on file     Relationship status: Not on file   Other Topics Concern    Are you pregnant or think you may be? 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, 25 children 2 daughter        Allergies:  Adhesive and Keflex [cephalexin]    Medications:    Current Outpatient Medications:     aspirin 325 MG tablet, Take 325 mg by mouth every 4 (four) hours as needed for Pain., Disp: , Rfl:     buPROPion (WELLBUTRIN SR) 100 MG TBSR 12 hr tablet, Take 2 tablets (200 mg total) by mouth every morning., Disp: 180 tablet, Rfl: 1    buPROPion (WELLBUTRIN SR) 150 MG TBSR 12 hr tablet, Take 1 tablet (150 mg total) by mouth every evening., Disp: 90 tablet, Rfl: 1    calcium-vitamin D3 (CALCIUM 500 + D) 500 mg(1,250mg) -200 unit per tablet, Take 1 tablet by mouth 2 (two) times daily with meals., Disp: , Rfl:     catheter (FEMALE CATHETER) 14 Fr Misc, Patient is to self catheterize four times a day indefinitley using female 12 fr in and out catheter for incomplete bladder emptying.  Dispense 120 month with 11 refills or 360 every 3 months with 3 refills depending on patient's preference, Disp: 120 each, Rfl: 11    fluphenazine (PROLIXIN) 1 MG tablet, TAKE 2 TABLETS NIGHTLY, Disp: 180 tablet, Rfl: 1    folic acid (FOLVITE) 1 MG tablet, Take 1 tablet (1,000 mcg total) by mouth once daily., Disp: 90 tablet, Rfl: 1    HYDROcodone-acetaminophen (NORCO) 5-325 mg per tablet, Take 1 tablet by mouth  every 6 (six) hours as needed for Pain. No alcohol, no driving, no operating machinery, no working, no swimming, no extra Tylenol (acetaminophen) while taking this medication., Disp: 10 tablet, Rfl: 0    ibuprofen (ADVIL,MOTRIN) 200 MG tablet, Take 200 mg by mouth every 6 (six) hours as needed for Pain., Disp: , Rfl:     magnesium oxide (MAG-OX) 400 mg tablet, Take 500 mg by mouth once daily., Disp: , Rfl:     OXcarbazepine (TRILEPTAL) 150 MG Tab, Take 1 tablet (150 mg total) by mouth 2 (two) times daily., Disp: 180 tablet, Rfl: 2    PHYSICAL EXAMINATION:    Constitutional: She is oriented to person, place, and time. She appears well-developed and well-nourished.  She is in no apparent distress.    Neck: Normal ROM.     Cardiovascular: Normal rate.      Pulmonary/Chest: Effort normal. No respiratory distress.     Abdominal:  She exhibits no distension.  There is no CVA tenderness.     Lymphadenopathy:          Right: No supraclavicular adenopathy present.        Left: No supraclavicular adenopathy present.     Neurological: She is alert and oriented to person, place, and time.     Skin: Skin is warm and dry.     Extremities: Normal ROM    Psych: Cooperative with normal affect.      Physical Exam      LABS:    U/a: sp grav 1.020, pH 5, + leukocytes, + nitrites, trace blood, otherwise negative  (catheterized specimen)    Lab Results   Component Value Date    CREATININE 0.9 05/06/2019       IMPRESSION:    Encounter Diagnoses   Name Primary?    Pre-op evaluation Yes    History of recurrent UTIs     Urinary urgency     Urinary frequency        PLAN:  -Catheterized urine specimen sent for culture  -Start doxycycline based on POCT UA  Discussed side effects, indications, and MOA for doxycycline. Prescription sent to the pharmacy. Pt verbalized understanding.  -Continue CIC 4 x per day.  -Surgery as scheduled    I spent 25 minutes with the patient of which more than half was spent in coordinating the patient's care  as well as in direct consultation with the patient in regards to our treatment and plan.

## 2019-12-03 NOTE — H&P (VIEW-ONLY)
CHIEF COMPLAINT:    Mrs Quezada is a 64 y.o. female presenting for urine culture.  PRESENTING ILLNESS:    Silvana Quezada is a 64 y.o. female with a PMH of MS, neurogenic bladder who presents for urine culture prior to cysto with botox injection. Her last clinic visit was 3/12/19 with Dr. Ross.     4/10/19   Pre-Op Diagnosis: Multiple sclerosis, neurogenic bladder  Procedure(s) Performed:   1.  Cystoscopy with bladder botox injection  Findings:   1. Cystoscopy significant for grade III trabeculations. Erythematous changes diffusely throughout bladder  2. 200u Botox injected in 20ml throughout bladder detrusor. Good wheals raised.     Today patient presents to clinic for urine culture prior to cysto with botox injection, which is scheduled for 12/11/19 with Dr. Ross. Patient voids on her own and performs CIC at home 4 times per day. She stays dry between catheterizations. Botox injections keep frequency, urgency and nocturia under control. Denies dysuria, hematuria or flank pain. Denies fever or chills. Denies complaints today in clinic.      REVIEW OF SYSTEMS:    Review of Systems    Constitutional: Negative for fever and chills.   HENT: Negative for hearing loss.   Eyes: Wears glasses. Negative for eye discharge.   Respiratory: Negative for shortness of breath.   Cardiovascular: Negative for chest pain.   Gastrointestinal: Negative for nausea, vomiting.   Genitourinary:  See HPI   Neurological: Negative for dizziness.   Hematological: Does not bruise/bleed easily.   Psychiatric/Behavioral: Negative for confusion.     PATIENT HISTORY:    Past Medical History:   Diagnosis Date    Bipolar 1 disorder     Breast cancer     right    Breast cyst     Closed fracture of proximal end of right humerus 3/9/2015    Depression     History of right shoulder fracture     MS (multiple sclerosis)     presented as dizzines, dx vision,     Osteoporosis, unspecified        Past Surgical History:   Procedure Laterality Date     BRAIN SURGERY  1991    BREAST BIOPSY Left 2009    core    BREAST LUMPECTOMY Right 2001    CYSTOSCOPY N/A 7/11/2018    Procedure: CYSTOSCOPY;  Surgeon: Jayesh Ross MD;  Location: [address];  Service: Urology;  Laterality: N/A;  30 min    CYSTOSCOPY N/A 4/10/2019    Procedure: CYSTOSCOPY;  Surgeon: Jayesh Ross MD;  Location: [facility] OR [address] [place] [address];  Service: Urology;  Laterality: N/A;  30 MIN    FOOT SURGERY  october 2013    HYSTERECTOMY      INJECTION OF BOTULINUM TOXIN TYPE A N/A 7/11/2018    Procedure: INJECTION, BOTULINUM TOXIN, TYPE A 200 UNITS;  Surgeon: Jayesh Ross MD;  Location: [facility] OR [address] [place] [address];  Service: Urology;  Laterality: N/A;    INJECTION OF BOTULINUM TOXIN TYPE A N/A 4/10/2019    Procedure: INJECTION, BOTULINUM TOXIN, TYPE A 200 UNITS;  Surgeon: Jayesh Ross MD;  Location: [address];  Service: Urology;  Laterality: N/A;    TONSILLECTOMY         Family History   Problem Relation Age of Onset    Skin cancer Mother     Breast cancer Mother     Stroke Mother     Emphysema Father     Breast cancer Maternal Aunt     Dementia Brother     Diabetes Brother     No Known Problems Daughter     Autism Son     No Known Problems Brother     Autism Daughter        Social History     Socioeconomic History    Marital status:      Spouse name: Not on file    Number of children: Not on file    Years of education: Not on file    Highest education level: Not on file   Occupational History    Occupation: [employer]   Social Needs    Financial resource strain: Not on file    Food insecurity:     Worry: Not on file     Inability: Not on file    Transportation needs:     Medical: Not on file     Non-medical: Not on file   Tobacco Use    Smoking status: Never Smoker    Smokeless tobacco: Never Used   Substance and Sexual Activity    Alcohol use: Yes     Alcohol/week: 1.7 standard drinks     Types: 2 Standard drinks or equivalent per week     Comment: occasionally    Drug use:  No    Sexual activity: Yes     Partners: Male   Lifestyle    Physical activity:     Days per week: Not on file     Minutes per session: Not on file    Stress: Not on file   Relationships    Social connections:     Talks on phone: Not on file     Gets together: Not on file     Attends Muslim service: Not on file     Active member of club or organization: Not on file     Attends meetings of clubs or organizations: Not on file     Relationship status: Not on file   Other Topics Concern    Are you pregnant or think you may be? 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, 25 children 2 daughter        Allergies:  Adhesive and Keflex [cephalexin]    Medications:    Current Outpatient Medications:     aspirin 325 MG tablet, Take 325 mg by mouth every 4 (four) hours as needed for Pain., Disp: , Rfl:     buPROPion (WELLBUTRIN SR) 100 MG TBSR 12 hr tablet, Take 2 tablets (200 mg total) by mouth every morning., Disp: 180 tablet, Rfl: 1    buPROPion (WELLBUTRIN SR) 150 MG TBSR 12 hr tablet, Take 1 tablet (150 mg total) by mouth every evening., Disp: 90 tablet, Rfl: 1    calcium-vitamin D3 (CALCIUM 500 + D) 500 mg(1,250mg) -200 unit per tablet, Take 1 tablet by mouth 2 (two) times daily with meals., Disp: , Rfl:     catheter (FEMALE CATHETER) 14 Fr Misc, Patient is to self catheterize four times a day indefinitley using female 12 fr in and out catheter for incomplete bladder emptying.  Dispense 120 month with 11 refills or 360 every 3 months with 3 refills depending on patient's preference, Disp: 120 each, Rfl: 11    fluphenazine (PROLIXIN) 1 MG tablet, TAKE 2 TABLETS NIGHTLY, Disp: 180 tablet, Rfl: 1    folic acid (FOLVITE) 1 MG tablet, Take 1 tablet (1,000 mcg total) by mouth once daily., Disp: 90 tablet, Rfl: 1    HYDROcodone-acetaminophen (NORCO) 5-325 mg per tablet, Take 1 tablet by mouth  every 6 (six) hours as needed for Pain. No alcohol, no driving, no operating machinery, no working, no swimming, no extra Tylenol (acetaminophen) while taking this medication., Disp: 10 tablet, Rfl: 0    ibuprofen (ADVIL,MOTRIN) 200 MG tablet, Take 200 mg by mouth every 6 (six) hours as needed for Pain., Disp: , Rfl:     magnesium oxide (MAG-OX) 400 mg tablet, Take 500 mg by mouth once daily., Disp: , Rfl:     OXcarbazepine (TRILEPTAL) 150 MG Tab, Take 1 tablet (150 mg total) by mouth 2 (two) times daily., Disp: 180 tablet, Rfl: 2    PHYSICAL EXAMINATION:    Constitutional: She is oriented to person, place, and time. She appears well-developed and well-nourished.  She is in no apparent distress.    Neck: Normal ROM.     Cardiovascular: Normal rate.      Pulmonary/Chest: Effort normal. No respiratory distress.     Abdominal:  She exhibits no distension.  There is no CVA tenderness.     Lymphadenopathy:          Right: No supraclavicular adenopathy present.        Left: No supraclavicular adenopathy present.     Neurological: She is alert and oriented to person, place, and time.     Skin: Skin is warm and dry.     Extremities: Normal ROM    Psych: Cooperative with normal affect.      Physical Exam      LABS:    U/a: sp grav 1.020, pH 5, + leukocytes, + nitrites, trace blood, otherwise negative  (catheterized specimen)    Lab Results   Component Value Date    CREATININE 0.9 05/06/2019       IMPRESSION:    Encounter Diagnoses   Name Primary?    Pre-op evaluation Yes    History of recurrent UTIs     Urinary urgency     Urinary frequency        PLAN:  -Catheterized urine specimen sent for culture  -Start doxycycline based on POCT UA  Discussed side effects, indications, and MOA for doxycycline. Prescription sent to the pharmacy. Pt verbalized understanding.  -Continue CIC 4 x per day.  -Surgery as scheduled    I spent 25 minutes with the patient of which more than half was spent in coordinating the patient's care  as well as in direct consultation with the patient in regards to our treatment and plan.

## 2019-12-04 ENCOUNTER — TELEPHONE (OUTPATIENT)
Dept: PLASTIC SURGERY | Facility: CLINIC | Age: 64
End: 2019-12-04

## 2019-12-04 NOTE — PROGRESS NOTES
"Outpatient Psychiatry Follow-Up Visit (MD/NP)    12/2/2019    Clinical Status of Patient:  Outpatient (Ambulatory)    Chief Complaint:  Silvana Quezada is a 64 y.o. female who presents today for follow-up of Depression; Anxiety; and Thoughts Of Death/suicide    Met with patient.      Interval History and Content of Current Session:  Interim Events/Subjective Report/Content of Current Session:  Pt returned casually dressed and groomed, with smiling affect.  She reported that she is recovering from the recent death of her mother along with the 10th anniversary of the death of her son.  She and her  plan to live permanently in her mother's house.  They will sell their old house.  Pt voiced concern over a neighbor near the old house who is "out of [employer]".  She has found items broken when she goes to check on the house but has no proof he is breaking in.  A rapid sale appears to be the best way out of this situation.  Financially, Pt and her  no longer have to work.  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    Medications were discussed and refilled unchanged.  Reductions may be possible in the future as her life fully stabilizes.  She was asked to return in 6 months.  A CMP was re-ordered.    Psychotherapy:  · Target symptoms: mood swings  · Why chosen therapy is appropriate versus another modality: relevant to diagnosis, patient responds to this modality  · Outcome monitoring methods: self-report, observation  · Therapeutic intervention type: insight oriented psychotherapy, supportive psychotherapy  · Topics discussed/themes: illness/death of a loved one, financial stressors, life stage transitional issues  · The patient's response to the intervention is motivated. The patient's progress toward treatment goals is good.   · Duration of intervention: 27 min.    Review of Systems   · PSYCHIATRIC: Pertinant items are noted in the " narrative.    Past Medical, Family and Social History: The patient's past medical, family and social history have been reviewed and updated as appropriate within the electronic medical record - see encounter notes.    Compliance: yes    Side effects: None    Risk Parameters:  Patient reports no suicidal ideation  Patient reports no homicidal ideation  Patient reports no self-injurious behavior  Patient reports no violent behavior    Exam (detailed: at least 9 elements; comprehensive: all 15 elements)   Constitutional  Vitals:  Most recent vital signs, dated less than 90 days prior to this appointment, were reviewed.    Vitals:    12/02/19 1351   BP: 126/60   Pulse: 78        General:  age appropriate, overweight     Musculoskeletal  Muscle Strength/Tone:  no rigidity, no dyskinesia, no dystonia, no tremor   Gait & Station:  non-ataxic     Psychiatric  Speech:  articulation error, spontaneous   Mood & Affect:  euthymic  mood-congruent   Thought Process:  goal-directed, logical   Associations:  intact   Thought Content:  normal, no suicidality, no homicidality, delusions, or paranoia   Insight:  has awareness of illness   Judgement: behavior is adequate to circumstances   Orientation:  grossly intact   Memory: intact for content of interview   Language: grossly intact   Attention Span & Concentration:  able to focus   Fund of Knowledge:  intact and appropriate to age and level of education     Assessment and Diagnosis   Status/Progress: Based on the examination today, the patient's problem(s) is/are improved.  New problems have not been presented today.   Comorbidities are not complicating management of the primary condition.  The working differential for this patient includes grief.    General Impression:  Pt is dealing well with grief, partly because of a new absence of financial pressures.    Axis I: MOOD DISORDERS; Bipolar I Disorder, Most Recent Episode Manic: 6.7.6.In Full Remission (296.46).  Axis II: NO  DIAGNOSIS ON AXIS II (V71.09)  Axis III: healthy  Axis IV: economic problems and problems with primary support group  Axis V: 71-80 If symptoms are present, they are transient and expectable reactions to psychosocial stressors (e.g. difficulty concentrating after family argument); no more than slight impairment in social, occupational or school functioning (e.g. temporarily falling behind in schoolwork)    Intervention/Counseling/Treatment Plan   · Medication Management: Continue current medications.    · Lab:  CMP      Return to Clinic: 6 months

## 2019-12-04 NOTE — TELEPHONE ENCOUNTER
Called and spoke with pt to see where she was with getting her clearances for her upcoming surgery . Pt stated that she was no longer sure that she wanted surgery anymore. Pt state she wanted to cancel & possibly schedule for next year  I asked pt was she sure, she stated yes. I told her I would. She verbalized understanding.

## 2019-12-05 PROCEDURE — 82274 ASSAY TEST FOR BLOOD FECAL: CPT

## 2019-12-05 NOTE — ANESTHESIA PAT ROS NOTE
12/05/2019  Silvana Quezada is a 64 y.o., female.      Pre-op Assessment         Review of Systems  Anesthesia Hx:  History of prior surgery of interest to airway management or planning: Previous anesthesia: General 4/10/2019  Cysto with botox injection with general anesthesia.  Procedure performed at an [facility]. Denies Family Hx of Anesthesia complications.   Denies Personal Hx of Anesthesia complications.   Social:  Social Alcohol Use, Non-Smoker    Hematology/Oncology:  Hematology Normal      Oncology Comments: Hx Breast CA    EENT/Dental:EENT/Dental Normal   Cardiovascular:   Denies MI.   Denies CABG/stent.   Denies Angina. Thoracic Aorta Atherosclorosis Functional Capacity 3.5 METS    Pulmonary:   Denies Asthma.  Denies Shortness of breath.  Denies Recent URI.    Renal/:   OAB   Hepatic/GI:   Denies GERD.    Neurological:  Neuromuscular Disease, Multiple Sclerosis   Endocrine:   Denies Diabetes.    Psych:  Depression.  Psychotic Disorder and Bipolar disorder.             Anesthesia Assessment: Preoperative EQUATION    Planned Procedure: Procedure(s) (LRB):  CYSTOSCOPY,WITH BOTULINUM TOXIN INJECTION (N/A)  Requested Anesthesia Type:Monitor Anesthesia Care  Surgeon: Jayesh Ross MD  Service: Urology  Known or anticipated Date of Surgery:12/11/2019    Surgeon notes: reviewed    Electronic QUestionnaire Assessment completed via nurse interview with patient.        Triage considerations:     The patient has no apparent active cardiac condition (No unstable coronary Syndrome such as severe unstable angina or recent [<1 month] myocardial infarction, decompensated CHF, severe valvular   disease or significant arrhythmia)    Previous anesthesia records:GETA and No problems  4/10/2019  Cysto with botox injection  Airway/Jaw/Neck:  Airway Findings: Mouth Opening: Normal Tongue: Normal  General Airway  Assessment: Adult  Mallampati: I  TM Distance: 4 - 6 cm Jaw/Neck Findings:  Neck ROM: Normal ROM     Last PCP note: within 1 month , within [facility]  Dr. Avila  Subspecialty notes: Cardiology: General, Hematology/Oncology, Neurology, Psychiatry, Urology    Other important co-morbidities: Multiple Sclerosis, Bipolar affective disorder, Thoracic aorta atherosclerosis, Urinary frequency, Neurogenic bladder, Overactive bladder, Hx breast cancer, osteoporosis      Tests already available:  Available tests,  6-12 months ago , within [facility] .  5/6/2019 CMP, CBC             Instructions given. (See in Nurse's note)    Optimization:  Anesthesia Preop Clinic Assessment  Indicated: Not required for this procedure.      Plan:    Testing:  BMP (AM of surgery)     Patient  has previously scheduled Medical Appointment: Not at this time prior to surgery    Navigation:  Tests Scheduled. (AM of surgery)    Straight Line to surgery.                No anesthesia preop clinic visit, or consult required.

## 2019-12-05 NOTE — PRE-PROCEDURE INSTRUCTIONS
Preop instructions given and reviewed; instructed to hold vitamins, herbal supplements and NSAIDS one week prior to surgery;  Patient verbalized understanding.

## 2019-12-06 ENCOUNTER — TELEPHONE (OUTPATIENT)
Dept: PEDIATRIC UROLOGY | Facility: CLINIC | Age: 64
End: 2019-12-06

## 2019-12-06 LAB
BACTERIA UR CULT: ABNORMAL
HEMOCCULT STL QL IA: NEGATIVE

## 2019-12-06 NOTE — TELEPHONE ENCOUNTER
Notified pt that her urine cx was positive for infection and to continue doxycycline as ordered. Instructed to continue medication until cysto w/botox inj scheduled 12/11/19. Pt voiced understanding      ----- Message from Teena Harris NP sent at 12/6/2019 10:05 AM CST -----  Please notify patient her urine culture was positive for infection. Continue doxycycline as ordered. Be sure to continue until cysto with botox injection scheduled 12/11/19.

## 2019-12-10 ENCOUNTER — TELEPHONE (OUTPATIENT)
Dept: UROLOGY | Facility: CLINIC | Age: 64
End: 2019-12-10

## 2019-12-11 ENCOUNTER — TELEPHONE (OUTPATIENT)
Dept: UROLOGY | Facility: CLINIC | Age: 64
End: 2019-12-11

## 2019-12-11 ENCOUNTER — HOSPITAL ENCOUNTER (OUTPATIENT)
Facility: HOSPITAL | Age: 64
Discharge: HOME | End: 2019-12-11
Attending: UROLOGY | Admitting: UROLOGY
Payer: MEDICARE

## 2019-12-11 VITALS
DIASTOLIC BLOOD PRESSURE: 67 MMHG | WEIGHT: 170 LBS | TEMPERATURE: 98 F | HEART RATE: 70 BPM | HEIGHT: 63 IN | RESPIRATION RATE: 18 BRPM | BODY MASS INDEX: 30.12 KG/M2 | OXYGEN SATURATION: 99 % | SYSTOLIC BLOOD PRESSURE: 145 MMHG

## 2019-12-11 DIAGNOSIS — R35.0 FREQUENCY OF URINATION: ICD-10-CM

## 2019-12-11 DIAGNOSIS — Z01.818 PREOPERATIVE TESTING: Primary | ICD-10-CM

## 2019-12-11 DIAGNOSIS — N31.9 NEUROGENIC BLADDER: ICD-10-CM

## 2019-12-11 DIAGNOSIS — N32.81 OVERACTIVE BLADDER: Primary | ICD-10-CM

## 2019-12-11 LAB
ANION GAP SERPL CALC-SCNC: 11 MMOL/L (ref 8–16)
BUN SERPL-MCNC: 17 MG/DL (ref 8–23)
CALCIUM SERPL-MCNC: 9.5 MG/DL (ref 8.7–10.5)
CHLORIDE SERPL-SCNC: 108 MMOL/L (ref 95–110)
CO2 SERPL-SCNC: 25 MMOL/L (ref 23–29)
CREAT SERPL-MCNC: 0.8 MG/DL (ref 0.5–1.4)
EST. GFR  (AFRICAN AMERICAN): >60 ML/MIN/1.73 M^2
EST. GFR  (NON AFRICAN AMERICAN): >60 ML/MIN/1.73 M^2
GLUCOSE SERPL-MCNC: 96 MG/DL (ref 70–110)
POTASSIUM SERPL-SCNC: 4.5 MMOL/L (ref 3.5–5.1)
SODIUM SERPL-SCNC: 144 MMOL/L (ref 136–145)

## 2019-12-11 PROCEDURE — 87077 CULTURE AEROBIC IDENTIFY: CPT

## 2019-12-11 PROCEDURE — 80048 BASIC METABOLIC PNL TOTAL CA: CPT

## 2019-12-11 PROCEDURE — 87088 URINE BACTERIA CULTURE: CPT

## 2019-12-11 PROCEDURE — 63600175 PHARM REV CODE 636 W HCPCS: Performed by: STUDENT IN AN ORGANIZED HEALTH CARE EDUCATION/TRAINING PROGRAM

## 2019-12-11 PROCEDURE — 87086 URINE CULTURE/COLONY COUNT: CPT

## 2019-12-11 PROCEDURE — 87186 SC STD MICRODIL/AGAR DIL: CPT

## 2019-12-11 RX ORDER — SODIUM CHLORIDE 9 MG/ML
INJECTION, SOLUTION INTRAVENOUS CONTINUOUS
Status: DISCONTINUED | OUTPATIENT
Start: 2019-12-11 | End: 2019-12-11 | Stop reason: HOSPADM

## 2019-12-11 RX ORDER — HYDROMORPHONE HYDROCHLORIDE 1 MG/ML
0.2 INJECTION, SOLUTION INTRAMUSCULAR; INTRAVENOUS; SUBCUTANEOUS EVERY 5 MIN PRN
Status: CANCELLED | OUTPATIENT
Start: 2019-12-11

## 2019-12-11 RX ORDER — CIPROFLOXACIN 2 MG/ML
400 INJECTION, SOLUTION INTRAVENOUS
Status: DISCONTINUED | OUTPATIENT
Start: 2019-12-11 | End: 2019-12-11 | Stop reason: HOSPADM

## 2019-12-11 RX ORDER — SODIUM CHLORIDE 0.9 % (FLUSH) 0.9 %
3 SYRINGE (ML) INJECTION
Status: DISCONTINUED | OUTPATIENT
Start: 2019-12-11 | End: 2019-12-11 | Stop reason: HOSPADM

## 2019-12-11 RX ORDER — CIPROFLOXACIN 2 MG/ML
INJECTION, SOLUTION INTRAVENOUS
Status: DISCONTINUED
Start: 2019-12-11 | End: 2019-12-11 | Stop reason: WASHOUT

## 2019-12-11 RX ADMIN — SODIUM CHLORIDE: 0.9 INJECTION, SOLUTION INTRAVENOUS at 09:12

## 2019-12-11 NOTE — TELEPHONE ENCOUNTER
Overactive bladder  -     Case Request Operating Room: CYSTOSCOPY,WITH BOTULINUM TOXIN INJECTION    Frequency of urination  -     Case Request Operating Room: CYSTOSCOPY,WITH BOTULINUM TOXIN INJECTION

## 2019-12-11 NOTE — DISCHARGE SUMMARY
[facility]  Discharge Note  Short Stay    Admit Date: 12/11/2019    Discharge Date and Time: 12/11/2019 10:19 AM      Attending Physician: Jayesh Ross MD     Discharge Provider: Yogi Avila    Diagnoses:  Active Hospital Problems    Diagnosis  POA    Neurogenic bladder [N31.9]  Yes      Resolved Hospital Problems   No resolved problems to display.       Discharged Condition: good    Hospital Course: Patient was admitted for bladder botox injections, but the surgery was cancelled due to nitrite + urine. The patient was discharged home in good condition on the same day. Her urine will be sent for culture, and antibiotics will be sent once her culture results. She will need to be rescheduled for surgery.     Final Diagnoses: Same as principal problem.    Disposition: Home or Self Care    Follow up/Patient Instructions:    Medications:  Reconciled Home Medications:   Current Discharge Medication List      CONTINUE these medications which have NOT CHANGED    Details   !! buPROPion (WELLBUTRIN SR) 100 MG TBSR 12 hr tablet Take 2 tablets (200 mg total) by mouth every morning.  Qty: 180 tablet, Refills: 1    Associated Diagnoses: Bipolar affective disorder, remission status unspecified      !! buPROPion (WELLBUTRIN SR) 150 MG TBSR 12 hr tablet Take 1 tablet (150 mg total) by mouth every evening.  Qty: 90 tablet, Refills: 1    Comments: **Patient requests 90 days supply**  Associated Diagnoses: Bipolar affective disorder, remission status unspecified      calcium-vitamin D3 (CALCIUM 500 + D) 500 mg(1,250mg) -200 unit per tablet Take 1 tablet by mouth 2 (two) times daily with meals.      doxycycline (MONODOX) 100 MG capsule Take 1 capsule (100 mg total) by mouth every 12 (twelve) hours. for 10 days  Qty: 20 capsule, Refills: 0    Associated Diagnoses: Bacteria in urine      fluphenazine (PROLIXIN) 1 MG tablet TAKE 2 TABLETS NIGHTLY  Qty: 180 tablet, Refills: 1    Associated Diagnoses: Bipolar affective  disorder, remission status unspecified      folic acid (FOLVITE) 1 MG tablet Take 1 tablet (1,000 mcg total) by mouth once daily.  Qty: 90 tablet, Refills: 1    Associated Diagnoses: Bipolar affective disorder, remission status unspecified      OXcarbazepine (TRILEPTAL) 150 MG Tab Take 1 tablet (150 mg total) by mouth 2 (two) times daily.  Qty: 180 tablet, Refills: 2    Associated Diagnoses: Bipolar I disorder, most recent episode depressed, in partial remission; Bipolar affective disorder, remission status unspecified      aspirin 325 MG tablet Take 325 mg by mouth every 4 (four) hours as needed for Pain.      catheter (FEMALE CATHETER) 14 Fr [facility] is to self catheterize four times a day indefinitley using female 12 fr in and out catheter for incomplete bladder emptying.   Dispense 120 month with 11 refills or 360 every 3 months with 3 refills depending on patient's preference  Qty: 120 each, Refills: 11      HYDROcodone-acetaminophen (NORCO) 5-325 mg per tablet Take 1 tablet by mouth every 6 (six) hours as needed for Pain. No alcohol, no driving, no operating machinery, no working, no swimming, no extra Tylenol (acetaminophen) while taking this medication.  Qty: 10 tablet, Refills: 0      ibuprofen (ADVIL,MOTRIN) 200 MG tablet Take 200 mg by mouth every 6 (six) hours as needed for Pain.      magnesium oxide (MAG-OX) 400 mg tablet Take 500 mg by mouth once daily.       !! - Potential duplicate medications found. Please discuss with provider.        No discharge procedures on file.  Follow-up Information     Jayesh Ross MD In 2 weeks.    Specialty:  Urology  Why:  for surgery  Contact information:  [address]  [facility] [address] [phone]

## 2019-12-13 ENCOUNTER — HOSPITAL ENCOUNTER (EMERGENCY)
Facility: HOSPITAL | Age: 64
Discharge: HOME | End: 2019-12-13
Attending: EMERGENCY MEDICINE
Payer: MEDICARE

## 2019-12-13 VITALS
TEMPERATURE: 98 F | HEART RATE: 67 BPM | DIASTOLIC BLOOD PRESSURE: 71 MMHG | WEIGHT: 169 LBS | HEIGHT: 63 IN | RESPIRATION RATE: 16 BRPM | BODY MASS INDEX: 29.95 KG/M2 | SYSTOLIC BLOOD PRESSURE: 119 MMHG | OXYGEN SATURATION: 97 %

## 2019-12-13 DIAGNOSIS — S52.509A DISPLACED FRACTURE OF DISTAL END OF RADIUS: Primary | ICD-10-CM

## 2019-12-13 DIAGNOSIS — S52.612A CLOSED DISPLACED FRACTURE OF STYLOID PROCESS OF LEFT ULNA, INITIAL ENCOUNTER: ICD-10-CM
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
## 2019-12-13 LAB
ALBUMIN SERPL BCP-MCNC: 4 G/DL (ref 3.5–5.2)
ALP SERPL-CCNC: 89 U/L (ref 55–135)
ALT SERPL W/O P-5'-P-CCNC: 15 U/L (ref 10–44)
ANION GAP SERPL CALC-SCNC: 9 MMOL/L (ref 8–16)
AST SERPL-CCNC: 14 U/L (ref 10–40)
BACTERIA UR CULT: ABNORMAL
BASOPHILS # BLD AUTO: 0.01 K/UL (ref 0–0.2)
BASOPHILS NFR BLD: 0.1 % (ref 0–1.9)
BILIRUB SERPL-MCNC: 0.3 MG/DL (ref 0.1–1)
BUN SERPL-MCNC: 21 MG/DL (ref 8–23)
CALCIUM SERPL-MCNC: 10.2 MG/DL (ref 8.7–10.5)
CHLORIDE SERPL-SCNC: 108 MMOL/L (ref 95–110)
CO2 SERPL-SCNC: 29 MMOL/L (ref 23–29)
CREAT SERPL-MCNC: 0.9 MG/DL (ref 0.5–1.4)
DIFFERENTIAL METHOD: ABNORMAL
EOSINOPHIL # BLD AUTO: 0.1 K/UL (ref 0–0.5)
EOSINOPHIL NFR BLD: 0.8 % (ref 0–8)
ERYTHROCYTE [DISTWIDTH] IN BLOOD BY AUTOMATED COUNT: 12.7 % (ref 11.5–14.5)
EST. GFR  (AFRICAN AMERICAN): >60 ML/MIN/1.73 M^2
EST. GFR  (NON AFRICAN AMERICAN): >60 ML/MIN/1.73 M^2
GLUCOSE SERPL-MCNC: 105 MG/DL (ref 70–110)
HCT VFR BLD AUTO: 42.1 % (ref 37–48.5)
HGB BLD-MCNC: 13.8 G/DL (ref 12–16)
INR PPP: 1 (ref 0.8–1.2)
LYMPHOCYTES # BLD AUTO: 1 K/UL (ref 1–4.8)
LYMPHOCYTES NFR BLD: 11.3 % (ref 18–48)
MCH RBC QN AUTO: 30.9 PG (ref 27–31)
MCHC RBC AUTO-ENTMCNC: 32.8 G/DL (ref 32–36)
MCV RBC AUTO: 94 FL (ref 82–98)
MONOCYTES # BLD AUTO: 0.7 K/UL (ref 0.3–1)
MONOCYTES NFR BLD: 7.8 % (ref 4–15)
NEUTROPHILS # BLD AUTO: 6.9 K/UL (ref 1.8–7.7)
NEUTROPHILS NFR BLD: 80 % (ref 38–73)
PLATELET # BLD AUTO: 161 K/UL (ref 150–350)
PMV BLD AUTO: 10.8 FL (ref 9.2–12.9)
POTASSIUM SERPL-SCNC: 4.8 MMOL/L (ref 3.5–5.1)
PROT SERPL-MCNC: 7 G/DL (ref 6–8.4)
PROTHROMBIN TIME: 10.7 SEC (ref 9–12.5)
RBC # BLD AUTO: 4.46 M/UL (ref 4–5.4)
SODIUM SERPL-SCNC: 146 MMOL/L (ref 136–145)
WBC # BLD AUTO: 8.68 K/UL (ref 3.9–12.7)

## 2019-12-13 PROCEDURE — 25000003 PHARM REV CODE 250: Performed by: NURSE PRACTITIONER

## 2019-12-13 PROCEDURE — 99284 EMERGENCY DEPT VISIT MOD MDM: CPT | Mod: 25

## 2019-12-13 PROCEDURE — 85025 COMPLETE CBC W/AUTO DIFF WBC: CPT

## 2019-12-13 PROCEDURE — 85610 PROTHROMBIN TIME: CPT

## 2019-12-13 PROCEDURE — 25000003 PHARM REV CODE 250: Performed by: STUDENT IN AN ORGANIZED HEALTH CARE EDUCATION/TRAINING PROGRAM

## 2019-12-13 PROCEDURE — 80053 COMPREHEN METABOLIC PANEL: CPT

## 2019-12-13 PROCEDURE — 63600175 PHARM REV CODE 636 W HCPCS: Performed by: PHYSICIAN ASSISTANT

## 2019-12-13 PROCEDURE — 96374 THER/PROPH/DIAG INJ IV PUSH: CPT

## 2019-12-13 RX ORDER — HYDROCODONE BITARTRATE AND ACETAMINOPHEN 5; 325 MG/1; MG/1
1 TABLET ORAL
Status: COMPLETED | OUTPATIENT
Start: 2019-12-13 | End: 2019-12-13

## 2019-12-13 RX ORDER — LIDOCAINE HYDROCHLORIDE 10 MG/ML
10 INJECTION INFILTRATION; PERINEURAL ONCE
Status: COMPLETED | OUTPATIENT
Start: 2019-12-13 | End: 2019-12-13

## 2019-12-13 RX ORDER — MORPHINE SULFATE 4 MG/ML
4 INJECTION, SOLUTION INTRAMUSCULAR; INTRAVENOUS
Status: COMPLETED | OUTPATIENT
Start: 2019-12-13 | End: 2019-12-13

## 2019-12-13 RX ORDER — HYDROCODONE BITARTRATE AND ACETAMINOPHEN 5; 325 MG/1; MG/1
1 TABLET ORAL EVERY 6 HOURS PRN
Qty: 12 TABLET | Refills: 0 | Status: SHIPPED | OUTPATIENT
Start: 2019-12-13 | End: 2019-12-17 | Stop reason: SDUPTHER

## 2019-12-13 RX ORDER — NAPROXEN 500 MG/1
500 TABLET ORAL 2 TIMES DAILY WITH MEALS
Qty: 20 TABLET | Refills: 0 | Status: SHIPPED | OUTPATIENT
Start: 2019-12-13 | End: 2020-01-09 | Stop reason: SDUPTHER

## 2019-12-13 RX ADMIN — MORPHINE SULFATE 4 MG: 4 INJECTION INTRAVENOUS at 06:12

## 2019-12-13 RX ADMIN — HYDROCODONE BITARTRATE AND ACETAMINOPHEN 1 TABLET: 5; 325 TABLET ORAL at 03:12

## 2019-12-13 RX ADMIN — LIDOCAINE HYDROCHLORIDE 10 ML: 10 INJECTION, SOLUTION INFILTRATION; PERINEURAL at 07:12

## 2019-12-13 NOTE — ED PROVIDER NOTES
Encounter Date: 12/13/2019       History     Chief Complaint   Patient presents with    Wrist Injury     pt had a trip and fall, landing and injuring L wrist. Pt denies hitting head or LOC.      Silvana Quezada 64 y.o. female who is right hand dominant presented to the ED with c/o left wrist injury that occurred just PTA. She reports that she had slip and fall walking up step onto outstretched hand. She present with obvious deformity of the left wrist.  She reports throbbing pain and some spasm of the left forearm. She denies any numbness, tingling, proximal extremity pain or other complaints. She has tried ice with modest improvement. She has not tried any medications for the symptoms.      The history is provided by the patient.     Review of patient's allergies indicates:   Allergen Reactions    Adhesive      blisters    Keflex [cephalexin] Rash     Past Medical History:   Diagnosis Date    Bipolar 1 disorder     Breast cancer     right    Breast cyst     Closed fracture of proximal end of right humerus 3/9/2015    Depression     History of right shoulder fracture     MS (multiple sclerosis)     presented as dizzines, dx vision,     Osteoporosis, unspecified      Past Surgical History:   Procedure Laterality Date    BRAIN SURGERY  1991    BREAST BIOPSY Left 2009    core    BREAST LUMPECTOMY Right 2001    CYSTOSCOPY N/A 7/11/2018    Procedure: CYSTOSCOPY;  Surgeon: Jayesh Ross MD;  Location: [facility] OR [address] [place] [address];  Service: Urology;  Laterality: N/A;  30 min    CYSTOSCOPY N/A 4/10/2019    Procedure: CYSTOSCOPY;  Surgeon: Jayesh Ross MD;  Location: [facility] OR [address] [place] [address];  Service: Urology;  Laterality: N/A;  30 MIN    FOOT SURGERY  october 2013    HYSTERECTOMY      INJECTION OF BOTULINUM TOXIN TYPE A N/A 7/11/2018    Procedure: INJECTION, BOTULINUM TOXIN, TYPE A 200 UNITS;  Surgeon: Jayesh Ross MD;  Location: [address];  Service: Urology;  Laterality: N/A;    INJECTION OF BOTULINUM TOXIN TYPE  A N/A 4/10/2019    Procedure: INJECTION, BOTULINUM TOXIN, TYPE A 200 UNITS;  Surgeon: Jayesh Ross MD;  Location: [facility] OR [address] [place] [address];  Service: Urology;  Laterality: N/A;    TONSILLECTOMY       Family History   Problem Relation Age of Onset    Skin cancer Mother     Breast cancer Mother     Stroke Mother     Emphysema Father     Breast cancer Maternal Aunt     Dementia Brother     Diabetes Brother     No Known Problems Daughter     Autism Son     No Known Problems Brother     Autism Daughter      Social History     Tobacco Use    Smoking status: Never Smoker    Smokeless tobacco: Never Used   Substance Use Topics    Alcohol use: Yes     Alcohol/week: 1.7 standard drinks     Types: 2 Standard drinks or equivalent per week     Comment: occasionally    Drug use: No     Review of Systems   Constitutional: Negative for chills and fever.   Gastrointestinal: Negative for nausea and vomiting.   Musculoskeletal: Positive for arthralgias, gait problem and joint swelling. Negative for back pain and myalgias.   Skin: Positive for color change and wound (abrasion). Negative for rash.   Neurological: Negative for weakness and numbness.   Hematological: Does not bruise/bleed easily.       Physical Exam     Initial Vitals [12/13/19 1423]   BP Pulse Resp Temp SpO2   123/61 89 16 97.9 °F (36.6 °C) 96 %      MAP       --         Physical Exam    Nursing note and vitals reviewed.  Constitutional: Vital signs are normal. She appears well-developed and well-nourished. She is cooperative.  Non-toxic appearance. She does not appear ill. No distress.   HENT:   Head: Normocephalic and atraumatic.   Eyes: Conjunctivae and lids are normal.   Neck: Neck supple. No neck rigidity.   Cardiovascular: Normal rate and regular rhythm.   Pulses:       Radial pulses are 2+ on the left side.   Pulmonary/Chest: Breath sounds normal. No respiratory distress. She has no wheezes. She has no rhonchi.   Abdominal: Normal appearance. There is no  rigidity.   Musculoskeletal: She exhibits edema and tenderness.        Left wrist: She exhibits decreased range of motion, tenderness, bony tenderness, swelling and deformity.        Left hand: She exhibits decreased range of motion and tenderness. She exhibits no bony tenderness, normal two-point discrimination, normal capillary refill and no swelling. Normal sensation noted.        Hands:  Neurological: She is alert and oriented to person, place, and time. No sensory deficit. GCS eye subscore is 4. GCS verbal subscore is 5. GCS motor subscore is 6.   Skin: Skin is warm and dry. Abrasion, bruising and ecchymosis noted. No rash noted.   Psychiatric: She has a normal mood and affect. Her speech is normal and behavior is normal. Thought content normal.         ED Course   Procedures  Labs Reviewed   CBC W/ AUTO DIFFERENTIAL - Abnormal; Notable for the following components:       Result Value    Gran% 80.0 (*)     Lymph% 11.3 (*)     All other components within normal limits   COMPREHENSIVE METABOLIC PANEL - Abnormal; Notable for the following components:    Sodium 146 (*)     All other components within normal limits   PROTIME-INR          Imaging Results          X-Ray Wrist Complete Left (Final result)  Result time 12/13/19 15:52:47    Final result by Cece Khan MD (12/13/19 15:52:47)                 Impression:      Comminuted mildly impacted fracture of the distal radius with volar displacement of the carpal bones.    Nondisplaced fracture of the ulnar styloid      Electronically signed by: Cece Khan MD  Date:    12/13/2019  Time:    15:52             Narrative:    EXAMINATION:  XR WRIST COMPLETE 3 VIEWS LEFT    CLINICAL HISTORY:  Unspecified fall, initial encounter    TECHNIQUE:  PA, lateral, and oblique views of the left wrist were performed.    COMPARISON:  None    FINDINGS:  Comminuted distal radius fracture with mild impaction and volar displacement of the carpal bones relative to the  radius and ulna.  There is a nondisplaced ulnar styloid fracture.  The remainder of the visualized osseous structures appear within normal limits.                               X-Ray Hand 3 View Left (Final result)  Result time 12/13/19 15:53:11    Final result by Cece Khan MD (12/13/19 15:53:11)                 Impression:      Comminuted displaced fracture of the distal radius extending to the articular surface with volar displacement of the carpal bones relative to the wrist joint.    Ulnar styloid fracture.      Electronically signed by: Cece Khan MD  Date:    12/13/2019  Time:    15:53             Narrative:    EXAMINATION:  XR FOREARM LEFT; XR HAND COMPLETE 3 VIEW LEFT    CLINICAL HISTORY:  fall;Unspecified fall, initial encounter    TECHNIQUE:  AP and lateral views of the left forearm were performed.  Three views of the hand.    COMPARISON:  None    FINDINGS:  Left forearm: Comminuted fracture of the distal radius with anterior displacement of the carpal bones relative to the radius.  There is extension of the fracture at the articular radial surface.  There is an associated nondisplaced ulnar styloid fracture.  The more proximal aspect of the ulna and radius are intact.    Left hand: The remainder of the carpal bones, metacarpal bones and phalanges appear normal.  Comminuted fracture of the distal radius with mild impaction and volar displacement of the carpal bones relative to the radius.                               X-Ray Forearm Left (Final result)  Result time 12/13/19 15:53:11    Final result by Cece Khan MD (12/13/19 15:53:11)                 Impression:      Comminuted displaced fracture of the distal radius extending to the articular surface with volar displacement of the carpal bones relative to the wrist joint.    Ulnar styloid fracture.      Electronically signed by: Cece Khan MD  Date:    12/13/2019  Time:    15:53             Narrative:     EXAMINATION:  XR FOREARM LEFT; XR HAND COMPLETE 3 VIEW LEFT    CLINICAL HISTORY:  fall;Unspecified fall, initial encounter    TECHNIQUE:  AP and lateral views of the left forearm were performed.  Three views of the hand.    COMPARISON:  None    FINDINGS:  Left forearm: Comminuted fracture of the distal radius with anterior displacement of the carpal bones relative to the radius.  There is extension of the fracture at the articular radial surface.  There is an associated nondisplaced ulnar styloid fracture.  The more proximal aspect of the ulna and radius are intact.    Left hand: The remainder of the carpal bones, metacarpal bones and phalanges appear normal.  Comminuted fracture of the distal radius with mild impaction and volar displacement of the carpal bones relative to the radius.                            Silvana Quezada 64 y.o. female who is right hand dominant presented to the ED with c/o left wrist injury that occurred just PTA. She reports that she had slip and fall walking up step onto outstretched hand. She present with obvious deformity of the left wrist.  She reports throbbing pain and some spasm of the left forearm. She denies any numbness, tingling, proximal extremity pain or other complaints. She has tried ice with modest improvement. She has not tried any medications for the symptoms.  ROS positive for left wrist pain.  Physical exam reveals patient well appearing in distress due to pain. TTP of the left wrist about distal radius and ulna. Obvious bony deformity of the area. No scaphoid TTP. Limited ROM of digits. No radial head TTP or elbow TTP. Abrasion over the dorsal ulnar region; however no definite open fracture Neurovascularly intact.     DDX: fracture, sprain, dislocation    ED management: x-ray showing displaced comminuted impacted fracture of distal radius and nondisplaced ulnar styloid fracture. Given severity IV was placed for pain control and basic labs. Labs unremarkable. Patient had  reduction by ortho in the ED and splint was placed by that provider. She reports patient should follow with Dr. Schroeder and a referral was placed. Patient provided sling for comfort. Encourage continued RICE therapy.    Impression/Plan: The primary encounter diagnosis was Displaced fracture of distal end of radius. Diagnoses of Fall and Closed displaced fracture of styloid process of left ulna, initial encounter were also pertinent to this visit.  Discharged with norco and naprosyn. Patient will follow up with Primary.  Patient cautioned on when to return to ED.  Pt. Understands and agrees with current treatment plan                           ED Course as of Dec 14 1536   Fri Dec 13, 2019   1617 Called [facility] as they are on for un referred.  Dr. Jeffrey is in surgery.  She was given MR and and findings.  She states that she will call after surgery.    [LC]   1923 [facility] did come to ED to evaluate the patient.  They did complete a reduction and placed splint themselves.  They did review post reduction x-ray and state that patient is cleared to go home with follow-up with Dr. Schroeder the hand specialist    [LC]      ED Course User Index  [LC] PITA Spaulding                Clinical Impression:       ICD-10-CM ICD-9-CM   1. Displaced fracture of distal end of radius S52.509A 813.42   2. Fall W19.XXXA E888.9   3. Closed displaced fracture of styloid process of left ulna, initial encounter S52.612A 813.43                             PITA Spaulding  12/15/19 0829

## 2019-12-13 NOTE — ED PROVIDER NOTES
Sort note: Silvana Quezada nontoxic/afebrile 64 y.o.  presented to the ED with c/o left wrist and hand pain after mechanical fall today.  TTP left distal radius and ulna.  DALI SHELLEY.     Patient seen and medically screened by Nurse practitioner in Sort process due to ED crowding.  Appropriate tests and/or medications ordered.  Care transferred to an alternate provider when patient was placed in an Exam Room from the [facility] for physical exam, additional orders, and disposition. 2:49 PM. PONCHO Parrish, NATALY  12/13/19 8711

## 2019-12-14 NOTE — ED TRIAGE NOTES
Pt seen in the ED with complaints of a fall today. Pt reports that she tripped and fell backwards landing with her hands, L wrist has obvious deformity noted, minimal swelling, and very limited green ecchymosis. Pt is in no acute distress during time of assessment, she can move all fingers, denies any numbness or tingling, and sensation is intact. Will continue to monitor.

## 2019-12-14 NOTE — CONSULTS
[facility] Consult Note    Consult: left distal radius fracture    Date of Injury: 12/13/19    HPI:  64 y.o. female RHD hx of fall earlier this afternoon landing on her left wrist with immediate pain. She presented to the ED immediately after injury. Denies numbness or tingling left upper extremity. Denies pain anywhere else.     PMH:   Past Medical History:   Diagnosis Date    Bipolar 1 disorder     Breast cancer     right    Breast cyst     Closed fracture of proximal end of right humerus 3/9/2015    Depression     History of right shoulder fracture     MS (multiple sclerosis)     presented as dizzines, dx vision,     Osteoporosis, unspecified        PSH:    has a past surgical history that includes Hysterectomy; Brain surgery (1991); Foot surgery (october 2013); Breast lumpectomy (Right, 2001); Breast biopsy (Left, 2009); Cystoscopy (N/A, 7/11/2018); Injection of botulinum toxin type A (N/A, 7/11/2018); Tonsillectomy; Cystoscopy (N/A, 4/10/2019); and Injection of botulinum toxin type A (N/A, 4/10/2019).    SOCIAL:    reports that she has never smoked. She has never used smokeless tobacco. She reports that she drinks about 1.7 standard drinks of alcohol per week. She reports that she does not use drugs.    FAMILY:  Family History   Problem Relation Age of Onset    Skin cancer Mother     Breast cancer Mother     Stroke Mother     Emphysema Father     Breast cancer Maternal Aunt     Dementia Brother     Diabetes Brother     No Known Problems Daughter     Autism Son     No Known Problems Brother     Autism Daughter        MEDS:   No current facility-administered medications on file prior to encounter.      Current Outpatient Medications on File Prior to Encounter   Medication Sig Dispense Refill    aspirin 325 MG tablet Take 325 mg by mouth every 4 (four) hours as needed for Pain.      buPROPion (WELLBUTRIN SR) 100 MG TBSR 12 hr tablet Take 2 tablets (200 mg total) by mouth every  morning. 180 tablet 1    buPROPion (WELLBUTRIN SR) 150 MG TBSR 12 hr tablet Take 1 tablet (150 mg total) by mouth every evening. 90 tablet 1    calcium-vitamin D3 (CALCIUM 500 + D) 500 mg(1,250mg) -200 unit per tablet Take 1 tablet by mouth 2 (two) times daily with meals.      catheter (FEMALE CATHETER) 14 Fr [facility] Patient is to self catheterize four times a day indefinitley using female 12 fr in and out catheter for incomplete bladder emptying.   Dispense 120 month with 11 refills or 360 every 3 months with 3 refills depending on patient's preference 120 each 11    doxycycline (MONODOX) 100 MG capsule Take 1 capsule (100 mg total) by mouth every 12 (twelve) hours. for 10 days 20 capsule 0    fluphenazine (PROLIXIN) 1 MG tablet TAKE 2 TABLETS NIGHTLY 180 tablet 1    folic acid (FOLVITE) 1 MG tablet Take 1 tablet (1,000 mcg total) by mouth once daily. 90 tablet 1    ibuprofen (ADVIL,MOTRIN) 200 MG tablet Take 200 mg by mouth every 6 (six) hours as needed for Pain.      magnesium oxide (MAG-OX) 400 mg tablet Take 500 mg by mouth once daily.      OXcarbazepine (TRILEPTAL) 150 MG Tab Take 1 tablet (150 mg total) by mouth 2 (two) times daily. 180 tablet 2    [DISCONTINUED] HYDROcodone-acetaminophen (NORCO) 5-325 mg per tablet Take 1 tablet by mouth every 6 (six) hours as needed for Pain. No alcohol, no driving, no operating machinery, no working, no swimming, no extra Tylenol (acetaminophen) while taking this medication. 10 tablet 0       ALLERGY:   Allergies as of 12/11/2019 - Reviewed 12/11/2019   Allergen Reaction Noted    Adhesive  07/25/2012    Keflex [cephalexin] Rash 07/16/2012       LAST MEAL: N/A    Vitals:    There were no vitals filed for this visit.    Labs:  Recent Labs   Lab 12/11/19  0950 12/13/19  1630   WBC  --  8.68   HGB  --  13.8   HCT  --  42.1   PLT  --  161   MCV  --  94   RDW  --  12.7    146*   K 4.5 4.8    108   CO2 25 29   BUN 17 21   CREATININE 0.8 0.9   GLU 96 105    PROT  --  7.0   ALBUMIN  --  4.0   BILITOT  --  0.3   AST  --  14   ALKPHOS  --  89   ALT  --  15       Coags:   Lab Results   Component Value Date    INR 1.0 12/13/2019       Infection:  Recent Labs   Lab 12/13/19  1630   WBC 8.68       Physical Exam:    Gen: A/Ox3, NAD  Card: RRR by RP  Lungs: nonlabored breathing, symmetric chest rise  Abd: S/NT/ND    LUE  + gross deformity at wrist with small abrasion over ulna  Crepitus of wrist  Compartments soft, compressible with visible wrinkles  Motor intact M/U/R/AIN/PIN   SILT in M/U/R distribution   Radial Pulse 2+, CF <2s    Radiology:  Xr left wrist:  Demonstrates intra-articular and completely displaced volar shear distal radius fracture with comminution along with ulnar styloid fracture    Assessment/Plan:  64 y.o. female hx of fall with subsequent left distal radius volar shear fracture.     Patient fracture was closed reduced and placed in a sugartong with hematoma block. Post-reduction xrays satisfactory.    Follow-up with Dr. Schroeder this week  CALLIE BRIONES    Patient was consented for closed reduction of left distal radius fracture. 10 mL of 1% lidocaine was injected as a fracture hematoma block under fluoroscopy. The patient was then hung in 10 lbs of traction for 5 minutes follow-ed by closed reduction of the fracture with assistance of fluoroscopy. A sugartong splint was applied. No changes in neurovascular status with reduction. Patient tolerated well.       Corinne E Cloud, MD  [facility], PGY-3  Pager: [phone]

## 2019-12-16 ENCOUNTER — TELEPHONE (OUTPATIENT)
Dept: ORTHOPEDICS | Facility: CLINIC | Age: 64
End: 2019-12-16

## 2019-12-16 ENCOUNTER — TELEPHONE (OUTPATIENT)
Dept: UROLOGY | Facility: CLINIC | Age: 64
End: 2019-12-16

## 2019-12-16 ENCOUNTER — PATIENT OUTREACH (OUTPATIENT)
Dept: ADMINISTRATIVE | Facility: OTHER | Age: 64
End: 2019-12-16

## 2019-12-16 DIAGNOSIS — N30.00 ACUTE CYSTITIS WITHOUT HEMATURIA: Primary | ICD-10-CM

## 2019-12-16 RX ORDER — SULFAMETHOXAZOLE AND TRIMETHOPRIM 800; 160 MG/1; MG/1
1 TABLET ORAL 2 TIMES DAILY
Qty: 14 TABLET | Refills: 0 | Status: SHIPPED | OUTPATIENT
Start: 2019-12-16 | End: 2019-12-23

## 2019-12-16 NOTE — ANESTHESIA PAT ROS NOTE
12/16/2019  Silvana Quezada is a 64 y.o., female.      Pre-op Assessment         Review of Systems  Anesthesia Hx:  History of prior surgery of interest to airway management or planning: Previous anesthesia: General 4/10/2019  Cysto with botox injection with general anesthesia.  Procedure performed at an [facility]. Denies Family Hx of Anesthesia complications.   Denies Personal Hx of Anesthesia complications.   Social:  Social Alcohol Use, Non-Smoker    Hematology/Oncology:  Hematology Normal      Oncology Comments: Hx Breast CA    EENT/Dental:EENT/Dental Normal   Cardiovascular:   Denies MI.   Denies CABG/stent.   Denies Angina. Thoracic Aorta Atherosclorosis Functional Capacity 3.5 METS    Pulmonary:   Denies Asthma.  Denies Shortness of breath.  Denies Recent URI.    Renal/:   OAB   Hepatic/GI:   Denies GERD.    Musculoskeletal:   Pt fell 12/13, fractured left wrist   Neurological:  Neuromuscular Disease, Multiple Sclerosis   Endocrine:   Denies Diabetes.    Psych:  Depression.  Psychotic Disorder and Bipolar disorder.               Anesthesia Assessment: Preoperative EQUATION    Planned Procedure: Procedure(s) (LRB):  CYSTOSCOPY,WITH BOTULINUM TOXIN INJECTION (N/A)  Requested Anesthesia Type:Monitor Anesthesia Care  Surgeon: Jayesh Ross MD  Service: Urology  Known or anticipated Date of Surgery:12/19/2019  (Previously scheduled for 12/11/2019, XCLD due to UTI)    Surgeon notes: reviewed    Electronic QUestionnaire Assessment completed via nurse interview with patient.        Triage considerations:     The patient has no apparent active cardiac condition (No unstable coronary Syndrome such as severe unstable angina or recent [<1 month] myocardial infarction, decompensated CHF, severe valvular   disease or significant arrhythmia)    Previous anesthesia records:GETA and No problems  4/10/2019   Cysto with botox injection  Airway/Jaw/Neck:  Airway Findings: Mouth Opening: Normal Tongue: Normal  General Airway Assessment: Adult  Mallampati: I  TM Distance: 4 - 6 cm Jaw/Neck Findings:  Neck ROM: Normal ROM     Last PCP note: within 1 month , within [facility]  Dr. Avila  Subspecialty notes: Cardiology: General, Hematology/Oncology, Neurology, Psychiatry, Urology    Other important co-morbidities: Multiple Sclerosis, Bipolar affective disorder, Thoracic aorta atherosclerosis, Urinary frequency, Neurogenic bladder, Overactive bladder, Hx breast cancer, osteoporosis      Tests already available:  Available tests,  within 1 month , within [facility] .  12/13/2019 PT/INR, CMP, CBC            Instructions given. (See in Nurse's note)    Optimization:  Anesthesia Preop Clinic Assessment  Indicated: Not required for this procedure.      Plan:    Testing:  None Needed     Patient  has previously scheduled Medical Appointment: Not at this time prior to surgery    Navigation:  Straight Line to surgery.                No tests, anesthesia preop clinic visit, or consult required.

## 2019-12-16 NOTE — TELEPHONE ENCOUNTER
----- Message from Alexandra Littlejohn LPN sent at 12/16/2019  9:12 AM CST -----  Contact: [phone]      ----- Message -----  From: Rohit Garcia MA  Sent: 12/16/2019   8:34 AM CST  To: [facility]AL[facility] Staff    Pt states she has not received antibiotics yet to prepare for botox on 12/19. Urine shows infection from last week

## 2019-12-16 NOTE — TELEPHONE ENCOUNTER
----- Message from Alebrt Schroeder Jr., MD sent at 12/16/2019  8:33 AM CST -----  This lady needs an appt this week with me please Thx!    EL

## 2019-12-16 NOTE — TELEPHONE ENCOUNTER
Acute cystitis without hematuria  -     sulfamethoxazole-trimethoprim 800-160mg (BACTRIM DS) 800-160 mg Tab; Take 1 tablet by mouth 2 (two) times daily. for 7 days  Dispense: 14 tablet; Refill: 0

## 2019-12-17 ENCOUNTER — OFFICE VISIT (OUTPATIENT)
Dept: ORTHOPEDICS | Facility: CLINIC | Age: 64
End: 2019-12-17
Payer: MEDICARE

## 2019-12-17 VITALS — WEIGHT: 169 LBS | BODY MASS INDEX: 29.95 KG/M2 | HEIGHT: 63 IN

## 2019-12-17 DIAGNOSIS — S62.102A WRIST FRACTURE, CLOSED, LEFT, INITIAL ENCOUNTER: Primary | ICD-10-CM

## 2019-12-17 PROCEDURE — 1101F PR PT FALLS ASSESS DOC 0-1 FALLS W/OUT INJ PAST YR: ICD-10-PCS | Mod: CPTII,S$GLB,, | Performed by: ORTHOPAEDIC SURGERY

## 2019-12-17 PROCEDURE — 99203 PR OFFICE/OUTPT VISIT, NEW, LEVL III, 30-44 MIN: ICD-10-PCS | Mod: S$GLB,,, | Performed by: ORTHOPAEDIC SURGERY

## 2019-12-17 PROCEDURE — 3008F BODY MASS INDEX DOCD: CPT | Mod: CPTII,S$GLB,, | Performed by: ORTHOPAEDIC SURGERY

## 2019-12-17 PROCEDURE — 3008F PR BODY MASS INDEX (BMI) DOCUMENTED: ICD-10-PCS | Mod: CPTII,S$GLB,, | Performed by: ORTHOPAEDIC SURGERY

## 2019-12-17 PROCEDURE — 99999 PR PBB SHADOW E&M-EST. PATIENT-LVL III: ICD-10-PCS | Mod: PBBFAC,,, | Performed by: ORTHOPAEDIC SURGERY

## 2019-12-17 PROCEDURE — 99203 OFFICE O/P NEW LOW 30 MIN: CPT | Mod: S$GLB,,, | Performed by: ORTHOPAEDIC SURGERY

## 2019-12-17 PROCEDURE — 99999 PR PBB SHADOW E&M-EST. PATIENT-LVL III: CPT | Mod: PBBFAC,,, | Performed by: ORTHOPAEDIC SURGERY

## 2019-12-17 PROCEDURE — 1101F PT FALLS ASSESS-DOCD LE1/YR: CPT | Mod: CPTII,S$GLB,, | Performed by: ORTHOPAEDIC SURGERY

## 2019-12-17 RX ORDER — SODIUM CHLORIDE 9 MG/ML
INJECTION, SOLUTION INTRAVENOUS CONTINUOUS
Status: CANCELLED | OUTPATIENT
Start: 2019-12-17

## 2019-12-17 RX ORDER — HYDROCODONE BITARTRATE AND ACETAMINOPHEN 5; 325 MG/1; MG/1
1 TABLET ORAL EVERY 6 HOURS PRN
Qty: 20 TABLET | Refills: 0 | Status: SHIPPED | OUTPATIENT
Start: 2019-12-17 | End: 2020-02-10

## 2019-12-17 RX ORDER — MUPIROCIN 20 MG/G
OINTMENT TOPICAL
Status: CANCELLED | OUTPATIENT
Start: 2019-12-17

## 2019-12-17 NOTE — H&P (VIEW-ONLY)
Subjective:      Patient ID: Silvana Quezada is a 64 y.o. female.    Chief Complaint: Pain and Fracture of the Left Wrist      HPI: Silvana Quezada  Is here in follow-up of emergency department visit.  Patient sustained a fall in her home 4 days ago.  X-rays performed in the ED significant for displaced distal radius fracture.  She was  placed in a long-arm splint and referred to orthopedics. Pt reports pain is tolerable with Norco. She denies any numbness and tingling of the fingers.     Past Medical History:   Diagnosis Date    Bipolar 1 disorder     Breast cancer     right    Breast cyst     Closed fracture of proximal end of right humerus 3/9/2015    Depression     History of right shoulder fracture     MS (multiple sclerosis)     presented as dizzines, dx vision,     Osteoporosis, unspecified        Current Outpatient Medications:     aspirin 325 MG tablet, Take 325 mg by mouth every 4 (four) hours as needed for Pain., Disp: , Rfl:     buPROPion (WELLBUTRIN SR) 100 MG TBSR 12 hr tablet, Take 2 tablets (200 mg total) by mouth every morning., Disp: 180 tablet, Rfl: 1    buPROPion (WELLBUTRIN SR) 150 MG TBSR 12 hr tablet, Take 1 tablet (150 mg total) by mouth every evening., Disp: 90 tablet, Rfl: 1    calcium-vitamin D3 (CALCIUM 500 + D) 500 mg(1,250mg) -200 unit per tablet, Take 1 tablet by mouth 2 (two) times daily with meals., Disp: , Rfl:     catheter (FEMALE CATHETER) 14 Fr Misc, Patient is to self catheterize four times a day indefinitley using female 12 fr in and out catheter for incomplete bladder emptying.  Dispense 120 month with 11 refills or 360 every 3 months with 3 refills depending on patient's preference, Disp: 120 each, Rfl: 11    fluphenazine (PROLIXIN) 1 MG tablet, TAKE 2 TABLETS NIGHTLY, Disp: 180 tablet, Rfl: 1    folic acid (FOLVITE) 1 MG tablet, Take 1 tablet (1,000 mcg total) by mouth once daily., Disp: 90 tablet, Rfl: 1    HYDROcodone-acetaminophen (NORCO) 5-325 mg per  "tablet, Take 1 tablet by mouth every 6 (six) hours as needed for Pain., Disp: 20 tablet, Rfl: 0    ibuprofen (ADVIL,MOTRIN) 200 MG tablet, Take 200 mg by mouth every 6 (six) hours as needed for Pain., Disp: , Rfl:     naproxen (NAPROSYN) 500 MG tablet, Take 1 tablet (500 mg total) by mouth 2 (two) times daily with meals., Disp: 20 tablet, Rfl: 0    OXcarbazepine (TRILEPTAL) 150 MG Tab, Take 1 tablet (150 mg total) by mouth 2 (two) times daily., Disp: 180 tablet, Rfl: 2    sulfamethoxazole-trimethoprim 800-160mg (BACTRIM DS) 800-160 mg Tab, Take 1 tablet by mouth 2 (two) times daily. for 7 days, Disp: 14 tablet, Rfl: 0    magnesium oxide (MAG-OX) 400 mg tablet, Take 500 mg by mouth once daily., Disp: , Rfl:   Review of patient's allergies indicates:   Allergen Reactions    Adhesive      blisters    Keflex [cephalexin] Rash       Ht 5' 3" (1.6 m)   Wt 76.7 kg (169 lb)   BMI 29.94 kg/m²     Review of Systems   Constitution: Negative for chills and fever.   Cardiovascular: Negative for chest pain and palpitations.   Respiratory: Negative for shortness of breath and wheezing.    Skin: Negative for poor wound healing and rash.   Musculoskeletal: Positive for falls, joint pain, joint swelling and stiffness.   Gastrointestinal: Negative for nausea and vomiting.   Genitourinary: Negative for dysuria and hematuria.   Neurological: Negative for seizures and tremors.   Psychiatric/Behavioral: Negative for altered mental status.   Allergic/Immunologic: Negative for environmental allergies and persistent infections.         Objective:    Ortho Exam        left upper extremity:  Significant for a long-arm splint in place.  Splint is intact  And comparable to the patient..   There is significant bruising and swelling of the fingers.  Range of motion fingers limited.  Sensation intact pulses present cap refill brisk.     Assessment:     Imaging:  No new imaging.  Images from the ED of the left wrist were reviewed which is " significant for  Angulated and displaced distal radius fracture.        1. Wrist fracture, closed, left, initial encounter          Plan:       Explained the nature of the problem to the patient regards to her wrist fracture.  I explained this fracture will need surgical  Fixation.  Consulted with Dr. Schroeder.   Pre, miguelina, and post operative procedures and expectations discussed. All questions were answered. Consent forms were explained and signed by the patient.   Silvana Quezada will contact us if there are any questions, concerns, or changes in medical status prior to surgery.  Pt would like to wait until 12/27/19 because he daughter is graduating from [employer] this weekend.   Refill pain medicine.  Pt instructed not to report to ED but to call if splint becomes painful.     Orders Placed This Encounter    HYDROcodone-acetaminophen (NORCO) 5-325 mg per tablet    Case Request Operating Room: ORIF, WRIST     No follow-ups on file.

## 2019-12-17 NOTE — PROGRESS NOTES
Subjective:      Patient ID: Silvana Quezada is a 64 y.o. female.    Chief Complaint: Pain and Fracture of the Left Wrist      HPI: Silvana Quezada  Is here in follow-up of emergency department visit.  Patient sustained a fall in her home 4 days ago.  X-rays performed in the ED significant for displaced distal radius fracture.  She was  placed in a long-arm splint and referred to orthopedics. Pt reports pain is tolerable with Norco. She denies any numbness and tingling of the fingers.     Past Medical History:   Diagnosis Date    Bipolar 1 disorder     Breast cancer     right    Breast cyst     Closed fracture of proximal end of right humerus 3/9/2015    Depression     History of right shoulder fracture     MS (multiple sclerosis)     presented as dizzines, dx vision,     Osteoporosis, unspecified        Current Outpatient Medications:     aspirin 325 MG tablet, Take 325 mg by mouth every 4 (four) hours as needed for Pain., Disp: , Rfl:     buPROPion (WELLBUTRIN SR) 100 MG TBSR 12 hr tablet, Take 2 tablets (200 mg total) by mouth every morning., Disp: 180 tablet, Rfl: 1    buPROPion (WELLBUTRIN SR) 150 MG TBSR 12 hr tablet, Take 1 tablet (150 mg total) by mouth every evening., Disp: 90 tablet, Rfl: 1    calcium-vitamin D3 (CALCIUM 500 + D) 500 mg(1,250mg) -200 unit per tablet, Take 1 tablet by mouth 2 (two) times daily with meals., Disp: , Rfl:     catheter (FEMALE CATHETER) 14 Fr Misc, Patient is to self catheterize four times a day indefinitley using female 12 fr in and out catheter for incomplete bladder emptying.  Dispense 120 month with 11 refills or 360 every 3 months with 3 refills depending on patient's preference, Disp: 120 each, Rfl: 11    fluphenazine (PROLIXIN) 1 MG tablet, TAKE 2 TABLETS NIGHTLY, Disp: 180 tablet, Rfl: 1    folic acid (FOLVITE) 1 MG tablet, Take 1 tablet (1,000 mcg total) by mouth once daily., Disp: 90 tablet, Rfl: 1    HYDROcodone-acetaminophen (NORCO) 5-325 mg per  "tablet, Take 1 tablet by mouth every 6 (six) hours as needed for Pain., Disp: 20 tablet, Rfl: 0    ibuprofen (ADVIL,MOTRIN) 200 MG tablet, Take 200 mg by mouth every 6 (six) hours as needed for Pain., Disp: , Rfl:     naproxen (NAPROSYN) 500 MG tablet, Take 1 tablet (500 mg total) by mouth 2 (two) times daily with meals., Disp: 20 tablet, Rfl: 0    OXcarbazepine (TRILEPTAL) 150 MG Tab, Take 1 tablet (150 mg total) by mouth 2 (two) times daily., Disp: 180 tablet, Rfl: 2    sulfamethoxazole-trimethoprim 800-160mg (BACTRIM DS) 800-160 mg Tab, Take 1 tablet by mouth 2 (two) times daily. for 7 days, Disp: 14 tablet, Rfl: 0    magnesium oxide (MAG-OX) 400 mg tablet, Take 500 mg by mouth once daily., Disp: , Rfl:   Review of patient's allergies indicates:   Allergen Reactions    Adhesive      blisters    Keflex [cephalexin] Rash       Ht 5' 3" (1.6 m)   Wt 76.7 kg (169 lb)   BMI 29.94 kg/m²     Review of Systems   Constitution: Negative for chills and fever.   Cardiovascular: Negative for chest pain and palpitations.   Respiratory: Negative for shortness of breath and wheezing.    Skin: Negative for poor wound healing and rash.   Musculoskeletal: Positive for falls, joint pain, joint swelling and stiffness.   Gastrointestinal: Negative for nausea and vomiting.   Genitourinary: Negative for dysuria and hematuria.   Neurological: Negative for seizures and tremors.   Psychiatric/Behavioral: Negative for altered mental status.   Allergic/Immunologic: Negative for environmental allergies and persistent infections.         Objective:    Ortho Exam        left upper extremity:  Significant for a long-arm splint in place.  Splint is intact  And comparable to the patient..   There is significant bruising and swelling of the fingers.  Range of motion fingers limited.  Sensation intact pulses present cap refill brisk.     Assessment:     Imaging:  No new imaging.  Images from the ED of the left wrist were reviewed which is " significant for  Angulated and displaced distal radius fracture.        1. Wrist fracture, closed, left, initial encounter          Plan:       Explained the nature of the problem to the patient regards to her wrist fracture.  I explained this fracture will need surgical  Fixation.  Consulted with Dr. Schroeder.   Pre, miguelina, and post operative procedures and expectations discussed. All questions were answered. Consent forms were explained and signed by the patient.   Silvana Quezada will contact us if there are any questions, concerns, or changes in medical status prior to surgery.  Pt would like to wait until 12/27/19 because he daughter is graduating from [employer] this weekend.   Refill pain medicine.  Pt instructed not to report to ED but to call if splint becomes painful.     Orders Placed This Encounter    HYDROcodone-acetaminophen (NORCO) 5-325 mg per tablet    Case Request Operating Room: ORIF, WRIST     No follow-ups on file.

## 2019-12-18 ENCOUNTER — TELEPHONE (OUTPATIENT)
Dept: UROLOGY | Facility: CLINIC | Age: 64
End: 2019-12-18

## 2019-12-19 ENCOUNTER — ANESTHESIA (OUTPATIENT)
Dept: SURGERY | Facility: HOSPITAL | Age: 64
End: 2019-12-19
Payer: MEDICARE

## 2019-12-19 ENCOUNTER — ANESTHESIA EVENT (OUTPATIENT)
Dept: SURGERY | Facility: HOSPITAL | Age: 64
End: 2019-12-19
Payer: MEDICARE

## 2019-12-19 ENCOUNTER — HOSPITAL ENCOUNTER (OUTPATIENT)
Facility: HOSPITAL | Age: 64
Discharge: HOME | End: 2019-12-19
Attending: UROLOGY | Admitting: UROLOGY
Payer: MEDICARE

## 2019-12-19 ENCOUNTER — TELEPHONE (OUTPATIENT)
Dept: UROLOGY | Facility: CLINIC | Age: 64
End: 2019-12-19

## 2019-12-19 VITALS
DIASTOLIC BLOOD PRESSURE: 54 MMHG | BODY MASS INDEX: 29.95 KG/M2 | SYSTOLIC BLOOD PRESSURE: 104 MMHG | HEIGHT: 63 IN | OXYGEN SATURATION: 100 % | RESPIRATION RATE: 17 BRPM | HEART RATE: 65 BPM | TEMPERATURE: 97 F | WEIGHT: 169 LBS

## 2019-12-19 DIAGNOSIS — N31.9 NEUROGENIC BLADDER: Primary | ICD-10-CM

## 2019-12-19 PROCEDURE — 71000015 HC POSTOP RECOV 1ST HR: Performed by: UROLOGY

## 2019-12-19 PROCEDURE — 37000009 HC ANESTHESIA EA ADD 15 MINS: Performed by: UROLOGY

## 2019-12-19 PROCEDURE — 63600175 PHARM REV CODE 636 W HCPCS: Performed by: STUDENT IN AN ORGANIZED HEALTH CARE EDUCATION/TRAINING PROGRAM

## 2019-12-19 PROCEDURE — 36000707: Performed by: UROLOGY

## 2019-12-19 PROCEDURE — 71000044 HC DOSC ROUTINE RECOVERY FIRST HOUR: Performed by: UROLOGY

## 2019-12-19 PROCEDURE — 37000008 HC ANESTHESIA 1ST 15 MINUTES: Performed by: UROLOGY

## 2019-12-19 PROCEDURE — D9220A PRA ANESTHESIA: ICD-10-PCS | Mod: CRNA,,, | Performed by: NURSE ANESTHETIST, CERTIFIED REGISTERED

## 2019-12-19 PROCEDURE — 52287 PR CYSTOURETHROSCOPY WITH INJ FOR CHEMODENERVATION: ICD-10-PCS | Mod: ,,, | Performed by: UROLOGY

## 2019-12-19 PROCEDURE — 25000003 PHARM REV CODE 250: Performed by: UROLOGY

## 2019-12-19 PROCEDURE — 63600175 PHARM REV CODE 636 W HCPCS: Performed by: NURSE ANESTHETIST, CERTIFIED REGISTERED

## 2019-12-19 PROCEDURE — 52287 CYSTOSCOPY CHEMODENERVATION: CPT | Mod: ,,, | Performed by: UROLOGY

## 2019-12-19 PROCEDURE — D9220A PRA ANESTHESIA: Mod: ANES,,, | Performed by: ANESTHESIOLOGY

## 2019-12-19 PROCEDURE — 36000706: Performed by: UROLOGY

## 2019-12-19 PROCEDURE — 63600175 PHARM REV CODE 636 W HCPCS: Mod: JG | Performed by: UROLOGY

## 2019-12-19 PROCEDURE — D9220A PRA ANESTHESIA: ICD-10-PCS | Mod: ANES,,, | Performed by: ANESTHESIOLOGY

## 2019-12-19 PROCEDURE — D9220A PRA ANESTHESIA: Mod: CRNA,,, | Performed by: NURSE ANESTHETIST, CERTIFIED REGISTERED

## 2019-12-19 RX ORDER — FENTANYL CITRATE 50 UG/ML
INJECTION, SOLUTION INTRAMUSCULAR; INTRAVENOUS
Status: DISCONTINUED | OUTPATIENT
Start: 2019-12-19 | End: 2019-12-19

## 2019-12-19 RX ORDER — LIDOCAINE HCL/PF 100 MG/5ML
SYRINGE (ML) INTRAVENOUS
Status: DISCONTINUED | OUTPATIENT
Start: 2019-12-19 | End: 2019-12-19

## 2019-12-19 RX ORDER — MIDAZOLAM HYDROCHLORIDE 1 MG/ML
INJECTION, SOLUTION INTRAMUSCULAR; INTRAVENOUS
Status: DISCONTINUED | OUTPATIENT
Start: 2019-12-19 | End: 2019-12-19

## 2019-12-19 RX ORDER — PROPOFOL 10 MG/ML
VIAL (ML) INTRAVENOUS
Status: DISCONTINUED | OUTPATIENT
Start: 2019-12-19 | End: 2019-12-19

## 2019-12-19 RX ORDER — TRAMADOL HYDROCHLORIDE 50 MG/1
50 TABLET ORAL EVERY 6 HOURS PRN
Qty: 5 TABLET | Refills: 0 | Status: SHIPPED | OUTPATIENT
Start: 2019-12-19 | End: 2020-02-10

## 2019-12-19 RX ORDER — SODIUM CHLORIDE 9 MG/ML
INJECTION, SOLUTION INTRAVENOUS CONTINUOUS PRN
Status: DISCONTINUED | OUTPATIENT
Start: 2019-12-19 | End: 2019-12-19

## 2019-12-19 RX ORDER — PROPOFOL 10 MG/ML
VIAL (ML) INTRAVENOUS CONTINUOUS PRN
Status: DISCONTINUED | OUTPATIENT
Start: 2019-12-19 | End: 2019-12-19

## 2019-12-19 RX ORDER — CIPROFLOXACIN 2 MG/ML
400 INJECTION, SOLUTION INTRAVENOUS
Status: COMPLETED | OUTPATIENT
Start: 2019-12-19 | End: 2019-12-19

## 2019-12-19 RX ORDER — SODIUM CHLORIDE 0.9 % (FLUSH) 0.9 %
10 SYRINGE (ML) INJECTION
Status: DISCONTINUED | OUTPATIENT
Start: 2019-12-19 | End: 2019-12-19 | Stop reason: HOSPADM

## 2019-12-19 RX ORDER — LIDOCAINE HYDROCHLORIDE 20 MG/ML
JELLY TOPICAL
Status: DISCONTINUED | OUTPATIENT
Start: 2019-12-19 | End: 2019-12-19 | Stop reason: HOSPADM

## 2019-12-19 RX ADMIN — PROPOFOL 20 MG: 10 INJECTION, EMULSION INTRAVENOUS at 08:12

## 2019-12-19 RX ADMIN — PROPOFOL 50 MCG/KG/MIN: 10 INJECTION, EMULSION INTRAVENOUS at 08:12

## 2019-12-19 RX ADMIN — MIDAZOLAM HYDROCHLORIDE 2 MG: 1 INJECTION, SOLUTION INTRAMUSCULAR; INTRAVENOUS at 08:12

## 2019-12-19 RX ADMIN — FENTANYL CITRATE 25 MCG: 50 INJECTION, SOLUTION INTRAMUSCULAR; INTRAVENOUS at 08:12

## 2019-12-19 RX ADMIN — SODIUM CHLORIDE: 0.9 INJECTION, SOLUTION INTRAVENOUS at 08:12

## 2019-12-19 RX ADMIN — LIDOCAINE HYDROCHLORIDE 40 MG: 20 INJECTION, SOLUTION INTRAVENOUS at 08:12

## 2019-12-19 RX ADMIN — CIPROFLOXACIN 400 MG: 2 INJECTION, SOLUTION INTRAVENOUS at 08:12

## 2019-12-19 NOTE — ANESTHESIA PREPROCEDURE EVALUATION
12/19/2019  Silvana Quezada is a 64 y.o., female.    Anesthesia Evaluation    I have reviewed the Patient Summary Reports.     I have reviewed the Medications.     Review of Systems  Anesthesia Hx:  History of prior surgery of interest to airway management or planning: Denies Family Hx of Anesthesia complications.   Denies Personal Hx of Anesthesia complications.   Hematology/Oncology:  Hematology Normal   Oncology Normal     EENT/Dental:EENT/Dental Normal   Cardiovascular:  Cardiovascular Normal     Pulmonary:  Pulmonary Normal    Renal/:  Renal/ Normal     Hepatic/GI:  Hepatic/GI Normal    Musculoskeletal:   osteoporosis   Neurological:   Multiple sclerosis   Endocrine:  Endocrine Normal    Psych:   Psychiatric History          Physical Exam  General:  Well nourished    Airway/Jaw/Neck:  Airway Findings: Mouth Opening: Normal Tongue: Normal  General Airway Assessment: Adult  Mallampati: II  Improves to II with phonation.  TM Distance: Normal, at least 6 cm      Dental:  Dental Findings: In tact   Chest/Lungs:  Chest/Lungs Findings: Clear to auscultation, Normal Respiratory Rate     Heart/Vascular:  Heart Findings: Rate: Normal  Rhythm: Regular Rhythm        Mental Status:  Mental Status Findings:  Cooperative, Alert and Oriented         Anesthesia Plan  Type of Anesthesia, risks & benefits discussed:  Anesthesia Type:  general  Patient's Preference:   Intra-op Monitoring Plan: standard ASA monitors  Intra-op Monitoring Plan Comments:   Post Op Pain Control Plan: multimodal analgesia  Post Op Pain Control Plan Comments:   Induction:   IV  Beta Blocker:  Patient is not currently on a Beta-Blocker (No further documentation required).       Informed Consent: Patient understands risks and agrees with Anesthesia plan.  Questions answered. Anesthesia consent signed with patient.  ASA Score: 2     Day of Surgery  Review of History & Physical:    H&P update referred to the surgeon.         Ready For Surgery From Anesthesia Perspective.

## 2019-12-19 NOTE — INTERVAL H&P NOTE
The patient has been examined and the H&P has been reviewed:    I concur with the findings and changes have been noted since the H&P was written:   - Urine culture from 12/11 grew pan-sensitive Proteus.  - Patient has been taking culture-appropriate bactrim since 12/16  - Patient has not taken ASA for over 2 weeks    Anesthesia/Surgery risks, benefits and alternative options discussed and understood by patient/family.          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

## 2019-12-19 NOTE — OP NOTE
[facility]  Operative Note    Date: 12/19/2019    Pre-Op Diagnosis:   1. Multiple sclerosis  2. Neurogenic bladder    Patient Active Problem List    Diagnosis Date Noted    Bipolar I disorder, most recent episode depressed, moderate 09/01/2019    Tortuous aorta 04/23/2019    Thoracic aorta atherosclerosis 05/24/2018    Bipolar affective disorder 05/24/2018    Overactive bladder 08/09/2017    Incomplete bladder emptying 07/21/2017    Localized osteoporosis with current pathological fracture 05/30/2017    History of breast cancer 08/27/2014    Urgency-frequency syndrome 12/11/2013    MS (multiple sclerosis) 12/03/2013    Neurogenic bladder 12/03/2013     Post-Op Diagnosis: same    Procedure(s) Performed:   1.  Cystoscopy with bladder botox injection    Specimen(s): none    Staff Surgeon:  Jayesh Ross MD    Assistant Surgeon: Rosales Sanchez MD    Anesthesia: Monitored Local Anesthesia with Sedation    Indications: Silvana Quezada is a 64 y.o. female with MS who has a neurogenic bladder managed by clean intermittent catheterization. She has had good relief from bladder spasms with botox injections in the past.    Findings:   1. Post-void residual 250 mL  2. Cystoscopy significant for moderate trabeculations.  3. 100 U Botox injected in 10 mL throughout bladder detrusor. Good wheals raised.     Estimated Blood Loss: min    Drains: none    Procedure in Detail:  After informed consent was obtained the patient was brought to the cystoscopy suite and placed in the supine position.  SCDs were applied and working.  Anesthesia was administered.  When the patient was adequately sedated she was placed in the dorsal lithotomy position and prepped and draped in the usual sterile fashion.      A rigid cystoscope in a 22 Fr sheath was introduced into the patients's bladder via the urethra. This passed easily. The scope was immediately broken and 250 mL of urine returned for her post-void residual.  Cystoscopy was performed which revealed the ureteral orifices in their normal anatomic position bilaterally.  There was evidence of moderate trabeculations.    100 units of botox in 10 mL was injected into the detrusor muscle throughout the bladder.  Good wheals were raised.  The patient's bladder was drained and the cystoscope was removed.     The patient tolerated the procedure well and was transferred to the recovery room in stable condition.      Disposition:  The patient will be discharged home from PACU. She was given a prescription for tramadol.  She knows how to self-cath should she have any issues with retention.      Rosales Sanchez MD

## 2019-12-19 NOTE — DISCHARGE SUMMARY
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       Discharged Condition: good    Hospital Course: Patient was admitted for cystoscopy with botox injections and tolerated the procedure well with no complications. The patient was discharged home in good condition on the same day.       Final Diagnoses: Same as principal problem.    Disposition: Home or Self Care    Follow up/Patient Instructions:    Medications:  Reconciled Home Medications:   Current Discharge Medication List      START taking these medications    Details   traMADol (ULTRAM) 50 mg tablet Take 1 tablet (50 mg total) by mouth every 6 (six) hours as needed for Pain.  Qty: 5 tablet, Refills: 0    Comments: Quantity prescribed more than 7 day supply? No         CONTINUE these medications which have NOT CHANGED    Details   aspirin 325 MG tablet Take 325 mg by mouth every 4 (four) hours as needed for Pain.      !! buPROPion (WELLBUTRIN SR) 100 MG TBSR 12 hr tablet Take 2 tablets (200 mg total) by mouth every morning.  Qty: 180 tablet, Refills: 1    Associated Diagnoses: Bipolar affective disorder, remission status unspecified      !! buPROPion (WELLBUTRIN SR) 150 MG TBSR 12 hr tablet Take 1 tablet (150 mg total) by mouth every evening.  Qty: 90 tablet, Refills: 1     Comments: **Patient requests 90 days supply**  Associated Diagnoses: Bipolar affective disorder, remission status unspecified      calcium-vitamin D3 (CALCIUM 500 + D) 500 mg(1,250mg) -200 unit per tablet Take 1 tablet by mouth 2 (two) times daily with meals.      fluphenazine (PROLIXIN) 1 MG tablet TAKE 2 TABLETS NIGHTLY  Qty: 180 tablet, Refills: 1    Associated Diagnoses: Bipolar affective disorder, remission status unspecified      folic acid (FOLVITE) 1 MG tablet Take 1 tablet (1,000 mcg total) by mouth once daily.  Qty: 90 tablet, Refills: 1    Associated Diagnoses: Bipolar affective disorder, remission status unspecified      ibuprofen (ADVIL,MOTRIN) 200 MG tablet Take 200 mg by mouth every 6 (six) hours as needed for Pain.      naproxen (NAPROSYN) 500 MG tablet Take 1 tablet (500 mg total) by mouth 2 (two) times daily with meals.  Qty: 20 tablet, Refills: 0      OXcarbazepine (TRILEPTAL) 150 MG Tab Take 1 tablet (150 mg total) by mouth 2 (two) times daily.  Qty: 180 tablet, Refills: 2    Associated Diagnoses: Bipolar I disorder, most recent episode depressed, in partial remission; Bipolar affective disorder, remission status unspecified      sulfamethoxazole-trimethoprim 800-160mg (BACTRIM DS) 800-160 mg Tab Take 1 tablet by mouth 2 (two) times daily. for 7 days  Qty: 14 tablet, Refills: 0    Associated Diagnoses: Acute cystitis without hematuria      catheter (FEMALE CATHETER) 14 Fr [facility] Patient is to self catheterize four times a day indefinitley using female 12 fr in and out catheter for incomplete bladder emptying.   Dispense 120 month with 11 refills or 360 every 3 months with 3 refills depending on patient's preference  Qty: 120 each, Refills: 11      HYDROcodone-acetaminophen (NORCO) 5-325 mg per tablet Take 1 tablet by mouth every 6 (six) hours as needed for Pain.  Qty: 20 tablet, Refills: 0    Comments: Quantity prescribed more than 7 day supply? No  Associated Diagnoses: Wrist fracture, closed,  left, initial encounter      magnesium oxide (MAG-OX) 400 mg tablet Take 500 mg by mouth once daily.       !! - Potential duplicate medications found. Please discuss with provider.        Discharge Procedure Orders   Diet Adult Regular     No driving until:   Order Comments: Off narcotics     Notify your health care provider if you experience any of the following:  temperature >100.4     Notify your health care provider if you experience any of the following:  persistent nausea and vomiting or diarrhea     Notify your health care provider if you experience any of the following:  severe uncontrolled pain     Notify your health care provider if you experience any of the following:  difficulty breathing or increased cough     Notify your health care provider if you experience any of the following:  severe persistent headache     Notify your health care provider if you experience any of the following:  worsening rash     Notify your health care provider if you experience any of the following:  persistent dizziness, light-headedness, or visual disturbances     Notify your health care provider if you experience any of the following:  increased confusion or weakness     Activity as tolerated     Follow-up Information     Jayesh Ross MD In 6 months.    Specialty:  Urology  Why:  Post-op follow-up  Contact information:  Shiraz MCNEIL ARNULFO  [facility] [id number] [phone]

## 2019-12-19 NOTE — PLAN OF CARE
Patient self caths as needed.  Patient and  given discharge instructions and verbalize understanding of all instructions.  Patient denies pain and nausea and VSS.  Criteria met fir discharge.

## 2019-12-19 NOTE — DISCHARGE INSTRUCTIONS
Cystoscopy    Cystoscopy is a procedure that lets your doctor look directly inside your urethra and bladder. It can be used to:  · Help diagnose a problem with your urethra, bladder, or kidneys.  · Take a sample (biopsy) of bladder or urethral tissue.  · Treat certain problems (such as removing kidney stones).  · Place a stent to bypass an obstruction.  · Take special X-rays of the kidneys.  Based on the findings, your doctor may recommend other tests or treatments.  What is a cystoscope?  A cystoscope is a telescope-like instrument that contains lenses and fiberoptics (small glass wires that make bright light). The cystoscope may be straight and rigid, or flexible to bend around curves in the urethra. The doctor may look directly into the cystoscope, or project the image onto a monitor.  Getting ready  · Ask your doctor if you should stop taking any medicines before the procedure.  · Ask whether you should avoid eating or drinking anything after midnight before the procedure.  · Follow any other instructions your doctor gives you.  Tell your doctor before the exam if you:  · Take any medicines, such as aspirin or blood thinners  · Have allergies to any medicines  · Are pregnant   The procedure  Cystoscopy is done in the doctors office, surgery center, or hospital. The doctor and a nurse are present during the procedure. It takes only a few minutes, longer if a biopsy, X-ray, or treatment needs to be done.  During the procedure:  · You lie on an exam table on your back, knees bent and legs apart. You are covered with a drape.  · Your urethra and the area around it are washed. Anesthetic jelly may be applied to numb the urethra. Other pain medicine is usually not needed. In some cases, you may be offered a mild sedative to help you relax. If a more extensive procedure is to be done, such as a biopsy or kidney stone removal, general anesthesia may be needed.  · The cystoscope is inserted. A sterile fluid is put  into the bladder to expand it. You may feel pressure from this fluid.  · When the procedure is done, the cystoscope is removed.  After the procedure  If you had a sedative, general anesthesia, or spinal anesthesia, you must have someone drive you home. Once youre home:  · Drink plenty of fluids.  · You may have burning or light bleeding when you urinate--this is normal.  · Medicines may be prescribed to ease any discomfort or prevent infection. Take these as directed.  · Call your doctor if you have heavy bleeding or blood clots, burning that lasts more than a day, a fever over 100°F  (38° C), or trouble urinating.  Date Last Reviewed: 1/1/2017  © 6162-6189 [employer]. [address] [place] [address], [place] [address]. All rights reserved. This information is not intended as a substitute for professional medical care. Always follow your healthcare professional's instructions.

## 2019-12-19 NOTE — TRANSFER OF CARE
"Anesthesia Transfer of Care Note    Patient: Silvana Quezada    Procedure(s) Performed: Procedure(s) (LRB):  CYSTOSCOPY,WITH BOTULINUM TOXIN INJECTION (N/A)    Patient location: PACU    Anesthesia Type: general    Transport from OR: Transported from OR on room air with adequate spontaneous ventilation    Post pain: adequate analgesia    Post assessment: no apparent anesthetic complications and tolerated procedure well    Post vital signs: stable    Level of consciousness: sedated    Nausea/Vomiting: no nausea/vomiting    Complications: none          Last vitals:   Visit Vitals  BP (!) 112/55 (BP Location: Right arm, Patient Position: Lying)   Pulse 66   Temp 36.3 °C (97.3 °F) (Temporal)   Resp 12   Ht 5' 3" (1.6 m)   Wt 76.7 kg (169 lb)   SpO2 100%   Breastfeeding? No   BMI 29.94 kg/m²     "

## 2019-12-19 NOTE — TELEPHONE ENCOUNTER
----- Message from Rosales Sanchez MD sent at 12/19/2019  9:27 AM CST -----  Patient will actually need 6 month follow-up with Dr. Ross.    Thanks,  DAVID Sanchez MD  Urology, PGY-2  Pager: [phone]

## 2019-12-26 ENCOUNTER — HOSPITAL ENCOUNTER (OUTPATIENT)
Dept: PREADMISSION TESTING | Facility: HOSPITAL | Age: 64
Discharge: HOME | End: 2019-12-26
Attending: ORTHOPAEDIC SURGERY
Payer: MEDICARE

## 2019-12-26 ENCOUNTER — ANESTHESIA EVENT (OUTPATIENT)
Dept: SURGERY | Facility: HOSPITAL | Age: 64
End: 2019-12-26
Payer: MEDICARE

## 2019-12-26 VITALS
SYSTOLIC BLOOD PRESSURE: 122 MMHG | RESPIRATION RATE: 20 BRPM | BODY MASS INDEX: 30.72 KG/M2 | OXYGEN SATURATION: 94 % | TEMPERATURE: 99 F | WEIGHT: 173.38 LBS | DIASTOLIC BLOOD PRESSURE: 71 MMHG | HEIGHT: 63 IN | HEART RATE: 76 BPM

## 2019-12-26 RX ORDER — LIDOCAINE HYDROCHLORIDE 10 MG/ML
1 INJECTION, SOLUTION EPIDURAL; INFILTRATION; INTRACAUDAL; PERINEURAL ONCE
Status: CANCELLED | OUTPATIENT
Start: 2019-12-26 | End: 2019-12-26

## 2019-12-26 RX ORDER — SODIUM CHLORIDE, SODIUM LACTATE, POTASSIUM CHLORIDE, CALCIUM CHLORIDE 600; 310; 30; 20 MG/100ML; MG/100ML; MG/100ML; MG/100ML
INJECTION, SOLUTION INTRAVENOUS CONTINUOUS
Status: CANCELLED | OUTPATIENT
Start: 2019-12-26

## 2019-12-26 NOTE — DISCHARGE INSTRUCTIONS
Your surgery is scheduled for Friday 12/27/19.    Please report to Procedure Check In Room on the 2nd FLOOR at 8:45 a.m.          INSTRUCTIONS IMPORTANT!!!  ¨ Do not eat or drink after 12 midnight-including water. OK to brush teeth, no   gum, candy or mints!    ¨ Take only these medicines with a small swallow of water-morning of surgery.    NONE    ____  Proceed to [facility] on *** for additional testing.        ____  Do not wear makeup, including mascara.  ____  No powder, lotions or creams to surgical area.  ____  Please remove all jewelry, including piercings and leave at home.  ____  No money or valuables needed. Please leave at home.  ____  Please bring any documents given by your doctor.  ____  If going home the same day, arrange for a ride home. You will not be able to             drive if Anesthesia was used.  ____  Children under 18 years require a parent / guardian present the entire time             they are in surgery / recovery.  ____  Wear loose fitting clothing. Allow for dressings, bandages.  ____  Stop Aspirin, Ibuprofen, Motrin and Aleve at least 3-5 days before surgery, unless otherwise instructed by your doctor, or the nurse.   You MAY use Tylenol/acetaminophen until day of surgery.  ____  Wash the surgical area with Hibiclens the night before surgery, and again the             morning of surgery.  Be sure to rinse hibiclens off completely (if instructed by   nurse).  ____  If you take diabetic medication, do not take am of surgery unless instructed by Doctor.  ____  Call MD for temperature above 101 degrees or any other signs of infection such as Urinary (bladder) infection, Upper respiratory infection, skin boils, etc.  ____ Stop taking any Fish Oil supplement or any Vitamins that contain Vitamin E at least 5 days prior to surgery.  ____ Do Not wear your contact lenses the day of your procedure.  You may wear your glasses.      ____Do not shave surgical site for 3 days prior to  surgery.  ____ Practice Good hand washing before, during, and after procedure.      I have read or had read and explained to me, and understand the above information.  Additional comments or instructions:  For additional questions call [phone]      ANESTHESIA SIDE EFFECTS  -For the first 24 hours after surgery:  Do not drive, use heavy equipment, make important decisions, or drink alcohol  -It is normal to feel sleepy for several hours.  Rest until you are more awake.  -Have someone stay with you, if needed.  They can watch for problems and help keep you safe.  -Some possible post anesthesia side effects include: nausea and vomiting, sore throat and hoarseness, sleepiness, and dizziness.        Pre-Op Bathing Instructions    Before surgery, you can play an important role in your own health.    Because skin is not sterile, we need to be sure that your skin is as free of germs as possible. By following the instructions below, you can reduce the number of germs on your skin before surgery.    IMPORTANT: You will need to shower with a special soap called Hibiclens*, available at any pharmacy.  If you are allergic to Chlorhexidine (the antiseptic in Hibiclens), use an antibacterial soap such as Dial Soap for your preoperative shower.  You will shower with Hibiclens both the night before your surgery and the morning of your surgery.  Do not use Hibiclens on the head, face or genitals to avoid injury to those areas.    STEP #1: THE NIGHT BEFORE YOUR SURGERY     1. Do not shave the area of your body where your surgery will be performed.  2. Shower and wash your hair and body as usual with your normal soap and shampoo.  3. Rinse your hair and body thoroughly after you shower to remove all soap residue.  4. With your hand, apply one packet of Hibiclens soap to the surgical site.   5. Wash the site gently for five (5) minutes. Do not scrub your skin too hard.   6. Do not wash with your regular soap after Hibiclens is  used.  7. Rinse your body thoroughly.  8. Pat yourself dry with a clean, soft towel.  9. Do not use lotion, cream, or powder.  10. Wear clean clothes.    STEP #2: THE MORNING OF YOUR SURGERY     1. Repeat Step #1.    * Not to be used by people allergic to Chlorhexidine.      Anesthesia: Monitored Anesthesia Care (MAC)    Youre due to have surgery. During surgery, youll be given medicine called anesthesia. This will keep you comfortable and pain-free. Your surgeon will use monitored anesthesia care (MAC). This sheet tells you more about this type of anesthesia.  What is monitored anesthesia care?  MAC keeps you very drowsy during surgery. You may be awake, but you will likely not remember much. And you wont feel pain. With MAC, medicines are given through an IV line into a vein in your arm or hand. A local anesthetic will usually be injected into the skin and muscle around the surgical site to numb it. The anesthesia provider monitors you during the procedure. He or she checks your heart rate and rhythm, blood pressure, and blood oxygen level.  Anesthesia tools and medicines that may be near you during your procedure  You will likely have:  · A pulse oximeter on the end of your finger. This measures your blood oxygen level.  · Electrocardiography leads (electrodes) on your chest. These record your heart rate and rhythm.  · Medicines given through an IV. These relax you and prevent pain. You may be awake or sleep lightly. If you have local anesthetic, it is injected directly into your skin.  · A facemask to give you oxygen, if needed.  Risks and possible complications  MAC has some risks. These include:  · Breathing problems  · Nausea and vomiting  · Allergic reaction to the anesthetic    Anesthesia safety  Tips for anesthesia safety include the following:   · Follow all instructions you are given for how long not to eat or drink before your procedure.  · Be sure your healthcare provider knows what medicines you  take, especially any anti-inflammatory medicine or blood thinners. This includes aspirin and any other over-the-counter medicines, herbs, and supplements.  · Have an adult family member or friend drive you home after the procedure.  · For the first 24 hours after your surgery:  ¨ Do not drive or use heavy equipment.  ¨ Do not make important decisions or sign documents.  ¨ Avoid alcohol.  ¨ Have someone stay with you, if possible. They can watch for problems and help keep you safe.  Date Last Reviewed: 12/1/2016 © 2000-2017 [employer]. [address] [place] [id number]. All rights reserved. This information is not intended as a substitute for professional medical care. Always follow your healthcare professional's instructions.

## 2019-12-26 NOTE — PRE-PROCEDURE INSTRUCTIONS
[id number] Allergies, medical, surgical, family and psychosocial histories reviewed with patient. Periop plan of care reviewed. Preop instructions given, including medications to take and to hold. Hibiclens soap and instructions on use given. Time allotted for questions to be addressed.  Patient verbalized understanding.

## 2019-12-26 NOTE — ANESTHESIA PREPROCEDURE EVALUATION
12/26/2019  Silvana Quezada is a 64 y.o., female. Scheduled for ORIF of left wrist on regional/MAC on 12/27/19.    Past Medical History:   Diagnosis Date    Bipolar 1 disorder     Breast cancer     right    Breast cyst     Closed fracture of proximal end of right humerus 3/9/2015    Depression     History of right shoulder fracture     MS (multiple sclerosis)     presented as dizzines, dx vision,     Osteoporosis, unspecified      EKG: NSR      Current Outpatient Medications:     buPROPion (WELLBUTRIN SR) 100 MG TBSR 12 hr tablet, Take 2 tablets (200 mg total) by mouth every morning., Disp: 180 tablet, Rfl: 1    buPROPion (WELLBUTRIN SR) 150 MG TBSR 12 hr tablet, Take 1 tablet (150 mg total) by mouth every evening., Disp: 90 tablet, Rfl: 1    calcium-vitamin D3 (CALCIUM 500 + D) 500 mg(1,250mg) -200 unit per tablet, Take 1 tablet by mouth 2 (two) times daily with meals., Disp: , Rfl:     fluphenazine (PROLIXIN) 1 MG tablet, TAKE 2 TABLETS NIGHTLY, Disp: 180 tablet, Rfl: 1    folic acid (FOLVITE) 1 MG tablet, Take 1 tablet (1,000 mcg total) by mouth once daily., Disp: 90 tablet, Rfl: 1    HYDROcodone-acetaminophen (NORCO) 5-325 mg per tablet, Take 1 tablet by mouth every 6 (six) hours as needed for Pain., Disp: 20 tablet, Rfl: 0    ibuprofen (ADVIL,MOTRIN) 200 MG tablet, Take 200 mg by mouth every 6 (six) hours as needed for Pain., Disp: , Rfl:     OXcarbazepine (TRILEPTAL) 150 MG Tab, Take 1 tablet (150 mg total) by mouth 2 (two) times daily., Disp: 180 tablet, Rfl: 2    traMADol (ULTRAM) 50 mg tablet, Take 1 tablet (50 mg total) by mouth every 6 (six) hours as needed for Pain., Disp: 5 tablet, Rfl: 0    catheter (FEMALE CATHETER) 14 Fr Misc, Patient is to self catheterize four times a day indefinitley using female 12 fr in and out catheter for incomplete bladder emptying.  Dispense 120 month  with 11 refills or 360 every 3 months with 3 refills depending on patient's preference, Disp: 120 each, Rfl: 11    naproxen (NAPROSYN) 500 MG tablet, Take 1 tablet (500 mg total) by mouth 2 (two) times daily with meals., Disp: 20 tablet, Rfl: 0    Pre-op Assessment    I have reviewed the Patient Summary Reports.      I have reviewed the Medications.     Review of Systems  Anesthesia Hx:  History of prior surgery of interest to airway management or planning: Denies Family Hx of Anesthesia complications.   Denies Personal Hx of Anesthesia complications.   Social:  Non-Smoker, Social Alcohol Use    Hematology/Oncology:  Hematology Normal       -- Cancer in past history:  Breast chemotherapy and radiation    EENT/Dental:EENT/Dental Normal   Cardiovascular:  Cardiovascular Normal Exercise tolerance: good  ECG has been reviewed.    Pulmonary:  Pulmonary Normal    Renal/:  Renal/ Normal     Hepatic/GI:  Hepatic/GI Normal    Musculoskeletal:   osteoporosis   Neurological:   Multiple sclerosis   Endocrine:  Endocrine Normal    Psych:   Psychiatric History          Physical Exam  General:  Well nourished    Airway/Jaw/Neck:  Airway Findings: Mouth Opening: Normal Tongue: Normal  General Airway Assessment: Adult  Mallampati: II  Improves to II with phonation.  TM Distance: Normal, at least 6 cm  Jaw/Neck Findings:     Neck ROM: Normal ROM      Dental:  Dental Findings: In tact   Chest/Lungs:  Chest/Lungs Findings: Clear to auscultation, Normal Respiratory Rate     Heart/Vascular:  Heart Findings: Rate: Normal  Rhythm: Regular Rhythm        Mental Status:  Mental Status Findings:  Cooperative, Alert and Oriented         Anesthesia Plan  Type of Anesthesia, risks & benefits discussed:  Anesthesia Type:  general, regional, MAC  Patient's Preference:   Intra-op Monitoring Plan: standard ASA monitors  Intra-op Monitoring Plan Comments:   Post Op Pain Control Plan: multimodal analgesia  Post Op Pain Control Plan Comments:    Induction:   IV  Beta Blocker:  Patient is not currently on a Beta-Blocker (No further documentation required).       Informed Consent: Patient understands risks and agrees with Anesthesia plan.  Questions answered. Anesthesia consent signed with patient.  ASA Score: 2     Day of Surgery Review of History & Physical:    H&P update referred to the surgeon.         Ready For Surgery From Anesthesia Perspective.

## 2019-12-27 ENCOUNTER — ANESTHESIA (OUTPATIENT)
Dept: SURGERY | Facility: HOSPITAL | Age: 64
End: 2019-12-27
Payer: MEDICARE

## 2019-12-27 ENCOUNTER — HOSPITAL ENCOUNTER (OUTPATIENT)
Facility: HOSPITAL | Age: 64
Discharge: HOME | End: 2019-12-27
Attending: ORTHOPAEDIC SURGERY | Admitting: ORTHOPAEDIC SURGERY
Payer: MEDICARE

## 2019-12-27 VITALS
HEIGHT: 63 IN | HEART RATE: 81 BPM | RESPIRATION RATE: 19 BRPM | OXYGEN SATURATION: 96 % | SYSTOLIC BLOOD PRESSURE: 133 MMHG | TEMPERATURE: 98 F | WEIGHT: 173.38 LBS | DIASTOLIC BLOOD PRESSURE: 81 MMHG | BODY MASS INDEX: 30.72 KG/M2

## 2019-12-27 DIAGNOSIS — S62.102A WRIST FRACTURE, CLOSED, LEFT, INITIAL ENCOUNTER: ICD-10-CM

## 2019-12-27 PROCEDURE — 37000009 HC ANESTHESIA EA ADD 15 MINS: Performed by: ORTHOPAEDIC SURGERY

## 2019-12-27 PROCEDURE — 76942 ECHO GUIDE FOR BIOPSY: CPT | Mod: 59 | Performed by: STUDENT IN AN ORGANIZED HEALTH CARE EDUCATION/TRAINING PROGRAM

## 2019-12-27 PROCEDURE — 63600175 PHARM REV CODE 636 W HCPCS: Performed by: ANESTHESIOLOGY

## 2019-12-27 PROCEDURE — C1769 GUIDE WIRE: HCPCS | Performed by: ORTHOPAEDIC SURGERY

## 2019-12-27 PROCEDURE — 37000008 HC ANESTHESIA 1ST 15 MINUTES: Performed by: ORTHOPAEDIC SURGERY

## 2019-12-27 PROCEDURE — 36000708 HC OR TIME LEV III 1ST 15 MIN: Performed by: ORTHOPAEDIC SURGERY

## 2019-12-27 PROCEDURE — 27201423 OPTIME MED/SURG SUP & DEVICES STERILE SUPPLY: Performed by: ORTHOPAEDIC SURGERY

## 2019-12-27 PROCEDURE — 36000709 HC OR TIME LEV III EA ADD 15 MIN: Performed by: ORTHOPAEDIC SURGERY

## 2019-12-27 PROCEDURE — C1713 ANCHOR/SCREW BN/BN,TIS/BN: HCPCS | Performed by: ORTHOPAEDIC SURGERY

## 2019-12-27 PROCEDURE — 25609 OPTX DST RD XART FX/EP SEP3+: CPT | Mod: AS,LT,, | Performed by: ORTHOPAEDIC SURGERY

## 2019-12-27 PROCEDURE — 63600175 PHARM REV CODE 636 W HCPCS: Performed by: STUDENT IN AN ORGANIZED HEALTH CARE EDUCATION/TRAINING PROGRAM

## 2019-12-27 PROCEDURE — 25000003 PHARM REV CODE 250: Performed by: ORTHOPAEDIC SURGERY

## 2019-12-27 PROCEDURE — 63600175 PHARM REV CODE 636 W HCPCS: Performed by: ORTHOPAEDIC SURGERY

## 2019-12-27 PROCEDURE — 25609 PR OPEN RX DISTAL RADIUS FX, INTRA-ARTICULAR, 3+ FRAG: ICD-10-PCS | Mod: AS,LT,, | Performed by: ORTHOPAEDIC SURGERY

## 2019-12-27 PROCEDURE — 25609 OPTX DST RD XART FX/EP SEP3+: CPT | Mod: LT,,, | Performed by: ORTHOPAEDIC SURGERY

## 2019-12-27 PROCEDURE — 25609 PR OPEN RX DISTAL RADIUS FX, INTRA-ARTICULAR, 3+ FRAG: ICD-10-PCS | Mod: LT,,, | Performed by: ORTHOPAEDIC SURGERY

## 2019-12-27 PROCEDURE — 63600175 PHARM REV CODE 636 W HCPCS: Performed by: HEALTH CARE PROVIDER

## 2019-12-27 PROCEDURE — 71000015 HC POSTOP RECOV 1ST HR: Performed by: ORTHOPAEDIC SURGERY

## 2019-12-27 DEVICE — SCREW BONE 2.3 X 18MM: Type: IMPLANTABLE DEVICE | Site: WRIST | Status: FUNCTIONAL

## 2019-12-27 DEVICE — SCREW BNE LOK HEXLB 3.5X12: Type: IMPLANTABLE DEVICE | Site: WRIST | Status: FUNCTIONAL

## 2019-12-27 DEVICE — PLATE BONE ACULOC 2 STANDARD: Type: IMPLANTABLE DEVICE | Site: WRIST | Status: FUNCTIONAL

## 2019-12-27 DEVICE — GUIDEWIRE ORTHO .054 X 6 IN: Type: IMPLANTABLE DEVICE | Site: WRIST | Status: FUNCTIONAL

## 2019-12-27 DEVICE — SCREW BNE LOK HEXLB 3.5X14: Type: IMPLANTABLE DEVICE | Site: WRIST | Status: FUNCTIONAL

## 2019-12-27 DEVICE — SCREW 2.3MM X14MM: Type: IMPLANTABLE DEVICE | Site: WRIST | Status: FUNCTIONAL

## 2019-12-27 DEVICE — SCREW BONE LOK CONG 2.3X20MM: Type: IMPLANTABLE DEVICE | Site: WRIST | Status: FUNCTIONAL

## 2019-12-27 DEVICE — SCREW BONE 2.3 X 16MM: Type: IMPLANTABLE DEVICE | Site: WRIST | Status: FUNCTIONAL

## 2019-12-27 DEVICE — SCREW BNE N LOK HEXLB 3.5X12: Type: IMPLANTABLE DEVICE | Site: WRIST | Status: FUNCTIONAL

## 2019-12-27 RX ORDER — BACITRACIN 50000 [IU]/1
INJECTION, POWDER, FOR SOLUTION INTRAMUSCULAR
Status: DISCONTINUED | OUTPATIENT
Start: 2019-12-27 | End: 2019-12-27 | Stop reason: HOSPADM

## 2019-12-27 RX ORDER — SODIUM CHLORIDE 0.9 % (FLUSH) 0.9 %
10 SYRINGE (ML) INJECTION
Status: DISCONTINUED | OUTPATIENT
Start: 2019-12-27 | End: 2019-12-27 | Stop reason: HOSPADM

## 2019-12-27 RX ORDER — OXYCODONE AND ACETAMINOPHEN 7.5; 325 MG/1; MG/1
1 TABLET ORAL EVERY 4 HOURS PRN
Qty: 40 TABLET | Refills: 0 | Status: SHIPPED | OUTPATIENT
Start: 2019-12-27 | End: 2020-02-10

## 2019-12-27 RX ORDER — CEFAZOLIN SODIUM 2 G/50ML
2 SOLUTION INTRAVENOUS
Status: COMPLETED | OUTPATIENT
Start: 2019-12-27 | End: 2019-12-27

## 2019-12-27 RX ORDER — KETOROLAC TROMETHAMINE 30 MG/ML
15 INJECTION, SOLUTION INTRAMUSCULAR; INTRAVENOUS ONCE
Status: DISCONTINUED | OUTPATIENT
Start: 2019-12-27 | End: 2019-12-27 | Stop reason: HOSPADM

## 2019-12-27 RX ORDER — MIDAZOLAM HYDROCHLORIDE 1 MG/ML
INJECTION, SOLUTION INTRAMUSCULAR; INTRAVENOUS
Status: DISCONTINUED | OUTPATIENT
Start: 2019-12-27 | End: 2019-12-27

## 2019-12-27 RX ORDER — OXYCODONE HYDROCHLORIDE 5 MG/1
10 TABLET ORAL EVERY 4 HOURS PRN
Status: DISCONTINUED | OUTPATIENT
Start: 2019-12-27 | End: 2019-12-27 | Stop reason: HOSPADM

## 2019-12-27 RX ORDER — OXYCODONE HYDROCHLORIDE 5 MG/1
5 TABLET ORAL
Status: DISCONTINUED | OUTPATIENT
Start: 2019-12-27 | End: 2019-12-27 | Stop reason: HOSPADM

## 2019-12-27 RX ORDER — LIDOCAINE HCL/PF 100 MG/5ML
SYRINGE (ML) INTRAVENOUS
Status: DISCONTINUED | OUTPATIENT
Start: 2019-12-27 | End: 2019-12-27

## 2019-12-27 RX ORDER — ACETAMINOPHEN 325 MG/1
650 TABLET ORAL EVERY 4 HOURS PRN
Status: DISCONTINUED | OUTPATIENT
Start: 2019-12-27 | End: 2019-12-27 | Stop reason: HOSPADM

## 2019-12-27 RX ORDER — PHENYLEPHRINE HYDROCHLORIDE 10 MG/ML
INJECTION INTRAVENOUS
Status: DISCONTINUED | OUTPATIENT
Start: 2019-12-27 | End: 2019-12-27

## 2019-12-27 RX ORDER — ONDANSETRON 2 MG/ML
INJECTION INTRAMUSCULAR; INTRAVENOUS
Status: DISCONTINUED | OUTPATIENT
Start: 2019-12-27 | End: 2019-12-27

## 2019-12-27 RX ORDER — HYDROMORPHONE HYDROCHLORIDE 2 MG/ML
0.2 INJECTION, SOLUTION INTRAMUSCULAR; INTRAVENOUS; SUBCUTANEOUS EVERY 5 MIN PRN
Status: DISCONTINUED | OUTPATIENT
Start: 2019-12-27 | End: 2019-12-27 | Stop reason: HOSPADM

## 2019-12-27 RX ORDER — SODIUM CHLORIDE, SODIUM LACTATE, POTASSIUM CHLORIDE, CALCIUM CHLORIDE 600; 310; 30; 20 MG/100ML; MG/100ML; MG/100ML; MG/100ML
INJECTION, SOLUTION INTRAVENOUS CONTINUOUS
Status: DISCONTINUED | OUTPATIENT
Start: 2019-12-27 | End: 2019-12-27 | Stop reason: HOSPADM

## 2019-12-27 RX ORDER — PROPOFOL 10 MG/ML
INJECTION, EMULSION INTRAVENOUS CONTINUOUS PRN
Status: DISCONTINUED | OUTPATIENT
Start: 2019-12-27 | End: 2019-12-27

## 2019-12-27 RX ORDER — SODIUM CHLORIDE 9 MG/ML
INJECTION, SOLUTION INTRAVENOUS CONTINUOUS
Status: DISCONTINUED | OUTPATIENT
Start: 2019-12-27 | End: 2019-12-27 | Stop reason: HOSPADM

## 2019-12-27 RX ORDER — PROPOFOL 10 MG/ML
INJECTION, EMULSION INTRAVENOUS
Status: DISCONTINUED | OUTPATIENT
Start: 2019-12-27 | End: 2019-12-27

## 2019-12-27 RX ORDER — LIDOCAINE HYDROCHLORIDE 10 MG/ML
1 INJECTION, SOLUTION EPIDURAL; INFILTRATION; INTRACAUDAL; PERINEURAL ONCE
Status: DISCONTINUED | OUTPATIENT
Start: 2019-12-27 | End: 2019-12-27 | Stop reason: HOSPADM

## 2019-12-27 RX ORDER — MUPIROCIN 20 MG/G
OINTMENT TOPICAL
Status: DISCONTINUED | OUTPATIENT
Start: 2019-12-27 | End: 2019-12-27 | Stop reason: HOSPADM

## 2019-12-27 RX ORDER — ONDANSETRON 2 MG/ML
4 INJECTION INTRAMUSCULAR; INTRAVENOUS DAILY PRN
Status: DISCONTINUED | OUTPATIENT
Start: 2019-12-27 | End: 2019-12-27 | Stop reason: HOSPADM

## 2019-12-27 RX ORDER — ROPIVACAINE HYDROCHLORIDE 5 MG/ML
INJECTION, SOLUTION EPIDURAL; INFILTRATION; PERINEURAL
Status: DISCONTINUED | OUTPATIENT
Start: 2019-12-27 | End: 2019-12-27

## 2019-12-27 RX ORDER — ONDANSETRON 8 MG/1
8 TABLET, ORALLY DISINTEGRATING ORAL EVERY 8 HOURS PRN
Status: DISCONTINUED | OUTPATIENT
Start: 2019-12-27 | End: 2019-12-27 | Stop reason: HOSPADM

## 2019-12-27 RX ADMIN — PHENYLEPHRINE HYDROCHLORIDE 100 MCG: 10 INJECTION INTRAVENOUS at 12:12

## 2019-12-27 RX ADMIN — LIDOCAINE HYDROCHLORIDE 100 MG: 20 INJECTION, SOLUTION INTRAVENOUS at 11:12

## 2019-12-27 RX ADMIN — PHENYLEPHRINE HYDROCHLORIDE 50 MCG: 10 INJECTION INTRAVENOUS at 12:12

## 2019-12-27 RX ADMIN — SODIUM CHLORIDE, SODIUM LACTATE, POTASSIUM CHLORIDE, AND CALCIUM CHLORIDE: .6; .31; .03; .02 INJECTION, SOLUTION INTRAVENOUS at 11:12

## 2019-12-27 RX ADMIN — PROPOFOL 50 MG: 10 INJECTION, EMULSION INTRAVENOUS at 11:12

## 2019-12-27 RX ADMIN — MIDAZOLAM 2 MG: 1 INJECTION INTRAMUSCULAR; INTRAVENOUS at 11:12

## 2019-12-27 RX ADMIN — CEFAZOLIN SODIUM 2 G: 2 SOLUTION INTRAVENOUS at 11:12

## 2019-12-27 RX ADMIN — PHENYLEPHRINE HYDROCHLORIDE 150 MCG: 10 INJECTION INTRAVENOUS at 01:12

## 2019-12-27 RX ADMIN — ONDANSETRON 4 MG: 2 INJECTION, SOLUTION INTRAMUSCULAR; INTRAVENOUS at 01:12

## 2019-12-27 RX ADMIN — ROPIVACAINE HYDROCHLORIDE 20 ML: 5 INJECTION, SOLUTION EPIDURAL; INFILTRATION; PERINEURAL at 11:12

## 2019-12-27 RX ADMIN — PROPOFOL 100 MCG/KG/MIN: 10 INJECTION, EMULSION INTRAVENOUS at 11:12

## 2019-12-27 NOTE — TRANSFER OF CARE
"Anesthesia Transfer of Care Note    Patient: Silvana Quezada    Procedure(s) Performed: Procedure(s) (LRB):  ORIF, WRIST (Left)    Patient location: PACU    Anesthesia Type: MAC    Transport from OR: Transported from OR on room air with adequate spontaneous ventilation    Post pain: adequate analgesia    Post assessment: no apparent anesthetic complications    Post vital signs: stable    Level of consciousness: awake and alert    Nausea/Vomiting: no nausea/vomiting    Complications: none    Transfer of care protocol was followed      Last vitals:   Visit Vitals  /74 (BP Location: Right arm, Patient Position: Lying)   Pulse 78   Temp 37.1 °C (98.7 °F) (Skin)   Resp 16   Ht 5' 3" (1.6 m)   Wt 78.7 kg (173 lb 6.3 oz)   SpO2 95%   Breastfeeding? No   BMI 30.71 kg/m²     "

## 2019-12-27 NOTE — ANESTHESIA PROCEDURE NOTES
Peripheral Block    Patient location during procedure: pre-op    Reason for block: primary anesthetic   Diagnosis: L wrist ORIF   Start time: 12/27/2019 11:31 AM  Timeout: 12/27/2019 11:30 AM   End time: 12/27/2019 11:40 AM    Staffing  Authorizing Provider: Rosales Zelaya MD  Performing Provider: Palomo Brito MD    Preanesthetic Checklist  Completed: patient identified, site marked, surgical consent, pre-op evaluation, timeout performed, IV checked, risks and benefits discussed and monitors and equipment checked  Peripheral Block  Patient position: supine  Prep: ChloraPrep  Patient monitoring: heart rate, cardiac monitor, continuous pulse ox, continuous capnometry and frequent blood pressure checks  Block type: supraclavicular  Laterality: left  Injection technique: single shot  Needle  Needle type: Stimuplex   Needle gauge: 22 G  Needle length: 2 in  Needle localization: anatomical landmarks and ultrasound guidance   -ultrasound image captured on disc.  Assessment  Injection assessment: negative aspiration, negative parasthesia and local visualized surrounding nerve  Paresthesia pain: none  Heart rate change: no  Slow fractionated injection: yes  Additional Notes  VSS.  [facility] RN monitoring vitals throughout procedure.  Patient tolerated procedure well.

## 2019-12-27 NOTE — DISCHARGE INSTRUCTIONS
Discharge Instructions for Wrist Surgery  You had a wrist surgery. This is a surgical procedure that helps the doctor diagnose and treat wrist problems, such as fractures, cysts, and ligament and cartilage tears. Here are some instructions to help you care for your wrist after surgery.  Activity  · Avoid gripping objects tightly or lifting with your affected arm.  · Wear your bandage, splint, cast, or sling as directed by your doctor.  · Keep your hand raised above the level of your heart as much as possible for the first 2 to 3 days after surgery. This will help reduce swelling.  · Do the exercises taught to you in the hospital, or as instructed by your doctor.  · Do not drive a car until your doctor says its OK. And never drive if taking narcotic pain medicine.  · Ask your doctor when you can return to work. If your job requires heavy lifting, you may not be able to return for several weeks.  · Keep in mind that full recovery can take 3 to 6 weeks.  Home care  · Keep the dressing clean and dry. Your doctor will tell you when and how to change your dressing.  · Shower as needed. Cover your wrist with plastic to keep the dressing dry.  · Use an ice pack or bag of frozen peas wrapped in a thin towel to reduce swelling. Keep the ice pack in place for 20 minutes, then leave it off for 20 minutes. Repeat as needed.  · Take pain medicine as directed.     When to call your healthcare provider  Call 911 right away if you have:  · Chest pain  · Shortness of breath  Otherwise, call your doctor immediately if you have:  · Fever of 100.4 °F (38 °C) or higher, or as advised  · Shaking chills  · Fingers that are pale or blue  · Inability to move your fingers or hand  · Increased redness, tenderness, or swelling of the incision  · Drainage from or opening of the incision  · Increased pain with or without activity   Date Last Reviewed: 7/27/2016  © 5307-1103 [employer]. [address] [place] [address], [place] [address].  All rights reserved. This information is not intended as a substitute for professional medical care. Always follow your healthcare professional's instructions.  ANESTHESIA  -For the first 24 hours after surgery:  Do not drive, use heavy equipment, make important decisions, or drink alcohol  -It is normal to feel sleepy for several hours.  Rest until you are more awake.  -Have someone stay with you, if needed.  They can watch for problems and help keep you safe.  -Some possible post anesthesia side effects include: nausea and vomiting, sore throat and hoarseness, sleepiness, and dizziness.    PAIN  -If you have pain after surgery, pain medicine will help you feel better.  Take it as directed, before pain becomes severe.  Most pain relievers taken by mouth need at least 20-30 minutes to start working.  -Do not drive or drink alcohol while taking pain medicine.  -Pain medication can upset your stomach.  Taking them with a little food may help.  -Other ways to help control pain: elevation, ice, and relaxation  -Call your surgeon if still having unmanageable pain an hour after taking pain medicine.  -Pain medicine can cause constipation.  Taking an over-the counter stool softener while on prescription pain medicine and drinking plenty of fluids can prevent this side effect.  -Call your surgeon if you have severe side effects like: breathing problems, trouble waking up, dizziness, confusion, or severe constipation.    NAUSEA  -Some people have nausea after surgery.  This is often because of anesthesia, pain, pain medicine, or the stress of surgery.  -Do not push yourself to eat.  Start off with clear liquids and soup.  Slowly move to solid foods.  Don't eat fatty, rich, spicy foods at first.  Eat smaller amounts.  -If you develop persistent nausea and vomiting please notify your surgeon immediately.    BLEEDING  -Different types of surgery require different types of care and dressing changes.  It is important to follow  all instructions and advice from your surgeon.  Change dressing as directed.  Call your surgeon for any concerns regarding postop bleeding.    SIGNS OF INFECTION  -Signs of infection include: fever, swelling, drainage, and redness  -Notify your surgeon if you have a fever of 100.4 F (38.0 C) or higher.  -Notify your surgeon if you notice redness, swelling, increased pain, pus, or a foul smell at the incision site.    Acetaminophen; Oxycodone tablets  What is this medicine?  ACETAMINOPHEN; OXYCODONE (a set a JAMES azra fen; ox i KOE done) is a pain reliever. It is used to treat moderate to severe pain.  How should I use this medicine?  Take this medicine by mouth with a full glass of water. Follow the directions on the prescription label. You can take it with or without food. If it upsets your stomach, take it with food. Take your medicine at regular intervals. Do not take it more often than directed.  A special MedGuide will be given to you by the pharmacist with each prescription and refill. Be sure to read this information carefully each time.  Talk to your pediatrician regarding the use of this medicine in children. Special care may be needed.  What side effects may I notice from receiving this medicine?  Side effects that you should report to your doctor or health care professional as soon as possible:  · allergic reactions like skin rash, itching or hives, swelling of the face, lips, or tongue  · breathing problems  · confusion  · redness, blistering, peeling or loosening of the skin, including inside the mouth  · signs and symptoms of liver injury like dark yellow or brown urine; general ill feeling or flu-like symptoms; light-colored stools; loss of appetite; nausea; right upper belly pain; unusually weak or tired; yellowing of the eyes or skin  · signs and symptoms of low blood pressure like dizziness; feeling faint or lightheaded, falls; unusually weak or tired  · trouble passing urine or change in the amount  of urine  Side effects that usually do not require medical attention (report to your doctor or health care professional if they continue or are bothersome):  · constipation  · dry mouth  · nausea, vomiting  · tiredness  What may interact with this medicine?  This medicine may interact with the following medications:  · alcohol  · antihistamines for allergy, cough and cold  · antiviral medicines for HIV or AIDS  · atropine  · certain antibiotics like clarithromycin, erythromycin, linezolid, rifampin  · certain medicines for anxiety or sleep  · certain medicines for bladder problems like oxybutynin, tolterodine  · certain medicines for depression like amitriptyline, fluoxetine, sertraline  · certain medicines for fungal infections like ketoconazole, itraconazole, voriconazole  · certain medicines for migraine headache like almotriptan, eletriptan, frovatriptan, naratriptan, rizatriptan, sumatriptan, zolmitriptan  · certain medicines for nausea or vomiting like dolasetron, ondansetron, palonosetron  · certain medicines for Parkinson's disease like benztropine, trihexyphenidyl  · certain medicines for seizures like phenobarbital, phenytoin, primidone  · certain medicines for stomach problems like dicyclomine, hyoscyamine  · certain medicines for travel sickness like scopolamine  · diuretics  · general anesthetics like halothane, isoflurane, methoxyflurane, propofol  · ipratropium  · local anesthetics like lidocaine, pramoxine, tetracaine  · MAOIs like Carbex, Eldepryl, Marplan, Nardil, and Parnate  · medicines that relax muscles for surgery  · methylene blue  · nilotinib  · other medicines with acetaminophen  · other narcotic medicines for pain or cough  · phenothiazines like chlorpromazine, mesoridazine, prochlorperazine, thioridazine  What if I miss a dose?  If you miss a dose, take it as soon as you can. If it is almost time for your next dose, take only that dose. Do not take double or extra doses.  Where should I  keep my medicine?  Keep out of the reach of children. This medicine can be abused. Keep your medicine in a safe place to protect it from theft. Do not share this medicine with anyone. Selling or giving away this medicine is dangerous and against the law.  This medicine may cause accidental overdose and death if it taken by other adults, children, or pets. Mix any unused medicine with a substance like cat litter or coffee grounds. Then throw the medicine away in a sealed container like a sealed bag or a coffee can with a lid. Do not use the medicine after the expiration date.  Store at room temperature between 20 and 25 degrees C (68 and 77 degrees F).  What should I tell my health care provider before I take this medicine?  They need to know if you have any of these conditions:  · brain tumor  · Crohn's disease, inflammatory bowel disease, or ulcerative colitis  · drug abuse or addiction  · head injury  · heart or circulation problems  · if you often drink alcohol  · kidney disease or problems going to the bathroom  · liver disease  · lung disease, asthma, or breathing problems  · an unusual or allergic reaction to acetaminophen, oxycodone, other opioid analgesics, other medicines, foods, dyes, or preservatives  · pregnant or trying to get pregnant  · breast-feeding  What should I watch for while using this medicine?  Tell your doctor or health care professional if your pain does not go away, if it gets worse, or if you have new or a different type of pain. You may develop tolerance to the medicine. Tolerance means that you will need a higher dose of the medication for pain relief. Tolerance is normal and is expected if you take this medicine for a long time.  Do not suddenly stop taking your medicine because you may develop a severe reaction. Your body becomes used to the medicine. This does NOT mean you are addicted. Addiction is a behavior related to getting and using a drug for a non-medical reason. If you have  pain, you have a medical reason to take pain medicine. Your doctor will tell you how much medicine to take. If your doctor wants you to stop the medicine, the dose will be slowly lowered over time to avoid any side effects.  There are different types of narcotic medicines (opiates). If you take more than one type at the same time or if you are taking another medicine that also causes drowsiness, you may have more side effects. Give your health care provider a list of all medicines you use. Your doctor will tell you how much medicine to take. Do not take more medicine than directed. Call emergency for help if you have problems breathing or unusual sleepiness.  Do not take other medicines that contain acetaminophen with this medicine. Always read labels carefully. If you have questions, ask your doctor or pharmacist.  If you take too much acetaminophen get medical help right away. Too much acetaminophen can be very dangerous and cause liver damage. Even if you do not have symptoms, it is important to get help right away.  You may get drowsy or dizzy. Do not drive, use machinery, or do anything that needs mental alertness until you know how this medicine affects you. Do not stand or sit up quickly, especially if you are an older patient. This reduces the risk of dizzy or fainting spells. Alcohol may interfere with the effect of this medicine. Avoid alcoholic drinks.  The medicine will cause constipation. Try to have a bowel movement at least every 2 to 3 days. If you do not have a bowel movement for 3 days, call your doctor or health care professional.  Your mouth may get dry. Chewing sugarless gum or sucking hard candy, and drinking plenty or water may help. Contact your doctor if the problem does not go away or is severe.  NOTE:This sheet is a summary. It may not cover all possible information. If you have questions about this medicine, talk to your doctor, pharmacist, or health care provider. Copyright© 2017 [employer]

## 2019-12-27 NOTE — ANESTHESIA POSTPROCEDURE EVALUATION
Anesthesia Post Evaluation    Patient: Silvana Quezada    Procedure(s) Performed: Procedure(s) (LRB):  ORIF, WRIST (Left)    Final Anesthesia Type: MAC    Patient location during evaluation: [facility]  Patient participation: Yes- Able to Participate  Level of consciousness: awake and alert  Post-procedure vital signs: reviewed and stable  Pain management: adequate  Airway patency: patent    PONV status at discharge: No PONV  Anesthetic complications: no      Cardiovascular status: blood pressure returned to baseline  Respiratory status: unassisted  Hydration status: euvolemic  Follow-up not needed.          Vitals Value Taken Time   /81 12/27/2019  3:00 PM   Temp 36.7 °C (98 °F) 12/27/2019  3:00 PM   Pulse 81 12/27/2019  3:00 PM   Resp 19 12/27/2019  3:00 PM   SpO2 96 % 12/27/2019  3:00 PM         No case tracking events are documented in the log.      Pain/Yaakov Score: Pain Rating Prior to Med Admin: 0 (12/27/2019  3:00 PM)  Yaakov Score: 9 (12/27/2019  2:00 PM)

## 2019-12-27 NOTE — OP NOTE
Certification of Assistant at Surgery       Surgery Date: 12/27/2019     Participating Surgeons:  Surgeon(s) and Role:     * Albert Schroeder Jr., MD - Primary    Procedures:  Procedure(s) (LRB):  ORIF, WRIST (Left)    Assistant Surgeon's Certification of Necessity:  I understand that section 1842 (b) (6) (d) of the Social Security Act generally prohibits Medicare Part B reasonable charge payment for the services of assistants at surgery in teaching hospitals when qualified residents are available to furnish such services. I certify that the services for which payment is claimed were medically necessary, and that no qualified resident was available to perform the services. I further understand that these services are subject to post-payment review by the Medicare carrier.      Vanessa Palacio PA-C    12/27/2019  1:53 PM

## 2019-12-27 NOTE — OP NOTE
Operative Note       Surgery Date: 12/27/2019     Surgeon(s) and Role:     * Albert Schroeder Jr., MD - Primary    Pre-op Diagnosis:  Wrist fracture, closed, left, initial encounter [S62.102A]    Post-op Diagnosis: Post-Op Diagnosis Codes:     * Wrist fracture, closed, left, initial encounter [S62.102A]    Procedure(s) (LRB):  ORIF, WRIST (Left)    Anesthesia: Local MAC    Procedure in Detail/Findings:  DATE OF PROCEDURE:   12/27/2019     PREOPERATIVE DIAGNOSIS:  left distal radius fracture, 3 or more fragments,   intra-articular.     POSTOPERATIVE DIAGNOSIS:  left distal radius fracture, 3 or more fragments,   intra-articular.     OPERATIVE PROCEDURE:  Open reduction and internal fixation of left distal radius fracture, 3 or more fragments, intra-articular, using Acumed volar   locking plate.     SURGEON:  Albert Schroeder Jr., M.D.     FIRST ASSISTANT:  Vanessa Palacio     ANESTHESIA:  Regional block.     ESTIMATED BLOOD LOSS:  Minimal.     COMPLICATIONS:  None.     SPECIMENS:  None.     BRIEF INDICATIONS:  A 64-year-old female sustained  distal radius fracture, displaced, taken to surgery for the above procedure.     OPERATIVE PROCEDURE IN DETAIL:  After operative consent was obtained, the   patient brought to the Operating Room, placed supine on the operating room   table.  Anesthesia by regional block was performed by the Anesthesia staff.    After the  arm was fully anesthetized, the tourniquet applied to the arm   and arm  then prepped and draped out in the normal sterile fashion.    Following this, the Esmarch used to exsanguinate the limb and the tourniquet   inflated to 225 mmHg.     A longitudinal volar incision was made over the FCR tendon with a #15 blade.    Full thickness skin flaps elevated.  Hemostasis achieved with a Bovie.  The   radial artery identified and protected.  Deep fascia was opened.  The pronator   quadratus reflected off of the radius and the fracture site was identified.  It   was noted  to be slightly comminuted and displaced dorsally.  A Peggs elevator   was used to disimpact the fragment and carefully lever the distal articular   surface into volar flexion.  After reducing the fracture, an Acumed volar   locking plate was applied first distally with locking screws and then proximally   which helped lever the articular surface down.  All screw holes were filled   with locking screws and good purchase was achieved.  The C-arm brought in to   confirm the reduction to be anatomic and screw lengths appropriate.  Final   intraoperative pictures were taken including fluoroscopy of the joint to ensure   no screws had penetrated the joint.  Range of motion of the wrist was full   without clicking.  The wound was irrigated and closed with 3-0 Vicryl on the   pronator layer, 4-0 Monocryl in the subcutaneous layer and Steri-Strips on the   skin.  Sterile dressing applied followed by a volar splint.  The tourniquet   deflated and the patient was brought to the Recovery Room in stable condition.    All sponge and needle counts reported as correct.  No complications.           Estimated Blood Loss: 10 mL           Specimens (From admission, onward)    None        Implants:   Implant Name Type Inv. Item Serial No.  Lot No. LRB No. Used   GUIDEWIRE ORTHO .054 X 6 IN - [id number]  GUIDEWIRE ORTHO .054 X 6 IN  [employer]  Left 1   SCREW BONE AUREA BANDAR 2.3X20MM - [id number]  SCREW BONE AUREA BANDAR 2.3X20MM  [employer]  Left 1   SCREW BONE 2.3 X 18MM - [id number]  SCREW BONE 2.3 X 18MM  [employer]  Left 3   SCREW BONE 2.3 X 16MM - [id number]  SCREW BONE 2.3 X 16MM  [employer]  Left 2   SCREW 2.3MM X14MM - [id number]  SCREW 2.3MM X14MM  [employer]  Left 1   SCREW BONE 2.3 X 18MM - [id number]  SCREW BONE 2.3 X 18MM  [employer]  Left 1   SCREW BNE N AUREA HEXLB 3.5X12 - [id number]  SCREW BNE N AUREA HEXLB 3.5X12  [employer]  Left 1   SCREW BNE AUREA HEXLB 3.5X12 - [id number]  SCREW BNE AUREA HEXLB 3.5X12  [employer]   Left 1   SCREW BNE AUREA HEXLB 3.5X14 - [id number]  SCREW BNE AUREA HEXLB 3.5X14  [employer]  Left 1   PLATE BONE ACULOC 2 STANDARD - [id number]  PLATE BONE ACULOC 2 STANDARD  [employer]  Left 1              Disposition: PACU - hemodynamically stable.           Condition: Good    Attestation:  I was present and scrubbed for the entire procedure.           Discharge Note    Admit Date: 12/27/2019    Attending Physician: Albert Schroeder Jr., MD     Discharge Physician: Albert Schroeder Jr., MD    Final Diagnosis: Post-Op Diagnosis Codes:     * Wrist fracture, closed, left, initial encounter [S62.102A]    Disposition: Home or Self Care    Patient Instructions:   Current Discharge Medication List      START taking these medications    Details   oxyCODONE-acetaminophen (PERCOCET) 7.5-325 mg per tablet Take 1 tablet by mouth every 4 (four) hours as needed for Pain.  Qty: 40 tablet, Refills: 0         CONTINUE these medications which have NOT CHANGED    Details   !! buPROPion (WELLBUTRIN SR) 100 MG TBSR 12 hr tablet Take 2 tablets (200 mg total) by mouth every morning.  Qty: 180 tablet, Refills: 1    Associated Diagnoses: Bipolar affective disorder, remission status unspecified      calcium-vitamin D3 (CALCIUM 500 + D) 500 mg(1,250mg) -200 unit per tablet Take 1 tablet by mouth 2 (two) times daily with meals.      catheter (FEMALE CATHETER) 14 Fr [facility] to self catheterize four times a day indefinitley using female 12 fr in and out catheter for incomplete bladder emptying.   Dispense 120 month with 11 refills or 360 every 3 months with 3 refills depending on patient's preference  Qty: 120 each, Refills: 11      naproxen (NAPROSYN) 500 MG tablet Take 1 tablet (500 mg total) by mouth 2 (two) times daily with meals.  Qty: 20 tablet, Refills: 0      traMADol (ULTRAM) 50 mg tablet Take 1 tablet (50 mg total) by mouth every 6 (six) hours as needed for Pain.  Qty: 5 tablet, Refills: 0    Comments: Quantity prescribed more  than 7 day supply? No      !! buPROPion (WELLBUTRIN SR) 150 MG TBSR 12 hr tablet Take 1 tablet (150 mg total) by mouth every evening.  Qty: 90 tablet, Refills: 1    Comments: **Patient requests 90 days supply**  Associated Diagnoses: Bipolar affective disorder, remission status unspecified      fluphenazine (PROLIXIN) 1 MG tablet TAKE 2 TABLETS NIGHTLY  Qty: 180 tablet, Refills: 1    Associated Diagnoses: Bipolar affective disorder, remission status unspecified      folic acid (FOLVITE) 1 MG tablet Take 1 tablet (1,000 mcg total) by mouth once daily.  Qty: 90 tablet, Refills: 1    Associated Diagnoses: Bipolar affective disorder, remission status unspecified      HYDROcodone-acetaminophen (NORCO) 5-325 mg per tablet Take 1 tablet by mouth every 6 (six) hours as needed for Pain.  Qty: 20 tablet, Refills: 0    Comments: Quantity prescribed more than 7 day supply? No  Associated Diagnoses: Wrist fracture, closed, left, initial encounter      ibuprofen (ADVIL,MOTRIN) 200 MG tablet Take 200 mg by mouth every 6 (six) hours as needed for Pain.      OXcarbazepine (TRILEPTAL) 150 MG Tab Take 1 tablet (150 mg total) by mouth 2 (two) times daily.  Qty: 180 tablet, Refills: 2    Associated Diagnoses: Bipolar I disorder, most recent episode depressed, in partial remission; Bipolar affective disorder, remission status unspecified       !! - Potential duplicate medications found. Please discuss with provider.          Discharge Procedure Orders (must include Diet, Follow-up, Activity)   Discharge Procedure Orders (must include Diet, Follow-up, Activity)   Diet general     Call MD for:  temperature >100.4     Call MD for:  persistent nausea and vomiting     Call MD for:  severe uncontrolled pain     Leave dressing on - Keep it clean, dry, and intact until clinic visit     Keep surgical extremity elevated     Shower on day dressing removed (No bath)        Discharge Date: No discharge date for patient encounter.

## 2019-12-27 NOTE — INTERVAL H&P NOTE
The patient has been examined and the H&P has been reviewed:    I concur with the findings and no changes have occurred since H&P was written.    Anesthesia/Surgery risks, benefits and alternative options discussed and understood by patient/family.          Active Hospital Problems    Diagnosis  POA    Wrist fracture, closed, left, initial encounter [S62.102A]  Yes      Resolved Hospital Problems   No resolved problems to display.

## 2020-01-08 ENCOUNTER — TELEPHONE (OUTPATIENT)
Dept: INFECTIOUS DISEASES | Facility: CLINIC | Age: 65
End: 2020-01-08

## 2020-01-08 DIAGNOSIS — S62.102A WRIST FRACTURE, CLOSED, LEFT, INITIAL ENCOUNTER: Primary | ICD-10-CM

## 2020-01-09 ENCOUNTER — HOSPITAL ENCOUNTER (OUTPATIENT)
Dept: RADIOLOGY | Facility: HOSPITAL | Age: 65
Discharge: HOME | End: 2020-01-09
Attending: PHYSICIAN ASSISTANT
Payer: MEDICARE

## 2020-01-09 ENCOUNTER — OFFICE VISIT (OUTPATIENT)
Dept: ORTHOPEDICS | Facility: CLINIC | Age: 65
End: 2020-01-09
Payer: MEDICARE

## 2020-01-09 ENCOUNTER — TELEPHONE (OUTPATIENT)
Dept: NEUROLOGY | Facility: CLINIC | Age: 65
End: 2020-01-09

## 2020-01-09 VITALS
HEART RATE: 74 BPM | WEIGHT: 173 LBS | SYSTOLIC BLOOD PRESSURE: 124 MMHG | HEIGHT: 63 IN | DIASTOLIC BLOOD PRESSURE: 75 MMHG | BODY MASS INDEX: 30.65 KG/M2

## 2020-01-09 DIAGNOSIS — S52.502D CLOSED FRACTURE OF DISTAL END OF LEFT RADIUS WITH ROUTINE HEALING, UNSPECIFIED FRACTURE MORPHOLOGY, SUBSEQUENT ENCOUNTER: Primary | ICD-10-CM

## 2020-01-09 DIAGNOSIS — S62.102A WRIST FRACTURE, CLOSED, LEFT, INITIAL ENCOUNTER: ICD-10-CM

## 2020-01-09 PROCEDURE — 73100 XR WRIST 2 VIEW LEFT: ICD-10-PCS | Mod: 26,LT,, | Performed by: RADIOLOGY

## 2020-01-09 PROCEDURE — 73100 X-RAY EXAM OF WRIST: CPT | Mod: TC,PN,LT

## 2020-01-09 PROCEDURE — 73100 X-RAY EXAM OF WRIST: CPT | Mod: 26,LT,, | Performed by: RADIOLOGY

## 2020-01-09 PROCEDURE — 99999 PR PBB SHADOW E&M-EST. PATIENT-LVL IV: ICD-10-PCS | Mod: PBBFAC,,, | Performed by: PHYSICIAN ASSISTANT

## 2020-01-09 PROCEDURE — 99024 POSTOP FOLLOW-UP VISIT: CPT | Mod: S$GLB,,, | Performed by: PHYSICIAN ASSISTANT

## 2020-01-09 PROCEDURE — 99999 PR PBB SHADOW E&M-EST. PATIENT-LVL IV: CPT | Mod: PBBFAC,,, | Performed by: PHYSICIAN ASSISTANT

## 2020-01-09 PROCEDURE — 99024 PR POST-OP FOLLOW-UP VISIT: ICD-10-PCS | Mod: S$GLB,,, | Performed by: PHYSICIAN ASSISTANT

## 2020-01-09 RX ORDER — NAPROXEN 500 MG/1
500 TABLET ORAL 2 TIMES DAILY WITH MEALS
Qty: 20 TABLET | Refills: 0 | OUTPATIENT
Start: 2020-01-09 | End: 2020-02-10

## 2020-01-09 NOTE — PROGRESS NOTES
Subjective:      Patient ID: Silvana Quezada is a 64 y.o. female.    Chief Complaint: Pain and Post-op Evaluation of the Left Wrist      HPI: Silvana Quezada is a 64-year-old female in clinic today for 2 week follow-up of ORIF distal radius fracture of the left wrist. She states pain is well controlled with naproxen.  She has not had any fever chills or any other signs of infection.  She is still in the plaster splint from the operating room.    Past Medical History:   Diagnosis Date    Bipolar 1 disorder     Breast cancer     right    Breast cyst     Closed fracture of proximal end of right humerus 3/9/2015    Depression     History of right shoulder fracture     MS (multiple sclerosis)     presented as dizzines, dx vision,     Osteoporosis, unspecified        Current Outpatient Medications:     buPROPion (WELLBUTRIN SR) 100 MG TBSR 12 hr tablet, Take 2 tablets (200 mg total) by mouth every morning., Disp: 180 tablet, Rfl: 1    buPROPion (WELLBUTRIN SR) 150 MG TBSR 12 hr tablet, Take 1 tablet (150 mg total) by mouth every evening., Disp: 90 tablet, Rfl: 1    calcium-vitamin D3 (CALCIUM 500 + D) 500 mg(1,250mg) -200 unit per tablet, Take 1 tablet by mouth 2 (two) times daily with meals., Disp: , Rfl:     cathe[facility] (F[facility]) [facility], Patient is to self catheterize four times a day indefinitley using female 12 fr in and out catheter for incomplete bladder emptying.  Dispense 120 month with 11 refills or 360 every 3 months with 3 refills depending on patient's preference, Disp: 120 each, Rfl: 11    fluphenazine (PROLIXIN) 1 MG tablet, TAKE 2 TABLETS NIGHTLY, Disp: 180 tablet, Rfl: 1    folic acid (FOLVITE) 1 MG tablet, Take 1 tablet (1,000 mcg total) by mouth once daily., Disp: 90 tablet, Rfl: 1    HYDROcodone-acetaminophen (NORCO) 5-325 mg per tablet, Take 1 tablet by mouth every 6 (six) hours as needed for Pain., Disp: 20 tablet, Rfl: 0    ibuprofen (ADVIL,MOTRIN) 200 MG tablet, Take 200 mg by  "mouth every 6 (six) hours as needed for Pain., Disp: , Rfl:     naproxen (NAPROSYN) 500 MG tablet, Take 1 tablet (500 mg total) by mouth 2 (two) times daily with meals., Disp: 20 tablet, Rfl: 0    OXcarbazepine (TRILEPTAL) 150 MG Tab, Take 1 tablet (150 mg total) by mouth 2 (two) times daily., Disp: 180 tablet, Rfl: 2    oxyCODONE-acetaminophen (PERCOCET) 7.5-325 mg per tablet, Take 1 tablet by mouth every 4 (four) hours as needed for Pain., Disp: 40 tablet, Rfl: 0    traMADol (ULTRAM) 50 mg tablet, Take 1 tablet (50 mg total) by mouth every 6 (six) hours as needed for Pain., Disp: 5 tablet, Rfl: 0  Review of patient's allergies indicates:   Allergen Reactions    Adhesive      blisters    Keflex [cephalexin] Rash       /75   Pulse 74   Ht 5' 3" (1.6 m)   Wt 78.5 kg (173 lb)   BMI 30.65 kg/m²     Review of Systems   Constitution: Negative for chills and fever.   HENT: Negative for congestion and hoarse voice.    Eyes: Negative for discharge and redness.   Cardiovascular: Negative for chest pain and palpitations.   Respiratory: Negative for cough and wheezing.    Skin: Negative for itching and rash.   Musculoskeletal: Positive for joint pain and joint swelling. Negative for arthritis and stiffness.   Gastrointestinal: Negative for diarrhea, nausea and vomiting.   Neurological: Negative for dizziness and numbness.   Psychiatric/Behavioral: Negative for altered mental status.   Allergic/Immunologic: Negative for environmental allergies and persistent infections.          Objective:    Ortho Exam   Left wrist:  Steri-Strips intact over the incision, no draining or other signs of infection  Mild to moderate swelling of the left wrist  Some TTP over the ulnar aspect of the left wrist  Sensation and pulses intact      Assessment:     Imaging: Xray left wrist shows hardware in position        1. Closed fracture of distal end of left radius with routine healing, unspecified fracture morphology, subsequent " encounter          Plan:       Plaster splint removed, placed in removable wrist brace, instructed on wound care  Explained to the patient that she should wear the wrist brace for activities and sleeping, but she may have it off for eating and relaxing  Referral to OT place  Refill naproxen 500mg    Orders Placed This Encounter    Ambulatory referral to Occupational Therapy    naproxen (NAPROSYN) 500 MG tablet     Follow up in about 3 weeks (around 1/30/2020).

## 2020-01-09 NOTE — TELEPHONE ENCOUNTER
I informed her that she should have office visit notes, MRI reports, and LP results (if available) faxed to this office. Fax number confirmed. I explained that we will call her to schedule once records have been received. Verbalizes understanding of all.

## 2020-01-09 NOTE — TELEPHONE ENCOUNTER
----- Message from Lm Jeffery sent at 1/9/2020 12:19 PM CST -----  Contact: pt  Pt would like to know if she needs to wait until wrist is healed due to a broken wrist before she schedules an appt with Armaan. Please give pt a call back at [phone] .

## 2020-01-16 ENCOUNTER — CLINICAL SUPPORT (OUTPATIENT)
Dept: REHABILITATION | Facility: HOSPITAL | Age: 65
End: 2020-01-16
Payer: MEDICARE

## 2020-01-16 DIAGNOSIS — M25.532 LEFT WRIST PAIN: ICD-10-CM

## 2020-01-16 DIAGNOSIS — M25.632 DECREASED RANGE OF MOTION OF LEFT WRIST: ICD-10-CM

## 2020-01-16 DIAGNOSIS — M79.89 SWELLING OF LEFT HAND: ICD-10-CM

## 2020-01-16 PROCEDURE — 97166 OT EVAL MOD COMPLEX 45 MIN: CPT | Mod: PN

## 2020-01-16 PROCEDURE — 97110 THERAPEUTIC EXERCISES: CPT | Mod: PN

## 2020-01-16 NOTE — PLAN OF CARE
[facility] Occupational Therapy  Initial Evaluation     Date: 1/16/2020  Name: Silvana Quezada  Clinic Number: [id number]    Therapy Diagnosis:   Encounter Diagnoses   Name Primary?    Left wrist pain     Swelling of left hand     Decreased range of motion of left wrist      Physician: Mc Cordero, CELIA    Physician Orders: eval and treat  Medical Diagnosis: S52.502D (ICD-10-CM) - Closed fracture of distal end of left radius with routine healing, unspecified fracture morphology, subsequent encounter  Surgical Procedure and Date: 12/27/2019,Open reduction and internal fixation of left distal radius fracture, 3 or more fragments, intra-articular, using Acumed volar   locking plate. / Date of Injury/Onset: 12/13/2019  Evaluation Date: 1/16/2020  Insurance Authorization Period Expiration: 1/8/2021  Plan of Care Certification Period: 3/13/2020  Date of Return to MD: 2/7/2020    Visit # / Visits authorized: 1 / 1    FOTO: initial eval  Medicare Amount:     Time In: 11:10 am  Time Out: 12:00 pm  Total treatment time: 50 minutes  Total Timed minutes: 15 minutes    Precautions:  Standard, Fall and Weightbearing **SENSITIVE TO HEAT DUE TO MS, DEFER HEAT MODALITIES    Subjective     Involved Side: left  Dominant Side: Right  Date of Onset: 12/27/2019  Mechanism of Injury: fall  History of Current Condition: Pt is a 63 y/o female who sustained injury to her L wrist due to fall in her home. She was placed in orthoglass splint and bandaging following surgery for 2 weeks. At 2 weeks post op she had sutures removed and was placed in a removable pre fabricated wrist brace. She has a hx of MS as well. Pt is currently limited with use of L hand with ADLs and IADLs, and has not returned to driving yet.   Imaging: x-ray on 1/9/2020 indicates: There is interval screw plate fixation of previous distal radial fracture with improved fracture alignment.  Hardware appears intact without evidence for loosening or failure.   Minimally displaced ulnar styloid fragment.  Limited visualization at the radial aspect of the proximal carpal row given hardware.    Previous Therapy: no prior therapy    Past Medical History/Physical Systems Review:   Silvana Quezada  has a past medical history of Bipolar 1 disorder, Breast cancer, Breast cyst, Closed fracture of proximal end of right humerus, Depression, History of right shoulder fracture, MS (multiple sclerosis), and Osteoporosis, unspecified.    Silvana Quezada  has a past surgical history that includes Hysterectomy; Brain surgery (1991); Foot surgery (october 2013); Breast lumpectomy (Right, 2001); Breast biopsy (Left, 2009); Cystoscopy (N/A, 7/11/2018); Injection of botulinum toxin type A (N/A, 7/11/2018); Tonsillectomy; Cystoscopy (N/A, 4/10/2019); Injection of botulinum toxin type A (N/A, 4/10/2019); and Open reduction and internal fixation (ORIF) of injury of wrist (Left, 12/27/2019).    Silvana has a current medication list which includes the following prescription(s): bupropion, bupropion, calcium-vitamin d3, catheter, fluphenazine, folic acid, hydrocodone-acetaminophen, ibuprofen, naproxen, oxcarbazepine, oxycodone-acetaminophen, and tramadol.    Review of patient's allergies indicates:   Allergen Reactions    Adhesive      blisters    Keflex [cephalexin] Rash        Patient's Goals for Therapy: to get back to playing the piano and get more movement    Pain:  Functional Pain Scale Rating 0-10:   1/10 on average  1/10 at best  5/10 at worst  Location: L thumb CMC and radial wrist  Description: Aching and Burning  Aggravating Factors: Getting out of bed/chair  Easing Factors: rest    Occupation:    Working presently: retired  Duties: n/a    Functional Limitations/Social History:    Previous functional status includes: Mod Independent with all ADLs.   cooks and does dishes    Current FunctionalStatus   Home/Living environment : lives with their spouse      Limitation of Functional  Status as follows:   ADLs/IADLs:     - Feeding: unable to cut food    - Bathing: mod I, uses TTB    - Dressing/Grooming: mod I, has not attempted buttons    - Driving: dependent, has not returned to driving yet    -unable to lift with L, difficulty with opening medicine bottles, unable to open tighter jars     Leisure: has been unable to play the piano with L hand again yet         Objective     Observation/Appearance: wearing wrist brace. Moderate swelling noted in L wrist/hand/fingers. Dry skin noted. Healing volar scar, closed with min scabbing at distal end of scar.     Edema. Measured in centimeters.   1/16/2020 1/16/2020    Left Right   Wrist Crease 19 15.8   DPC 18.8 18.5   MCPs 19.3 18.7       Elbow and Wrist ROM. Measured in degrees.   1/16/2020 1/16/2020    Left Right   Elbow Ext/Flex WNL WNL   Supination/Pronation 44/74 WNL   Wrist Ext/Flex 36/32 72/60   Wrist RD/UD 4/6 12/22     Hand ROM. Measured in degrees.  Other digits WFL, able to make full fist   1/16/2020    Left       Thumb: MP 5/45                IP 0       Rad ADD/ABD 42       Pal ADD/ABD 40          Opposition To P2 o SF      Strength (Dynamometer) and Pinch Strength (Pinch Gauge)  Measured in pounds.   1/16/2020 1/16/2020    Left Right   Rung II deferred deferred   Simon Pinch deferred deferred   3pt Pinch deferred deferred   2pt Pinch deferred deferred     Sensation: denies numbness and tingling    Manual Muscle Test: deferred        CMS Impairment/Limitation/Restriction for FOTO Wrist Survey    Therapist reviewed FOTO scores for Silvana Quezada on 1/16/2020.   FOTO documents entered into [employer] - see Media section.    Limitation Score: 49%         Treatment     Treatment Time In: 11:45 am  Treatment Time Out: 12:00 pm  Total Treatment time separate from Evaluation time:15 min    **heat modalities deferred due to pt sensitive to heat due to MS    Silvana received the following manual therapy techniques for 5 minutes:   -Performed RM to L  digits/hand/wrist  to decrease edema, improve joint mobility, decrease pain and improve lymphatic drainage to increase hand function.     Silvana received therapeutic exercises for 10 minutes including:  -  AROM   Sup/Pro  Wrist  Ext/flx  RD/UD    X 10 reps each    AROM   Spreads  Wave  Hook  Straight fist  Composite fist  Lifts X 10 reps each   AROM thumb:  Rad abd/add  Palmar abd/add  IP blocks  Opposition  Pinky Slides    X 10 reps each        Home Exercise Program/Education:  Issued HEP (see patient instructions in EMR) and educated on modality use for pain management . Exercises were reviewed and Silvana was able to demonstrate them prior to the end of the session.   Pt received a written copy of exercises to perform at home. Silvana demonstrated good  understanding of the education provided.  Pt was advised to perform these exercises free of pain, and to stop performing them if pain occurs.    Patient/Family Education: role of OT, goals for OT, scheduling/cancellations - pt verbalized understanding. Discussed insurance limitations with patient.    Additional Education provided: gentle scar massage    Assessment     Silvana Quezada is a 64 y.o. female referred to outpatient occupational therapy and presents with a medical diagnosis of s/p ORIF of L DR klein, resulting in pain, swelling, scar adherent, decreased ROM, and limited functional use of hand, and demonstrates limitations as described in the chart below. Following medical record review it is determined that pt will benefit from occupational therapy services in order to maximize pain free and/or functional use of left hand. The following goals were discussed with the patient and patient is in agreement with them as to be addressed in the treatment plan. The patient's rehab potential is Good.     Anticipated barriers to occupational therapy: MS  Pt has no cultural, educational or language barriers to learning provided.    Profile and History Assessment of  Occupational Performance Level of Clinical Decision Making Complexity Score   Occupational Profile:   Silvana Quezada is a 64 y.o. female who lives with their spouse and is currently retired. Silvana Quezada has difficulty with  feeding, bathing and dressing  driving/transportation management, phone/computer use, housework/household chores and playing piano  affecting his/her daily functional abilities. His/her main goal for therapy is to get back to playing piano & get more movement.     Comorbidities:   history of cancer and MS, Bipolar d/o    Medical and Therapy History Review:   Expanded               Performance Deficits    Physical:  Joint Mobility  Joint Stability  Muscle Power/Strength  Muscle Endurance  Skin Integrity/Scar Formation  Edema   Strength  Pinch Strength  Fine Motor Coordination  Pain    Cognitive:  No Deficits    Psychosocial:    Habits  Routines     Clinical Decision Making:  moderate    Assessment Process:  Detailed Assessments    Modification/Need for Assistance:  Minimal-Moderate Modifications/Assistance    Intervention Selection:  Multiple Treatment Options       moderate  Based on PMHX, co morbidities , data from assessments and functional level of assistance required with task and clinical presentation directly impacting function.         Goals:   The following goals were discussed with the patient and patient is in agreement with them as to be addressed in the treatment plan.   Long Term Goals (LTGs); to be met by discharge.  LTG #1: Pt will report a pain level of 1 out of 10 with ADLs and house hold tasks   LTG #2: Pt will demo improved FOTO score by at least points.   LTG #3: Pt will return to prior level of function for ADLs and household management.   LTG #4: Pt will demonstrate improved L wrist AROM WFL for performing daily tasks  LTG #5:  and pinch to be assessed when appropriate and goals set accordingly    Short Term Goals (STGs); to be met within 4 weeks (2/14/2020).  STG  #1a: Pt will report 3 out of 10 pain level with ADLs and daily tasks.  STG #2a: Pt will report/demo [place] with feeding self.  STG #2b: Pt will report/demo [place] with driving.  STG #3a: Pt will demonstrate independence with issued HEP.   STG #3b: Pt will demo improved L wrist AROM by at least 15 degrees needed to aid with functional use during ADLs  STG #4: Pt will demonstrate improved edema in L hand and wrist by at least 0.4 cm for increased use during daily tasks.        Plan   Certification Period/Plan of care expiration: 1/16/2020 to 3/13/2020.    Outpatient Occupational Therapy 2 times weekly for 8 weeks to include the following interventions: Paraffin, Fluidotherapy, Manual therapy/joint mobilizations, Modalities for pain management, US 3 mhz, Therapeutic exercises/activities., Strengthening, Edema Control, Scar Management and Wound Care.      BRUCE Patino  Date: 1/16/2020      I certify the need for these services furnished under the plan of treatment and while under my care.     ____________________________________________________________________  Physician/Referring Practitioner   Date of Signature

## 2020-01-16 NOTE — PATIENT INSTRUCTIONS
[facility]  OCCUPATIONAL THERAPY  HOME EXERCISE PROGRAM   [facility]  [phone]           Complete massage 2-3 minutes 4 times a day:        Complete 10 repetitions of each exercise 4 times a day:                   _

## 2020-01-23 ENCOUNTER — CLINICAL SUPPORT (OUTPATIENT)
Dept: REHABILITATION | Facility: HOSPITAL | Age: 65
End: 2020-01-23
Payer: MEDICARE

## 2020-01-23 DIAGNOSIS — M79.89 SWELLING OF LEFT HAND: ICD-10-CM

## 2020-01-23 DIAGNOSIS — M25.532 LEFT WRIST PAIN: ICD-10-CM

## 2020-01-23 DIAGNOSIS — M25.632 DECREASED RANGE OF MOTION OF LEFT WRIST: ICD-10-CM

## 2020-01-23 PROCEDURE — 97110 THERAPEUTIC EXERCISES: CPT | Mod: PN

## 2020-01-23 PROCEDURE — 97140 MANUAL THERAPY 1/> REGIONS: CPT | Mod: PN

## 2020-01-23 NOTE — PROGRESS NOTES
"  Occupational Therapy Daily Treatment Note      Date: 1/23/2020  Name: Silvana Quezada  Clinic Number: [id number]    Therapy Diagnosis:   Encounter Diagnoses   Name Primary?    Left wrist pain     Swelling of left hand     Decreased range of motion of left wrist      Physician: Mc Cordero, CELIA    Physician Orders: eval and treat  Medical Diagnosis: S52.502D (ICD-10-CM) - Closed fracture of distal end of left radius with routine healing, unspecified fracture morphology, subsequent encounter  Surgical Procedure and Date: 12/27/2019,Open reduction and internal fixation of left distal radius fracture, 3 or more fragments, intra-articular, using Acumed volar   locking plate. / Date of Injury/Onset: 12/13/2019  Evaluation Date: 1/16/2020  Insurance Authorization Period Expiration: 3/16/2020  Plan of Care Certification Period: 3/13/2020  Date of Return to MD: 2/7/2020     FOTO: initial eval      Visit # / Visits authorized: 2 / 12  Time In: 1:00 pm  Time Out: 1:45 pm  Total Treatment Time: 45 minutes  Total Timed minutes: 45 minutes    Precautions:  Standard, Fall and Weightbearing **SENSITIVE TO HEAT DUE TO MS, DEFER HEAT MODALITIES       Subjective     Pt reports: "I think I'm doing a little better. We were able to find a glove so I've been wearing that."  she was compliant with home exercise program given last session.   Response to previous treatment: improved pain, swelling, and ROM  Functional change: none noted    Pain: 1/10  Location: left hand    Objective     Edema. Measured in centimeters.    1/16/2020 1/16/2020     Left Right   Wrist Crease 19 15.8   DPC 18.8 18.5   MCPs 19.3 18.7         Elbow and Wrist ROM. Measured in degrees.    1/16/2020 1/16/2020     Left Right   Elbow Ext/Flex WNL WNL   Supination/Pronation 44/74 WNL   Wrist Ext/Flex 36/32 72/60   Wrist RD/UD 4/6 12/22      Hand ROM. Measured in degrees.  Other digits WFL, able to make full fist    1/16/2020     Left         Thumb: MP 5/45            "     IP 0       Rad ADD/ABD 42       Pal ADD/ABD 40          Opposition To P2 o SF         Treatment consisted of the following:    **heat modalities deferred due to pt sensitive to heat due to MS     Silvana received the following manual therapy techniques for 10 minutes:   -Performed retrograde massage to L digits/hand/wrist  to decrease edema, improve joint mobility, decrease pain and improve lymphatic drainage to increase hand function. Performed scar massage to volar wrist area (not including one small healing area)   to decrease adhesions and improve tensile glide.        Silvana received therapeutic exercises for 35 minutes including:  -  AROM   Sup/Pro  Wrist  Ext/flx  RD/UD    X 10 reps each    AROM   Spreads  Wave  Hook  Straight fist  Composite fist  Lifts X 10 reps each   AROM thumb:  Rad abd/add  Palmar abd/add  IP blocks  Opposition  Pinky Slides    X 10 reps each    Wrist wheel, flex/ext, sup/pro X 3 min   Wrist maze X 3 min   isospheres X 2 min, on table   In hand manipulation with med pom poms X 2 containers   Wrist AROM with unweighted dowel, flex/ext, Rd/UD X 20 reps each                Home Exercises and Education Provided     Education provided: cont HEP as tolerated, cont brace wear, and compression glove wear for edema management  - Progress towards goals     Written Home Exercises Provided: Patient instructed to cont prior HEP.  Exercises were reviewed and Silvana was able to demonstrate them prior to the end of the session.  Silvana demonstrated good  understanding of the education provided.   .   See EMR under Patient Instructions for exercises provided initial eval.     Assessment     Pt tolerated tx fairly well today. She denied increased pain, but reported some fatigue at the end of the session. Pt participated well and is motivated. She cont to present with swelling, stiffness, limited ROM, and weakness; however, pt demonstrates improvement in ROM and edema.     Silvana is progressing well towards  her goals and there are no updates to goals at this time. Pt prognosis continues as Good. Pt will continue to benefit from skilled outpatient occupational therapy to address the deficits listed in the problem list on initial evaluation, provide pt/family education and to maximize pt's level of independence in the home and community environment.     Anticipated barriers to continued occupational therapy: MS    Pt's spiritual, cultural and educational needs considered and pt agreeable to plan of care and goals.    Goals     Goals:   The following goals were discussed with the patient and patient is in agreement with them as to be addressed in the treatment plan.   Long Term Goals (LTGs); to be met by discharge.  LTG #1: Pt will report a pain level of 1 out of 10 with ADLs and house hold tasks ---progressing    LTG #2: Pt will demo improved FOTO score by at least points. ---progressing  LTG #3: Pt will return to prior level of function for ADLs and household management. ---progressing  LTG #4: Pt will demonstrate improved L wrist AROM WFL for performing daily tasks---progressing  LTG #5:  and pinch to be assessed when appropriate and goals set accordingly---progressing     Short Term Goals (STGs); to be met within 4 weeks (2/14/2020).  STG #1a: Pt will report 3 out of 10 pain level with ADLs and daily tasks.---progressing  STG #2a: Pt will report/demo [place] with feeding self.---progressing  STG #2b: Pt will report/demo [place] with driving.---progressing  STG #3a: Pt will demonstrate independence with issued HEP. ---progressing  STG #3b: Pt will demo improved L wrist AROM by at least 15 degrees needed to aid with functional use during ADLs---progressing  STG #4: Pt will demonstrate improved edema in L hand and wrist by at least 0.4 cm for increased use during daily tasks.---progressing         Plan     Continue skilled occupational therapy with individualized plan of care focusing on ROM and edema  management to maximize functional use of her L hand.       Updates/Grading for next session: cont to progress as able with ROM    BRUCE Patino

## 2020-01-27 ENCOUNTER — CLINICAL SUPPORT (OUTPATIENT)
Dept: REHABILITATION | Facility: HOSPITAL | Age: 65
End: 2020-01-27
Payer: MEDICARE

## 2020-01-27 DIAGNOSIS — M25.532 LEFT WRIST PAIN: ICD-10-CM

## 2020-01-27 DIAGNOSIS — M25.632 DECREASED RANGE OF MOTION OF LEFT WRIST: ICD-10-CM

## 2020-01-27 DIAGNOSIS — M79.89 SWELLING OF LEFT HAND: ICD-10-CM

## 2020-01-27 PROCEDURE — 97110 THERAPEUTIC EXERCISES: CPT | Mod: PN

## 2020-01-27 PROCEDURE — 97140 MANUAL THERAPY 1/> REGIONS: CPT | Mod: PN

## 2020-01-27 NOTE — PROGRESS NOTES
"  Occupational Therapy Daily Treatment Note      Date: 1/27/2020  Name: Silvana Quezada  Clinic Number: [id number]    Therapy Diagnosis:   Encounter Diagnoses   Name Primary?    Left wrist pain     Swelling of left hand     Decreased range of motion of left wrist      Physician: Mc Cordero, CELIA    Physician Orders: eval and treat  Medical Diagnosis: S52.502D (ICD-10-CM) - Closed fracture of distal end of left radius with routine healing, unspecified fracture morphology, subsequent encounter  Surgical Procedure and Date: 12/27/2019,Open reduction and internal fixation of left distal radius fracture, 3 or more fragments, intra-articular, using Acumed volar   locking plate. / Date of Injury/Onset: 12/13/2019  Evaluation Date: 1/16/2020  Insurance Authorization Period Expiration: 3/16/2020  Plan of Care Certification Period: 3/13/2020  Date of Return to MD: 2/7/2020     FOTO: initial eval      Visit # / Visits authorized: 3 / 12  Time In: 1:00 pm  Time Out: 1:50 pm  Total Treatment Time: 50 minutes  Total Timed minutes: 50 minutes    Precautions:  Standard, Fall and Weightbearing **SENSITIVE TO HEAT DUE TO MS, DEFER HEAT MODALITIES       Subjective     Pt reports: "The glove has been helping."  she was compliant with home exercise program given last session.   Response to previous treatment: improved pain, swelling, and ROM  Functional change: none noted    Pain: 1/10  Location: left hand    Objective     Edema. Measured in centimeters.    1/16/2020 1/16/2020     Left Right   Wrist Crease 19 15.8   DPC 18.8 18.5   MCPs 19.3 18.7         Elbow and Wrist ROM. Measured in degrees.    1/16/2020 1/16/2020     Left Right   Elbow Ext/Flex WNL WNL   Supination/Pronation 44/74 WNL   Wrist Ext/Flex 36/32 72/60   Wrist RD/UD 4/6 12/22      Hand ROM. Measured in degrees.  Other digits WFL, able to make full fist    1/16/2020     Left         Thumb: MP 5/45                IP 0       Rad ADD/ABD 42       Pal ADD/ABD 40          " Opposition To P2 o SF         Treatment consisted of the following:    **heat modalities deferred due to pt sensitive to heat due to MS     Silvana received the following manual therapy techniques for 10 minutes:   -Performed retrograde massage to L digits/hand/wrist  to decrease edema, improve joint mobility, decrease pain and improve lymphatic drainage to increase hand function. Performed scar massage to volar wrist area (not including one small healing area)   to decrease adhesions and improve tensile glide.        Silvana received therapeutic exercises for 40 minutes including:  -  AROM   Sup/Pro  Wrist  Ext/flx  RD/UD    X 10 reps each    AROM   Spreads  Wave  Hook  Straight fist  Composite fist  Lifts X 10 reps each   AROM thumb:  Rad abd/add  Palmar abd/add  IP blocks  Opposition  Pinky Slides    X 10 reps each    Wrist wheel, flex/ext, sup/pro X 3 min   Wrist maze X 3 min   isospheres X 2 min, on table   Finger add with blue foam, abd with yellow rubber band X 10 reps each   In hand manipulation with med pom poms X 3 containers   Wrist AROM with unweighted dowel, flex/ext, Rd/UD X 20 reps each (able to perform 10 reps each today due to fatigue)   Towel scrunches X 2 min            Home Exercises and Education Provided     Education provided: cont HEP as tolerated, cont brace wear, and compression glove wear for edema management  - Progress towards goals     Written Home Exercises Provided: Patient instructed to cont prior HEP.  Exercises were reviewed and Silvana was able to demonstrate them prior to the end of the session.  Silvana demonstrated good  understanding of the education provided.   .   See EMR under Patient Instructions for exercises provided initial eval.     Assessment     Pt tolerated tx fairly well today. She denied increased pain, but reported some fatigue at the end of the session. Pt required rest breaks throughout session due to fatigue.  Wrist and forearm ROM appears to be improving. Pt  participated well and is motivated. She cont to present with swelling, stiffness, limited ROM, and weakness; however, pt demonstrates improvement in ROM and edema.     Silvana is progressing well towards her goals and there are no updates to goals at this time. Pt prognosis continues as Good. Pt will continue to benefit from skilled outpatient occupational therapy to address the deficits listed in the problem list on initial evaluation, provide pt/family education and to maximize pt's level of independence in the home and community environment.     Anticipated barriers to continued occupational therapy: MS    Pt's spiritual, cultural and educational needs considered and pt agreeable to plan of care and goals.    Goals     Goals:   The following goals were discussed with the patient and patient is in agreement with them as to be addressed in the treatment plan.   Long Term Goals (LTGs); to be met by discharge.  LTG #1: Pt will report a pain level of 1 out of 10 with ADLs and house hold tasks ---progressing    LTG #2: Pt will demo improved FOTO score by at least points. ---progressing  LTG #3: Pt will return to prior level of function for ADLs and household management. ---progressing  LTG #4: Pt will demonstrate improved L wrist AROM WFL for performing daily tasks---progressing  LTG #5:  and pinch to be assessed when appropriate and goals set accordingly---progressing     Short Term Goals (STGs); to be met within 4 weeks (2/14/2020).  STG #1a: Pt will report 3 out of 10 pain level with ADLs and daily tasks.---progressing  STG #2a: Pt will report/demo [place] with feeding self.---progressing  STG #2b: Pt will report/demo [place] with driving.---progressing  STG #3a: Pt will demonstrate independence with issued HEP. ---progressing  STG #3b: Pt will demo improved L wrist AROM by at least 15 degrees needed to aid with functional use during ADLs---progressing  STG #4: Pt will demonstrate improved edema in L  hand and wrist by at least 0.4 cm for increased use during daily tasks.---progressing         Plan     Continue skilled occupational therapy with individualized plan of care focusing on ROM and edema management to maximize functional use of her L hand.       Updates/Grading for next session: cont to progress as able with ROM    BRUCE Patino

## 2020-01-30 ENCOUNTER — CLINICAL SUPPORT (OUTPATIENT)
Dept: REHABILITATION | Facility: HOSPITAL | Age: 65
End: 2020-01-30
Payer: MEDICARE

## 2020-01-30 DIAGNOSIS — M25.632 DECREASED RANGE OF MOTION OF LEFT WRIST: ICD-10-CM

## 2020-01-30 DIAGNOSIS — M79.89 SWELLING OF LEFT HAND: ICD-10-CM

## 2020-01-30 DIAGNOSIS — M25.532 LEFT WRIST PAIN: ICD-10-CM

## 2020-01-30 PROCEDURE — 97110 THERAPEUTIC EXERCISES: CPT | Mod: PN

## 2020-01-30 PROCEDURE — 97140 MANUAL THERAPY 1/> REGIONS: CPT | Mod: PN

## 2020-01-30 NOTE — PROGRESS NOTES
"  Occupational Therapy Daily Treatment Note      Date: 1/30/2020  Name: Silvana Quezada  Clinic Number: [id number]    Therapy Diagnosis:   Encounter Diagnoses   Name Primary?    Left wrist pain     Swelling of left hand     Decreased range of motion of left wrist      Physician: Mc Cordero, CELIA    Physician Orders: eval and treat  Medical Diagnosis: S52.502D (ICD-10-CM) - Closed fracture of distal end of left radius with routine healing, unspecified fracture morphology, subsequent encounter  Surgical Procedure and Date: 12/27/2019,Open reduction and internal fixation of left distal radius fracture, 3 or more fragments, intra-articular, using Acumed volar   locking plate. / Date of Injury/Onset: 12/13/2019  Evaluation Date: 1/16/2020  Insurance Authorization Period Expiration: 3/16/2020  Plan of Care Certification Period: 3/13/2020  Date of Return to MD: 2/7/2020     FOTO: initial eval      Visit # / Visits authorized: 4 / 12  Time In: 2:00 pm  Time Out: 2:50 pm  Total Treatment Time: 50 minutes  Total Timed minutes: 50 minutes    Precautions:  Standard, Fall and Weightbearing **SENSITIVE TO HEAT DUE TO MS, DEFER HEAT MODALITIES       Subjective     Pt reports: "I think the hand is getting better." Pt reports still wearing compression glove on and off. Pt with wrist brace intact.   she was compliant with home exercise program given last session.   Response to previous treatment: improved pain, swelling, and ROM  Functional change: none noted    Pain: 1/10  Location: left hand    Objective     Edema. Measured in centimeters.    1/16/2020 1/16/2020 1/30/2020     Left Right Left   Wrist Crease 19 15.8 18.3 (-0.7)   DPC 18.8 18.5 18.7 (-0.1)   MCPs 19.3 18.7 19.1 (-0.2)         Elbow and Wrist ROM. Measured in degrees.    1/16/2020 1/16/2020 1/30/2020     Left Right Left   Elbow Ext/Flex WNL WNL WNL   Supination/Pronation 44/74 WNL 66/84 (+22/+10)   Wrist Ext/Flex 36/32 72/60 44/46 (+8/+14)   Wrist RD/UD 4/6 12/22 " 10/12 (+6/+6)      Hand ROM. Measured in degrees.  Other digits WFL, able to make full fist    1/16/2020 1/30/2020     Left Left          Thumb: MP 5/45 5/50 (+5)                IP 0 8 (+8)       Rad ADD/ABD 42 42       Pal ADD/ABD 40 42          Opposition To P2 o SF To base of SF (improved)         Treatment consisted of the following:    **heat modalities deferred due to pt sensitive to heat due to MS     Silvana received the following manual therapy techniques for 10 minutes:   -Performed retrograde massage to L digits/hand/wrist  to decrease edema, improve joint mobility, decrease pain and improve lymphatic drainage to increase hand function. Performed scar massage to volar wrist area (not including one small healing area)   to decrease adhesions and improve tensile glide.        Silvana received therapeutic exercises for 40 minutes including:  -  AROM   Sup/Pro  Wrist  Ext/flx  RD/UD    X 10 reps each    AROM   Spreads  Wave  Hook  Straight fist  Composite fist  Lifts X 10 reps each   AROM thumb:  Rad abd/add  Palmar abd/add  IP blocks  Opposition  Pinky Slides    X 10 reps each    Wrist wheel, flex/ext, sup/pro X 3 min   Wrist maze X 3 min   isospheres X 2 min, on table (not today due to fatigue)   Finger add with blue foam, abd with yellow rubber band X 10 reps each (not today due to fatigue)   In hand manipulation with med pom poms X 3 containers   Wrist AROM with unweighted dowel, flex/ext, Rd/UD X 10 reps each (able to perform 10 reps each today due to fatigue)   Towel scrunches X 2 min            Home Exercises and Education Provided     Education provided: cont HEP as tolerated, cont brace wear, and compression glove wear for edema management  - Progress towards goals     Written Home Exercises Provided: Patient instructed to cont prior HEP.  Exercises were reviewed and Silvana was able to demonstrate them prior to the end of the session.  Silvana demonstrated good  understanding of the education provided.   .    See EMR under Patient Instructions for exercises provided initial eval.     Assessment     Pt tolerated tx fairly well today. She reported increased fatigue today, possibly MS related. Unable to perform some exercise, and she required frequent rest breaks.  Pt required rest breaks throughout session due to fatigue.  Wrist and forearm ROM appears to be improving. Pt participated well and is motivated. She cont to present with swelling, stiffness, limited ROM, and weakness; however, pt demonstrates improvement in ROM and edema.     Silvana is progressing well towards her goals and there are no updates to goals at this time. Pt prognosis continues as Good. Pt will continue to benefit from skilled outpatient occupational therapy to address the deficits listed in the problem list on initial evaluation, provide pt/family education and to maximize pt's level of independence in the home and community environment.     Anticipated barriers to continued occupational therapy: MS    Pt's spiritual, cultural and educational needs considered and pt agreeable to plan of care and goals.    Goals     Goals:   The following goals were discussed with the patient and patient is in agreement with them as to be addressed in the treatment plan.   Long Term Goals (LTGs); to be met by discharge.  LTG #1: Pt will report a pain level of 1 out of 10 with ADLs and house hold tasks ---progressing    LTG #2: Pt will demo improved FOTO score by at least points. ---progressing  LTG #3: Pt will return to prior level of function for ADLs and household management. ---progressing  LTG #4: Pt will demonstrate improved L wrist AROM WFL for performing daily tasks---progressing  LTG #5:  and pinch to be assessed when appropriate and goals set accordingly---progressing     Short Term Goals (STGs); to be met within 4 weeks (2/14/2020).  STG #1a: Pt will report 3 out of 10 pain level with ADLs and daily tasks.---progressing  STG #2a: Pt will report/demo  [place] with feeding self.---progressing  STG #2b: Pt will report/demo [place] with driving.---progressing  STG #3a: Pt will demonstrate independence with issued HEP. ---progressing  STG #3b: Pt will demo improved L wrist AROM by at least 15 degrees needed to aid with functional use during ADLs---progressing  STG #4: Pt will demonstrate improved edema in L hand and wrist by at least 0.4 cm for increased use during daily tasks.---progressing         Plan     Continue skilled occupational therapy with individualized plan of care focusing on ROM and edema management to maximize functional use of her L hand.       Updates/Grading for next session: cont to progress as able with ROM    BRUCE Patino

## 2020-02-03 ENCOUNTER — CLINICAL SUPPORT (OUTPATIENT)
Dept: REHABILITATION | Facility: HOSPITAL | Age: 65
End: 2020-02-03
Payer: MEDICARE

## 2020-02-03 DIAGNOSIS — M25.532 LEFT WRIST PAIN: ICD-10-CM

## 2020-02-03 DIAGNOSIS — M79.89 SWELLING OF LEFT HAND: ICD-10-CM

## 2020-02-03 DIAGNOSIS — M25.632 DECREASED RANGE OF MOTION OF LEFT WRIST: ICD-10-CM

## 2020-02-03 PROCEDURE — 97110 THERAPEUTIC EXERCISES: CPT | Mod: PN

## 2020-02-03 PROCEDURE — 97140 MANUAL THERAPY 1/> REGIONS: CPT | Mod: PN

## 2020-02-03 NOTE — PROGRESS NOTES
"  Occupational Therapy Daily Treatment Note      Date: 2/3/2020  Name: Silvana Quezada  Clinic Number: [id number]    Therapy Diagnosis:   Encounter Diagnoses   Name Primary?    Left wrist pain     Swelling of left hand     Decreased range of motion of left wrist      Physician: Mc Cordero, CELIA    Physician Orders: eval and treat  Medical Diagnosis: S52.502D (ICD-10-CM) - Closed fracture of distal end of left radius with routine healing, unspecified fracture morphology, subsequent encounter  Surgical Procedure and Date: 12/27/2019,Open reduction and internal fixation of left distal radius fracture, 3 or more fragments, intra-articular, using Acumed volar   locking plate. / Date of Injury/Onset: 12/13/2019  Evaluation Date: 1/16/2020  Insurance Authorization Period Expiration: 3/16/2020  Plan of Care Certification Period: 3/13/2020  Date of Return to MD: 2/7/2020     FOTO: initial eval      Visit # / Visits authorized: 5 / 12 *FOTO next visit  Time In: 1:55 pm  Time Out: 2:45 pm  Total Treatment Time: 50 minutes  Total Timed minutes: 30 minutes    Precautions:  Standard, Fall and Weightbearing **SENSITIVE TO HEAT DUE TO MS, DEFER HEAT MODALITIES       Subjective     Pt reports: "I have a little pain, but it comes and goes." Pt reports still wearing compression glove on and off. Pt with wrist brace intact.   she was compliant with home exercise program given last session.   Response to previous treatment: improved pain, swelling, and ROM  Functional change: none noted    Pain: 1/10  Location: left hand    Objective     Edema. Measured in centimeters.    1/16/2020 1/16/2020 1/30/2020     Left Right Left   Wrist Crease 19 15.8 18.3 (-0.7)   DPC 18.8 18.5 18.7 (-0.1)   MCPs 19.3 18.7 19.1 (-0.2)         Elbow and Wrist ROM. Measured in degrees.    1/16/2020 1/16/2020 1/30/2020     Left Right Left   Elbow Ext/Flex WNL WNL WNL   Supination/Pronation 44/74 WNL 66/84 (+22/+10)   Wrist Ext/Flex 36/32 72/60 44/46 (+8/+14) "   Wrist RD/UD 4/6 12/22 10/12 (+6/+6)      Hand ROM. Measured in degrees.  Other digits WFL, able to make full fist    1/16/2020 1/30/2020     Left Left          Thumb: MP 5/45 5/50 (+5)                IP 0 8 (+8)       Rad ADD/ABD 42 42       Pal ADD/ABD 40 42          Opposition To P2 o SF To base of SF (improved)         Treatment consisted of the following:    **heat modalities deferred due to pt sensitive to heat due to MS     Silvana received the following manual therapy techniques for 10 minutes:   -Performed retrograde massage to L digits/hand/wrist  to decrease edema, improve joint mobility, decrease pain and improve lymphatic drainage to increase hand function. Performed scar massage to volar wrist area (not including one small healing area)   to decrease adhesions and improve tensile glide.        Silvana received therapeutic exercises for 40 minutes including: (20 min 1:1)  -  AROM   Sup/Pro  Wrist  Ext/flx  RD/UD    X 10 reps each    AROM   Spreads  Wave  Hook  Straight fist  Composite fist  Lifts X 10 reps each   AROM thumb:  Rad abd/add  Palmar abd/add  IP blocks  Opposition  Pinky Slides    X 10 reps each    Wrist wheel, flex/ext, sup/pro X 3 min   Wrist maze X 3 min   isospheres X 2 min, on table    Finger add with blue foam, abd with yellow rubber band X 10 reps each    In hand manipulation with med pom poms X 3 containers   Wrist AROM with unweighted dowel, flex/ext, Rd/UD X 10 reps each (able to perform 10 reps each today due to fatigue)   Towel scrunches X 2 min            Home Exercises and Education Provided     Education provided: cont HEP as tolerated, cont brace wear, and compression glove wear for edema management  - Progress towards goals     Written Home Exercises Provided: Patient instructed to cont prior HEP.  Exercises were reviewed and Silvana was able to demonstrate them prior to the end of the session.  Silvana demonstrated good  understanding of the education provided.   .   See EMR under  Patient Instructions for exercises provided initial eval.     Assessment     Pt tolerated tx fairly well today. She required frequent rest breaks due to fatigue. Wrist and forearm ROM appears to be improving slowly. Pt participated well and is motivated. She cont to present with swelling, stiffness, limited ROM, and weakness; however, pt demonstrates improvement in ROM and edema. Limited progression in therapy due to limited endurance, probably related to MS.     Silvana is progressing well towards her goals and there are no updates to goals at this time. Pt prognosis continues as Good. Pt will continue to benefit from skilled outpatient occupational therapy to address the deficits listed in the problem list on initial evaluation, provide pt/family education and to maximize pt's level of independence in the home and community environment.     Anticipated barriers to continued occupational therapy: MS    Pt's spiritual, cultural and educational needs considered and pt agreeable to plan of care and goals.    Goals     Goals:   The following goals were discussed with the patient and patient is in agreement with them as to be addressed in the treatment plan.   Long Term Goals (LTGs); to be met by discharge.  LTG #1: Pt will report a pain level of 1 out of 10 with ADLs and house hold tasks ---progressing    LTG #2: Pt will demo improved FOTO score by at least points. ---progressing  LTG #3: Pt will return to prior level of function for ADLs and household management. ---progressing  LTG #4: Pt will demonstrate improved L wrist AROM WFL for performing daily tasks---progressing  LTG #5:  and pinch to be assessed when appropriate and goals set accordingly---progressing     Short Term Goals (STGs); to be met within 4 weeks (2/14/2020).  STG #1a: Pt will report 3 out of 10 pain level with ADLs and daily tasks.---progressing  STG #2a: Pt will report/demo [place] with feeding self.---progressing  STG #2b: Pt will  report/demo [place] with driving.---progressing  STG #3a: Pt will demonstrate independence with issued HEP. ---progressing  STG #3b: Pt will demo improved L wrist AROM by at least 15 degrees needed to aid with functional use during ADLs---progressing  STG #4: Pt will demonstrate improved edema in L hand and wrist by at least 0.4 cm for increased use during daily tasks.---progressing         Plan     Continue skilled occupational therapy with individualized plan of care focusing on ROM and edema management to maximize functional use of her L hand.       Updates/Grading for next session: cont to progress as able with ROM    BRUCE Patino

## 2020-02-06 ENCOUNTER — CLINICAL SUPPORT (OUTPATIENT)
Dept: REHABILITATION | Facility: HOSPITAL | Age: 65
End: 2020-02-06
Payer: MEDICARE

## 2020-02-06 DIAGNOSIS — M25.632 DECREASED RANGE OF MOTION OF LEFT WRIST: ICD-10-CM

## 2020-02-06 DIAGNOSIS — M79.89 SWELLING OF LEFT HAND: ICD-10-CM

## 2020-02-06 DIAGNOSIS — M25.532 LEFT WRIST PAIN: ICD-10-CM

## 2020-02-06 PROCEDURE — 97110 THERAPEUTIC EXERCISES: CPT | Mod: PN

## 2020-02-06 PROCEDURE — 97140 MANUAL THERAPY 1/> REGIONS: CPT | Mod: PN

## 2020-02-06 NOTE — PROGRESS NOTES
"  Occupational Therapy Daily Treatment Note      Date: 2/6/2020  Name: Silvana Quezada  Clinic Number: [id number]    Therapy Diagnosis:   Encounter Diagnoses   Name Primary?    Left wrist pain     Swelling of left hand     Decreased range of motion of left wrist      Physician: Mc Cordero, CELIA    Physician Orders: eval and treat  Medical Diagnosis: S52.502D (ICD-10-CM) - Closed fracture of distal end of left radius with routine healing, unspecified fracture morphology, subsequent encounter  Surgical Procedure and Date: 12/27/2019,Open reduction and internal fixation of left distal radius fracture, 3 or more fragments, intra-articular, using Acumed volar   locking plate. / Date of Injury/Onset: 12/13/2019  Evaluation Date: 1/16/2020  Insurance Authorization Period Expiration: 3/16/2020  Plan of Care Certification Period: 3/13/2020  Date of Return to MD: 2/7/2020     FOTO: initial eval, 6th visit: 33% limitation, (16 points improved)      Visit # / Visits authorized: 6 / 12   Time In: 1:00 pm  Time Out: 1:50 pm  Total Treatment Time: 50 minutes  Total Timed minutes: 30 minutes    Precautions:  Standard, Fall and Weightbearing **SENSITIVE TO HEAT DUE TO MS, DEFER HEAT MODALITIES       Subjective     Pt reports: "it's feeling better." Pt reports still wearing compression glove on and off. Pt not wearing wrist brace.  she was compliant with home exercise program given last session.   Response to previous treatment: improved pain, swelling, and ROM  Functional change: none noted    Pain: 1/10  Location: left hand    Objective     Edema. Measured in centimeters.    1/16/2020 1/16/2020 1/30/2020     Left Right Left   Wrist Crease 19 15.8 18.3 (-0.7)   DPC 18.8 18.5 18.7 (-0.1)   MCPs 19.3 18.7 19.1 (-0.2)         Elbow and Wrist ROM. Measured in degrees.    1/16/2020 1/16/2020 1/30/2020 2/6/2020     Left Right Left Left   Elbow Ext/Flex WNL WNL WNL    Supination/Pronation 44/74 WNL 66/84 (+22/+10) 72/84   Wrist Ext/Flex " 36/32 72/60 44/46 (+8/+14) 52/50   Wrist RD/UD 4/6 12/22 10/12 (+6/+6) 12/14      Hand ROM. Measured in degrees.  Other digits WFL, able to make full fist    1/16/2020 1/30/2020 2/6/2020     Left Left Left           Thumb: MP 5/45 5/50 (+5) 50                IP 0 8 (+8) 10       Rad ADD/ABD 42 42 46       Pal ADD/ABD 40 42 48          Opposition To P2 o SF To base of SF (improved) To base of SF         Treatment consisted of the following:    **heat modalities deferred due to pt sensitive to heat due to MS     Silvana received the following manual therapy techniques for 10 minutes:   -Performed retrograde massage to L digits/hand/wrist  to decrease edema, improve joint mobility, decrease pain and improve lymphatic drainage to increase hand function. Performed scar massage to volar wrist area (not including one small healing area)   to decrease adhesions and improve tensile glide.        Silvana received therapeutic exercises for 40 minutes including: (20 min 1:1)  -  AROM   Sup/Pro  Wrist  Ext/flx  RD/UD    X 10 reps each    AROM   Spreads  Wave  Hook  Straight fist  Composite fist  Lifts X 10 reps each   AROM thumb:  Rad abd/add  Palmar abd/add  IP blocks  Opposition  Pinky Slides    X 10 reps each    Wrist wheel, flex/ext, sup/pro X 3 min   Wrist maze X 3 min   isospheres X 2 min, on table    Finger add with blue foam, abd with yellow rubber band X 10 reps each    In hand manipulation with med pom poms X 3 containers   Wrist AROM with unweighted dowel, flex/ext, Rd/UD 2 X 10 reps each    Towel scrunches X 2 min   Gross grasping with rice bin X 2 min   Light gripping with pink sponge X 10 reps            Home Exercises and Education Provided     Education provided: cont HEP as tolerated, cont brace wear, and compression glove wear for edema management  - Progress towards goals     Written Home Exercises Provided: Patient instructed to cont prior HEP.  Exercises were reviewed and Silvana was able to demonstrate them prior  to the end of the session.  Silvana demonstrated good  understanding of the education provided.   .   See EMR under Patient Instructions for exercises provided initial eval.     Assessment     Pt tolerated tx fairly well today. She required frequent rest breaks due to fatigue, but better endurance noted today. Wrist and forearm ROM appears to be improving slowly. Pt participated well and is motivated. She cont to present with swelling, stiffness, limited ROM, and weakness; however, pt demonstrates improvement in ROM and edema. Limited progression in therapy due to limited endurance, probably related to MS. However, able to progress with some exercise today.    Silvana is progressing well towards her goals and there are no updates to goals at this time. Pt prognosis continues as Good. Pt will continue to benefit from skilled outpatient occupational therapy to address the deficits listed in the problem list on initial evaluation, provide pt/family education and to maximize pt's level of independence in the home and community environment.     Anticipated barriers to continued occupational therapy: MS    Pt's spiritual, cultural and educational needs considered and pt agreeable to plan of care and goals.    Goals     Goals:   The following goals were discussed with the patient and patient is in agreement with them as to be addressed in the treatment plan.   Long Term Goals (LTGs); to be met by discharge.  LTG #1: Pt will report a pain level of 1 out of 10 with ADLs and house hold tasks ---progressing    LTG #2: Pt will demo improved FOTO score by at least points. ---progressing  LTG #3: Pt will return to prior level of function for ADLs and household management. ---progressing  LTG #4: Pt will demonstrate improved L wrist AROM WFL for performing daily tasks---progressing  LTG #5:  and pinch to be assessed when appropriate and goals set accordingly---progressing     Short Term Goals (STGs); to be met within 4 weeks  (2/14/2020).  STG #1a: Pt will report 3 out of 10 pain level with ADLs and daily tasks.---progressing  STG #2a: Pt will report/demo [place] with feeding self.---progressing  STG #2b: Pt will report/demo [place] with driving.---progressing  STG #3a: Pt will demonstrate independence with issued HEP. ---progressing  STG #3b: Pt will demo improved L wrist AROM by at least 15 degrees needed to aid with functional use during ADLs---progressing  STG #4: Pt will demonstrate improved edema in L hand and wrist by at least 0.4 cm for increased use during daily tasks.---progressing         Plan     Continue skilled occupational therapy with individualized plan of care focusing on ROM and edema management to maximize functional use of her L hand.       Updates/Grading for next session: cont to progress as able with ROM. Pt has follow up with MD tomorrow.     BRUCE Patino

## 2020-02-10 ENCOUNTER — CLINICAL SUPPORT (OUTPATIENT)
Dept: REHABILITATION | Facility: HOSPITAL | Age: 65
End: 2020-02-10
Payer: MEDICARE

## 2020-02-10 ENCOUNTER — OFFICE VISIT (OUTPATIENT)
Dept: ORTHOPEDICS | Facility: CLINIC | Age: 65
End: 2020-02-10
Payer: MEDICARE

## 2020-02-10 VITALS — WEIGHT: 173 LBS | BODY MASS INDEX: 30.65 KG/M2 | HEIGHT: 63 IN

## 2020-02-10 DIAGNOSIS — M25.632 DECREASED RANGE OF MOTION OF LEFT WRIST: ICD-10-CM

## 2020-02-10 DIAGNOSIS — M79.89 SWELLING OF LEFT HAND: ICD-10-CM

## 2020-02-10 DIAGNOSIS — M25.532 LEFT WRIST PAIN: ICD-10-CM

## 2020-02-10 DIAGNOSIS — S52.502D CLOSED FRACTURE OF DISTAL END OF LEFT RADIUS WITH ROUTINE HEALING, UNSPECIFIED FRACTURE MORPHOLOGY, SUBSEQUENT ENCOUNTER: Primary | ICD-10-CM

## 2020-02-10 PROCEDURE — 99999 PR PBB SHADOW E&M-EST. PATIENT-LVL III: ICD-10-PCS | Mod: PBBFAC,,, | Performed by: PHYSICIAN ASSISTANT

## 2020-02-10 PROCEDURE — 97110 THERAPEUTIC EXERCISES: CPT | Mod: PN

## 2020-02-10 PROCEDURE — 99024 PR POST-OP FOLLOW-UP VISIT: ICD-10-PCS | Mod: S$GLB,,, | Performed by: PHYSICIAN ASSISTANT

## 2020-02-10 PROCEDURE — 99999 PR PBB SHADOW E&M-EST. PATIENT-LVL III: CPT | Mod: PBBFAC,,, | Performed by: PHYSICIAN ASSISTANT

## 2020-02-10 PROCEDURE — 97140 MANUAL THERAPY 1/> REGIONS: CPT | Mod: PN

## 2020-02-10 PROCEDURE — 99024 POSTOP FOLLOW-UP VISIT: CPT | Mod: S$GLB,,, | Performed by: PHYSICIAN ASSISTANT

## 2020-02-10 NOTE — PROGRESS NOTES
"  Occupational Therapy Daily Treatment Note      Date: 2/10/2020  Name: Silvana Quezada  Clinic Number: [id number]    Therapy Diagnosis:   Encounter Diagnoses   Name Primary?    Left wrist pain     Swelling of left hand     Decreased range of motion of left wrist      Physician: Mc Cordero, PAKellyC    Physician Orders: eval and treat  Medical Diagnosis: S52.502D (ICD-10-CM) - Closed fracture of distal end of left radius with routine healing, unspecified fracture morphology, subsequent encounter  Surgical Procedure and Date: 12/27/2019,Open reduction and internal fixation of left distal radius fracture, 3 or more fragments, intra-articular, using Acumed volar   locking plate. / Date of Injury/Onset: 12/13/2019  Evaluation Date: 1/16/2020  Insurance Authorization Period Expiration: 3/16/2020  Plan of Care Certification Period: 3/13/2020  Date of Return to MD: 3/9/2020     FOTO: initial eval, 6th visit: 33% limitation, (16 points improved)      Visit # / Visits authorized: 7 / 12   Time In: 12:55 pm  Time Out: 1:40 pm  Total Treatment Time: 45 minutes  Total Timed minutes: 45 minutes    Precautions:  Standard, Fall and Weightbearing **SENSITIVE TO HEAT DUE TO MS, DEFER HEAT MODALITIES       Subjective     Pt reports: "it's feeling better. I saw the PA today and he said it's looking good." Pt reports still wearing compression glove on and off. Pt not wearing wrist brace.  she was compliant with home exercise program given last session.   Response to previous treatment: improved pain, swelling, and ROM  Functional change: none noted    Pain: 1/10  Location: left hand    Objective     Edema. Measured in centimeters.    1/16/2020 1/16/2020 1/30/2020     Left Right Left   Wrist Crease 19 15.8 18.3 (-0.7)   DPC 18.8 18.5 18.7 (-0.1)   MCPs 19.3 18.7 19.1 (-0.2)         Elbow and Wrist ROM. Measured in degrees.    1/16/2020 1/16/2020 1/30/2020 2/6/2020     Left Right Left Left   Elbow Ext/Flex WNL WNL WNL  "   Supination/Pronation 44/74 WNL 66/84 (+22/+10) 72/84   Wrist Ext/Flex 36/32 72/60 44/46 (+8/+14) 52/50   Wrist RD/UD 4/6 12/22 10/12 (+6/+6) 12/14      Hand ROM. Measured in degrees.  Other digits WFL, able to make full fist    1/16/2020 1/30/2020 2/6/2020     Left Left Left           Thumb: MP 5/45 5/50 (+5) 50                IP 0 8 (+8) 10       Rad ADD/ABD 42 42 46       Pal ADD/ABD 40 42 48          Opposition To P2 o SF To base of SF (improved) To base of SF         Treatment consisted of the following:    **heat modalities deferred due to pt sensitive to heat due to MS     Silvana received the following manual therapy techniques for 10 minutes:   -Performed retrograde massage to L digits/hand/wrist  to decrease edema, improve joint mobility, decrease pain and improve lymphatic drainage to increase hand function. Performed scar massage to volar wrist area (not including one small healing area)   to decrease adhesions and improve tensile glide.        Silvana received therapeutic exercises for 35 minutes including:   AROM   Sup/Pro  Wrist  Ext/flx  RD/UD    X 10 reps each    AROM   Spreads  Wave  Hook  Straight fist  Composite fist  Lifts X 10 reps each   AROM thumb:  Rad abd/add  Palmar abd/add  IP blocks  Opposition  Pinky Slides    X 10 reps each    Wrist wheel, flex/ext, sup/pro X 3 min   Wrist maze X 3 min   isospheres X 2 min, on table    Finger add with blue foam, abd with yellow rubber band X 10 reps each    In hand manipulation with med pom poms X 3 containers   Wrist AROM with unweighted dowel, flex/ext, Rd/UD 2 X 10 reps each    Towel scrunches X 2 min   Gross grasping with rice bin X 2 min   Light gripping with pink sponge X 15 reps            Home Exercises and Education Provided     Education provided: cont HEP as tolerated, cont brace wear, and compression glove wear for edema management  - Progress towards goals     Written Home Exercises Provided: Patient instructed to cont prior HEP.  Exercises  were reviewed and Silvana was able to demonstrate them prior to the end of the session.  Silvana demonstrated good  understanding of the education provided.   .   See EMR under Patient Instructions for exercises provided initial eval.     Assessment     Pt tolerated tx fairly well today. She required frequent rest breaks due to fatigue, but endurance noted today. Wrist and forearm ROM appears to be improving slowly. Pt participated well and is motivated. She cont to present with swelling, stiffness, limited ROM, and weakness; however, pt demonstrates improvement in ROM and edema. Treatment somewhat limited due to limited endurance, probably related to MS.    Silvana is progressing well towards her goals and there are no updates to goals at this time. Pt prognosis continues as Good. Pt will continue to benefit from skilled outpatient occupational therapy to address the deficits listed in the problem list on initial evaluation, provide pt/family education and to maximize pt's level of independence in the home and community environment.     Anticipated barriers to continued occupational therapy: MS    Pt's spiritual, cultural and educational needs considered and pt agreeable to plan of care and goals.    Goals     Goals:   The following goals were discussed with the patient and patient is in agreement with them as to be addressed in the treatment plan.   Long Term Goals (LTGs); to be met by discharge.  LTG #1: Pt will report a pain level of 1 out of 10 with ADLs and house hold tasks ---progressing    LTG #2: Pt will demo improved FOTO score by at least points. ---progressing  LTG #3: Pt will return to prior level of function for ADLs and household management. ---progressing  LTG #4: Pt will demonstrate improved L wrist AROM WFL for performing daily tasks---progressing  LTG #5:  and pinch to be assessed when appropriate and goals set accordingly---progressing     Short Term Goals (STGs); to be met within 4 weeks  (2/14/2020).  STG #1a: Pt will report 3 out of 10 pain level with ADLs and daily tasks.---progressing  STG #2a: Pt will report/demo [place] with feeding self.---progressing  STG #2b: Pt will report/demo [place] with driving.---progressing  STG #3a: Pt will demonstrate independence with issued HEP. ---progressing  STG #3b: Pt will demo improved L wrist AROM by at least 15 degrees needed to aid with functional use during ADLs---progressing  STG #4: Pt will demonstrate improved edema in L hand and wrist by at least 0.4 cm for increased use during daily tasks.---progressing         Plan     Continue skilled occupational therapy with individualized plan of care focusing on ROM and edema management to maximize functional use of her L hand.       Updates/Grading for next session: cont to progress as able with ROM.     BRUCE Patino      Alert-The patient is alert, awake and responds to voice. The patient is oriented to time, place, and person. The triage nurse is able to obtain subjective information.

## 2020-02-10 NOTE — PROGRESS NOTES
Subjective:      Patient ID: Silvana Quezada is a 64 y.o. female.    Chief Complaint: Post-op Evaluation of the Left Wrist      HPI: Silvana Quezada a 64-year-old female in clinic today for 6 week postoperative follow-up after ORIF of the left distal radius.  She states that she is no longer experiencing any pain and is out of her removable wrist brace almost all of the time. She has been going to therapy 2 times a week and they are working on her mobility, she feels like she is making good progress.    Past Medical History:   Diagnosis Date    Bipolar 1 disorder     Breast cancer     right    Breast cyst     Closed fracture of proximal end of right humerus 3/9/2015    Depression     History of right shoulder fracture     MS (multiple sclerosis)     presented as dizzines, dx vision,     Osteoporosis, unspecified        Current Outpatient Medications:     buPROPion (WELLBUTRIN SR) 100 MG TBSR 12 hr tablet, Take 2 tablets (200 mg total) by mouth every morning., Disp: 180 tablet, Rfl: 1    buPROPion (WELLBUTRIN SR) 150 MG TBSR 12 hr tablet, Take 1 tablet (150 mg total) by mouth every evening., Disp: 90 tablet, Rfl: 1    calcium-vitamin D3 (CALCIUM 500 + D) 500 mg(1,250mg) -200 unit per tablet, Take 1 tablet by mouth 2 (two) times daily with meals., Disp: , Rfl:     catheter (FEMALE CATHETER) 14 Fr Misc, Patient is to self catheterize four times a day indefinitley using female 12 fr in and out catheter for incomplete bladder emptying.  Dispense 120 month with 11 refills or 360 every 3 months with 3 refills depending on patient's preference, Disp: 120 each, Rfl: 11    fluphenazine (PROLIXIN) 1 MG tablet, TAKE 2 TABLETS NIGHTLY, Disp: 180 tablet, Rfl: 1    folic acid (FOLVITE) 1 MG tablet, Take 1 tablet (1,000 mcg total) by mouth once daily., Disp: 90 tablet, Rfl: 1    OXcarbazepine (TRILEPTAL) 150 MG Tab, Take 1 tablet (150 mg total) by mouth 2 (two) times daily., Disp: 180 tablet, Rfl: 2  Review of patient's  "allergies indicates:   Allergen Reactions    Adhesive      blisters    Keflex [cephalexin] Rash       Ht 5' 3" (1.6 m)   Wt 78.5 kg (173 lb)   BMI 30.65 kg/m²     Review of Systems   Constitution: Negative for chills and fever.   HENT: Negative for congestion and hoarse voice.    Eyes: Negative for discharge and redness.   Cardiovascular: Negative for chest pain and palpitations.   Respiratory: Negative for cough and wheezing.    Skin: Negative for itching and rash.   Musculoskeletal: Positive for stiffness. Negative for arthritis, joint pain and joint swelling.   Gastrointestinal: Negative for diarrhea, nausea and vomiting.   Neurological: Negative for dizziness and numbness.   Psychiatric/Behavioral: Negative for altered mental status.   Allergic/Immunologic: Negative for environmental allergies and persistent infections.           Objective:    Ortho Exam   Left wrist:  Incision is completely healed, no signs infection  No swelling is noted  No TTP  Flexion/extension wrist are limited by some stiffness, but there is no pain  Patient is able to pronate/supinate the wrist  Sensation and pulses intact  GEN: Well developed, well nourished female. AAOX3. No acute distress.   Normocephalic, atraumatic.   DG  Breathing unlabored.  Mood and affect appropriate.         Assessment:     Imaging:  No new imaging ordered today        1. Closed fracture of distal end of left radius with routine healing, unspecified fracture morphology, subsequent encounter          Plan:       Continue therapy for range of motion  Avoid heavy lifting, gripping, pushing, pulling; progress activities as tolerated  Encouraged the patient that her progress is very good that with continued therapy she should be able to regain more motion of the wrist     Follow up in about 4 weeks (around 3/9/2020).        "

## 2020-02-13 ENCOUNTER — CLINICAL SUPPORT (OUTPATIENT)
Dept: REHABILITATION | Facility: HOSPITAL | Age: 65
End: 2020-02-13
Payer: MEDICARE

## 2020-02-13 DIAGNOSIS — M79.89 SWELLING OF LEFT HAND: ICD-10-CM

## 2020-02-13 DIAGNOSIS — M25.632 DECREASED RANGE OF MOTION OF LEFT WRIST: ICD-10-CM

## 2020-02-13 DIAGNOSIS — M25.532 LEFT WRIST PAIN: ICD-10-CM

## 2020-02-13 PROCEDURE — 97110 THERAPEUTIC EXERCISES: CPT | Mod: PN

## 2020-02-13 PROCEDURE — 97140 MANUAL THERAPY 1/> REGIONS: CPT | Mod: PN

## 2020-02-13 NOTE — PROGRESS NOTES
"  Occupational Therapy Daily Treatment Note      Date: 2/13/2020  Name: Silvana Quezada  Clinic Number: [id number]    Therapy Diagnosis:   Encounter Diagnoses   Name Primary?    Left wrist pain     Swelling of left hand     Decreased range of motion of left wrist      Physician: Mc Cordero, CELIA    Physician Orders: eval and treat  Medical Diagnosis: S52.502D (ICD-10-CM) - Closed fracture of distal end of left radius with routine healing, unspecified fracture morphology, subsequent encounter  Surgical Procedure and Date: 12/27/2019,Open reduction and internal fixation of left distal radius fracture, 3 or more fragments, intra-articular, using Acumed volar   locking plate. / Date of Injury/Onset: 12/13/2019  Evaluation Date: 1/16/2020  Insurance Authorization Period Expiration: 3/16/2020  Plan of Care Certification Period: 3/13/2020  Date of Return to MD: 3/9/2020     FOTO: initial eval, 6th visit: 33% limitation, (16 points improved)      Visit # / Visits authorized: 8 / 12   Time In: 1:10 pm  Time Out: 1:55 pm  Total Treatment Time: 45 minutes  Total Timed minutes: 45 minutes    Precautions:  Standard, Fall and Weightbearing **SENSITIVE TO HEAT DUE TO MS, DEFER HEAT MODALITIES       Subjective     Pt reports: "I think my hand is a little swollen." Pt reports still wearing compression glove on and off. Pt not wearing wrist brace.  she was compliant with home exercise program given last session.   Response to previous treatment: improved pain, swelling, and ROM  Functional change: none noted    Pain: 1/10  Location: left hand    Objective     Edema. Measured in centimeters.    1/16/2020 1/16/2020 1/30/2020     Left Right Left   Wrist Crease 19 15.8 18.3 (-0.7)   DPC 18.8 18.5 18.7 (-0.1)   MCPs 19.3 18.7 19.1 (-0.2)         Elbow and Wrist ROM. Measured in degrees.    1/16/2020 1/16/2020 1/30/2020 2/6/2020     Left Right Left Left   Elbow Ext/Flex WNL WNL WNL    Supination/Pronation 44/74 WNL 66/84 (+22/+10) 72/84 "   Wrist Ext/Flex 36/32 72/60 44/46 (+8/+14) 52/50   Wrist RD/UD 4/6 12/22 10/12 (+6/+6) 12/14      Hand ROM. Measured in degrees.  Other digits WFL, able to make full fist    1/16/2020 1/30/2020 2/6/2020     Left Left Left           Thumb: MP 5/45 5/50 (+5) 50                IP 0 8 (+8) 10       Rad ADD/ABD 42 42 46       Pal ADD/ABD 40 42 48          Opposition To P2 o SF To base of SF (improved) To base of SF         Treatment consisted of the following:    **heat modalities deferred due to pt sensitive to heat due to MS     Silvana received the following manual therapy techniques for 10 minutes:   -Performed retrograde massage to L digits/hand/wrist  to decrease edema, improve joint mobility, decrease pain and improve lymphatic drainage to increase hand function. Performed scar massage to volar wrist area (not including one small healing area)   to decrease adhesions and improve tensile glide.        Silvana received therapeutic exercises for 35 minutes including:   AROM   Sup/Pro  Wrist  Ext/flx  RD/UD    X 10 reps each    AROM   Spreads  Wave  Hook  Straight fist  Composite fist  Lifts X 10 reps each   AROM thumb:  Rad abd/add  Palmar abd/add  IP blocks  Opposition  Pinky Slides    X 10 reps each    Wrist wheel, flex/ext, sup/pro X 3 min   Wrist maze X 3 min   isospheres X 2 min, on table    In hand manipulation with coins X 1/2 container   Wrist AROM with unweighted dowel, flex/ext, Rd/UD 2 X 10 reps each    Towel scrunches X 2 min   Gross grasping with rice bin X 2 min   Light gripping with pink sponge X 15 reps            Home Exercises and Education Provided     Education provided: cont HEP as tolerated, cont brace wear, and compression glove wear for edema management  - Progress towards goals     Written Home Exercises Provided: Patient instructed to cont prior HEP.  Exercises were reviewed and Silvana was able to demonstrate them prior to the end of the session.  Silvana demonstrated good  understanding of the  education provided.   .   See EMR under Patient Instructions for exercises provided initial eval.     Assessment     Pt is 6 weeks 5 days post op. Pt tolerated tx fairly well today. She cont to require frequent rest breaks due to fatigue. Wrist and forearm ROM appears to be improving slowly. Pt participated well and is motivated. She cont to present with swelling, stiffness, limited ROM, and weakness; however, pt demonstrates improvement in ROM and edema. Treatment somewhat limited due to limited endurance, probably related to MS.    Silvana is progressing well towards her goals and there are no updates to goals at this time. Pt prognosis continues as Good. Pt will continue to benefit from skilled outpatient occupational therapy to address the deficits listed in the problem list on initial evaluation, provide pt/family education and to maximize pt's level of independence in the home and community environment.     Anticipated barriers to continued occupational therapy: MS    Pt's spiritual, cultural and educational needs considered and pt agreeable to plan of care and goals.    Goals     Goals:   The following goals were discussed with the patient and patient is in agreement with them as to be addressed in the treatment plan.   Long Term Goals (LTGs); to be met by discharge.  LTG #1: Pt will report a pain level of 1 out of 10 with ADLs and house hold tasks ---progressing    LTG #2: Pt will demo improved FOTO score by at least points. ---progressing  LTG #3: Pt will return to prior level of function for ADLs and household management. ---progressing  LTG #4: Pt will demonstrate improved L wrist AROM WFL for performing daily tasks---progressing  LTG #5:  and pinch to be assessed when appropriate and goals set accordingly---progressing     Short Term Goals (STGs); to be met within 4 weeks (2/14/2020).  STG #1a: Pt will report 3 out of 10 pain level with ADLs and daily tasks.---progressing  STG #2a: Pt will report/demo  [place] with feeding self.---progressing  STG #2b: Pt will report/demo [place] with driving.---progressing  STG #3a: Pt will demonstrate independence with issued HEP. ---progressing  STG #3b: Pt will demo improved L wrist AROM by at least 15 degrees needed to aid with functional use during ADLs---progressing  STG #4: Pt will demonstrate improved edema in L hand and wrist by at least 0.4 cm for increased use during daily tasks.---progressing         Plan     Continue skilled occupational therapy with individualized plan of care focusing on ROM and edema management to maximize functional use of her L hand.       Updates/Grading for next session: cont to progress as able with ROM.     BRUCE Patino

## 2020-02-17 ENCOUNTER — CLINICAL SUPPORT (OUTPATIENT)
Dept: REHABILITATION | Facility: HOSPITAL | Age: 65
End: 2020-02-17
Payer: MEDICARE

## 2020-02-17 DIAGNOSIS — M25.632 DECREASED RANGE OF MOTION OF LEFT WRIST: ICD-10-CM

## 2020-02-17 DIAGNOSIS — M25.532 LEFT WRIST PAIN: ICD-10-CM

## 2020-02-17 DIAGNOSIS — M79.89 SWELLING OF LEFT HAND: ICD-10-CM

## 2020-02-17 PROCEDURE — 97110 THERAPEUTIC EXERCISES: CPT | Mod: PN

## 2020-02-17 NOTE — PROGRESS NOTES
"  Occupational Therapy Daily Treatment Note      Date: 2/17/2020  Name: Silvana Quezada  Clinic Number: [id number]    Therapy Diagnosis:   Encounter Diagnoses   Name Primary?    Left wrist pain     Swelling of left hand     Decreased range of motion of left wrist      Physician: Mc Cordero, CELIA    Physician Orders: eval and treat  Medical Diagnosis: S52.502D (ICD-10-CM) - Closed fracture of distal end of left radius with routine healing, unspecified fracture morphology, subsequent encounter  Surgical Procedure and Date: 12/27/2019,Open reduction and internal fixation of left distal radius fracture, 3 or more fragments, intra-articular, using Acumed volar   locking plate. / Date of Injury/Onset: 12/13/2019  Evaluation Date: 1/16/2020  Insurance Authorization Period Expiration: 3/16/2020  Plan of Care Certification Period: 3/13/2020  Date of Return to MD: 3/9/2020     FOTO: initial eval, 6th visit: 33% limitation, (16 points improved)      Visit # / Visits authorized: 9 / 12 *FOTO next visit  Time In: 1:05 pm  Time Out: 1:55 pm  Total Treatment Time: 45 minutes  Total Timed minutes: 45 minutes    Precautions:  Standard, Fall and Weightbearing **SENSITIVE TO HEAT DUE TO MS, DEFER HEAT MODALITIES       Subjective     Pt reports: "the hand feels ok. I think the therapy helps" Pt reports still wearing compression glove on and off. Pt not wearing wrist brace.  she was compliant with home exercise program given last session.   Response to previous treatment: improved pain, swelling, and ROM  Functional change: none noted    Pain: 1/10  Location: left hand    Objective     Edema. Measured in centimeters.    1/16/2020 1/16/2020 1/30/2020     Left Right Left   Wrist Crease 19 15.8 18.3 (-0.7)   DPC 18.8 18.5 18.7 (-0.1)   MCPs 19.3 18.7 19.1 (-0.2)         Elbow and Wrist ROM. Measured in degrees.    1/16/2020 1/16/2020 1/30/2020 2/6/2020     Left Right Left Left   Elbow Ext/Flex WNL WNL WNL    Supination/Pronation 44/74 " WNL 66/84 (+22/+10) 72/84   Wrist Ext/Flex 36/32 72/60 44/46 (+8/+14) 52/50   Wrist RD/UD 4/6 12/22 10/12 (+6/+6) 12/14      Hand ROM. Measured in degrees.  Other digits WFL, able to make full fist    1/16/2020 1/30/2020 2/6/2020     Left Left Left           Thumb: MP 5/45 5/50 (+5) 50                IP 0 8 (+8) 10       Rad ADD/ABD 42 42 46       Pal ADD/ABD 40 42 48          Opposition To P2 o SF To base of SF (improved) To base of SF         Treatment consisted of the following:    **heat modalities deferred due to pt sensitive to heat due to MS     Silvana received the following manual therapy techniques for 10 minutes:   -Performed retrograde massage to L digits/hand/wrist  to decrease edema, improve joint mobility, decrease pain and improve lymphatic drainage to increase hand function. Performed scar massage to volar wrist area (not including one small healing area)   to decrease adhesions and improve tensile glide.        Silvana received therapeutic exercises for 35 minutes including:   AROM   Sup/Pro  Wrist  Ext/flx  RD/UD    X 10 reps each    AROM   Spreads  Wave  Hook  Straight fist  Composite fist  Lifts X 10 reps each   AROM thumb:  Rad abd/add  Palmar abd/add  IP blocks  Opposition  Pinky Slides    X 10 reps each    Wrist wheel, flex/ext, sup/pro X 3 min   Wrist maze X 3 min   isospheres X 2 min, on table    In hand manipulation with coins X 1/2 container   Wrist AROM with unweighted dowel, flex/ext, Rd/UD 2 X 10 reps each    Towel scrunches X 2 min   Gross grasping with rice bin X 2 min   Light gripping with pink sponge X 15 reps            Home Exercises and Education Provided     Education provided: cont HEP as tolerated, cont brace wear, and compression glove wear for edema management  - Progress towards goals     Written Home Exercises Provided: Patient instructed to cont prior HEP.  Exercises were reviewed and Silvana was able to demonstrate them prior to the end of the session.  Silvana demonstrated  good  understanding of the education provided.   .   See EMR under Patient Instructions for exercises provided initial eval.     Assessment     Pt is 7 weeks 2 days post op. Pt tolerated tx fairly well today. She cont to require frequent rest breaks due to fatigue. Wrist and forearm ROM appears to be improving slowly. Pt participated well and is motivated. She cont to present with swelling, stiffness, limited ROM, and weakness; however, pt demonstrates improvement in ROM and edema. Treatment somewhat limited still due to limited endurance, probably related to MS.    Silvana is progressing well towards her goals and there are no updates to goals at this time. Pt prognosis continues as Good. Pt will continue to benefit from skilled outpatient occupational therapy to address the deficits listed in the problem list on initial evaluation, provide pt/family education and to maximize pt's level of independence in the home and community environment.     Anticipated barriers to continued occupational therapy: MS    Pt's spiritual, cultural and educational needs considered and pt agreeable to plan of care and goals.    Goals     Goals:   The following goals were discussed with the patient and patient is in agreement with them as to be addressed in the treatment plan.   Long Term Goals (LTGs); to be met by discharge.  LTG #1: Pt will report a pain level of 1 out of 10 with ADLs and house hold tasks ---progressing    LTG #2: Pt will demo improved FOTO score by at least points. ---progressing  LTG #3: Pt will return to prior level of function for ADLs and household management. ---progressing  LTG #4: Pt will demonstrate improved L wrist AROM WFL for performing daily tasks---progressing  LTG #5:  and pinch to be assessed when appropriate and goals set accordingly---progressing     Short Term Goals (STGs); to be met within 4 weeks (2/14/2020).  STG #1a: Pt will report 3 out of 10 pain level with ADLs and daily  tasks.---progressing  STG #2a: Pt will report/demo [place] with feeding self.---progressing  STG #2b: Pt will report/demo [place] with driving.---progressing  STG #3a: Pt will demonstrate independence with issued HEP. ---progressing  STG #3b: Pt will demo improved L wrist AROM by at least 15 degrees needed to aid with functional use during ADLs---progressing  STG #4: Pt will demonstrate improved edema in L hand and wrist by at least 0.4 cm for increased use during daily tasks.---progressing         Plan     Continue skilled occupational therapy with individualized plan of care focusing on ROM and edema management to maximize functional use of her L hand.       Updates/Grading for next session: cont to progress as able with ROM.     BRUCE Patino

## 2020-02-18 NOTE — TELEPHONE ENCOUNTER
----- Message from Jeanne Barksdale sent at 2/18/2020  1:23 PM CST -----  Patient Requesting Order    Order Needed:--Cath orders--    Communication Preference:--Robyn--[facility]--[phone]--    Additional Information:Robyn calling to get orders fax to her for pt supplies listed above. Please fax to [phone].

## 2020-02-20 ENCOUNTER — LAB VISIT (OUTPATIENT)
Dept: LAB | Facility: HOSPITAL | Age: 65
End: 2020-02-20
Attending: INTERNAL MEDICINE
Payer: MEDICARE

## 2020-02-20 DIAGNOSIS — R79.9 ABNORMAL FINDING OF BLOOD CHEMISTRY, UNSPECIFIED: ICD-10-CM

## 2020-02-20 DIAGNOSIS — Z13.220 SCREENING FOR HYPERLIPIDEMIA: ICD-10-CM

## 2020-02-20 LAB
CHOLEST SERPL-MCNC: 202 MG/DL (ref 120–199)
CHOLEST/HDLC SERPL: 3 {RATIO} (ref 2–5)
HDLC SERPL-MCNC: 67 MG/DL (ref 40–75)
HDLC SERPL: 33.2 % (ref 20–50)
LDLC SERPL CALC-MCNC: 118 MG/DL (ref 63–159)
NONHDLC SERPL-MCNC: 135 MG/DL
TRIGL SERPL-MCNC: 85 MG/DL (ref 30–150)

## 2020-02-20 PROCEDURE — 36415 COLL VENOUS BLD VENIPUNCTURE: CPT | Mod: PO

## 2020-02-20 PROCEDURE — 80061 LIPID PANEL: CPT

## 2020-02-21 PROBLEM — M81.0 AGE-RELATED OSTEOPOROSIS WITHOUT CURRENT PATHOLOGICAL FRACTURE: Status: ACTIVE | Noted: 2020-02-21

## 2020-02-21 NOTE — PROGRESS NOTES
"Subjective:       Patient ID: Silvana Quezada is a 64 y.o. female.    Chief Complaint: Follow-up           #Interim Hx  2/1/2020 - ORIF left distal radius . Now in OT 2x/weeks   12/19- cstoscoyp with botox injecitons  12/17/19 -fall with distal radial fracture  12/4/2019 - psych no change and f/u in 6 motnhs   11/1/2019  - EJ fall 2 weeks ago . Mechanical.       #Concerns Today      none  #Chronic Problems      MS  C/b neurogenic bladder  Not able to get into neurology appointment     H/o BCa  UTD on mammo - BIRADS2- 5/2019       Osteopersosis cb pathologic fracture  Last dexa 6/2017 osteoperosis  Fall and wrist fracture 12/2019   Last took bisphonotate >5 years previously  Would be interested in injectable    Bipolar   Seen by psych       Health Maintenance   Topic Date Due    TETANUS VACCINE  09/25/1972    High Dose Statin  09/25/1975    DEXA SCAN  05/30/2020    Fecal Occult Blood Test (FOBT)/[employer]  12/05/2020    Mammogram  05/06/2021    Lipid Panel  02/20/2025    Hepatitis C Screening  Completed    Pneumococcal Vaccine (65+ Low/Medium Risk)  Completed         HPI  Review of Systems   Constitutional: Negative for activity change, chills, diaphoresis, fatigue, fever and unexpected weight change.   Eyes: Negative for redness and visual disturbance.   Respiratory: Negative for shortness of breath.    Cardiovascular: Negative for chest pain and palpitations.   Gastrointestinal: Negative for abdominal distention and blood in stool.   Genitourinary: Positive for difficulty urinating. Negative for dysuria, frequency and menstrual problem.   Neurological: Negative for syncope and headaches.       Objective:       /64 (BP Location: Right arm, Patient Position: Sitting, BP Method: Medium (Manual))   Pulse 77   Ht 5' 3" (1.6 m)   Wt 78.9 kg (173 lb 15.1 oz)   SpO2 98%   BMI 30.81 kg/m²     Physical Exam   Constitutional: She is oriented to person, place, and time. She appears well-developed and " well-nourished. No distress.   HENT:   Head: Normocephalic.   Nose: Nose normal.   Mouth/Throat: Oropharynx is clear and moist.   Eyes: Pupils are equal, round, and reactive to light. Conjunctivae and EOM are normal. Right eye exhibits no discharge. Left eye exhibits no discharge.   Neck: Normal range of motion. Neck supple.   Cardiovascular: Normal rate, regular rhythm and normal heart sounds. Exam reveals no gallop and no friction rub.   No murmur heard.  Pulmonary/Chest: Effort normal. No respiratory distress.   Abdominal: Soft. Bowel sounds are normal. She exhibits no distension.   Musculoskeletal: Normal range of motion. She exhibits no edema or deformity.   Lymphadenopathy:     She has no cervical adenopathy.   Neurological: She is alert and oriented to person, place, and time. She displays normal reflexes. No cranial nerve deficit or sensory deficit. Coordination normal.   5/5 UE  3/5 strenght in LE flexors, extensor  4/5 strenght in calf     Skin: Skin is warm. She is not diaphoretic.   Psychiatric: She has a normal mood and affect.   Nursing note and vitals reviewed.          Basic Labs  CMP  Sodium   Date Value Ref Range Status   12/13/2019 146 (H) 136 - 145 mmol/L Final     Potassium   Date Value Ref Range Status   12/13/2019 4.8 3.5 - 5.1 mmol/L Final     Chloride   Date Value Ref Range Status   12/13/2019 108 95 - 110 mmol/L Final     CO2   Date Value Ref Range Status   12/13/2019 29 23 - 29 mmol/L Final     Glucose   Date Value Ref Range Status   12/13/2019 105 70 - 110 mg/dL Final     BUN, Bld   Date Value Ref Range Status   12/13/2019 21 8 - 23 mg/dL Final     Creatinine   Date Value Ref Range Status   12/13/2019 0.9 0.5 - 1.4 mg/dL Final     Calcium   Date Value Ref Range Status   12/13/2019 10.2 8.7 - 10.5 mg/dL Final     Total Protein   Date Value Ref Range Status   12/13/2019 7.0 6.0 - 8.4 g/dL Final     Albumin   Date Value Ref Range Status   12/13/2019 4.0 3.5 - 5.2 g/dL Final     Total  Bilirubin   Date Value Ref Range Status   12/13/2019 0.3 0.1 - 1.0 mg/dL Final     Comment:     For infants and newborns, interpretation of results should be based  on gestational age, weight and in agreement with clinical  observations.  Premature Infant recommended reference ranges:  Up to 24 hours.............<8.0 mg/dL  Up to 48 hours............<12.0 mg/dL  3-5 days..................<15.0 mg/dL  6-29 days.................<15.0 mg/dL       Alkaline Phosphatase   Date Value Ref Range Status   12/13/2019 89 55 - 135 U/L Final     AST   Date Value Ref Range Status   12/13/2019 14 10 - 40 U/L Final     ALT   Date Value Ref Range Status   12/13/2019 15 10 - 44 U/L Final     Anion Gap   Date Value Ref Range Status   12/13/2019 9 8 - 16 mmol/L Final     eGFR if    Date Value Ref Range Status   12/13/2019 >60 >60 mL/min/1.73 m^2 Final     eGFR if non    Date Value Ref Range Status   12/13/2019 >60 >60 mL/min/1.73 m^2 Final     Comment:     Calculation used to obtain the estimated glomerular filtration  rate (eGFR) is the CKD-EPI equation.            Lab Results   Component Value Date    TSH 1.683 05/17/2017         Lab Results   Component Value Date    HEPCAB Negative 07/19/2019       Lipids  Lab Results   Component Value Date    CHOL 202 (H) 02/20/2020     Lab Results   Component Value Date    HDL 67 02/20/2020     Lab Results   Component Value Date    LDLCALC 118.0 02/20/2020     Lab Results   Component Value Date    TRIG 85 02/20/2020     Lab Results   Component Value Date    CHOLHDL 33.2 02/20/2020           Assessment:       1. Fall, initial encounter    2. Age-related osteoporosis without current pathological fracture    3. MS (multiple sclerosis)    4. Bipolar I disorder, most recent episode depressed, moderate    5. History of breast cancer    6. Encounter for screening mammogram for malignant neoplasm of breast         Plan:         Silvana was seen today for  follow-up.    Diagnoses and all orders for this visit:    Fall, initial encounter  This is 2nd fall in 3 months  Will eval with PT/OT and consider walker   Provided instruction on other supportive care treatmetn  RTC in 3 months   -     Ambulatory referral/consult to Physical/Occupational Therapy; Future    Age-related osteoporosis without current pathological fracture  H/o of osteoporsis and recent fall and fracture  Will repeat DXA   Will likely send to endo as patient prefers injectable to oral  -     Mammo Digital Screening Bilat; Standing  -     Basic metabolic panel; Future  -     Calcium, ionized; Future  -     Vitamin D; Future  -     PTH, intact; Future  -     PHOSPHORUS; Future  -     DXA Bone Density Spine And Hip; Future    MS (multiple sclerosis)  Will reach out to neurology to see about appointment     Bipolar I disorder, most recent episode depressed, moderate  Follows with psych no recent changes to medication    History of breast cancer  -     Mammo Digital Screening Bilat; Standing    Encounter for screening mammogram for malignant neoplasm of breast   -     Mammo Digital Screening Bilat; Standing

## 2020-02-24 ENCOUNTER — CLINICAL SUPPORT (OUTPATIENT)
Dept: REHABILITATION | Facility: HOSPITAL | Age: 65
End: 2020-02-24
Payer: MEDICARE

## 2020-02-24 DIAGNOSIS — M25.532 LEFT WRIST PAIN: ICD-10-CM

## 2020-02-24 DIAGNOSIS — M25.632 DECREASED RANGE OF MOTION OF LEFT WRIST: ICD-10-CM

## 2020-02-24 DIAGNOSIS — M79.89 SWELLING OF LEFT HAND: ICD-10-CM

## 2020-02-24 PROCEDURE — 97110 THERAPEUTIC EXERCISES: CPT | Mod: PN

## 2020-02-24 PROCEDURE — 97140 MANUAL THERAPY 1/> REGIONS: CPT | Mod: PN

## 2020-02-24 NOTE — PROGRESS NOTES
"  Occupational Therapy Daily Treatment Note      Date: 2/24/2020  Name: Silvana Quezada  Clinic Number: [id number]    Therapy Diagnosis:   Encounter Diagnoses   Name Primary?    Left wrist pain     Swelling of left hand     Decreased range of motion of left wrist      Physician: Mc Crodero, CELIA    Physician Orders: eval and treat  Medical Diagnosis: S52.502D (ICD-10-CM) - Closed fracture of distal end of left radius with routine healing, unspecified fracture morphology, subsequent encounter  Surgical Procedure and Date: 12/27/2019,Open reduction and internal fixation of left distal radius fracture, 3 or more fragments, intra-articular, using Acumed volar   locking plate. / Date of Injury/Onset: 12/13/2019  Evaluation Date: 1/16/2020  Insurance Authorization Period Expiration: 3/16/2020  Plan of Care Certification Period: 3/13/2020  Date of Return to MD: 3/9/2020     FOTO: initial eval, 6th visit, 10th visit: 50% limitation      Visit # / Visits authorized: 10 / 12   Time In: 1:10 pm  Time Out: 2:00 pm  Total Treatment Time: 50 minutes  Total Timed minutes: 50 minutes    Precautions:  Standard, Fall and Weightbearing **SENSITIVE TO HEAT DUE TO MS, DEFER HEAT MODALITIES       Subjective     Pt reports: "I'm feeling ok today" Pt reports still wearing compression glove on and off. Pt not wearing wrist brace.  she was compliant with home exercise program given last session.   Response to previous treatment: improved pain, swelling, and ROM  Functional change: none noted    Pain: 1/10  Location: left hand    Objective     Edema. Measured in centimeters.    1/16/2020 1/16/2020 1/30/2020 2/20/2020     Left Right Left Left   Wrist Crease 19 15.8 18.3 (-0.7) 18.5   DPC 18.8 18.5 18.7 (-0.1) 18.4   MCPs 19.3 18.7 19.1 (-0.2) 19         Elbow and Wrist ROM. Measured in degrees.    1/16/2020 1/16/2020 1/30/2020 2/6/2020 2/20/2020     Left Right Left Left Left   Elbow Ext/Flex WNL WNL WNL     Supination/Pronation 44/74 WNL " 66/84 (+22/+10) 72/84 74/86   Wrist Ext/Flex 36/32 72/60 44/46 (+8/+14) 52/50 52/50   Wrist RD/UD 4/6 12/22 10/12 (+6/+6) 12/14 12/18      Hand ROM. Measured in degrees.  Other digits WFL, able to make full fist    1/16/2020 1/30/2020 2/6/2020 2/20/2020     Left Left Left Left            Thumb: MP 5/45 5/50 (+5) 50 50                IP 0 8 (+8) 10 25       Rad ADD/ABD 42 42 46 44       Pal ADD/ABD 40 42 48 42          Opposition To P2 o SF To base of SF (improved) To base of SF To SF MP jt         Treatment consisted of the following:    **heat modalities deferred due to pt sensitive to heat due to MS     Silvana received the following manual therapy techniques for 10 minutes:   -Performed retrograde massage to L digits/hand/wrist  to decrease edema, improve joint mobility, decrease pain and improve lymphatic drainage to increase hand function. Performed scar massage to volar wrist area (not including one small healing area)   to decrease adhesions and improve tensile glide.        Silvana received therapeutic exercises for 40 minutes including:   AROM   Sup/Pro  Wrist  Ext/flx  RD/UD    X 10 reps each    AROM   Spreads  Wave  Hook  Straight fist  Composite fist  Lifts X 10 reps each   AROM thumb:  Rad abd/add  Palmar abd/add  IP blocks  Opposition  Pinky Slides    X 10 reps each    Wrist wheel, flex/ext, sup/pro X 3 min   Wrist maze X 3 min   isospheres X 2 min, on table    In hand manipulation with coins X 1/2 container   Light wrist PRE, 3 ways 2 X 10 reps each, 1#, pain free   Supination/pronation, 1# X 10 reps   Gross grasping with rice bin X 2 min   Light grippers X 10 reps each, 3 grippers            Home Exercises and Education Provided     Education provided: cont HEP as tolerated, cont brace wear, and compression glove wear for edema management  - Progress towards goals     Written Home Exercises Provided: Patient instructed to cont prior HEP.  Exercises were reviewed and Silvana was able to demonstrate them  prior to the end of the session.  Silvana demonstrated good  understanding of the education provided.   .   See EMR under Patient Instructions for exercises provided initial eval.     Assessment     Pt is 7 weeks 6 days post op. Pt tolerated tx fairly well today. She cont to require frequent rest breaks due to fatigue. Able to tolerate light wrist and hand strengthening. She cont to be limited with wrist ROM.  Pt participated well and is motivated. She cont to present with swelling, stiffness, limited ROM, and weakness; however, pt demonstrates improvement in ROM and edema. Treatment somewhat limited still due to limited endurance, probably related to MS.    Silvana is progressing well towards her goals and there are no updates to goals at this time. Pt prognosis continues as Good. Pt will continue to benefit from skilled outpatient occupational therapy to address the deficits listed in the problem list on initial evaluation, provide pt/family education and to maximize pt's level of independence in the home and community environment.     Anticipated barriers to continued occupational therapy: MS    Pt's spiritual, cultural and educational needs considered and pt agreeable to plan of care and goals.    Goals     Goals:   The following goals were discussed with the patient and patient is in agreement with them as to be addressed in the treatment plan.   Long Term Goals (LTGs); to be met by discharge.  LTG #1: Pt will report a pain level of 1 out of 10 with ADLs and house hold tasks ---progressing    LTG #2: Pt will demo improved FOTO score by at least points. ---progressing  LTG #3: Pt will return to prior level of function for ADLs and household management. ---progressing  LTG #4: Pt will demonstrate improved L wrist AROM WFL for performing daily tasks---progressing  LTG #5:  and pinch to be assessed when appropriate and goals set accordingly---progressing     Short Term Goals (STGs); to be met within 4 weeks  (2/14/2020).  STG #1a: Pt will report 3 out of 10 pain level with ADLs and daily tasks.---progressing  STG #2a: Pt will report/demo [place] with feeding self.---progressing  STG #2b: Pt will report/demo [place] with driving.---progressing  STG #3a: Pt will demonstrate independence with issued HEP. ---progressing  STG #3b: Pt will demo improved L wrist AROM by at least 15 degrees needed to aid with functional use during ADLs---progressing  STG #4: Pt will demonstrate improved edema in L hand and wrist by at least 0.4 cm for increased use during daily tasks.---progressing         Plan     Continue skilled occupational therapy with individualized plan of care focusing on ROM and edema management to maximize functional use of her L hand.       Updates/Grading for next session: cont to progress as able with ROM.     BRUCE Patino

## 2020-02-26 ENCOUNTER — LAB VISIT (OUTPATIENT)
Dept: LAB | Facility: HOSPITAL | Age: 65
End: 2020-02-26
Attending: INTERNAL MEDICINE
Payer: MEDICARE

## 2020-02-26 ENCOUNTER — OFFICE VISIT (OUTPATIENT)
Dept: INTERNAL MEDICINE | Facility: CLINIC | Age: 65
End: 2020-02-26
Payer: MEDICARE

## 2020-02-26 VITALS
HEIGHT: 63 IN | HEART RATE: 77 BPM | WEIGHT: 173.94 LBS | OXYGEN SATURATION: 98 % | DIASTOLIC BLOOD PRESSURE: 64 MMHG | BODY MASS INDEX: 30.82 KG/M2 | SYSTOLIC BLOOD PRESSURE: 114 MMHG

## 2020-02-26 DIAGNOSIS — F31.32 BIPOLAR I DISORDER, MOST RECENT EPISODE DEPRESSED, MODERATE: ICD-10-CM

## 2020-02-26 DIAGNOSIS — G35 MS (MULTIPLE SCLEROSIS): ICD-10-CM

## 2020-02-26 DIAGNOSIS — Z85.3 HISTORY OF BREAST CANCER: ICD-10-CM

## 2020-02-26 DIAGNOSIS — M81.0 AGE-RELATED OSTEOPOROSIS WITHOUT CURRENT PATHOLOGICAL FRACTURE: ICD-10-CM

## 2020-02-26 DIAGNOSIS — Z12.31 ENCOUNTER FOR SCREENING MAMMOGRAM FOR MALIGNANT NEOPLASM OF BREAST: ICD-10-CM
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
## 2020-02-26 LAB
25(OH)D3+25(OH)D2 SERPL-MCNC: 35 NG/ML (ref 30–96)
ANION GAP SERPL CALC-SCNC: 8 MMOL/L (ref 8–16)
BUN SERPL-MCNC: 20 MG/DL (ref 8–23)
CA-I BLDV-SCNC: 1.3 MMOL/L (ref 1.06–1.42)
CALCIUM SERPL-MCNC: 9.6 MG/DL (ref 8.7–10.5)
CHLORIDE SERPL-SCNC: 106 MMOL/L (ref 95–110)
CO2 SERPL-SCNC: 28 MMOL/L (ref 23–29)
CREAT SERPL-MCNC: 0.8 MG/DL (ref 0.5–1.4)
EST. GFR  (AFRICAN AMERICAN): >60 ML/MIN/1.73 M^2
EST. GFR  (NON AFRICAN AMERICAN): >60 ML/MIN/1.73 M^2
GLUCOSE SERPL-MCNC: 89 MG/DL (ref 70–110)
PHOSPHATE SERPL-MCNC: 2.8 MG/DL (ref 2.7–4.5)
POTASSIUM SERPL-SCNC: 3.8 MMOL/L (ref 3.5–5.1)
PTH-INTACT SERPL-MCNC: 29 PG/ML (ref 9–77)
SODIUM SERPL-SCNC: 142 MMOL/L (ref 136–145)

## 2020-02-26 PROCEDURE — 99214 OFFICE O/P EST MOD 30 MIN: CPT | Mod: S$GLB,,, | Performed by: INTERNAL MEDICINE

## 2020-02-26 PROCEDURE — 3008F PR BODY MASS INDEX (BMI) DOCUMENTED: ICD-10-PCS | Mod: CPTII,S$GLB,, | Performed by: INTERNAL MEDICINE

## 2020-02-26 PROCEDURE — 1101F PT FALLS ASSESS-DOCD LE1/YR: CPT | Mod: CPTII,S$GLB,, | Performed by: INTERNAL MEDICINE

## 2020-02-26 PROCEDURE — 99999 PR PBB SHADOW E&M-EST. PATIENT-LVL V: CPT | Mod: PBBFAC,,, | Performed by: INTERNAL MEDICINE

## 2020-02-26 PROCEDURE — 82306 VITAMIN D 25 HYDROXY: CPT

## 2020-02-26 PROCEDURE — 99999 PR PBB SHADOW E&M-EST. PATIENT-LVL V: ICD-10-PCS | Mod: PBBFAC,,, | Performed by: INTERNAL MEDICINE

## 2020-02-26 PROCEDURE — 99499 UNLISTED E&M SERVICE: CPT | Mod: ,,, | Performed by: INTERNAL MEDICINE

## 2020-02-26 PROCEDURE — 36415 COLL VENOUS BLD VENIPUNCTURE: CPT | Mod: PO

## 2020-02-26 PROCEDURE — 82330 ASSAY OF CALCIUM: CPT

## 2020-02-26 PROCEDURE — 83970 ASSAY OF PARATHORMONE: CPT

## 2020-02-26 PROCEDURE — 99499 RISK ADDL DX/OHS AUDIT: ICD-10-PCS | Mod: ,,, | Performed by: INTERNAL MEDICINE

## 2020-02-26 PROCEDURE — 99499 RISK ADDL DX/OHS AUDIT: ICD-10-PCS | Mod: S$GLB,,, | Performed by: INTERNAL MEDICINE

## 2020-02-26 PROCEDURE — 84100 ASSAY OF PHOSPHORUS: CPT

## 2020-02-26 PROCEDURE — 99499 UNLISTED E&M SERVICE: CPT | Mod: S$GLB,,, | Performed by: INTERNAL MEDICINE

## 2020-02-26 PROCEDURE — 80048 BASIC METABOLIC PNL TOTAL CA: CPT

## 2020-02-26 PROCEDURE — 99214 PR OFFICE/OUTPT VISIT, EST, LEVL IV, 30-39 MIN: ICD-10-PCS | Mod: S$GLB,,, | Performed by: INTERNAL MEDICINE

## 2020-02-26 PROCEDURE — 3008F BODY MASS INDEX DOCD: CPT | Mod: CPTII,S$GLB,, | Performed by: INTERNAL MEDICINE

## 2020-02-26 PROCEDURE — 1101F PR PT FALLS ASSESS DOC 0-1 FALLS W/OUT INJ PAST YR: ICD-10-PCS | Mod: CPTII,S$GLB,, | Performed by: INTERNAL MEDICINE

## 2020-02-26 NOTE — PATIENT INSTRUCTIONS
We were happy to see you today    For your Testing  Please have your labs and imaging test done at your earliest convience    1. Labs  2. Bone density    For your Medication   For more information about side effects please visit medlineplus.gov      For your Referrals  Physical therapy       Please return to clinic in    3 month

## 2020-02-27 ENCOUNTER — CLINICAL SUPPORT (OUTPATIENT)
Dept: REHABILITATION | Facility: HOSPITAL | Age: 65
End: 2020-02-27
Payer: MEDICARE

## 2020-02-27 DIAGNOSIS — M25.532 LEFT WRIST PAIN: ICD-10-CM

## 2020-02-27 DIAGNOSIS — M25.632 DECREASED RANGE OF MOTION OF LEFT WRIST: ICD-10-CM

## 2020-02-27 DIAGNOSIS — M79.89 SWELLING OF LEFT HAND: ICD-10-CM

## 2020-02-27 PROCEDURE — 97110 THERAPEUTIC EXERCISES: CPT | Mod: PN

## 2020-02-27 PROCEDURE — 97140 MANUAL THERAPY 1/> REGIONS: CPT | Mod: PN

## 2020-02-27 NOTE — PROGRESS NOTES
"  Occupational Therapy Daily Treatment Note      Date: 2/27/2020  Name: Silvana Quezada  Clinic Number: [id number]    Therapy Diagnosis:   Encounter Diagnoses   Name Primary?    Left wrist pain     Swelling of left hand     Decreased range of motion of left wrist      Physician: Mc Cordero, CELIA    Physician Orders: eval and treat  Medical Diagnosis: S52.502D (ICD-10-CM) - Closed fracture of distal end of left radius with routine healing, unspecified fracture morphology, subsequent encounter  Surgical Procedure and Date: 12/27/2019,Open reduction and internal fixation of left distal radius fracture, 3 or more fragments, intra-articular, using Acumed volar   locking plate. / Date of Injury/Onset: 12/13/2019  Evaluation Date: 1/16/2020  Insurance Authorization Period Expiration: 3/16/2020  Plan of Care Certification Period: 3/13/2020  Date of Return to MD: 3/9/2020     FOTO: initial eval, 6th visit, 10th visit: 50% limitation      Visit # / Visits authorized: 11 / 12   Time In: 1:05 pm  Time Out: 1:50 pm  Total Treatment Time: 45 minutes  Total Timed minutes: 45 minutes    Precautions:  Standard, Fall and Weightbearing **SENSITIVE TO HEAT DUE TO MS, DEFER HEAT MODALITIES       Subjective     Pt reports: "I'm feeling ok today" Pt reports still wearing compression glove on and off. Pt not wearing wrist brace.  she was compliant with home exercise program given last session.   Response to previous treatment: improved pain, swelling, and ROM  Functional change: none noted    Pain: 1/10  Location: left hand    Objective     Edema. Measured in centimeters.    1/16/2020 1/16/2020 1/30/2020 2/20/2020     Left Right Left Left   Wrist Crease 19 15.8 18.3 (-0.7) 18.5   DPC 18.8 18.5 18.7 (-0.1) 18.4   MCPs 19.3 18.7 19.1 (-0.2) 19         Elbow and Wrist ROM. Measured in degrees.    1/16/2020 1/16/2020 1/30/2020 2/6/2020 2/20/2020     Left Right Left Left Left   Elbow Ext/Flex WNL WNL WNL     Supination/Pronation 44/74 WNL " 66/84 (+22/+10) 72/84 74/86   Wrist Ext/Flex 36/32 72/60 44/46 (+8/+14) 52/50 52/50   Wrist RD/UD 4/6 12/22 10/12 (+6/+6) 12/14 12/18      Hand ROM. Measured in degrees.  Other digits WFL, able to make full fist    1/16/2020 1/30/2020 2/6/2020 2/20/2020     Left Left Left Left            Thumb: MP 5/45 5/50 (+5) 50 50                IP 0 8 (+8) 10 25       Rad ADD/ABD 42 42 46 44       Pal ADD/ABD 40 42 48 42          Opposition To P2 o SF To base of SF (improved) To base of SF To SF MP jt         Treatment consisted of the following:    **heat modalities deferred due to pt sensitive to heat due to MS     Silvana received the following manual therapy techniques for 10 minutes:   -Performed retrograde massage to L digits/hand/wrist  to decrease edema, improve joint mobility, decrease pain and improve lymphatic drainage to increase hand function. Performed scar massage to volar wrist area (not including one small healing area)   to decrease adhesions and improve tensile glide.        Silvana received therapeutic exercises for 35 minutes including: (20 min 1:1)  AROM   Sup/Pro  Wrist  Ext/flx  RD/UD    X 10 reps each    AROM   Spreads  Wave  Hook  Straight fist  Composite fist  Lifts X 10 reps each   AROM thumb:  Rad abd/add  Palmar abd/add  IP blocks  Opposition  Pinky Slides    X 10 reps each    Wrist wheel, flex/ext, sup/pro X 3 min   Wrist maze X 3 min   isospheres X 2 min, on table    In hand manipulation with coins X 1/2 container   Light wrist PRE, 3 ways 2 X 10 reps each, 1#, pain free   Supination/pronation, 1# X 10 reps   Gross grasping with rice bin X 2 min   Light grippers X 10 reps each, 3 grippers            Home Exercises and Education Provided     Education provided: cont HEP as tolerated, cont brace wear, and compression glove wear for edema management  - Progress towards goals     Written Home Exercises Provided: Patient instructed to cont prior HEP.  Exercises were reviewed and Silvana was able to  demonstrate them prior to the end of the session.  Silvana demonstrated good  understanding of the education provided.   .   See EMR under Patient Instructions for exercises provided initial eval.     Assessment     Pt is 8 weeks 2 days post op. Pt tolerated tx fairly well today. She cont to require frequent rest breaks due to fatigue. She denied pain again today. She cont to be limited with wrist ROM.  Pt participated well and is motivated. She cont to present with swelling, stiffness, limited ROM, and weakness; however, pt demonstrates improvement in ROM and edema. Treatment somewhat limited still due to limited endurance, probably related to MS.    Silvana is progressing well towards her goals and there are no updates to goals at this time. Pt prognosis continues as Good. Pt will continue to benefit from skilled outpatient occupational therapy to address the deficits listed in the problem list on initial evaluation, provide pt/family education and to maximize pt's level of independence in the home and community environment.     Anticipated barriers to continued occupational therapy: MS    Pt's spiritual, cultural and educational needs considered and pt agreeable to plan of care and goals.    Goals     Goals:   The following goals were discussed with the patient and patient is in agreement with them as to be addressed in the treatment plan.   Long Term Goals (LTGs); to be met by discharge.  LTG #1: Pt will report a pain level of 1 out of 10 with ADLs and house hold tasks ---progressing    LTG #2: Pt will demo improved FOTO score by at least points. ---progressing  LTG #3: Pt will return to prior level of function for ADLs and household management. ---progressing  LTG #4: Pt will demonstrate improved L wrist AROM WFL for performing daily tasks---progressing  LTG #5:  and pinch to be assessed when appropriate and goals set accordingly---progressing     Short Term Goals (STGs); to be met within 4 weeks  (2/14/2020).  STG #1a: Pt will report 3 out of 10 pain level with ADLs and daily tasks.---progressing  STG #2a: Pt will report/demo [place] with feeding self.---progressing  STG #2b: Pt will report/demo [place] with driving.---progressing  STG #3a: Pt will demonstrate independence with issued HEP. ---progressing  STG #3b: Pt will demo improved L wrist AROM by at least 15 degrees needed to aid with functional use during ADLs---progressing  STG #4: Pt will demonstrate improved edema in L hand and wrist by at least 0.4 cm for increased use during daily tasks.---progressing         Plan     Continue skilled occupational therapy with individualized plan of care focusing on ROM and edema management to maximize functional use of her L hand.       Updates/Grading for next session: cont to progress as able with ROM.     BRUCE Patino

## 2020-03-02 ENCOUNTER — CLINICAL SUPPORT (OUTPATIENT)
Dept: REHABILITATION | Facility: HOSPITAL | Age: 65
End: 2020-03-02
Payer: MEDICARE

## 2020-03-02 DIAGNOSIS — M79.89 SWELLING OF LEFT HAND: ICD-10-CM

## 2020-03-02 DIAGNOSIS — M25.532 LEFT WRIST PAIN: ICD-10-CM

## 2020-03-02 DIAGNOSIS — M25.632 DECREASED RANGE OF MOTION OF LEFT WRIST: ICD-10-CM

## 2020-03-02 PROCEDURE — 97140 MANUAL THERAPY 1/> REGIONS: CPT | Mod: PN

## 2020-03-02 PROCEDURE — 97110 THERAPEUTIC EXERCISES: CPT | Mod: PN

## 2020-03-02 NOTE — PROGRESS NOTES
"  Occupational Therapy Daily Treatment Note      Date: 3/2/2020  Name: Silvana Quezada  Clinic Number: [id number]    Therapy Diagnosis:   Encounter Diagnoses   Name Primary?    Left wrist pain     Swelling of left hand     Decreased range of motion of left wrist      Physician: Mc Cordero, CELIA    Physician Orders: eval and treat  Medical Diagnosis: S52.502D (ICD-10-CM) - Closed fracture of distal end of left radius with routine healing, unspecified fracture morphology, subsequent encounter  Surgical Procedure and Date: 12/27/2019,Open reduction and internal fixation of left distal radius fracture, 3 or more fragments, intra-articular, using Acumed volar   locking plate. / Date of Injury/Onset: 12/13/2019  Evaluation Date: 1/16/2020  Insurance Authorization Period Expiration: 3/16/2020  Plan of Care Certification Period: 3/13/2020  Date of Return to MD: 3/9/2020     FOTO: initial eval, 6th visit, 10th visit: 50% limitation      Visit # / Visits authorized: 12 / 12   Time In: 1:00 pm  Time Out: 1:55 pm  Total Treatment Time: 55 minutes  Total Timed minutes: 55 minutes    Precautions:  Standard, Fall and Weightbearing **SENSITIVE TO HEAT DUE TO MS, DEFER HEAT MODALITIES       Subjective     Pt reports: "I'm feeling ok today" Pt reports still wearing compression glove on and off. Pt not wearing wrist brace.  she was compliant with home exercise program given last session.   Response to previous treatment: improved pain, swelling, and ROM  Functional change: has been able to perform ADLs and most functional activities at home    Pain: 0/10  Location: left hand    Objective     Edema. Measured in centimeters.    1/16/2020 1/16/2020 1/30/2020 2/20/2020 3/2/2020     Left Right Left Left Left   Wrist Crease 19 15.8 18.3 (-0.7) 18.5 18.2 (-0.3)   DPC 18.8 18.5 18.7 (-0.1) 18.4 18.1 (-0.3)   MCPs 19.3 18.7 19.1 (-0.2) 19 18.6 (-0.4)         Elbow and Wrist ROM. Measured in degrees.    1/16/2020 1/16/2020 1/30/2020 2/6/2020 " "2/20/2020 3/2/2020     Left Right Left Left Left Left   Elbow Ext/Flex WNL WNL WNL      Supination/Pronation 44/74 WNL 66/84 (+22/+10) 72/84 74/86 76/86(+2)   Wrist Ext/Flex 36/32 72/60 44/46 (+8/+14) 52/50 52/50 54/58 (+10)   Wrist RD/UD 4/6 12/22 10/12 (+6/+6) 12/14 12/18 16/18 (+4)      Hand ROM. Measured in degrees.  Other digits WFL, able to make full fist    1/16/2020 1/30/2020 2/6/2020 2/20/2020 3/2/2020     Left Left Left Left Left             Thumb: MP 5/45 5/50 (+5) 50 50 55                IP 0 8 (+8) 10 25 25       Rad ADD/ABD 42 42 46 44 44       Pal ADD/ABD 40 42 48 42 42          Opposition To P2 o SF To base of SF (improved) To base of SF To SF MP jt To SF MP jt         Treatment consisted of the following:    **heat modalities deferred due to pt sensitive to heat due to MS     Silvana received the following manual therapy techniques for 10 minutes:   -Performed retrograde massage to L digits/hand/wrist  to decrease edema, improve joint mobility, decrease pain and improve lymphatic drainage to increase hand function. Performed scar massage to volar wrist area (not including one small healing area)   to decrease adhesions and improve tensile glide.        Silvana received therapeutic exercises for 45 minutes including:   AROM   Sup/Pro  Wrist  Ext/flx  RD/UD    X 10 reps each    AROM   Spreads  Wave  Hook  Straight fist  Composite fist  Lifts X 10 reps each   AROM thumb:  Rad abd/add  Palmar abd/add  IP blocks  Opposition  Pinky Slides    X 10 reps each    Prayer stretch 3 x 30"   Wrist wheel, flex/ext, sup/pro X 3 min   Wrist maze X 3 min   isospheres X 2 min, on table    In hand manipulation with coins X 1/2 container (not today)   Light wrist PRE, 3 ways 2 X 10 reps each, 1#, pain free   Supination/pronation, 1# X 10 reps   Light grippers X 10 reps each, 3 grippers (not today)   Light theraputty - yellow  -molding  -gripping  -finger spread  -log rolls with tripod pinch/lateral key pinch     -1 min  -10 " reps  -5 reps  -2 sets each            Home Exercises and Education Provided     Education provided: cont HEP as tolerated, cont brace wear, and compression glove wear for edema management. Added prayer stretch and yellow theraputty for light hand strengthening.  - Progress towards goals     Written Home Exercises Provided: Patient instructed to cont prior HEP.  Exercises were reviewed and Silvana was able to demonstrate them prior to the end of the session.  Silvana demonstrated good  understanding of the education provided.   .   See EMR under Patient Instructions for exercises provided 3/2/2020.     Assessment     Pt is 8 weeks 6 days post op. Pt tolerated tx fairly well today. She cont to require frequent rest breaks due to fatigue. She denied pain again today. Able to progress with light hand strengthening today without c/o increased pain.  Pt participated well and is motivated. She cont to present with swelling, stiffness, limited ROM, and weakness; however, pt demonstrates improvement in ROM and edema. Treatment somewhat limited still due to limited endurance, probably related to MS.    Silvana is progressing fairly well, but slowly towards her goals and there are no updates to goals at this time. Pt prognosis continues as Good. Pt will continue to benefit from skilled outpatient occupational therapy to address the deficits listed in the problem list on initial evaluation, provide pt/family education and to maximize pt's level of independence in the home and community environment.     Anticipated barriers to continued occupational therapy: MS    Pt's spiritual, cultural and educational needs considered and pt agreeable to plan of care and goals.    Goals     Goals:   The following goals were discussed with the patient and patient is in agreement with them as to be addressed in the treatment plan.   Long Term Goals (LTGs); to be met by discharge.  LTG #1: Pt will report a pain level of 1 out of 10 with ADLs and  house hold tasks ---progressing    LTG #2: Pt will demo improved FOTO score by at least 20 points. ---progressing  LTG #3: Pt will return to prior level of function for ADLs and household management. ---progressing  LTG #4: Pt will demonstrate improved L wrist AROM WFL for performing daily tasks---progressing  LTG #5:  and pinch to be assessed when appropriate and goals set accordingly---progressing     Short Term Goals (STGs); to be met within 4 weeks (2/14/2020).  STG #1a: Pt will report 3 out of 10 pain level with ADLs and daily tasks.---progressing  STG #2a: Pt will report/demo [place] with feeding self.---met, 3/2/2020  STG #2b: Pt will report/demo [place] with driving.---progressing  STG #3a: Pt will demonstrate independence with issued HEP. ---progressing  STG #3b: Pt will demo improved L wrist AROM by at least 15 degrees needed to aid with functional use during ADLs---met, 3/2/2020  STG #4: Pt will demonstrate improved edema in L hand and wrist by at least 0.4 cm for increased use during daily tasks.---met, 3/2/2020         Plan     Continue skilled occupational therapy with individualized plan of care focusing on ROM and edema management to maximize functional use of her L hand.       Updates/Grading for next session: cont to progress as able with ROM.     BRUCE Patino

## 2020-03-03 ENCOUNTER — HOSPITAL ENCOUNTER (OUTPATIENT)
Dept: RADIOLOGY | Facility: HOSPITAL | Age: 65
Discharge: HOME | End: 2020-03-03
Attending: INTERNAL MEDICINE
Payer: MEDICARE

## 2020-03-03 DIAGNOSIS — M81.0 OSTEOPOROSIS, UNSPECIFIED OSTEOPOROSIS TYPE, UNSPECIFIED PATHOLOGICAL FRACTURE PRESENCE: Primary | ICD-10-CM

## 2020-03-03 DIAGNOSIS — M81.0 AGE-RELATED OSTEOPOROSIS WITHOUT CURRENT PATHOLOGICAL FRACTURE: ICD-10-CM

## 2020-03-03 PROCEDURE — 77080 DEXA BONE DENSITY SPINE HIP: ICD-10-PCS | Mod: 26,,, | Performed by: RADIOLOGY

## 2020-03-03 PROCEDURE — 77080 DXA BONE DENSITY AXIAL: CPT | Mod: 26,,, | Performed by: RADIOLOGY

## 2020-03-03 PROCEDURE — 77080 DXA BONE DENSITY AXIAL: CPT | Mod: TC

## 2020-03-03 RX ORDER — ALENDRONATE SODIUM 70 MG/1
70 TABLET ORAL
Qty: 26 TABLET | Refills: 0 | Status: SHIPPED | OUTPATIENT
Start: 2020-03-03 | End: 2020-08-19

## 2020-03-03 NOTE — PROGRESS NOTES
Called patient and updated with DXA  osteoperosis  Labs PTH, vernell Vit D wnl  Previously on bisphonopate  Will start fosamax q7 days  Reviewed R/B/SE  Answered all questions

## 2020-03-09 ENCOUNTER — OFFICE VISIT (OUTPATIENT)
Dept: ORTHOPEDICS | Facility: CLINIC | Age: 65
End: 2020-03-09
Payer: MEDICARE

## 2020-03-09 ENCOUNTER — HOSPITAL ENCOUNTER (OUTPATIENT)
Dept: RADIOLOGY | Facility: HOSPITAL | Age: 65
Discharge: HOME | End: 2020-03-09
Attending: ORTHOPAEDIC SURGERY
Payer: MEDICARE

## 2020-03-09 VITALS — BODY MASS INDEX: 30.65 KG/M2 | WEIGHT: 173 LBS | HEIGHT: 63 IN

## 2020-03-09 DIAGNOSIS — S52.502D CLOSED FRACTURE OF DISTAL END OF LEFT RADIUS WITH ROUTINE HEALING, UNSPECIFIED FRACTURE MORPHOLOGY, SUBSEQUENT ENCOUNTER: Primary | ICD-10-CM

## 2020-03-09 DIAGNOSIS — S52.502D CLOSED FRACTURE OF DISTAL END OF LEFT RADIUS WITH ROUTINE HEALING, UNSPECIFIED FRACTURE MORPHOLOGY, SUBSEQUENT ENCOUNTER: ICD-10-CM

## 2020-03-09 PROCEDURE — 99999 PR PBB SHADOW E&M-EST. PATIENT-LVL III: CPT | Mod: PBBFAC,,, | Performed by: ORTHOPAEDIC SURGERY

## 2020-03-09 PROCEDURE — 99999 PR PBB SHADOW E&M-EST. PATIENT-LVL III: ICD-10-PCS | Mod: PBBFAC,,, | Performed by: ORTHOPAEDIC SURGERY

## 2020-03-09 PROCEDURE — 99024 POSTOP FOLLOW-UP VISIT: CPT | Mod: S$GLB,,, | Performed by: ORTHOPAEDIC SURGERY

## 2020-03-09 PROCEDURE — 73100 X-RAY EXAM OF WRIST: CPT | Mod: TC,PN,LT

## 2020-03-09 PROCEDURE — 99024 PR POST-OP FOLLOW-UP VISIT: ICD-10-PCS | Mod: S$GLB,,, | Performed by: ORTHOPAEDIC SURGERY

## 2020-03-09 PROCEDURE — 73100 XR WRIST 2 VIEW LEFT: ICD-10-PCS | Mod: 26,LT,, | Performed by: RADIOLOGY

## 2020-03-09 PROCEDURE — 73100 X-RAY EXAM OF WRIST: CPT | Mod: 26,LT,, | Performed by: RADIOLOGY

## 2020-03-09 NOTE — PROGRESS NOTES
Subjective:      Patient ID: Silvana Quezada is a 64 y.o. female.    Chief Complaint: Post-op Evaluation of the Left Wrist      HPI: Silvana Quezada is here for postoperative visit.  She is 11 weeks status post ORIF of left distal radius fracture. Patient is currently in physical therapy working on strength and range of motion. She reports this is very helpful and continues to see progress.  She denies any wrist pain or need for analgesia. She is using her left wrist for all activities of daily living without difficulty or pain. She has no postop complaints this time.    Past Medical History:   Diagnosis Date    Bipolar 1 disorder     Breast cancer     right    Breast cyst     Closed fracture of proximal end of right humerus 3/9/2015    Depression     History of right shoulder fracture     MS (multiple sclerosis)     presented as dizzines, dx vision,     Osteoporosis, unspecified        Current Outpatient Medications:     alendronate (FOSAMAX) 70 MG tablet, Take 1 tablet (70 mg total) by mouth every 7 days., Disp: 26 tablet, Rfl: 0    buPROPion (WELLBUTRIN SR) 100 MG TBSR 12 hr tablet, Take 2 tablets (200 mg total) by mouth every morning., Disp: 180 tablet, Rfl: 1    buPROPion (WELLBUTRIN SR) 150 MG TBSR 12 hr tablet, Take 1 tablet (150 mg total) by mouth every evening., Disp: 90 tablet, Rfl: 1    calcium-vitamin D3 (CALCIUM 500 + D) 500 mg(1,250mg) -200 unit per tablet, Take 1 tablet by mouth 2 (two) times daily with meals., Disp: , Rfl:     catheter (FEMALE CATHETER) [facility], Patient is to self catheterize four times a day indefinitley using female 12 fr in and out catheter for incomplete bladder emptying.  Dispense 120 month with 11 refills or 360 every 3 months with 3 refills depending on patient's preference, Disp: 120 each, Rfl: 11    fluphenazine (PROLIXIN) 1 MG tablet, TAKE 2 TABLETS NIGHTLY, Disp: 180 tablet, Rfl: 1    folic acid (FOLVITE) 1 MG tablet, Take 1 tablet (1,000 mcg total) by  "mouth once daily., Disp: 90 tablet, Rfl: 1    OXcarbazepine (TRILEPTAL) 150 MG Tab, Take 1 tablet (150 mg total) by mouth 2 (two) times daily., Disp: 180 tablet, Rfl: 2  Review of patient's allergies indicates:   Allergen Reactions    Adhesive      blisters    Keflex [cephalexin] Rash       Ht 5' 3" (1.6 m)   Wt 78.5 kg (173 lb)   BMI 30.65 kg/m²     Review of Systems   Constitution: Negative for chills and fever.   Cardiovascular: Negative for chest pain and palpitations.   Respiratory: Negative for shortness of breath and wheezing.    Skin: Negative for poor wound healing and rash.   Musculoskeletal: Positive for joint swelling and stiffness. Negative for joint pain.   Gastrointestinal: Negative for nausea and vomiting.   Genitourinary: Negative for dysuria and hematuria.   Neurological: Negative for seizures and tremors.   Psychiatric/Behavioral: Negative for altered mental status.   Allergic/Immunologic: Negative for environmental allergies and persistent infections.         Objective:    Ortho Exam         Left upper extremity:  Significant for very well-healed volar wrist incision. No significant tenderness to palpation.  Global wrist swelling. Range of motion limited specifically in wrist extension and radial/ulnar deviation.  Questionable subluxation. Range of motion fingers full.   strength decreased.  Sensation intact.  Pulses present.  Cap refill brisk.  GEN: Well developed, well nourished female. AAOX3. No acute distress.   Normocephalic, atraumatic.   DG  Breathing unlabored.  Mood and affect appropriate.     Assessment:     Imaging:  Repeat left wrist radiographs from today again with hardware in place. There is some slight volar wrist subluxation.        1. Closed fracture of distal end of left radius with routine healing, unspecified fracture morphology, subsequent encounter          Plan:       Patient continues to report no pain or limitations of ADLs despite radiographic " finding.  Continue therapy.  Continue activities as tolerated - use the brace for heavier activities.  OTC analgesia if needed.    Orders Placed This Encounter    X-Ray Wrist 2 View Left     Follow up in about 6 weeks (around 4/20/2020).

## 2020-03-10 ENCOUNTER — CLINICAL SUPPORT (OUTPATIENT)
Dept: REHABILITATION | Facility: HOSPITAL | Age: 65
End: 2020-03-10
Payer: MEDICARE

## 2020-03-10 DIAGNOSIS — M25.532 LEFT WRIST PAIN: ICD-10-CM

## 2020-03-10 DIAGNOSIS — M79.89 SWELLING OF LEFT HAND: ICD-10-CM

## 2020-03-10 DIAGNOSIS — M25.632 DECREASED RANGE OF MOTION OF LEFT WRIST: ICD-10-CM

## 2020-03-10 PROCEDURE — 97140 MANUAL THERAPY 1/> REGIONS: CPT | Mod: PN

## 2020-03-10 PROCEDURE — 97110 THERAPEUTIC EXERCISES: CPT | Mod: PN

## 2020-03-10 NOTE — PROGRESS NOTES
"  Occupational Therapy Daily Treatment Note      Date: 3/10/2020  Name: Silvana Quezada  Clinic Number: [id number]    Therapy Diagnosis:   Encounter Diagnoses   Name Primary?    Left wrist pain     Swelling of left hand     Decreased range of motion of left wrist      Physician: Mc Cordero, CELIA    Physician Orders: eval and treat  Medical Diagnosis: S52.502D (ICD-10-CM) - Closed fracture of distal end of left radius with routine healing, unspecified fracture morphology, subsequent encounter  Surgical Procedure and Date: 12/27/2019,Open reduction and internal fixation of left distal radius fracture, 3 or more fragments, intra-articular, using Acumed volar   locking plate. / Date of Injury/Onset: 12/13/2019  Evaluation Date: 1/16/2020  Insurance Authorization Period Expiration: 3/16/2020  Plan of Care Certification Period: 3/13/2020  Date of Return to MD: 3/9/2020     FOTO: initial eval, 6th visit, 10th visit: 50% limitation      Visit # / Visits authorized: 13 / 16  Time In: 3:00 pm  Time Out: 3:50 pm  Total Treatment Time: 50 minutes  Total Timed minutes: 35 minutes    Precautions:  Standard, Fall and Weightbearing **SENSITIVE TO HEAT DUE TO MS, DEFER HEAT MODALITIES       Subjective     Pt reports: "I'm feeling ok today. I saw the doctor yesterday and they want me to keep coming to therapy." Pt reports still wearing compression glove on and off. Pt not wearing wrist brace.  she was compliant with home exercise program given last session.   Response to previous treatment: improved pain, swelling, and ROM  Functional change: has been able to perform ADLs and most functional activities at home    Pain: 3- 4/10  Location: left hand    Objective     Edema. Measured in centimeters.    1/16/2020 1/16/2020 1/30/2020 2/20/2020 3/2/2020 3/10/2020     Left Right Left Left Left Left   Wrist Crease 19 15.8 18.3 (-0.7) 18.5 18.2 (-0.3) 18.1   DPC 18.8 18.5 18.7 (-0.1) 18.4 18.1 (-0.3) 18.1   MCPs 19.3 18.7 19.1 (-0.2) 19 18.6 " "(-0.4) 18.2         Elbow and Wrist ROM. Measured in degrees.    1/16/2020 1/16/2020 1/30/2020 2/6/2020 2/20/2020 3/2/2020 3/10/2020     Left Right Left Left Left Left Left   Elbow Ext/Flex WNL WNL WNL       Supination/Pronation 44/74 WNL 66/84 (+22/+10) 72/84 74/86 76/86(+2) 76/82   Wrist Ext/Flex 36/32 72/60 44/46 (+8/+14) 52/50 52/50 54/58 (+10) 54/58   Wrist RD/UD 4/6 12/22 10/12 (+6/+6) 12/14 12/18 16/18 (+4) 16/16      Hand ROM. Measured in degrees.  Other digits WFL, able to make full fist    1/16/2020 1/30/2020 2/6/2020 2/20/2020 3/2/2020 3/10/2020     Left Left Left Left Left Left              Thumb: MP 5/45 5/50 (+5) 50 50 55 55                IP 0 8 (+8) 10 25 25 35       Rad ADD/ABD 42 42 46 44 44 44       Pal ADD/ABD 40 42 48 42 42 42          Opposition To P2 o SF To base of SF (improved) To base of SF To SF MP jt To SF MP jt same        Strength (Dynamometer) and Pinch Strength (Pinch Gauge)  Measured in pounds.    3/10/2020 3/10/2020     Left Right   Rung II 12 25   Key Pinch 6 7   3pt Pinch 4 7   2pt Pinch 4 8           Treatment consisted of the following:    **heat modalities deferred due to pt sensitive to heat due to MS     Silvana received the following manual therapy techniques for 10 minutes:   -Performed retrograde massage to L digits/hand/wrist  to decrease edema, improve joint mobility, decrease pain and improve lymphatic drainage to increase hand function. Performed scar massage to volar wrist area (not including one small healing area)   to decrease adhesions and improve tensile glide.        Silvana received therapeutic exercises for 40 minutes including: (25 min 1:1)  AROM   Sup/Pro  Wrist  Ext/flx  RD/UD    X 10 reps each    AROM   Spreads  Wave  Hook  Straight fist  Composite fist  Lifts X 10 reps each   AROM thumb:  Rad abd/add  Palmar abd/add  IP blocks  Opposition  Pinky Slides    X 10 reps each    Prayer stretch 3 x 30"   Wrist wheel, flex/ext, sup/pro X 3 min   Wrist maze X 3 " min   isospheres X 2 min, on table    In hand manipulation with coins X 1/2 container    Light wrist PRE, 3 ways 2 X 10 reps each, 1#, pain free   Supination/pronation, 1# X 10 reps   Light grippers X 10 reps each, 3 grippers (not today)   PHG, 1st rung, gold spring X 10 reps   Light theraputty - yellow @ home  -molding  -gripping  -finger spread  -log rolls with tripod pinch/lateral key pinch     -1 min  -10 reps  -5 reps  -2 sets each            Home Exercises and Education Provided     Education provided: cont HEP as tolerated, cont brace wear, and compression glove wear for edema management.   - Progress towards goals     Written Home Exercises Provided: Patient instructed to cont prior HEP.  Exercises were reviewed and Silvana was able to demonstrate them prior to the end of the session.  Silvana demonstrated good  understanding of the education provided.   .   See EMR under Patient Instructions for exercises provided 3/2/2020.     Assessment     Pt is10+ weeks post op. Pt tolerated tx fairly well today. She cont to require frequent rest breaks due to fatigue.  She c/o mild pain this date.  Pt participated well and is motivated. She cont to present with swelling, stiffness, limited ROM, and weakness; however, pt demonstrates improvement in ROM and edema. Treatment somewhat limited still due to limited endurance, probably related to MS.    Silvana is progressing fairly well, but slowly towards her goals and there are no updates to goals at this time. Pt prognosis continues as Good. Pt will continue to benefit from skilled outpatient occupational therapy to address the deficits listed in the problem list on initial evaluation, provide pt/family education and to maximize pt's level of independence in the home and community environment.     Anticipated barriers to continued occupational therapy: MS    Pt's spiritual, cultural and educational needs considered and pt agreeable to plan of care and goals.    Goals     Goals:    The following goals were discussed with the patient and patient is in agreement with them as to be addressed in the treatment plan.   Long Term Goals (LTGs); to be met by discharge.  LTG #1: Pt will report a pain level of 1 out of 10 with ADLs and house hold tasks ---progressing    LTG #2: Pt will demo improved FOTO score by at least 20 points. ---progressing  LTG #3: Pt will return to prior level of function for ADLs and household management. ---progressing  LTG #4: Pt will demonstrate improved L wrist AROM WFL for performing daily tasks---progressing  LTG #5:  and pinch to be assessed when appropriate and goals set accordingly---progressing     Short Term Goals (STGs); to be met within 4 weeks (2/14/2020).  STG #1a: Pt will report 3 out of 10 pain level with ADLs and daily tasks.---progressing  STG #2a: Pt will report/demo [place] with feeding self.---met, 3/2/2020  STG #2b: Pt will report/demo [place] with driving.---progressing  STG #3a: Pt will demonstrate independence with issued HEP. ---progressing  STG #3b: Pt will demo improved L wrist AROM by at least 15 degrees needed to aid with functional use during ADLs---met, 3/2/2020  STG #4: Pt will demonstrate improved edema in L hand and wrist by at least 0.4 cm for increased use during daily tasks.---met, 3/2/2020         Plan     Continue skilled occupational therapy with individualized plan of care focusing on ROM and edema management to maximize functional use of her L hand.       Updates/Grading for next session: cont to progress as able with ROM.     BRUCE Patino

## 2020-03-12 ENCOUNTER — CLINICAL SUPPORT (OUTPATIENT)
Dept: REHABILITATION | Facility: HOSPITAL | Age: 65
End: 2020-03-12
Payer: MEDICARE

## 2020-03-12 DIAGNOSIS — M79.89 SWELLING OF LEFT HAND: ICD-10-CM

## 2020-03-12 DIAGNOSIS — M25.632 DECREASED RANGE OF MOTION OF LEFT WRIST: ICD-10-CM

## 2020-03-12 DIAGNOSIS — M25.532 LEFT WRIST PAIN: ICD-10-CM

## 2020-03-12 PROCEDURE — 97140 MANUAL THERAPY 1/> REGIONS: CPT | Mod: PN

## 2020-03-12 PROCEDURE — 97110 THERAPEUTIC EXERCISES: CPT | Mod: PN

## 2020-03-12 NOTE — PROGRESS NOTES
"  Occupational Therapy Progress Note & Updated POC     Date: 3/12/2020  Name: Silvana Quezada  Clinic Number: [id number]    Therapy Diagnosis:   Encounter Diagnoses   Name Primary?    Left wrist pain     Swelling of left hand     Decreased range of motion of left wrist      Physician: Mc Cordero, PAKellyC    Physician Orders: eval and treat  Medical Diagnosis: S52.502D (ICD-10-CM) - Closed fracture of distal end of left radius with routine healing, unspecified fracture morphology, subsequent encounter  Surgical Procedure and Date: 12/27/2019,Open reduction and internal fixation of left distal radius fracture, 3 or more fragments, intra-articular, using Acumed volar   locking plate. / Date of Injury/Onset: 12/13/2019  Evaluation Date: 1/16/2020  Insurance Authorization Period Expiration: 3/16/2020  Plan of Care Certification Period: 3/13/2020  Date of Return to MD: 3/9/2020     FOTO: initial eval, 6th visit, 10th visit: 50% limitation      Visit # / Visits authorized: 14 / 16  Time In: 1:00 pm  Time Out: 1:45 pm  Total Treatment Time: 45 minutes  Total Timed minutes: 45 minutes    Precautions:  Standard, Fall and Weightbearing **SENSITIVE TO HEAT DUE TO MS, DEFER HEAT MODALITIES       Subjective     Pt reports: "I did the putty this morning and that bothered it a little bit. But I've been doing ok." Pt reports still wearing compression glove on and off. Pt not wearing wrist brace.   she was compliant with home exercise program given last session.   Response to previous treatment: improved pain, swelling, and ROM  Functional change: has been able to perform ADLs and most functional activities at home    Pain:  2/10  Location: left hand    Objective     Edema. Measured in centimeters.    1/16/2020 1/16/2020 1/30/2020 2/20/2020 3/2/2020 3/10/2020     Left Right Left Left Left Left   Wrist Crease 19 15.8 18.3 (-0.7) 18.5 18.2 (-0.3) 18.1   DPC 18.8 18.5 18.7 (-0.1) 18.4 18.1 (-0.3) 18.1   MCPs 19.3 18.7 19.1 (-0.2) 19 18.6 " "(-0.4) 18.2         Elbow and Wrist ROM. Measured in degrees.    1/16/2020 1/16/2020 1/30/2020 2/6/2020 2/20/2020 3/2/2020 3/10/2020     Left Right Left Left Left Left Left   Elbow Ext/Flex WNL WNL WNL       Supination/Pronation 44/74 WNL 66/84 (+22/+10) 72/84 74/86 76/86(+2) 76/82   Wrist Ext/Flex 36/32 72/60 44/46 (+8/+14) 52/50 52/50 54/58 (+10) 54/58   Wrist RD/UD 4/6 12/22 10/12 (+6/+6) 12/14 12/18 16/18 (+4) 16/16      Hand ROM. Measured in degrees.  Other digits WFL, able to make full fist    1/16/2020 1/30/2020 2/6/2020 2/20/2020 3/2/2020 3/10/2020     Left Left Left Left Left Left              Thumb: MP 5/45 5/50 (+5) 50 50 55 55                IP 0 8 (+8) 10 25 25 35       Rad ADD/ABD 42 42 46 44 44 44       Pal ADD/ABD 40 42 48 42 42 42          Opposition To P2 o SF To base of SF (improved) To base of SF To SF MP jt To SF MP jt same        Strength (Dynamometer) and Pinch Strength (Pinch Gauge)  Measured in pounds.    3/10/2020 3/10/2020     Left Right   Rung II 12 25   Key Pinch 6 7   3pt Pinch 4 7   2pt Pinch 4 8           Treatment consisted of the following:    **heat modalities deferred due to pt sensitive to heat due to MS     Silvana received the following manual therapy techniques for 10 minutes:   -Performed retrograde massage to L digits/hand/wrist  to decrease edema, improve joint mobility, decrease pain and improve lymphatic drainage to increase hand function. Performed scar massage to volar wrist area (not including one small healing area)   to decrease adhesions and improve tensile glide.        Silvana received therapeutic exercises for 35 minutes including:  AROM   Sup/Pro  Wrist  Ext/flx  RD/UD    X 10 reps each    AROM   Spreads  Wave  Hook  Straight fist  Composite fist  Lifts X 10 reps each   AROM thumb:  Rad abd/add  Palmar abd/add  IP blocks  Opposition  Pinky Slides    X 10 reps each    Prayer stretch 3 x 30"   Wrist wheel, flex/ext, sup/pro X 3 min   Wrist maze X 3 min "   isospheres X 2 min, on table    In hand manipulation with marbles X 3 containers   Light wrist PRE, 3 ways 2 X 10 reps each, 1#, pain free   Supination/pronation, 1# X 10 reps   Light grippers X 10 reps each, 3 grippers (not today)   PHG, 1st rung, gold spring X 10 reps   Digit extend, yellow RB X 10 reps   Light theraputty - yellow @ home  -molding  -gripping  -finger spread  -log rolls with tripod pinch/lateral key pinch     -1 min  -10 reps  -5 reps  -2 sets each            Home Exercises and Education Provided     Education provided: cont HEP as tolerated, cont brace wear, and compression glove wear for edema management.   - Progress towards goals     Written Home Exercises Provided: Patient instructed to cont prior HEP.  Exercises were reviewed and Silvana was able to demonstrate them prior to the end of the session.  Silvana demonstrated good  understanding of the education provided.   .   See EMR under Patient Instructions for exercises provided 3/2/2020.     Assessment     Pt is10+ weeks post op. Pt tolerated tx fairly well today. She cont to require frequent rest breaks due to fatigue.    Pt participated well and is motivated. She cont to present with swelling, stiffness, limited ROM, and weakness; however, pt demonstrates improvement in ROM and edema. Treatment somewhat limited still due to limited endurance, probably related to MS. MD would like pt to cont with therapy. Pt has partially met goals thus far. Recommend cont therapy for ROM and light strengthening.     Silvana is progressing fairly well, but slowly towards her goals and there are no updates to goals at this time. Pt prognosis continues as Good. Pt will continue to benefit from skilled outpatient occupational therapy to address the deficits listed in the problem list on initial evaluation, provide pt/family education and to maximize pt's level of independence in the home and community environment.     Anticipated barriers to continued occupational  therapy: MS    Pt's spiritual, cultural and educational needs considered and pt agreeable to plan of care and goals.    Goals     Goals:   The following goals were discussed with the patient and patient is in agreement with them as to be addressed in the treatment plan.   Long Term Goals (LTGs); to be met by discharge.  LTG #1: Pt will report a pain level of 1 out of 10 with ADLs and house hold tasks ---progressing    LTG #2: Pt will demo improved FOTO score by at least 20 points. ---progressing  LTG #3: Pt will return to prior level of function for ADLs and household management. ---progressing  LTG #4: Pt will demonstrate improved L wrist AROM WFL for performing daily tasks---progressing  LTG #5:  and pinch to be assessed when appropriate and goals set accordingly---met, 3/12/2020  Add: LTG #6: Pt will demonstrate improved L  strength by at least 3-5# for functional grasp of objects or when driving     Short Term Goals (STGs); to be met within 4 weeks (2/14/2020).  STG #1a: Pt will report 3 out of 10 pain level with ADLs and daily tasks.---met, 3/12/2020  STG #2a: Pt will report/demo [place] with feeding self.---met, 3/2/2020  STG #2b: Pt will report/demo [place] with driving.---progressing  STG #3a: Pt will demonstrate independence with issued HEP. ---met, 3/12/2020  STG #3b: Pt will demo improved L wrist AROM by at least 15 degrees needed to aid with functional use during ADLs---met, 3/2/2020  STG #4: Pt will demonstrate improved edema in L hand and wrist by at least 0.4 cm for increased use during daily tasks.---met, 3/2/2020         Plan     Continue skilled occupational therapy with individualized plan of care 2x/week for 4 more weeks during certification period 3/13/2020 - 4/10/2020 focusing on ROM and edema management to maximize functional use of her L hand.       Updates/Grading for next session: cont to progress as able with ROM and light  strengthening.     BRUCE Patino

## 2020-03-12 NOTE — PLAN OF CARE
"  Outpatient Therapy Updated Plan of Care     Visit Date: 3/12/2020  Name: Silvana Quezada  Clinic Number: [id number]    Therapy Diagnosis:   Encounter Diagnoses   Name Primary?    Left wrist pain     Swelling of left hand     Decreased range of motion of left wrist      Physician: Mc Cordero, PAKellyC; Dr. Albert Schroeder    Physician Orders: eval and treat  Medical Diagnosis: S52.502D (ICD-10-CM) - Closed fracture of distal end of left radius with routine healing, unspecified fracture morphology, subsequent encounter  Surgical Procedure and Date: 12/27/2019,Open reduction and internal fixation of left distal radius fracture, 3 or more fragments, intra-articular, using Acumed volar   locking plate. / Date of Injury/Onset: 12/13/2019  Evaluation Date: 1/16/2020     FOTO: initial eval, 6th visit, 10th visit: 50% limitation      Visit # / Visits authorized: 14 / 16    Total Visits Received: 14  Cancelled Visits: 0  No Show Visits: 0    Current Certification Period:  1/16/2020 to 3/13/2020  Precautions:  Standard, Fall and Weightbearing **SENSITIVE TO HEAT DUE TO MS, DEFER HEAT MODALITIES     Visits from Evaluation Date:  14  Functional Level Prior to Evaluation:  Required min A with some activities    Subjective     Update: Pt reports: "I did the putty this morning and that bothered it a little bit. But I've been doing ok." Pt reports still wearing compression glove on and off. Pt not wearing wrist brace.   she was compliant with home exercise program given last session.   Response to previous treatment: improved pain, swelling, and ROM  Functional change: has been able to perform ADLs and most functional activities at home        Objective     Update: Edema. Measured in centimeters.    1/16/2020 1/16/2020 1/30/2020 2/20/2020 3/2/2020 3/10/2020     Left Right Left Left Left Left   Wrist Crease 19 15.8 18.3 (-0.7) 18.5 18.2 (-0.3) 18.1   DPC 18.8 18.5 18.7 (-0.1) 18.4 18.1 (-0.3) 18.1   MCPs 19.3 18.7 19.1 (-0.2) 19 18.6 " (-0.4) 18.2         Elbow and Wrist ROM. Measured in degrees.    1/16/2020 1/16/2020 1/30/2020 2/6/2020 2/20/2020 3/2/2020 3/10/2020     Left Right Left Left Left Left Left   Elbow Ext/Flex WNL WNL WNL       Supination/Pronation 44/74 WNL 66/84 (+22/+10) 72/84 74/86 76/86(+2) 76/82   Wrist Ext/Flex 36/32 72/60 44/46 (+8/+14) 52/50 52/50 54/58 (+10) 54/58   Wrist RD/UD 4/6 12/22 10/12 (+6/+6) 12/14 12/18 16/18 (+4) 16/16      Hand ROM. Measured in degrees.  Other digits WFL, able to make full fist    1/16/2020 1/30/2020 2/6/2020 2/20/2020 3/2/2020 3/10/2020     Left Left Left Left Left Left              Thumb: MP 5/45 5/50 (+5) 50 50 55 55                IP 0 8 (+8) 10 25 25 35       Rad ADD/ABD 42 42 46 44 44 44       Pal ADD/ABD 40 42 48 42 42 42          Opposition To P2 o SF To base of SF (improved) To base of SF To SF MP jt To SF MP jt same        Strength (Dynamometer) and Pinch Strength (Pinch Gauge)  Measured in pounds.    3/10/2020 3/10/2020     Left Right   Rung II 12 25   Key Pinch 6 7   3pt Pinch 4 7   2pt Pinch 4 8           Assessment     Update: Pt is10+ weeks post op. Pt tolerated tx fairly well today. She cont to require frequent rest breaks due to fatigue.    Pt participated well and is motivated. She cont to present with swelling, stiffness, limited ROM, and weakness; however, pt demonstrates improvement in ROM and edema. Treatment somewhat limited still due to limited endurance, probably related to MS. MD would like pt to cont with therapy. Pt has partially met goals thus far. Recommend cont therapy for ROM and light strengthening.     Silvana is progressing fairly well, but slowly towards her goals and there are no updates to goals at this time. Pt prognosis continues as Good. Pt will continue to benefit from skilled outpatient occupational therapy to address the deficits listed in the problem list on initial evaluation, provide pt/family education and to maximize pt's level of independence in  the home and community environment.       Previous Short Term Goals Status:   Goals:   The following goals were discussed with the patient and patient is in agreement with them as to be addressed in the treatment plan.   Long Term Goals (LTGs); to be met by discharge.  LTG #1: Pt will report a pain level of 1 out of 10 with ADLs and house hold tasks ---progressing    LTG #2: Pt will demo improved FOTO score by at least 20 points. ---progressing  LTG #3: Pt will return to prior level of function for ADLs and household management. ---progressing  LTG #4: Pt will demonstrate improved L wrist AROM WFL for performing daily tasks---progressing  LTG #5:  and pinch to be assessed when appropriate and goals set accordingly---met, 3/12/2020  Add: LTG #6: Pt will demonstrate improved L  strength by at least 3-5# for functional grasp of objects or when driving     Short Term Goals (STGs); to be met within 4 weeks (2/14/2020).  STG #1a: Pt will report 3 out of 10 pain level with ADLs and daily tasks.---met, 3/12/2020  STG #2a: Pt will report/demo [place] with feeding self.---met, 3/2/2020  STG #2b: Pt will report/demo [place] with driving.---progressing  STG #3a: Pt will demonstrate independence with issued HEP. ---met, 3/12/2020  STG #3b: Pt will demo improved L wrist AROM by at least 15 degrees needed to aid with functional use during ADLs---met, 3/2/2020  STG #4: Pt will demonstrate improved edema in L hand and wrist by at least 0.4 cm for increased use during daily tasks.---met, 3/2/2020    New Short Term Goals Status:   Cont 2,3  Long Term Goal Status:   continue per initial plan of care.  Reasons for Recertification of Therapy:   Pt cont to make progress in therapy, bu slow. She cont to have some deficits with grasping heavier objects and driving. Pt ordered continued therapy.     Plan     Updated Certification Period: 3/12/2020 to 4/10/2020  Recommended Treatment Plan: 2 times per week for 4 weeks:  Fluidotherapy, Manual Therapy, Moist Heat/ Ice, Patient Education, Self Care, Therapeutic Activites and Therapeutic Exercise  Other Recommendations: n/a    BRUCE Patino  3/12/2020      I CERTIFY THE NEED FOR THESE SERVICES FURNISHED UNDER THIS PLAN OF TREATMENT AND WHILE UNDER MY CARE    Physician's comments:        Physician's Signature: ___________________________________________________

## 2020-03-16 ENCOUNTER — TELEPHONE (OUTPATIENT)
Dept: REHABILITATION | Facility: HOSPITAL | Age: 65
End: 2020-03-16

## 2020-03-16 NOTE — TELEPHONE ENCOUNTER
Spoke with patient this date, 3/16/2020. Therapist advised pt to stay at home this week due to other comorbidities and that we cont to monitor the COVID-19 situation. Patient scheduled next week and will keep pt updated on any changes. Encourage pt to cont with HEP for now. Therapist got patient's updated email and encouraged sign up with [facility].

## 2020-03-23 ENCOUNTER — TELEPHONE (OUTPATIENT)
Dept: REHABILITATION | Facility: HOSPITAL | Age: 65
End: 2020-03-23

## 2020-03-24 ENCOUNTER — TELEPHONE (OUTPATIENT)
Dept: NEUROLOGY | Facility: CLINIC | Age: 65
End: 2020-03-24

## 2020-03-24 NOTE — TELEPHONE ENCOUNTER
----- Message from Alessandra Curry RN sent at 3/20/2020  3:42 PM CDT -----  Regarding: FW: New pt  Please schedule with KK in a couple of months (Mayish). KK aware she has no records. Please make sure to note this in the notes.  ----- Message -----  From: Jhonatan Avila III, MD  Sent: 2/26/2020  11:01 AM CDT  To: Alessandra Curry RN  Subject: New pt                                           Greeting   This patient said she has had a difficult time getting records from [facility]  Is there a way we can get her a clinic appt without the records?  Her MRI was from the 90s

## 2020-03-26 ENCOUNTER — NURSE TRIAGE (OUTPATIENT)
Dept: ADMINISTRATIVE | Facility: CLINIC | Age: 65
End: 2020-03-26

## 2020-03-26 PROBLEM — C80.1 MALIGNANT (PRIMARY) NEOPLASM, UNSPECIFIED: Status: ACTIVE | Noted: 2020-03-26

## 2020-03-26 PROBLEM — R06.02 SHORTNESS OF BREATH: Status: ACTIVE | Noted: 2020-03-26

## 2020-03-26 NOTE — TELEPHONE ENCOUNTER
Pt has had fever of 100.8, cough and decreased appetite for 3 days. Pt is feeling weaker today.      Reason for Disposition   Fever present > 3 days (72 hours)    Additional Information   Negative: Difficult to awaken or acting confused (e.g., disoriented, slurred speech)   Negative: Pale cold skin and very weak (can't stand)   Negative: Difficulty breathing and bluish (or gray) lips or face   Negative: New onset rash with purple (or blood-colored) spots or dots   Negative: Sounds like a life-threatening emergency to the triager   Negative: Fever onset within 24 hours of receiving vaccine   Negative: Fever within 21 days of Ebola exposure   Negative: Headache and stiff neck (can't touch chin to chest)   Negative: Difficulty breathing   Negative: IV drug abuse   Negative: Fever > 103 F (39.4 C)   Negative: Fever > 101 F (38.3 C) and over 60 years of age   Negative: Fever > 100.0 F (37.8 C) and diabetes mellitus or a weak immune system (e.g., HIV positive, chemotherapy, splenectomy)   Negative: Fever > 100.0 F (37.8 C) and bedridden (e.g., nursing home patient, stroke, chronic illness, recovering from surgery)   Negative: Fever > 100.0 F (37.8 C) and indwelling urinary catheter (e.g., Pinzon, coude)   Negative: Fever > 100.5 F (38.1 C) and has port (portacath), central line, or PICC line   Negative: Drinking very little and has signs of dehydration (e.g., no urine > 12 hours, very dry mouth, very lightheaded)   Negative: Patient sounds very sick or weak to the triager   Negative: Fever > 100.5 F (38.1 C) and surgery in the past month   Negative: Transplant patient (e.g., liver, heart, lung, kidney)   Negative: Widespread rash and cause unknown   Negative: Patient wants to be seen    Protocols used: FEVER-A-OH

## 2020-03-27 ENCOUNTER — NURSE TRIAGE (OUTPATIENT)
Dept: ADMINISTRATIVE | Facility: CLINIC | Age: 65
End: 2020-03-27

## 2020-03-27 ENCOUNTER — TELEPHONE (OUTPATIENT)
Dept: INTERNAL MEDICINE | Facility: CLINIC | Age: 65
End: 2020-03-27

## 2020-03-27 ENCOUNTER — HOSPITAL ENCOUNTER (INPATIENT)
Facility: HOSPITAL | Age: 65
LOS: 3 days | Discharge: HOME | DRG: 193 | End: 2020-03-30
Attending: EMERGENCY MEDICINE | Admitting: INTERNAL MEDICINE
Payer: MEDICARE

## 2020-03-27 DIAGNOSIS — D72.810 LYMPHOPENIA: ICD-10-CM

## 2020-03-27 DIAGNOSIS — R91.8 OPACITIES OF BOTH LUNGS PRESENT ON CHEST X-RAY: ICD-10-CM

## 2020-03-27 DIAGNOSIS — G35 MS (MULTIPLE SCLEROSIS): ICD-10-CM

## 2020-03-27 DIAGNOSIS — J96.01 ACUTE RESPIRATORY FAILURE WITH HYPOXIA: ICD-10-CM

## 2020-03-27 DIAGNOSIS — J06.9 ACUTE RESPIRATORY DISEASE DUE TO COVID-19 VIRUS: Primary | ICD-10-CM

## 2020-03-27 DIAGNOSIS — N31.9 NEUROGENIC BLADDER: ICD-10-CM

## 2020-03-27 DIAGNOSIS — F31.9 BIPOLAR AFFECTIVE DISORDER, REMISSION STATUS UNSPECIFIED: ICD-10-CM

## 2020-03-27 DIAGNOSIS — Z20.822 SUSPECTED COVID-19 VIRUS INFECTION: ICD-10-CM

## 2020-03-27 DIAGNOSIS — Z20.822 SUSPECTED COVID-19 VIRUS INFECTION: Primary | ICD-10-CM

## 2020-03-27 DIAGNOSIS — R79.89 ELEVATED TROPONIN: ICD-10-CM

## 2020-03-27 DIAGNOSIS — D69.6 THROMBOCYTOPENIA: ICD-10-CM

## 2020-03-27 DIAGNOSIS — U07.1 ACUTE RESPIRATORY DISEASE DUE TO COVID-19 VIRUS: Primary | ICD-10-CM

## 2020-03-27 PROBLEM — J18.9 PNEUMONIA: Status: ACTIVE | Noted: 2020-03-27

## 2020-03-27 LAB
ALBUMIN SERPL BCP-MCNC: 3.1 G/DL (ref 3.5–5.2)
ALP SERPL-CCNC: 41 U/L (ref 55–135)
ALT SERPL W/O P-5'-P-CCNC: 21 U/L (ref 10–44)
ANION GAP SERPL CALC-SCNC: 11 MMOL/L (ref 8–16)
AST SERPL-CCNC: 30 U/L (ref 10–40)
BASOPHILS # BLD AUTO: 0 K/UL (ref 0–0.2)
BASOPHILS NFR BLD: 0 % (ref 0–1.9)
BILIRUB SERPL-MCNC: 0.4 MG/DL (ref 0.1–1)
BNP SERPL-MCNC: <10 PG/ML (ref 0–99)
BUN SERPL-MCNC: 16 MG/DL (ref 8–23)
CALCIUM SERPL-MCNC: 8.1 MG/DL (ref 8.7–10.5)
CHLORIDE SERPL-SCNC: 102 MMOL/L (ref 95–110)
CO2 SERPL-SCNC: 24 MMOL/L (ref 23–29)
CREAT SERPL-MCNC: 0.8 MG/DL (ref 0.5–1.4)
CRP SERPL-MCNC: 24.1 MG/L (ref 0–8.2)
DIFFERENTIAL METHOD: ABNORMAL
EOSINOPHIL # BLD AUTO: 0 K/UL (ref 0–0.5)
EOSINOPHIL NFR BLD: 0 % (ref 0–8)
ERYTHROCYTE [DISTWIDTH] IN BLOOD BY AUTOMATED COUNT: 12.6 % (ref 11.5–14.5)
EST. GFR  (AFRICAN AMERICAN): >60 ML/MIN/1.73 M^2
EST. GFR  (NON AFRICAN AMERICAN): >60 ML/MIN/1.73 M^2
FERRITIN SERPL-MCNC: 366 NG/ML (ref 20–300)
GLUCOSE SERPL-MCNC: 112 MG/DL (ref 70–110)
HCT VFR BLD AUTO: 36.1 % (ref 37–48.5)
HGB BLD-MCNC: 11.8 G/DL (ref 12–16)
IMM GRANULOCYTES # BLD AUTO: 0.02 K/UL (ref 0–0.04)
IMM GRANULOCYTES NFR BLD AUTO: 0.8 % (ref 0–0.5)
INFLUENZA A, MOLECULAR: NEGATIVE
INFLUENZA B, MOLECULAR: NEGATIVE
LACTATE SERPL-SCNC: 1.3 MMOL/L (ref 0.5–2.2)
LDH SERPL L TO P-CCNC: 302 U/L (ref 110–260)
LYMPHOCYTES # BLD AUTO: 0.4 K/UL (ref 1–4.8)
LYMPHOCYTES NFR BLD: 16.7 % (ref 18–48)
MAGNESIUM SERPL-MCNC: 2.2 MG/DL (ref 1.6–2.6)
MCH RBC QN AUTO: 29.9 PG (ref 27–31)
MCHC RBC AUTO-ENTMCNC: 32.7 G/DL (ref 32–36)
MCV RBC AUTO: 92 FL (ref 82–98)
MONOCYTES # BLD AUTO: 0.3 K/UL (ref 0.3–1)
MONOCYTES NFR BLD: 12.7 % (ref 4–15)
NEUTROPHILS # BLD AUTO: 1.8 K/UL (ref 1.8–7.7)
NEUTROPHILS NFR BLD: 69.8 % (ref 38–73)
NRBC BLD-RTO: 0 /100 WBC
PHOSPHATE SERPL-MCNC: 2.7 MG/DL (ref 2.7–4.5)
PLATELET # BLD AUTO: 86 K/UL (ref 150–350)
PLATELET BLD QL SMEAR: ABNORMAL
PMV BLD AUTO: 11.1 FL (ref 9.2–12.9)
POTASSIUM SERPL-SCNC: 3.6 MMOL/L (ref 3.5–5.1)
PROCALCITONIN SERPL IA-MCNC: 0.06 NG/ML
PROT SERPL-MCNC: 6 G/DL (ref 6–8.4)
RBC # BLD AUTO: 3.94 M/UL (ref 4–5.4)
SODIUM SERPL-SCNC: 137 MMOL/L (ref 136–145)
SPECIMEN SOURCE: NORMAL
TROPONIN I SERPL DL<=0.01 NG/ML-MCNC: 0.03 NG/ML (ref 0–0.03)
WBC # BLD AUTO: 2.51 K/UL (ref 3.9–12.7)

## 2020-03-27 PROCEDURE — 83605 ASSAY OF LACTIC ACID: CPT

## 2020-03-27 PROCEDURE — 11000001 HC ACUTE MED/SURG PRIVATE ROOM

## 2020-03-27 PROCEDURE — 63600175 PHARM REV CODE 636 W HCPCS: Performed by: STUDENT IN AN ORGANIZED HEALTH CARE EDUCATION/TRAINING PROGRAM

## 2020-03-27 PROCEDURE — 86140 C-REACTIVE PROTEIN: CPT

## 2020-03-27 PROCEDURE — 25000003 PHARM REV CODE 250: Performed by: STUDENT IN AN ORGANIZED HEALTH CARE EDUCATION/TRAINING PROGRAM

## 2020-03-27 PROCEDURE — 96372 THER/PROPH/DIAG INJ SC/IM: CPT | Performed by: EMERGENCY MEDICINE

## 2020-03-27 PROCEDURE — 84484 ASSAY OF TROPONIN QUANT: CPT

## 2020-03-27 PROCEDURE — 25000003 PHARM REV CODE 250: Performed by: EMERGENCY MEDICINE

## 2020-03-27 PROCEDURE — 85025 COMPLETE CBC W/AUTO DIFF WBC: CPT

## 2020-03-27 PROCEDURE — 80053 COMPREHEN METABOLIC PANEL: CPT

## 2020-03-27 PROCEDURE — 84100 ASSAY OF PHOSPHORUS: CPT

## 2020-03-27 PROCEDURE — 82728 ASSAY OF FERRITIN: CPT

## 2020-03-27 PROCEDURE — 63600175 PHARM REV CODE 636 W HCPCS: Performed by: EMERGENCY MEDICINE

## 2020-03-27 PROCEDURE — 96361 HYDRATE IV INFUSION ADD-ON: CPT

## 2020-03-27 PROCEDURE — 96360 HYDRATION IV INFUSION INIT: CPT

## 2020-03-27 PROCEDURE — 99285 EMERGENCY DEPT VISIT HI MDM: CPT | Mod: 25

## 2020-03-27 PROCEDURE — 83615 LACTATE (LD) (LDH) ENZYME: CPT

## 2020-03-27 PROCEDURE — 87040 BLOOD CULTURE FOR BACTERIA: CPT

## 2020-03-27 PROCEDURE — 84145 PROCALCITONIN (PCT): CPT

## 2020-03-27 PROCEDURE — 83735 ASSAY OF MAGNESIUM: CPT

## 2020-03-27 PROCEDURE — 83880 ASSAY OF NATRIURETIC PEPTIDE: CPT

## 2020-03-27 PROCEDURE — 87502 INFLUENZA DNA AMP PROBE: CPT

## 2020-03-27 RX ORDER — HYDROXYCHLOROQUINE SULFATE 200 MG/1
400 TABLET, FILM COATED ORAL 2 TIMES DAILY
Status: COMPLETED | OUTPATIENT
Start: 2020-03-27 | End: 2020-03-28

## 2020-03-27 RX ORDER — BENZONATATE 100 MG/1
100 CAPSULE ORAL 3 TIMES DAILY PRN
Status: DISCONTINUED | OUTPATIENT
Start: 2020-03-27 | End: 2020-03-30 | Stop reason: HOSPADM

## 2020-03-27 RX ORDER — FOLIC ACID 1 MG/1
1000 TABLET ORAL DAILY
Status: DISCONTINUED | OUTPATIENT
Start: 2020-03-28 | End: 2020-03-30 | Stop reason: HOSPADM

## 2020-03-27 RX ORDER — ENOXAPARIN SODIUM 100 MG/ML
40 INJECTION SUBCUTANEOUS EVERY 24 HOURS
Status: DISCONTINUED | OUTPATIENT
Start: 2020-03-27 | End: 2020-03-30 | Stop reason: HOSPADM

## 2020-03-27 RX ORDER — HYDROXYCHLOROQUINE SULFATE 200 MG/1
400 TABLET, FILM COATED ORAL DAILY
Status: DISCONTINUED | OUTPATIENT
Start: 2020-03-29 | End: 2020-03-30 | Stop reason: HOSPADM

## 2020-03-27 RX ORDER — CALCIUM CARBONATE 200(500)MG
500 TABLET,CHEWABLE ORAL 2 TIMES DAILY WITH MEALS
Status: DISCONTINUED | OUTPATIENT
Start: 2020-03-27 | End: 2020-03-30 | Stop reason: HOSPADM

## 2020-03-27 RX ORDER — CHOLECALCIFEROL (VITAMIN D3) 10 MCG
400 TABLET ORAL DAILY
Status: DISCONTINUED | OUTPATIENT
Start: 2020-03-28 | End: 2020-03-30 | Stop reason: HOSPADM

## 2020-03-27 RX ORDER — ACETAMINOPHEN 325 MG/1
650 TABLET ORAL EVERY 8 HOURS PRN
Status: DISCONTINUED | OUTPATIENT
Start: 2020-03-27 | End: 2020-03-30 | Stop reason: HOSPADM

## 2020-03-27 RX ORDER — SODIUM CHLORIDE 0.9 % (FLUSH) 0.9 %
10 SYRINGE (ML) INJECTION
Status: DISCONTINUED | OUTPATIENT
Start: 2020-03-27 | End: 2020-03-30 | Stop reason: HOSPADM

## 2020-03-27 RX ORDER — BUPROPION HYDROCHLORIDE 100 MG/1
200 TABLET, EXTENDED RELEASE ORAL EVERY MORNING
Status: DISCONTINUED | OUTPATIENT
Start: 2020-03-27 | End: 2020-03-30 | Stop reason: HOSPADM

## 2020-03-27 RX ORDER — FERROUS SULFATE, DRIED 160(50) MG
1 TABLET, EXTENDED RELEASE ORAL 2 TIMES DAILY WITH MEALS
Status: DISCONTINUED | OUTPATIENT
Start: 2020-03-27 | End: 2020-03-27 | Stop reason: CLARIF

## 2020-03-27 RX ORDER — AZITHROMYCIN 250 MG/1
500 TABLET, FILM COATED ORAL DAILY
Status: DISCONTINUED | OUTPATIENT
Start: 2020-03-28 | End: 2020-03-30 | Stop reason: HOSPADM

## 2020-03-27 RX ORDER — BUPROPION HYDROCHLORIDE 75 MG/1
150 TABLET ORAL NIGHTLY
Status: DISCONTINUED | OUTPATIENT
Start: 2020-03-27 | End: 2020-03-30 | Stop reason: HOSPADM

## 2020-03-27 RX ORDER — OXCARBAZEPINE 150 MG/1
150 TABLET, FILM COATED ORAL 2 TIMES DAILY
Status: DISCONTINUED | OUTPATIENT
Start: 2020-03-27 | End: 2020-03-30 | Stop reason: HOSPADM

## 2020-03-27 RX ADMIN — BUPROPION HYDROCHLORIDE 150 MG: 75 TABLET, FILM COATED ORAL at 09:03

## 2020-03-27 RX ADMIN — HYDROXYCHLOROQUINE SULFATE 400 MG: 200 TABLET, FILM COATED ORAL at 08:03

## 2020-03-27 RX ADMIN — OXCARBAZEPINE 150 MG: 150 TABLET, FILM COATED ORAL at 08:03

## 2020-03-27 RX ADMIN — ENOXAPARIN SODIUM 40 MG: 100 INJECTION SUBCUTANEOUS at 08:03

## 2020-03-27 RX ADMIN — BUPROPION HYDROCHLORIDE 200 MG: 100 TABLET, FILM COATED, EXTENDED RELEASE ORAL at 03:03

## 2020-03-27 RX ADMIN — SODIUM CHLORIDE 150 ML: 0.9 INJECTION, SOLUTION INTRAVENOUS at 11:03

## 2020-03-27 RX ADMIN — CALCIUM CARBONATE 500 MG: 500 TABLET, CHEWABLE ORAL at 08:03

## 2020-03-27 NOTE — ED NOTES
RN attempted contact with admit team to clarify lab orders. Informed provider CBC and CMP already collected at 1200 and inquired if repeat labs needed. Awaiting orders.

## 2020-03-27 NOTE — H&P
[facility] Medicine H&P Note     Admitting Team: [facility] Hospitalist Team A  Attending Physician: Valencia To MD  Resident: Louise  Intern: Blayne    Date of Admit: 3/27/2020    Chief Complaint     SOB with intermittent fever x 1 week    Subjective:      History of Present Illness:  Silvana Quezada is a 64 y.o.  female who  has a past medical history of Bipolar 1 disorder, Breast cancer, Breast cyst, Closed fracture of proximal end of right humerus (3/9/2015), Depression, History of right shoulder fracture, MS (multiple sclerosis), and Osteoporosis, unspecified.. The patient presented to [facility] on 3/27/2020 with a primary complaint of Shortness of Breath (increased SOB since discharge yesterday,  pt + for covid )      The patient was in their usual state of health until a week ago when she felt feverish intermittently with mild fatigue. She began to experience SOB and cough over the last 2 days. Her cough is non productive and has not been bloody. She denies nausea, dysuria, dizziness, diarrhea, or sick contacts. She lives with her . She has a history of MS and struggles with incontinence due to neurogenic bladder. She takes buproprion and trileptal. She denies other past medical history. She is not a smoker or drinker.  She has a COVID test pending from her PCP and was sent here due to worsening of symptoms.     Past Medical History:  Past Medical History:   Diagnosis Date    Bipolar 1 disorder     Breast cancer     right    Breast cyst     Closed fracture of proximal end of right humerus 3/9/2015    Depression     History of right shoulder fracture     MS (multiple sclerosis)     presented as dizzines, dx vision,     Osteoporosis, unspecified        Past Surgical History:  Past Surgical History:   Procedure Laterality Date    BRAIN SURGERY  1991    BREAST BIOPSY Left 2009    core    BREAST LUMPECTOMY Right 2001    CYSTOSCOPY N/A 7/11/2018    Procedure: CYSTOSCOPY;   Surgeon: Jayesh Ross MD;  Location: [address];  Service: Urology;  Laterality: N/A;  30 min    CYSTOSCOPY N/A 4/10/2019    Procedure: CYSTOSCOPY;  Surgeon: Jayesh Ross MD;  Location: [address];  Service: Urology;  Laterality: N/A;  30 MIN    FOOT SURGERY  october 2013    HYSTERECTOMY      INJECTION OF BOTULINUM TOXIN TYPE A N/A 7/11/2018    Procedure: INJECTION, BOTULINUM TOXIN, TYPE A 200 UNITS;  Surgeon: Jayesh Ross MD;  Location: [facility] OR [address] [place] [address];  Service: Urology;  Laterality: N/A;    INJECTION OF BOTULINUM TOXIN TYPE A N/A 4/10/2019    Procedure: INJECTION, BOTULINUM TOXIN, TYPE A 200 UNITS;  Surgeon: Jayesh Ross MD;  Location: [address];  Service: Urology;  Laterality: N/A;    OPEN REDUCTION AND INTERNAL FIXATION (ORIF) OF INJURY OF WRIST Left 12/27/2019    Procedure: ORIF, WRIST;  Surgeon: Albert Schroeder Jr., MD;  Location: [facility];  Service: Orthopedics;  Laterality: Left;  ACUMED/ MINI C ARM [employer] notified/ Rk confirmed here in [facility] with Lacie 12/23/19 KB [id number]    TONSILLECTOMY         Allergies:  Review of patient's allergies indicates:   Allergen Reactions    Adhesive      blisters    Keflex [cephalexin] Rash    Cephalosporins        Home Medications:  Prior to Admission medications    Medication Sig Start Date End Date Taking? Authorizing Provider   albuterol (PROVENTIL/VENTOLIN HFA) 90 mcg/actuation inhaler Inhale 1-2 puffs into the lungs every 6 (six) hours as needed for Wheezing. Rescue 3/26/20   Rosales Zhao MD   alendronate (FOSAMAX) 70 MG tablet Take 1 tablet (70 mg total) by mouth every 7 days. 3/3/20 3/3/21  Jhonatan Avila III, MD   azithromycin (Z-PEYTON) 250 MG tablet Take 1 tablet (250 mg total) by mouth once daily. Take first 2 tablets together, then 1 every day until finished. 3/26/20   Rosales Zhao MD   benzonatate (TESSALON PERLES) 100 MG capsule Take 1 capsule (100 mg total) by mouth 3 (three) times daily as needed for  Cough. 3/26/20   Rosales Zhao MD   buPROPion (WELLBUTRIN SR) 100 MG TBSR 12 hr tablet Take 2 tablets (200 mg total) by mouth every morning. 12/2/19   Don Munoz Jr., MD   buPROPion (WELLBUTRIN SR) 150 MG TBSR 12 hr tablet Take 1 tablet (150 mg total) by mouth every evening. 12/2/19   Don Munoz Jr., MD   calcium-vitamin D3 (CALCIUM 500 + D) 500 mg(1,250mg) -200 unit per tablet Take 1 tablet by mouth 2 (two) times daily with meals.    Historical Provider, MD   catheter (FEMALE CATHETER) 14 Fr [facility] Patient is to self catheterize four times a day indefinitley using female 12 fr in and out catheter for incomplete bladder emptying.   Dispense 120 month with 11 refills or 360 every 3 months with 3 refills depending on patient's preference 2/18/20   Jayesh Ross MD   fluphenazine (PROLIXIN) 1 MG tablet TAKE 2 TABLETS NIGHTLY 12/2/19   Don Munoz Jr., MD   folic acid (FOLVITE) 1 MG tablet Take 1 tablet (1,000 mcg total) by mouth once daily. 12/2/19   Don Munoz Jr., MD   inhalation spacing device Use as directed for inhalation. 3/27/20   Jhonatan Avila III, MD   ondansetron (ZOFRAN-ODT) 4 MG TbDL Take 1 tablet (4 mg total) by mouth every 8 (eight) hours as needed. 3/26/20   Rosales Zhao MD   OXcarbazepine (TRILEPTAL) 150 MG Tab Take 1 tablet (150 mg total) by mouth 2 (two) times daily. 12/2/19   Don Munoz Jr., MD       Family History:  Family History   Problem Relation Age of Onset    Skin cancer Mother     Breast cancer Mother     Stroke Mother     Emphysema Father     Breast cancer Maternal Aunt     Dementia Brother     Diabetes Brother     No Known Problems Daughter     Autism Son     No Known Problems Brother     Autism Daughter        Social History:  Social History     Tobacco Use    Smoking status: Never Smoker    Smokeless tobacco: Never Used   Substance Use Topics    Alcohol use: Yes     Alcohol/week: 1.7 standard drinks     Types: 2 Standard drinks or  "equivalent per week     Comment: occasionally    Drug use: No       Review of Systems:  Pertinent items are noted in HPI. All other systems are reviewed and are negative.    Health Maintaince :   Primary Care Physician: Jhonatan Avila    Immunizations:   TDap UTD    Flu UTD  Pna UTD       Objective:   Last 24 Hour Vital Signs:  BP  Min: 94/53  Max: 126/60  Temp  Av.4 °F (37.4 °C)  Min: 99.1 °F (37.3 °C)  Max: 99.8 °F (37.7 °C)  Pulse  Av  Min: 78  Max: 97  Resp  Av.3  Min: 17  Max: 22  SpO2  Av.7 %  Min: 90 %  Max: 96 %  Height  Av' 3" (160 cm)  Min: 5' 3" (160 cm)  Max: 5' 3" (160 cm)  Weight  Av kg (172 lb)  Min: 78 kg (172 lb)  Max: 78 kg (172 lb)  Body mass index is 30.47 kg/m².  No intake/output data recorded.    Physical Examination:    BP (!) 108/55   Pulse 85   Temp 98.8 °F (37.1 °C) (Oral)   Resp 18   Wt 78 kg (172 lb)   SpO2 99%   BMI 30.47 kg/m²     General Appearance:    Speaking in short sentences due to SOB, otherwise comfortable on room air   Head:    Normocephalic, without obvious abnormality, atraumatic   Eyes:    PERRL, conjunctiva/corneas clear, EOM's intact       Nose:   Nares normal, septum midline, mucosa normal, no drainage    or sinus tenderness   Throat:   Lips, mucosa, and tongue normal; teeth and gums normal       Back:     Symmetric, no curvature, ROM normal, no CVA tenderness   Lungs:     tachypneic, respirations otherwise unlabored   Chest Wall:    No tenderness or deformity    Heart:    Regular rate and rhythm       Abdomen:     Abdomen not obviously distended and non tender to patient's movement                   Skin:   Skin color, texture, turgor normal, no rashes or lesions easily visible through teleconference   Lymph nodes:   Cervical, supraclavicular, and axillary nodes normal   Neurologic:   CNII-XII grossly intact, moving all extremities          Laboratory:  Most Recent Data:  CBC:   Lab Results   Component Value Date    WBC 2.51 (L) " 03/27/2020    HGB 11.8 (L) 03/27/2020    HCT 36.1 (L) 03/27/2020    PLT 86 (L) 03/27/2020    MCV 92 03/27/2020    RDW 12.6 03/27/2020     BMP:   Lab Results   Component Value Date     03/27/2020    K 3.6 03/27/2020     03/27/2020    CO2 24 03/27/2020    BUN 16 03/27/2020    CREATININE 0.8 03/27/2020     (H) 03/27/2020    CALCIUM 8.1 (L) 03/27/2020    PHOS 2.8 02/26/2020     LFTs:   Lab Results   Component Value Date    PROT 6.0 03/27/2020    ALBUMIN 3.1 (L) 03/27/2020    BILITOT 0.4 03/27/2020    AST 30 03/27/2020    ALKPHOS 41 (L) 03/27/2020    ALT 21 03/27/2020     Coags:   Lab Results   Component Value Date    INR 1.0 12/13/2019     FLP:   Lab Results   Component Value Date    CHOL 202 (H) 02/20/2020    HDL 67 02/20/2020    LDLCALC 118.0 02/20/2020    TRIG 85 02/20/2020    CHOLHDL 33.2 02/20/2020     DM:   Lab Results   Component Value Date    LDLCALC 118.0 02/20/2020    CREATININE 0.8 03/27/2020     Thyroid:   Lab Results   Component Value Date    TSH 1.683 05/17/2017    FREET4 0.93 05/17/2017    Q6LDBWV 92.93 06/14/2013     Anemia:   Lab Results   Component Value Date    FERRITIN 366 (H) 03/27/2020    GHHTEZXA35 1,252 (H) 06/14/2013     Cardiac:   Lab Results   Component Value Date    TROPONINI 0.033 (H) 03/27/2020    BNP <10 03/27/2020     Urinalysis:   Lab Results   Component Value Date    LABURIN PROTEUS MIRABILIS  >100,000 cfu/ml   (A) 12/11/2019    COLORU Yellow 09/18/2017    SPECGRAV 1.010 09/18/2017    NITRITE Positive (A) 09/18/2017    PROTEINUR neg 09/18/2017    KETONESU Negative 09/18/2017    UROBILINOGEN Negative 09/18/2017    BILIRUBINUR neg 09/18/2017    WBCUA >100 (H) 09/18/2017       Trended Lab Data:  Recent Labs   Lab 03/26/20  1518 03/27/20  1201   WBC 2.07* 2.51*   HGB 12.2 11.8*   HCT 37.8 36.1*   PLT 76* 86*   MCV 92 92   RDW 12.5 12.6    137   K 3.8 3.6    102   CO2 26 24   BUN 15 16   CREATININE 0.8 0.8    112*   PROT 6.3 6.0   ALBUMIN 3.3* 3.1*    BILITOT 0.3 0.4   AST 29 30   ALKPHOS 45* 41*   ALT 24 21       Trended Cardiac Data:  Recent Labs   Lab 03/27/20  1201   TROPONINI 0.033*   BNP <10       Microbiology Data:  Flu pending  Blood cultures pending  COVID pending    Radiology:  Imaging Results          X-Ray Chest 1 View (Final result)  Result time 03/27/20 11:53:57    Final result by Sanford Branch MD (03/27/20 11:53:57)                 Impression:      As above.      Electronically signed by: Sanford Branch MD  Date:    03/27/2020  Time:    11:53             Narrative:    EXAMINATION:  XR CHEST 1 VIEW    CLINICAL HISTORY:  shortness of breath;    TECHNIQUE:  Single frontal view of the chest was performed.    COMPARISON:  03/26/2020    FINDINGS:  There are bilateral interstitial opacities, increased in conspicuity from prior study.  No pleural effusion or pneumothorax.  Cardiac silhouette is unremarkable.  Thoracic scoliosis noted.                                 Assessment:     Silvana Quezada is a 64 y.o. female with:  Patient Active Problem List    Diagnosis Date Noted    Acute respiratory failure with hypoxia 03/27/2020    Malignant (primary) neoplasm, unspecified 03/26/2020    Shortness of breath 03/26/2020    Age-related osteoporosis without current pathological fracture 02/21/2020    Left wrist pain 01/16/2020    Swelling of left hand 01/16/2020    Decreased range of motion of left wrist 01/16/2020    Wrist fracture, closed, left, initial encounter 12/27/2019    Bipolar I disorder, most recent episode depressed, moderate 09/01/2019    Tortuous aorta 04/23/2019    Thoracic aorta atherosclerosis 05/24/2018    Bipolar affective disorder 05/24/2018    Overactive bladder 08/09/2017    Incomplete bladder emptying 07/21/2017    Localized osteoporosis with current pathological fracture 05/30/2017    History of breast cancer 08/27/2014    Urgency-frequency syndrome 12/11/2013    MS (multiple sclerosis) 12/03/2013    Neurogenic bladder  12/03/2013        Plan:     Acute Hypoxic Respiratory Failure possibly 2/2 COVID  -Patient satting in 80's in ED. Requiring nasal cannula  -Supplement O2 PRN and wean for goal >90%  -Vitals otherwise wnl  -Starting Hydroxychloroquine 400 BID today then daily for 4 days  -Starting azithromycin  -COVID pending,   -WBC 2.51. Lactate 1.3. Trop 0.033  -Procalc pending.  -, CRP 24.1    Lymphopenia and Thrombocytopenia  -possibly 2/2 COVID  -Continue to monitor    Multiple Sclerosis w/ History of Neurogenic Bladder  -Continue to monitor urine output and worsening of symptoms  -CMP without evidence of FRANCOISE    Elevated Troponin Likely 2/2 Demand  -0.033 in ED  -Patient asymptomatic    BP1D  -Continue home bupropion    VTE Prophylaxis: lovenox  Diet: reg    Dispo: place in observation for O2 support. COVID pending. Monitor for symptomatic improvement    Code Status:     Full    Socrates Cisneros MD  [facility] Internal Medicine HO-I    [facility] Medicine Hospitalist Pager numbers:   [facility] Hospitalist Medicine Team A (Arabella/Yousuf): [phone]  [facility] Hospitalist Medicine Team B (Kayce/Cheryl):  [phone]

## 2020-03-27 NOTE — ED NOTES
Rec'd call from Carmen Gonzalez, chemistry updating magnesium and phosphorus results. Informed that results would be updated in pt chart.

## 2020-03-27 NOTE — PROGRESS NOTES
Non- Formulary medication order for Calcium 500 mg with Vitamin D3 200 mg BID (combination product)  changed to Calcium 500 mg BID + Vitamin D3 400 mg Once a day ( equivalent dose) while patient is in the hospital. Thanks Rx [phone]

## 2020-03-27 NOTE — ED NOTES
Pt presents to the ED sent from PCP for further monitoring. Pt was seen at this facility yesterday for c/o sob, continues sob and cough, denies any fever. Has not taken any antipyretics today. Pt on 2L NC, vitals stable at this time, appears in NAD. Will continue to monitor and assess.

## 2020-03-27 NOTE — ED NOTES
Pharmacy contacted requesting to send Bupropion for pt administration. Informed per pharmacy, medication sent via tube.

## 2020-03-27 NOTE — TELEPHONE ENCOUNTER
Spoke with patients , patient unable to speak a few words without getting SOB, sounds very weak.   states they have spoke to PCP this morning and were advised to go to ED in [place] for evaluation.  Home O2 sats high 80s to 92%.   will bring patient to ED.    Reason for Disposition   Severe difficulty breathing (e.g., struggling for each breath, speak in single words, bluish lips)   Patient sounds very sick or weak to the triager    Protocols used: CORONAVIRUS (COVID-19) EXPOSURE-A-OH

## 2020-03-27 NOTE — TELEPHONE ENCOUNTER
Patient presented to the hospital yesterday with fever, cough, SOB  CXR with infiltrate and suspected COVID- 19   Today she is a awake still shortness of breath   Respiratory pulse ox is high 80s all evening - taken with home pulse ox  Called on call at [facility] and made aware  Recommending to return to ED for evaluation    agrees with plan and will take her by personal vehicle

## 2020-03-27 NOTE — ED PROVIDER NOTES
Encounter Date: 3/27/2020       History     Chief Complaint   Patient presents with    Shortness of Breath     increased SOB since discharge yesterday,  pt + for covid      Patient is a 64-year-old female who presents with worsening shortness of breath since being seen in this ED yesterday.  Patient has a cough and low-grade fevers.  She says her oxygen saturations at home have been in the 80s.  No vomiting or diarrhea.  (COVID-19 swab done yesterday is pending.)        Review of patient's allergies indicates:   Allergen Reactions    Adhesive      blisters    Keflex [cephalexin] Rash    Cephalosporins      Past Medical History:   Diagnosis Date    Bipolar 1 disorder     Breast cancer     right    Breast cyst     Closed fracture of proximal end of right humerus 3/9/2015    Depression     History of right shoulder fracture     MS (multiple sclerosis)     presented as dizzines, dx vision,     Osteoporosis, unspecified      Past Surgical History:   Procedure Laterality Date    BRAIN SURGERY  1991    BREAST BIOPSY Left 2009    core    BREAST LUMPECTOMY Right 2001    CYSTOSCOPY N/A 7/11/2018    Procedure: CYSTOSCOPY;  Surgeon: Jayesh Ross MD;  Location: [address];  Service: Urology;  Laterality: N/A;  30 min    CYSTOSCOPY N/A 4/10/2019    Procedure: CYSTOSCOPY;  Surgeon: Jayesh Ross MD;  Location: [address];  Service: Urology;  Laterality: N/A;  30 MIN    FOOT SURGERY  october 2013    HYSTERECTOMY      INJECTION OF BOTULINUM TOXIN TYPE A N/A 7/11/2018    Procedure: INJECTION, BOTULINUM TOXIN, TYPE A 200 UNITS;  Surgeon: Jayesh Ross MD;  Location: [address];  Service: Urology;  Laterality: N/A;    INJECTION OF BOTULINUM TOXIN TYPE A N/A 4/10/2019    Procedure: INJECTION, BOTULINUM TOXIN, TYPE A 200 UNITS;  Surgeon: Jayesh Ross MD;  Location: [address];  Service: Urology;  Laterality: N/A;    OPEN REDUCTION AND INTERNAL FIXATION (ORIF) OF INJURY OF WRIST Left  12/27/2019    Procedure: ORIF, WRIST;  Surgeon: Albert Schroeder Jr., MD;  Location: [facility] OR;  Service: Orthopedics;  Laterality: Left;  ACUMED/ MINI C ARM Palomo Hughes notified/ Rk confirmed here in [facility] with Lacie 12/23/19 KB 0928    TONSILLECTOMY       Family History   Problem Relation Age of Onset    Skin cancer Mother     Breast cancer Mother     Stroke Mother     Emphysema Father     Breast cancer Maternal Aunt     Dementia Brother     Diabetes Brother     No Known Problems Daughter     Autism Son     No Known Problems Brother     Autism Daughter      Social History     Tobacco Use    Smoking status: Never Smoker    Smokeless tobacco: Never Used   Substance Use Topics    Alcohol use: Yes     Alcohol/week: 1.7 standard drinks     Types: 2 Standard drinks or equivalent per week     Comment: occasionally    Drug use: No     Review of Systems   Constitutional: Positive for fatigue and fever.   Respiratory: Positive for cough and shortness of breath.    Gastrointestinal: Negative for diarrhea and vomiting.   Skin: Negative for rash.   All other systems reviewed and are negative.      Physical Exam     Initial Vitals [03/27/20 1047]   BP Pulse Resp Temp SpO2   (!) 94/53 91 19 99.8 °F (37.7 °C) (!) 92 %      MAP       --         Physical Exam    Nursing note and vitals reviewed.  Constitutional: She appears distressed.   HENT:   Head: Normocephalic.   Eyes: EOM are normal.   Neck: Neck supple.   Cardiovascular: Normal rate, regular rhythm and normal heart sounds.   Pulmonary/Chest:   Coarse breath sounds bilaterally.   Abdominal: Soft. There is no tenderness.   Musculoskeletal: Normal range of motion. She exhibits no edema.   Neurological: She is alert and oriented to person, place, and time.   Skin: Skin is warm and dry.   Psychiatric: Thought content normal.         ED Course   Procedures  Labs Reviewed   FERRITIN - Abnormal; Notable for the following components:       Result Value     Ferritin 366 (*)     All other components within normal limits   LACTATE DEHYDROGENASE - Abnormal; Notable for the following components:     (*)     All other components within normal limits   C-REACTIVE PROTEIN - Abnormal; Notable for the following components:    CRP 24.1 (*)     All other components within normal limits   CBC W/ AUTO DIFFERENTIAL - Abnormal; Notable for the following components:    WBC 2.51 (*)     RBC 3.94 (*)     Hemoglobin 11.8 (*)     Hematocrit 36.1 (*)     Platelets 86 (*)     Immature Granulocytes 0.8 (*)     Lymph # 0.4 (*)     Lymph% 16.7 (*)     Platelet Estimate Decreased (*)     All other components within normal limits   COMPREHENSIVE METABOLIC PANEL - Abnormal; Notable for the following components:    Glucose 112 (*)     Calcium 8.1 (*)     Albumin 3.1 (*)     Alkaline Phosphatase 41 (*)     All other components within normal limits   TROPONIN I - Abnormal; Notable for the following components:    Troponin I 0.033 (*)     All other components within normal limits   INFLUENZA A & B BY MOLECULAR   CULTURE, BLOOD   CULTURE, BLOOD   PROCALCITONIN   B-TYPE NATRIURETIC PEPTIDE   LACTIC ACID, PLASMA   MAGNESIUM   PHOSPHORUS   COMPREHENSIVE METABOLIC PANEL   CBC W/ AUTO DIFFERENTIAL          Imaging Results          X-Ray Chest 1 View (Final result)  Result time 03/27/20 11:53:57    Final result by Sanford Branch MD (03/27/20 11:53:57)                 Impression:      As above.      Electronically signed by: Sanford Branch MD  Date:    03/27/2020  Time:    11:53             Narrative:    EXAMINATION:  XR CHEST 1 VIEW    CLINICAL HISTORY:  shortness of breath;    TECHNIQUE:  Single frontal view of the chest was performed.    COMPARISON:  03/26/2020    FINDINGS:  There are bilateral interstitial opacities, increased in conspicuity from prior study.  No pleural effusion or pneumothorax.  Cardiac silhouette is unremarkable.  Thoracic scoliosis noted.                                  Medical Decision Making:   Clinical Tests:   Lab Tests: Ordered and Reviewed  Radiological Study: Ordered and Reviewed  ED Management:  64-year-old female with suspected COVID-19 infection who returns to the ED today with worsening symptoms.  Patient was seen in this ED yesterday.  She is hypotensive as well as hypoxic.  Chest x-ray has a worsened appearance since yesterday.  She will be admitted by the [facility] internal medicine team.                                 Clinical Impression:       ICD-10-CM ICD-9-CM   1. Acute respiratory failure with hypoxia J96.01 518.81   2. Opacities of both lungs present on chest x-ray R91.8 793.19   3. Suspected Covid-19 Virus Infection R68.89    4. Lymphopenia D72.810 288.51   5. Thrombocytopenia D69.6 287.5   6. Elevated troponin R79.89 790.6   7. Bipolar affective disorder, remission status unspecified F31.9 296.80   8. MS (multiple sclerosis) G35 340   9. Neurogenic bladder N31.9 596.54                                Jose Carlos Roberson MD  03/28/20 0199

## 2020-03-27 NOTE — ED NOTES
Regular diet meal tray given to pt. Meal set up provided by RN. HOB elevated 90 degrees. Call bell at bedside.

## 2020-03-27 NOTE — PLAN OF CARE
VN cued into room for q2h rounding.  Pt resting comfortably in bed. Pt asking to be changed, notified nurses desk.  Call light within reach, fall precautions maintained.  VN will continue to follow and be available as needed.

## 2020-03-27 NOTE — ED NOTES
"Assumed pt care at this time. RN introduced self to pt and updated pt with plan of care. Comfort check provided. Pt states, "My bottom hurts". RN assessed pt buttock. Blanchable pinkness noted to sacral area. RN assisted pt to reposition self to right side lying position. Pt verbalized understanding.   "

## 2020-03-28 PROCEDURE — 25000003 PHARM REV CODE 250: Performed by: EMERGENCY MEDICINE

## 2020-03-28 PROCEDURE — 25000003 PHARM REV CODE 250: Performed by: STUDENT IN AN ORGANIZED HEALTH CARE EDUCATION/TRAINING PROGRAM

## 2020-03-28 PROCEDURE — 63600175 PHARM REV CODE 636 W HCPCS: Performed by: STUDENT IN AN ORGANIZED HEALTH CARE EDUCATION/TRAINING PROGRAM

## 2020-03-28 PROCEDURE — 11000001 HC ACUTE MED/SURG PRIVATE ROOM

## 2020-03-28 RX ADMIN — BUPROPION HYDROCHLORIDE 150 MG: 75 TABLET, FILM COATED ORAL at 09:03

## 2020-03-28 RX ADMIN — HYDROXYCHLOROQUINE SULFATE 400 MG: 200 TABLET, FILM COATED ORAL at 08:03

## 2020-03-28 RX ADMIN — ENOXAPARIN SODIUM 40 MG: 100 INJECTION SUBCUTANEOUS at 09:03

## 2020-03-28 RX ADMIN — OXCARBAZEPINE 150 MG: 150 TABLET, FILM COATED ORAL at 09:03

## 2020-03-28 RX ADMIN — CALCIUM CARBONATE 500 MG: 500 TABLET, CHEWABLE ORAL at 04:03

## 2020-03-28 RX ADMIN — FOLIC ACID 1000 MCG: 1 TABLET ORAL at 08:03

## 2020-03-28 RX ADMIN — CALCIUM CARBONATE 500 MG: 500 TABLET, CHEWABLE ORAL at 08:03

## 2020-03-28 RX ADMIN — BUPROPION HYDROCHLORIDE 200 MG: 100 TABLET, FILM COATED, EXTENDED RELEASE ORAL at 06:03

## 2020-03-28 RX ADMIN — AZITHROMYCIN MONOHYDRATE 500 MG: 250 TABLET ORAL at 08:03

## 2020-03-28 RX ADMIN — OXCARBAZEPINE 150 MG: 150 TABLET, FILM COATED ORAL at 08:03

## 2020-03-28 RX ADMIN — CHOLECALCIFEROL TAB 10 MCG (400 UNIT) 400 UNITS: 10 TAB at 08:03

## 2020-03-28 NOTE — PLAN OF CARE
Pt AAOx3. Medications administered per order. 2L NC maintained, saturations of 95-98%. Denies pain, discomfort, or nausea. Cardiac monitor maintained. Pt slept through the night. Encouraged to call with questions/concerns/assistance. Will continue to monitor. Safety maintained.

## 2020-03-28 NOTE — PLAN OF CARE
VN cued into patients room for q2h rounding - Patient is in no acute distress at this time. Resting comfortably.  Call light within reach. Will continue to monitor closely.

## 2020-03-28 NOTE — PROGRESS NOTES
VN cued into patients room for q2h rounding - Patient is in no acute distress at this time. Primary RN at bedside. Call light within reach. Will continue to monitor closely.

## 2020-03-28 NOTE — PROGRESS NOTES
"[facility] Hospital Medicine Progress Note    Primary Team: [facility] Hospitalist Team A  Attending Physician: Yousuf  Resident: Louise  Intern: Blayne    Subjective:      Patient still reporting fatigue and labored breathing. Has not worsened but has not improved. Denies n/v, fever, chills overnight. PO intake reduced.      Objective:     Last 24 Hour Vital Signs:  BP  Min: 94/53  Max: 127/64  Temp  Av.7 °F (37.1 °C)  Min: 97.9 °F (36.6 °C)  Max: 99.8 °F (37.7 °C)  Pulse  Av  Min: 83  Max: 96  Resp  Av.8  Min: 17  Max: 19  SpO2  Av.1 %  Min: 90 %  Max: 100 %  Height  Av' 3" (160 cm)  Min: 5' 3" (160 cm)  Max: 5' 3" (160 cm)  Weight  Av.2 kg (170 lb 3.3 oz)  Min: 75.2 kg (165 lb 12.6 oz)  Max: 78.4 kg (172 lb 13.5 oz)  I/O last 3 completed shifts:  In: 630 [P.O.:630]  Out: -     Physical Examination:  Gen: AAOx3, NAD  HEENT: head normocephalic, atraumatic  Cardiac: regular rate and rhythm  Resp: tachypneic, labored breathing on nasal cannula,   Extrem: no obvious clubbing or swelling noted of Upper extremity  Skin: no obvious rashes or bruises noted on arms face or neck  Neuro: AAOx3, moving all extremities without difficulty       Laboratory:  Laboratory Data Reviewed: yes  Pertinent Findings:  Recent Labs   Lab 20  1518 20  1201   WBC 2.07* 2.51*   HGB 12.2 11.8*   HCT 37.8 36.1*   PLT 76* 86*    137   K 3.8 3.6    102   CREATININE 0.8 0.8   BUN 15 16   CO2 26 24   ALT 24 21   AST 29 30         Microbiology Data Reviewed: yes  Pertinent Findings:  Flu neg  Blood cultures pending  COVID pending      Current Medications:     Infusions:       Scheduled:   azithromycin  500 mg Oral Daily    buPROPion  150 mg Oral QHS    buPROPion  200 mg Oral QAM    calcium carbonate  500 mg Oral BID WM    And    cholecalciferol (vitamin D3)  400 Units Oral Daily    enoxaparin  40 mg Subcutaneous Daily    folic acid  1,000 mcg Oral Daily    hydroxychloroquine  400 mg Oral BID    " Followed by    [START ON 3/29/2020] hydroxychloroquine  400 mg Oral Daily    OXcarbazepine  150 mg Oral BID        PRN:  acetaminophen, benzonatate, sodium chloride 0.9%    Assessment:     Silvana Quezada is a 64 y.o.female with  Patient Active Problem List    Diagnosis Date Noted    Acute respiratory failure with hypoxia 03/27/2020    Lymphopenia 03/27/2020    Thrombocytopenia 03/27/2020    Elevated troponin 03/27/2020    Opacities of both lungs present on chest x-ray 03/27/2020    Pneumonia 03/27/2020    Suspected Covid-19 Virus Infection     Malignant (primary) neoplasm, unspecified 03/26/2020    Shortness of breath 03/26/2020    Age-related osteoporosis without current pathological fracture 02/21/2020    Left wrist pain 01/16/2020    Swelling of left hand 01/16/2020    Decreased range of motion of left wrist 01/16/2020    Wrist fracture, closed, left, initial encounter 12/27/2019    Bipolar I disorder, most recent episode depressed, moderate 09/01/2019    Tortuous aorta 04/23/2019    Thoracic aorta atherosclerosis 05/24/2018    Bipolar affective disorder 05/24/2018    Overactive bladder 08/09/2017    Incomplete bladder emptying 07/21/2017    Localized osteoporosis with current pathological fracture 05/30/2017    History of breast cancer 08/27/2014    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 Requiring nasal cannula  -Supplement O2 PRN and wean for goal >90%  -Vitals otherwise wnl  - Hydroxychloroquine 400 BID yesterday then daily for 4 days  -Continue azithromycin  -COVID pending,   -WBC 2.51. Lactate 1.3. Trop 0.033  -Procalc 0.06  -, CRP 24.1  -Ferritin 366  -O2 requirements not increasing but patient with continued SOB and fatigue  -Continue to monitor     Lymphopenia and Thrombocytopenia  -mild  -possibly 2/2  COVID  -Continue to monitor. Repeat CBC and CMP pending     Multiple Sclerosis w/ History of Neurogenic Bladder  -Continue to monitor urine output and worsening of symptoms  -CMP without evidence of FRANCOISE     Elevated Troponin Likely 2/2 Demand  -0.033 in ED  -Patient asymptomatic     BP1D  -Continue home bupropion  -Continue home oxcarbazepine     VTE Prophylaxis: lovenox  Diet: reg     Dispo: place in observation for O2 support. COVID pending. Monitor for symptomatic improvement     Code Status:      Full     Socrates Cisneros MD  [facility] Internal Medicine HO-I     [facility] Medicine Hospitalist Pager numbers:   [facility] Hospitalist Medicine Team A (Arabella/Yousuf):          [phone]  [facility] Hospitalist Medicine Team B (Kayce/Cheryl):        [phone]

## 2020-03-28 NOTE — PLAN OF CARE
VN cued into room for q2h rounding.  Pt resting comfortably in bed.  NC in place, coached in deep breathing exercises.  No other needs or complaints at this time.  Call light within reach, fall precautions maintained.  VN will continue to follow and be available as needed.

## 2020-03-29 PROBLEM — U07.1 ACUTE RESPIRATORY DISEASE DUE TO COVID-19 VIRUS: Status: ACTIVE | Noted: 2020-03-27

## 2020-03-29 PROBLEM — J06.9 ACUTE RESPIRATORY DISEASE DUE TO COVID-19 VIRUS: Status: ACTIVE | Noted: 2020-03-27

## 2020-03-29 LAB
ALBUMIN SERPL BCP-MCNC: 2.6 G/DL (ref 3.5–5.2)
ALP SERPL-CCNC: 35 U/L (ref 55–135)
ALT SERPL W/O P-5'-P-CCNC: 27 U/L (ref 10–44)
ANION GAP SERPL CALC-SCNC: 10 MMOL/L (ref 8–16)
AST SERPL-CCNC: 38 U/L (ref 10–40)
BASOPHILS # BLD AUTO: 0 K/UL (ref 0–0.2)
BASOPHILS NFR BLD: 0 % (ref 0–1.9)
BILIRUB SERPL-MCNC: 0.3 MG/DL (ref 0.1–1)
BUN SERPL-MCNC: 14 MG/DL (ref 8–23)
CALCIUM SERPL-MCNC: 8.8 MG/DL (ref 8.7–10.5)
CHLORIDE SERPL-SCNC: 102 MMOL/L (ref 95–110)
CO2 SERPL-SCNC: 28 MMOL/L (ref 23–29)
CREAT SERPL-MCNC: 0.7 MG/DL (ref 0.5–1.4)
DIFFERENTIAL METHOD: ABNORMAL
EOSINOPHIL # BLD AUTO: 0 K/UL (ref 0–0.5)
EOSINOPHIL NFR BLD: 0.7 % (ref 0–8)
ERYTHROCYTE [DISTWIDTH] IN BLOOD BY AUTOMATED COUNT: 12.3 % (ref 11.5–14.5)
EST. GFR  (AFRICAN AMERICAN): >60 ML/MIN/1.73 M^2
EST. GFR  (NON AFRICAN AMERICAN): >60 ML/MIN/1.73 M^2
GLUCOSE SERPL-MCNC: 161 MG/DL (ref 70–110)
HCT VFR BLD AUTO: 34.2 % (ref 37–48.5)
HGB BLD-MCNC: 11 G/DL (ref 12–16)
IMM GRANULOCYTES # BLD AUTO: 0.02 K/UL (ref 0–0.04)
IMM GRANULOCYTES NFR BLD AUTO: 0.7 % (ref 0–0.5)
LYMPHOCYTES # BLD AUTO: 0.5 K/UL (ref 1–4.8)
LYMPHOCYTES NFR BLD: 17.8 % (ref 18–48)
MAGNESIUM SERPL-MCNC: 2.1 MG/DL (ref 1.6–2.6)
MCH RBC QN AUTO: 30 PG (ref 27–31)
MCHC RBC AUTO-ENTMCNC: 32.2 G/DL (ref 32–36)
MCV RBC AUTO: 93 FL (ref 82–98)
MONOCYTES # BLD AUTO: 0.3 K/UL (ref 0.3–1)
MONOCYTES NFR BLD: 9.7 % (ref 4–15)
NEUTROPHILS # BLD AUTO: 2.1 K/UL (ref 1.8–7.7)
NEUTROPHILS NFR BLD: 71.1 % (ref 38–73)
NRBC BLD-RTO: 0 /100 WBC
PHOSPHATE SERPL-MCNC: 2.7 MG/DL (ref 2.7–4.5)
PLATELET # BLD AUTO: 115 K/UL (ref 150–350)
PLATELET BLD QL SMEAR: ABNORMAL
PMV BLD AUTO: 11.2 FL (ref 9.2–12.9)
POTASSIUM SERPL-SCNC: 3.8 MMOL/L (ref 3.5–5.1)
PROT SERPL-MCNC: 5.6 G/DL (ref 6–8.4)
RBC # BLD AUTO: 3.67 M/UL (ref 4–5.4)
SODIUM SERPL-SCNC: 140 MMOL/L (ref 136–145)
WBC # BLD AUTO: 2.98 K/UL (ref 3.9–12.7)

## 2020-03-29 PROCEDURE — 36415 COLL VENOUS BLD VENIPUNCTURE: CPT

## 2020-03-29 PROCEDURE — 11000001 HC ACUTE MED/SURG PRIVATE ROOM

## 2020-03-29 PROCEDURE — 83735 ASSAY OF MAGNESIUM: CPT

## 2020-03-29 PROCEDURE — 80053 COMPREHEN METABOLIC PANEL: CPT

## 2020-03-29 PROCEDURE — 85025 COMPLETE CBC W/AUTO DIFF WBC: CPT

## 2020-03-29 PROCEDURE — 63600175 PHARM REV CODE 636 W HCPCS: Performed by: INTERNAL MEDICINE

## 2020-03-29 PROCEDURE — 25000003 PHARM REV CODE 250: Performed by: STUDENT IN AN ORGANIZED HEALTH CARE EDUCATION/TRAINING PROGRAM

## 2020-03-29 PROCEDURE — 94761 N-INVAS EAR/PLS OXIMETRY MLT: CPT

## 2020-03-29 PROCEDURE — 63600175 PHARM REV CODE 636 W HCPCS: Performed by: STUDENT IN AN ORGANIZED HEALTH CARE EDUCATION/TRAINING PROGRAM

## 2020-03-29 PROCEDURE — 99900035 HC TECH TIME PER 15 MIN (STAT)

## 2020-03-29 PROCEDURE — 25000003 PHARM REV CODE 250: Performed by: EMERGENCY MEDICINE

## 2020-03-29 PROCEDURE — 84100 ASSAY OF PHOSPHORUS: CPT

## 2020-03-29 PROCEDURE — 27000221 HC OXYGEN, UP TO 24 HOURS

## 2020-03-29 RX ADMIN — HYDROXYCHLOROQUINE SULFATE 400 MG: 200 TABLET, FILM COATED ORAL at 08:03

## 2020-03-29 RX ADMIN — CALCIUM CARBONATE 500 MG: 500 TABLET, CHEWABLE ORAL at 08:03

## 2020-03-29 RX ADMIN — OXCARBAZEPINE 150 MG: 150 TABLET, FILM COATED ORAL at 08:03

## 2020-03-29 RX ADMIN — BUPROPION HYDROCHLORIDE 150 MG: 75 TABLET, FILM COATED ORAL at 10:03

## 2020-03-29 RX ADMIN — AZITHROMYCIN MONOHYDRATE 500 MG: 250 TABLET ORAL at 08:03

## 2020-03-29 RX ADMIN — BUPROPION HYDROCHLORIDE 200 MG: 100 TABLET, FILM COATED, EXTENDED RELEASE ORAL at 06:03

## 2020-03-29 RX ADMIN — FOLIC ACID 1000 MCG: 1 TABLET ORAL at 08:03

## 2020-03-29 RX ADMIN — METHYLPREDNISOLONE SODIUM SUCCINATE 20 MG: 40 INJECTION, POWDER, FOR SOLUTION INTRAMUSCULAR; INTRAVENOUS at 12:03

## 2020-03-29 RX ADMIN — METHYLPREDNISOLONE SODIUM SUCCINATE 20 MG: 40 INJECTION, POWDER, FOR SOLUTION INTRAMUSCULAR; INTRAVENOUS at 06:03

## 2020-03-29 RX ADMIN — OXCARBAZEPINE 150 MG: 150 TABLET, FILM COATED ORAL at 10:03

## 2020-03-29 RX ADMIN — CHOLECALCIFEROL TAB 10 MCG (400 UNIT) 400 UNITS: 10 TAB at 08:03

## 2020-03-29 RX ADMIN — ACETAMINOPHEN 650 MG: 325 TABLET ORAL at 11:03

## 2020-03-29 RX ADMIN — ENOXAPARIN SODIUM 40 MG: 100 INJECTION SUBCUTANEOUS at 10:03

## 2020-03-29 RX ADMIN — CALCIUM CARBONATE 500 MG: 500 TABLET, CHEWABLE ORAL at 05:03

## 2020-03-29 NOTE — PLAN OF CARE
VIRTUAL NURSE 2 HOUR ROUNDS:  Cued into patient's room.  Patient awake resting in bed on phone; respirations even and unlabored.  No distress noted.  no needs expressed. Will cont to monitor.

## 2020-03-29 NOTE — NURSING
Attempted pt wean pt off oxygen. Oxygen therapy removed for 30 minutes. Pt's o2 sat 89% on RA. Pt placed on 1L nasal cannula with 02 sats at 932-93%.

## 2020-03-29 NOTE — PLAN OF CARE
Pt received on nasal cannula at  2  lpm.  SPO2   94%.  Pt in no apparent respiratory distress.  Will continue to monitor.

## 2020-03-29 NOTE — PROGRESS NOTES
[facility] Internal Medicine Resident MARIPOSA Progress Note    Subjective:      Patient reports feeling better this morning.  Her breathing is improved over when she came into the hospital.  She is still requiring supplemental O2.       Objective:   Last 24 Hour Vital Signs:  BP  Min: 96/53  Max: 108/51  Temp  Av.8 °F (36.6 °C)  Min: 97.2 °F (36.2 °C)  Max: 98.3 °F (36.8 °C)  Pulse  Av.2  Min: 73  Max: 89  Resp  Av  Min: 20  Max: 20  SpO2  Av.7 %  Min: 96 %  Max: 98 %  Weight  Av kg (165 lb 5.5 oz)  Min: 75 kg (165 lb 5.5 oz)  Max: 75 kg (165 lb 5.5 oz)    Fluid Balance:     Intake/Output Summary (Last 24 hours) at 3/29/2020 0802  Last data filed at 3/28/2020 2330  Gross per 24 hour   Intake 120 ml   Output 200 ml   Net -80 ml       Vitals:    20 0029 20 0350 20 0416 20 0742   BP:  (!) 108/51  (!) 107/58   BP Location:  Left arm     Patient Position:  Lying     Pulse: 79 78 77 73   Resp:    20   Temp:  97.8 °F (36.6 °C)  97.6 °F (36.4 °C)   TempSrc:  Oral     SpO2:  96%     Weight:  75 kg (165 lb 5.5 oz)     Height:           Physical Examination:  Gen: Lying comfortably in bed, in NAD  Resp: Speaking in full sentences, no coughing, on O2 via NC    Rest of exam deferred due to virtual visit    Laboratory:  Laboratory Data Reviewed: Yes  Pertinent Findings:    Hematology:  Recent Labs   Lab 20  1518 20  1201   WBC 2.07* 2.51*   HGB 12.2 11.8*   HCT 37.8 36.1*   PLT 76* 86*   MCV 92 92   RDW 12.5 12.6       Chemistry:  Recent Labs   Lab 20  1518 20  1201 20  1353    137  --    K 3.8 3.6  --     102  --    CO2 26 24  --    BUN 15 16  --    CREATININE 0.8 0.8  --     112*  --    CALCIUM 8.8 8.1*  --    PROT 6.3 6.0  --    ALBUMIN 3.3* 3.1*  --    ALT 24 21  --    AST 29 30  --    ALKPHOS 45* 41*  --    MG  --   --  2.2   PHOS  --   --  2.7   BILITOT 0.3 0.4  --        Cardiac:  Recent Labs   Lab 20  1201   TROPONINI 0.033*    BNP <10       DM:  Recent Labs   Lab 03/26/20  1518 03/27/20  1201    112*         Microbiology Data Reviewed: Yes  Pertinent Findings:  Microbiology Results (last 7 days)     Procedure Component Value Units Date/Time    Blood Culture #1 **CANNOT BE ORDERED STAT** [[id number]] Collected:  03/27/20 1155    Order Status:  Completed Specimen:  Blood from Peripheral, Antecubital, Left Updated:  03/28/20 2012     Blood Culture, Routine No Growth to date      No Growth to date    Blood Culture #2 **CANNOT BE ORDERED STAT** [[id number]] Collected:  03/27/20 1157    Order Status:  Completed Specimen:  Blood from Peripheral, Hand, Right Updated:  03/28/20 2012     Blood Culture, Routine No Growth to date      No Growth to date    Influenza A & B by Molecular [[id number]] Collected:  03/27/20 1409    Order Status:  Completed Specimen:  Nasopharyngeal Swab Updated:  03/27/20 1457     Influenza A, Molecular Negative     Influenza B, Molecular Negative     Flu A & B Source Nasal swab        Lab Results   Component Value Date    [id number] Detected (A) 03/26/2020       Other Results:    Radiology Data Reviewed: Yes  Pertinent Findings:  Imaging Results          X-Ray Chest 1 View (Final result)  Result time 03/27/20 11:53:57    Final result by Sanford Branch MD (03/27/20 11:53:57)                 Impression:      As above.      Electronically signed by: Sanford Branch MD  Date:    03/27/2020  Time:    11:53             Narrative:    EXAMINATION:  XR CHEST 1 VIEW    CLINICAL HISTORY:  shortness of breath;    TECHNIQUE:  Single frontal view of the chest was performed.    COMPARISON:  03/26/2020    FINDINGS:  There are bilateral interstitial opacities, increased in conspicuity from prior study.  No pleural effusion or pneumothorax.  Cardiac silhouette is unremarkable.  Thoracic scoliosis noted.                                Current Medications:     Infusions:       Scheduled:   azithromycin  500 mg Oral Daily    buPROPion   150 mg Oral QHS    buPROPion  200 mg Oral QAM    calcium carbonate  500 mg Oral BID WM    And    cholecalciferol (vitamin D3)  400 Units Oral Daily    enoxaparin  40 mg Subcutaneous Daily    folic acid  1,000 mcg Oral Daily    hydroxychloroquine  400 mg Oral Daily    OXcarbazepine  150 mg Oral BID        PRN:  acetaminophen, benzonatate, sodium chloride 0.9%     Antibiotics:  Antimicrobials (From admission, onward)    Ordered     Dose Route Frequency Start Stop    03/27/20 1802  azithromycin tablet 500 mg     INDICATION:  Lower Respiratory Infections        500 mg Oral Daily 03/28/20 0900 --             Assessment:     Silvana Quezada is a 64 y.o.female with    Plan:     Acute Hypoxic Respiratory Failure 2/2 pneumonia from COVID  -Patient satting in 80's in ED. Requiring nasal cannula  -Supplement O2 PRN and wean for goal >90%  -Vitals otherwise wnl  -Hydroxychloroquine 400 BID then daily for 4 days  -Continue azithromycin  -COVID positive  -WBC 2.51. Lactate 1.3. Trop 0.033  -Procalc 0.06  -, CRP 24.1  -Ferritin 366  -Avoid NSAIDs  -Currently requiring 2L NC, wean as tolerated     Lymphopenia and Thrombocytopenia  -mild  -2/2 COVID  -Continue to monitor. Repeat CBC and CMP pending     Multiple Sclerosis w/ History of Neurogenic Bladder  -Continue to monitor urine output and worsening of symptoms  -CMP without evidence of FRANCOISE     Elevated Troponin Likely 2/2 Demand  -0.033 in ED  -Patient asymptomatic     BP1D  -Continue home bupropion  -Continue home oxcarbazepine     VTE Prophylaxis: lovenox  Diet: reg     Dispo: pending improvement in respiratory status     Yrn Cavanaugh MD  [facility] Internal Medicine HO-I  [facility] Internal Medicine Service Team A    [facility] Medicine Hospitalist Pager numbers:   [facility] Hospitalist Medicine Team A (Arabella/Yousuf): [phone]  [facility] Hospitalist Medicine Team B (Kayce/Cheryl):  [phone]

## 2020-03-29 NOTE — PLAN OF CARE
VN cued into room for q2h rounding.  Pt resting comfortably in bed.  Staff currently in room assisting patient.  No needs or complaints at this time.  Call light within reach, fall precautions maintained.  VN will continue to follow and be available as needed.

## 2020-03-29 NOTE — NURSING
Attempted to wean pt off oxygen. Oxygen therapy removed for about 30 minutes. Pt's O2 sat 88% on RA. Pt placed on 2L nasal cannula with o2 sats at 94%

## 2020-03-30 VITALS
TEMPERATURE: 97 F | HEART RATE: 86 BPM | DIASTOLIC BLOOD PRESSURE: 61 MMHG | BODY MASS INDEX: 30.46 KG/M2 | RESPIRATION RATE: 20 BRPM | WEIGHT: 171.94 LBS | SYSTOLIC BLOOD PRESSURE: 108 MMHG | HEIGHT: 63 IN | OXYGEN SATURATION: 90 %

## 2020-03-30 PROBLEM — D69.6 THROMBOCYTOPENIA: Status: RESOLVED | Noted: 2020-03-27 | Resolved: 2020-03-30

## 2020-03-30 PROBLEM — J06.9 ACUTE RESPIRATORY DISEASE DUE TO COVID-19 VIRUS: Status: RESOLVED | Noted: 2020-03-27 | Resolved: 2020-03-30

## 2020-03-30 PROBLEM — R79.89 ELEVATED TROPONIN: Status: RESOLVED | Noted: 2020-03-27 | Resolved: 2020-03-30

## 2020-03-30 PROBLEM — J96.01 ACUTE RESPIRATORY FAILURE WITH HYPOXIA: Status: RESOLVED | Noted: 2020-03-27 | Resolved: 2020-03-30

## 2020-03-30 PROBLEM — U07.1 ACUTE RESPIRATORY DISEASE DUE TO COVID-19 VIRUS: Status: RESOLVED | Noted: 2020-03-27 | Resolved: 2020-03-30

## 2020-03-30 PROBLEM — D72.810 LYMPHOPENIA: Status: RESOLVED | Noted: 2020-03-27 | Resolved: 2020-03-30

## 2020-03-30 PROCEDURE — 94761 N-INVAS EAR/PLS OXIMETRY MLT: CPT

## 2020-03-30 PROCEDURE — 63600175 PHARM REV CODE 636 W HCPCS: Performed by: INTERNAL MEDICINE

## 2020-03-30 PROCEDURE — 27000221 HC OXYGEN, UP TO 24 HOURS

## 2020-03-30 PROCEDURE — 25000003 PHARM REV CODE 250: Performed by: STUDENT IN AN ORGANIZED HEALTH CARE EDUCATION/TRAINING PROGRAM

## 2020-03-30 PROCEDURE — 25000003 PHARM REV CODE 250: Performed by: EMERGENCY MEDICINE

## 2020-03-30 RX ORDER — HYDROXYCHLOROQUINE SULFATE 200 MG/1
400 TABLET, FILM COATED ORAL DAILY
Qty: 6 TABLET | Refills: 0 | Status: SHIPPED | OUTPATIENT
Start: 2020-03-30 | End: 2020-04-02

## 2020-03-30 RX ADMIN — CALCIUM CARBONATE 500 MG: 500 TABLET, CHEWABLE ORAL at 10:03

## 2020-03-30 RX ADMIN — METHYLPREDNISOLONE SODIUM SUCCINATE 20 MG: 40 INJECTION, POWDER, FOR SOLUTION INTRAMUSCULAR; INTRAVENOUS at 12:03

## 2020-03-30 RX ADMIN — CHOLECALCIFEROL TAB 10 MCG (400 UNIT) 400 UNITS: 10 TAB at 10:03

## 2020-03-30 RX ADMIN — HYDROXYCHLOROQUINE SULFATE 400 MG: 200 TABLET, FILM COATED ORAL at 10:03

## 2020-03-30 RX ADMIN — OXCARBAZEPINE 150 MG: 150 TABLET, FILM COATED ORAL at 10:03

## 2020-03-30 RX ADMIN — METHYLPREDNISOLONE SODIUM SUCCINATE 20 MG: 40 INJECTION, POWDER, FOR SOLUTION INTRAMUSCULAR; INTRAVENOUS at 06:03

## 2020-03-30 RX ADMIN — FOLIC ACID 1000 MCG: 1 TABLET ORAL at 10:03

## 2020-03-30 RX ADMIN — BUPROPION HYDROCHLORIDE 200 MG: 100 TABLET, FILM COATED, EXTENDED RELEASE ORAL at 06:03

## 2020-03-30 RX ADMIN — AZITHROMYCIN MONOHYDRATE 500 MG: 250 TABLET ORAL at 10:03

## 2020-03-30 NOTE — PROGRESS NOTES
[facility] Internal Medicine Resident MARIPOSA Progress Note    Subjective:      Patient reports feeling even better this morning.  Her breathing is improved over when she came into the hospital.  She is requiring minimal supplemental O2.       Objective:   Last 24 Hour Vital Signs:  BP  Min: 103/63  Max: 136/54  Temp  Av °F (36.7 °C)  Min: 97.5 °F (36.4 °C)  Max: 98.5 °F (36.9 °C)  Pulse  Av.7  Min: 76  Max: 80  Resp  Av  Min: 18  Max: 20  SpO2  Av.4 %  Min: 94 %  Max: 95 %  Weight  Av kg (171 lb 15.3 oz)  Min: 78 kg (171 lb 15.3 oz)  Max: 78 kg (171 lb 15.3 oz)    Fluid Balance:     Intake/Output Summary (Last 24 hours) at 3/30/2020 0833  Last data filed at 3/30/2020 0600  Gross per 24 hour   Intake 555 ml   Output 300 ml   Net 255 ml       Vitals:    20 0025 20 0415 20 0533 20 0800   BP:   127/79    BP Location:   Left arm    Patient Position:   Lying    Pulse: 79 76 79 77   Resp:   20    Temp:   98.5 °F (36.9 °C)    TempSrc:   Oral    SpO2:   (!) 94%    Weight:   78 kg (171 lb 15.3 oz)    Height:           Physical Examination:  Gen: Lying comfortably in bed, in NAD  Resp: Speaking in full sentences, no coughing, on O2 via NC    Rest of exam deferred due to virtual visit    Laboratory:  Laboratory Data Reviewed: Yes  Pertinent Findings:    Hematology:  Recent Labs   Lab 20  1518 20  1201 20  1353   WBC 2.07* 2.51* 2.98*   HGB 12.2 11.8* 11.0*   HCT 37.8 36.1* 34.2*   PLT 76* 86* 115*   MCV 92 92 93   RDW 12.5 12.6 12.3       Chemistry:  Recent Labs   Lab 20  1518 20  1201 20  1353 20  1353    137  --  140   K 3.8 3.6  --  3.8    102  --  102   CO2 26 24  --  28   BUN 15 16  --  14   CREATININE 0.8 0.8  --  0.7    112*  --  161*   CALCIUM 8.8 8.1*  --  8.8   PROT 6.3 6.0  --  5.6*   ALBUMIN 3.3* 3.1*  --  2.6*   ALT 24 21  --  27   AST 29 30  --  38   ALKPHOS 45* 41*  --  35*   MG  --   --  2.2 2.1   PHOS  --   --   2.7 2.7   BILITOT 0.3 0.4  --  0.3       Cardiac:  Recent Labs   Lab 03/27/20  1201   TROPONINI 0.033*   BNP <10       DM:  Recent Labs   Lab 03/26/20  1518 03/27/20  1201 03/29/20  1353    112* 161*         Microbiology Data Reviewed: Yes  Pertinent Findings:  Microbiology Results (last 7 days)     Procedure Component Value Units Date/Time    Blood Culture #2 **CANNOT BE ORDERED STAT** [[id number]] Collected:  03/27/20 1157    Order Status:  Completed Specimen:  Blood from Peripheral, Hand, Right Updated:  03/29/20 2012     Blood Culture, Routine No Growth to date      No Growth to date      No Growth to date    Blood Culture #1 **CANNOT BE ORDERED STAT** [[id number]] Collected:  03/27/20 1155    Order Status:  Completed Specimen:  Blood from Peripheral, Antecubital, Left Updated:  03/29/20 2012     Blood Culture, Routine No Growth to date      No Growth to date      No Growth to date    Influenza A & B by Molecular [[id number]] Collected:  03/27/20 1409    Order Status:  Completed Specimen:  Nasopharyngeal Swab Updated:  03/27/20 1457     Influenza A, Molecular Negative     Influenza B, Molecular Negative     Flu A & B Source Nasal swab        Lab Results   Component Value Date    [id number] Detected (A) 03/26/2020       Other Results:    Radiology Data Reviewed: Yes  Pertinent Findings:  Imaging Results          X-Ray Chest 1 View (Final result)  Result time 03/27/20 11:53:57    Final result by Sanford Branch MD (03/27/20 11:53:57)                 Impression:      As above.      Electronically signed by: Sanford Branch MD  Date:    03/27/2020  Time:    11:53             Narrative:    EXAMINATION:  XR CHEST 1 VIEW    CLINICAL HISTORY:  shortness of breath;    TECHNIQUE:  Single frontal view of the chest was performed.    COMPARISON:  03/26/2020    FINDINGS:  There are bilateral interstitial opacities, increased in conspicuity from prior study.  No pleural effusion or pneumothorax.  Cardiac silhouette is  unremarkable.  Thoracic scoliosis noted.                                Current Medications:     Infusions:       Scheduled:   azithromycin  500 mg Oral Daily    buPROPion  150 mg Oral QHS    buPROPion  200 mg Oral QAM    calcium carbonate  500 mg Oral BID WM    And    cholecalciferol (vitamin D3)  400 Units Oral Daily    enoxaparin  40 mg Subcutaneous Daily    folic acid  1,000 mcg Oral Daily    hydroxychloroquine  400 mg Oral Daily    methylPREDNISolone sodium succinate  20 mg Intravenous Q6H    OXcarbazepine  150 mg Oral BID        PRN:  acetaminophen, benzonatate, sodium chloride 0.9%     Antibiotics:  Antimicrobials (From admission, onward)    Ordered     Dose Route Frequency Start Stop    03/27/20 1802  azithromycin tablet 500 mg     INDICATION:  Lower Respiratory Infections        500 mg Oral Daily 03/28/20 0900 --             Assessment:     Silvana Quezada is a 64 y.o.female with    Plan:     Acute Hypoxic Respiratory Failure 2/2 pneumonia from COVID-resolving  -Patient satting in 80's in ED. Requiring nasal cannula  -Supplement O2 PRN and wean for goal >90%  -Vitals otherwise wnl  -Hydroxychloroquine 400 BID then daily for 4 days  -Continue azithromycin  -COVID positive  -WBC 2.51. Lactate 1.3. Trop 0.033  -Procalc 0.06  -, CRP 24.1  -Ferritin 366  -Avoid NSAIDs  -Currently requiring 2L NC, home O2 eval this morning     Lymphopenia and Thrombocytopenia  -mild  -2/2 COVID  -Continue to monitor.     Multiple Sclerosis w/ History of Neurogenic Bladder  -Continue to monitor urine output and worsening of symptoms  -CMP without evidence of FRANCOISE     Elevated Troponin Likely 2/2 Demand  -0.033 in ED  -Patient asymptomatic     BP1D  -Continue home bupropion  -Continue home oxcarbazepine     VTE Prophylaxis: lovenox  Diet: reg     Dispo: pending ambulation test for Home O2, patient ready for discharge pending oxygen necessity/availability     Socrates Cisneros MD  [facility] Internal [facility] -I  [facility]  Internal Medicine Service Team A    [facility] Medicine Hospitalist Pager numbers:   [facility] Hospitalist Medicine Team A (Arabella/Yousuf): [phone]  [facility] Hospitalist Medicine Team B (Kayce/Cheryl):  [phone]

## 2020-03-30 NOTE — NURSING
VIRTUAL NURSE 2 HOUR ROUNDS:  Cued into patient's room.  Patient watching TV; respirations even and unlabored.  No distress noted.  Will cont to monitor.

## 2020-03-30 NOTE — PLAN OF CARE
Aox4. VS stable. Meds given per MAR. Pt safety maintained. Bed alarm on. Resting quietly. O2 sats in upper 90's on 2L via NC. Stating she feels better. No fever or SOB. No distress during night.

## 2020-03-30 NOTE — DISCHARGE SUMMARY
[facility] Hospital Medicine Discharge Summary    Primary Team: [facility] Hospitalist Team A  Attending Physician: Valencia To MD  Resident: Louise  Intern: Blayne    Date of Admit: 3/27/2020  Date of Discharge: 3/30/2020    Discharge to: home  Condition: improved    Discharge Diagnoses     Patient Active Problem List   Diagnosis    MS (multiple sclerosis)    Neurogenic bladder    Urgency-frequency syndrome    History of breast cancer    Localized osteoporosis with current pathological fracture    Incomplete bladder emptying    Overactive bladder    Thoracic aorta atherosclerosis    Bipolar affective disorder    Tortuous aorta    Bipolar I disorder, most recent episode depressed, moderate    Wrist fracture, closed, left, initial encounter    Left wrist pain    Swelling of left hand    Decreased range of motion of left wrist    Age-related osteoporosis without current pathological fracture    Malignant (primary) neoplasm, unspecified    Shortness of breath       Consultants and Procedures     Consultants:  n/a    Procedures:   n/a    Imaging:  CXR    Brief History of Present Illness      Silvana Quezada is a 64 y.o.  female who  has a past medical history of Bipolar 1 disorder, Breast cancer, Breast cyst, Closed fracture of proximal end of right humerus (3/9/2015), Depression, History of right shoulder fracture, MS (multiple sclerosis), and Osteoporosis, unspecified.. The patient presented to [facility] on 3/27/2020 with a primary complaint of Shortness of Breath (increased SOB since discharge yesterday,  pt + for covid )        The patient was in their usual state of health until a week ago when she felt feverish intermittently with mild fatigue. She began to experience SOB and cough over the last 2 days. Her cough is non productive and has not been bloody. She denies nausea, dysuria, dizziness, diarrhea, or sick contacts. She lives with her . She has a history of MS and struggles  with incontinence due to neurogenic bladder. She takes buproprion and trileptal. She denies other past medical history. She is not a smoker or drinker.  She has a COVID test pending from her PCP and was sent here due to worsening of symptoms.     For the full HPI please refer to the History & Physical from this admission.    Hospital Course By Problem with Pertinent Findings     Acute Hypoxic Respiratory Failure 2/2 pneumonia from COVID-resolving  -Patient satting in 80's in ED. Requiring nasal cannula  -Supplement O2 PRN and wean for goal >90%  -Vitals otherwise wnl  -Hydroxychloroquine 400 BID then daily for 4 days  -Continue azithromycin  -COVID positive  -WBC 2.51. Lactate 1.3. Trop 0.033  -Procalc 0.06  -, CRP 24.1  -Ferritin 366  -Avoid NSAIDs  -Maintained saturation 89 on ambulation test yesterday. O2 discontinued and discharged patient this morning. Breathing has returned very near to baseline per patient and patient ready to go home.     Lymphopenia and Thrombocytopenia  -mild  -2/2 COVID       Multiple Sclerosis w/ History of Neurogenic Bladder  -Continue to monitor urine output and worsening of symptoms  -CMP without evidence of FRANCOISE     Elevated Troponin Likely 2/2 Demand  -0.033 in ED  -Patient asymptomatic     BP1D  -Continue home bupropion  -Continue home oxcarbazepine       Discharge Medications      Brands, Silvana KENNY   Home Medication Instructions [id number]    Printed on:03/30/20 1028   Medication Information                      albuterol (PROVENTIL/VENTOLIN HFA) 90 mcg/actuation inhaler  Inhale 1-2 puffs into the lungs every 6 (six) hours as needed for Wheezing. Rescue             alendronate (FOSAMAX) 70 MG tablet  Take 1 tablet (70 mg total) by mouth every 7 days.             benzonatate (TESSALON PERLES) 100 MG capsule  Take 1 capsule (100 mg total) by mouth 3 (three) times daily as needed for Cough.             buPROPion (WELLBUTRIN SR) 100 MG TBSR 12 hr tablet  Take 2 tablets (200  mg total) by mouth every morning.             buPROPion (WELLBUTRIN SR) 150 MG TBSR 12 hr tablet  Take 1 tablet (150 mg total) by mouth every evening.             calcium-vitamin D3 (CALCIUM 500 + D) 500 mg(1,250mg) -200 unit per tablet  Take 1 tablet by mouth 2 (two) times daily with meals.             catheter (FEMALE CATHETER) 14 Fr [facility]  Patient is to self catheterize four times a day indefinitley using female 12 fr in and out catheter for incomplete bladder emptying.   Dispense 120 month with 11 refills or 360 every 3 months with 3 refills depending on patient's preference             fluphenazine (PROLIXIN) 1 MG tablet  TAKE 2 TABLETS NIGHTLY             folic acid (FOLVITE) 1 MG tablet  Take 1 tablet (1,000 mcg total) by mouth once daily.             hydroxychloroquine (PLAQUENIL) 200 mg tablet  Take 2 tablets (400 mg total) by mouth once daily. for 3 days             inhalation spacing device  Use as directed for inhalation.             ondansetron (ZOFRAN-ODT) 4 MG TbDL  Take 1 tablet (4 mg total) by mouth every 8 (eight) hours as needed.             OXcarbazepine (TRILEPTAL) 150 MG Tab  Take 1 tablet (150 mg total) by mouth 2 (two) times daily.                 Discharge Information:   Diet:  Regular    Physical Activity:  Regular as tolerated             Instructions:  1. Take all medications as prescribed  2. Keep all follow-up appointments  3. Return to the hospital or call your primary care physicians if any worsening symptoms such as fever, chest pain, shortness of breath, return of symptoms, or any other concerns.    Follow-Up Appointments:  Ortho 4/21  Neuro 5/13  IM 5/26    Socrates Cisneros MD  [facility] Internal Medicine, [facility]

## 2020-03-30 NOTE — PLAN OF CARE
[place] met with pt prior to d/c per Virtual Nurse Screen   pt lives with spouse   ZOYA Mayer [phone]   pt has a st. cane at home, no hh     Covid -19 +   for d/c to home today.     pt's spouse to transport pt to home.          03/30/20 1454   Discharge Assessment   Assessment Type Discharge Planning Assessment   Confirmed/corrected address and phone number on facesheet? Yes   Assessment information obtained from? Patient;Medical Record   Expected Length of Stay (days) 2   Communicated expected length of stay with patient/caregiver yes   Prior to hospitilization cognitive status: Alert/Oriented   Prior to hospitalization functional status: Assistive Equipment   Current cognitive status: Alert/Oriented   Current Functional Status: Assistive Equipment   Lives With spouse   Able to Return to Prior Arrangements yes   Is patient able to care for self after discharge? Yes   Who are your caregiver(s) and their phone number(s)?   (DAVID Quezada  [phone] )   Patient's perception of discharge disposition home or selfcare   Readmission Within the Last 30 Days no previous admission in last 30 days   Patient currently being followed by outpatient case management? No   Patient currently receives any other outside agency services? No   Equipment Currently Used at Home none   Do you have any problems affording any of your prescribed medications? No   Is the patient taking medications as prescribed? yes   Does the patient have transportation home? Yes   Transportation Anticipated family or friend will provide   Does the patient receive services at the Coumadin Clinic? No   Discharge Plan A Home;Home with family   DME Needed Upon Discharge  none   Patient/Family in Agreement with Plan yes

## 2020-03-31 ENCOUNTER — OFFICE VISIT (OUTPATIENT)
Dept: INTERNAL MEDICINE | Facility: CLINIC | Age: 65
End: 2020-03-31
Payer: MEDICARE

## 2020-03-31 ENCOUNTER — NURSE TRIAGE (OUTPATIENT)
Dept: ADMINISTRATIVE | Facility: CLINIC | Age: 65
End: 2020-03-31

## 2020-03-31 DIAGNOSIS — U07.1 COVID-19 VIRUS INFECTION: Primary | ICD-10-CM

## 2020-03-31 LAB
BACTERIA BLD CULT: NORMAL
BACTERIA BLD CULT: NORMAL

## 2020-03-31 PROCEDURE — 3288F PR FALLS RISK ASSESSMENT DOCUMENTED: ICD-10-PCS | Mod: CPTII,95,S$GLB, | Performed by: INTERNAL MEDICINE

## 2020-03-31 PROCEDURE — 1100F PTFALLS ASSESS-DOCD GE2>/YR: CPT | Mod: CPTII,95,S$GLB, | Performed by: INTERNAL MEDICINE

## 2020-03-31 PROCEDURE — 99213 OFFICE O/P EST LOW 20 MIN: CPT | Mod: 95,,, | Performed by: INTERNAL MEDICINE

## 2020-03-31 PROCEDURE — 1100F PR PT FALLS ASSESS DOC 2+ FALLS/FALL W/INJURY/YR: ICD-10-PCS | Mod: CPTII,95,S$GLB, | Performed by: INTERNAL MEDICINE

## 2020-03-31 PROCEDURE — 99213 PR OFFICE/OUTPT VISIT, EST, LEVL III, 20-29 MIN: ICD-10-PCS | Mod: 95,,, | Performed by: INTERNAL MEDICINE

## 2020-03-31 PROCEDURE — 3288F FALL RISK ASSESSMENT DOCD: CPT | Mod: CPTII,95,S$GLB, | Performed by: INTERNAL MEDICINE

## 2020-03-31 NOTE — PROGRESS NOTES
Subjective:       Patient ID: Silvana Quezada is a 64 y.o. female.    Chief Complaint: No chief complaint on file.      Hospital follow up   Admin/Discharge: 3/27/2020-3/20/2020  Reason for admission: COVID19 viral resp infection  Brief History: SOB, hypoxemia   Pertinent Lab - mild leukopenia ,   Pertinent Imaging; CXR with infiltrate  Discharge Plan;  Discharge pertinent meds:  Plaquenil 200 mg daily x3 days   Labs/Imaging pending at the time of discharged:     Since Discharge: has improved SOB, feels more SOB, and desats if lying flat but sitting upright imrpoves      HPI  Review of Systems   Constitutional:        See hpi   All other systems reviewed and are negative.      Objective:       There were no vitals taken for this visit.    Physical Exam   Constitutional: She appears well-developed and well-nourished. No distress.   HEENT  Normocephalic atraumatic  Oropharynx dry   Resp  Normal Respiratory effort  No retraction  No increase work of breathing, able to complete full sentences  No audible wheezes  CV  No perioral cyanosis  Skin  No observed rashes/ bruises  MSK  Moves all visualized extremites with normal range of motion      Skin: She is not diaphoretic.       Assessment:       1. COVID-19 virus infection        Plan:       Diagnoses and all orders for this visit:    COVID-19 virus infection  -- Patient has improved symptoms since hospitalization  --completing course of hydroxycholorquine x3 more days of daily dosing  -- Labs are reviewed   --  return for  follow up 4 weeks

## 2020-03-31 NOTE — TELEPHONE ENCOUNTER
Pt with COVID 19+ test, states feels better since home but  stats sats dropping at night to 80s. Reading on call 92%the patient states felt better on 02.  states they have 02 at home from mother in law. Patient and  directed NOT to use that 02 unless instructed by MD. Agreed to virtual visit with PCP.    Reason for Disposition   [1] Adult has symptoms of COVID-19 (fever, cough, or SOB) AND [2] lab test positive   MILD difficulty breathing (e.g., minimal/no SOB at rest, SOB with walking, pulse <100)    Additional Information   Negative: SEVERE difficulty breathing (e.g., struggling for each breath, speaks in single words)   Negative: Difficult to awaken or acting confused (e.g., disoriented, slurred speech)   Negative: Bluish (or gray) lips or face now   Negative: Shock suspected (e.g., cold/pale/clammy skin, too weak to stand, low BP, rapid pulse)   Negative: Sounds like a life-threatening emergency to the triager   Negative: [1] COVID-19 exposure AND [2] no symptoms   Negative: COVID-19 and Breastfeeding, questions about   Negative: [1] COVID-19 suspected (e.g., cough, fever, shortness of breath) AND [2] public health department recommends testing   Negative: SEVERE or constant chest pain (Exception: mild central chest pain, present only when coughing)   Negative: MODERATE difficulty breathing (e.g., speaks in phrases, SOB even at rest, pulse 100-120)   Negative: Patient sounds very sick or weak to the triager    Protocols used: CORONAVIRUS (COVID-19) EXPOSURE-A-AH, CORONAVIRUS (COVID-19) DIAGNOSED OR [id number]

## 2020-04-01 ENCOUNTER — TELEPHONE (OUTPATIENT)
Dept: INTERNAL MEDICINE | Facility: CLINIC | Age: 65
End: 2020-04-01

## 2020-04-01 DIAGNOSIS — U07.1 COVID-19 VIRUS INFECTION: Primary | ICD-10-CM

## 2020-04-01 NOTE — TELEPHONE ENCOUNTER
----- Message from Nafisa Bush sent at 4/1/2020  9:09 AM CDT -----  Contact: / [place]  [phone]  Patient  calling to speak with the nurse about the patient having a rash all over her back and stomach.  Please call back to assist at [phone]  (IM message to Nurse Kellen was told to send a message)

## 2020-04-01 NOTE — TELEPHONE ENCOUNTER
Spoke to , she has been on 3 new meds since being discharged from [facility] with covid, woke up this am with a rash on her trunk, she started zofran, tessalon and hydroxycloroquine, please advise

## 2020-04-01 NOTE — TELEPHONE ENCOUNTER
Call patient and reviewed the rash symptoms that she had  Mildly pruritic rash across the chest and upper back  Denies any rashes of or lesions in the eye, mouth, vaginal region, buttocks.  No diarrhea or gastrointestinal symptoms  Denies any fevers, chills, sweats  Denies any lip swelling, tongue swelling, shortness of breath, wheezing, vomiting  Suspect this may be related to either a drug eruption rash, a viral exanthem,  Low suspicion for AGEP SJS/TEN with both will need to keep a close eye on   discontinue the hydroxychloroquine for now and monitor the symptoms

## 2020-04-02 ENCOUNTER — PATIENT MESSAGE (OUTPATIENT)
Dept: ORTHOPEDICS | Facility: CLINIC | Age: 65
End: 2020-04-02

## 2020-04-02 ENCOUNTER — PATIENT OUTREACH (OUTPATIENT)
Dept: ADMINISTRATIVE | Facility: CLINIC | Age: 65
End: 2020-04-02

## 2020-04-02 ENCOUNTER — SSC ENCOUNTER (OUTPATIENT)
Dept: ADMINISTRATIVE | Facility: OTHER | Age: 65
End: 2020-04-02

## 2020-04-02 NOTE — PROGRESS NOTES
Please note the following patient's information was forwarded to [facility] (N) for case management and/or  on 4/2/2020.    Please contact Ext. [phone] with any questions.    Thank you,    Alicia Huang, [facility]  Outpatient Case Mgmnt  [phone]

## 2020-04-02 NOTE — PATIENT INSTRUCTIONS
Fever Control (Adult)  A fever is a natural reaction of the body to an illness. In most cases, the temperature itself is not harmful. It actually helps the body fight infections. A fever does not need to be treated unless you feel very uncomfortable.   Home Care  If you feel warm, check your temperature.  If you feel very uncomfortable and your temperature is at or higher than 100.4ºF (38ºC) oral, you may take acetaminophen (Tylenol) every 4 to 6 hours. If you cant take or keep down oral medicine, ask your pharmacist for Tylenol suppositories, which you can get without a prescription. If the fever does not respond to acetaminophen within 1 hour, take ibuprofen (Advil or Motrin). If this works, keep taking the ibuprofen every 6 to 8 hours. Note: If you have chronic liver or kidney disease or ever had a stomach ulcer or GI bleeding, talk with your doctor before using these medications.  If either medication alone does not keep the fever down, you may alternate the two medicines every 3 to 4 hours, only if your healthcare provider has instructed you to do so. For example, take Motrin then wait 3 hours, take Tylenol then wait 3 hours, take Motrin, and so on. Follow your healthcare providers instructions exactly.  Clothing: Keep clothing light because excess body heat is lost through the skin. The fever will go up if you wear extra layers or wrap in blankets.  Fluids: Fever causes the body to lose water through evaporation. Drink plenty of fluids such as water, juice, clear sodas, ginger ale, or lemonade.  Do not use aspirin in anyone under 18 years of age who is ill with a fever. It can cause severe liver damage.  Follow Up  with your doctor or as advised by our staff if you do not get better after 48 hours.  Get Prompt Medical Attention  if any of the following occur:  Fever does not get better after taking fever medication  Fast or difficult breathing  Earache, sinus pain, stiff or painful neck, headache, repeated  "diarrhea or vomiting  You feel unusually irritable, drowsy, or confused  A rash appears  You feel weak or dizzy, or that you might faint  © 1406-6587 [employer], [address] [facility], [place] [address]. All rights reserved. This information is not intended as a substitute for professional medical care. Always follow your healthcare professional's instructions.     Dyspnea (Shortness Of Breath)  Shortness of Breath (also known as "Dyspnea") is the sense that you can't catch your breath or can't get enough air.  Dyspnea can be caused by many different conditions such as:  Acute asthma attack  Worsening of emphysema (also called "COPD") -- a lung disease that is caused by smoking  A mucus plug blocks a large air passage in the lung -- this can occur with emphysema or chronic bronchitis  Congestive Heart Failure ("CHF") -- when a weak heart muscle allows excess fluid to collect in the lungs  Panic attacks, anxiety -- fear can cause rapid breathing ("hyperventilation")  Pneumonia -- infection in the lung tissue  Exposure to toxic fumes or smoke  Pulmonary embolus (blood clot to the lung)  Based on your visit today, the exact cause of your shortness of breath is not certain. Your tests do not show any of the serious causes of dyspnea. Sometimes, further testing is needed to find out if a serious problem exists. Therefore, it is important for you to watch for any new symptoms or worsening of your condition and follow up with your doctor as directed.  Home Care:  When your symptoms are better, resume your usual activities.  If you smoke, you need to stop. Join a stop-smoking program or ask your doctor for help.  Follow Up  with your doctor or as advised by our staff.  Get Prompt Medical Attention  if any of the following occur:  Increasing shortness of breath or wheezing  Redness, pain or swelling in one leg  Swelling in both legs or ankles  Unexpected weight gain  Chest, arm, shoulder, neck or upper back " pain  Dizziness, weakness or fainting  Palpitations (the sense that your heart is fluttering, beating fast or hard)  Fever of 100.4ºF (38ºC) or higher, or as directed by your healthcare provider  Cough with dark colored or bloody sputum (mucus)  © 6675-2763 [employer], [address] [place] [address], [place] [address]. All rights reserved. This information is not intended as a substitute for professional medical care. Always follow your healthcare professional's instructions.

## 2020-04-02 NOTE — PROGRESS NOTES
Please forward this important TCC information to your provider in order to maximize the post discharge care delivery of this patient.    C3 nurse spoke with Silvana Quezada'  for a TCC post hospital discharge follow up call. The patient has attended a HOSPFU with Dr. Avila on 3/31/20.  Respectfully,  Sierra Mishra, RN  [facility]    [email or web address]       Please do not reply to this message, as this inbox is not routinely monitored.

## 2020-04-05 ENCOUNTER — PATIENT MESSAGE (OUTPATIENT)
Dept: INTERNAL MEDICINE | Facility: CLINIC | Age: 65
End: 2020-04-05

## 2020-04-08 ENCOUNTER — NURSE TRIAGE (OUTPATIENT)
Dept: ADMINISTRATIVE | Facility: CLINIC | Age: 65
End: 2020-04-08

## 2020-04-09 ENCOUNTER — OFFICE VISIT (OUTPATIENT)
Dept: INTERNAL MEDICINE | Facility: CLINIC | Age: 65
End: 2020-04-09
Payer: MEDICARE

## 2020-04-09 ENCOUNTER — NURSE TRIAGE (OUTPATIENT)
Dept: ADMINISTRATIVE | Facility: CLINIC | Age: 65
End: 2020-04-09

## 2020-04-09 ENCOUNTER — TELEPHONE (OUTPATIENT)
Dept: INTERNAL MEDICINE | Facility: CLINIC | Age: 65
End: 2020-04-09

## 2020-04-09 DIAGNOSIS — R06.02 SOB (SHORTNESS OF BREATH): Primary | ICD-10-CM

## 2020-04-09 DIAGNOSIS — U07.1 COVID-19 VIRUS INFECTION: ICD-10-CM

## 2020-04-09 PROCEDURE — 99214 OFFICE O/P EST MOD 30 MIN: CPT | Mod: 95,,, | Performed by: NURSE PRACTITIONER

## 2020-04-09 PROCEDURE — 99214 PR OFFICE/OUTPT VISIT, EST, LEVL IV, 30-39 MIN: ICD-10-PCS | Mod: 95,,, | Performed by: NURSE PRACTITIONER

## 2020-04-09 NOTE — TELEPHONE ENCOUNTER
It is OK to take a benadryl 25mg per Dr. Larry mckenna secure chat. Called and told . He will respond to his call from symptoms line or call to request a virtual to visualize the rash.

## 2020-04-09 NOTE — TELEPHONE ENCOUNTER
"Patient came through drive up [employer] drive through station.  79 HR, 96% Pulse ox, using BSC in room to keep from having to walk too far, had rash to lower and mid abdomen, neck and underarms this a.m. Was advised to 'take benadryl" and now the rash is gone. Advised patient to stay hydrated, drink cranberry juice, urine is orange, no odor noticed, rash is completely gone now.  Last fever was March 26-29, no fever since then, no chest pain no SOB, even though patient states, I feel like I need glo oxygen, advised to continued hydrating, eating when she can and rest.  No distress noted.   "

## 2020-04-09 NOTE — TELEPHONE ENCOUNTER
96% p 78 T.97   Patient with rash under breast and underarms. Also noted in groin area. Slightly itchy and red.  Patient has changed her clothes to evaluate.   Paged Dr. Juarez and sent secure chat.  Called back to check on patient.  states he put some cornstarch and it has relieved her for now.    Reason for Disposition   COVID-19, questions about    Additional Information   Negative: SEVERE difficulty breathing (e.g., struggling for each breath, speaks in single words)   Negative: Difficult to awaken or acting confused (e.g., disoriented, slurred speech)   Negative: Bluish (or gray) lips or face now   Negative: Shock suspected (e.g., cold/pale/clammy skin, too weak to stand, low BP, rapid pulse)   Negative: Sounds like a life-threatening emergency to the triager   Negative: [1] COVID-19 suspected (e.g., cough, fever, shortness of breath) AND [2] public health department recommends testing   Negative: [1] COVID-19 exposure AND [2] no symptoms   Negative: COVID-19 and Breastfeeding, questions about   Negative: SEVERE or constant chest pain (Exception: mild central chest pain, present only when coughing)   Negative: MODERATE difficulty breathing (e.g., speaks in phrases, SOB even at rest, pulse 100-120)   Negative: Patient sounds very sick or weak to the triager   Negative: MILD difficulty breathing (e.g., minimal/no SOB at rest, SOB with walking, pulse <100)   Negative: Chest pain   Negative: Fever > 103 F (39.4 C)   Negative: [1] Fever > 101 F (38.3 C) AND [2] age > 60   Negative: [1] Fever > 100.0 F (37.8 C) AND [2] bedridden (e.g., nursing home patient, CVA, chronic illness, recovering from surgery)   Negative: HIGH RISK patient (e.g., age > 64 years, diabetes, heart or lung disease, weak immune system)   Negative: Fever present > 3 days (72 hours)   Negative: [1] Fever returns after gone for over 24 hours AND [2] symptoms worse or not improved   Negative: [1] Continuous (nonstop)  coughing interferes with work or school AND [2] no improvement using cough treatment per protocol   Negative: Cough present > 3 weeks   Negative: 1] COVID-19 infection diagnosed or suspected AND [2] mild symptoms (fever, cough) AND [2] no trouble breathing or other complications    Protocols used: CORONAVIRUS (COVID-19) DIAGNOSED OR [id number]

## 2020-04-09 NOTE — TELEPHONE ENCOUNTER
Pt called on COVID Symptom Tracker COVID positive experiencing rash and worsening SOB. Refereed for virtual visit.    Reason for Disposition   [1] Fever returns after gone for over 24 hours AND [2] symptoms worse or not improved    Additional Information   Negative: SEVERE difficulty breathing (e.g., struggling for each breath, speaks in single words)   Negative: Fever > 103 F (39.4 C)   Negative: [1] Fever > 101 F (38.3 C) AND [2] age > 60   Negative: [1] Fever > 100.0 F (37.8 C) AND [2] bedridden (e.g., nursing home patient, CVA, chronic illness, recovering from surgery)   Negative: HIGH RISK patient (e.g., age > 64 years, diabetes, heart or lung disease, weak immune system)   Negative: MILD difficulty breathing (e.g., minimal/no SOB at rest, SOB with walking, pulse <100)   Negative: Chest pain   Negative: Patient sounds very sick or weak to the triager   Negative: SEVERE or constant chest pain (Exception: mild central chest pain, present only when coughing)   Negative: MODERATE difficulty breathing (e.g., speaks in phrases, SOB even at rest, pulse 100-120)   Negative: [1] COVID-19 suspected (e.g., cough, fever, shortness of breath) AND [2] public health department recommends testing   Negative: [1] COVID-19 exposure AND [2] no symptoms   Negative: COVID-19 and Breastfeeding, questions about   Negative: Difficult to awaken or acting confused (e.g., disoriented, slurred speech)   Negative: Bluish (or gray) lips or face now   Negative: Shock suspected (e.g., cold/pale/clammy skin, too weak to stand, low BP, rapid pulse)   Negative: Sounds like a life-threatening emergency to the triager   Negative: Fever present > 3 days (72 hours)    Protocols used: CORONAVIRUS (COVID-19) DIAGNOSED OR [id number]

## 2020-04-09 NOTE — PROGRESS NOTES
Subjective:    The patient location is: [place], [place].  The chief complaint leading to consultation is: worsening SOB, mostly at night, COVID+  Visit type: Virtual visit with synchronous audio and video  Total time spent with patient: 15 min  Each patient to whom he or she provides medical services by telemedicine is:  (1) informed of the relationship between the physician and patient and the respective role of any other health care provider with respect to management of the patient; and (2) notified that he or she may decline to receive medical services by telemedicine and may withdraw from such care at any time.       Patient ID: Silvana Quezada is a 64 y.o. female.    Chief Complaint: No chief complaint on file.    HPI   This is a 63 y/o female patient of Dr. Avila's with positive COVID-19 who is new to me and presents with c/o worsening sob, worse at night. Had rash that developed yesterday to her axilla, under breasts and groin areas, pt spoke with on-call last night who instructed to take a benadryl. Reports this am notice rash has resolved.  Patient's  stated sats highest yesterday was 94%.  Patient states that she is very concerned of her breathing ability.    Review of Systems   Constitutional: Positive for fatigue. Negative for fever.   Respiratory: Positive for shortness of breath.    Cardiovascular: Negative for chest pain.   Gastrointestinal: Negative for nausea.   Musculoskeletal: Positive for myalgias.   Neurological: Positive for weakness. Negative for headaches.         Objective:   There were no vitals filed for this visit.       Physical Exam   Constitutional: She is oriented to person, place, and time. She appears well-developed and well-nourished. No distress.   HENT:   Head: Normocephalic.   Eyes: Conjunctivae and EOM are normal.   Neck: Normal range of motion. Neck supple.   Cardiovascular:   No complaints of chest pain or signs and symptoms of distress noted   Pulmonary/Chest: Effort  normal. No respiratory distress.   Patient winded with conversation   Musculoskeletal: Normal range of motion.   Neurological: She is alert and oriented to person, place, and time.   Skin: Skin is warm and dry. She is not diaphoretic.   Psychiatric: She has a normal mood and affect. Her behavior is normal. Judgment and thought content normal.       Assessment:       1. SOB (shortness of breath)    2. COVID-19 virus infection        Plan:     will order vital sign and O2 sat check at the drive-through.  Patient may require home oxygen

## 2020-04-13 ENCOUNTER — OFFICE VISIT (OUTPATIENT)
Dept: INTERNAL MEDICINE | Facility: CLINIC | Age: 65
End: 2020-04-13
Payer: MEDICARE

## 2020-04-13 DIAGNOSIS — U07.1 COVID-19 VIRUS INFECTION: Primary | ICD-10-CM

## 2020-04-13 PROCEDURE — 3288F FALL RISK ASSESSMENT DOCD: CPT | Mod: CPTII,,, | Performed by: INTERNAL MEDICINE

## 2020-04-13 PROCEDURE — 1100F PTFALLS ASSESS-DOCD GE2>/YR: CPT | Mod: CPTII,,, | Performed by: INTERNAL MEDICINE

## 2020-04-13 PROCEDURE — 99213 PR OFFICE/OUTPT VISIT, EST, LEVL III, 20-29 MIN: ICD-10-PCS | Mod: 95,,, | Performed by: INTERNAL MEDICINE

## 2020-04-13 PROCEDURE — 1100F PR PT FALLS ASSESS DOC 2+ FALLS/FALL W/INJURY/YR: ICD-10-PCS | Mod: CPTII,,, | Performed by: INTERNAL MEDICINE

## 2020-04-13 PROCEDURE — 3288F PR FALLS RISK ASSESSMENT DOCUMENTED: ICD-10-PCS | Mod: CPTII,,, | Performed by: INTERNAL MEDICINE

## 2020-04-13 PROCEDURE — 99213 OFFICE O/P EST LOW 20 MIN: CPT | Mod: 95,,, | Performed by: INTERNAL MEDICINE

## 2020-04-13 NOTE — PROGRESS NOTES
Subjective:       Patient ID: Silvana Quezada is a 64 y.o. female.    Chief Complaint: No chief complaint on file.  The patient location is: home in [place]  The chief complaint leading to consultation is: concerned about oxygen sats at night  Visit type: Virtual visit with synchronous audio and video  Total time spent with patient: 12 minutes  Each patient to whom he or she provides medical services by telemedicine is:  (1) informed of the relationship between the physician and patient and the respective role of any other health care provider with respect to management of the patient; and (2) notified that he or she may decline to receive medical services by telemedicine and may withdraw from such care at any time.    Notes:   Patient new to me presented accompanied by  for a virtual visit.  She was hospitalized on 3/26/20 for 3 days and diagnosed with COVID-19.   monitors the oxygen sats with a finger tip pulseox device.  During the day, the patient's o2 sats are good, meaning over 92 easily.  But at night when patient is in a deep sleep with no apparent respiratory difficulty, the  will check the O2 sats and gets 89.  He was concerned.      At those times the patient is not having any respiratory problems, no shortness of breath, she is in a sound sleep.  I've discussed with  and patient that when we are in a sound, deep sleep with only tidal breathing, then the O2 sats will drop below 90 for most of us.  If the patient is not having any respiratory difficulty at those times, then there is nothing to be alarmed about.    Otherwise, she feels tired and weak.  Appetite is low, but they are doing small snacks between meals.  I supported that.  Also, suggested that they may want to add something like Boost or Ensure as a snack during the day.  Just a way to get calories in.      Review of labs from when patient was hospitalized shows that she is mildly anemic.  I've discussed this with  them in light of her fatigue factor.  Encouraged eating 5 small meals a day and drinking plenty of water.  She has not had any fever since 3/29/20.  No cough, no shortness of breath.  HPI  Review of Systems   Constitutional: Positive for fatigue. Negative for chills and fever.   HENT: Negative for congestion, postnasal drip, rhinorrhea, sinus pressure, sinus pain and sore throat.    Respiratory: Negative for cough, chest tightness, shortness of breath and wheezing.    Cardiovascular: Negative for chest pain, palpitations and leg swelling.   Gastrointestinal: Negative for abdominal distention, abdominal pain, diarrhea, nausea and vomiting.   Genitourinary: Negative.    Musculoskeletal: Negative for back pain and myalgias.   Skin: Negative for rash.   Neurological: Negative for dizziness, light-headedness and headaches.       Objective:      Physical Exam    Assessment:       1. COVID-19 virus infection        Plan:     1. After discussion, have recommended 5 small meals a day with a can of Boost/Ensure for a snack.  Plenty of water.  2. Continue any regular med.  3. Frequent rest periods.  4. Followup with regular PCP as needed.

## 2020-04-28 NOTE — PROGRESS NOTES
Subjective:    The patient location is: [place], [place]  The chief complaint leading to consultation is: f/u of COVID-19   Visit type: audiovisual  Total time spent with patient: 2:32-2:48    Each patient to whom he or she provides medical services by telemedicine is:  (1) informed of the relationship between the physician and patient and the respective role of any other health care provider with respect to management of the patient; and (2) notified that he or she may decline to receive medical services by telemedicine and may withdraw from such care at any time.     Patient ID: Silvana Quezada is a 64 y.o. female.    Chief Complaint: Fatigue      Fatigue    Patient reports that she has had some fatigue symptoms ever since her injection she is not able to walk as far she once was, roughly a mild previously, now just 3 or 4 blocks before she gets tired.  She does not have any shortness of breath or chest pain.  No claudication symptoms that stops her from walking.  She just feels overall weak.  She has not had any fevers.  Vitals were checked yesterday when she felt some mild shortness of breath but oxygen levels were 96%, heart rates in the 60s, blood pressure within normal limits, no fevers noted at that time.  Otherwise she denies any nausea, vomiting, diarrhea.  She has not had any melena or hematochezia.          HPI  Review of Systems   Constitutional:        See hpi     All other systems reviewed and are negative.      Objective:           Physical Exam   Constitutional: She is oriented to person, place, and time. She appears well-developed and well-nourished. No distress.   HENT:   Head: Normocephalic.   Pulmonary/Chest: Effort normal. No respiratory distress.   Abdominal: Soft.   Neurological: She is alert and oriented to person, place, and time.   Skin: She is not diaphoretic.   Psychiatric: She has a normal mood and affect.   Nursing note and vitals reviewed.      Assessment:       1. Fatigue, unspecified type     2. COVID-19 virus infection        Plan:       Silvana was seen today for fatigue.    Diagnoses and all orders for this visit:    Fatigue, unspecified type  Fatigue symptoms likely related to recovery from the corona virus infection  No vital sign instability  No signs of secondary infection  She seems to be progressing nicely in her recovery overall  Updated on signs and symptoms to monitor    COVID-19 virus infection  Positive corona virus infection in the end of March  Hospitalization was short with mild luminal O2 therapy  Has been recovering at home with home monitoring equipment i.e. pulse ox, per monitored  Her so recovered since to be going well  Return in 3 months for follow-up visit            Disclarmier:  This note has been generated using voice-recognition software. There may be grammatical or spelling errors that have been missed during proof-reading

## 2020-04-29 ENCOUNTER — OFFICE VISIT (OUTPATIENT)
Dept: INTERNAL MEDICINE | Facility: CLINIC | Age: 65
End: 2020-04-29
Payer: MEDICARE

## 2020-04-29 DIAGNOSIS — R53.83 FATIGUE, UNSPECIFIED TYPE: Primary | ICD-10-CM

## 2020-04-29 DIAGNOSIS — U07.1 COVID-19 VIRUS INFECTION: ICD-10-CM

## 2020-04-29 PROCEDURE — 1100F PR PT FALLS ASSESS DOC 2+ FALLS/FALL W/INJURY/YR: ICD-10-PCS | Mod: CPTII,95,, | Performed by: INTERNAL MEDICINE

## 2020-04-29 PROCEDURE — 99213 OFFICE O/P EST LOW 20 MIN: CPT | Mod: 95,,, | Performed by: INTERNAL MEDICINE

## 2020-04-29 PROCEDURE — 3288F FALL RISK ASSESSMENT DOCD: CPT | Mod: CPTII,95,, | Performed by: INTERNAL MEDICINE

## 2020-04-29 PROCEDURE — 99213 PR OFFICE/OUTPT VISIT, EST, LEVL III, 20-29 MIN: ICD-10-PCS | Mod: 95,,, | Performed by: INTERNAL MEDICINE

## 2020-04-29 PROCEDURE — 3288F PR FALLS RISK ASSESSMENT DOCUMENTED: ICD-10-PCS | Mod: CPTII,95,, | Performed by: INTERNAL MEDICINE

## 2020-04-29 PROCEDURE — 1100F PTFALLS ASSESS-DOCD GE2>/YR: CPT | Mod: CPTII,95,, | Performed by: INTERNAL MEDICINE

## 2020-05-07 ENCOUNTER — TELEPHONE (OUTPATIENT)
Dept: REHABILITATION | Facility: HOSPITAL | Age: 65
End: 2020-05-07

## 2020-05-07 ENCOUNTER — TELEPHONE (OUTPATIENT)
Dept: UROLOGY | Facility: CLINIC | Age: 65
End: 2020-05-07

## 2020-05-07 NOTE — TELEPHONE ENCOUNTER
Resume Appointments    Patient: Silvana Quezada  Date: 5/7/2020  MRN: [id number]    Called pt on behalf of treating therapist, Iwona.  Spoke with patient following updates regarding COVID-19.  Offered resuming in person therapy visits.  Pt agreeable.  will call to schedule.     5/7/2020  Xi Keen CCC-SLP

## 2020-05-07 NOTE — TELEPHONE ENCOUNTER
Left message that she changed to [facility] and she was given an updated rx in January 2020

## 2020-05-07 NOTE — TELEPHONE ENCOUNTER
----- Message from Salas Juarez sent at 5/7/2020  1:03 PM CDT -----  Contact: Leanne ([facility]) [phone]  Leanne called to request a medical necessity,orders, and clinical notes to e faxed. The fax number is [phone].

## 2020-05-11 ENCOUNTER — TELEPHONE (OUTPATIENT)
Dept: UROLOGY | Facility: CLINIC | Age: 65
End: 2020-05-11

## 2020-05-12 ENCOUNTER — OFFICE VISIT (OUTPATIENT)
Dept: UROLOGY | Facility: CLINIC | Age: 65
End: 2020-05-12
Payer: MEDICARE

## 2020-05-12 DIAGNOSIS — N39.41 URGE INCONTINENCE: ICD-10-CM

## 2020-05-12 DIAGNOSIS — N31.9 NEUROGENIC BLADDER: ICD-10-CM

## 2020-05-12 DIAGNOSIS — G35 MS (MULTIPLE SCLEROSIS): ICD-10-CM

## 2020-05-12 DIAGNOSIS — N30.00 ACUTE CYSTITIS WITHOUT HEMATURIA: Primary | ICD-10-CM

## 2020-05-12 PROCEDURE — 1100F PR PT FALLS ASSESS DOC 2+ FALLS/FALL W/INJURY/YR: ICD-10-PCS | Mod: CPTII,95,, | Performed by: UROLOGY

## 2020-05-12 PROCEDURE — 99214 PR OFFICE/OUTPT VISIT, EST, LEVL IV, 30-39 MIN: ICD-10-PCS | Mod: 95,,, | Performed by: UROLOGY

## 2020-05-12 PROCEDURE — 3288F FALL RISK ASSESSMENT DOCD: CPT | Mod: CPTII,95,, | Performed by: UROLOGY

## 2020-05-12 PROCEDURE — 3288F PR FALLS RISK ASSESSMENT DOCUMENTED: ICD-10-PCS | Mod: CPTII,95,, | Performed by: UROLOGY

## 2020-05-12 PROCEDURE — 1100F PTFALLS ASSESS-DOCD GE2>/YR: CPT | Mod: CPTII,95,, | Performed by: UROLOGY

## 2020-05-12 PROCEDURE — 99214 OFFICE O/P EST MOD 30 MIN: CPT | Mod: 95,,, | Performed by: UROLOGY

## 2020-05-12 NOTE — PROGRESS NOTES
The patient location is: home  The chief complaint leading to consultation is: urinary incontinence  Visit type: audiovisual  Total time spent with patient: 13 mins  Each patient to whom he or she provides medical services by telemedicine is:  (1) informed of the relationship between the physician and patient and the respective role of any other health care provider with respect to management of the patient; and (2) notified that he or she may decline to receive medical services by telemedicine and may withdraw from such care at any time.    Notes:   CHIEF COMPLAINT:    Mrs Quezada is a 64 y.o. female presenting for recurrent urinary incontinence.  PRESENTING ILLNESS:    Silvana Quezada is a 64 y.o. female with a PMH of MS, neurogenic bladder who presents for urine culture prior to cysto with botox injection.   C/o urinary incontinence at night.  Has been doing CIC 4 x a day and she does void in-between catheterization.    Last botox injection 12/19/19  Procedure(s) Performed:   1.  Cystoscopy with bladder botox injection   Findings:   1. Post-void residual 250 mL  2. Cystoscopy significant for moderate trabeculations.  3. 100 U Botox injected in 10 mL throughout bladder detrusor. Good wheals raised.     Patient voids on her own and performs CIC at home 4 times per day. She stays dry between catheterizations. Botox injections keep frequency, urgency and nocturia under control. Denies dysuria, hematuria or flank pain. Denies fever or chills. Denies complaints today in clinic.      REVIEW OF SYSTEMS:    Review of Systems    Constitutional: Negative for fever and chills.   HENT: Negative for hearing loss.   Eyes: Wears glasses. Negative for eye discharge.   Respiratory: Negative for shortness of breath.   Cardiovascular: Negative for chest pain.   Gastrointestinal: Negative for nausea, vomiting.   Genitourinary:  See HPI   Neurological: Negative for dizziness.   Hematological: Does not bruise/bleed easily.    Psychiatric/Behavioral: Negative for confusion.     PATIENT HISTORY:    Past Medical History:   Diagnosis Date    Bipolar 1 disorder     Breast cancer     right    Breast cyst     Closed fracture of proximal end of right humerus 3/9/2015    Depression     History of right shoulder fracture     MS (multiple sclerosis)     presented as dizzines, dx vision,     Osteoporosis, unspecified     Pneumonia 3/27/2020       Past Surgical History:   Procedure Laterality Date    BRAIN SURGERY  1991    BREAST BIOPSY Left 2009    core    BREAST LUMPECTOMY Right 2001    CYSTOSCOPY N/A 7/11/2018    Procedure: CYSTOSCOPY;  Surgeon: Jayesh Ross MD;  Location: [address];  Service: Urology;  Laterality: N/A;  30 min    CYSTOSCOPY N/A 4/10/2019    Procedure: CYSTOSCOPY;  Surgeon: Jayesh Ross MD;  Location: [address];  Service: Urology;  Laterality: N/A;  30 MIN    FOOT SURGERY  october 2013    HYSTERECTOMY      INJECTION OF BOTULINUM TOXIN TYPE A N/A 7/11/2018    Procedure: INJECTION, BOTULINUM TOXIN, TYPE A 200 UNITS;  Surgeon: Jayesh Ross MD;  Location: [address];  Service: Urology;  Laterality: N/A;    INJECTION OF BOTULINUM TOXIN TYPE A N/A 4/10/2019    Procedure: INJECTION, BOTULINUM TOXIN, TYPE A 200 UNITS;  Surgeon: Jayesh Ross MD;  Location: [address];  Service: Urology;  Laterality: N/A;    OPEN REDUCTION AND INTERNAL FIXATION (ORIF) OF INJURY OF WRIST Left 12/27/2019    Procedure: ORIF, WRIST;  Surgeon: [place] Schroeder Jr., MD;  Location: [facility];  Service: Orthopedics;  Laterality: Left;  ACUMED/ MINI C ARM [facility] notified/ Rk confirmed here in [facility] with Lacie 12/23/19 KB [id number]    TONSILLECTOMY         Family History   Problem Relation Age of Onset    Skin cancer Mother     Breast cancer Mother     Stroke Mother     Emphysema Father     Breast cancer Maternal Aunt     Dementia Brother     Diabetes Brother     No Known Problems Daughter      Autism Son     No Known Problems Brother     Autism Daughter        Social History     Socioeconomic History    Marital status:      Spouse name: Not on file    Number of children: Not on file    Years of education: Not on file    Highest education level: Not on file   Occupational History    Occupation: [employer]   Social Needs    Financial resource strain: Not on file    Food insecurity:     Worry: Not on file     Inability: Not on file    Transportation needs:     Medical: Not on file     Non-medical: Not on file   Tobacco Use    Smoking status: Never Smoker    Smokeless tobacco: Never Used   Substance and Sexual Activity    Alcohol use: Yes     Alcohol/week: 1.7 standard drinks     Types: 2 Standard drinks or equivalent per week     Comment: occasionally    Drug use: No    Sexual activity: Yes     Partners: Male   Lifestyle    Physical activity:     Days per week: Not on file     Minutes per session: Not on file    Stress: Not on file   Relationships    Social connections:     Talks on phone: Not on file     Gets together: Not on file     Attends [employer] service: Not on file     Active member of club or organization: Not on file     Attends meetings of clubs or organizations: Not on file     Relationship status: Not on file   Other Topics Concern    Are you pregnant or think you may be? 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, 25 children 2 daughter        Allergies:  Adhesive; Keflex [cephalexin]; and Cephalosporins    Medications:    Current Outpatient Medications:     albuterol (PROVENTIL/VENTOLIN HFA) 90 mcg/actuation inhaler, Inhale 1-2 puffs into the lungs every 6 (six) hours as needed for Wheezing. Rescue, Disp: 8 g, Rfl: 0    alendronate (FOSAMAX) 70 MG tablet, Take 1 tablet (70 mg total) by mouth every 7 days., Disp: 26 tablet, Rfl: 0     benzonatate (TESSALON PERLES) 100 MG capsule, Take 1 capsule (100 mg total) by mouth 3 (three) times daily as needed for Cough., Disp: 30 capsule, Rfl: 0    buPROPion (WELLBUTRIN SR) 100 MG TBSR 12 hr tablet, Take 2 tablets (200 mg total) by mouth every morning., Disp: 180 tablet, Rfl: 1    buPROPion (WELLBUTRIN SR) 150 MG TBSR 12 hr tablet, Take 1 tablet (150 mg total) by mouth every evening., Disp: 90 tablet, Rfl: 1    calcium-vitamin D3 (CALCIUM 500 + D) 500 mg(1,250mg) -200 unit per tablet, Take 1 tablet by mouth 2 (two) times daily with meals., Disp: , Rfl:     catheter (FEMALE CATHETER) 14 Fr Misc, Patient is to self catheterize four times a day indefinitley using female 12 fr in and out catheter for incomplete bladder emptying.  Dispense 120 month with 11 refills or 360 every 3 months with 3 refills depending on patient's preference, Disp: 120 each, Rfl: 11    fluphenazine (PROLIXIN) 1 MG tablet, TAKE 2 TABLETS NIGHTLY, Disp: 180 tablet, Rfl: 1    folic acid (FOLVITE) 1 MG tablet, Take 1 tablet (1,000 mcg total) by mouth once daily., Disp: 90 tablet, Rfl: 1    inhalation spacing device, Use as directed for inhalation., Disp: 1 each, Rfl: 0    ondansetron (ZOFRAN-ODT) 4 MG TbDL, Take 1 tablet (4 mg total) by mouth every 8 (eight) hours as needed., Disp: 12 tablet, Rfl: 0    OXcarbazepine (TRILEPTAL) 150 MG Tab, Take 1 tablet (150 mg total) by mouth 2 (two) times daily., Disp: 180 tablet, Rfl: 2    PHYSICAL EXAMINATION:  n/a    LABS:    Lab Results   Component Value Date    CREATININE 0.7 03/29/2020       IMPRESSION:    Encounter Diagnoses   Name Primary?    Acute cystitis without hematuria Yes    Urge incontinence     Neurogenic bladder     MS (multiple sclerosis)        PLAN:  - urine specimen sent for home collect urine culture  -schedule cysto botox injection 200 units  -Continue CIC 4 x per day.    She had positive COVID-19 back in March. She is asymptomatic at present time.    Follow up cysto  botox injection 200 units in June..  I spent 25 minutes with the patient of which more than half was spent in coordinating the patient's care as well as in direct consultation with the patient in regards to our treatment and plan.

## 2020-05-13 ENCOUNTER — LAB VISIT (OUTPATIENT)
Dept: LAB | Facility: HOSPITAL | Age: 65
End: 2020-05-13
Payer: MEDICARE

## 2020-05-13 ENCOUNTER — OFFICE VISIT (OUTPATIENT)
Dept: NEUROLOGY | Facility: CLINIC | Age: 65
End: 2020-05-13
Payer: MEDICARE

## 2020-05-13 VITALS
WEIGHT: 169.75 LBS | DIASTOLIC BLOOD PRESSURE: 80 MMHG | SYSTOLIC BLOOD PRESSURE: 123 MMHG | HEART RATE: 77 BPM | BODY MASS INDEX: 30.08 KG/M2 | HEIGHT: 63 IN

## 2020-05-13 DIAGNOSIS — E55.9 VITAMIN D INSUFFICIENCY: ICD-10-CM

## 2020-05-13 DIAGNOSIS — R53.82 CHRONIC FATIGUE: ICD-10-CM

## 2020-05-13 DIAGNOSIS — Z71.89 COUNSELING REGARDING GOALS OF CARE: ICD-10-CM

## 2020-05-13 DIAGNOSIS — R26.9 UNSPECIFIED ABNORMALITIES OF GAIT AND MOBILITY: ICD-10-CM

## 2020-05-13 DIAGNOSIS — F31.32 BIPOLAR I DISORDER, MOST RECENT EPISODE DEPRESSED, MODERATE: ICD-10-CM

## 2020-05-13 DIAGNOSIS — N31.9 NEUROGENIC BLADDER: ICD-10-CM

## 2020-05-13 DIAGNOSIS — G35 MULTIPLE SCLEROSIS: Primary | ICD-10-CM

## 2020-05-13 DIAGNOSIS — Z85.3 HISTORY OF BREAST CANCER: ICD-10-CM

## 2020-05-13 DIAGNOSIS — G35 MULTIPLE SCLEROSIS: ICD-10-CM

## 2020-05-13 LAB
25(OH)D3+25(OH)D2 SERPL-MCNC: 42 NG/ML (ref 30–96)
ALBUMIN SERPL BCP-MCNC: 3.9 G/DL (ref 3.5–5.2)
ALP SERPL-CCNC: 58 U/L (ref 55–135)
ALT SERPL W/O P-5'-P-CCNC: 16 U/L (ref 10–44)
ANION GAP SERPL CALC-SCNC: 8 MMOL/L (ref 8–16)
AST SERPL-CCNC: 15 U/L (ref 10–40)
BASOPHILS # BLD AUTO: 0.02 K/UL (ref 0–0.2)
BASOPHILS NFR BLD: 0.5 % (ref 0–1.9)
BILIRUB SERPL-MCNC: 0.3 MG/DL (ref 0.1–1)
BUN SERPL-MCNC: 17 MG/DL (ref 8–23)
CALCIUM SERPL-MCNC: 9.5 MG/DL (ref 8.7–10.5)
CHLORIDE SERPL-SCNC: 107 MMOL/L (ref 95–110)
CO2 SERPL-SCNC: 28 MMOL/L (ref 23–29)
CREAT SERPL-MCNC: 0.9 MG/DL (ref 0.5–1.4)
DIFFERENTIAL METHOD: ABNORMAL
EOSINOPHIL # BLD AUTO: 0.1 K/UL (ref 0–0.5)
EOSINOPHIL NFR BLD: 2.4 % (ref 0–8)
ERYTHROCYTE [DISTWIDTH] IN BLOOD BY AUTOMATED COUNT: 13.4 % (ref 11.5–14.5)
EST. GFR  (AFRICAN AMERICAN): >60 ML/MIN/1.73 M^2
EST. GFR  (NON AFRICAN AMERICAN): >60 ML/MIN/1.73 M^2
GLUCOSE SERPL-MCNC: 90 MG/DL (ref 70–110)
HCT VFR BLD AUTO: 40.4 % (ref 37–48.5)
HGB BLD-MCNC: 12.7 G/DL (ref 12–16)
IMM GRANULOCYTES # BLD AUTO: 0.01 K/UL (ref 0–0.04)
IMM GRANULOCYTES NFR BLD AUTO: 0.3 % (ref 0–0.5)
LYMPHOCYTES # BLD AUTO: 1.4 K/UL (ref 1–4.8)
LYMPHOCYTES NFR BLD: 35.5 % (ref 18–48)
MCH RBC QN AUTO: 30.2 PG (ref 27–31)
MCHC RBC AUTO-ENTMCNC: 31.4 G/DL (ref 32–36)
MCV RBC AUTO: 96 FL (ref 82–98)
MONOCYTES # BLD AUTO: 0.4 K/UL (ref 0.3–1)
MONOCYTES NFR BLD: 9.5 % (ref 4–15)
NEUTROPHILS # BLD AUTO: 2 K/UL (ref 1.8–7.7)
NEUTROPHILS NFR BLD: 51.8 % (ref 38–73)
NRBC BLD-RTO: 0 /100 WBC
PLATELET # BLD AUTO: 151 K/UL (ref 150–350)
PMV BLD AUTO: 11.4 FL (ref 9.2–12.9)
POTASSIUM SERPL-SCNC: 3.8 MMOL/L (ref 3.5–5.1)
PROT SERPL-MCNC: 6.9 G/DL (ref 6–8.4)
RBC # BLD AUTO: 4.2 M/UL (ref 4–5.4)
SODIUM SERPL-SCNC: 143 MMOL/L (ref 136–145)
VIT B12 SERPL-MCNC: 465 PG/ML (ref 210–950)
WBC # BLD AUTO: 3.8 K/UL (ref 3.9–12.7)

## 2020-05-13 PROCEDURE — 99999 PR PBB SHADOW E&M-EST. PATIENT-LVL IV: CPT | Mod: PBBFAC,,, | Performed by: PHYSICIAN ASSISTANT

## 2020-05-13 PROCEDURE — 82607 VITAMIN B-12: CPT

## 2020-05-13 PROCEDURE — 99205 OFFICE O/P NEW HI 60 MIN: CPT | Mod: S$GLB,,, | Performed by: PHYSICIAN ASSISTANT

## 2020-05-13 PROCEDURE — 85025 COMPLETE CBC W/AUTO DIFF WBC: CPT

## 2020-05-13 PROCEDURE — 3008F PR BODY MASS INDEX (BMI) DOCUMENTED: ICD-10-PCS | Mod: CPTII,S$GLB,, | Performed by: PHYSICIAN ASSISTANT

## 2020-05-13 PROCEDURE — 3008F BODY MASS INDEX DOCD: CPT | Mod: CPTII,S$GLB,, | Performed by: PHYSICIAN ASSISTANT

## 2020-05-13 PROCEDURE — 86235 NUCLEAR ANTIGEN ANTIBODY: CPT | Mod: 59

## 2020-05-13 PROCEDURE — 86039 ANTINUCLEAR ANTIBODIES (ANA): CPT

## 2020-05-13 PROCEDURE — 99999 PR PBB SHADOW E&M-EST. PATIENT-LVL IV: ICD-10-PCS | Mod: PBBFAC,,, | Performed by: PHYSICIAN ASSISTANT

## 2020-05-13 PROCEDURE — 80053 COMPREHEN METABOLIC PANEL: CPT

## 2020-05-13 PROCEDURE — 3288F PR FALLS RISK ASSESSMENT DOCUMENTED: ICD-10-PCS | Mod: CPTII,S$GLB,, | Performed by: PHYSICIAN ASSISTANT

## 2020-05-13 PROCEDURE — 99205 PR OFFICE/OUTPT VISIT, NEW, LEVL V, 60-74 MIN: ICD-10-PCS | Mod: S$GLB,,, | Performed by: PHYSICIAN ASSISTANT

## 2020-05-13 PROCEDURE — 86038 ANTINUCLEAR ANTIBODIES: CPT

## 2020-05-13 PROCEDURE — 3288F FALL RISK ASSESSMENT DOCD: CPT | Mod: CPTII,S$GLB,, | Performed by: PHYSICIAN ASSISTANT

## 2020-05-13 PROCEDURE — 99354 PR PROLONGED SVC, OUPT, 1ST HR: CPT | Mod: S$GLB,,, | Performed by: PHYSICIAN ASSISTANT

## 2020-05-13 PROCEDURE — 99354 PR PROLONGED SVC, OUPT, 1ST HR: ICD-10-PCS | Mod: S$GLB,,, | Performed by: PHYSICIAN ASSISTANT

## 2020-05-13 PROCEDURE — 36415 COLL VENOUS BLD VENIPUNCTURE: CPT

## 2020-05-13 PROCEDURE — 1100F PTFALLS ASSESS-DOCD GE2>/YR: CPT | Mod: CPTII,S$GLB,, | Performed by: PHYSICIAN ASSISTANT

## 2020-05-13 PROCEDURE — 82306 VITAMIN D 25 HYDROXY: CPT

## 2020-05-13 PROCEDURE — 1100F PR PT FALLS ASSESS DOC 2+ FALLS/FALL W/INJURY/YR: ICD-10-PCS | Mod: CPTII,S$GLB,, | Performed by: PHYSICIAN ASSISTANT

## 2020-05-13 NOTE — LETTER
May 15, 2020      Jhonatan Avila III, MD  [address] [facility] [address]           [facility]  [address]  [facility] [address]  Phone: [phone]          Patient: Silvana Quezada   MR Number: [id number]   YOB: 1955   Date of Visit: 5/13/2020       Dear Dr. Jhonatan Avila III:    Thank you for referring Silvana Quezada to me for evaluation. Attached you will find relevant portions of my assessment and plan of care.    If you have questions, please do not hesitate to call me. I look forward to following Silvana Quezada along with you.    Sincerely,    Sigrid Sommer PA-C    Enclosure  CC:  No Recipients    If you would like to receive this communication electronically, please contact [email or web address] or [phone] to request more information on [facility] [employer] access.    For providers and/or their staff who would like to refer a patient to [facility], please contact us through our one-stop-shop provider referral line, [facility] , at [phone].    If you feel you have received this communication in error or would no longer like to receive these types of communications, please e-mail [email or web address]

## 2020-05-13 NOTE — Clinical Note
Just following up to make sure release of information was sent to  for records --I didn't see a note in the chart that anything has been faxed

## 2020-05-13 NOTE — PROGRESS NOTES
"Neurology Consultation    Reason for consult:  Establish care with [facility]    History of present illness:   Per patient she was originally diagnosed in 1991 by Dr. Patino -- had an episode of sudden double vision and  trouble walking  -had MRI/lumbar puncture -at . Hospitalized for several weeks--she received steroids(oral) and was discharged and tapered off slowly. Was going to start Betaseron but discovered she was pregnant(3rd child). Between [id number] she had several more "attacks" where she was given steroids(oral).   To date, she has never been on DMT.    Dr. Avila(PCP) has recommended that she come to [facility] due to several falls and subsequent fractures in the last year.  Prior to her last fall in December 2019, she was walking daily at least a mile perhaps more. She feels she has not been exercising much since and her balance is off.     She had recent COVID infection in late March and feels she has fully recovered. Her  is currently COVID positive.     MS symptoms  1.Bladder is issue-but controlled-managed by Dr. Ross  2. Balance  3. Fatigue  4. Heat Sensitivity-increases fatigue, difficulty walking    Last MRI- greater than 10-15 years ago.   She does take Vit D3 but is unsure how much or what brand     History of right breast Cancer 2001- lumpectomy -radiation/chemo-5 years tamoxifen      Review of systems:   CONSTITUTIONAL: no fever, sleeps about 5-6 hours per night, some fatigue  HEENT: Eyes: No visual loss, blurred vision, double vision or yellow sclerae currently. Ears, Nose, Throat: No hearing loss, sneezing, congestion, runny nose or sore throat.   SKIN: dry skin but of no concern  CARDIOVASCULAR: No chest pain, chest pressure or chest discomfort. No palpitations or edema.   RESPIRATORY: No shortness of breath, cough or sputum.   GASTROINTESTINAL: No anorexia, nausea, vomiting or diarrhea. No abdominal pain or blood.   GENITOURINARY: sees Dr. Ross and received Botox to bladder, CIC 4 " times daily and urinates on her own inbetween.   Bowel: some constipation-does eat prunes and this is helpful  NEUROLOGICAL: As in HPI   MUSCULOSKELETAL: wrist fracture in December with subsequent surgery.   HEMATOLOGIC: No anemia, bleeding or bruising.   LYMPHATICS: No enlarged nodes.  PSYCHIATRIC: Bipolar depression--sees Dr. Munoz q 3-6 months. Does feel that at times it's difficult to sleep as her thoughts will keep her awake.   ENDOCRINOLOGIC: No reports of sweating, cold or heat intolerance. No polyuria or polydipsia.     Past Medical History:   Diagnosis Date    Bipolar 1 disorder     Breast cancer     right    Breast cyst     Closed fracture of proximal end of right humerus 3/9/2015    Depression     History of right shoulder fracture     MS (multiple sclerosis)     presented as dizzines, dx vision,     Osteoporosis, unspecified     Pneumonia 3/27/2020       Past Surgical History:   Procedure Laterality Date    BRAIN SURGERY  1991    BREAST BIOPSY Left 2009    core    BREAST LUMPECTOMY Right 2001    CYSTOSCOPY N/A 7/11/2018    Procedure: CYSTOSCOPY;  Surgeon: Jayesh Ross MD;  Location: [address];  Service: Urology;  Laterality: N/A;  30 min    CYSTOSCOPY N/A 4/10/2019    Procedure: CYSTOSCOPY;  Surgeon: Jayesh Ross MD;  Location: [facility] OR [address] [place] [address];  Service: Urology;  Laterality: N/A;  30 MIN    FOOT SURGERY  october 2013    HYSTERECTOMY      INJECTION OF BOTULINUM TOXIN TYPE A N/A 7/11/2018    Procedure: INJECTION, BOTULINUM TOXIN, TYPE A 200 UNITS;  Surgeon: Jayesh Ross MD;  Location: [address];  Service: Urology;  Laterality: N/A;    INJECTION OF BOTULINUM TOXIN TYPE A N/A 4/10/2019    Procedure: INJECTION, BOTULINUM TOXIN, TYPE A 200 UNITS;  Surgeon: Jayesh Ross MD;  Location: [address];  Service: Urology;  Laterality: N/A;    OPEN REDUCTION AND INTERNAL FIXATION (ORIF) OF INJURY OF WRIST Left 12/27/2019    Procedure: ORIF, WRIST;  Surgeon: Albert HERNANDEZ  Alexandre Licea MD;  Location: [facility] OR;  Service: Orthopedics;  Laterality: Left;  ACUMED/ MINI C ARM Palomo Girongiovanna notified/ Rk confirmed here in [facility] with Lacie 12/23/19 KB [id number]    TONSILLECTOMY         Family History   Problem Relation Age of Onset    Skin cancer Mother     Breast cancer Mother     Stroke Mother     Emphysema Father     Breast cancer Maternal Aunt     Dementia Brother     Diabetes Brother     No Known Problems Daughter     Autism Son     No Known Problems Brother     Autism Daughter        Social History     Socioeconomic History    Marital status:      Spouse name: Not on file    Number of children: Not on file    Years of education: Not on file    Highest education level: Not on file   Occupational History    Occupation: [employer]   Social Needs    Financial resource strain: Not on file    Food insecurity:     Worry: Not on file     Inability: Not on file    Transportation needs:     Medical: Not on file     Non-medical: Not on file   Tobacco Use    Smoking status: Never Smoker    Smokeless tobacco: Never Used   Substance and Sexual Activity    Alcohol use: Yes     Alcohol/week: 1.7 standard drinks     Types: 2 Standard drinks or equivalent per week     Comment: occasionally    Drug use: No    Sexual activity: Yes     Partners: Male   Lifestyle    Physical activity:     Days per week: Not on file     Minutes per session: Not on file    Stress: Not on file   Relationships    Social connections:     Talks on phone: Not on file     Gets together: Not on file     Attends Jewish service: Not on file     Active member of club or organization: Not on file     Attends meetings of clubs or organizations: Not on file     Relationship status: Not on file   Other Topics Concern    Are you pregnant or think you may be? 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      "Lives with      39, 25 children 2 daughter        Comprehensive questionnaire reviewed with patient and will be uploaded.    Review of patient's allergies indicates:   Allergen Reactions    Adhesive      blisters    Keflex [cephalexin] Rash    Cephalosporins          Physical Exam    Vitals:    05/13/20 1329   BP: 123/80   Pulse: 77   Weight: 77 kg (169 lb 12.1 oz)   Height: 5' 3" (1.6 m)       In general, the patient is well nourished.    Timed Walk: 9.78s without assistive device    MENTAL STATUS: language is fluent, normal verbal comprehension, short-term and remote memory is intact, attention is normal, patient is alert and oriented x 3, fund of knowlege is appropriate by vocabulary.     CRANIAL NERVE EXAM:  There is dysconjugate gaze. No facial asymmetry. Facial sensation is intact bilaterally.No dysarthria. Mask in place for visit . Shoulder shrug intact bilaterlly.  Hearing is grossly intact. Neck is supple. Acuity: 20/40 corrected each eye using snellen hand held chart at 6 feet.    MOTOR EXAM: Normal bulk and tone throughout UE and LE bilaterally.   Rapid sequential movements are diminished on L; Strength is  5/5 in all groups in the lower extremities and upper extremities on R, slightly diminished 4+/5 on L extensors UE's, L LE 5/5; in sitting tends to lean L slighlty to left    REFLEXES: 2+ and symmetric throughout in all four extremities except AJ are 1+; toes are down on R and up on L    SENSORY EXAM: Normal to light touch, intact to vibration UE's , some disturbance on LE's    COORDINATION: Normal finger-to-nose exam on R -impaired on L    GAIT: wide based-without use of assistive device. Noted kyphosis/scoliosis.  Posture in sitting and standing is with L trunk leaning/L shoulder depressed as compared to R.         IMAGING (personally reviewed):   No images to review-last MRI > 10 years ago(done at )    LABS:  Lab Results   Component Value Date    WBC 3.80 (L) 05/13/2020    HGB 12.7 " 05/13/2020    HCT 40.4 05/13/2020    MCV 96 05/13/2020     05/13/2020     CMP  Sodium   Date Value Ref Range Status   05/13/2020 143 136 - 145 mmol/L Final     Potassium   Date Value Ref Range Status   05/13/2020 3.8 3.5 - 5.1 mmol/L Final     Chloride   Date Value Ref Range Status   05/13/2020 107 95 - 110 mmol/L Final     CO2   Date Value Ref Range Status   05/13/2020 28 23 - 29 mmol/L Final     Glucose   Date Value Ref Range Status   05/13/2020 90 70 - 110 mg/dL Final     BUN, Bld   Date Value Ref Range Status   05/13/2020 17 8 - 23 mg/dL Final     Creatinine   Date Value Ref Range Status   05/13/2020 0.9 0.5 - 1.4 mg/dL Final     Calcium   Date Value Ref Range Status   05/13/2020 9.5 8.7 - 10.5 mg/dL Final     Total Protein   Date Value Ref Range Status   05/13/2020 6.9 6.0 - 8.4 g/dL Final     Albumin   Date Value Ref Range Status   05/13/2020 3.9 3.5 - 5.2 g/dL Final     Total Bilirubin   Date Value Ref Range Status   05/13/2020 0.3 0.1 - 1.0 mg/dL Final     Comment:     For infants and newborns, interpretation of results should be based  on gestational age, weight and in agreement with clinical  observations.  Premature Infant recommended reference ranges:  Up to 24 hours.............<8.0 mg/dL  Up to 48 hours............<12.0 mg/dL  3-5 days..................<15.0 mg/dL  6-29 days.................<15.0 mg/dL       Alkaline Phosphatase   Date Value Ref Range Status   05/13/2020 58 55 - 135 U/L Final     AST   Date Value Ref Range Status   05/13/2020 15 10 - 40 U/L Final     ALT   Date Value Ref Range Status   05/13/2020 16 10 - 44 U/L Final     Anion Gap   Date Value Ref Range Status   05/13/2020 8 8 - 16 mmol/L Final     eGFR if    Date Value Ref Range Status   05/13/2020 >60.0 >60 mL/min/1.73 m^2 Final     eGFR if non    Date Value Ref Range Status   05/13/2020 >60.0 >60 mL/min/1.73 m^2 Final     Comment:     Calculation used to obtain the estimated glomerular  "filtration  rate (eGFR) is the CKD-EPI equation.                 ASSESSMENT:        1.  Neurogenic bladder      2.  Weakness L UE, decreased coordination       3.  Babinski on L     4.  Decreased Balance  5.  Visual disturbance/dysconjugate gaze-? SONIA  6.  Heat Sensitivity  7. Fatigue            DISCUSSION:   Patient presents to establish care for her MS. She states she was diagnosed at  in 1991 after a sudden visual change(double vision) and increased difficulty walking. She was hospitalized at that time with imaging and LP which per patient indicated MS. She received oral steroids which were beneficial. She never ultimately started on DMT-she discovered she was pregnant just prior to initiating Betaseron and after delivery of last(3rd) child she stated she wasn't having issues so didn't start. For the subsequent 5 years that followed she states she had several "episodes" and was treated with oral steroids for these, then she has not had issues since until recently. Late in 2019 she fell and broke her wrist. She had a previous fall in 2019 as well where she broke her nose. While, I do not necessarily doubt her diagnosis of MS, I have no records to review today to confirm diagnosis as she was not able to obtain records from - she has signed a release of information and we will get as much information as we are able. She does have an abnormal neurological exam(vision, reflexes, coordination/balance, strength, bladder).        RECOMMENDATIONS:  1. MRI Brain(patient requests to be done in 6 weeks)           2. Will need to get records from  regarding diagnosis           3. Consider sleep study(patient concerned about doing now with current COVID-will hold for now)           4. She has upcoming PT and OT outpatient which I certainly agree with, and recommended cane for ambulation(she will discuss which type of cane is appropriate with PT upcoming)  5.  Labs today  6.  Patient will message regarding Vit D3 dose and " brand, recommendation will be made once labs are completed.                Multiple sclerosis  -     CBC auto differential; Future; Expected date: 05/13/2020  -     Comprehensive metabolic panel; Future  -     Vitamin D; Future  -     Vitamin B12; Future; Expected date: 05/13/2020  -     MRI Brain Demyelinating W W/O Contrast; Future; Expected date: 05/13/2020  -     BHAVESH Screen w/Reflex; Future; Expected date: 05/13/2020    Chronic fatigue  -     Vitamin B12; Future; Expected date: 05/13/2020    Vitamin D insufficiency  -     Vitamin D; Future    Unspecified abnormalities of gait and mobility   -     Vitamin B12; Future; Expected date: 05/13/2020    Over 100% of our 90 minute visit was directly face to face with patient in clinic focusing on MS diagnosis, current status, coordination of care, imaging, labs.     Follow up in about 10 weeks (around 7/22/2020).with Dr. Oneil  Patient agreed to POC today.    Attending, Dr. Oneil, was available during today's encounter.     Sigrid Sommer PA-C  [facility]

## 2020-05-14 ENCOUNTER — TELEPHONE (OUTPATIENT)
Dept: UROLOGY | Facility: CLINIC | Age: 65
End: 2020-05-14

## 2020-05-14 ENCOUNTER — PATIENT MESSAGE (OUTPATIENT)
Dept: UROLOGY | Facility: CLINIC | Age: 65
End: 2020-05-14

## 2020-05-14 DIAGNOSIS — N39.41 URGE INCONTINENCE: Primary | ICD-10-CM

## 2020-05-14 DIAGNOSIS — Z13.9 SCREENING FOR UNSPECIFIED CONDITION: Primary | ICD-10-CM

## 2020-05-14 NOTE — TELEPHONE ENCOUNTER
Screening for unspecified condition  -     COVID-19 Routine Screening; Future; Expected date: 06/15/2020

## 2020-05-14 NOTE — TELEPHONE ENCOUNTER
Spoke with Ms Damien to confirm her procedure on 6-17 and her covid screening on 6-14 which will be done in [place], pt agreed and verbally understood.

## 2020-05-14 NOTE — TELEPHONE ENCOUNTER
Urge incontinence  -     Case Request Operating Room: CYSTOSCOPY,WITH BOTULINUM TOXIN INJECTION 200units

## 2020-05-15 LAB
ANA PATTERN 1: NORMAL
ANA SER QL IF: POSITIVE
ANA TITR SER IF: NORMAL {TITER}

## 2020-05-18 ENCOUNTER — CLINICAL SUPPORT (OUTPATIENT)
Dept: REHABILITATION | Facility: HOSPITAL | Age: 65
End: 2020-05-18
Payer: MEDICARE

## 2020-05-18 DIAGNOSIS — M25.632 DECREASED RANGE OF MOTION OF LEFT WRIST: ICD-10-CM

## 2020-05-18 DIAGNOSIS — M79.89 SWELLING OF LEFT HAND: ICD-10-CM

## 2020-05-18 DIAGNOSIS — Z91.81 AT LOW RISK FOR FALL: ICD-10-CM

## 2020-05-18 DIAGNOSIS — R29.898 MUSCULAR DECONDITIONING: ICD-10-CM
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
## 2020-05-18 DIAGNOSIS — M25.532 LEFT WRIST PAIN: ICD-10-CM

## 2020-05-18 DIAGNOSIS — R53.81 PHYSICAL DECONDITIONING: ICD-10-CM

## 2020-05-18 LAB
ANTI SM ANTIBODY: 0.07 RATIO (ref 0–0.99)
ANTI SM/RNP ANTIBODY: 0.07 RATIO (ref 0–0.99)
ANTI-SM INTERPRETATION: NEGATIVE
ANTI-SM/RNP INTERPRETATION: NEGATIVE
ANTI-SSA ANTIBODY: 0.05 RATIO (ref 0–0.99)
ANTI-SSA INTERPRETATION: NEGATIVE
ANTI-SSB ANTIBODY: 0.1 RATIO (ref 0–0.99)
ANTI-SSB INTERPRETATION: NEGATIVE
DSDNA AB SER-ACNC: NORMAL [IU]/ML

## 2020-05-18 PROCEDURE — 97162 PT EVAL MOD COMPLEX 30 MIN: CPT | Mod: PN

## 2020-05-18 PROCEDURE — 97110 THERAPEUTIC EXERCISES: CPT | Mod: PN

## 2020-05-18 PROCEDURE — 97140 MANUAL THERAPY 1/> REGIONS: CPT | Mod: PN,59

## 2020-05-18 NOTE — PLAN OF CARE
[facility]  Physical Therapy Neurological Rehabilitation Initial Evaluation    Name: Silvana Quezada  Clinic Number: [id number]    Therapy Diagnosis:   Encounter Diagnoses   Name Primary?    Fall, initial encounter     At low risk for fall     Muscular deconditioning     Physical deconditioning      Physician: Jhonatan Avila III, MD    Physician Orders: PT Eval and Treat   Medical Diagnosis from Referral: W19.XXXA (ICD-10-CM) - Fall, initial encounter  Evaluation Date: 5/18/2020  Authorization Period Expiration: 05/13/2021  Plan of Care Expiration: 7/17/2020  Visit # / Visits authorized: 1/ 1    Time In: 1400  Time Out: 1445  Total Billable Time: 45 minutes    Precautions: Fall, Multiple sclerosis    Subjective   Date of onset: Dec 2019  History of current condition - Silvana reports: she was coming out of her laundry room and there is one step up from the room and she took a missed step and found herself falling backward and broke her wrist.   SHe stated that she had a fall in the neighborhood a few weeks prior to that.  She stated that she was unable to get up from the ground.     Medical History:   Past Medical History:   Diagnosis Date    Bipolar 1 disorder     Breast cancer     right    Breast cyst     Closed fracture of proximal end of right humerus 3/9/2015    Depression     History of right shoulder fracture     MS (multiple sclerosis)     presented as dizzines, dx vision,     Osteoporosis, unspecified     Pneumonia 3/27/2020       Surgical History:   Silvana uQezada  has a past surgical history that includes Hysterectomy; Brain surgery (1991); Foot surgery (october 2013); Breast lumpectomy (Right, 2001); Breast biopsy (Left, 2009); Cystoscopy (N/A, 7/11/2018); Injection of botulinum toxin type A (N/A, 7/11/2018); Tonsillectomy; Cystoscopy (N/A, 4/10/2019); Injection of botulinum toxin type A (N/A, 4/10/2019); and Open reduction and internal fixation (ORIF) of injury of  wrist (Left, 2019).    Medications:   Silvana has a current medication list which includes the following prescription(s): albuterol, alendronate, benzonatate, bupropion, bupropion, calcium-vitamin d3, catheter, fluphenazine, folic acid, inhalation spacing device, ondansetron, and oxcarbazepine.    Allergies:   Review of patient's allergies indicates:   Allergen Reactions    Adhesive      blisters    Keflex [cephalexin] Rash    Cephalosporins       Prior Therapy: none recent  Social History:  lives with their spouse  Falls: 2 in the last 6 months with injury.    DME: walker, cane, BSC, shower chair.  Pt uses the shower chair daily.  Home Environment: single story home   Exercise Routine / History: walked 1 mile a day prior to COVID   Family Present at time of Eval:    Occupation: semi-retired ()   Prior Level of Function: indep with no fall since 2 years  Current Level of Function: supervision with 2 falls in the last 6 months    Pain:  Current 0/10, worst 0/10, best 0/10   N/A    No MS meds     Pts goals: walk more balance without falls     Objective     - Command followin%   - Speech: disorganized thought process    Mental status: alert, oriented to person, place, and time  Behavior:  calm and cooperative  Attention Span and Concentration:  Normal    Dominant hand:  right     Posture Alignment :slouched posture and kyphosis    Sensation:  Light Touch: Intact           Proprioception:   Intact    Tone: 0 - No increase in muscle tone     Visual/Auditory: denies changes in the last year    Coordination:   - fine motor: see OT note  - UE coordination: see OT note    - LE coordination:  Toe tap intact    ROM:   UPPER EXTREMITY--AROM/PROM  See OT eval         RANGE OF MOTION--LOWER EXTREMITIES  R LE: WFL    (L) LE: WFL    Upper Extremity Strength  See OT eval    Lower Extremity Strength   RLE LLE   Hip Flexion: 4+/5 4+/5   Hip Extension:  3+/5 4-/5   Hip Abduction: 4-/5 4/5   Knee  Extension: 4/5 4/5   Knee Flexion: 4-/5 4/5   Ankle Dorsiflexion: 4-/5 4-/5   Ankle Plantarflexion: 4-/5 4-/5     Gait Assessment:   - AD used: none  - Assistance: supervision  - Distance: >200 ft   - Stairs: not tested or observed    RED  BALANCE ASSESSMENT TOOL  1. Sitting to Standing   3 - able to stand independely using hands  2. Standing Unsupported   4 - able to stand safely 2 minutes without hold  3. Sitting Unsupported   4 - able to sit safely and securely 2 minutes  4. Standing to Sitting   3 - controls descent by using hands  5. Pivot Transfer   3 - able to transfer safely with definite use of hands  6. Standing with Eyes Closed   4 - albe to stand 10 seconds safely  7. Standing with Feet Together   3 - able to place feet together independently and stand for 1 minute with supervision  8. Reaching Forward with Outstretched Arm   2 - can reach forward 5 cm/2 inches safely  9. Retrieving Object from Floor   4 - able to  slipper safely and easily  10. Turning to Look Behind   4 - looks behind from both sides and weights shifts well  11. Turning 360 Degrees   3 - able to trun 360 safely one side only in 4 seconds or less  12. Placing Alternate Foot on Step   1 - able to complete > 2 steps needs min assist  13. Standing with One Foot in Front   2 - able to take small step indenpendently and hold 30 seconds  14. Standing on One Foot   1 - tries to lift leg and unable to hold 3 seconds but remains standing independently    TOTAL SCORE: 41  Maximum: 56  Score:     41-56 low fall risk     Fall risk cut-off scores:   Elderly and History of falls: <51/56      Gait Analysis:  Deviations noted: forward flexed posture, decreased hip extension, decreased heel strike    Impairments contributing to deviations:  General weakness    Endurance Deficit: pt was SOB during session      Balance Assessment:    Sitting balance (static,dynamic):WFL  Standing balance (static, dynamic):see Red balance test    Functional  Mobility (Bed mobility, transfers)  All NIDHI to independent    CMS Impairment/Limitation/Restriction for FOTO unspecified compliation Survey and Abbrev ABC scale    Therapist reviewed FOTO scores for Silvana Quezada on 5/18/2020.   FOTO documents entered into [employer] - see Media section.    Limitation Score: 32%  And 78% (Abbrev ABC)         TREATMENT   Therapeutic exercise to be initiated at follow-up visit:  Stepper, treadmill, SLS tasks, step ups, 1/2 tandem and tandem, dev sequencing, LE strengthening, postural control exercise.    HEP not issued this session, Patient Education Provided    Education provided:   - need for balance and endurance training    Assessment   Silvana is a 64 y.o. female referred to outpatient Physical Therapy with a medical diagnosis of fall encounter. Pt presents with general weakness post COVID-19, decreased endurance, impaired balance on the Jennings Balance test. She has a fall while carrying a basket and stepping into another room but she is unbalanced with a narrow or single limb base.  She would benefit from PT to improve balance, endurance, gait, and strength as well as balance confidence.  She scored a 78% limitation on the Abbrev ABC and also has a h/o MS.      Pt prognosis is Good.   Pt will benefit from skilled outpatient Physical Therapy to address the deficits stated above and in the chart below, provide pt/family education, and to maximize pt's level of independence.     Plan of care discussed with patient: Yes  Pt's spiritual, cultural and educational needs considered and patient is agreeable to the plan of care and goals as stated below:     Anticipated Barriers for therapy: low endurance, fatigue, recent dx of COVID-19.       Medical Necessity is demonstrated by the following  History  Co-morbidities and personal factors that may impact the plan of care Co-morbidities:   Bipolar 1 disorder   Breast cancer   right   Breast cyst   Closed fracture of proximal end of right humerus    Depression   History of right shoulder fracture   MS (multiple sclerosis)   presented as dizzines, dx vision,    Osteoporosis, unspecified   Pneumonia     Personal Factors:   age  recent illness, MS, fatigue     high   Examination  Body Structures and Functions, activity limitations and participation restrictions that may impact the plan of care Body Regions:   lower extremities  trunk    Body Systems:    gross symmetry  ROM  strength  gross coordinated movement  balance  gait  transfers  transitions  motor control  motor learning    Participation Restrictions:   Safety carrying object, step/stair negotiation, dual tasks, walking long community distances    Activity limitations:   Learning and applying knowledge  word finding    General Tasks and Commands  undertaking multiple tasks    Communication  communicating with/receiving spoken language    Mobility  lifting and carrying objects  fine hand use (grasping/picking up)  walking    Self care  washing oneself (bathing, drying, washing hands)  toileting  dressing  looking after one's health    Domestic Life  shopping  doing house work (cleaning house, washing dishes, laundry)    Interactions/Relationships  no deficits    Life Areas  homemaking    Community and Social Life  community life  recreation and leisure         moderate   Clinical Presentation evolving clinical presentation with changing clinical characteristics moderate   Decision Making/ Complexity Score: moderate     Short Term Goals (4 Weeks):   1.  Independent with initial HEP.  2.  Pt will perform nu-step at level 4 x 8 minutes without rests breaks going at least 50 step/min or greater.    Long Term Goals (8 Weeks):   1.  Pt will score greater than or equal to 45/56 on the Jennings test/assessment without use of AD demonstrating overall improved functional mobility and balance and reduce fall risk.   2.  Pt will walk < than or equal to 850 ft on the 6 minute walk test outdoors on multiple terrain without  AD with 0 LOB for improved community ambulation.  3.  Pt will be able to safely perform and tolerate high level ADL's without LOB.   4. Pt will have 0 falls from start of PT sessions.  5. Independent with updated HEP.   6. Pt will have MMT score of 4+/5 in all major ms groups in B LE.  7. Pt will ambulate on TM x 8 minutes with use of UE support with 0 LOB at 1.0 mph.  8. Pt will perform floor to stand f/b stand to floor transfer with UE support with stand by assist and no cues for improved dynamic transfer, core stability and fall safety in home and community.  9. Pt will begin some form of community fitness to begin regular and consistent performance of exercise to continue maintenance of gains made in PT.    Plan   Plan of care Certification: 5/18/2020 to 7/17/2020.    Outpatient Physical Therapy 2 times weekly for 8 weeks to include the following interventions: Gait Training, Manual Therapy, Moist Heat/ Ice, Neuromuscular Re-ed, Patient Education, Self Care, Therapeutic Activites and Therapeutic Exercise.     Iwona Vora, PT

## 2020-05-18 NOTE — PROGRESS NOTES
"  Occupational Therapy Progress Note & Updated POC     Date: 5/18/2020  Name: Silvana Quezada  Clinic Number: [id number]    Therapy Diagnosis:   No diagnosis found.  Physician: Mc Cordero, CELIA    Physician Orders: eval and treat  Medical Diagnosis: S52.502D (ICD-10-CM) - Closed fracture of distal end of left radius with routine healing, unspecified fracture morphology, subsequent encounter  Surgical Procedure and Date: 12/27/2019,Open reduction and internal fixation of left distal radius fracture, 3 or more fragments, intra-articular, using Acumed volar   locking plate. /   Date of Injury/Onset: 12/13/2019  Evaluation Date: 1/16/2020  Insurance Authorization Period Expiration: 3/16/2020  Plan of Care Certification Period: 7/17/20  Date of Return to MD: 3/9/2020     FOTO: initial eval, 6th visit, 10th visit: 50% limitation      Visit # / Visits authorized: 14 / 16  Time In: 1:15 pm  Time Out: 1:45 pm  Total Treatment Time: 45 minutes  Total Timed minutes: 45 minutes    Precautions:  Standard, Fall and Weightbearing **SENSITIVE TO HEAT DUE TO MS, DEFER HEAT MODALITIES       Subjective     Pt reports: "I been doing ok but man im glad to be back."   she was compliant with home exercise program given last session.   Response to previous treatment: improved pain, swelling, and ROM  Functional change: has been able to perform ADLs and most functional activities at home    Pain:  2/10  Location: left hand    Objective         Treatment consisted of the following:    **heat modalities deferred due to pt sensitive to heat due to MS     Silvana received the following manual therapy techniques for 10 minutes:   -Performed retrograde massage to L digits/hand/wrist  to decrease edema, improve joint mobility, decrease pain and improve lymphatic drainage to increase hand function. Performed scar massage to volar wrist area (not including one small healing area)   to decrease adhesions and improve tensile glide.        Silvana received " "therapeutic exercises for 35 minutes including:  AROM   Sup/Pro  Wrist  Ext/flx  RD/UD    X 30 reps each    AROM   Spreads  Wave  Hook  Straight fist  Composite fist  Lifts X 30 reps each   AROM thumb:  Rad abd/add  Palmar abd/add  IP blocks  Opposition  Pinky Slides    X 10 reps each    Prayer stretch 3 x 30"   Wrist wheel, flex/ext, sup/pro X 3 min   Wrist maze X 3 min   isospheres X 2 min, on table        Light wrist PRE, 3 ways 2 X 10 reps each, 1#, pain free   Supination/pronation, 1# X 10 reps   Light grippers X 10 reps each, 3 grippers (not today)   PHG, 1st rung, [place]    X 10 reps   Digit extend, yellow RB X 10 reps   Light theraputty - yellow @ home  -molding  -gripping  -finger spread  -log rolls with tripod pinch/lateral key pinch     -1 min  -10 reps  -5 reps  -2 sets each            Home Exercises and Education Provided     Education provided: cont HEP as tolerated, cont brace wear, and compression glove wear for edema management.   - Progress towards goals     Written Home Exercises Provided: Patient instructed to cont prior HEP.  Exercises were reviewed and Silvana was able to demonstrate them prior to the end of the session.  Silvana demonstrated good  understanding of the education provided.   .   See EMR under Patient Instructions for exercises provided 3/2/2020.     Assessment     See tx section for UPOC.     Silvana is progressing fairly well, but slowly towards her goals and there are no updates to goals at this time. Pt prognosis continues as Good. Pt will continue to benefit from skilled outpatient occupational therapy to address the deficits listed in the problem list on initial evaluation, provide pt/family education and to maximize pt's level of independence in the home and community environment.     Anticipated barriers to continued occupational therapy: MS    Pt's spiritual, cultural and educational needs considered and pt agreeable to plan of care and goals.    Goals     Goals:   The " following goals were discussed with the patient and patient is in agreement with them as to be addressed in the treatment plan.   Long Term Goals (LTGs); to be met by discharge.  LTG #1: Pt will report a pain level of 1 out of 10 with ADLs and house hold tasks ---progressing    LTG #2: Pt will demo improved FOTO score by at least 20 points. ---progressing  LTG #3: Pt will return to prior level of function for ADLs and household management. ---progressing  LTG #4: Pt will demonstrate improved L wrist AROM WFL for performing daily tasks---progressing  LTG #5:  and pinch to be assessed when appropriate and goals set accordingly---met, 3/12/2020  Add: LTG #6: Pt will demonstrate improved L  strength by at least 3-5# for functional grasp of objects or when driving     Short Term Goals (STGs); to be met within 4 weeks (2/14/2020).  STG #1a: Pt will report 3 out of 10 pain level with ADLs and daily tasks.---met, 3/12/2020  STG #2a: Pt will report/demo [place] with feeding self.---met, 3/2/2020  STG #2b: Pt will report/demo [place] with driving.---progressing  STG #3a: Pt will demonstrate independence with issued HEP. ---met, 3/12/2020  STG #3b: Pt will demo improved L wrist AROM by at least 15 degrees needed to aid with functional use during ADLs---met, 3/2/2020  STG #4: Pt will demonstrate improved edema in L hand and wrist by at least 0.4 cm for increased use during daily tasks.---met, 3/2/2020         Plan     Continue skilled occupational therapy with individualized plan of care 2x/week for 4 more weeks during certification period 3/13/2020 - 4/10/2020 focusing on ROM and edema management to maximize functional use of her L hand.       Updates/Grading for next session: cont to progress as able with ROM and light  strengthening.     Darinel Cordova, OTR/L

## 2020-05-19 ENCOUNTER — PATIENT MESSAGE (OUTPATIENT)
Dept: UROLOGY | Facility: CLINIC | Age: 65
End: 2020-05-19

## 2020-05-19 ENCOUNTER — TELEPHONE (OUTPATIENT)
Dept: UROLOGY | Facility: CLINIC | Age: 65
End: 2020-05-19

## 2020-05-19 NOTE — TELEPHONE ENCOUNTER
Spoke with the patient to confirm she has to get tested for covid twice per Dr Ross, 2 wks before procedure date(6-3) and 2 days before procedure date (6-15), she will be going to the [place] location to get tested. Pt agreed and verbally understood.

## 2020-05-19 NOTE — PLAN OF CARE
"  Outpatient Therapy Updated Plan of Care     Visit Date: 5/18/2020  Name: Silvana Quezada  Clinic Number: [id number]    Therapy Diagnosis:   Encounter Diagnoses   Name Primary?    Left wrist pain     Swelling of left hand     Decreased range of motion of left wrist      Physician: Mc Cordero, CELIA    Physician Orders: Eval and tx  Medical Diagnosis:  S52.502D (ICD-10-CM) - Closed fracture of distal end of left radius with routine healing, unspecified fracture morphology, subsequent encounter  Evaluation Date: 1/16/20    Total Visits Received: 14/16  Cancelled Visits: 1  No Show Visits: 1    Current Certification Period:  5/15/20 to 7/17/20  Precautions:  Universal, COVID- 19 positive, MS, Bipolar  Visits from Evaluation Date:  14  Functional Level Prior to Evaluation:  IND    Subjective     Update: "I am glad to be back but the hand and wrist still hurt"    Objective     Update: Edema. Measured in centimeters.    1/16/2020 1/16/2020 1/30/2020 2/20/2020 3/2/2020 3/10/2020 5/18/2020       Left Right Left Left Left Left Left   Wrist Crease 19 15.8 18.3 (-0.7) 18.5 18.2 (-0.3) 18.1 18   DPC 18.8 18.5 18.7 (-0.1) 18.4 18.1 (-0.3) 18.1 18.1   MCPs 19.3 18.7 19.1 (-0.2) 19 18.6 (-0.4) 18.2 18.3         Elbow and Wrist ROM. Measured in degrees.    1/16/2020 1/16/2020 1/30/2020 2/6/2020 2/20/2020 3/2/2020 3/10/2020 5/18/2020       Left Right Left Left Left Left Left Left   Elbow Ext/Flex WNL WNL WNL        Supination/Pronation 44/74 WNL 66/84 (+22/+10) 72/84 74/86 76/86(+2) 76/82 WNL/WNL   Wrist Ext/Flex 36/32 72/60 44/46 (+8/+14) 52/50 52/50 54/58 (+10) 54/58 55/60   Wrist RD/UD 4/6 12/22 10/12 (+6/+6) 12/14 12/18 16/18 (+4) 16/16 19/17      Hand ROM. Measured in degrees.  Other digits WFL, able to make full fist    1/16/2020 1/30/2020 2/6/2020 2/20/2020 3/2/2020 3/10/2020 5/18/2020       Left Left Left Left Left Left Left               Thumb: MP 5/45 5/50 (+5) 50 50 55 55 65                IP 0 8 (+8) 10 25 25 35 55     "   Rad ADD/ABD 42 42 46 44 44 44 66       Pal ADD/ABD 40 42 48 42 42 42 65          Opposition To P2 o SF To base of SF (improved) To base of SF To SF MP jt To SF MP jt same WNL        Strength (Dynamometer) and Pinch Strength (Pinch Gauge)  Measured in pounds.    3/10/2020 3/10/2020 5/18/2020       Left Right Left   Rung II 12 25 25   Key Pinch 6 7 7   3pt Pinch 4 7 5   2pt Pinch 4 8 6       Assessment     Update: Pt has missed therapy due to covid-19 precautions and was actually tested positive via chart review. She was being seen for the wrist due to DRF causing pain, weakness and stiffness. She is doing fairly well with the wrist. She was able to tolerate established exercises fairly well with minor progression with updated measurements. Treatment somewhat limited still due to limited endurance, probably related to MS.    Previous Short Term Goals Status:   Long Term Goals (LTGs); to be met by discharge.  LTG #1: Pt will report a pain level of 1 out of 10 with ADLs and house hold tasks ---progressing    LTG #2: Pt will demo improved FOTO score by at least 20 points. ---progressing  LTG #3: Pt will return to prior level of function for ADLs and household management. ---progressing  LTG #4: Pt will demonstrate improved L wrist AROM WFL for performing daily tasks---progressing  LTG #5:  and pinch to be assessed when appropriate and goals set accordingly---met, 3/12/2020  Add: LTG #6: Pt will demonstrate improved L  strength by at least 3-5# for functional grasp of objects or when driving     Short Term Goals (STGs); to be met within 4 weeks (2/14/2020).  STG #1a: Pt will report 3 out of 10 pain level with ADLs and daily tasks.---met, 3/12/2020  STG #2a: Pt will report/demo [place] with feeding self.---met, 3/2/2020  STG #2b: Pt will report/demo [place] with driving.---progressing  STG #3a: Pt will demonstrate independence with issued HEP. ---met, 3/12/2020  STG #3b: Pt will demo improved L  wrist AROM by at least 15 degrees needed to aid with functional use during ADLs---met, 3/2/2020  STG #4: Pt will demonstrate improved edema in L hand and wrist by at least 0.4 cm for increased use during daily tasks.---met, 3/2/2020       New Short Term Goals Status:   See above  Long Term Goal Status:   continue per initial plan of care.  Reasons for Recertification of Therapy:   Pt continues to benefit from skilled services and has made good progress thus far with good future rehab potential. Pt remains limited with LUE strength, ROM and coordination at this time which all still impact their performance of ADLs , IADLs affecting her habits, roles and routines.      Plan     Updated Certification Period: 5/18/2020 to 7/17/20  Recommended Treatment Plan: 2 times per week for 8 weeks: Electrical Stimulation PRN, Fluidotherapy, Manual Therapy, Moist Heat/ Ice, Neuromuscular Re-ed, Orthotic Management and Training, Paraffin, Patient Education, Self Care, Therapeutic Activites and Therapeutic Exercise  Other Recommendations: K tape, Cupping, UPOC, Orthotic training PRN.       Darinel Cordvoa, OT  5/18/2020      I CERTIFY THE NEED FOR THESE SERVICES FURNISHED UNDER THIS PLAN OF TREATMENT AND WHILE UNDER MY CARE    Physician's comments:        Physician's Signature: ___________________________________________________

## 2020-05-20 PROBLEM — Z91.81 AT LOW RISK FOR FALL: Status: ACTIVE | Noted: 2020-05-20

## 2020-05-20 PROBLEM — R53.81 PHYSICAL DECONDITIONING: Status: ACTIVE | Noted: 2020-05-20

## 2020-05-20 PROBLEM — R29.898 MUSCULAR DECONDITIONING: Status: ACTIVE | Noted: 2020-05-20

## 2020-05-26 ENCOUNTER — OFFICE VISIT (OUTPATIENT)
Dept: INTERNAL MEDICINE | Facility: CLINIC | Age: 65
End: 2020-05-26
Payer: MEDICARE

## 2020-05-26 ENCOUNTER — TELEPHONE (OUTPATIENT)
Dept: UROLOGY | Facility: CLINIC | Age: 65
End: 2020-05-26

## 2020-05-26 VITALS
TEMPERATURE: 97 F | HEIGHT: 63 IN | OXYGEN SATURATION: 98 % | SYSTOLIC BLOOD PRESSURE: 98 MMHG | WEIGHT: 165.38 LBS | DIASTOLIC BLOOD PRESSURE: 68 MMHG | HEART RATE: 100 BPM | BODY MASS INDEX: 29.3 KG/M2

## 2020-05-26 DIAGNOSIS — G35 MS (MULTIPLE SCLEROSIS): ICD-10-CM

## 2020-05-26 DIAGNOSIS — Z85.3 HISTORY OF BREAST CANCER: ICD-10-CM

## 2020-05-26 DIAGNOSIS — M81.0 AGE-RELATED OSTEOPOROSIS WITHOUT CURRENT PATHOLOGICAL FRACTURE: Primary | ICD-10-CM

## 2020-05-26 DIAGNOSIS — F31.32 BIPOLAR I DISORDER, MOST RECENT EPISODE DEPRESSED, MODERATE: ICD-10-CM

## 2020-05-26 PROCEDURE — 99499 RISK ADDL DX/OHS AUDIT: ICD-10-PCS | Mod: S$GLB,,, | Performed by: INTERNAL MEDICINE

## 2020-05-26 PROCEDURE — 99999 PR PBB SHADOW E&M-EST. PATIENT-LVL III: CPT | Mod: PBBFAC,,, | Performed by: INTERNAL MEDICINE

## 2020-05-26 PROCEDURE — 99499 UNLISTED E&M SERVICE: CPT | Mod: S$GLB,,, | Performed by: INTERNAL MEDICINE

## 2020-05-26 PROCEDURE — 1100F PR PT FALLS ASSESS DOC 2+ FALLS/FALL W/INJURY/YR: ICD-10-PCS | Mod: CPTII,S$GLB,, | Performed by: INTERNAL MEDICINE

## 2020-05-26 PROCEDURE — 99214 PR OFFICE/OUTPT VISIT, EST, LEVL IV, 30-39 MIN: ICD-10-PCS | Mod: S$GLB,,, | Performed by: INTERNAL MEDICINE

## 2020-05-26 PROCEDURE — 99214 OFFICE O/P EST MOD 30 MIN: CPT | Mod: S$GLB,,, | Performed by: INTERNAL MEDICINE

## 2020-05-26 PROCEDURE — 99999 PR PBB SHADOW E&M-EST. PATIENT-LVL III: ICD-10-PCS | Mod: PBBFAC,,, | Performed by: INTERNAL MEDICINE

## 2020-05-26 PROCEDURE — 3288F PR FALLS RISK ASSESSMENT DOCUMENTED: ICD-10-PCS | Mod: CPTII,S$GLB,, | Performed by: INTERNAL MEDICINE

## 2020-05-26 PROCEDURE — 3008F PR BODY MASS INDEX (BMI) DOCUMENTED: ICD-10-PCS | Mod: CPTII,S$GLB,, | Performed by: INTERNAL MEDICINE

## 2020-05-26 PROCEDURE — 1100F PTFALLS ASSESS-DOCD GE2>/YR: CPT | Mod: CPTII,S$GLB,, | Performed by: INTERNAL MEDICINE

## 2020-05-26 PROCEDURE — 3008F BODY MASS INDEX DOCD: CPT | Mod: CPTII,S$GLB,, | Performed by: INTERNAL MEDICINE

## 2020-05-26 PROCEDURE — 3288F FALL RISK ASSESSMENT DOCD: CPT | Mod: CPTII,S$GLB,, | Performed by: INTERNAL MEDICINE

## 2020-05-26 NOTE — PROGRESS NOTES
"Subjective:       Patient ID: Silvana Quezada is a 64 y.o. female.    Chief Complaint: Follow-up           #Interim Hx  3/2020 - COVID 19      5/15/2020 - est care with neurology . Labs and MRI pending 6/24/20      #Concerns Today    none  #Chronic Problems      MS  C/b neurogenic bladder, gets botox inj with urology   Now followed by neruology     H/o BCa  UTD on mammo - BIRADS2- 5/2019   Mammogram 6/18/20     Osteopersosis cb pathologic fracture  Last dexa 6/2017 osteoperosis  Fall and wrist fracture 12/2019   Rpt DXA showed osteoperosis  Started on fosamax wihtout SE    Bipolar   Seen by psych       Health Maintenance   Topic Date Due    TETANUS VACCINE  09/25/1972    High Dose Statin  09/25/1975    Fecal Occult Blood Test (FOBT)/FitKit  12/05/2020    Mammogram  05/06/2021    DEXA SCAN  03/03/2023    Lipid Panel  02/20/2025    Hepatitis C Screening  Completed    Pneumococcal Vaccine (65+ High/Highest Risk)  Completed         HPI  Review of Systems   Constitutional: Negative for activity change, chills, diaphoresis, fatigue, fever and unexpected weight change.   Eyes: Negative for redness and visual disturbance.   Respiratory: Negative for shortness of breath.    Cardiovascular: Negative for chest pain and palpitations.   Gastrointestinal: Negative for abdominal distention and blood in stool.   Genitourinary: Positive for difficulty urinating. Negative for dysuria, frequency and menstrual problem.   Neurological: Negative for syncope and headaches.       Objective:       BP 98/68 (BP Location: Right arm, Patient Position: Sitting, BP Method: Medium (Manual))   Pulse 100   Temp 97.3 °F (36.3 °C)   Ht 5' 3" (1.6 m)   Wt 75 kg (165 lb 5.5 oz)   SpO2 98%   BMI 29.29 kg/m²     Physical Exam   Constitutional: She is oriented to person, place, and time. She appears well-developed and well-nourished. No distress.   HENT:   Head: Normocephalic.   Nose: Nose normal.   Mouth/Throat: Oropharynx is clear and " moist.   Eyes: Pupils are equal, round, and reactive to light. Conjunctivae and EOM are normal. Right eye exhibits no discharge. Left eye exhibits no discharge.   Neck: Normal range of motion. Neck supple.   Cardiovascular: Normal rate, regular rhythm and normal heart sounds. Exam reveals no gallop and no friction rub.   No murmur heard.  Pulmonary/Chest: Effort normal. No respiratory distress.   Abdominal: Soft.   Musculoskeletal: Normal range of motion. She exhibits no edema or deformity.   Neurological: She is alert and oriented to person, place, and time. She displays normal reflexes. No cranial nerve deficit or sensory deficit. Coordination normal.   Skin: Skin is warm. She is not diaphoretic.   Psychiatric: She has a normal mood and affect.   Nursing note and vitals reviewed.          Basic Labs  CMP  Sodium   Date Value Ref Range Status   05/13/2020 143 136 - 145 mmol/L Final     Potassium   Date Value Ref Range Status   05/13/2020 3.8 3.5 - 5.1 mmol/L Final     Chloride   Date Value Ref Range Status   05/13/2020 107 95 - 110 mmol/L Final     CO2   Date Value Ref Range Status   05/13/2020 28 23 - 29 mmol/L Final     Glucose   Date Value Ref Range Status   05/13/2020 90 70 - 110 mg/dL Final     BUN, Bld   Date Value Ref Range Status   05/13/2020 17 8 - 23 mg/dL Final     Creatinine   Date Value Ref Range Status   05/13/2020 0.9 0.5 - 1.4 mg/dL Final     Calcium   Date Value Ref Range Status   05/13/2020 9.5 8.7 - 10.5 mg/dL Final     Total Protein   Date Value Ref Range Status   05/13/2020 6.9 6.0 - 8.4 g/dL Final     Albumin   Date Value Ref Range Status   05/13/2020 3.9 3.5 - 5.2 g/dL Final     Total Bilirubin   Date Value Ref Range Status   05/13/2020 0.3 0.1 - 1.0 mg/dL Final     Comment:     For infants and newborns, interpretation of results should be based  on gestational age, weight and in agreement with clinical  observations.  Premature Infant recommended reference ranges:  Up to 24  hours.............<8.0 mg/dL  Up to 48 hours............<12.0 mg/dL  3-5 days..................<15.0 mg/dL  6-29 days.................<15.0 mg/dL       Alkaline Phosphatase   Date Value Ref Range Status   05/13/2020 58 55 - 135 U/L Final     AST   Date Value Ref Range Status   05/13/2020 15 10 - 40 U/L Final     ALT   Date Value Ref Range Status   05/13/2020 16 10 - 44 U/L Final     Anion Gap   Date Value Ref Range Status   05/13/2020 8 8 - 16 mmol/L Final     eGFR if    Date Value Ref Range Status   05/13/2020 >60.0 >60 mL/min/1.73 m^2 Final     eGFR if non    Date Value Ref Range Status   05/13/2020 >60.0 >60 mL/min/1.73 m^2 Final     Comment:     Calculation used to obtain the estimated glomerular filtration  rate (eGFR) is the CKD-EPI equation.            Lab Results   Component Value Date    TSH 1.683 05/17/2017         Lab Results   Component Value Date    HEPCAB Negative 07/19/2019       Lipids  Lab Results   Component Value Date    CHOL 202 (H) 02/20/2020     Lab Results   Component Value Date    HDL 67 02/20/2020     Lab Results   Component Value Date    LDLCALC 118.0 02/20/2020     Lab Results   Component Value Date    TRIG 85 02/20/2020     Lab Results   Component Value Date    CHOLHDL 33.2 02/20/2020           Assessment:       1. Age-related osteoporosis without current pathological fracture    2. Bipolar I disorder, most recent episode depressed, moderate    3. MS (multiple sclerosis)    4. History of breast cancer        Plan:         iSlvana was seen today for follow-up.    Diagnoses and all orders for this visit:    Age-related osteoporosis without current pathological fracture  Chronic; controlled  Cont fosamax    Bipolar I disorder, most recent episode depressed, moderate  Chronic controlled  F/u psych    MS (multiple sclerosis)  Chronic controlled  F/u with neurology    History of breast cancer  Chronic controlled  Mammogram in 6/2020 6/18/2020 11:00 AM [facility]  MAMMO1 [facility] MAMMO [facility]   6/18/2020  1:30 PM JUAN JOSE PalaciosC [facility] ORTHO [facility]   6/22/2020  2:45 PM Iwona Vora, PT [facility] OP RHB [facility]   6/24/2020 11:30 AM [facility] MRI1 450 LB LIMIT [facility] MRI [facility]   6/24/2020  2:45 PM Darinel Cordova, OT [facility] OP RHB [place] [address]   7/6/2020  3:00 PM Albert Schroeder Jr., MD [facility] ORTHO [facility]   7/24/2020 10:00 AM Carol Oneil [facility] MSC [facility]   8/25/2020  9:20 AM Jhonatan Avila III, MD [facility]         Disclaimer:  This note has been generated using voice-recognition software. There may be grammatical or spelling errors that have been missed during proof-reading

## 2020-05-26 NOTE — TELEPHONE ENCOUNTER
Spoke with pt about covid screening dates and location ([place]) covid on 6-3 and 6-15. Pt agreed and verbally understood. Information was also sent to pt my [facility] and she was advised

## 2020-05-26 NOTE — PATIENT INSTRUCTIONS
We were happy to see you today        For your Medication   For more information about side effects please visit medlineplus.gov      For your Referrals  We will reach out to urology to find out about covid testing       Please return to clinic in      3 months

## 2020-05-27 ENCOUNTER — CLINICAL SUPPORT (OUTPATIENT)
Dept: REHABILITATION | Facility: HOSPITAL | Age: 65
End: 2020-05-27
Payer: MEDICARE

## 2020-05-27 DIAGNOSIS — M25.632 DECREASED RANGE OF MOTION OF LEFT WRIST: ICD-10-CM

## 2020-05-27 DIAGNOSIS — R29.898 MUSCULAR DECONDITIONING: ICD-10-CM

## 2020-05-27 DIAGNOSIS — M79.89 SWELLING OF LEFT HAND: ICD-10-CM

## 2020-05-27 DIAGNOSIS — R53.81 PHYSICAL DECONDITIONING: ICD-10-CM

## 2020-05-27 DIAGNOSIS — Z91.81 AT LOW RISK FOR FALL: ICD-10-CM

## 2020-05-27 DIAGNOSIS — M25.532 LEFT WRIST PAIN: ICD-10-CM

## 2020-05-27 PROCEDURE — 97112 NEUROMUSCULAR REEDUCATION: CPT | Mod: PN

## 2020-05-27 PROCEDURE — 97110 THERAPEUTIC EXERCISES: CPT | Mod: PN

## 2020-05-27 PROCEDURE — 97140 MANUAL THERAPY 1/> REGIONS: CPT | Mod: PN

## 2020-05-27 NOTE — PROGRESS NOTES
Physical Therapy Treatment Note     Name: Silvana Quezada  Clinic Number: [id number]    Therapy Diagnosis:   Encounter Diagnoses   Name Primary?    At low risk for fall     Muscular deconditioning     Physical deconditioning      Physician: Jhonatan Avila III, MD    Visit Date: 5/27/2020    Physician Orders: PT Eval and Treat   Medical Diagnosis from Referral: W19.XXXA (ICD-10-CM) - Fall, initial encounter  Evaluation Date: 5/18/2020  Authorization Period Expiration: 05/13/2021  Plan of Care Expiration: 7/17/2020  Visit # / Visits authorized: 2/ 12     Time In: 1400  Time Out: 1440  Total Billable Time: 40 minutes (1NMR, 2TE)     Precautions: Fall, Multiple sclerosis, fatigue  Subjective     Pt reports: she get tired so quickly but is ready to begin PT.  She will be compliant with home exercise program once it is issued  Response to previous treatment: assess next session  Functional change: none noted    Pain: see OT note for reports of wrist pain, denies other pain     Objective     Silvana participated in neuromuscular re-education activities to improve: Balance, Coordination, Sense, Proprioception and Posture for 20 minutes. The following activities were included:    Neuro re-ed and endurance training nustep with B UE/LE extremities for reciprocal motion of all limbs on sci-fit x 8 min at level 1 at > or equal to 40 spm w/o rest.      -- toe taps    2 x 1' L1 4'' step  -- SLS w/ contral limb on step 2 x 30'' each side.        Silvana received therapeutic exercises to develop strength, endurance, ROM, flexibility and posture for 25 minutes including:    Standing Hip abduction    3 x 8 B  Standing Hip extension   3 x 8 B  Mini-squats     3 x 8 B  Heel raises     3 x 8 B    Patient Education Provided     Education provided:   - need for rest breaks to reduce fatigue    PT will issue HEP once it is clear that pt can tolerate it safely    Assessment     Pt needed >5 seated during session to reduce fatigue.  She  responded well to low reps with rest between sets standing and reps b/w exercises in sitting.   Silvana is progressing well towards her goals.   Pt prognosis is Good.     Pt will continue to benefit from skilled outpatient physical therapy to address the deficits listed in the problem list box on initial evaluation, provide pt/family education and to maximize pt's level of independence in the home and community environment.     Pt's spiritual, cultural and educational needs considered and pt agreeable to plan of care and goals.     Anticipated barriers to physical therapy: low endurance, fatigue, recent dx of COVID-19, caring for sick  with COVID-19    Short Term Goals (4 Weeks):   1.  Independent with initial HEP.  (PROGRESSING, NOT MET)  2.  Pt will perform nu-step at level 4 x 8 minutes without rests breaks going at least 50 step/min or greater.(PROGRESSING, NOT MET)     Long Term Goals (8 Weeks):   1.  Pt will score greater than or equal to 45/56 on the Jennings test/assessment without use of AD demonstrating overall improved functional mobility and balance and reduce fall risk. (PROGRESSING, NOT MET)  2.  Pt will walk < than or equal to 850 ft on the 6 minute walk test outdoors on multiple terrain without AD with 0 LOB for improved community ambulation.(PROGRESSING, NOT MET)  3.  Pt will be able to safely perform and tolerate high level ADL's without LOB. (PROGRESSING, NOT MET)  4. Pt will have 0 falls from start of PT sessions.(PROGRESSING, NOT MET)  5. Independent with updated HEP. (PROGRESSING, NOT MET)  6. Pt will have MMT score of 4+/5 in all major ms groups in B LE.(PROGRESSING, NOT MET)  7. Pt will ambulate on TM x 8 minutes with use of UE support with 0 LOB at 1.0 mph.(PROGRESSING, NOT MET)  8. Pt will perform floor to stand f/b stand to floor transfer with UE support with stand by assist and no cues for improved dynamic transfer, core stability and fall safety in home and community.(PROGRESSING, NOT  MET)  9. Pt will begin some form of community fitness to begin regular and consistent performance of exercise to continue maintenance of gains made in PT.(PROGRESSING, NOT MET)    Plan     Progress balance and endurance, give lots of rests, lower reps with more sets    Iwona Vora, PT

## 2020-05-27 NOTE — PATIENT INSTRUCTIONS
[facility]  OCCUPATIONAL THERAPY  HOME EXERCISE PROGRAM    Apply pressure to the webspace between your thumb & index finger. You can use your other hand or a chip clip. Hold for 30 seconds. Do 3 repetitions, 2x/day.

## 2020-05-27 NOTE — PROGRESS NOTES
"  Occupational Therapy Daily Treatment Note     Date: 5/27/2020  Name: Silvana Quezada  Clinic Number: [id number]    Therapy Diagnosis:   Encounter Diagnoses   Name Primary?    Left wrist pain     Swelling of left hand     Decreased range of motion of left wrist      Physician: Mc Cordero, CELIA    Physician Orders: eval and treat  Medical Diagnosis: S52.502D (ICD-10-CM) - Closed fracture of distal end of left radius with routine healing, unspecified fracture morphology, subsequent encounter  Surgical Procedure and Date: 12/27/2019,Open reduction and internal fixation of left distal radius fracture, 3 or more fragments, intra-articular, using Acumed volar   locking plate. /   Date of Injury/Onset: 12/13/2019  Evaluation Date: 1/16/2020  Insurance Authorization Period Expiration: 3/16/2020  Plan of Care Certification Period: 7/17/20  Date of Return to MD: 3/9/2020     FOTO: initial eval, 6th visit, 10th visit: 50% limitation      Visit # / Visits authorized: 16 / 24  Time In: 2:00 pm  Time Out: 2:45 pm  Total Treatment Time: 45 minutes  Total Timed minutes: 45 minutes    Precautions:  Standard, Fall and Weightbearing **SENSITIVE TO HEAT DUE TO MS, DEFER HEAT MODALITIES       Subjective     Pt reports: "I feel like I am still having trouble playing the piano again."   she was compliant with home exercise program given last session.   Response to previous treatment: improved pain, swelling, and ROM  Functional change: has been able to perform ADLs and most functional activities at home, limited with playing piano    Pain:  2/10  Location: left hand    Objective         Treatment consisted of the following:    **heat modalities deferred due to pt sensitive to heat due to MS     Silvana received the following manual therapy techniques for 10 minutes:   -Performed retrograde massage to L digits/hand/wrist  to decrease edema, improve joint mobility, decrease pain and improve lymphatic drainage to increase hand function. " "Performed scar massage to volar wrist area (not including one small healing area)   to decrease adhesions and improve tensile glide.        Silvana received therapeutic exercises for 35 minutes including:  AROM   Sup/Pro  Wrist  Ext/flx  RD/UD    X 30 reps each    AROM   Spreads  Wave  Hook  Straight fist  Composite fist  Lifts X 30 reps each   AROM thumb:  Rad abd/add  Palmar abd/add  IP blocks  Opposition  Pinky Slides    X 10 reps each    Prayer stretch 3 x 30"   Wrist wheel, flex/ext, sup/pro X 3 min   Wrist maze X 3 min   isospheres X 2 min, on table        Light wrist PRE, 3 ways 2 X 10 reps each, 1#, pain free   Supination/pronation, 1# 2 X 10 reps   Light grippers X 10 reps each, 3 grippers (not today)   PHG, 1st rung, [place]    X 10 reps   Digit extend, yellow RB X 10 reps   Light theraputty - yellow @ home  -molding  -gripping  -finger spread  -log rolls with tripod pinch/lateral key pinch     -1 min  -10 reps  -5 reps  -2 sets each   Thumb adductor stretch, see pt instructions 3 x 30"            Home Exercises and Education Provided     Education provided: cont HEP as tolerated. Added thumb stretch  - Progress towards goals     Written Home Exercises Provided: Patient instructed to cont prior HEP.  Exercises were reviewed and Silvana was able to demonstrate them prior to the end of the session.  Silvana demonstrated good  understanding of the education provided.   .   See EMR under Patient Instructions for exercises provided 3/2/2020.     Assessment     Pt tolerated tx fairly. Pt cont to c/o fatigue and requires frequent rest breaks. She is slow to progress but is back to most of her normal activities. She cont to report some trouble playing the piano and stretching her fingers. Pt participated fairly well.    Silvana is progressing fairly well, but slowly towards her goals and there are no updates to goals at this time. Pt prognosis continues as Good. Pt will continue to benefit from skilled outpatient " occupational therapy to address the deficits listed in the problem list on initial evaluation, provide pt/family education and to maximize pt's level of independence in the home and community environment.     Anticipated barriers to continued occupational therapy: MS    Pt's spiritual, cultural and educational needs considered and pt agreeable to plan of care and goals.    Goals     Goals:   The following goals were discussed with the patient and patient is in agreement with them as to be addressed in the treatment plan.   Long Term Goals (LTGs); to be met by discharge.  LTG #1: Pt will report a pain level of 1 out of 10 with ADLs and house hold tasks ---progressing    LTG #2: Pt will demo improved FOTO score by at least 20 points. ---progressing  LTG #3: Pt will return to prior level of function for ADLs and household management. ---progressing  LTG #4: Pt will demonstrate improved L wrist AROM WFL for performing daily tasks---progressing  LTG #5:  and pinch to be assessed when appropriate and goals set accordingly---met, 3/12/2020  Add: LTG #6: Pt will demonstrate improved L  strength by at least 3-5# for functional grasp of objects or when driving     Short Term Goals (STGs); to be met within 4 weeks (2/14/2020).  STG #1a: Pt will report 3 out of 10 pain level with ADLs and daily tasks.---met, 3/12/2020  STG #2a: Pt will report/demo [place] with feeding self.---met, 3/2/2020  STG #2b: Pt will report/demo [place] with driving.---progressing  STG #3a: Pt will demonstrate independence with issued HEP. ---met, 3/12/2020  STG #3b: Pt will demo improved L wrist AROM by at least 15 degrees needed to aid with functional use during ADLs---met, 3/2/2020  STG #4: Pt will demonstrate improved edema in L hand and wrist by at least 0.4 cm for increased use during daily tasks.---met, 3/2/2020         Plan     Continue skilled occupational therapy with individualized plan of care focusing on ROM and edema  management to maximize functional use of her L hand.       Updates/Grading for next session: cont to progress as able with ROM and light  strengthening.

## 2020-06-02 ENCOUNTER — CLINICAL SUPPORT (OUTPATIENT)
Dept: REHABILITATION | Facility: HOSPITAL | Age: 65
End: 2020-06-02
Payer: MEDICARE

## 2020-06-02 DIAGNOSIS — Z91.81 AT LOW RISK FOR FALL: ICD-10-CM

## 2020-06-02 DIAGNOSIS — M25.532 LEFT WRIST PAIN: ICD-10-CM

## 2020-06-02 DIAGNOSIS — R29.898 MUSCULAR DECONDITIONING: ICD-10-CM

## 2020-06-02 DIAGNOSIS — M25.632 DECREASED RANGE OF MOTION OF LEFT WRIST: ICD-10-CM

## 2020-06-02 DIAGNOSIS — M79.89 SWELLING OF LEFT HAND: ICD-10-CM

## 2020-06-02 DIAGNOSIS — R53.81 PHYSICAL DECONDITIONING: ICD-10-CM

## 2020-06-02 PROCEDURE — 97112 NEUROMUSCULAR REEDUCATION: CPT | Mod: PN

## 2020-06-02 PROCEDURE — 97140 MANUAL THERAPY 1/> REGIONS: CPT | Mod: PN

## 2020-06-02 PROCEDURE — 97110 THERAPEUTIC EXERCISES: CPT | Mod: PN

## 2020-06-02 NOTE — PROGRESS NOTES
Physical Therapy Treatment Note     Name: Silvana Quezada  Clinic Number: [id number]    Therapy Diagnosis:   Encounter Diagnoses   Name Primary?    At low risk for fall     Muscular deconditioning     Physical deconditioning      Physician: Jhonatan Avila III, MD    Visit Date: 6/2/2020    Physician Orders: PT Eval and Treat   Medical Diagnosis from Referral: W19.XXXA (ICD-10-CM) - Fall, initial encounter  Evaluation Date: 5/18/2020  Authorization Period Expiration: 05/13/2021  Plan of Care Expiration: 7/17/2020  Visit # / Visits authorized: 3/ 12     Time In: 1445  Time Out: 1530  Total Billable Time: 40 minutes (1NMR, 1TE)     Precautions: Fall, Multiple sclerosis, fatigue  Subjective     Pt reports: her  is doing better post COVID-19.  SHe is going to be checked for COVID again tomorrow. She stated that she has a procedure for her bladder on 6/17/2020.    She will be compliant with home exercise program once it is issued  Response to previous treatment: no adverse affects  Functional change: no falls     Pain: see OT note for reports of wrist pain, denies other pain     Objective     Silvana participated in neuromuscular re-education activities to improve: Balance, Coordination, Sense, Proprioception and Posture for 30 minutes. The following activities were included:    Neuro re-ed and endurance training nustep with B UE/LE extremities for reciprocal motion of all limbs on sci-fit x 8 min at level 1 at > or equal to 40 spm w/o rest.   -- sit to stand     2 x 5 with B UE support   -- toe taps    4 x 30'' L1 4'' step  -- SLS w/ contral limb on step 4 x 30'' each side.        Silvana received therapeutic exercises to develop strength, endurance, ROM, flexibility and posture for 10 minutes including:    Standing Hip abduction    3 x 8 B  Standing Hip extension   3 x 8 B  Heel raises     3 x 8 B    Patient Education Provided     Education provided:   - need for rest breaks to reduce fatigue    PT will issue HEP  once it is clear that pt can tolerate it safely    Assessment     Pt needed >6 seated during session to reduce fatigue.  She still has fatigue limiting session but she is less SOB during session.  Pt responds well to cues for upright posture with a point of visual focus to prevent downward gaze.     Silvana is progressing well towards her goals.   Pt prognosis is Good.     Pt will continue to benefit from skilled outpatient physical therapy to address the deficits listed in the problem list box on initial evaluation, provide pt/family education and to maximize pt's level of independence in the home and community environment.     Pt's spiritual, cultural and educational needs considered and pt agreeable to plan of care and goals.     Anticipated barriers to physical therapy: low endurance, fatigue, recent dx of COVID-19, caring for sick  with COVID-19    Short Term Goals (4 Weeks):   1.  Independent with initial HEP.  (PROGRESSING, NOT MET)  2.  Pt will perform nu-step at level 4 x 8 minutes without rests breaks going at least 50 step/min or greater.(PROGRESSING, NOT MET)     Long Term Goals (8 Weeks):   1.  Pt will score greater than or equal to 45/56 on the Jennings test/assessment without use of AD demonstrating overall improved functional mobility and balance and reduce fall risk. (PROGRESSING, NOT MET)  2.  Pt will walk < than or equal to 850 ft on the 6 minute walk test outdoors on multiple terrain without AD with 0 LOB for improved community ambulation.(PROGRESSING, NOT MET)  3.  Pt will be able to safely perform and tolerate high level ADL's without LOB. (PROGRESSING, NOT MET)  4. Pt will have 0 falls from start of PT sessions.(PROGRESSING, NOT MET)  5. Independent with updated HEP. (PROGRESSING, NOT MET)  6. Pt will have MMT score of 4+/5 in all major ms groups in B LE.(PROGRESSING, NOT MET)  7. Pt will ambulate on TM x 8 minutes with use of UE support with 0 LOB at 1.0 mph.(PROGRESSING, NOT MET)  8. Pt  will perform floor to stand f/b stand to floor transfer with UE support with stand by assist and no cues for improved dynamic transfer, core stability and fall safety in home and community.(PROGRESSING, NOT MET)  9. Pt will begin some form of community fitness to begin regular and consistent performance of exercise to continue maintenance of gains made in PT.(PROGRESSING, NOT MET)    Plan     Progress balance and endurance, give lots of rests, lower reps with more sets    Iwona Vora, PT

## 2020-06-02 NOTE — PROGRESS NOTES
"  Occupational Therapy Daily Treatment Note     Date: 6/2/2020  Name: Silvana Quezada  Clinic Number: [id number]    Therapy Diagnosis:   Encounter Diagnoses   Name Primary?    Left wrist pain     Swelling of left hand     Decreased range of motion of left wrist      Physician: Mc Cordero, CELIA    Physician Orders: eval and treat  Medical Diagnosis: S52.502D (ICD-10-CM) - Closed fracture of distal end of left radius with routine healing, unspecified fracture morphology, subsequent encounter  Surgical Procedure and Date: 12/27/2019,Open reduction and internal fixation of left distal radius fracture, 3 or more fragments, intra-articular, using Acumed volar   locking plate. /   Date of Injury/Onset: 12/13/2019  Evaluation Date: 1/16/2020  Insurance Authorization Period Expiration: 3/16/2020  Plan of Care Certification Period: 7/17/20  Date of Return to MD: 3/9/2020     FOTO: initial eval, 6th visit, 10th visit: 50% limitation      Visit # / Visits authorized: 17 / 24  Time In: 2:05 pm  Time Out: 2:45 pm  Total Treatment Time: 40 minutes  Total Timed minutes: 40 minutes    Precautions:  Standard, Fall and Weightbearing **SENSITIVE TO HEAT DUE TO MS, DEFER HEAT MODALITIES       Subjective     Pt reports: "Its getting somewhat better."   she was compliant with home exercise program given last session.   Response to previous treatment: improved pain, swelling, and ROM  Functional change: has been able to perform ADLs and most functional activities at home, limited with playing piano    Pain:  2/10  Location: left hand    Objective       Treatment consisted of the following:    **heat modalities deferred due to pt sensitive to heat due to MS     Silvana received the following manual therapy techniques for 10 minutes:   -Performed retrograde massage to L digits/hand/wrist  to decrease edema, improve joint mobility, decrease pain and improve lymphatic drainage to increase hand function. Performed scar massage to volar wrist " "area (not including one small healing area)   to decrease adhesions and improve tensile glide.        Silvana received therapeutic exercises for 30 minutes including:  AROM   Sup/Pro  Wrist  Ext/flx  RD/UD    X 30 reps each    AROM   Spreads  Wave  Hook  Straight fist  Composite fist  Lifts X 30 reps each   AROM thumb:  Rad abd/add  Palmar abd/add  IP blocks  Opposition  Pinky Slides    X 10 reps each    Prayer stretch 3 x 30"   Wrist wheel, flex/ext, sup/pro X 3 min   Wrist maze X 3 min   isospheres X 2 min, on table        Light wrist PRE, 3 ways 2 X 10 reps each, 1#, pain free   Supination/pronation, 1# 2 X 10 reps   Light grippers X 10 reps each, 3 grippers (not today)   PHG, 1st rung, gold spring    X 10 reps   Digit extend, yellow RB X 10 reps   Light theraputty - yellow @ home  -molding  -gripping  -finger spread  -log rolls with tripod pinch/lateral key pinch     -1 min  -10 reps  -5 reps  -2 sets each   Thumb adductor stretch, see pt instructions 3 x 30"            Home Exercises and Education Provided     Education provided: cont HEP as tolerated. Added thumb stretch  - Progress towards goals     Written Home Exercises Provided: Patient instructed to cont prior HEP.  Exercises were reviewed and Silvana was able to demonstrate them prior to the end of the session.  Silvana demonstrated good  understanding of the education provided.   .   See EMR under Patient Instructions for exercises provided 3/2/2020.     Assessment     Pt tolerated tx better today. Increased focus on tolerance to exercises and functional use and strengthening of the hand and wrist. She did well with progression of established exercises and new challenges. She is tolerating sessions better each time. Increased resistance and progression of strengthening exercises next session..     Silvana is progressing fairly well, but slowly towards her goals and there are no updates to goals at this time. Pt prognosis continues as Good. Pt will continue to " benefit from skilled outpatient occupational therapy to address the deficits listed in the problem list on initial evaluation, provide pt/family education and to maximize pt's level of independence in the home and community environment.     Anticipated barriers to continued occupational therapy: MS    Pt's spiritual, cultural and educational needs considered and pt agreeable to plan of care and goals.    Goals     Goals:   The following goals were discussed with the patient and patient is in agreement with them as to be addressed in the treatment plan.   Long Term Goals (LTGs); to be met by discharge.  LTG #1: Pt will report a pain level of 1 out of 10 with ADLs and house hold tasks ---progressing    LTG #2: Pt will demo improved FOTO score by at least 20 points. ---progressing  LTG #3: Pt will return to prior level of function for ADLs and household management. ---progressing  LTG #4: Pt will demonstrate improved L wrist AROM WFL for performing daily tasks---progressing  LTG #5:  and pinch to be assessed when appropriate and goals set accordingly---met, 3/12/2020  Add: LTG #6: Pt will demonstrate improved L  strength by at least 3-5# for functional grasp of objects or when driving     Short Term Goals (STGs); to be met within 4 weeks (2/14/2020).  STG #1a: Pt will report 3 out of 10 pain level with ADLs and daily tasks.---met, 3/12/2020  STG #2a: Pt will report/demo [place] with feeding self.---met, 3/2/2020  STG #2b: Pt will report/demo [place] with driving.---progressing  STG #3a: Pt will demonstrate independence with issued HEP. ---met, 3/12/2020  STG #3b: Pt will demo improved L wrist AROM by at least 15 degrees needed to aid with functional use during ADLs---met, 3/2/2020  STG #4: Pt will demonstrate improved edema in L hand and wrist by at least 0.4 cm for increased use during daily tasks.---met, 3/2/2020         Plan     Continue skilled occupational therapy with individualized plan of care  focusing on ROM and edema management to maximize functional use of her L hand.       Updates/Grading for next session: cont to progress as able with ROM and light  strengthening.

## 2020-06-03 ENCOUNTER — CLINICAL SUPPORT (OUTPATIENT)
Dept: REHABILITATION | Facility: HOSPITAL | Age: 65
End: 2020-06-03
Payer: MEDICARE

## 2020-06-03 DIAGNOSIS — R29.898 MUSCULAR DECONDITIONING: ICD-10-CM

## 2020-06-03 DIAGNOSIS — R53.81 PHYSICAL DECONDITIONING: ICD-10-CM

## 2020-06-03 DIAGNOSIS — Z91.81 AT LOW RISK FOR FALL: ICD-10-CM

## 2020-06-03 PROCEDURE — 97112 NEUROMUSCULAR REEDUCATION: CPT | Mod: PN,CQ

## 2020-06-03 PROCEDURE — 97110 THERAPEUTIC EXERCISES: CPT | Mod: PN,CQ

## 2020-06-03 NOTE — PROGRESS NOTES
Physical Therapy Treatment Note     Name: Silvana Quezada  Clinic Number: [id number]    Therapy Diagnosis:   Encounter Diagnoses   Name Primary?    At low risk for fall     Muscular deconditioning     Physical deconditioning      Physician: Jhonatan Avila III, MD    Visit Date: 6/3/2020    Physician Orders: PT Eval and Treat   Medical Diagnosis from Referral: W19.XXXA (ICD-10-CM) - Fall, initial encounter  Evaluation Date: 5/18/2020  Authorization Period Expiration: 05/13/2021  Plan of Care Expiration: 7/17/2020  Visit # / Visits authorized: 4/ 12     Time In: 1:45 PM   Time Out: 2:30 PM   Total Billable Time: 45 minutes (2NMR, 1TE)     Precautions: Fall, Multiple sclerosis, fatigue  Subjective     Pt reports: pt reports  has not declined in health since sharing he was tested positive for COVID 19. She sates she has minimal B LE soreness, but is agreeable to PT session .   She will be compliant with home exercise program once it is issued  Response to previous treatment: no adverse affects  Functional change: no falls     Pain: no new complaints of pain reported upon arrival.     Objective     Silvana participated in neuromuscular re-education activities to improve: Balance, Coordination, Sense, Proprioception and Posture for 30 minutes. The following activities were included:    Neuro re-ed and endurance training nustep with B UE/LE extremities for reciprocal motion of all limbs on sci-fit x 8 min at level 1 at > or equal to 40 spm w/o rest.   -- sit to stand     2 x 5 with B UE support   -- toe taps    4 x 30'' L1 4'' step  -- SLS w/ contral limb on step 4 x 30'' each side.  -- hurdles    4 hurdles w use of 1 UE only x 4 trials     In //bars      Silvana received therapeutic exercises to develop strength, endurance, ROM, flexibility and posture for 10 minutes including:    Standing Hip abduction    3 x 8 B  Standing Hip extension   3 x 8 B  Heel raises     3 x 8 B    Patient Education Provided     Education  provided:   - need for rest breaks to reduce fatigue    PT will issue HEP once it is clear that pt can tolerate it safely    Assessment     Pt completed session with no reports of pain. Experienced no loss of balance during standing therex. No adverse reactions to PT session.    Silvana is progressing well towards her goals.   Pt prognosis is Good.     Pt will continue to benefit from skilled outpatient physical therapy to address the deficits listed in the problem list box on initial evaluation, provide pt/family education and to maximize pt's level of independence in the home and community environment.     Pt's spiritual, cultural and educational needs considered and pt agreeable to plan of care and goals.     Anticipated barriers to physical therapy: low endurance, fatigue, recent dx of COVID-19, caring for sick  with COVID-19    Short Term Goals (4 Weeks):   1.  Independent with initial HEP.  (PROGRESSING, NOT MET)  2.  Pt will perform nu-step at level 4 x 8 minutes without rests breaks going at least 50 step/min or greater.(PROGRESSING, NOT MET)     Long Term Goals (8 Weeks):   1.  Pt will score greater than or equal to 45/56 on the Jennings test/assessment without use of AD demonstrating overall improved functional mobility and balance and reduce fall risk. (PROGRESSING, NOT MET)  2.  Pt will walk < than or equal to 850 ft on the 6 minute walk test outdoors on multiple terrain without AD with 0 LOB for improved community ambulation.(PROGRESSING, NOT MET)  3.  Pt will be able to safely perform and tolerate high level ADL's without LOB. (PROGRESSING, NOT MET)  4. Pt will have 0 falls from start of PT sessions.(PROGRESSING, NOT MET)  5. Independent with updated HEP. (PROGRESSING, NOT MET)  6. Pt will have MMT score of 4+/5 in all major ms groups in B LE.(PROGRESSING, NOT MET)  7. Pt will ambulate on TM x 8 minutes with use of UE support with 0 LOB at 1.0 mph.(PROGRESSING, NOT MET)  8. Pt will perform floor to  stand f/b stand to floor transfer with UE support with stand by assist and no cues for improved dynamic transfer, core stability and fall safety in home and community.(PROGRESSING, NOT MET)  9. Pt will begin some form of community fitness to begin regular and consistent performance of exercise to continue maintenance of gains made in PT.(PROGRESSING, NOT MET)    Plan2     Progress balance and endurance, give lots of rests, lower reps with more sets    Edward Parsons, PTA

## 2020-06-04 ENCOUNTER — LAB VISIT (OUTPATIENT)
Dept: INTERNAL MEDICINE | Facility: CLINIC | Age: 65
End: 2020-06-04
Payer: MEDICARE

## 2020-06-04 DIAGNOSIS — Z13.9 SCREENING FOR UNSPECIFIED CONDITION: ICD-10-CM

## 2020-06-04 PROCEDURE — U0003 INFECTIOUS AGENT DETECTION BY NUCLEIC ACID (DNA OR RNA); SEVERE ACUTE RESPIRATORY SYNDROME CORONAVIRUS 2 (SARS-COV-2) (CORONAVIRUS DISEASE [COVID-19]), AMPLIFIED PROBE TECHNIQUE, MAKING USE OF HIGH THROUGHPUT TECHNOLOGIES AS DESCRIBED BY CMS-2020-01-R: HCPCS

## 2020-06-05 LAB — SARS-COV-2 RNA RESP QL NAA+PROBE: NOT DETECTED

## 2020-06-10 ENCOUNTER — CLINICAL SUPPORT (OUTPATIENT)
Dept: REHABILITATION | Facility: HOSPITAL | Age: 65
End: 2020-06-10
Payer: MEDICARE

## 2020-06-10 DIAGNOSIS — M25.532 LEFT WRIST PAIN: ICD-10-CM

## 2020-06-10 DIAGNOSIS — R29.898 MUSCULAR DECONDITIONING: ICD-10-CM

## 2020-06-10 DIAGNOSIS — R53.81 PHYSICAL DECONDITIONING: ICD-10-CM

## 2020-06-10 DIAGNOSIS — Z91.81 AT LOW RISK FOR FALL: ICD-10-CM

## 2020-06-10 DIAGNOSIS — M25.632 DECREASED RANGE OF MOTION OF LEFT WRIST: ICD-10-CM

## 2020-06-10 DIAGNOSIS — M79.89 SWELLING OF LEFT HAND: ICD-10-CM

## 2020-06-10 PROCEDURE — 97110 THERAPEUTIC EXERCISES: CPT | Mod: PN,CQ

## 2020-06-10 PROCEDURE — 97110 THERAPEUTIC EXERCISES: CPT | Mod: PN

## 2020-06-10 NOTE — PROGRESS NOTES
"  Occupational Therapy Daily Treatment Note     Date: 6/10/2020  Name: Silvana Quezada  Clinic Number: [id number]    Therapy Diagnosis:   Encounter Diagnoses   Name Primary?    Left wrist pain     Swelling of left hand     Decreased range of motion of left wrist      Physician: Mc Cordero, CELIA    Physician Orders: eval and treat  Medical Diagnosis: S52.502D (ICD-10-CM) - Closed fracture of distal end of left radius with routine healing, unspecified fracture morphology, subsequent encounter  Surgical Procedure and Date: 12/27/2019,Open reduction and internal fixation of left distal radius fracture, 3 or more fragments, intra-articular, using Acumed volar   locking plate. /   Date of Injury/Onset: 12/13/2019  Evaluation Date: 1/16/2020  Insurance Authorization Period Expiration: 3/16/2020  Plan of Care Certification Period: 7/17/20  Date of Return to MD: 3/9/2020     FOTO: initial eval, 6th visit, 10th visit: 50% limitation      Visit # / Visits authorized: 18 / 24  Time In: 3:27 pm  Time Out: 4:05 pm  Total Treatment Time: 38 minutes  Total Timed minutes: 38 minutes    Precautions:  Standard, Fall and Weightbearing **SENSITIVE TO HEAT DUE TO MS, DEFER HEAT MODALITIES       Subjective     Pt reports: "Its doing better."   she was compliant with home exercise program given last session.   Response to previous treatment: improved pain, swelling, and ROM  Functional change: has been able to perform ADLs and most functional activities at home, limited with playing piano    Pain:  2/10  Location: left hand    Objective       Treatment consisted of the following:    **heat modalities deferred due to pt sensitive to heat due to MS     Silvana received the following manual therapy techniques for 5 minutes:   -Performed retrograde massage to L digits/hand/wrist  to decrease edema, improve joint mobility, decrease pain and improve lymphatic drainage to increase hand function. Performed scar massage to volar wrist area (not " "including one small healing area)   to decrease adhesions and improve tensile glide.        Silvana received therapeutic exercises for 30 minutes including:  AROM   Sup/Pro  Wrist  Ext/flx  RD/UD    X 30 reps each    AROM   Spreads  Wave  Hook  Straight fist  Composite fist  Lifts X 30 reps each   AROM thumb:  Rad abd/add  Palmar abd/add  IP blocks  Opposition  Pinky Slides    X 10 reps each    Prayer stretch 3 x 30"   Wrist wheel, flex/ext, sup/pro X 3 min   Wrist maze X 3 min   isospheres X 2 min, on table        Light wrist PRE, 3 ways 2 X 10 reps each, 1#, pain free   Supination/pronation, 1# 2 X 10 reps       PHG, 1st rung, gold spring    2 X 10 reps   Digit extend, yellow RB X 10 reps   Light theraputty - yellow @ home  -molding  -gripping  -finger spread  -log rolls with tripod pinch/lateral key pinch     -1 min  -10 reps  -5 reps  -2 sets each   Thumb adductor stretch, see pt instructions 3 x 30"            Home Exercises and Education Provided     Education provided: cont HEP as tolerated. Added thumb stretch  - Progress towards goals     Written Home Exercises Provided: Patient instructed to cont prior HEP.  Exercises were reviewed and Silvana was able to demonstrate them prior to the end of the session.  Silvana demonstrated good  understanding of the education provided.   .   See EMR under Patient Instructions for exercises provided 3/2/2020.     Assessment     Pt tolerated tx better today.  Increased tolerance and ability to perform exercises with less pain noted. She did well with minor added resistance for  and increased reps with some movements. Still noted stiffness and instability in the wrist but completing pain free strengthening and ROM at this time due to severity of break. Progressing well thus far.     Silvana is progressing fairly well, but slowly towards her goals and there are no updates to goals at this time. Pt prognosis continues as Good. Pt will continue to benefit from skilled outpatient " occupational therapy to address the deficits listed in the problem list on initial evaluation, provide pt/family education and to maximize pt's level of independence in the home and community environment.     Anticipated barriers to continued occupational therapy: MS    Pt's spiritual, cultural and educational needs considered and pt agreeable to plan of care and goals.    Goals     Goals:   The following goals were discussed with the patient and patient is in agreement with them as to be addressed in the treatment plan.   Long Term Goals (LTGs); to be met by discharge.  LTG #1: Pt will report a pain level of 1 out of 10 with ADLs and house hold tasks ---progressing    LTG #2: Pt will demo improved FOTO score by at least 20 points. ---progressing  LTG #3: Pt will return to prior level of function for ADLs and household management. ---progressing  LTG #4: Pt will demonstrate improved L wrist AROM WFL for performing daily tasks---progressing  LTG #5:  and pinch to be assessed when appropriate and goals set accordingly---met, 3/12/2020  Add: LTG #6: Pt will demonstrate improved L  strength by at least 3-5# for functional grasp of objects or when driving     Short Term Goals (STGs); to be met within 4 weeks (2/14/2020).  STG #1a: Pt will report 3 out of 10 pain level with ADLs and daily tasks.---met, 3/12/2020  STG #2a: Pt will report/demo [place] with feeding self.---met, 3/2/2020  STG #2b: Pt will report/demo [place] with driving.---progressing  STG #3a: Pt will demonstrate independence with issued HEP. ---met, 3/12/2020  STG #3b: Pt will demo improved L wrist AROM by at least 15 degrees needed to aid with functional use during ADLs---met, 3/2/2020  STG #4: Pt will demonstrate improved edema in L hand and wrist by at least 0.4 cm for increased use during daily tasks.---met, 3/2/2020         Plan     Continue skilled occupational therapy with individualized plan of care focusing on ROM and edema  management to maximize functional use of her L hand.       Updates/Grading for next session: cont to progress as able with ROM and light  strengthening.

## 2020-06-10 NOTE — PROGRESS NOTES
"  Physical Therapy Treatment Note     Name: Silvana Quezada  Clinic Number: [id number]    Therapy Diagnosis:   Encounter Diagnoses   Name Primary?    At low risk for fall     Muscular deconditioning     Physical deconditioning      Physician: Jhonatan Avila III, MD    Visit Date: 6/10/2020    Physician Orders: PT Eval and Treat   Medical Diagnosis from Referral: W19.XXXA (ICD-10-CM) - Fall, initial encounter  Evaluation Date: 5/18/2020  Authorization Period Expiration: 05/13/2021  Plan of Care Expiration: 7/17/2020  Visit # / Visits authorized: 4/ 12     Time In: 2:50 PM   Time Out: 3:25 PM   Total Billable Time: 30 minutes (2TE)     Precautions: Fall, Multiple sclerosis, fatigue  Subjective     Pt reports: arrived 20 min late for session, agreeable to abbreviated session. No reports of pain upon arrival  She will be compliant with home exercise program once it is issued  Response to previous treatment: no adverse affects  Functional change: no falls     Pain: no new complaints of pain reported upon arrival.     Objective     Silvana participated in neuromuscular re-education activities to improve: Balance, Coordination, Sense, Proprioception and Posture for 30 minutes. The following activities were included:    Neuro re-ed and endurance training nustep with B UE/LE extremities for reciprocal motion of all limbs on sci-fit x 8 min at level 1 at > or equal to 10pm w/o rest.   -- sit to stand     2 x 5 with B UE support   -- toe taps    4 x 30'' L1 4'' step(out of time)  -- SLS w/ contral limb on step 4 x 30'' each side.(out of time)  -- hurdles    4 hurdles w use of 1 UE only x 4 trials (out of time)     In //bars      Silvana received therapeutic exercises to develop strength, endurance, ROM, flexibility and posture for 20 minutes including:  Seated march     2 min  Standing Hip abduction    2 x 10 B  Standing Hip extension   2 x 10 B  Heel raises     2 x 10 B  Seated hip Add    5"x15 w tball  Patient Education Provided "     Education provided:   - need for rest breaks to reduce fatigue    PT will issue HEP once it is clear that pt can tolerate it safely    Assessment     Pt completed session with no reports of pain. Limited session due to pt's late arrival. No adverse reactions to PT session.    Silvana is progressing well towards her goals.   Pt prognosis is Good.     Pt will continue to benefit from skilled outpatient physical therapy to address the deficits listed in the problem list box on initial evaluation, provide pt/family education and to maximize pt's level of independence in the home and community environment.     Pt's spiritual, cultural and educational needs considered and pt agreeable to plan of care and goals.     Anticipated barriers to physical therapy: low endurance, fatigue, recent dx of COVID-19, caring for sick  with COVID-19    Short Term Goals (4 Weeks):   1.  Independent with initial HEP.  (PROGRESSING, NOT MET)  2.  Pt will perform nu-step at level 4 x 8 minutes without rests breaks going at least 50 step/min or greater.(PROGRESSING, NOT MET)     Long Term Goals (8 Weeks):   1.  Pt will score greater than or equal to 45/56 on the Jennings test/assessment without use of AD demonstrating overall improved functional mobility and balance and reduce fall risk. (PROGRESSING, NOT MET)  2.  Pt will walk < than or equal to 850 ft on the 6 minute walk test outdoors on multiple terrain without AD with 0 LOB for improved community ambulation.(PROGRESSING, NOT MET)  3.  Pt will be able to safely perform and tolerate high level ADL's without LOB. (PROGRESSING, NOT MET)  4. Pt will have 0 falls from start of PT sessions.(PROGRESSING, NOT MET)  5. Independent with updated HEP. (PROGRESSING, NOT MET)  6. Pt will have MMT score of 4+/5 in all major ms groups in B LE.(PROGRESSING, NOT MET)  7. Pt will ambulate on TM x 8 minutes with use of UE support with 0 LOB at 1.0 mph.(PROGRESSING, NOT MET)  8. Pt will perform floor to  stand f/b stand to floor transfer with UE support with stand by assist and no cues for improved dynamic transfer, core stability and fall safety in home and community.(PROGRESSING, NOT MET)  9. Pt will begin some form of community fitness to begin regular and consistent performance of exercise to continue maintenance of gains made in PT.(PROGRESSING, NOT MET)    Plan2     Progress balance and endurance, give lots of rests, lower reps with more sets    Edward Parsnos, PTA

## 2020-06-11 NOTE — ANESTHESIA PAT ROS NOTE
06/11/2020  Silvana Quezada is a 64 y.o., female.      Pre-op Assessment          Review of Systems  Anesthesia Hx:  No problems with previous Anesthesia  Denies Family Hx of Anesthesia complications.   Denies Personal Hx of Anesthesia complications.   Social:  Social Alcohol Use, Non-Smoker    Hematology/Oncology:         -- Cancer in past history: Breast surgery, chemotherapy and radiation  Oncology Comments: 2000-right Breast-S/P-Lumpectomy     EENT/Dental:   Wears glasses   Cardiovascular:   Exercise tolerance: good Stationary bike-x2  per week & hand held weight exercise   Pulmonary:   Pneumonia Shortness of breath Covid-19 Positive -x2 months ago at [facility] x3 days admit   Renal/:   Urge incontinence   Neurological:   Neuromuscular Disease, [id number] per week for wrist fracture in 12/2019   Psych:   Psychiatric History depression Bipolar     Mile Leal RN  6/11/20       Anesthesia Assessment: Preoperative EQUATION    Planned Procedure: Procedure(s) (LRB):  CYSTOSCOPY,WITH BOTULINUM TOXIN INJECTION 200units (N/A)  Requested Anesthesia Type:Monitor Anesthesia Care  Surgeon: Jayesh Ross MD  Service: Urology  Known or anticipated Date of Surgery:6/17/2020    Surgeon notes: reviewed and Urge incontinence    Previous anesthesia records:12/27/19-ORIF, Wrist-left-no apparent anesthetic complications    Anesthesia Hx:  History of prior surgery of interest to airway management or planning: Denies Family Hx of Anesthesia complications.   Denies Personal Hx of Anesthesia complications.     Airway/Jaw/Neck:  Airway Findings: Mouth Opening: Normal Tongue: Normal  General Airway Assessment: Adult  Mallampati: II  Improves to II with phonation.  TM Distance: Normal, at least 6 cm  Jaw/Neck Findings:     Neck ROM: Normal ROM          Last PCP note: within 1 month , within [facility] , Dr. N.  Hill-F/U visit  Subspecialty notes: Neurology, Ortho, Psychiatry, Otolaryngology/Plastic Surgery Visit    Other important co-morbidities: none      Past Medical History:   Diagnosis Date    Bipolar 1 disorder      Breast cancer       right    Breast cyst      Closed fracture of proximal end of right humerus 3/9/2015    Depression      History of right shoulder fracture      MS (multiple sclerosis)       presented as dizzines, dx vision,     Osteoporosis, unspecified      Pneumonia 3/27/2020       Tests already available:  Results have been reviewed. Lab-6/4/20-COVID-19/ 5/13/20 [facility] Screen w/reflex/B-12/Vit.D/CMP/CBC/CXR-3/27/20/EKG-3/26/20/Labs-3/29/20/ Labs-3/27/20// Labs-3/26/20     Plan:   Phone pending   Testing:  None Needed      Patient  has previously scheduled Medical Appointment:Appointment on 6/17/20, prior to the surgery date.    Navigation: Phone Completed                                     Consults scheduled.None needed at this time.                            Straight Line to surgery.               No tests, anesthesia preop clinic visit, or consult required.                             Mile Leal RN  6/11/20

## 2020-06-15 ENCOUNTER — LAB VISIT (OUTPATIENT)
Dept: FAMILY MEDICINE | Facility: CLINIC | Age: 65
End: 2020-06-15
Payer: MEDICARE

## 2020-06-15 ENCOUNTER — CLINICAL SUPPORT (OUTPATIENT)
Dept: REHABILITATION | Facility: HOSPITAL | Age: 65
End: 2020-06-15
Payer: MEDICARE

## 2020-06-15 DIAGNOSIS — Z01.818 PRE-OP EXAM: ICD-10-CM

## 2020-06-15 DIAGNOSIS — R53.81 PHYSICAL DECONDITIONING: ICD-10-CM

## 2020-06-15 DIAGNOSIS — R29.898 MUSCULAR DECONDITIONING: ICD-10-CM

## 2020-06-15 DIAGNOSIS — Z91.81 AT LOW RISK FOR FALL: ICD-10-CM

## 2020-06-15 PROCEDURE — U0003 INFECTIOUS AGENT DETECTION BY NUCLEIC ACID (DNA OR RNA); SEVERE ACUTE RESPIRATORY SYNDROME CORONAVIRUS 2 (SARS-COV-2) (CORONAVIRUS DISEASE [COVID-19]), AMPLIFIED PROBE TECHNIQUE, MAKING USE OF HIGH THROUGHPUT TECHNOLOGIES AS DESCRIBED BY CMS-2020-01-R: HCPCS

## 2020-06-15 PROCEDURE — 97112 NEUROMUSCULAR REEDUCATION: CPT | Mod: PN

## 2020-06-15 PROCEDURE — 97110 THERAPEUTIC EXERCISES: CPT | Mod: PN

## 2020-06-15 NOTE — PROGRESS NOTES
Physical Therapy Treatment Note     Name: Silvana Quezada  Clinic Number: [id number]    Therapy Diagnosis:   Encounter Diagnoses   Name Primary?    At low risk for fall     Muscular deconditioning     Physical deconditioning      Physician: Jhonatan Avila III, MD    Visit Date: 6/15/2020    Physician Orders: PT Eval and Treat   Medical Diagnosis from Referral: W19.XXXA (ICD-10-CM) - Fall, initial encounter  Evaluation Date: 5/18/2020  Authorization Period Expiration: 05/13/2021  Plan of Care Expiration: 7/17/2020  Visit # / Visits authorized: 5/ 12 FOTO done     Time In: 2:45 PM   Time Out: 3:30 PM   Total Billable Time: 45 minutes (1TE, 2NMR)     Precautions: Fall, Multiple sclerosis, fatigue  Subjective     Pt reports: that her  is improving from COVID and she stated that she is having her bladder procedure soon and will need to cancel her appt for Wednesday.   She will be compliant with home exercise program once it is issued next session  Response to previous treatment: no adverse affects  Functional change: no falls and decreased SOB    Pain: no new complaints of pain reported upon arrival.     Objective     Silvana participated in neuromuscular re-education activities to improve: Balance, Coordination, Sense, Proprioception and Posture for 25 minutes. The following activities were included:    Neuro re-ed and endurance training nustep with B UE/LE extremities for reciprocal motion of all limbs on sci-fit x 8 min at level 1 at > or equal to 50 pm w/o rest.   -- sit to stand     2 x 10 without hand from elevated mat.   -- step ups    3 x 30'' Blue step  -- SLS w/ contral limb on step 3 x 30'' each side (blue step)  -- hurdles    4 hurdles w use of 1 UE only x 4 trials         Silvana received therapeutic exercises to develop strength, endurance, ROM, flexibility and posture for 20 minutes including:    Seated march     HEP  Standing Hip abduction    2 x 8 B  Standing Hip extension   2 x 8 B  Heel  raises     3 x 8 B  Mini- squats     3 x 8 B    Patient Education Provided     Education provided:   - need for rest breaks to reduce fatigue    PT will issue HEP once it is clear that pt can tolerate it safely    Assessment     Pt required only 4 seated rests today and she was not SOB. She responds better to no more with 5-8 reps over 10 or more reps.  She needs breaks and less reps to recover from fatigue.     Silvana is progressing well towards her goals.   Pt prognosis is Good.     Pt will continue to benefit from skilled outpatient physical therapy to address the deficits listed in the problem list box on initial evaluation, provide pt/family education and to maximize pt's level of independence in the home and community environment.     Pt's spiritual, cultural and educational needs considered and pt agreeable to plan of care and goals.     Anticipated barriers to physical therapy: low endurance, fatigue, recent dx of COVID-19, caring for sick  with COVID-19    Short Term Goals (4 Weeks):   1.  Independent with initial HEP.  (PROGRESSING, NOT MET)  2.  Pt will perform nu-step at level 4 x 8 minutes without rests breaks going at least 50 step/min or greater.(PROGRESSING, NOT MET) 6/15/2020 level 2     Long Term Goals (8 Weeks):   1.  Pt will score greater than or equal to 45/56 on the Jennings test/assessment without use of AD demonstrating overall improved functional mobility and balance and reduce fall risk. (PROGRESSING, NOT MET)  2.  Pt will walk < than or equal to 850 ft on the 6 minute walk test outdoors on multiple terrain without AD with 0 LOB for improved community ambulation.(PROGRESSING, NOT MET)  3.  Pt will be able to safely perform and tolerate high level ADL's without LOB. (PROGRESSING, NOT MET)  4. Pt will have 0 falls from start of PT sessions.(PROGRESSING, NOT MET)  5. Independent with updated HEP. (PROGRESSING, NOT MET)  6. Pt will have MMT score of 4+/5 in all major ms groups in B  MADONNA.(PROGRESSING, NOT MET)  7. Pt will ambulate on TM x 8 minutes with use of UE support with 0 LOB at 1.0 mph.(PROGRESSING, NOT MET)  8. Pt will perform floor to stand f/b stand to floor transfer with UE support with stand by assist and no cues for improved dynamic transfer, core stability and fall safety in home and community.(PROGRESSING, NOT MET)  9. Pt will begin some form of community fitness to begin regular and consistent performance of exercise to continue maintenance of gains made in PT.(PROGRESSING, NOT MET)    Plan2     Progress balance and endurance, give lots of rests, lower reps with more sets    Iwona Vora, PT

## 2020-06-16 ENCOUNTER — ANESTHESIA EVENT (OUTPATIENT)
Dept: SURGERY | Facility: HOSPITAL | Age: 65
End: 2020-06-16
Payer: MEDICARE

## 2020-06-16 ENCOUNTER — TELEPHONE (OUTPATIENT)
Dept: PLASTIC SURGERY | Facility: CLINIC | Age: 65
End: 2020-06-16

## 2020-06-16 ENCOUNTER — TELEPHONE (OUTPATIENT)
Dept: UROLOGY | Facility: CLINIC | Age: 65
End: 2020-06-16

## 2020-06-16 NOTE — TELEPHONE ENCOUNTER
Called pt to confirm arrival time of 630am for procedure on 6-17-20. Gave pt NPO instructions and gave pt opportunity to ask questions. Pt verbalized understanding.

## 2020-06-16 NOTE — ANESTHESIA PREPROCEDURE EVALUATION
06/17/2020  Silvana Quezada is a 64 y.o., female.      Pre-op Assessment          Review of Systems  Anesthesia Hx:  No problems with previous Anesthesia  Denies Family Hx of Anesthesia complications.   Denies Personal Hx of Anesthesia complications.   Social:  Social Alcohol Use, Non-Smoker    Hematology/Oncology:         -- Cancer in past history: Breast surgery, chemotherapy and radiation  Oncology Comments: 2000-right Breast-S/P-Lumpectomy     EENT/Dental:   Wears glasses   Cardiovascular:   Exercise tolerance: good Stationary bike-x2  per week & hand held weight exercise   Pulmonary:   Pneumonia Shortness of breath Covid-19 Positive -x2 months ago at [facility] x3 days admit   Renal/:   Urge incontinence   Neurological:   Neuromuscular Disease, Oetidcyjzxjjey-CV-6y per week for wrist fracture in 12/2019   Psych:   Psychiatric History depression Bipolar     Mile Leal RN  6/11/20       Anesthesia Assessment: Preoperative EQUATION    Planned Procedure: Procedure(s) (LRB):  CYSTOSCOPY,WITH BOTULINUM TOXIN INJECTION 200units (N/A)  Requested Anesthesia Type:Monitor Anesthesia Care  Surgeon: Jayesh Ross MD  Service: Urology  Known or anticipated Date of Surgery:6/17/2020    Surgeon notes: reviewed and Urge incontinence    Previous anesthesia records:12/27/19-ORIF, Wrist-left-no apparent anesthetic complications    Anesthesia Hx:  History of prior surgery of interest to airway management or planning: Denies Family Hx of Anesthesia complications.   Denies Personal Hx of Anesthesia complications.     Airway/Jaw/Neck:  Airway Findings: Mouth Opening: Normal Tongue: Normal  General Airway Assessment: Adult  Mallampati: II  Improves to II with phonation.  TM Distance: Normal, at least 6 cm  Jaw/Neck Findings:     Neck ROM: Normal ROM          Last PCP note: within 1 month , within [facility] , Dr. N.  Hill-F/U visit  Subspecialty notes: Neurology, Ortho, Psychiatry, Otolaryngology/Plastic Surgery Visit    Other important co-morbidities: none      Past Medical History:   Diagnosis Date    Bipolar 1 disorder      Breast cancer       right    Breast cyst      Closed fracture of proximal end of right humerus 3/9/2015    Depression      History of right shoulder fracture      MS (multiple sclerosis)       presented as dizzines, dx vision,     Osteoporosis, unspecified      Pneumonia 3/27/2020       Tests already available:  Results have been reviewed. Lab-6/4/20-COVID-19/ 5/13/20 BHAVESH Screen w/reflex/B-12/Vit.D/CMP/CBC/CXR-3/27/20/EKG-3/26/20/Labs-3/29/20/ Labs-3/27/20// Labs-3/26/20     Plan:   Phone pending   Testing:  None Needed      Patient  has previously scheduled Medical Appointment:Appointment on 6/17/20, prior to the surgery date.    Navigation: Phone Completed                                     Consults scheduled.None needed at this time.                            Straight Line to surgery.               No tests, anesthesia preop clinic visit, or consult required.                             Mile Leal RN  6/11/20 06/16/2020  Silvana Quezada is a 64 y.o., female.  [facility]  Anesthesia Pre-Operative Evaluation         Patient Name: Silvana Quezada  YOB: 1955  MRN: [id number]    SUBJECTIVE:     Pre-operative evaluation for Procedure(s) (LRB):  CYSTOSCOPY,WITH BOTULINUM TOXIN INJECTION 200units (N/A)     06/16/2020    Silvana Quezada is a 64 y.o. female w/ a significant PMHx of multiple sclerosis (with neurogenic bladder, Bipolar, previous Hx of breast cancer (S/P Rt lumpectomy).     Patient now presents for the above procedure(s).      LDA:          Peripheral IV - Single Lumen 03/27/20 18 G Right Forearm (Active)   Number of days: 81            Peripheral  IV - Single Lumen 03/27/20 1219 20 G Right Hand (Active)   Number of days: 81       Prev airway: None documented. Pt had previous MAC cases.     Drips: None documented.      Patient Active Problem List   Diagnosis    MS (multiple sclerosis)    Neurogenic bladder    Urgency-frequency syndrome    History of breast cancer    Localized osteoporosis with current pathological fracture    Incomplete bladder emptying    Overactive bladder    Thoracic aorta atherosclerosis    Bipolar affective disorder    Tortuous aorta    Bipolar I disorder, most recent episode depressed, moderate    Wrist fracture, closed, left, initial encounter    Left wrist pain    Swelling of left hand    Decreased range of motion of left wrist    Age-related osteoporosis without current pathological fracture    Malignant (primary) neoplasm, unspecified    Shortness of breath    Urge incontinence    At low risk for fall    Muscular deconditioning    Physical deconditioning       Review of patient's allergies indicates:   Allergen Reactions    Adhesive      blisters    Keflex [cephalexin] Rash    Cephalosporins        Current Inpatient Medications:      No current facility-administered medications on file prior to encounter.      Current Outpatient Medications on File Prior to Encounter   Medication Sig Dispense Refill    alendronate (FOSAMAX) 70 MG tablet Take 1 tablet (70 mg total) by mouth every 7 days. 26 tablet 0    benzonatate (TESSALON PERLES) 100 MG capsule Take 1 capsule (100 mg total) by mouth 3 (three) times daily as needed for Cough. 30 capsule 0    buPROPion (WELLBUTRIN SR) 100 MG TBSR 12 hr tablet Take 2 tablets (200 mg total) by mouth every morning. 180 tablet 1    calcium-vitamin D3 (CALCIUM 500 + D) 500 mg(1,250mg) -200 unit per tablet Take 1 tablet by mouth 2 (two) times daily with meals.      catheter (FEMALE CATHETER) 14 Fr [facility] Patient is to self catheterize four times a day indefinitley using female 12  fr in and out catheter for incomplete bladder emptying.   Dispense 120 month with 11 refills or 360 every 3 months with 3 refills depending on patient's preference 120 each 11    fluphenazine (PROLIXIN) 1 MG tablet TAKE 2 TABLETS NIGHTLY 180 tablet 1    folic acid (FOLVITE) 1 MG tablet Take 1 tablet (1,000 mcg total) by mouth once daily. 90 tablet 1    inhalation spacing device Use as directed for inhalation. 1 each 0    OXcarbazepine (TRILEPTAL) 150 MG Tab Take 1 tablet (150 mg total) by mouth 2 (two) times daily. 180 tablet 2       Past Surgical History:   Procedure Laterality Date    BRAIN SURGERY  1991    BREAST BIOPSY Left 2009    core    BREAST LUMPECTOMY Right 2001    CYSTOSCOPY N/A 7/11/2018    Procedure: CYSTOSCOPY;  Surgeon: aJyesh Ross MD;  Location: [address];  Service: Urology;  Laterality: N/A;  30 min    CYSTOSCOPY N/A 4/10/2019    Procedure: CYSTOSCOPY;  Surgeon: Jayesh Ross MD;  Location: [address];  Service: Urology;  Laterality: N/A;  30 MIN    FOOT SURGERY  october 2013    HYSTERECTOMY      INJECTION OF BOTULINUM TOXIN TYPE A N/A 7/11/2018    Procedure: INJECTION, BOTULINUM TOXIN, TYPE A 200 UNITS;  Surgeon: Jayesh Ross MD;  Location: [address];  Service: Urology;  Laterality: N/A;    INJECTION OF BOTULINUM TOXIN TYPE A N/A 4/10/2019    Procedure: INJECTION, BOTULINUM TOXIN, TYPE A 200 UNITS;  Surgeon: Jayesh Ross MD;  Location: [address];  Service: Urology;  Laterality: N/A;    OPEN REDUCTION AND INTERNAL FIXATION (ORIF) OF INJURY OF WRIST Left 12/27/2019    Procedure: ORIF, WRIST;  Surgeon: Albert Schroeder Jr., MD;  Location: [facility];  Service: Orthopedics;  Laterality: Left;  ACUMED/ MINI C ARM Palomo Hughes notified/ Rk confirmed here in [facility] with Lacie 12/23/19 KB [id number]    TONSILLECTOMY         Social History     Socioeconomic History    Marital status:      Spouse name: Not on file    Number of children: Not on file     Years of education: Not on file    Highest education level: Not on file   Occupational History    Occupation: [employer]   Social Needs    Financial resource strain: Not on file    Food insecurity     Worry: Not on file     Inability: Not on file    Transportation needs     Medical: Not on file     Non-medical: Not on file   Tobacco Use    Smoking status: Never Smoker    Smokeless tobacco: Never Used   Substance and Sexual Activity    Alcohol use: Yes     Alcohol/week: 1.7 standard drinks     Types: 2 Standard drinks or equivalent per week     Comment: occasionally    Drug use: No    Sexual activity: Yes     Partners: Male   Lifestyle    Physical activity     Days per week: Not on file     Minutes per session: Not on file    Stress: Not on file   Relationships    Social connections     Talks on phone: Not on file     Gets together: Not on file     Attends [employer] service: Not on file     Active member of club or organization: Not on file     Attends meetings of clubs or organizations: Not on file     Relationship status: Not on file   Other Topics Concern    Are you pregnant or think you may be? 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    EKG:   Results for orders placed or performed in visit on 03/27/20   EKG 12-lead     Collection Time: 03/26/20  4:53 PM    Narrative    Test Reason : R06.02,    Vent. Rate : 078 BPM     Atrial Rate : 078 BPM     P-R Int : 138 ms          QRS Dur : 072 ms      QT Int : 370 ms       P-R-T Axes : 059 045 051 degrees     QTc Int : 421 ms    Normal sinus rhythm  Normal ECG  When compared with ECG of 20-NOV-2001 14:11,  No significant change was found  Confirmed by Bishop BRUCE MD, Kirk COOK ([id number]) on 3/27/2020 4:16:28 PM    Referred By: System System           Confirmed By:Kirk Alas III, MD       2D ECHO:  TTE:  No results found for this or any previous visit.    JB:  No results found for this or any previous visit.    ASSESSMENT/PLAN:       Anesthesia Evaluation    I have reviewed the Patient Summary Reports.      I have reviewed the Medications.     Review of Systems  Anesthesia Hx:  History of prior surgery of interest to airway management or planning: Denies Family Hx of Anesthesia complications.   Denies Personal Hx of Anesthesia complications.   Social:  Non-Smoker, Social Alcohol Use    Hematology/Oncology:  Hematology Normal       -- Cancer in past history:  Breast chemotherapy and radiation    EENT/Dental:EENT/Dental Normal   Cardiovascular:  Cardiovascular Normal Exercise tolerance: good  ECG has been reviewed.    Pulmonary:  Pulmonary Normal    Renal/:  Renal/ Normal     Hepatic/GI:  Hepatic/GI Normal    Musculoskeletal:   osteoporosis   Neurological:   Multiple sclerosis   Endocrine:  Endocrine Normal    Psych:   Psychiatric History          Physical Exam  General:  Well nourished    Airway/Jaw/Neck:  Airway Findings: Mouth Opening: Normal Tongue: Normal  General Airway Assessment: Adult  Mallampati: II  Improves to II with phonation.  TM Distance: Normal, at least 6 cm  Jaw/Neck Findings:     Neck ROM: Normal ROM      Dental:  Dental Findings: In tact   Chest/Lungs:  Chest/Lungs Findings: Clear to auscultation, Normal Respiratory Rate     Heart/Vascular:  Heart Findings: Rate:  Normal  Rhythm: Regular Rhythm        Mental Status:  Mental Status Findings:  Cooperative, Alert and Oriented         Anesthesia Plan  Type of Anesthesia, risks & benefits discussed:  Anesthesia Type:  general, regional, MAC  Patient's Preference:   Intra-op Monitoring Plan: standard ASA monitors  Intra-op Monitoring Plan Comments:   Post Op Pain Control Plan: multimodal analgesia, IV/PO Opioids PRN and per primary service following discharge from PACU  Post Op Pain Control Plan Comments:   Induction:   IV  Beta Blocker:  Patient is not currently on a Beta-Blocker (No further documentation required).       Informed Consent: Patient understands risks and agrees with Anesthesia plan.  Questions answered. Anesthesia consent signed with patient.  ASA Score: 3     Day of Surgery Review of History & Physical:    H&P update referred to the surgeon.         Ready For Surgery From Anesthesia Perspective.

## 2020-06-16 NOTE — TELEPHONE ENCOUNTER
Pt called and asked to be considered for surgery again ,I reminded pt that she has cancelled surgery 3 times in the past due to personal issues and wanted to make sure she was sure about it this time . I told pt I would schedule her for a follow up.               ----- Message from Ze Sarmiento sent at 6/16/2020  8:19 AM CDT -----  Regarding: question about surgery      The Pt would like to ask for advice about how soon after her surgery with Dr. Ross tomorrow can she have her surgery with you.    Phone # [phone]

## 2020-06-17 ENCOUNTER — HOSPITAL ENCOUNTER (OUTPATIENT)
Facility: HOSPITAL | Age: 65
Discharge: HOME | End: 2020-06-17
Attending: UROLOGY | Admitting: UROLOGY
Payer: MEDICARE

## 2020-06-17 ENCOUNTER — ANESTHESIA (OUTPATIENT)
Dept: SURGERY | Facility: HOSPITAL | Age: 65
End: 2020-06-17
Payer: MEDICARE

## 2020-06-17 VITALS
BODY MASS INDEX: 29.95 KG/M2 | SYSTOLIC BLOOD PRESSURE: 122 MMHG | RESPIRATION RATE: 12 BRPM | DIASTOLIC BLOOD PRESSURE: 68 MMHG | OXYGEN SATURATION: 99 % | WEIGHT: 169 LBS | TEMPERATURE: 98 F | HEART RATE: 71 BPM | HEIGHT: 63 IN

## 2020-06-17 DIAGNOSIS — N31.9 NEUROGENIC BLADDER: ICD-10-CM

## 2020-06-17 DIAGNOSIS — N32.81 URGENCY-FREQUENCY SYNDROME: Primary | ICD-10-CM

## 2020-06-17 LAB — SARS-COV-2 RNA RESP QL NAA+PROBE: NOT DETECTED

## 2020-06-17 PROCEDURE — 52287 PR CYSTOURETHROSCOPY WITH INJ FOR CHEMODENERVATION: ICD-10-PCS | Mod: ,,, | Performed by: UROLOGY

## 2020-06-17 PROCEDURE — C1758 CATHETER, URETERAL: HCPCS | Performed by: UROLOGY

## 2020-06-17 PROCEDURE — 71000015 HC POSTOP RECOV 1ST HR: Performed by: UROLOGY

## 2020-06-17 PROCEDURE — 94761 N-INVAS EAR/PLS OXIMETRY MLT: CPT

## 2020-06-17 PROCEDURE — 52287 CYSTOSCOPY CHEMODENERVATION: CPT | Mod: ,,, | Performed by: UROLOGY

## 2020-06-17 PROCEDURE — 27201037 HC PRESSURE MONITORING SET UP

## 2020-06-17 PROCEDURE — 25000003 PHARM REV CODE 250: Performed by: STUDENT IN AN ORGANIZED HEALTH CARE EDUCATION/TRAINING PROGRAM

## 2020-06-17 PROCEDURE — D9220A PRA ANESTHESIA: ICD-10-PCS | Mod: ,,, | Performed by: ANESTHESIOLOGY

## 2020-06-17 PROCEDURE — 36000707: Performed by: UROLOGY

## 2020-06-17 PROCEDURE — 37000009 HC ANESTHESIA EA ADD 15 MINS: Performed by: UROLOGY

## 2020-06-17 PROCEDURE — 63600175 PHARM REV CODE 636 W HCPCS: Performed by: STUDENT IN AN ORGANIZED HEALTH CARE EDUCATION/TRAINING PROGRAM

## 2020-06-17 PROCEDURE — 36000706: Performed by: UROLOGY

## 2020-06-17 PROCEDURE — 63600175 PHARM REV CODE 636 W HCPCS: Mod: JG | Performed by: UROLOGY

## 2020-06-17 PROCEDURE — 71000033 HC RECOVERY, INTIAL HOUR: Performed by: UROLOGY

## 2020-06-17 PROCEDURE — 37000008 HC ANESTHESIA 1ST 15 MINUTES: Performed by: UROLOGY

## 2020-06-17 PROCEDURE — 27000221 HC OXYGEN, UP TO 24 HOURS

## 2020-06-17 PROCEDURE — D9220A PRA ANESTHESIA: Mod: ,,, | Performed by: ANESTHESIOLOGY

## 2020-06-17 RX ORDER — SODIUM CHLORIDE 9 MG/ML
INJECTION, SOLUTION INTRAVENOUS CONTINUOUS PRN
Status: DISCONTINUED | OUTPATIENT
Start: 2020-06-17 | End: 2020-06-17

## 2020-06-17 RX ORDER — ONDANSETRON 2 MG/ML
INJECTION INTRAMUSCULAR; INTRAVENOUS
Status: DISCONTINUED | OUTPATIENT
Start: 2020-06-17 | End: 2020-06-17

## 2020-06-17 RX ORDER — LIDOCAINE HCL/PF 100 MG/5ML
SYRINGE (ML) INTRAVENOUS
Status: DISCONTINUED | OUTPATIENT
Start: 2020-06-17 | End: 2020-06-17

## 2020-06-17 RX ORDER — FENTANYL CITRATE 50 UG/ML
25 INJECTION, SOLUTION INTRAMUSCULAR; INTRAVENOUS EVERY 5 MIN PRN
Status: DISCONTINUED | OUTPATIENT
Start: 2020-06-17 | End: 2020-06-17 | Stop reason: HOSPADM

## 2020-06-17 RX ORDER — SODIUM CHLORIDE 9 MG/ML
INJECTION, SOLUTION INTRAVENOUS CONTINUOUS
Status: DISCONTINUED | OUTPATIENT
Start: 2020-06-17 | End: 2020-06-17 | Stop reason: HOSPADM

## 2020-06-17 RX ORDER — DEXAMETHASONE SODIUM PHOSPHATE 4 MG/ML
INJECTION, SOLUTION INTRA-ARTICULAR; INTRALESIONAL; INTRAMUSCULAR; INTRAVENOUS; SOFT TISSUE
Status: DISCONTINUED | OUTPATIENT
Start: 2020-06-17 | End: 2020-06-17

## 2020-06-17 RX ORDER — CIPROFLOXACIN 2 MG/ML
400 INJECTION, SOLUTION INTRAVENOUS
Status: COMPLETED | OUTPATIENT
Start: 2020-06-17 | End: 2020-06-17

## 2020-06-17 RX ORDER — CIPROFLOXACIN 500 MG/1
500 TABLET ORAL 2 TIMES DAILY
Qty: 6 TABLET | Refills: 0 | Status: SHIPPED | OUTPATIENT
Start: 2020-06-17 | End: 2020-06-20

## 2020-06-17 RX ORDER — LIDOCAINE HYDROCHLORIDE 10 MG/ML
1 INJECTION, SOLUTION EPIDURAL; INFILTRATION; INTRACAUDAL; PERINEURAL ONCE
Status: COMPLETED | OUTPATIENT
Start: 2020-06-17 | End: 2020-06-17

## 2020-06-17 RX ORDER — PROPOFOL 10 MG/ML
VIAL (ML) INTRAVENOUS CONTINUOUS PRN
Status: DISCONTINUED | OUTPATIENT
Start: 2020-06-17 | End: 2020-06-17

## 2020-06-17 RX ORDER — PROPOFOL 10 MG/ML
VIAL (ML) INTRAVENOUS
Status: DISCONTINUED | OUTPATIENT
Start: 2020-06-17 | End: 2020-06-17

## 2020-06-17 RX ORDER — MIDAZOLAM HYDROCHLORIDE 1 MG/ML
INJECTION, SOLUTION INTRAMUSCULAR; INTRAVENOUS
Status: DISCONTINUED | OUTPATIENT
Start: 2020-06-17 | End: 2020-06-17

## 2020-06-17 RX ORDER — ONDANSETRON 2 MG/ML
4 INJECTION INTRAMUSCULAR; INTRAVENOUS DAILY PRN
Status: DISCONTINUED | OUTPATIENT
Start: 2020-06-17 | End: 2020-06-17 | Stop reason: HOSPADM

## 2020-06-17 RX ADMIN — CIPROFLOXACIN 400 MG: 2 INJECTION, SOLUTION INTRAVENOUS at 08:06

## 2020-06-17 RX ADMIN — DEXAMETHASONE SODIUM PHOSPHATE 4 MG: 4 INJECTION, SOLUTION INTRAMUSCULAR; INTRAVENOUS at 08:06

## 2020-06-17 RX ADMIN — PROPOFOL 50 MG: 10 INJECTION, EMULSION INTRAVENOUS at 08:06

## 2020-06-17 RX ADMIN — PROPOFOL 50 MCG/KG/MIN: 10 INJECTION, EMULSION INTRAVENOUS at 08:06

## 2020-06-17 RX ADMIN — SODIUM CHLORIDE: 0.9 INJECTION, SOLUTION INTRAVENOUS at 07:06

## 2020-06-17 RX ADMIN — ONDANSETRON 4 MG: 2 INJECTION, SOLUTION INTRAMUSCULAR; INTRAVENOUS at 08:06

## 2020-06-17 RX ADMIN — MIDAZOLAM HYDROCHLORIDE 2 MG: 1 INJECTION, SOLUTION INTRAMUSCULAR; INTRAVENOUS at 08:06

## 2020-06-17 RX ADMIN — LIDOCAINE HYDROCHLORIDE 10 MG: 10 INJECTION, SOLUTION EPIDURAL; INFILTRATION; INTRACAUDAL; PERINEURAL at 08:06

## 2020-06-17 RX ADMIN — SODIUM CHLORIDE: 0.9 INJECTION, SOLUTION INTRAVENOUS at 08:06

## 2020-06-17 RX ADMIN — LIDOCAINE HYDROCHLORIDE 60 MG: 20 INJECTION, SOLUTION INTRAVENOUS at 08:06

## 2020-06-17 NOTE — DISCHARGE SUMMARY
[facility]  Discharge Note  Short Stay    Admit Date: 6/17/2020    Discharge Date and Time: 06/17/2020 8:44 AM      Attending Physician: Jayesh Ross MD     Discharge Provider: Bonifacio Craig MD    Diagnoses:  Active Hospital Problems    Diagnosis  POA    *Urgency-frequency syndrome [N32.81]  Yes    Overactive bladder [N32.81]  Yes    Incomplete bladder emptying [R33.9]  Yes    MS (multiple sclerosis) [G35]  Yes    Neurogenic bladder [N31.9]  Yes      Resolved Hospital Problems   No resolved problems to display.       Discharged Condition: stable    Hospital Course: Patient was admitted for cysto botox and tolerated the procedure well with no complications. She was discharged home in good condition on the same day.       Final Diagnoses: Same as principal problem.    Disposition: Home or Self Care    Follow up/Patient Instructions:    Medications:  Reconciled Home Medications:   Current Discharge Medication List      START taking these medications    Details   ciprofloxacin HCl (CIPRO) 500 MG tablet Take 1 tablet (500 mg total) by mouth 2 (two) times daily. for 3 days  Qty: 6 tablet, Refills: 0         CONTINUE these medications which have NOT CHANGED    Details   alendronate (FOSAMAX) 70 MG tablet Take 1 tablet (70 mg total) by mouth every 7 days.  Qty: 26 tablet, Refills: 0    Associated Diagnoses: Osteoporosis, unspecified osteoporosis type, unspecified pathological fracture presence      !! buPROPion (WELLBUTRIN SR) 100 MG TBSR 12 hr tablet Take 2 tablets (200 mg total) by mouth every morning.  Qty: 180 tablet, Refills: 1    Associated Diagnoses: Bipolar affective disorder, remission status unspecified      !! buPROPion (WELLBUTRIN SR) 150 MG TBSR 12 hr tablet Take 1 tablet (150 mg total) by mouth every evening. DO NOT CRUSH OR CHEW; SWALLOW WHOLE.  Qty: 30 tablet, Refills: 0    Associated Diagnoses: Bipolar affective disorder, remission status unspecified      calcium-vitamin D3 (CALCIUM 500 +  D) 500 mg(1,250mg) -200 unit per tablet Take 1 tablet by mouth 2 (two) times daily with meals.      fluphenazine (PROLIXIN) 1 MG tablet TAKE 2 TABLETS NIGHTLY  Qty: 180 tablet, Refills: 1    Associated Diagnoses: Bipolar affective disorder, remission status unspecified      folic acid (FOLVITE) 1 MG tablet Take 1 tablet (1,000 mcg total) by mouth once daily.  Qty: 90 tablet, Refills: 1    Associated Diagnoses: Bipolar affective disorder, remission status unspecified      OXcarbazepine (TRILEPTAL) 150 MG Tab Take 1 tablet (150 mg total) by mouth 2 (two) times daily.  Qty: 180 tablet, Refills: 2    Associated Diagnoses: Bipolar I disorder, most recent episode depressed, in partial remission; Bipolar affective disorder, remission status unspecified      benzonatate (TESSALON PERLES) 100 MG capsule Take 1 capsule (100 mg total) by mouth 3 (three) times daily as needed for Cough.  Qty: 30 capsule, Refills: 0      catheter (FEMALE CATHETER) 14 Fr [facility] Patient is to self catheterize four times a day indefinitley using female 12 fr in and out catheter for incomplete bladder emptying.   Dispense 120 month with 11 refills or 360 every 3 months with 3 refills depending on patient's preference  Qty: 120 each, Refills: 11      inhalation spacing device Use as directed for inhalation.  Qty: 1 each, Refills: 0    Associated Diagnoses: Suspected Covid-19 Virus Infection       !! - Potential duplicate medications found. Please discuss with provider.        Discharge Procedure Orders   Call MD for:  temperature >100.4     Call MD for:  persistent nausea and vomiting or diarrhea     Call MD for:  severe uncontrolled pain     Call MD for:  difficulty breathing or increased cough     Call MD for:  severe persistent headache     Call MD for:  persistent dizziness, light-headedness, or visual disturbances     Call MD for:  increased confusion or weakness     No dressing needed     Activity as tolerated     Follow-up Information     Jayesh  ROX Ross MD.    Specialty: Urology  Contact information:  [address] [facility] [address]  [phone]

## 2020-06-17 NOTE — H&P
[facility]  Staff: Jayesh Ross MD    CC: OAB    HPI:  Silvana Quezada is a 64 y.o. female with MS, neurogenic bladder who presents for cysto bladder botox injection.     ROS:  Neg except per HPI    Past Medical History:   Diagnosis Date    Bipolar 1 disorder     Breast cancer     right    Breast cyst     Closed fracture of proximal end of right humerus 3/9/2015    Depression     History of right shoulder fracture     MS (multiple sclerosis)     presented as dizzines, dx vision,     Osteoporosis, unspecified     Pneumonia 3/27/2020       Past Surgical History:   Procedure Laterality Date    BRAIN SURGERY  1991    BREAST BIOPSY Left 2009    core    BREAST LUMPECTOMY Right 2001    CYSTOSCOPY N/A 7/11/2018    Procedure: CYSTOSCOPY;  Surgeon: Jayesh Ross MD;  Location: [address];  Service: Urology;  Laterality: N/A;  30 min    CYSTOSCOPY N/A 4/10/2019    Procedure: CYSTOSCOPY;  Surgeon: Jayesh Ross MD;  Location: [address];  Service: Urology;  Laterality: N/A;  30 MIN    FOOT SURGERY  october 2013    HYSTERECTOMY      INJECTION OF BOTULINUM TOXIN TYPE A N/A 7/11/2018    Procedure: INJECTION, BOTULINUM TOXIN, TYPE A 200 UNITS;  Surgeon: Jayesh Ross MD;  Location: [address];  Service: Urology;  Laterality: N/A;    INJECTION OF BOTULINUM TOXIN TYPE A N/A 4/10/2019    Procedure: INJECTION, BOTULINUM TOXIN, TYPE A 200 UNITS;  Surgeon: Jayesh Ross MD;  Location: [address];  Service: Urology;  Laterality: N/A;    OPEN REDUCTION AND INTERNAL FIXATION (ORIF) OF INJURY OF WRIST Left 12/27/2019    Procedure: ORIF, WRIST;  Surgeon: Albert Schroeder Jr., MD;  Location: [facility];  Service: Orthopedics;  Laterality: Left;  ACUMED/ MINI C ARM Palomo Mercedesman [employer] notified/ Rk confirmed here in [employer] with Lacie 12/23/19 KB [id number]    TONSILLECTOMY         Social History     Socioeconomic History    Marital status:      Spouse name: Not on file    Number of  children: Not on file    Years of education: Not on file    Highest education level: Not on file   Occupational History    Occupation: [employer]   Social Needs    Financial resource strain: Not on file    Food insecurity     Worry: Not on file     Inability: Not on file    Transportation needs     Medical: Not on file     Non-medical: Not on file   Tobacco Use    Smoking status: Never Smoker    Smokeless tobacco: Never Used   Substance and Sexual Activity    Alcohol use: Yes     Alcohol/week: 1.7 standard drinks     Types: 2 Standard drinks or equivalent per week     Comment: occasionally    Drug use: No    Sexual activity: Yes     Partners: Male   Lifestyle    Physical activity     Days per week: Not on file     Minutes per session: Not on file    Stress: Not on file   Relationships    Social connections     Talks on phone: Not on file     Gets together: Not on file     Attends Baptism service: Not on file     Active member of club or organization: Not on file     Attends meetings of clubs or organizations: Not on file     Relationship status: Not on file   Other Topics Concern    Are you pregnant or think you may be? 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, 25 children 2 daughter        Family History   Problem Relation Age of Onset    Skin cancer Mother     Breast cancer Mother     Stroke Mother     Emphysema Father     Breast cancer Maternal Aunt     Dementia Brother     Diabetes Brother     No Known Problems Daughter     Autism Son     No Known Problems Brother     Autism Daughter     Anesthesia problems Neg Hx        Review of patient's allergies indicates:   Allergen Reactions    Adhesive      blisters    Keflex [cephalexin] Rash    Cephalosporins        No current facility-administered medications on file prior to encounter.      Current Outpatient  "Medications on File Prior to Encounter   Medication Sig Dispense Refill    alendronate (FOSAMAX) 70 MG tablet Take 1 tablet (70 mg total) by mouth every 7 days. 26 tablet 0    benzonatate (TESSALON PERLES) 100 MG capsule Take 1 capsule (100 mg total) by mouth 3 (three) times daily as needed for Cough. 30 capsule 0    buPROPion (WELLBUTRIN SR) 100 MG TBSR 12 hr tablet Take 2 tablets (200 mg total) by mouth every morning. 180 tablet 1    calcium-vitamin D3 (CALCIUM 500 + D) 500 mg(1,250mg) -200 unit per tablet Take 1 tablet by mouth 2 (two) times daily with meals.      catheter (FEMALE CATHETER) 14 Fr [facility] Patient is to self catheterize four times a day indefinitley using female 12 fr in and out catheter for incomplete bladder emptying.   Dispense 120 month with 11 refills or 360 every 3 months with 3 refills depending on patient's preference 120 each 11    fluphenazine (PROLIXIN) 1 MG tablet TAKE 2 TABLETS NIGHTLY 180 tablet 1    folic acid (FOLVITE) 1 MG tablet Take 1 tablet (1,000 mcg total) by mouth once daily. 90 tablet 1    inhalation spacing device Use as directed for inhalation. 1 each 0    OXcarbazepine (TRILEPTAL) 150 MG Tab Take 1 tablet (150 mg total) by mouth 2 (two) times daily. 180 tablet 2       Anticoagulation:  No    Physical Exam:  Estimated body mass index is 29.94 kg/m² as calculated from the following:    Height as of this encounter: 5' 3" (1.6 m).    Weight as of this encounter: 76.7 kg (169 lb).    General: No acute distress, well developed. AAOx3  Head: Normocephalic, Atraumatic  Eyes: Extra-occular movements intact, No discharge  Neck: supple, symmetrical, trachea midline  Lungs: normal respiratory effort, no respiratory distress, no wheezes  CV: regular rate, 2+ pulses  Abdomen: soft, non-tender, non-distended, no organomegaly  MSK: no edema, no deformities, normal ROM  Skin: skin color, texture, turgor normal.  Neurologic: no focal deficits, sensation intact    Labs:    Lab Results "   Component Value Date    WBC 3.80 (L) 05/13/2020    HGB 12.7 05/13/2020    HCT 40.4 05/13/2020    MCV 96 05/13/2020     05/13/2020     BMP  Lab Results   Component Value Date     05/13/2020    K 3.8 05/13/2020     05/13/2020    CO2 28 05/13/2020    BUN 17 05/13/2020    CREATININE 0.9 05/13/2020    CALCIUM 9.5 05/13/2020    ANIONGAP 8 05/13/2020    ESTGFRAFRICA >60.0 05/13/2020    EGFRNONAA >60.0 05/13/2020     Assessment: Silvana Quezada is a 64 y.o. female with OAB    Plan:     1. To OR today for cysto bladder botox injection  2. Consents signed   3. I have explained the risk, benefits, and alternatives of the procedure in detail. The patient voices understanding and all questions have been answered. The patient agrees to proceed as planned.     Bonifacio Craig MD

## 2020-06-17 NOTE — PROGRESS NOTES
Pt discharge instructions reviewed with patient and family. Pt verbalized understandings of instructions. Copy of AVS given to patient. All questions answered. Pt ambulated to wheelchair for discharge. All belongings sent home with patient. IV discontinued.

## 2020-06-17 NOTE — OP NOTE
[facility]  Operative Note    Date: 06/17/2020    Pre-Op Diagnosis:   1. Multiple sclerosis  2. Neurogenic bladder    Patient Active Problem List    Diagnosis Date Noted    At low risk for fall 05/20/2020    Muscular deconditioning 05/20/2020    Physical deconditioning 05/20/2020    Urge incontinence 05/12/2020    Malignant (primary) neoplasm, unspecified 03/26/2020    Shortness of breath 03/26/2020    Age-related osteoporosis without current pathological fracture 02/21/2020    Left wrist pain 01/16/2020    Swelling of left hand 01/16/2020    Decreased range of motion of left wrist 01/16/2020    Wrist fracture, closed, left, initial encounter 12/27/2019    Bipolar I disorder, most recent episode depressed, moderate 09/01/2019    Tortuous aorta 04/23/2019    Thoracic aorta atherosclerosis 05/24/2018    Bipolar affective disorder 05/24/2018    Overactive bladder 08/09/2017    Incomplete bladder emptying 07/21/2017    Localized osteoporosis with current pathological fracture 05/30/2017    History of breast cancer 08/27/2014    Urgency-frequency syndrome 12/11/2013    MS (multiple sclerosis) 12/03/2013    Neurogenic bladder 12/03/2013     Post-Op Diagnosis: same    Procedure(s) Performed:   1.  Cystoscopy with bladder botox injection    Specimen(s): none    Staff Surgeon:  Jayesh Ross MD    Assistant Surgeon: Bonifacio Craig MD    Anesthesia: Monitored Local Anesthesia with Sedation    Indications: Silvana Quezada is a 64 y.o. female with MS who has a neurogenic bladder managed by clean intermittent catheterization. She has had good relief from bladder spasms with botox injections in the past.    Findings:   1.Cystoscopy significant for moderate trabeculations.  2. 200 U Botox injected in 20 mL throughout bladder detrusor. Good wheals raised.     Estimated Blood Loss: min    Drains: none    Procedure in Detail:  After informed consent was obtained the patient was brought to the  cystoscopy suite and placed in the supine position.  SCDs were applied and working.  Anesthesia was administered.  When the patient was adequately sedated she was placed in the dorsal lithotomy position and prepped and draped in the usual sterile fashion.    A rigid cystoscope in a 22 Fr sheath was introduced into the patients's bladder via the urethra. This passed easily. The scope was immediately broken and 250 mL of urine returned for her post-void residual. Cystoscopy was performed which revealed the ureteral orifices in their normal anatomic position bilaterally.  There was evidence of moderate trabeculations.    200 units of botox in 10 mL was injected into the detrusor muscle throughout the bladder.  Good wheals were raised.  The patient's bladder was drained and the cystoscope was removed.     The patient tolerated the procedure well and was transferred to the recovery room in stable condition.      Disposition:  The patient will be discharged home from PACU. She was given a prescription for cipro.    Bonifacio Craig MD

## 2020-06-17 NOTE — TRANSFER OF CARE
"Anesthesia Transfer of Care Note    Patient: Silvana Quezada    Procedure(s) Performed: Procedure(s) (LRB):  CYSTOSCOPY,WITH BOTULINUM TOXIN INJECTION 200units (N/A)    Patient location: PACU    Anesthesia Type: general    Transport from OR: Transported from OR on 6-10 L/min O2 by face mask with adequate spontaneous ventilation    Post pain: adequate analgesia    Post assessment: no apparent anesthetic complications    Post vital signs: stable    Level of consciousness: awake    Nausea/Vomiting: no nausea/vomiting    Complications: none    Transfer of care protocol was followed      Last vitals:   Visit Vitals  /71 (BP Location: Left arm, Patient Position: Lying)   Pulse 72   Temp 36.3 °C (97.4 °F) (Oral)   Resp 18   Ht 5' 3" (1.6 m)   Wt 76.7 kg (169 lb)   SpO2 97%   Breastfeeding No   BMI 29.94 kg/m²     "

## 2020-06-17 NOTE — ANESTHESIA POSTPROCEDURE EVALUATION
Anesthesia Post Evaluation    Patient: Silvana Quezada    Procedure(s) Performed: Procedure(s) (LRB):  CYSTOSCOPY,WITH BOTULINUM TOXIN INJECTION 200units (N/A)    Final Anesthesia Type: general    Patient location during evaluation: PACU  Patient participation: Yes- Able to Participate  Level of consciousness: awake and alert  Post-procedure vital signs: reviewed and stable  Pain management: adequate  Airway patency: patent    PONV status at discharge: No PONV  Anesthetic complications: no      Cardiovascular status: blood pressure returned to baseline  Respiratory status: unassisted, room air and spontaneous ventilation  Hydration status: euvolemic  Follow-up not needed.          Vitals Value Taken Time   /68 06/17/20 0930   Temp 36.6 °C (97.8 °F) 06/17/20 0930   Pulse 71 06/17/20 0930   Resp 28 06/17/20 0926   SpO2 99 % 06/17/20 0930   Vitals shown include unvalidated device data.      Event Time   Out of Recovery 09:15:00         Pain/Yaakov Score: Yaakov Score: 10 (6/17/2020  9:00 AM)

## 2020-06-18 ENCOUNTER — OFFICE VISIT (OUTPATIENT)
Dept: ORTHOPEDICS | Facility: CLINIC | Age: 65
End: 2020-06-18
Payer: MEDICARE

## 2020-06-18 ENCOUNTER — HOSPITAL ENCOUNTER (OUTPATIENT)
Dept: RADIOLOGY | Facility: HOSPITAL | Age: 65
Discharge: HOME | End: 2020-06-18
Attending: INTERNAL MEDICINE
Payer: MEDICARE

## 2020-06-18 DIAGNOSIS — Z12.31 ENCOUNTER FOR SCREENING MAMMOGRAM FOR MALIGNANT NEOPLASM OF BREAST: ICD-10-CM

## 2020-06-18 DIAGNOSIS — S52.502D CLOSED FRACTURE OF DISTAL END OF LEFT RADIUS WITH ROUTINE HEALING, UNSPECIFIED FRACTURE MORPHOLOGY, SUBSEQUENT ENCOUNTER: Primary | ICD-10-CM

## 2020-06-18 DIAGNOSIS — M81.0 AGE-RELATED OSTEOPOROSIS WITHOUT CURRENT PATHOLOGICAL FRACTURE: ICD-10-CM

## 2020-06-18 DIAGNOSIS — Z85.3 HISTORY OF BREAST CANCER: ICD-10-CM

## 2020-06-18 PROCEDURE — 99213 OFFICE O/P EST LOW 20 MIN: CPT | Mod: S$GLB,,, | Performed by: PHYSICIAN ASSISTANT

## 2020-06-18 PROCEDURE — 99999 PR PBB SHADOW E&M-EST. PATIENT-LVL III: CPT | Mod: PBBFAC,,, | Performed by: PHYSICIAN ASSISTANT

## 2020-06-18 PROCEDURE — 77067 MAMMO DIGITAL SCREENING BILAT WITH TOMOSYNTHESIS_CAD: ICD-10-PCS | Mod: 26,,, | Performed by: RADIOLOGY

## 2020-06-18 PROCEDURE — 77067 SCR MAMMO BI INCL CAD: CPT | Mod: 26,,, | Performed by: RADIOLOGY

## 2020-06-18 PROCEDURE — 99999 PR PBB SHADOW E&M-EST. PATIENT-LVL III: ICD-10-PCS | Mod: PBBFAC,,, | Performed by: PHYSICIAN ASSISTANT

## 2020-06-18 PROCEDURE — 77067 SCR MAMMO BI INCL CAD: CPT | Mod: TC

## 2020-06-18 PROCEDURE — 77063 MAMMO DIGITAL SCREENING BILAT WITH TOMOSYNTHESIS_CAD: ICD-10-PCS | Mod: 26,,, | Performed by: RADIOLOGY

## 2020-06-18 PROCEDURE — 99213 PR OFFICE/OUTPT VISIT, EST, LEVL III, 20-29 MIN: ICD-10-PCS | Mod: S$GLB,,, | Performed by: PHYSICIAN ASSISTANT

## 2020-06-18 PROCEDURE — 77063 BREAST TOMOSYNTHESIS BI: CPT | Mod: 26,,, | Performed by: RADIOLOGY

## 2020-06-18 NOTE — PROGRESS NOTES
Subjective:      Patient ID: Silvana Quezada is a 64 y.o. female.    Chief Complaint: Post-op Evaluation of the Left Wrist      HPI: Silvana Quezada is a 64 year old female in clinic today for follow up of left distal radius ORIF 6 months ago. Patient is still going to therapy and is doing very well. She has no complaints at this time.    Past Medical History:   Diagnosis Date    Bipolar 1 disorder     Breast cancer 2009    right-radiation & chemo    Breast cyst     Closed fracture of proximal end of right humerus 3/9/2015    Depression     History of right shoulder fracture     MS (multiple sclerosis)     presented as dizzines, dx vision,     Osteoporosis, unspecified     Pneumonia 3/27/2020       Current Outpatient Medications:     alendronate (FOSAMAX) 70 MG tablet, Take 1 tablet (70 mg total) by mouth every 7 days., Disp: 26 tablet, Rfl: 0    buPROPion (WELLBUTRIN SR) 100 MG TBSR 12 hr tablet, Take 2 tablets (200 mg total) by mouth every morning., Disp: 180 tablet, Rfl: 1    buPROPion (WELLBUTRIN SR) 150 MG TBSR 12 hr tablet, Take 1 tablet (150 mg total) by mouth every evening. DO NOT CRUSH OR CHEW; SWALLOW WHOLE., Disp: 30 tablet, Rfl: 0    calcium-vitamin D3 (CALCIUM 500 + D) 500 mg(1,250mg) -200 unit per tablet, Take 1 tablet by mouth 2 (two) times daily with meals., Disp: , Rfl:     catheter (FEMALE CATHETER) 14 Fr Misc, Patient is to self catheterize four times a day indefinitley using female 12 fr in and out catheter for incomplete bladder emptying.  Dispense 120 month with 11 refills or 360 every 3 months with 3 refills depending on patient's preference, Disp: 120 each, Rfl: 11    ciprofloxacin HCl (CIPRO) 500 MG tablet, Take 1 tablet (500 mg total) by mouth 2 (two) times daily. for 3 days, Disp: 6 tablet, Rfl: 0    fluphenazine (PROLIXIN) 1 MG tablet, TAKE 2 TABLETS NIGHTLY, Disp: 180 tablet, Rfl: 1    folic acid (FOLVITE) 1 MG tablet, Take 1 tablet (1,000 mcg total) by mouth once daily.,  Disp: 90 tablet, Rfl: 1    inhalation spacing device, Use as directed for inhalation., Disp: 1 each, Rfl: 0    OXcarbazepine (TRILEPTAL) 150 MG Tab, Take 1 tablet (150 mg total) by mouth 2 (two) times daily., Disp: 180 tablet, Rfl: 2    benzonatate (TESSALON PERLES) 100 MG capsule, Take 1 capsule (100 mg total) by mouth 3 (three) times daily as needed for Cough., Disp: 30 capsule, Rfl: 0  Review of patient's allergies indicates:   Allergen Reactions    Adhesive      blisters    Keflex [cephalexin] Rash    Cephalosporins        LMP  (LMP Unknown)     Review of Systems   Constitution: Negative for chills and fever.   HENT: Negative for congestion and hoarse voice.    Eyes: Negative for discharge and redness.   Cardiovascular: Negative for chest pain and palpitations.   Respiratory: Negative for cough and wheezing.    Skin: Negative for itching and rash.   Musculoskeletal: Negative for arthritis, joint pain, joint swelling and stiffness.   Gastrointestinal: Negative for diarrhea, nausea and vomiting.   Neurological: Negative for dizziness and numbness.   Psychiatric/Behavioral: Negative for altered mental status.   Allergic/Immunologic: Negative for environmental allergies and persistent infections.           Objective:    Ortho Exam   Left wrist:  No TTP  No swelling noted  Full ROM  Sensation and pulses are intact    GEN: Well developed, well nourished female. AAOX3. No acute distress.   Normocephalic, atraumatic.   DG  Breathing unlabored.  Mood and affect appropriate.        Assessment:     Imaging:  No new imaging ordered today        1. Closed fracture of distal end of left radius with routine healing, unspecified fracture morphology, subsequent encounter          Plan:       Continue with therapy.  I informed the patient that we will order more therapy when her current prescription expires if she feels that she needs more to regain pre-injury ability.  Patient will follow up if symptoms worsen or fail  to improve     Follow up if symptoms worsen or fail to improve.

## 2020-06-22 ENCOUNTER — CLINICAL SUPPORT (OUTPATIENT)
Dept: REHABILITATION | Facility: HOSPITAL | Age: 65
End: 2020-06-22
Payer: MEDICARE

## 2020-06-22 DIAGNOSIS — R29.898 MUSCULAR DECONDITIONING: ICD-10-CM

## 2020-06-22 DIAGNOSIS — R53.81 PHYSICAL DECONDITIONING: ICD-10-CM

## 2020-06-22 DIAGNOSIS — Z91.81 AT LOW RISK FOR FALL: Primary | ICD-10-CM

## 2020-06-22 PROCEDURE — 97110 THERAPEUTIC EXERCISES: CPT | Mod: PN

## 2020-06-22 PROCEDURE — 97112 NEUROMUSCULAR REEDUCATION: CPT | Mod: PN

## 2020-06-22 NOTE — PROGRESS NOTES
Physical Therapy Treatment Note     Name: Silvana Quezada  Clinic Number: [id number]    Therapy Diagnosis:   Encounter Diagnoses   Name Primary?    At low risk for fall Yes    Muscular deconditioning     Physical deconditioning      Physician: Jhonatan Avila III, MD    Visit Date: 6/22/2020    Physician Orders: PT Eval and Treat   Medical Diagnosis from Referral: W19.XXXA (ICD-10-CM) - Fall, initial encounter  Evaluation Date: 5/18/2020  Authorization Period Expiration: 05/13/2021  Plan of Care Expiration: 7/17/2020  Visit # / Visits authorized: 6/ 12      Time In: 2:45 PM   Time Out: 3:30 PM   Total Billable Time: 45 minutes (1TE, 2NMR)     Precautions: Fall, Multiple sclerosis, fatigue  Subjective     Pt reports: her procedure went well and her  is awaiting to be retested for COVID-19.  She stated that her daughter was in a bad bike accident this week and that had her a little worried.   She will be compliant with home exercise program once it is issued next session  Response to previous treatment: no adverse affects  Functional change: no falls and decreased SOB    Pain: 0/10  Location: N/A    Objective     Silvana participated in neuromuscular re-education activities to improve: Balance, Coordination, Sense, Proprioception and Posture for 23 minutes. The following activities were included:    Neuro re-ed and endurance training nustep with B UE/LE extremities for reciprocal motion of all limbs on sci-fit x 8 min at level 1 at > or equal to 50 pm w/o rest.   -- sit to stand     3 x 8 without hand from elevated mat.   -- step ups    3 x 30'' Blue step  -- SLS w/ contral limb on step 3 x 30'' each side (blue step)           Silvana received therapeutic exercises to develop strength, endurance, ROM, flexibility and posture for 17 minutes including:    Standing Hip abduction    2 x 8 B  Standing Hip extension   2 x 8 B  Heel raises     3 x 8 B    HEP and Patient Education Provided     Education provided:   - need  for rest breaks to reduce fatigue    Written Home Exercises Provided: yes.  Exercises were reviewed and Silvana was able to demonstrate them prior to the end of the session.  Silvana demonstrated good  understanding of the education provided.     See EMR under Media for exercises provided 6/22/2020.    Assessment     Pt's status has not changed since bladder procedure. Pt required only 6 seated rests today and she was not SOB. She responds better to no more with 5-8 reps over 10 or more reps.  She needs breaks and less reps to recover from fatigue.     Silvana is progressing well towards her goals.   Pt prognosis is Good.     Pt will continue to benefit from skilled outpatient physical therapy to address the deficits listed in the problem list box on initial evaluation, provide pt/family education and to maximize pt's level of independence in the home and community environment.     Pt's spiritual, cultural and educational needs considered and pt agreeable to plan of care and goals.     Anticipated barriers to physical therapy: low endurance, fatigue, recent dx of COVID-19, caring for sick  with COVID-19    Short Term Goals (4 Weeks):   1.  Independent with initial HEP.  (PROGRESSING, NOT MET)  2.  Pt will perform nu-step at level 4 x 8 minutes without rests breaks going at least 50 step/min or greater.(PROGRESSING, NOT MET) 6/15/2020 level 2     Long Term Goals (8 Weeks):   1.  Pt will score greater than or equal to 45/56 on the Jennings test/assessment without use of AD demonstrating overall improved functional mobility and balance and reduce fall risk. (PROGRESSING, NOT MET)  2.  Pt will walk < than or equal to 850 ft on the 6 minute walk test outdoors on multiple terrain without AD with 0 LOB for improved community ambulation.(PROGRESSING, NOT MET)  3.  Pt will be able to safely perform and tolerate high level ADL's without LOB. (PROGRESSING, NOT MET)  4. Pt will have 0 falls from start of PT sessions.(PROGRESSING,  NOT MET)  5. Independent with updated HEP. (PROGRESSING, NOT MET)  6. Pt will have MMT score of 4+/5 in all major ms groups in B LE.(PROGRESSING, NOT MET)  7. Pt will ambulate on TM x 8 minutes with use of UE support with 0 LOB at 1.0 mph.(PROGRESSING, NOT MET)  8. Pt will perform floor to stand f/b stand to floor transfer with UE support with stand by assist and no cues for improved dynamic transfer, core stability and fall safety in home and community.(PROGRESSING, NOT MET)  9. Pt will begin some form of community fitness to begin regular and consistent performance of exercise to continue maintenance of gains made in PT.(PROGRESSING, NOT MET)    Plan2     Progress balance and endurance, give lots of rests, lower reps with more sets    Iwona Vora, PT

## 2020-06-22 NOTE — PROGRESS NOTES
"  Occupational Therapy Daily Treatment Note     Date: 6/24/2020  Name: Silvana Quezada  Clinic Number: [id number]    Therapy Diagnosis:   Encounter Diagnoses   Name Primary?    Left wrist pain     Swelling of left hand     Decreased range of motion of left wrist      Physician: Jhonatan Avila III, MD    Physician Orders: eval and treat  Medical Diagnosis: S52.502D (ICD-10-CM) - Closed fracture of distal end of left radius with routine healing, unspecified fracture morphology, subsequent encounter  Surgical Procedure and Date: 12/27/2019,Open reduction and internal fixation of left distal radius fracture, 3 or more fragments, intra-articular, using Acumed volar   locking plate. /   Date of Injury/Onset: 12/13/2019  Evaluation Date: 1/16/2020  Insurance Authorization Period Expiration: 7/17/2020  Plan of Care Certification Period: 7/17/20  Date of Return to MD: 7/6/2020     FOTO: initial eval, 6th visit, 10th visit: 50% limitation      Visit # / Visits authorized: 19 / 24  Time In: 2:15 pm  Time Out: 3:00 pm  Total Treatment Time: 45 minutes  Total Timed minutes: 45 minutes    Precautions:  Standard, Fall and Weightbearing **SENSITIVE TO HEAT DUE TO MS, DEFER HEAT MODALITIES       Subjective     Pt reports: "I played a little piano this morning."  she was compliant with home exercise program given last session.   Response to previous treatment: improved pain, swelling, and ROM  Functional change: has been able to perform ADLs and most functional activities at home, limited with playing piano    Pain:  0/10  Location: left hand    Objective       Treatment consisted of the following:    Silvana received the following manual therapy techniques for 10 minutes:   -Performed retrograde massage to L digits/hand/wrist  to decrease edema, improve joint mobility, decrease pain and improve lymphatic drainage to increase hand function. Performed scar massage to volar wrist area (not including one small healing area)   to decrease " "adhesions and improve tensile glide.      Silvana received therapeutic exercises for 35 minutes including:  AROM   Sup/Pro  Wrist  Ext/flx  RD/UD    X 30 reps each    AROM   Spreads  Wave  Hook  Straight fist  Composite fist  Lifts X 30 reps each   AROM thumb:  Rad abd/add  Palmar abd/add  IP blocks  Opposition  Pinky Slides    X 10 reps each    Prayer stretch 3 x 30"   Wrist wheel, flex/ext, sup/pro X 3 min   Wrist maze X 3 min   isospheres X 2 min, on table        Light wrist PRE, 3 ways 2 X 10 reps each, 1#, pain free   Supination/pronation, 1# 2 X 10 reps       PHG, 1st rung, gold spring    2 X 10 reps   Digit extend, yellow RB X 10 reps   Thumb adductor stretch, see pt instructions 3 x 30"      Home Exercises and Education Provided     Education provided: cont HEP as tolerated. Added thumb stretch  - Progress towards goals     Written Home Exercises Provided: Patient instructed to cont prior HEP.  Exercises were reviewed and Silvana was able to demonstrate them prior to the end of the session.  Silvana demonstrated good  understanding of the education provided.   .   See EMR under Patient Instructions for exercises provided 3/2/2020.     Assessment     Pt pain free throughout today's session. She continues to have some ROM and strength limitations however very motivated and steadily improving. Pt reporting increased functional use LUE as demonstrated by playing the piano without difficulty.   Silvana is progressing fairly well, but slowly towards her goals and there are no updates to goals at this time. Pt prognosis continues as Good. Pt will continue to benefit from skilled outpatient occupational therapy to address the deficits listed in the problem list on initial evaluation, provide pt/family education and to maximize pt's level of independence in the home and community environment.     Anticipated barriers to continued occupational therapy: MS    Pt's spiritual, cultural and educational needs considered and pt " agreeable to plan of care and goals.    Goals     Goals:   The following goals were discussed with the patient and patient is in agreement with them as to be addressed in the treatment plan.   Long Term Goals (LTGs); to be met by discharge.  LTG #1: Pt will report a pain level of 1 out of 10 with ADLs and house hold tasks ---progressing    LTG #2: Pt will demo improved FOTO score by at least 20 points. ---progressing  LTG #3: Pt will return to prior level of function for ADLs and household management. ---progressing  LTG #4: Pt will demonstrate improved L wrist AROM WFL for performing daily tasks---progressing  LTG #5:  and pinch to be assessed when appropriate and goals set accordingly---met, 3/12/2020  Add: LTG #6: Pt will demonstrate improved L  strength by at least 3-5# for functional grasp of objects or when driving     Short Term Goals (STGs); to be met within 4 weeks (2/14/2020).  STG #1a: Pt will report 3 out of 10 pain level with ADLs and daily tasks.---met, 3/12/2020  STG #2a: Pt will report/demo [place] with feeding self.---met, 3/2/2020  STG #2b: Pt will report/demo [place] with driving.---progressing  STG #3a: Pt will demonstrate independence with issued HEP. ---met, 3/12/2020  STG #3b: Pt will demo improved L wrist AROM by at least 15 degrees needed to aid with functional use during ADLs---met, 3/2/2020  STG #4: Pt will demonstrate improved edema in L hand and wrist by at least 0.4 cm for increased use during daily tasks.---met, 3/2/2020         Plan     Continue skilled occupational therapy with individualized plan of care focusing on ROM and edema management to maximize functional use of her L hand.       Updates/Grading for next session: cont to progress as able with ROM and light  strengthening.

## 2020-06-24 ENCOUNTER — CLINICAL SUPPORT (OUTPATIENT)
Dept: REHABILITATION | Facility: HOSPITAL | Age: 65
End: 2020-06-24
Payer: MEDICARE

## 2020-06-24 DIAGNOSIS — M79.89 SWELLING OF LEFT HAND: ICD-10-CM

## 2020-06-24 DIAGNOSIS — M25.632 DECREASED RANGE OF MOTION OF LEFT WRIST: ICD-10-CM

## 2020-06-24 DIAGNOSIS — M25.532 LEFT WRIST PAIN: ICD-10-CM

## 2020-06-24 PROCEDURE — 97140 MANUAL THERAPY 1/> REGIONS: CPT | Mod: PN

## 2020-06-24 PROCEDURE — 97110 THERAPEUTIC EXERCISES: CPT | Mod: PN

## 2020-07-01 ENCOUNTER — CLINICAL SUPPORT (OUTPATIENT)
Dept: REHABILITATION | Facility: HOSPITAL | Age: 65
End: 2020-07-01
Payer: MEDICARE

## 2020-07-01 DIAGNOSIS — R53.81 PHYSICAL DECONDITIONING: ICD-10-CM

## 2020-07-01 DIAGNOSIS — M25.532 LEFT WRIST PAIN: ICD-10-CM

## 2020-07-01 DIAGNOSIS — Z91.81 AT LOW RISK FOR FALL: ICD-10-CM

## 2020-07-01 DIAGNOSIS — M25.632 DECREASED RANGE OF MOTION OF LEFT WRIST: ICD-10-CM

## 2020-07-01 DIAGNOSIS — R29.898 MUSCULAR DECONDITIONING: ICD-10-CM

## 2020-07-01 DIAGNOSIS — M79.89 SWELLING OF LEFT HAND: ICD-10-CM

## 2020-07-01 PROCEDURE — 97140 MANUAL THERAPY 1/> REGIONS: CPT | Mod: PN

## 2020-07-01 PROCEDURE — 97110 THERAPEUTIC EXERCISES: CPT | Mod: PN,CQ

## 2020-07-01 PROCEDURE — 97112 NEUROMUSCULAR REEDUCATION: CPT | Mod: PN,CQ

## 2020-07-01 PROCEDURE — 97110 THERAPEUTIC EXERCISES: CPT | Mod: PN

## 2020-07-01 NOTE — PROGRESS NOTES
"  Occupational Therapy Daily Treatment Note     Date: 7/1/2020  Name: Silvana Quezada  Clinic Number: [id number]    Therapy Diagnosis:   Encounter Diagnoses   Name Primary?    Left wrist pain     Swelling of left hand     Decreased range of motion of left wrist      Physician: Mc Cordero, CELIA    Physician Orders: eval and treat  Medical Diagnosis: S52.502D (ICD-10-CM) - Closed fracture of distal end of left radius with routine healing, unspecified fracture morphology, subsequent encounter  Surgical Procedure and Date: 12/27/2019,Open reduction and internal fixation of left distal radius fracture, 3 or more fragments, intra-articular, using Acumed volar   locking plate. /   Date of Injury/Onset: 12/13/2019  Evaluation Date: 1/16/2020  Insurance Authorization Period Expiration: 7/17/2020  Plan of Care Certification Period: 7/17/20  Date of Return to MD: 7/6/2020     FOTO: initial eval, 6th visit, 10th visit, 20th Visit: 47% limitation      Visit # / Visits authorized: 20/ 24  Time In: 1:30 pm  Time Out:2:15 pm  Total Treatment Time: 45 minutes  Total Timed minutes: 45 minutes    Precautions:  Standard, Fall and Weightbearing **SENSITIVE TO HEAT DUE TO MS, DEFER HEAT MODALITIES       Subjective     Pt reports: "I really feel like therapy has helped."  she was compliant with home exercise program given last session.   Response to previous treatment: improved pain, swelling, and ROM  Functional change: has been able to perform ADLs and most functional activities at home, limited with playing piano    Pain:  0/10  Location: left hand    Objective     Treatment consisted of the following:    Silvana received the following manual therapy techniques for 10 minutes:   -Performed retrograde massage to L digits/hand/wrist  to decrease edema, improve joint mobility, decrease pain and improve lymphatic drainage to increase hand function. Performed scar massage to volar wrist area (not including one small healing area)   to " "decrease adhesions and improve tensile glide.      Silvana received therapeutic exercises for 35 minutes including:  AROM   Spreads  Wave  Hook  Straight fist  Composite fist  Lifts X 30 reps each   AROM thumb:  Rad abd/add  Palmar abd/add  IP blocks  Opposition  Pinky Slides    X 10 reps each    Prayer stretch 3 x 30"   Wrist wheel, flex/ext, sup/pro X 3 min   Wrist maze X 3 min   isospheres X 2 min, on table        Light wrist PRE, 3 ways 2 X 10 reps each, 1#, pain free   Supination/pronation, 1# 2 X 10 reps       PHG, 1st rung, gold spring    2 X 10 reps   Digit extend, yellow RB X 10 reps   Thumb adductor stretch, see pt instructions 3 x 30"      Home Exercises and Education Provided     Education provided: cont HEP as tolerated. Added thumb stretch  - Progress towards goals     Written Home Exercises Provided: Patient instructed to cont prior HEP.  Exercises were reviewed and Silvana was able to demonstrate them prior to the end of the session.  Silvana demonstrated good  understanding of the education provided.   .   See EMR under Patient Instructions for exercises provided 3/2/2020.     Assessment   Pt with increased fatigue today. She required numerous rest breaks throughout session. She was pain free throughout today's treatment. Pt motivated.   Silvana is progressing fairly well, but slowly towards her goals and there are no updates to goals at this time. Pt prognosis continues as Good. Pt will continue to benefit from skilled outpatient occupational therapy to address the deficits listed in the problem list on initial evaluation, provide pt/family education and to maximize pt's level of independence in the home and community environment.     Anticipated barriers to continued occupational therapy: MS    Pt's spiritual, cultural and educational needs considered and pt agreeable to plan of care and goals.    Goals     Goals:   The following goals were discussed with the patient and patient is in agreement with them " as to be addressed in the treatment plan.   Long Term Goals (LTGs); to be met by discharge.  LTG #1: Pt will report a pain level of 1 out of 10 with ADLs and house hold tasks ---progressing    LTG #2: Pt will demo improved FOTO score by at least 20 points. ---progressing  LTG #3: Pt will return to prior level of function for ADLs and household management. ---progressing  LTG #4: Pt will demonstrate improved L wrist AROM WFL for performing daily tasks---progressing  LTG #5:  and pinch to be assessed when appropriate and goals set accordingly---met, 3/12/2020  Add: LTG #6: Pt will demonstrate improved L  strength by at least 3-5# for functional grasp of objects or when driving     Short Term Goals (STGs); to be met within 4 weeks (2/14/2020).  STG #1a: Pt will report 3 out of 10 pain level with ADLs and daily tasks.---met, 3/12/2020  STG #2a: Pt will report/demo [place] with feeding self.---met, 3/2/2020  STG #2b: Pt will report/demo [place] with driving.---progressing  STG #3a: Pt will demonstrate independence with issued HEP. ---met, 3/12/2020  STG #3b: Pt will demo improved L wrist AROM by at least 15 degrees needed to aid with functional use during ADLs---met, 3/2/2020  STG #4: Pt will demonstrate improved edema in L hand and wrist by at least 0.4 cm for increased use during daily tasks.---met, 3/2/2020     Plan     Continue skilled occupational therapy with individualized plan of care focusing on ROM and edema management to maximize functional use of her L hand.       Updates/Grading for next session: cont to progress as able with ROM and light  strengthening.

## 2020-07-01 NOTE — PROGRESS NOTES
Physical Therapy Treatment Note     Name: Silvana Quezada  Clinic Number: [id number]    Therapy Diagnosis:   Encounter Diagnoses   Name Primary?    At low risk for fall     Muscular deconditioning     Physical deconditioning      Physician: Jhonatan Avila III, MD    Visit Date: 7/1/2020    Physician Orders: PT Eval and Treat   Medical Diagnosis from Referral: W19.XXXA (ICD-10-CM) - Fall, initial encounter  Evaluation Date: 5/18/2020  Authorization Period Expiration: 05/13/2021  Plan of Care Expiration: 7/17/2020  Visit # / Visits authorized: 6/ 12      Time In: 2:30 PM   Time Out: 3:10 PM   Total Billable Time: 40 minutes (1TE, 2NMR)     Precautions: Fall, Multiple sclerosis, fatigue  Subjective     Pt reports: she is tired - but feeling well   She was compliant with home exercise program   Response to previous treatment: no adverse affects  Functional change: no falls and decreased SOB    Pain: 0/10  Location: N/A    Objective     Silvana participated in neuromuscular re-education activities to improve: Balance, Coordination, Sense, Proprioception and Posture for 25 minutes. The following activities were included:    Neuro re-ed and endurance training nustep with B UE/LE extremities for reciprocal motion of all limbs on sci-fit x 8 min at level 1 at > or equal to 50 pm w/o rest.   -- sit to stand     3 x 8 without hands from elevated mat.   -- step ups    3 x 30'' Blue step  -- SLS w/ contral limb on step 3 x 30'' each side (blue step)           Silvana received therapeutic exercises to develop strength, endurance, ROM, flexibility and posture for 15 minutes including:    Standing Hip abduction    2 x 8 B  Standing Hip extension   2 x 8 B  Heel raises     3 x 8 B    HEP and Patient Education Provided     Education provided:   - need for rest breaks to reduce fatigue    Written Home Exercises Provided: yes.  Exercises were reviewed and Silvana was able to demonstrate them prior to the end of the session.  Silvana  "demonstrated good  understanding of the education provided.     See EMR under Media for exercises provided 6/22/2020.    Assessment     Pt required only 6 seated rests today and she was not SOB. She continues to responds better to no more with 5-8 reps over 10 or more reps.  She recovers quickly with rest breaks.  States "tired - but good.  I enjoyed that today."  States no pain.    Silvana is progressing well towards her goals.   Pt prognosis is Good.     Pt will continue to benefit from skilled outpatient physical therapy to address the deficits listed in the problem list box on initial evaluation, provide pt/family education and to maximize pt's level of independence in the home and community environment.     Pt's spiritual, cultural and educational needs considered and pt agreeable to plan of care and goals.     Anticipated barriers to physical therapy: low endurance, fatigue, recent dx of COVID-19, caring for sick  with COVID-19    Short Term Goals (4 Weeks):   1.  Independent with initial HEP.  (PROGRESSING, NOT MET)  2.  Pt will perform nu-step at level 4 x 8 minutes without rests breaks going at least 50 step/min or greater.(PROGRESSING, NOT MET) 6/15/2020 level 2     Long Term Goals (8 Weeks):   1.  Pt will score greater than or equal to 45/56 on the Jennings test/assessment without use of AD demonstrating overall improved functional mobility and balance and reduce fall risk. (PROGRESSING, NOT MET)  2.  Pt will walk < than or equal to 850 ft on the 6 minute walk test outdoors on multiple terrain without AD with 0 LOB for improved community ambulation.(PROGRESSING, NOT MET)  3.  Pt will be able to safely perform and tolerate high level ADL's without LOB. (PROGRESSING, NOT MET)  4. Pt will have 0 falls from start of PT sessions.(PROGRESSING, NOT MET)  5. Independent with updated HEP. (PROGRESSING, NOT MET)  6. Pt will have MMT score of 4+/5 in all major ms groups in B LE.(PROGRESSING, NOT MET)  7. Pt will " ambulate on TM x 8 minutes with use of UE support with 0 LOB at 1.0 mph.(PROGRESSING, NOT MET)  8. Pt will perform floor to stand f/b stand to floor transfer with UE support with stand by assist and no cues for improved dynamic transfer, core stability and fall safety in home and community.(PROGRESSING, NOT MET)  9. Pt will begin some form of community fitness to begin regular and consistent performance of exercise to continue maintenance of gains made in PT.(PROGRESSING, NOT MET)    Plan2     Progress balance and endurance, give lots of rests, lower reps with more sets    Tessaalecia Sterling, PTA

## 2020-07-06 ENCOUNTER — HOSPITAL ENCOUNTER (OUTPATIENT)
Dept: RADIOLOGY | Facility: HOSPITAL | Age: 65
Discharge: HOME | End: 2020-07-06
Attending: ORTHOPAEDIC SURGERY
Payer: MEDICARE

## 2020-07-06 ENCOUNTER — OFFICE VISIT (OUTPATIENT)
Dept: ORTHOPEDICS | Facility: CLINIC | Age: 65
End: 2020-07-06
Payer: MEDICARE

## 2020-07-06 VITALS — WEIGHT: 169 LBS | HEIGHT: 63 IN | BODY MASS INDEX: 29.95 KG/M2

## 2020-07-06 DIAGNOSIS — M25.532 LEFT WRIST PAIN: ICD-10-CM

## 2020-07-06 DIAGNOSIS — M25.532 LEFT WRIST PAIN: Primary | ICD-10-CM

## 2020-07-06 PROCEDURE — 73100 X-RAY EXAM OF WRIST: CPT | Mod: TC,PN,LT

## 2020-07-06 PROCEDURE — 1100F PR PT FALLS ASSESS DOC 2+ FALLS/FALL W/INJURY/YR: ICD-10-PCS | Mod: CPTII,S$GLB,, | Performed by: ORTHOPAEDIC SURGERY

## 2020-07-06 PROCEDURE — 73100 XR WRIST 2 VIEW LEFT: ICD-10-PCS | Mod: 26,LT,, | Performed by: RADIOLOGY

## 2020-07-06 PROCEDURE — 3288F FALL RISK ASSESSMENT DOCD: CPT | Mod: CPTII,S$GLB,, | Performed by: ORTHOPAEDIC SURGERY

## 2020-07-06 PROCEDURE — 99999 PR PBB SHADOW E&M-EST. PATIENT-LVL III: CPT | Mod: PBBFAC,,, | Performed by: ORTHOPAEDIC SURGERY

## 2020-07-06 PROCEDURE — 99999 PR PBB SHADOW E&M-EST. PATIENT-LVL III: ICD-10-PCS | Mod: PBBFAC,,, | Performed by: ORTHOPAEDIC SURGERY

## 2020-07-06 PROCEDURE — 73100 X-RAY EXAM OF WRIST: CPT | Mod: 26,LT,, | Performed by: RADIOLOGY

## 2020-07-06 PROCEDURE — 99213 OFFICE O/P EST LOW 20 MIN: CPT | Mod: S$GLB,,, | Performed by: ORTHOPAEDIC SURGERY

## 2020-07-06 PROCEDURE — 3008F BODY MASS INDEX DOCD: CPT | Mod: CPTII,S$GLB,, | Performed by: ORTHOPAEDIC SURGERY

## 2020-07-06 PROCEDURE — 3288F PR FALLS RISK ASSESSMENT DOCUMENTED: ICD-10-PCS | Mod: CPTII,S$GLB,, | Performed by: ORTHOPAEDIC SURGERY

## 2020-07-06 PROCEDURE — 3008F PR BODY MASS INDEX (BMI) DOCUMENTED: ICD-10-PCS | Mod: CPTII,S$GLB,, | Performed by: ORTHOPAEDIC SURGERY

## 2020-07-06 PROCEDURE — 1100F PTFALLS ASSESS-DOCD GE2>/YR: CPT | Mod: CPTII,S$GLB,, | Performed by: ORTHOPAEDIC SURGERY

## 2020-07-06 PROCEDURE — 99213 PR OFFICE/OUTPT VISIT, EST, LEVL III, 20-29 MIN: ICD-10-PCS | Mod: S$GLB,,, | Performed by: ORTHOPAEDIC SURGERY

## 2020-07-06 NOTE — PROGRESS NOTES
Subjective:      Patient ID: Silvana Quezada is a 64 y.o. female.  Chief Complaint: Post-op Evaluation of the Left Wrist      HPI  Silvana Quezada is a  64 y.o. female presenting today for follow up of ORIF left distal radius fracture.  She reports that she is doing well now more than 6 months postop  She has just about finished up therapy which was delayed by COVID  She has no pain and is using her hand and wrist much better now she still has some weakness  No numbness or tingling reported.    Review of patient's allergies indicates:   Allergen Reactions    Adhesive      blisters    Keflex [cephalexin] Rash    Cephalosporins          Current Outpatient Medications   Medication Sig Dispense Refill    alendronate (FOSAMAX) 70 MG tablet Take 1 tablet (70 mg total) by mouth every 7 days. 26 tablet 0    buPROPion (WELLBUTRIN SR) 100 MG TBSR 12 hr tablet TAKE 2 TABLETS BY MOUTH EVERY MORNING 120 tablet 1    buPROPion (WELLBUTRIN SR) 150 MG TBSR 12 hr tablet TAKE 1 TABLET BY MOUTH EVERY EVENING. DO NOT CRUSH OR CHEW; SWALLOW WHOLE 30 tablet 1    calcium-vitamin D3 (CALCIUM 500 + D) 500 mg(1,250mg) -200 unit per tablet Take 1 tablet by mouth 2 (two) times daily with meals.      catheter (FEMALE CATHETER) 14 Fr [facility] [place] Patient is to self catheterize four times a day indefinitley using female 12 fr in and out catheter for incomplete bladder emptying.   Dispense 120 month with 11 refills or 360 every 3 months with 3 refills depending on patient's preference 120 each 11    fluphenazine (PROLIXIN) 1 MG tablet TAKE 2 TABLETS NIGHTLY 180 tablet 1    folic acid (FOLVITE) 1 MG tablet Take 1 tablet (1,000 mcg total) by mouth once daily. 90 tablet 1    inhalation spacing device Use as directed for inhalation. 1 each 0    OXcarbazepine (TRILEPTAL) 150 MG Tab Take 1 tablet (150 mg total) by mouth 2 (two) times daily. 180 tablet 2    benzonatate (TESSALON PERLES) 100 MG capsule Take 1 capsule (100 mg total) by mouth 3 (three)  "times daily as needed for Cough. 30 capsule 0     No current facility-administered medications for this visit.        Past Medical History:   Diagnosis Date    Bipolar 1 disorder     Breast cancer 2009    right-radiation & chemo    Breast cyst     Closed fracture of proximal end of right humerus 3/9/2015    Depression     History of right shoulder fracture     MS (multiple sclerosis)     presented as dizzines, dx vision,     Osteoporosis, unspecified     Pneumonia 3/27/2020       Past Surgical History:   Procedure Laterality Date    BRAIN SURGERY  1991    BREAST BIOPSY Left 2009    core    BREAST BIOPSY Right     BREAST LUMPECTOMY Right 2001    CYSTOSCOPY N/A 7/11/2018    Procedure: CYSTOSCOPY;  Surgeon: Jayesh Ross MD;  Location: [address];  Service: Urology;  Laterality: N/A;  30 min    CYSTOSCOPY N/A 4/10/2019    Procedure: CYSTOSCOPY;  Surgeon: Jayesh Ross MD;  Location: [address];  Service: Urology;  Laterality: N/A;  30 MIN    FOOT SURGERY  october 2013    HYSTERECTOMY      INJECTION OF BOTULINUM TOXIN TYPE A N/A 7/11/2018    Procedure: INJECTION, BOTULINUM TOXIN, TYPE A 200 UNITS;  Surgeon: Jayesh Ross MD;  Location: [address];  Service: Urology;  Laterality: N/A;    INJECTION OF BOTULINUM TOXIN TYPE A N/A 4/10/2019    Procedure: INJECTION, BOTULINUM TOXIN, TYPE A 200 UNITS;  Surgeon: Jayesh Ross MD;  Location: [address];  Service: Urology;  Laterality: N/A;    OPEN REDUCTION AND INTERNAL FIXATION (ORIF) OF INJURY OF WRIST Left 12/27/2019    Procedure: ORIF, WRIST;  Surgeon: Albert Schroeder Jr., MD;  Location: [facility];  Service: Orthopedics;  Laterality: Left;  ACUMED/ MINI C ARM [facility] [email or web address] notified/ Rk confirmed here in [employer] with Lacie 12/23/19 KB [id number]    TONSILLECTOMY         OBJECTIVE:   PHYSICAL EXAM:  Height: 5' 3" (160 cm) Weight: 76.7 kg (169 lb)  Vitals:    07/06/20 1506   Weight: 76.7 kg (169 lb)   Height: 5' 3" (1.6 m) "     Ortho/SPM Exam  Examination left hand and wrist volar incision well-healed no swelling no tenderness  He does have some prominence of the distal ulna  Range of motion wrist fingers full including pronation supination without pain    RADIOGRAPHS:  AP and lateral x-rays left wrist demonstrates healed fracture with some what appears to be volar shift on the lateral view causing some prominence of the distal ulna  Comments: I have personally reviewed the imaging and I agree with the above radiologist's report.    ASSESSMENT/PLAN:     IMPRESSION:  Status post ORIF left distal radius fracture with volar shift and ulnar prominence    PLAN:  I explained the nature of the prominence of the distal ulna  Fortunately she has full range of motion and no pain so I really do not see any need to address this surgically although it certainly could be treated with an ulnar shortening osteotomy or a Shmuel procedure  In the meantime I have given her squeeze ball to work on strengthening otherwise full activities    FOLLOW UP:  2-3 months    Disclaimer: This note has been generated using voice-recognition software. There may be typographical errors that have been missed during proof-reading.

## 2020-07-07 ENCOUNTER — CLINICAL SUPPORT (OUTPATIENT)
Dept: REHABILITATION | Facility: HOSPITAL | Age: 65
End: 2020-07-07
Payer: MEDICARE

## 2020-07-07 DIAGNOSIS — M79.89 SWELLING OF LEFT HAND: ICD-10-CM

## 2020-07-07 DIAGNOSIS — R53.81 PHYSICAL DECONDITIONING: ICD-10-CM

## 2020-07-07 DIAGNOSIS — R29.898 MUSCULAR DECONDITIONING: ICD-10-CM

## 2020-07-07 DIAGNOSIS — M25.632 DECREASED RANGE OF MOTION OF LEFT WRIST: ICD-10-CM

## 2020-07-07 DIAGNOSIS — M25.532 LEFT WRIST PAIN: ICD-10-CM

## 2020-07-07 DIAGNOSIS — Z91.81 AT LOW RISK FOR FALL: ICD-10-CM

## 2020-07-07 PROCEDURE — 97112 NEUROMUSCULAR REEDUCATION: CPT | Mod: PN

## 2020-07-07 PROCEDURE — 97140 MANUAL THERAPY 1/> REGIONS: CPT | Mod: PN

## 2020-07-07 PROCEDURE — 97110 THERAPEUTIC EXERCISES: CPT | Mod: PN

## 2020-07-07 NOTE — PROGRESS NOTES
Physical Therapy Treatment Note/Discharge Summary     Name: Silvana Quezada  Clinic Number: [id number]    Therapy Diagnosis:   Encounter Diagnoses   Name Primary?    At low risk for fall     Muscular deconditioning     Physical deconditioning      Physician: Jhonatan Avila III, MD    Visit Date: 7/7/2020    Physician Orders: PT Eval and Treat   Medical Diagnosis from Referral: W19.XXXA (ICD-10-CM) - Fall, initial encounter  Evaluation Date: 5/18/2020  Authorization Period Expiration: 05/13/2021  Plan of Care Expiration: 7/17/2020  Visit # / Visits authorized: 7/ 12      Time In: 1230 PM   Time Out: 1330 PM   Total Billable Time: 50 minutes (2TE, 1NMR)     Precautions: Fall, Multiple sclerosis, fatigue  Subjective     Pt reports: she feels that she can do all off what she could prior to COVID-19   She was compliant with home exercise program   Response to previous treatment: no adverse affects  Functional change: no falls and decreased SOB    Pain: 0/10  Location: N/A    Objective     Silvana participated in neuromuscular re-education activities to improve: Balance, Coordination, Sense, Proprioception and Posture for 8 minutes. The following activities were included:    Neuro re-ed and endurance training nustep with B UE/LE extremities for reciprocal motion of all limbs on sci-fit x 8 min at level 1 at > or equal to 50 pm w/o rest.     PT performed reassessment x 42' total with 5 seated rest breaks    RED  BALANCE ASSESSMENT TOOL  1. Sitting to Standing   3 - use of hands  2. Standing Unsupported   4 - able to stand safely 2 minutes without hold  3. Sitting Unsupported   4 - able to sit safely and securely 2 minutes  4. Standing to Sitting   3 - controls descent by using hands  5. Pivot Transfer   3 - able to transfer safely with definite use of hands  6. Standing with Eyes Closed   4 - albe to stand 10 seconds safely  7. Standing with Feet Together   4 - able to place feet together independently and stand 1 minute  safely  8. Reaching Forward with Outstretched Arm   4 - can reach forward confidently 25 cm/10 inches  9. Retrieving Object from Floor   4 - able to  slipper safely and easily  10. Turning to Look Behind   4 - looks behind from both sides and weights shifts well  11. Turning 360 Degrees   4 - able to turn 360 in seconds or less  12. Placing Alternate Foot on Step   4 - able to stand independently safely and complete 8 steps in 20 seconds  13. Standing with One Foot in Front   3 - able to place foot ahead of other independently and hold 30 seconds  14. Standing on One Foot   2 - able to lift leg indepentdently and hold = or < 3 seconds    TOTAL SCORE: 50  Maximum: 56  Score:    41-56 low fall risk     Fall risk cut-off scores:   Elderly and History of falls: <51/56    6 minute walk test:   875 ft without AD without LOB without standing rest      Lower Extremity Strength    RLE LLE   Hip Flexion: 4+/5 4+/5   Hip Extension:  4-/5 4-/5   Hip Abduction: 4-/5 4/5   Knee Extension: 4/5 4/5   Knee Flexion: 4-/5 4/5   Ankle Dorsiflexion: 4-/5 4-/5   Ankle Plantarflexion: 4-/5 4-/5      HEP and Patient Education Provided     Education provided:   - need for rest breaks to reduce fatigue    Written Home Exercises Provided: yes.  Exercises were reviewed and Silvana was able to demonstrate them prior to the end of the session.  Silvana demonstrated good  understanding of the education provided.     See EMR under Media for exercises provided 6/22/2020.    Assessment     Pt improved in balance, endurance, LE strength and stability post fall and COVID-19.  She has met most goals with no new falls.     Silvana is progressing well towards her goals.   Pt prognosis is Good.     Pt will continue to benefit from skilled outpatient physical therapy to address the deficits listed in the problem list box on initial evaluation, provide pt/family education and to maximize pt's level of independence in the home and community environment.      Pt's spiritual, cultural and educational needs considered and pt agreeable to plan of care and goals.     Anticipated barriers to physical therapy: low endurance, fatigue, recent dx of COVID-19, caring for sick  with COVID-19    Short Term Goals (4 Weeks):   1.  Independent with initial HEP. MET  2.  Pt will perform nu-step at level 4 x 8 minutes without rests breaks going at least 50 step/min or greater. MET 6/15/2020 8' level 3     Long Term Goals (8 Weeks):   1.  Pt will score greater than or equal to 45/56 on the Jennings test/assessment without use of AD demonstrating overall improved functional mobility and balance and reduce fall risk.   MET 50/56 7/7/2020   2.  Pt will walk < than or equal to 850 ft on the 6 minute walk test outdoors on multiple terrain without AD with 0 LOB for improved community ambulation.MET  3.  Pt will be able to safely perform and tolerate high level ADL's without LOB. MET, sweeping, laundry, shopping, etc.  4. Pt will have 0 falls from start of PT sessions. MET 7/7/2020  5. Independent with updated HEP. MET with HEP issued 6/22/2020  6. Pt will have MMT score of 4+/5 in all major ms groups in B LE.(PROGRESSING, NOT MET)  7. Pt will ambulate on TM x 8 minutes with use of UE support with 0 LOB at 1.0 mph.not assessed   8. Pt will perform floor to stand f/b stand to floor transfer with UE support with stand by assist and no cues for improved dynamic transfer, core stability and fall safety in home and community. Not assessed  9. Pt will begin some form of community fitness to begin regular and consistent performance of exercise to continue maintenance of gains made in PT. MET 7/7/2020    Plan     D/c today    Iwona Vora, PT

## 2020-07-07 NOTE — PROGRESS NOTES
"  Occupational Therapy Daily Treatment Note/Discharge Note     Date: 7/7/2020  Name: Silvana Quezada  Clinic Number: [id number]    Therapy Diagnosis:   Encounter Diagnoses   Name Primary?    Left wrist pain     Swelling of left hand     Decreased range of motion of left wrist      Physician: Mc Cordero, CELIA    Physician Orders: eval and treat  Medical Diagnosis: S52.502D (ICD-10-CM) - Closed fracture of distal end of left radius with routine healing, unspecified fracture morphology, subsequent encounter  Surgical Procedure and Date: 12/27/2019,Open reduction and internal fixation of left distal radius fracture, 3 or more fragments, intra-articular, using Acumed volar   locking plate. /   Date of Injury/Onset: 12/13/2019  Evaluation Date: 1/16/2020  Insurance Authorization Period Expiration: 7/17/2020  Plan of Care Certification Period: 7/17/20  Date of Return to MD: 7/6/2020     FOTO: initial eval, 6th visit, 10th visit, 20th Visit: 47% limitation      Visit # / Visits authorized: 21/ 24  Time In: 1:30 pm  Time Out:2:15 pm  Total Treatment Time: 45 minutes  Total Timed minutes: 45 minutes    Precautions:  Standard, Fall and Weightbearing **SENSITIVE TO HEAT DUE TO MS, DEFER HEAT MODALITIES       Subjective     Pt reports: "the doctor said I can continue on my own."  she was compliant with home exercise program given last session.   Response to previous treatment: improved pain, swelling, and ROM  Functional change: has been able to perform ADLs and most functional activities at home    Pain:  0/10  Location: left hand    Objective     Treatment consisted of the following:    Silvana received the following manual therapy techniques for 10 minutes:   -Performed retrograde massage to L digits/hand/wrist  to decrease edema, improve joint mobility, decrease pain and improve lymphatic drainage to increase hand function. Performed scar massage to volar wrist area (not including one small healing area)   to decrease " "adhesions and improve tensile glide.      Silvana received therapeutic exercises for 35 minutes including:  AROM   Spreads  Wave  Hook  Straight fist  Composite fist  Lifts X 30 reps each   AROM thumb:  Rad abd/add  Palmar abd/add  IP blocks  Opposition  Pinky Slides    X 10 reps each    Prayer stretch 3 x 30"   Wrist wheel, flex/ext, sup/pro X 3 min   Wrist maze X 3 min   isospheres X 2 min, on table        Light wrist PRE, 3 ways 2 X 10 reps each, 1#, pain free   Supination/pronation, 1# 2 X 10 reps       PHG, 1st rung, gold spring    2 X 10 reps   Digit extend, yellow RB X 10 reps    Update: Edema. Measured in centimeters.    1/16/2020 1/16/2020 1/30/2020 2/20/2020 3/2/2020 3/10/2020 5/18/2020    07/07/2020       Left Right Left Left Left Left Left Left   Wrist Crease 19 15.8 18.3 (-0.7) 18.5 18.2 (-0.3) 18.1 18 17.5   DPC 18.8 18.5 18.7 (-0.1) 18.4 18.1 (-0.3) 18.1 18.1 18   MCPs 19.3 18.7 19.1 (-0.2) 19 18.6 (-0.4) 18.2 18.3 18         Elbow and Wrist ROM. Measured in degrees.    1/16/2020 1/16/2020 1/30/2020 2/6/2020 2/20/2020 3/2/2020 3/10/2020 5/18/2020    07/07/2020       Left Right Left Left Left Left Left Left Left   Elbow Ext/Flex WNL WNL WNL           WNL   Supination/Pronation 44/74 WNL 66/84 (+22/+10) 72/84 74/86 76/86(+2) 76/82 WNL/WNL WNL   Wrist Ext/Flex 36/32 72/60 44/46 (+8/+14) 52/50 52/50 54/58 (+10) 54/58 55/60 55/60   Wrist RD/UD 4/6 12/22 10/12 (+6/+6) 12/14 12/18 16/18 (+4) 16/16 19/17 12/20      Hand ROM. Measured in degrees.  Other digits WFL, able to make full fist    1/16/2020 1/30/2020 2/6/2020 2/20/2020 3/2/2020 3/10/2020 5/18/2020    07/07/2020     Left Left Left Left Left Left Left                       Thumb: MP 5/45 5/50 (+5) 50 50 55 55 65 55                IP 0 8 (+8) 10 25 25 35 55 60       Rad ADD/ABD 42 42 46 44 44 44 66 40       Pal ADD/ABD 40 42 48 42 42 42 65 40          Opposition To P2 o SF To base of SF (improved) To base of SF To SF MP jt To SF MP jt same WNL WNL "          Strength (Dynamometer) and Pinch Strength (Pinch Gauge)  Measured in pounds.    3/10/2020 3/10/2020 5/18/2020    07/07/2020     Left Right Left Left   Rung II 12 25 25 20   Simon Pinch 6 7 7 7   3pt Pinch 4 7 5 5   2pt Pinch 4 8 6 5        Home Exercises and Education Provided     Education provided: cont HEP as tolerated. Added thumb stretch  - Progress towards goals     Written Home Exercises Provided: Patient instructed to cont prior HEP.  Exercises were reviewed and Silvana was able to demonstrate them prior to the end of the session.  Silvana demonstrated good  understanding of the education provided.   .   See EMR under Patient Instructions for exercises provided 3/2/2020.     Assessment   Pt has attended OT x 21 visits. She was evaluated in January 2020 but then was put on hold due to Covid outbreak. Pt has been motivated throughout her course of care. She is demonstrating overall improved ROM, strength, pain and edema. She reports she is no longer limited functionally by her left hand. She is demonstrating slight improvement in FOTO score since initial evaluation. Pt has met all goals. She no longer requires skilled services at this time.     Anticipated barriers to continued occupational therapy: MS    Pt's spiritual, cultural and educational needs considered and pt agreeable to plan of care and goals.    Goals     Goals:   The following goals were discussed with the patient and patient is in agreement with them as to be addressed in the treatment plan.   Long Term Goals (LTGs); to be met by discharge.  LTG #1: Pt will report a pain level of 1 out of 10 with ADLs and house hold tasks ---met  LTG #2: Pt will demo improved FOTO score by at least 20 points. ---met  LTG #3: Pt will return to prior level of function for ADLs and household management. ---met  LTG #4: Pt will demonstrate improved L wrist AROM WFL for performing daily tasks---met  LTG #5:  and pinch to be assessed when appropriate and  goals set accordingly---met, 3/12/2020  Add: LTG #6: Pt will demonstrate improved L  strength by at least 3-5# for functional grasp of objects or when driving-met     Short Term Goals (STGs); to be met within 4 weeks (2/14/2020).  STG #1a: Pt will report 3 out of 10 pain level with ADLs and daily tasks.---met, 3/12/2020  STG #2a: Pt will report/demo [place] with feeding self.---met, 3/2/2020  STG #2b: Pt will report/demo [place] with driving.---met  STG #3a: Pt will demonstrate independence with issued HEP. ---met, 3/12/2020  STG #3b: Pt will demo improved L wrist AROM by at least 15 degrees needed to aid with functional use during ADLs---met, 3/2/2020  STG #4: Pt will demonstrate improved edema in L hand and wrist by at least 0.4 cm for increased use during daily tasks.---met, 3/2/2020     Plan     Discharge from OT

## 2020-07-15 ENCOUNTER — TELEPHONE (OUTPATIENT)
Dept: NEUROLOGY | Facility: CLINIC | Age: 65
End: 2020-07-15

## 2020-07-15 NOTE — TELEPHONE ENCOUNTER
----- Message from Gricel Cummings sent at 7/15/2020 11:42 AM CDT -----  Contact: Pt @ [phone]  Pt is calling to speak to someone because she says she needs an MRI scheduled.

## 2020-07-17 ENCOUNTER — HOSPITAL ENCOUNTER (OUTPATIENT)
Dept: RADIOLOGY | Facility: HOSPITAL | Age: 65
Discharge: HOME | End: 2020-07-17
Attending: PHYSICIAN ASSISTANT
Payer: MEDICARE

## 2020-07-17 DIAGNOSIS — G35 MULTIPLE SCLEROSIS: ICD-10-CM

## 2020-07-17 LAB
CREAT SERPL-MCNC: 0.8 MG/DL (ref 0.5–1.4)
SAMPLE: NORMAL

## 2020-07-17 PROCEDURE — 70553 MRI BRAIN STEM W/O & W/DYE: CPT | Mod: TC

## 2020-07-17 PROCEDURE — 25500020 PHARM REV CODE 255: Performed by: PHYSICIAN ASSISTANT

## 2020-07-17 PROCEDURE — 70553 MRI BRAIN DEMYELINATING W/ WO CONTRAST: ICD-10-PCS | Mod: 26,,, | Performed by: RADIOLOGY

## 2020-07-17 PROCEDURE — A9585 GADOBUTROL INJECTION: HCPCS | Performed by: PHYSICIAN ASSISTANT

## 2020-07-17 PROCEDURE — 70553 MRI BRAIN STEM W/O & W/DYE: CPT | Mod: 26,,, | Performed by: RADIOLOGY

## 2020-07-17 RX ORDER — GADOBUTROL 604.72 MG/ML
8 INJECTION INTRAVENOUS
Status: COMPLETED | OUTPATIENT
Start: 2020-07-17 | End: 2020-07-17

## 2020-07-17 RX ADMIN — GADOBUTROL 8 ML: 604.72 INJECTION INTRAVENOUS at 02:07

## 2020-07-24 ENCOUNTER — OFFICE VISIT (OUTPATIENT)
Dept: NEUROLOGY | Facility: CLINIC | Age: 65
End: 2020-07-24
Payer: MEDICARE

## 2020-07-24 VITALS — WEIGHT: 166.25 LBS | BODY MASS INDEX: 29.46 KG/M2 | HEIGHT: 63 IN

## 2020-07-24 DIAGNOSIS — Z71.89 COUNSELING REGARDING GOALS OF CARE: ICD-10-CM

## 2020-07-24 DIAGNOSIS — G35 MULTIPLE SCLEROSIS: Primary | ICD-10-CM

## 2020-07-24 DIAGNOSIS — R53.83 OTHER FATIGUE: ICD-10-CM

## 2020-07-24 DIAGNOSIS — R26.89 ABNORMALITY OF GAIT DUE TO IMPAIRMENT OF BALANCE: ICD-10-CM

## 2020-07-24 PROCEDURE — 3008F BODY MASS INDEX DOCD: CPT | Mod: CPTII,S$GLB,, | Performed by: PSYCHIATRY & NEUROLOGY

## 2020-07-24 PROCEDURE — 99215 OFFICE O/P EST HI 40 MIN: CPT | Mod: S$GLB,,, | Performed by: PSYCHIATRY & NEUROLOGY

## 2020-07-24 PROCEDURE — 1101F PT FALLS ASSESS-DOCD LE1/YR: CPT | Mod: CPTII,S$GLB,, | Performed by: PSYCHIATRY & NEUROLOGY

## 2020-07-24 PROCEDURE — 99215 PR OFFICE/OUTPT VISIT, EST, LEVL V, 40-54 MIN: ICD-10-PCS | Mod: S$GLB,,, | Performed by: PSYCHIATRY & NEUROLOGY

## 2020-07-24 PROCEDURE — 3008F PR BODY MASS INDEX (BMI) DOCUMENTED: ICD-10-PCS | Mod: CPTII,S$GLB,, | Performed by: PSYCHIATRY & NEUROLOGY

## 2020-07-24 PROCEDURE — 99999 PR PBB SHADOW E&M-EST. PATIENT-LVL III: ICD-10-PCS | Mod: PBBFAC,,, | Performed by: PSYCHIATRY & NEUROLOGY

## 2020-07-24 PROCEDURE — 1101F PR PT FALLS ASSESS DOC 0-1 FALLS W/OUT INJ PAST YR: ICD-10-PCS | Mod: CPTII,S$GLB,, | Performed by: PSYCHIATRY & NEUROLOGY

## 2020-07-24 PROCEDURE — 99999 PR PBB SHADOW E&M-EST. PATIENT-LVL III: CPT | Mod: PBBFAC,,, | Performed by: PSYCHIATRY & NEUROLOGY

## 2020-07-24 NOTE — PATIENT INSTRUCTIONS
For energy:  Coenzyme Q10 100mg capsules, Take one capsule three times a day.  Vit B12 500mcg, take one tablet twice a day.

## 2020-07-24 NOTE — PROGRESS NOTES
"Subjective:          Patient ID: Silvana Quezada is a 64 y.o. female who presents today for a routine clinic visit for MS.      MS HPI:  · DMT: None  · Side effects from DMT? No  · Taking vitamin D3 as recommended? Yes -   · Used to walk a mile every morning last year, but she fell and broke her wrist. Completed PT/OT about 2-3 weeks ago for falls and imbalance and she graduated without need for walking aid. No falls since last December - one fall was due to imbalance, other was due to a "misstep" when she fell backwards and broke her wrist. Outside of these two events, she states that she never falls. Balance isn't great but is "improved" since PT/OT.  · Following with urology, no issues.  · Also with fatigue, intolerable.  · Given a list of MS wellness resources, including holistic therapies    Medications:  Current Outpatient Medications   Medication Sig    alendronate (FOSAMAX) 70 MG tablet Take 1 tablet (70 mg total) by mouth every 7 days.    buPROPion (WELLBUTRIN SR) 100 MG TBSR 12 hr tablet TAKE 2 TABLETS BY MOUTH EVERY MORNING    buPROPion (WELLBUTRIN SR) 150 MG TBSR 12 hr tablet TAKE 1 TABLET BY MOUTH EVERY EVENING. DO NOT CRUSH OR CHEW; SWALLOW WHOLE    calcium-vitamin D3 (CALCIUM 500 + D) 500 mg(1,250mg) -200 unit per tablet Take 1 tablet by mouth 2 (two) times daily with meals.    catheter (FEMALE CATHETER) 14 Fr [facility] [place] [facility] Patient is to self catheterize four times a day indefinitley using female 12 fr in and out catheter for incomplete bladder emptying.   Dispense 120 month with 11 refills or 360 every 3 months with 3 refills depending on patient's preference    fluphenazine (PROLIXIN) 1 MG tablet TAKE 2 TABLETS BY MOUTH NIGHTLY    folic acid (FOLVITE) 1 MG tablet Take 1 tablet (1,000 mcg total) by mouth once daily.    OXcarbazepine (TRILEPTAL) 150 MG Tab Take 1 tablet (150 mg total) by mouth 2 (two) times daily.    inhalation spacing device Use as directed for inhalation.     No current " facility-administered medications for this visit.        SOCIAL HISTORY  Social History     Tobacco Use    Smoking status: Never Smoker    Smokeless tobacco: Never Used   Substance Use Topics    Alcohol use: Yes     Alcohol/week: 1.7 standard drinks     Types: 2 Standard drinks or equivalent per week     Comment: occasionally    Drug use: No       Living arrangements - the patient lives with their spouse.    ROS:    No flowsheet data found.             Objective:        1. 25 foot timed walk:  Timed 25 Foot Walk: 7/24/2020   Did patient wear an AFO? No   Was assistive device used? No   Time for 25 Foot Walk (seconds) 9.8   Time for 25 Foot Walk (seconds) 10.3       2. 9 Hole Peg Test:  No flowsheet data found.    Neurologic Exam      Imaging:     No results found for this or any previous visit.  No results found for this or any previous visit.  No results found for this or any previous visit.  Results for orders placed during the hospital encounter of 07/17/20   MRI Brain Demyelinating W W/O Contrast    Impression Findings in the cerebral white matter which are typical for multiple sclerosis.  No enhancing lesions to suggest active disease.      Electronically signed by: Steven Hay MD  Date:    07/17/2020  Time:    15:28     No results found for this or any previous visit.  No results found for this or any previous visit.      Labs:     Lab Results   Component Value Date    IOIWGUIR69VC 42 05/13/2020    YBGPIYJL54CL 35 02/26/2020    LRIMGCPP79ZN 33 05/17/2017     No results found for: JCVINDEX, JCVANTIBODY  No results found for: XY3KYBNW, ABSOLUTECD3, RC3QPENR, ABSOLUTECD8, OY6UJPMT, ABSOLUTECD4, LABCD48  Lab Results   Component Value Date    WBC 3.80 (L) 05/13/2020    RBC 4.20 05/13/2020    HGB 12.7 05/13/2020    HCT 40.4 05/13/2020    MCV 96 05/13/2020    MCH 30.2 05/13/2020    MCHC 31.4 (L) 05/13/2020    RDW 13.4 05/13/2020     05/13/2020    MPV 11.4 05/13/2020    GRAN 2.0 05/13/2020    GRAN 51.8  05/13/2020    LYMPH 1.4 05/13/2020    LYMPH 35.5 05/13/2020    MONO 0.4 05/13/2020    MONO 9.5 05/13/2020    EOS 0.1 05/13/2020    BASO 0.02 05/13/2020    EOSINOPHIL 2.4 05/13/2020    BASOPHIL 0.5 05/13/2020     Sodium   Date Value Ref Range Status   05/13/2020 143 136 - 145 mmol/L Final     Potassium   Date Value Ref Range Status   05/13/2020 3.8 3.5 - 5.1 mmol/L Final     Chloride   Date Value Ref Range Status   05/13/2020 107 95 - 110 mmol/L Final     CO2   Date Value Ref Range Status   05/13/2020 28 23 - 29 mmol/L Final     Glucose   Date Value Ref Range Status   05/13/2020 90 70 - 110 mg/dL Final     BUN, Bld   Date Value Ref Range Status   05/13/2020 17 8 - 23 mg/dL Final     Creatinine   Date Value Ref Range Status   05/13/2020 0.9 0.5 - 1.4 mg/dL Final     Calcium   Date Value Ref Range Status   05/13/2020 9.5 8.7 - 10.5 mg/dL Final     Total Protein   Date Value Ref Range Status   05/13/2020 6.9 6.0 - 8.4 g/dL Final     Albumin   Date Value Ref Range Status   05/13/2020 3.9 3.5 - 5.2 g/dL Final     Total Bilirubin   Date Value Ref Range Status   05/13/2020 0.3 0.1 - 1.0 mg/dL Final     Comment:     For infants and newborns, interpretation of results should be based  on gestational age, weight and in agreement with clinical  observations.  Premature Infant recommended reference ranges:  Up to 24 hours.............<8.0 mg/dL  Up to 48 hours............<12.0 mg/dL  3-5 days..................<15.0 mg/dL  6-29 days.................<15.0 mg/dL       Alkaline Phosphatase   Date Value Ref Range Status   05/13/2020 58 55 - 135 U/L Final     AST   Date Value Ref Range Status   05/13/2020 15 10 - 40 U/L Final     ALT   Date Value Ref Range Status   05/13/2020 16 10 - 44 U/L Final     Anion Gap   Date Value Ref Range Status   05/13/2020 8 8 - 16 mmol/L Final     eGFR if    Date Value Ref Range Status   05/13/2020 >60.0 >60 mL/min/1.73 m^2 Final     eGFR if non    Date Value Ref Range  Status   05/13/2020 >60.0 >60 mL/min/1.73 m^2 Final     Comment:     Calculation used to obtain the estimated glomerular filtration  rate (eGFR) is the CKD-EPI equation.        No results found for: HEPBSAG, HEPBSAB, HEPBCAB        MS Impression and Plan:     NEURO MULTIPLE SCLEROSIS IMPRESSION:   MS Status:     Number of relapses in the past year?:  0    Clinical Progression:  Clinically Stable    MRI Progression comment:  Recent MRI without any active lesions  Plan:     DMT:  No change in management    DMT comment:  Not on DMT    Symptom Management:  No change in symptom management     MS counseling given. No signs/symptoms concerning for disease progression per patient and recent MRI without active lesions.  PT/OT helping with gait imbalance and falls  Fatigue: advised to consider a trial of vitamins with the following:  --Coenzyme Q10 100mg capsules, Take one capsule three times a day.  --Vit B12 500mcg, take one tablet twice a day.    Can follow-up in one year or sooner if needed          Our visit today lasted 40 minutes, and 100% of this time was spent face to face with the patient. Over 50% of this visit included discussion of the treatment plan/medication changes/symptom management/exam findings/imaging results/coordination of care. The patient agrees with the plan of care.    Problem List Items Addressed This Visit     None          Carol Oneil MD

## 2020-08-14 ENCOUNTER — HOSPITAL ENCOUNTER (OUTPATIENT)
Dept: RADIOLOGY | Facility: HOSPITAL | Age: 65
Discharge: HOME | End: 2020-08-14
Attending: NURSE PRACTITIONER
Payer: MEDICARE

## 2020-08-14 ENCOUNTER — OFFICE VISIT (OUTPATIENT)
Dept: FAMILY MEDICINE | Facility: CLINIC | Age: 65
End: 2020-08-14
Payer: MEDICARE

## 2020-08-14 VITALS
DIASTOLIC BLOOD PRESSURE: 60 MMHG | HEART RATE: 75 BPM | WEIGHT: 169.06 LBS | BODY MASS INDEX: 29.95 KG/M2 | SYSTOLIC BLOOD PRESSURE: 124 MMHG | OXYGEN SATURATION: 98 % | TEMPERATURE: 98 F | HEIGHT: 63 IN

## 2020-08-14 DIAGNOSIS — M79.602 LEFT ARM PAIN: ICD-10-CM

## 2020-08-14 DIAGNOSIS — S09.90XA HEAD TRAUMA, INITIAL ENCOUNTER: ICD-10-CM
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
## 2020-08-14 PROCEDURE — 99214 OFFICE O/P EST MOD 30 MIN: CPT | Mod: S$GLB,,, | Performed by: NURSE PRACTITIONER

## 2020-08-14 PROCEDURE — 73070 X-RAY EXAM OF ELBOW: CPT | Mod: TC,FY,PO,LT

## 2020-08-14 PROCEDURE — 73070 XR ELBOW 2 VIEWS LEFT: ICD-10-PCS | Mod: 26,LT,, | Performed by: RADIOLOGY

## 2020-08-14 PROCEDURE — 99999 PR PBB SHADOW E&M-EST. PATIENT-LVL V: ICD-10-PCS | Mod: PBBFAC,,, | Performed by: NURSE PRACTITIONER

## 2020-08-14 PROCEDURE — 70450 CT HEAD/BRAIN W/O DYE: CPT | Mod: TC

## 2020-08-14 PROCEDURE — 73070 X-RAY EXAM OF ELBOW: CPT | Mod: 26,LT,, | Performed by: RADIOLOGY

## 2020-08-14 PROCEDURE — 3008F BODY MASS INDEX DOCD: CPT | Mod: CPTII,S$GLB,, | Performed by: NURSE PRACTITIONER

## 2020-08-14 PROCEDURE — 3008F PR BODY MASS INDEX (BMI) DOCUMENTED: ICD-10-PCS | Mod: CPTII,S$GLB,, | Performed by: NURSE PRACTITIONER

## 2020-08-14 PROCEDURE — 73090 X-RAY EXAM OF FOREARM: CPT | Mod: 26,LT,, | Performed by: RADIOLOGY

## 2020-08-14 PROCEDURE — 73090 X-RAY EXAM OF FOREARM: CPT | Mod: TC,FY,PO,LT

## 2020-08-14 PROCEDURE — 70450 CT HEAD/BRAIN W/O DYE: CPT | Mod: 26,,, | Performed by: RADIOLOGY

## 2020-08-14 PROCEDURE — 1101F PR PT FALLS ASSESS DOC 0-1 FALLS W/OUT INJ PAST YR: ICD-10-PCS | Mod: CPTII,S$GLB,, | Performed by: NURSE PRACTITIONER

## 2020-08-14 PROCEDURE — 99214 PR OFFICE/OUTPT VISIT, EST, LEVL IV, 30-39 MIN: ICD-10-PCS | Mod: S$GLB,,, | Performed by: NURSE PRACTITIONER

## 2020-08-14 PROCEDURE — 1101F PT FALLS ASSESS-DOCD LE1/YR: CPT | Mod: CPTII,S$GLB,, | Performed by: NURSE PRACTITIONER

## 2020-08-14 PROCEDURE — 73090 XR FOREARM LEFT: ICD-10-PCS | Mod: 26,LT,, | Performed by: RADIOLOGY

## 2020-08-14 PROCEDURE — 99999 PR PBB SHADOW E&M-EST. PATIENT-LVL V: CPT | Mod: PBBFAC,,, | Performed by: NURSE PRACTITIONER

## 2020-08-14 PROCEDURE — 70450 CT HEAD WITHOUT CONTRAST: ICD-10-PCS | Mod: 26,,, | Performed by: RADIOLOGY

## 2020-08-14 NOTE — PROGRESS NOTES
Subjective:       Patient ID: Silvana Quezada is a 64 y.o. female.    Chief Complaint: Pain (on face and arrms x 1 day) and Fall    This is a 63 yo female patient of Dr. Avila who is new to me. She presents today accompanied by her  with c/o pain to face and arms since yesterday after having a fall outside. PMH includes    Patient Active Problem List:     MS (multiple sclerosis)     Neurogenic bladder     Urgency-frequency syndrome     History of breast cancer     Localized osteoporosis with current pathological fracture     Incomplete bladder emptying     Overactive bladder     Thoracic aorta atherosclerosis     Bipolar affective disorder     Tortuous aorta     Bipolar I disorder, most recent episode depressed, moderate     Wrist fracture, closed, left, initial encounter     Age-related osteoporosis without current pathological fracture     Malignant (primary) neoplasm, unspecified     Shortness of breath     Urge incontinence     Muscular deconditioning     Physical deconditioning     VSS today. Patient reports she was helping her  move a musical keyboard out to the car when she fell forward and hit her head on the cement in the driveway. Landed on her left arm with most of her body falling on the grass but her head hit the cement. Denies any post-fall loss of consciousness, loss of balance, loss of sensation, immobility, visual changes, N/V, change in appetite, or speech difficulty. ORIF of left distal radius in December 2019 d/t another fall. See more below.    Pain  This is a new problem. The current episode started yesterday. The problem occurs constantly. Associated symptoms include arthralgias, joint swelling and weakness (to RUE). Pertinent negatives include no abdominal pain, chest pain, chills, fatigue, fever, headaches, nausea, neck pain, numbness, visual change or vomiting. The symptoms are aggravated by exertion. She has tried ice, rest and NSAIDs for the symptoms. The treatment provided  moderate relief.   Arm Injury   The incident occurred 12 to 24 hours ago. Incident location: in the driveway. The injury mechanism was a fall. The pain is present in the left forearm, left elbow and left wrist. The quality of the pain is described as aching. The pain is at a severity of 3/10. The pain is mild. The pain has been fluctuating since the incident. Associated symptoms include muscle weakness. Pertinent negatives include no chest pain, numbness or tingling. The symptoms are aggravated by movement and palpation. She has tried ice and NSAIDs for the symptoms. The treatment provided moderate relief.     Review of Systems   Constitutional: Negative for chills, fatigue and fever.   HENT: Negative for trouble swallowing and voice change.    Eyes: Negative for pain and visual disturbance.   Respiratory: Negative for chest tightness and shortness of breath.    Cardiovascular: Negative for chest pain and palpitations.   Gastrointestinal: Negative for abdominal pain, nausea and vomiting.   Genitourinary: Negative for difficulty urinating.   Musculoskeletal: Positive for arthralgias and joint swelling. Negative for neck pain and neck stiffness.   Neurological: Positive for weakness (to RUE). Negative for dizziness, tingling, tremors, syncope, speech difficulty, light-headedness, numbness and headaches.         Objective:      Physical Exam  Constitutional:       General: She is not in acute distress.     Appearance: Normal appearance. She is well-developed and well-groomed.   HENT:      Head: Normocephalic. Raccoon eyes (Left) present. No Teran's sign.      Right Ear: External ear normal.      Left Ear: External ear normal.   Eyes:      General:         Right eye: No discharge.         Left eye: No discharge.      Extraocular Movements: Extraocular movements intact.      Conjunctiva/sclera: Conjunctivae normal.      Pupils: Pupils are equal, round, and reactive to light.      Comments: Uses reading glasses   Neck:       Vascular: No carotid bruit.   Cardiovascular:      Rate and Rhythm: Normal rate and regular rhythm.      Heart sounds: No murmur.   Pulmonary:      Effort: Pulmonary effort is normal. No respiratory distress.      Breath sounds: No wheezing or rhonchi.   Abdominal:      General: Bowel sounds are normal. There is no distension.      Palpations: Abdomen is soft.      Tenderness: There is no abdominal tenderness.   Musculoskeletal:         General: Swelling, tenderness and signs of injury present.      Left elbow: She exhibits decreased range of motion. Tenderness found. No lateral epicondyle tenderness noted.      Left wrist: She exhibits decreased range of motion, tenderness and swelling.      Right lower leg: No edema.      Left lower leg: No edema.   Skin:     General: Skin is warm and dry.      Capillary Refill: Capillary refill takes less than 2 seconds.      Findings: Abrasion, bruising and ecchymosis present.             Comments: Abrasion noted to area outlined in red  Left periorbital ecchymosis   Neurological:      Mental Status: She is alert and oriented to person, place, and time.      Motor: Weakness (to LUE) present.      Comments: CN II-XII intact   Psychiatric:         Attention and Perception: Attention normal.         Mood and Affect: Mood and affect normal.         Speech: Speech normal.         Behavior: Behavior normal. Behavior is cooperative.                   Assessment:       1. Fall, initial encounter    2. Head trauma, initial encounter    3. Left arm pain        Plan:       Silvana was seen today for pain and fall.    Diagnoses and all orders for this visit:    Fall, initial encounter  -     X-Ray Elbow 2 Views Left; Future  -     X-Ray Forearm Left; Future    Head trauma, initial encounter  -     CT Head Without Contrast; Future    Left arm pain  -     X-Ray Elbow 2 Views Left; Future  -     X-Ray Forearm Left; Future      - Imaging tests ordered today  - Instructed to use OTC ibuprofen  q8h PRN pain  - May apply ice pack to arm for 10-15 min at a time up to 3-4 times/day  - Warning signs discussed  - Follow up with PCP later this month, or sooner if needed

## 2020-08-24 NOTE — PROGRESS NOTES
"Subjective:       Patient ID: Silvana Quezada is a 64 y.o. female.    Chief Complaint: Hand Pain (Left) and Follow-up           #Interim Hx  8/2020- fall stable xray, CT  3/2020 - COVID 19      5/15/2020 - est care with neurology . Labs and MRI pending 6/24/20      #Concerns Today    none  #Chronic Problems      MS  C/b neurogenic bladder, gets botox inj with urology   Now followed by neruology   MRI stable  Starte VItb12 and Q10     H/o BCa  UTD on mammo - BIRADS2- 5/2019   Mammogram 6/18/20     Osteopersosis cb pathologic fracture  Last dexa 6/2017 osteoperosis  Fall and wrist fracture 12/2019   Rpt DXA showed osteoperosis  Started on fosamax wihtout SE    Bipolar   Seen by psych     Health Maintenance Due   Topic Date Due    HIV Screening  09/25/1969    TETANUS VACCINE  09/25/1972    High Dose Statin  09/25/1975    Shingles Vaccine (1 of 2) 09/25/2004       Health Maintenance Topics with due status: Not Due       Topic Last Completion Date    Influenza Vaccine 09/10/2019    Colorectal Cancer Screening 12/05/2019    Lipid Panel 02/20/2020    DEXA SCAN 03/03/2020    Mammogram 06/18/2020       HPI  Review of Systems   Constitutional: Negative for activity change, chills, diaphoresis, fatigue, fever and unexpected weight change.   Eyes: Negative for redness and visual disturbance.   Respiratory: Negative for shortness of breath.    Cardiovascular: Negative for chest pain and palpitations.   Gastrointestinal: Negative for abdominal distention and blood in stool.   Genitourinary: Positive for difficulty urinating. Negative for dysuria, frequency and menstrual problem.   Neurological: Negative for syncope and headaches.       Objective:       /74 (BP Location: Right arm, Patient Position: Sitting, BP Method: Medium (Manual))   Pulse 81   Temp 98.8 °F (37.1 °C) (Temporal)   Ht 5' 3" (1.6 m)   Wt 76.6 kg (168 lb 14 oz)   LMP  (LMP Unknown)   SpO2 98%   BMI 29.91 kg/m²     Physical Exam  Vitals signs and " nursing note reviewed.   Constitutional:       General: She is not in acute distress.     Appearance: She is well-developed. She is not diaphoretic.   HENT:      Head: Normocephalic.      Nose: Nose normal.   Eyes:      General:         Right eye: No discharge.         Left eye: No discharge.      Conjunctiva/sclera: Conjunctivae normal.      Pupils: Pupils are equal, round, and reactive to light.   Neck:      Musculoskeletal: Normal range of motion and neck supple.   Cardiovascular:      Rate and Rhythm: Normal rate and regular rhythm.      Heart sounds: Normal heart sounds. No murmur. No friction rub. No gallop.    Pulmonary:      Effort: Pulmonary effort is normal. No respiratory distress.   Abdominal:      Palpations: Abdomen is soft.   Musculoskeletal: Normal range of motion.         General: No deformity.   Skin:     General: Skin is warm.   Neurological:      Mental Status: She is alert and oriented to person, place, and time.      Cranial Nerves: No cranial nerve deficit.      Sensory: No sensory deficit.      Coordination: Coordination normal.      Deep Tendon Reflexes: Reflexes normal.           Basic Labs  CMP  Sodium   Date Value Ref Range Status   05/13/2020 143 136 - 145 mmol/L Final     Potassium   Date Value Ref Range Status   05/13/2020 3.8 3.5 - 5.1 mmol/L Final     Chloride   Date Value Ref Range Status   05/13/2020 107 95 - 110 mmol/L Final     CO2   Date Value Ref Range Status   05/13/2020 28 23 - 29 mmol/L Final     Glucose   Date Value Ref Range Status   05/13/2020 90 70 - 110 mg/dL Final     BUN, Bld   Date Value Ref Range Status   05/13/2020 17 8 - 23 mg/dL Final     Creatinine   Date Value Ref Range Status   05/13/2020 0.9 0.5 - 1.4 mg/dL Final     Calcium   Date Value Ref Range Status   05/13/2020 9.5 8.7 - 10.5 mg/dL Final     Total Protein   Date Value Ref Range Status   05/13/2020 6.9 6.0 - 8.4 g/dL Final     Albumin   Date Value Ref Range Status   05/13/2020 3.9 3.5 - 5.2 g/dL Final      Total Bilirubin   Date Value Ref Range Status   05/13/2020 0.3 0.1 - 1.0 mg/dL Final     Comment:     For infants and newborns, interpretation of results should be based  on gestational age, weight and in agreement with clinical  observations.  Premature Infant recommended reference ranges:  Up to 24 hours.............<8.0 mg/dL  Up to 48 hours............<12.0 mg/dL  3-5 days..................<15.0 mg/dL  6-29 days.................<15.0 mg/dL       Alkaline Phosphatase   Date Value Ref Range Status   05/13/2020 58 55 - 135 U/L Final     AST   Date Value Ref Range Status   05/13/2020 15 10 - 40 U/L Final     ALT   Date Value Ref Range Status   05/13/2020 16 10 - 44 U/L Final     Anion Gap   Date Value Ref Range Status   05/13/2020 8 8 - 16 mmol/L Final     eGFR if    Date Value Ref Range Status   05/13/2020 >60.0 >60 mL/min/1.73 m^2 Final     eGFR if non    Date Value Ref Range Status   05/13/2020 >60.0 >60 mL/min/1.73 m^2 Final     Comment:     Calculation used to obtain the estimated glomerular filtration  rate (eGFR) is the CKD-EPI equation.            Lab Results   Component Value Date    TSH 1.683 05/17/2017         Lab Results   Component Value Date    HEPCAB Negative 07/19/2019       Lipids  Lab Results   Component Value Date    CHOL 202 (H) 02/20/2020     Lab Results   Component Value Date    HDL 67 02/20/2020     Lab Results   Component Value Date    LDLCALC 118.0 02/20/2020     Lab Results   Component Value Date    TRIG 85 02/20/2020     Lab Results   Component Value Date    CHOLHDL 33.2 02/20/2020           Assessment:       1. MS (multiple sclerosis)    2. COVID-19 virus infection    3. Fall, subsequent encounter    4. Other osteoporosis, unspecified pathological fracture presence        Plan:       Silvana was seen today for hand pain and follow-up.    Diagnoses and all orders for this visit:    MS (multiple sclerosis)    COVID-19 virus infection    Fall, subsequent  encounter    Other osteoporosis, unspecified pathological fracture presence            Future Appointments   Date Time Provider Department Center   9/16/2020  1:45 PM Kirk Tompkins MD [facility] PLASTIC [facility]   9/17/2020  9:40 AM Albert Schroeder Jr., MD [facility] ORTHO [facility]   10/14/2020  1:00 PM Don Munoz Jr., MD [facility] PSYCH [facility]   11/25/2020  3:20 PM Jhonatan Avila III, MD [facility]         Medication List with Changes/Refills   Current Medications    ALENDRONATE (FOSAMAX) 70 MG TABLET    TAKE 1 TABLET BY MOUTH EVERY 7 DAYS    BUPROPION (WELLBUTRIN SR) 100 MG TBSR 12 HR TABLET    TAKE 2 TABLETS BY MOUTH EVERY MORNING    BUPROPION (WELLBUTRIN SR) 150 MG TBSR 12 HR TABLET    TAKE 1 TABLET BY MOUTH EVERY EVENING. DO NOT CRUSH OR CHEW; SWALLOW WHOLE    CALCIUM-VITAMIN D3 (CALCIUM 500 + D) 500 MG(1,250MG) -200 UNIT PER TABLET    Take 1 tablet by mouth 2 (two) times daily with meals.    CATHETER (FEMALE CATHETER) 14 FR [facility]    Patient is to self catheterize four times a day indefinitley using female 12 fr in and out catheter for incomplete bladder emptying.   Dispense 120 month with 11 refills or 360 every 3 months with 3 refills depending on patient's preference    FLUPHENAZINE (PROLIXIN) 1 MG TABLET    TAKE 2 TABLETS BY MOUTH NIGHTLY    FOLIC ACID (FOLVITE) 1 MG TABLET    Take 1 tablet (1,000 mcg total) by mouth once daily.    INHALATION SPACING DEVICE    Use as directed for inhalation.    OXCARBAZEPINE (TRILEPTAL) 150 MG TAB    Take 1 tablet (150 mg total) by mouth 2 (two) times daily.         Disclaimer:  This note has been generated using voice-recognition software. There may be grammatical or spelling errors that have been missed during proof-reading

## 2020-08-24 NOTE — PROGRESS NOTES
I spoke with  EJ lee MD about this xray findings. He states that he'd like to see her in the office once again. Could we set up a follow up with appointment . Thanks!

## 2020-08-25 ENCOUNTER — OFFICE VISIT (OUTPATIENT)
Dept: INTERNAL MEDICINE | Facility: CLINIC | Age: 65
End: 2020-08-25
Payer: MEDICARE

## 2020-08-25 VITALS
WEIGHT: 168.88 LBS | DIASTOLIC BLOOD PRESSURE: 74 MMHG | HEART RATE: 81 BPM | HEIGHT: 63 IN | BODY MASS INDEX: 29.92 KG/M2 | SYSTOLIC BLOOD PRESSURE: 110 MMHG | TEMPERATURE: 99 F | OXYGEN SATURATION: 98 %

## 2020-08-25 DIAGNOSIS — G35 MS (MULTIPLE SCLEROSIS): Primary | ICD-10-CM
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
## 2020-08-25 DIAGNOSIS — U07.1 COVID-19 VIRUS INFECTION: ICD-10-CM

## 2020-08-25 DIAGNOSIS — M81.8 OTHER OSTEOPOROSIS, UNSPECIFIED PATHOLOGICAL FRACTURE PRESENCE: ICD-10-CM

## 2020-08-25 PROCEDURE — 3008F PR BODY MASS INDEX (BMI) DOCUMENTED: ICD-10-PCS | Mod: CPTII,S$GLB,, | Performed by: INTERNAL MEDICINE

## 2020-08-25 PROCEDURE — 3008F BODY MASS INDEX DOCD: CPT | Mod: CPTII,S$GLB,, | Performed by: INTERNAL MEDICINE

## 2020-08-25 PROCEDURE — 99213 OFFICE O/P EST LOW 20 MIN: CPT | Mod: S$GLB,,, | Performed by: INTERNAL MEDICINE

## 2020-08-25 PROCEDURE — 99213 PR OFFICE/OUTPT VISIT, EST, LEVL III, 20-29 MIN: ICD-10-PCS | Mod: S$GLB,,, | Performed by: INTERNAL MEDICINE

## 2020-08-25 PROCEDURE — 99999 PR PBB SHADOW E&M-EST. PATIENT-LVL IV: ICD-10-PCS | Mod: PBBFAC,,, | Performed by: INTERNAL MEDICINE

## 2020-08-25 PROCEDURE — 1101F PT FALLS ASSESS-DOCD LE1/YR: CPT | Mod: CPTII,S$GLB,, | Performed by: INTERNAL MEDICINE

## 2020-08-25 PROCEDURE — 99999 PR PBB SHADOW E&M-EST. PATIENT-LVL IV: CPT | Mod: PBBFAC,,, | Performed by: INTERNAL MEDICINE

## 2020-08-25 PROCEDURE — 1101F PR PT FALLS ASSESS DOC 0-1 FALLS W/OUT INJ PAST YR: ICD-10-PCS | Mod: CPTII,S$GLB,, | Performed by: INTERNAL MEDICINE

## 2020-09-14 ENCOUNTER — PATIENT OUTREACH (OUTPATIENT)
Dept: ADMINISTRATIVE | Facility: OTHER | Age: 65
End: 2020-09-14

## 2020-09-14 NOTE — PROGRESS NOTES
LINKS immunization registry updated  Care Everywhere updated  Health Maintenance updated  Chart reviewed for overdue Proactive [facility] Encounters (VIRIDIANA) health maintenance testing (CRS, Breast Ca, Diabetic Eye Exam)   Orders entered:N/A

## 2020-09-16 ENCOUNTER — OFFICE VISIT (OUTPATIENT)
Dept: PLASTIC SURGERY | Facility: CLINIC | Age: 65
End: 2020-09-16
Payer: MEDICARE

## 2020-09-16 VITALS
HEIGHT: 63 IN | SYSTOLIC BLOOD PRESSURE: 166 MMHG | WEIGHT: 168.88 LBS | DIASTOLIC BLOOD PRESSURE: 70 MMHG | HEART RATE: 88 BPM | BODY MASS INDEX: 29.92 KG/M2

## 2020-09-16 DIAGNOSIS — R21 EXCORIATED RASH: ICD-10-CM

## 2020-09-16 DIAGNOSIS — M54.2 NECK PAIN, CHRONIC: ICD-10-CM

## 2020-09-16 DIAGNOSIS — G89.29 NECK PAIN, CHRONIC: ICD-10-CM

## 2020-09-16 DIAGNOSIS — N62 MACROMASTIA: Primary | ICD-10-CM

## 2020-09-16 DIAGNOSIS — G89.29 CHRONIC THORACIC BACK PAIN, UNSPECIFIED BACK PAIN LATERALITY: ICD-10-CM

## 2020-09-16 DIAGNOSIS — M54.6 CHRONIC THORACIC BACK PAIN, UNSPECIFIED BACK PAIN LATERALITY: ICD-10-CM

## 2020-09-16 PROCEDURE — 99999 PR PBB SHADOW E&M-EST. PATIENT-LVL III: ICD-10-PCS | Mod: PBBFAC,,, | Performed by: SURGERY

## 2020-09-16 PROCEDURE — 99213 PR OFFICE/OUTPT VISIT, EST, LEVL III, 20-29 MIN: ICD-10-PCS | Mod: S$GLB,,, | Performed by: SURGERY

## 2020-09-16 PROCEDURE — 99213 OFFICE O/P EST LOW 20 MIN: CPT | Mod: S$GLB,,, | Performed by: SURGERY

## 2020-09-16 PROCEDURE — 1101F PR PT FALLS ASSESS DOC 0-1 FALLS W/OUT INJ PAST YR: ICD-10-PCS | Mod: CPTII,S$GLB,, | Performed by: SURGERY

## 2020-09-16 PROCEDURE — 3008F BODY MASS INDEX DOCD: CPT | Mod: CPTII,S$GLB,, | Performed by: SURGERY

## 2020-09-16 PROCEDURE — 3008F PR BODY MASS INDEX (BMI) DOCUMENTED: ICD-10-PCS | Mod: CPTII,S$GLB,, | Performed by: SURGERY

## 2020-09-16 PROCEDURE — 99999 PR PBB SHADOW E&M-EST. PATIENT-LVL III: CPT | Mod: PBBFAC,,, | Performed by: SURGERY

## 2020-09-16 PROCEDURE — 1101F PT FALLS ASSESS-DOCD LE1/YR: CPT | Mod: CPTII,S$GLB,, | Performed by: SURGERY

## 2020-09-16 NOTE — PROGRESS NOTES
Subjective:      Silvana Quezada is a 64 y.o. year old female who [facility]16/[facility] for evaluation for breast asymmetry.   Pt had right sided lumpectomy and radiation on right breast years ago, and now is unhappy with the asymmetry of both breasts.  Denies fever, chills, nausea, vomiting, or other systemic signs of infection.    Vitals:    09/16/20 1444   BP: (!) 166/70   Pulse: 88        Review of patient's allergies indicates:   Allergen Reactions    Adhesive      blisters    Keflex [cephalexin] Rash    Cephalosporins        Current Outpatient Medications on File Prior to Visit   Medication Sig Dispense Refill    alendronate (FOSAMAX) 70 MG tablet TAKE 1 TABLET BY MOUTH EVERY 7 DAYS 24 tablet 0    buPROPion (WELLBUTRIN SR) 100 MG TBSR 12 hr tablet TAKE 2 TABLETS BY MOUTH EVERY MORNING 120 tablet 1    buPROPion (WELLBUTRIN SR) 150 MG TBSR 12 hr tablet TAKE 1 TABLET BY MOUTH EVERY EVENING. DO NOT CRUSH OR CHEW; SWALLOW WHOLE 30 tablet 1    calcium-vitamin D3 (CALCIUM 500 + D) 500 mg(1,250mg) -200 unit per tablet Take 1 tablet by mouth 2 (two) times daily with meals.      catheter (FEMALE CATHETER) 14 Fr [facility] Patient is to self catheterize four times a day indefinitley using female 12 fr in and out catheter for incomplete bladder emptying.   Dispense 120 month with 11 refills or 360 every 3 months with 3 refills depending on patient's preference 120 each 11    fluphenazine (PROLIXIN) 1 MG tablet TAKE 2 TABLETS BY MOUTH NIGHTLY 180 tablet 0    folic acid (FOLVITE) 1 MG tablet Take 1 tablet (1,000 mcg total) by mouth once daily. 90 tablet 1    inhalation spacing device Use as directed for inhalation. (Patient not taking: Reported on 8/25/2020) 1 each 0    OXcarbazepine (TRILEPTAL) 150 MG Tab TAKE 1 TABLET BY MOUTH TWICE DAILY 120 tablet 0     No current facility-administered medications on file prior to visit.        Patient Active Problem List   Diagnosis    MS  (multiple sclerosis)    Neurogenic bladder    Urgency-frequency syndrome    History of breast cancer    Localized osteoporosis with current pathological fracture    Incomplete bladder emptying    Overactive bladder    Thoracic aorta atherosclerosis    Bipolar affective disorder    Tortuous aorta    Bipolar I disorder, most recent episode depressed, moderate    Wrist fracture, closed, left, initial encounter    Age-related osteoporosis without current pathological fracture    Malignant (primary) neoplasm, unspecified    Shortness of breath    Urge incontinence    At low risk for fall    Muscular deconditioning    Physical deconditioning       Past Surgical History:   Procedure Laterality Date    BRAIN SURGERY  1991    BREAST BIOPSY Left 2009    core    BREAST BIOPSY Right     BREAST LUMPECTOMY Right 2001    CYSTOSCOPY N/A 7/11/2018    Procedure: CYSTOSCOPY;  Surgeon: Jayesh Ross MD;  Location: [address];  Service: Urology;  Laterality: N/A;  30 min    CYSTOSCOPY N/A 4/10/2019    Procedure: CYSTOSCOPY;  Surgeon: Jayesh Ross MD;  Location: [address];  Service: Urology;  Laterality: N/A;  30 MIN    FOOT SURGERY  october 2013    HYSTERECTOMY      INJECTION OF BOTULINUM TOXIN TYPE A N/A 7/11/2018    Procedure: INJECTION, BOTULINUM TOXIN, TYPE A 200 UNITS;  Surgeon: Jayesh Ross MD;  Location: [address];  Service: Urology;  Laterality: N/A;    INJECTION OF BOTULINUM TOXIN TYPE A N/A 4/10/2019    Procedure: INJECTION, BOTULINUM TOXIN, TYPE A 200 UNITS;  Surgeon: Jayesh Ross MD;  Location: [address];  Service: Urology;  Laterality: N/A;    OPEN REDUCTION AND INTERNAL FIXATION (ORIF) OF INJURY OF WRIST Left 12/27/2019    Procedure: ORIF, WRIST;  Surgeon: Albert Schroeder Jr., MD;  Location: [facility];  Service: Orthopedics;  Laterality: Left;  ACUMED/ MINI C ARM Palomo [employer] notified/ Rk confirmed here in [employer] with Lacie 12/23/19 KB [id number]     TONSILLECTOMY         Social History     Socioeconomic History    Marital status:      Spouse name: Not on file    Number of children: Not on file    Years of education: Not on file    Highest education level: Not on file   Occupational History    Occupation: [employer]   Social Needs    Financial resource strain: Not on file    Food insecurity     Worry: Not on file     Inability: Not on file    Transportation needs     Medical: Not on file     Non-medical: Not on file   Tobacco Use    Smoking status: Never Smoker    Smokeless tobacco: Never Used   Substance and Sexual Activity    Alcohol use: Yes     Alcohol/week: 1.7 standard drinks     Types: 2 Standard drinks or equivalent per week     Comment: occasionally    Drug use: No    Sexual activity: Yes     Partners: Male   Lifestyle    Physical activity     Days per week: Not on file     Minutes per session: Not on file    Stress: Not on file   Relationships    Social connections     Talks on phone: Not on file     Gets together: Not on file     Attends Bahai service: Not on file     Active member of club or organization: Not on file     Attends meetings of clubs or organizations: Not on file     Relationship status: Not on file   Other Topics Concern    Are you pregnant or think you may be? 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, 25 children 2 daughter            Review of Systems: n/a    Objective:     Physical Exam:  Vitals:    09/16/20 1444   BP: (!) 166/70   Pulse: 88       WD WN NAD  VSS  Normal resp effort  Breasts bilateral ptosis, right breast smaller than left        Assessment:       No diagnosis found.    Plan:   64 y.o. female status post right sided lumpectomy and radiation yrs ago, now w/ breast asymmetry desiring a more symmetric look. Patient will be quoted for left sided breast reduction and will f/u  with us as needed for scheduling surgery.        All questions were answered. The patient was advised to call the clinic with any questions or concerns prior to their next visit.

## 2020-09-17 ENCOUNTER — TELEPHONE (OUTPATIENT)
Dept: ORTHOPEDICS | Facility: CLINIC | Age: 65
End: 2020-09-17

## 2020-09-17 ENCOUNTER — HOSPITAL ENCOUNTER (OUTPATIENT)
Dept: RADIOLOGY | Facility: HOSPITAL | Age: 65
Discharge: HOME | End: 2020-09-17
Attending: ORTHOPAEDIC SURGERY
Payer: MEDICARE

## 2020-09-17 ENCOUNTER — OFFICE VISIT (OUTPATIENT)
Dept: ORTHOPEDICS | Facility: CLINIC | Age: 65
End: 2020-09-17
Payer: MEDICARE

## 2020-09-17 VITALS — TEMPERATURE: 98 F

## 2020-09-17 DIAGNOSIS — S52.502D CLOSED FRACTURE OF DISTAL END OF LEFT RADIUS WITH ROUTINE HEALING, UNSPECIFIED FRACTURE MORPHOLOGY, SUBSEQUENT ENCOUNTER: Primary | ICD-10-CM

## 2020-09-17 DIAGNOSIS — M25.532 WRIST PAIN, LEFT: ICD-10-CM

## 2020-09-17 DIAGNOSIS — S52.502D CLOSED FRACTURE OF DISTAL END OF LEFT RADIUS WITH ROUTINE HEALING, UNSPECIFIED FRACTURE MORPHOLOGY, SUBSEQUENT ENCOUNTER: ICD-10-CM

## 2020-09-17 PROCEDURE — 99213 OFFICE O/P EST LOW 20 MIN: CPT | Mod: S$GLB,,, | Performed by: ORTHOPAEDIC SURGERY

## 2020-09-17 PROCEDURE — 73100 X-RAY EXAM OF WRIST: CPT | Mod: TC,PN,LT

## 2020-09-17 PROCEDURE — 1101F PT FALLS ASSESS-DOCD LE1/YR: CPT | Mod: CPTII,S$GLB,, | Performed by: ORTHOPAEDIC SURGERY

## 2020-09-17 PROCEDURE — 73100 XR WRIST 2 VIEW LEFT: ICD-10-PCS | Mod: 26,LT,, | Performed by: RADIOLOGY

## 2020-09-17 PROCEDURE — 99213 PR OFFICE/OUTPT VISIT, EST, LEVL III, 20-29 MIN: ICD-10-PCS | Mod: S$GLB,,, | Performed by: ORTHOPAEDIC SURGERY

## 2020-09-17 PROCEDURE — 1101F PR PT FALLS ASSESS DOC 0-1 FALLS W/OUT INJ PAST YR: ICD-10-PCS | Mod: CPTII,S$GLB,, | Performed by: ORTHOPAEDIC SURGERY

## 2020-09-17 PROCEDURE — 73100 X-RAY EXAM OF WRIST: CPT | Mod: 26,LT,, | Performed by: RADIOLOGY

## 2020-09-17 PROCEDURE — 99999 PR PBB SHADOW E&M-EST. PATIENT-LVL III: ICD-10-PCS | Mod: PBBFAC,,, | Performed by: ORTHOPAEDIC SURGERY

## 2020-09-17 PROCEDURE — 99999 PR PBB SHADOW E&M-EST. PATIENT-LVL III: CPT | Mod: PBBFAC,,, | Performed by: ORTHOPAEDIC SURGERY

## 2020-09-17 NOTE — PROGRESS NOTES
Subjective:      Patient ID: Silvana Quezada is a 64 y.o. female.  Chief Complaint: Follow-up (left wrist fracture)      HPI  Silvana Quezada is a  64 y.o. female presenting today for follow up of left wrist pain after previous left wrist fracture.  She reports that she is still having some intermittent symptoms  She does have occasional popping in the wrist and we have noted previously that she does have prominence of the distal ulna  No numbness or tingling is reported.    Review of patient's allergies indicates:   Allergen Reactions    Adhesive      blisters    Keflex [cephalexin] Rash    Cephalosporins          Current Outpatient Medications   Medication Sig Dispense Refill    alendronate (FOSAMAX) 70 MG tablet TAKE 1 TABLET BY MOUTH EVERY 7 DAYS 24 tablet 0    buPROPion (WELLBUTRIN SR) 100 MG TBSR 12 hr tablet TAKE 2 TABLETS BY MOUTH EVERY MORNING 120 tablet 1    buPROPion (WELLBUTRIN SR) 150 MG TBSR 12 hr tablet TAKE 1 TABLET BY MOUTH EVERY EVENING. DO NOT CRUSH OR CHEW; SWALLOW WHOLE 30 tablet 1    calcium-vitamin D3 (CALCIUM 500 + D) 500 mg(1,250mg) -200 unit per tablet Take 1 tablet by mouth 2 (two) times daily with meals.      catheter (FEMALE CATHETER) 14 Fr [facility] Patient is to self catheterize four times a day indefinitley using female 12 fr in and out catheter for incomplete bladder emptying.   Dispense 120 month with 11 refills or 360 every 3 months with 3 refills depending on patient's preference 120 each 11    fluphenazine (PROLIXIN) 1 MG tablet TAKE 2 TABLETS BY MOUTH NIGHTLY 180 tablet 0    folic acid (FOLVITE) 1 MG tablet Take 1 tablet (1,000 mcg total) by mouth once daily. 90 tablet 1    OXcarbazepine (TRILEPTAL) 150 MG Tab TAKE 1 TABLET BY MOUTH TWICE DAILY 120 tablet 0    inhalation spacing device Use as directed for inhalation. (Patient not taking: Reported on 8/25/2020) 1 each 0     No current facility-administered medications for this visit.        Past Medical History:   Diagnosis  Date    Bipolar 1 disorder     Breast cancer 2009    right-radiation & chemo    Breast cyst     Closed fracture of proximal end of right humerus 3/9/2015    Depression     History of right shoulder fracture     MS (multiple sclerosis)     presented as dizzines, dx vision,     Osteoporosis, unspecified     Pneumonia 3/27/2020       Past Surgical History:   Procedure Laterality Date    BRAIN SURGERY  1991    BREAST BIOPSY Left 2009    core    BREAST BIOPSY Right     BREAST LUMPECTOMY Right 2001    CYSTOSCOPY N/A 7/11/2018    Procedure: CYSTOSCOPY;  Surgeon: Jayesh Ross MD;  Location: [address];  Service: Urology;  Laterality: N/A;  30 min    CYSTOSCOPY N/A 4/10/2019    Procedure: CYSTOSCOPY;  Surgeon: Jayesh Ross MD;  Location: [facility] OR [address] [place] [address];  Service: Urology;  Laterality: N/A;  30 MIN    FOOT SURGERY  october 2013    HYSTERECTOMY      INJECTION OF BOTULINUM TOXIN TYPE A N/A 7/11/2018    Procedure: INJECTION, BOTULINUM TOXIN, TYPE A 200 UNITS;  Surgeon: Jayesh Ross MD;  Location: [address];  Service: Urology;  Laterality: N/A;    INJECTION OF BOTULINUM TOXIN TYPE A N/A 4/10/2019    Procedure: INJECTION, BOTULINUM TOXIN, TYPE A 200 UNITS;  Surgeon: Jayesh Ross MD;  Location: [facility] OR [address] [place] [address];  Service: Urology;  Laterality: N/A;    OPEN REDUCTION AND INTERNAL FIXATION (ORIF) OF INJURY OF WRIST Left 12/27/2019    Procedure: ORIF, WRIST;  Surgeon: Albert Schroeder Jr., MD;  Location: [facility];  Service: Orthopedics;  Laterality: Left;  ACUMED/ MINI C ARM [facility] notified/ Rk confirmed here in [facility] with Lacie 12/23/19 KB [id number]    TONSILLECTOMY         OBJECTIVE:   PHYSICAL EXAM:       Vitals:    09/17/20 1018   Temp: 98.4 °F (36.9 °C)   PainSc:   3     Ortho/SPM Exam  Examination left wrist there is some prominence of the distal ulna and she does have some mild instability of the distal ulna with a little bit of popping and pain with pronation  supination  Range of motion flexion and extension are full without pain   strength is slightly decreased  Tinel sign negative at the wrist    RADIOGRAPHS:  AP and lateral x-ray left wrist demonstrates healed distal radius fracture hardware intact volarly  There is some dorsal prominence of the distal ulna  Comments: I have personally reviewed the imaging and I agree with the above radiologist's report.    ASSESSMENT/PLAN:     IMPRESSION:  1.  Status post ORIF left distal radius fracture.  2.  Mild to moderate symptomatic distal ulnar instability    PLAN:  I have given her a wrist wrap for the left wrist which I would like her to wear during the day she can have it off at night  Advil or Motrin by mouth  In the future we may consider surgery we discussed this briefly today I think the best option would be a Darrach procedure to remove the distal ulna    FOLLOW UP:  2-3 months    Disclaimer: This note has been generated using voice-recognition software. There may be typographical errors that have been missed during proof-reading.

## 2020-09-25 DIAGNOSIS — Z01.818 PRE-OP TESTING: Primary | ICD-10-CM

## 2020-09-25 DIAGNOSIS — Z85.3 HX: BREAST CANCER: ICD-10-CM

## 2020-09-29 ENCOUNTER — TELEPHONE (OUTPATIENT)
Dept: NEUROLOGY | Facility: CLINIC | Age: 65
End: 2020-09-29

## 2020-09-29 ENCOUNTER — TELEPHONE (OUTPATIENT)
Dept: INTERNAL MEDICINE | Facility: CLINIC | Age: 65
End: 2020-09-29

## 2020-09-29 NOTE — TELEPHONE ENCOUNTER
----- Message from Wallace Gabriel sent at 9/29/2020  1:47 PM CDT -----  Type:  Needs Medical Advice    Who Called: self  Reason:Needs surgery clearance   Would the patient rather a call back or a response via [employer]? call  Best Call Back Number: [phone]  Additional Information: none

## 2020-09-29 NOTE — TELEPHONE ENCOUNTER
----- Message from Sivan Marques sent at 9/29/2020 12:01 PM CDT -----  Contact: self @ [phone]  Pt would like to speak with Dr Oneil to see if it is safe for her to have plastic surgery for a breast reduction.  Pls call.

## 2020-10-05 NOTE — PROGRESS NOTES
"Subjective:       Patient ID: Silvana Quezada is a 65 y.o. female.    Chief Complaint: Pre-op Exam    Preoperative clearance    Surgical info   Date - 11/5/20   Surgery - left breast reduction  Surgeon  - Dr Turner yL PMHx;   MS    Mets  > 4    Prior Surgery  ORIF  Hystectomy  Foot  Brain sugery  Breast sugery    Smoking/Etoh  Never smoker, occasional drinker     Pertinet meds  n/a    HPI  Review of Systems   Constitutional: Negative for activity change, chills, diaphoresis, fatigue, fever and unexpected weight change.   Eyes: Negative for redness and visual disturbance.   Respiratory: Negative for shortness of breath.    Cardiovascular: Negative for chest pain and palpitations.   Gastrointestinal: Negative for abdominal distention and blood in stool.   Genitourinary: Negative for difficulty urinating, dysuria, frequency and menstrual problem.   Neurological: Negative for syncope and headaches.         Objective:       /70 (BP Location: Left arm, Patient Position: Sitting, BP Method: Large (Manual))   Pulse 77   Temp 97.7 °F (36.5 °C) (Temporal)   Ht 5' 3" (1.6 m)   Wt 79.5 kg (175 lb 4.3 oz)   LMP  (LMP Unknown)   SpO2 98%   BMI 31.05 kg/m²     Physical Exam  Vitals signs and nursing note reviewed.   Constitutional:       General: She is not in acute distress.     Appearance: She is well-developed. She is not diaphoretic.   HENT:      Head: Normocephalic.      Nose: Nose normal.   Eyes:      General:         Right eye: No discharge.         Left eye: No discharge.      Conjunctiva/sclera: Conjunctivae normal.      Pupils: Pupils are equal, round, and reactive to light.   Neck:      Musculoskeletal: Normal range of motion.   Cardiovascular:      Rate and Rhythm: Normal rate and regular rhythm.      Heart sounds: Normal heart sounds. No murmur. No friction rub. No gallop.    Pulmonary:      Effort: Pulmonary effort is normal. No respiratory distress.   Abdominal:      General: Bowel sounds are " normal. There is no distension.      Palpations: Abdomen is soft.   Musculoskeletal: Normal range of motion.         General: No deformity.   Skin:     General: Skin is warm.   Neurological:      Mental Status: She is alert and oriented to person, place, and time.      Cranial Nerves: No cranial nerve deficit.         Assessment:       1. Need for vaccination        Plan:       Silvana was seen today for pre-op exam.    Diagnoses and all orders for this visit:    Pre-op examination   Patient does not present with active unstable cardiac conditions such as ACS, unstable arrhythmia or ADHF  No personal  history of CAD/MI, CVA/TIA, DM requiring insulin , or Cr>2    Patient is able to walk at least one flight of stairs  without symptoms of chest pain   RCRI score is 0   Patient is low risk patient going for moderate risk surgery and would not  recommend further evaluation prior to surgery      Need for vaccination  -- I reviewed the patient vaccine history and provided the risk benefits and side effects of the vaccine.   -- I provided the patient a VIS sheet on the vaccine.   -     Influenza - Quadrivalent (Adjuvanted)          Future Appointments   Date Time Provider Department Center   10/14/2020  1:00 PM Dno Munoz Jr., MD [facility] PSYCH [facility]   11/2/2020 10:00 AM COVID TESTING, [facility] GEN SURG [facility] GENSUR [facility]   11/11/2020  2:45 PM Kirk Tompkins MD [facility] PLASTIC [facility]   11/25/2020  3:20 PM Jhonatan Avila III, MD [facility]   12/17/2020 10:30 AM Vanessa Palacio PA-C [facility] ORTHO [facility]         Medication List with Changes/Refills   Current Medications    ALENDRONATE (FOSAMAX) 70 MG TABLET    TAKE 1 TABLET BY MOUTH EVERY 7 DAYS    BUPROPION (WELLBUTRIN SR) 100 MG TBSR 12 HR TABLET    TAKE 2 TABLETS BY MOUTH EVERY MORNING    BUPROPION (WELLBUTRIN SR) 150 MG TBSR 12 HR TABLET    TAKE 1 TABLET BY MOUTH EVERY EVENING. DO NOT CRUSH OR CHEW; SWALLOW WHOLE    CALCIUM-VITAMIN D3 (CALCIUM 500 +  D) 500 MG(1,250MG) -200 UNIT PER TABLET    Take 1 tablet by mouth 2 (two) times daily with meals.    CATHETER (FEMALE CATHETER) 14 FR [facility]    Patient is to self catheterize four times a day indefinitley using female 12 fr in and out catheter for incomplete bladder emptying.   Dispense 120 month with 11 refills or 360 every 3 months with 3 refills depending on patient's preference    COENZYME Q10 (CO Q-10) 10 MG CAPSULE        CYANOCOBALAMIN (VITAMIN B-12) 1000 MCG TABLET        FLUPHENAZINE (PROLIXIN) 1 MG TABLET    TAKE 2 TABLETS BY MOUTH NIGHTLY    FOLIC ACID (FOLVITE) 1 MG TABLET    Take 1 tablet (1,000 mcg total) by mouth once daily.    INHALATION SPACING DEVICE    Use as directed for inhalation.    OXCARBAZEPINE (TRILEPTAL) 150 MG TAB    TAKE 1 TABLET BY MOUTH TWICE DAILY         Disclaimer:  This note has been generated using voice-recognition software. There may be grammatical or spelling errors that have been missed during proof-reading

## 2020-10-06 ENCOUNTER — OFFICE VISIT (OUTPATIENT)
Dept: INTERNAL MEDICINE | Facility: CLINIC | Age: 65
End: 2020-10-06
Payer: MEDICARE

## 2020-10-06 VITALS
WEIGHT: 175.25 LBS | DIASTOLIC BLOOD PRESSURE: 70 MMHG | OXYGEN SATURATION: 98 % | HEART RATE: 77 BPM | TEMPERATURE: 98 F | HEIGHT: 63 IN | BODY MASS INDEX: 31.05 KG/M2 | SYSTOLIC BLOOD PRESSURE: 100 MMHG

## 2020-10-06 DIAGNOSIS — Z23 NEED FOR VACCINATION: ICD-10-CM

## 2020-10-06 DIAGNOSIS — Z01.810 PREOP CARDIOVASCULAR EXAM: Primary | ICD-10-CM

## 2020-10-06 PROCEDURE — 1101F PT FALLS ASSESS-DOCD LE1/YR: CPT | Mod: CPTII,S$GLB,, | Performed by: INTERNAL MEDICINE

## 2020-10-06 PROCEDURE — 99999 PR PBB SHADOW E&M-EST. PATIENT-LVL IV: CPT | Mod: PBBFAC,,, | Performed by: INTERNAL MEDICINE

## 2020-10-06 PROCEDURE — 99214 PR OFFICE/OUTPT VISIT, EST, LEVL IV, 30-39 MIN: ICD-10-PCS | Mod: 25,S$GLB,, | Performed by: INTERNAL MEDICINE

## 2020-10-06 PROCEDURE — 3008F PR BODY MASS INDEX (BMI) DOCUMENTED: ICD-10-PCS | Mod: CPTII,S$GLB,, | Performed by: INTERNAL MEDICINE

## 2020-10-06 PROCEDURE — 1101F PR PT FALLS ASSESS DOC 0-1 FALLS W/OUT INJ PAST YR: ICD-10-PCS | Mod: CPTII,S$GLB,, | Performed by: INTERNAL MEDICINE

## 2020-10-06 PROCEDURE — 99999 PR PBB SHADOW E&M-EST. PATIENT-LVL IV: ICD-10-PCS | Mod: PBBFAC,,, | Performed by: INTERNAL MEDICINE

## 2020-10-06 PROCEDURE — G0008 ADMIN INFLUENZA VIRUS VAC: HCPCS | Mod: S$GLB,,, | Performed by: INTERNAL MEDICINE

## 2020-10-06 PROCEDURE — 1126F AMNT PAIN NOTED NONE PRSNT: CPT | Mod: S$GLB,,, | Performed by: INTERNAL MEDICINE

## 2020-10-06 PROCEDURE — G0008 FLU VACCINE - QUADRIVALENT - ADJUVANTED: ICD-10-PCS | Mod: S$GLB,,, | Performed by: INTERNAL MEDICINE

## 2020-10-06 PROCEDURE — 1159F PR MEDICATION LIST DOCUMENTED IN MEDICAL RECORD: ICD-10-PCS | Mod: S$GLB,,, | Performed by: INTERNAL MEDICINE

## 2020-10-06 PROCEDURE — 1159F MED LIST DOCD IN RCRD: CPT | Mod: S$GLB,,, | Performed by: INTERNAL MEDICINE

## 2020-10-06 PROCEDURE — 1126F PR PAIN SEVERITY QUANTIFIED, NO PAIN PRESENT: ICD-10-PCS | Mod: S$GLB,,, | Performed by: INTERNAL MEDICINE

## 2020-10-06 PROCEDURE — 3008F BODY MASS INDEX DOCD: CPT | Mod: CPTII,S$GLB,, | Performed by: INTERNAL MEDICINE

## 2020-10-06 PROCEDURE — 90694 VACC AIIV4 NO PRSRV 0.5ML IM: CPT | Mod: S$GLB,,, | Performed by: INTERNAL MEDICINE

## 2020-10-06 PROCEDURE — 99214 OFFICE O/P EST MOD 30 MIN: CPT | Mod: 25,S$GLB,, | Performed by: INTERNAL MEDICINE

## 2020-10-06 PROCEDURE — 90694 FLU VACCINE - QUADRIVALENT - ADJUVANTED: ICD-10-PCS | Mod: S$GLB,,, | Performed by: INTERNAL MEDICINE

## 2020-10-06 RX ORDER — LANOLIN ALCOHOL/MO/W.PET/CERES
1000 CREAM (GRAM) TOPICAL DAILY
COMMUNITY

## 2020-10-06 RX ORDER — AA/PROT/LYSINE/METHIO/VIT C/B6 50-12.5 MG
10 TABLET ORAL DAILY
COMMUNITY
End: 2024-01-11

## 2020-10-14 ENCOUNTER — OFFICE VISIT (OUTPATIENT)
Dept: PSYCHIATRY | Facility: CLINIC | Age: 65
End: 2020-10-14
Payer: MEDICARE

## 2020-10-14 DIAGNOSIS — F31.9 BIPOLAR AFFECTIVE DISORDER, REMISSION STATUS UNSPECIFIED: ICD-10-CM

## 2020-10-14 DIAGNOSIS — F31.75 BIPOLAR I DISORDER, MOST RECENT EPISODE DEPRESSED, IN PARTIAL REMISSION: Primary | ICD-10-CM

## 2020-10-14 PROCEDURE — 99999 PR PBB SHADOW E&M-EST. PATIENT-LVL II: CPT | Mod: PBBFAC,,, | Performed by: PSYCHIATRY & NEUROLOGY

## 2020-10-14 PROCEDURE — 1101F PT FALLS ASSESS-DOCD LE1/YR: CPT | Mod: CPTII,S$GLB,, | Performed by: PSYCHIATRY & NEUROLOGY

## 2020-10-14 PROCEDURE — 99213 PR OFFICE/OUTPT VISIT, EST, LEVL III, 20-29 MIN: ICD-10-PCS | Mod: S$GLB,,, | Performed by: PSYCHIATRY & NEUROLOGY

## 2020-10-14 PROCEDURE — 90833 PSYTX W PT W E/M 30 MIN: CPT | Mod: S$GLB,,, | Performed by: PSYCHIATRY & NEUROLOGY

## 2020-10-14 PROCEDURE — 99999 PR PBB SHADOW E&M-EST. PATIENT-LVL II: ICD-10-PCS | Mod: PBBFAC,,, | Performed by: PSYCHIATRY & NEUROLOGY

## 2020-10-14 PROCEDURE — 1159F PR MEDICATION LIST DOCUMENTED IN MEDICAL RECORD: ICD-10-PCS | Mod: S$GLB,,, | Performed by: PSYCHIATRY & NEUROLOGY

## 2020-10-14 PROCEDURE — 90833 PR PSYCHOTHERAPY W/PATIENT W/E&M, 30 MIN (ADD ON): ICD-10-PCS | Mod: S$GLB,,, | Performed by: PSYCHIATRY & NEUROLOGY

## 2020-10-14 PROCEDURE — 99213 OFFICE O/P EST LOW 20 MIN: CPT | Mod: S$GLB,,, | Performed by: PSYCHIATRY & NEUROLOGY

## 2020-10-14 PROCEDURE — 1101F PR PT FALLS ASSESS DOC 0-1 FALLS W/OUT INJ PAST YR: ICD-10-PCS | Mod: CPTII,S$GLB,, | Performed by: PSYCHIATRY & NEUROLOGY

## 2020-10-14 PROCEDURE — 1159F MED LIST DOCD IN RCRD: CPT | Mod: S$GLB,,, | Performed by: PSYCHIATRY & NEUROLOGY

## 2020-10-14 RX ORDER — BUPROPION HYDROCHLORIDE 100 MG/1
200 TABLET, EXTENDED RELEASE ORAL EVERY MORNING
Qty: 180 TABLET | Refills: 1 | Status: SHIPPED | OUTPATIENT
Start: 2020-10-14 | End: 2020-10-21

## 2020-10-14 RX ORDER — FLUPHENAZINE HYDROCHLORIDE 1 MG/1
2 TABLET ORAL NIGHTLY
Qty: 180 TABLET | Refills: 1 | Status: SHIPPED | OUTPATIENT
Start: 2020-10-14 | End: 2021-04-07 | Stop reason: SDUPTHER

## 2020-10-14 RX ORDER — OXCARBAZEPINE 150 MG/1
150 TABLET, FILM COATED ORAL 2 TIMES DAILY
Qty: 180 TABLET | Refills: 1 | Status: SHIPPED | OUTPATIENT
Start: 2020-10-14 | End: 2021-04-07 | Stop reason: SDUPTHER

## 2020-10-14 RX ORDER — BUPROPION HYDROCHLORIDE 150 MG/1
150 TABLET, EXTENDED RELEASE ORAL NIGHTLY
Qty: 90 TABLET | Refills: 1 | Status: SHIPPED | OUTPATIENT
Start: 2020-10-14 | End: 2021-04-07 | Stop reason: SDUPTHER

## 2020-10-14 NOTE — PATIENT INSTRUCTIONS
Call in January to schedule an appointment in [facility], 2021.    Call if problems arise.  Go to ER if in crisis.

## 2020-10-16 ENCOUNTER — ANESTHESIA EVENT (OUTPATIENT)
Dept: SURGERY | Facility: HOSPITAL | Age: 65
End: 2020-10-16
Payer: MEDICARE

## 2020-10-16 NOTE — PROGRESS NOTES
Outpatient Psychiatry Follow-Up Visit (MD/NP)    10/14/2020    Clinical Status of Patient:  Outpatient (Ambulatory)    Chief Complaint:  Silvana Quezada is a 65 y.o. female who presents today for follow-up of Depression and Anxiety    Met with patient.      Interval History and Content of Current Session:  Interim Events/Subjective Report/Content of Current Session:  Pt returned casually dressed and groomed, wearing a mask.  This past year has been difficult for her, beginning with the death of her mother, problems with a neighbor, and a permanent move into her mother's home.  Pt then contracted COVID-19 but recovered successfully at home.  She also had a fracture of her left wrist, also now healed.  Pt is not currently working.  On questioning, she admits she has had some cognitive slowing since COVID-19 which also affects her fine motor skills including playing music.  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    Medications were reviewed.  She said that her mood has remained remarkably well through all of these stressors and she requested to continue current psychotropic meds with no change.  This was done.  She will call prn new problems, or return in person in 6 months.      Psychotherapy:  · Target symptoms: mood swings  · Why chosen therapy is appropriate versus another modality: relevant to diagnosis, patient responds to this modality  · Outcome monitoring methods: self-report, observation  · Therapeutic intervention type: insight oriented psychotherapy, supportive psychotherapy  · Topics discussed/themes: illness/death of a loved one, financial stressors, life stage transitional issues  · The patient's response to the intervention is motivated. The patient's progress toward treatment goals is good.   · Duration of intervention: 25 min.    Review of Systems   · PSYCHIATRIC: Pertinant items are noted in the narrative.    Past Medical,  Family and Social History: The patient's past medical, family and social history have been reviewed and updated as appropriate within the electronic medical record - see encounter notes.    Compliance: yes    Side effects: None    Risk Parameters:  Patient reports no suicidal ideation  Patient reports no homicidal ideation  Patient reports no self-injurious behavior  Patient reports no violent behavior    Exam (detailed: at least 9 elements; comprehensive: all 15 elements)   Constitutional  Vitals:  Most recent vital signs, dated less than 90 days prior to this appointment, were reviewed.    There were no vitals filed for this visit.     General:  age appropriate, well nourished, casually dressed     Musculoskeletal  Muscle Strength/Tone:  no rigidity, no dyskinesia, no dystonia, no tremor   Gait & Station:  non-ataxic     Psychiatric  Speech:  articulation error, spontaneous   Mood & Affect:  euthymic  mood-congruent   Thought Process:  goal-directed, logical   Associations:  intact   Thought Content:  normal, no suicidality, no homicidality, delusions, or paranoia   Insight:  has awareness of illness   Judgement: behavior is adequate to circumstances   Orientation:  grossly intact   Memory: intact for content of interview   Language: grossly intact   Attention Span & Concentration:  able to focus   Fund of Knowledge:  intact and appropriate to age and level of education     Assessment and Diagnosis   Status/Progress: Based on the examination today, the patient's problem(s) is/are improved.  New problems have been presented today.   Comorbidities are not complicating management of the primary condition.  The working differential for this patient includes recovery from COVID-19.    General Impression:  Pt has overcome numerous stressors this year including infection with COVID-19 and has remained euthymic and optimistic.    Axis I: MOOD DISORDERS; Bipolar I Disorder, Most Recent Episode Manic: 6.7.6.In Full  Remission (296.46).  Axis II: NO DIAGNOSIS ON AXIS II (V71.09)  Axis III: healthy  Axis IV: economic problems and problems with primary support group  Axis V: 71-80 If symptoms are present, they are transient and expectable reactions to psychosocial stressors (e.g. difficulty concentrating after family argument); no more than slight impairment in social, occupational or school functioning (e.g. temporarily falling behind in schoolwork)    Intervention/Counseling/Treatment Plan   · Medication Management: Continue current medications.        Return to Clinic: 6 months

## 2020-10-22 ENCOUNTER — TELEPHONE (OUTPATIENT)
Dept: PLASTIC SURGERY | Facility: CLINIC | Age: 65
End: 2020-10-22

## 2020-10-30 ENCOUNTER — PATIENT MESSAGE (OUTPATIENT)
Dept: ADMINISTRATIVE | Facility: HOSPITAL | Age: 65
End: 2020-10-30

## 2020-10-30 ENCOUNTER — TELEPHONE (OUTPATIENT)
Dept: PREADMISSION TESTING | Facility: HOSPITAL | Age: 65
End: 2020-10-30

## 2020-10-30 NOTE — TELEPHONE ENCOUNTER
----- Message from Mile Leal RN sent at 10/30/2020  1:28 PM CDT -----  Regarding: preop appt.  Sx date-11/5/20-Need:Lab-Hemoglobin(ordered), prior to the surgery date. Thank you. CARLOS

## 2020-10-30 NOTE — ANESTHESIA PAT ROS NOTE
10/29/2020  Silvana Queazda is a 65 y.o., female.      Pre-op Assessment          Review of Systems  Anesthesia Hx:  No problems with previous Anesthesia  Family Hx of Anesthesia complications: Family Anesthesia Complications are Daughter-(Megan), woke up during right leg surgery  Denies Personal Hx of Anesthesia complications.   Social:  Non-Smoker, Social Alcohol Use    Hematology/Oncology:         -- Cancer in past history: Breast right chemotherapy, radiation and surgery    EENT/Dental:   Wears glasses   Cardiovascular:   Exercise tolerance: good Walking daily outside for x15-20 minutes/ housework   Pulmonary:   Pneumonia Shortness of breath COVID in Feb., 2020/ Snoring   Renal/:   Hx  of BOTOX injection for urinary incontinence   Neurological:   Neuromuscular Disease, Muscular decontioning /MS(multiple scjerosis)   Psych:   Psychiatric History depression Bipolar        Mile Leal RN  10/30/20    Anesthesia Assessment: Preoperative EQUATION    Planned Procedure: Procedure(s) (LRB):  MAMMOPLASTY, REDUCTION, LEFT (Left)  Requested Anesthesia Type:General  Surgeon: Kirk Tompkins MD  Service: Plastics  Known or anticipated Date of Surgery:11/5/2020    Surgeon notes: reviewed and Hx: breast cancer     Previous anesthesia records:6/17/20-Cystoscopy, with Botulinum Toxin Injection 200 units-General -no apparent anesthetic complications    Anesthesia Hx:  No problems with previous Anesthesia  Denies Family Hx of Anesthesia complications.   Denies Personal Hx of Anesthesia complications.     Airway/Jaw/Neck:  Airway Findings: Mouth Opening: Normal Tongue: Normal  General Airway Assessment: Adult  Mallampati: II  Improves to II with phonation.  TM Distance: Normal, at least 6 cm  Jaw/Neck Findings:     Neck ROM: Normal ROM       Last PCP note: within 1 month , within [facility]   10/6/20-  "ROSSY Avila III-Preop Exam-"Patient is low risk patient going for moderate risk surgery and would not recommend further evaluation prior to surgery."    Subspecialty notes: Neurology, Ortho, Psychiatry, Urology/  Otolaryngology    Other important co-morbidities: None    Medical History    Diagnosis Date Comment Source   Bipolar 1 disorder   Provider   Breast cancer 2009 right-radiation & chemo Provider   Breast cyst   Provider   Closed fracture of proximal end of right humerus 3/9/2015  Provider   Depression   Provider   History of right shoulder fracture   Provider   MS (multiple sclerosis)  presented as dizzines, dx vision,  Provider   Osteoporosis, unspecified   Provider   Pneumonia 3/27/2020  Provider       Tests already available:  Results have been reviewed. CXR-1 View-3/27/20/ EKG-3/26/20        Plan: Phone pending      Testing:  Hemoglobin        Patient  has previously scheduled Medical Appointment:Appointment on 11/2/20, prior to the surgery date.    Navigation: Phone Completed                        Tests Scheduled. Hemoglobin on 11/2/20 @ 10:15a-pending             Consults scheduled. Patient "Clearance" statement  in chart note from [facility] PCP Dr. Jhonatan Avila-10/6/20 visit.             Results will be tracked by Preop Clinic.                           Mile Leal RN  10/30/20    Addendum:Lab-Hemoglobin done & result reviewed on 11/2/20, Mile Leal RN  11/2/20  "

## 2020-11-02 ENCOUNTER — PATIENT MESSAGE (OUTPATIENT)
Dept: PSYCHIATRY | Facility: CLINIC | Age: 65
End: 2020-11-02

## 2020-11-02 ENCOUNTER — LAB VISIT (OUTPATIENT)
Dept: SURGERY | Facility: CLINIC | Age: 65
End: 2020-11-02
Payer: MEDICARE

## 2020-11-02 ENCOUNTER — LAB VISIT (OUTPATIENT)
Dept: LAB | Facility: HOSPITAL | Age: 65
End: 2020-11-02
Attending: ANESTHESIOLOGY
Payer: MEDICARE

## 2020-11-02 DIAGNOSIS — Z01.818 PRE-OP TESTING: ICD-10-CM

## 2020-11-02 DIAGNOSIS — Z01.818 PREOP TESTING: ICD-10-CM

## 2020-11-02 LAB
HGB BLD-MCNC: 13.3 G/DL (ref 12–16)
SARS-COV-2 RNA RESP QL NAA+PROBE: NOT DETECTED

## 2020-11-02 PROCEDURE — 36415 COLL VENOUS BLD VENIPUNCTURE: CPT

## 2020-11-02 PROCEDURE — 85018 HEMOGLOBIN: CPT

## 2020-11-02 PROCEDURE — U0003 INFECTIOUS AGENT DETECTION BY NUCLEIC ACID (DNA OR RNA); SEVERE ACUTE RESPIRATORY SYNDROME CORONAVIRUS 2 (SARS-COV-2) (CORONAVIRUS DISEASE [COVID-19]), AMPLIFIED PROBE TECHNIQUE, MAKING USE OF HIGH THROUGHPUT TECHNOLOGIES AS DESCRIBED BY CMS-2020-01-R: HCPCS

## 2020-11-04 ENCOUNTER — TELEPHONE (OUTPATIENT)
Dept: PLASTIC SURGERY | Facility: CLINIC | Age: 65
End: 2020-11-04

## 2020-11-05 ENCOUNTER — HOSPITAL ENCOUNTER (OUTPATIENT)
Facility: HOSPITAL | Age: 65
Discharge: HOME | End: 2020-11-05
Attending: SURGERY | Admitting: SURGERY
Payer: MEDICARE

## 2020-11-05 ENCOUNTER — ANESTHESIA (OUTPATIENT)
Dept: SURGERY | Facility: HOSPITAL | Age: 65
End: 2020-11-05
Payer: MEDICARE

## 2020-11-05 VITALS
DIASTOLIC BLOOD PRESSURE: 61 MMHG | RESPIRATION RATE: 20 BRPM | TEMPERATURE: 98 F | OXYGEN SATURATION: 98 % | HEIGHT: 63 IN | SYSTOLIC BLOOD PRESSURE: 111 MMHG | HEART RATE: 67 BPM | WEIGHT: 170 LBS | BODY MASS INDEX: 30.12 KG/M2

## 2020-11-05 DIAGNOSIS — N62 MACROMASTIA: Primary | ICD-10-CM

## 2020-11-05 PROCEDURE — D9220A PRA ANESTHESIA: ICD-10-PCS | Mod: ANES,,, | Performed by: ANESTHESIOLOGY

## 2020-11-05 PROCEDURE — 63600175 PHARM REV CODE 636 W HCPCS: Performed by: NURSE ANESTHETIST, CERTIFIED REGISTERED

## 2020-11-05 PROCEDURE — 63600175 PHARM REV CODE 636 W HCPCS: Performed by: ANESTHESIOLOGY

## 2020-11-05 PROCEDURE — D9220A PRA ANESTHESIA: ICD-10-PCS | Mod: CRNA,,, | Performed by: NURSE ANESTHETIST, CERTIFIED REGISTERED

## 2020-11-05 PROCEDURE — 71000015 HC POSTOP RECOV 1ST HR: Performed by: SURGERY

## 2020-11-05 PROCEDURE — 37000008 HC ANESTHESIA 1ST 15 MINUTES: Performed by: SURGERY

## 2020-11-05 PROCEDURE — 25000003 PHARM REV CODE 250: Performed by: STUDENT IN AN ORGANIZED HEALTH CARE EDUCATION/TRAINING PROGRAM

## 2020-11-05 PROCEDURE — 88305 TISSUE EXAM BY PATHOLOGIST: ICD-10-PCS | Mod: 26,,, | Performed by: PATHOLOGY

## 2020-11-05 PROCEDURE — 88305 TISSUE EXAM BY PATHOLOGIST: CPT | Performed by: PATHOLOGY

## 2020-11-05 PROCEDURE — 19318 PR REDUCTION OF LARGE BREAST: ICD-10-PCS | Mod: LT,,, | Performed by: SURGERY

## 2020-11-05 PROCEDURE — 19318 BREAST REDUCTION: CPT | Mod: LT,,, | Performed by: SURGERY

## 2020-11-05 PROCEDURE — 71000016 HC POSTOP RECOV ADDL HR: Performed by: SURGERY

## 2020-11-05 PROCEDURE — 88305 TISSUE EXAM BY PATHOLOGIST: CPT | Mod: 26,,, | Performed by: PATHOLOGY

## 2020-11-05 PROCEDURE — 25000003 PHARM REV CODE 250: Performed by: NURSE ANESTHETIST, CERTIFIED REGISTERED

## 2020-11-05 PROCEDURE — 36000706: Performed by: SURGERY

## 2020-11-05 PROCEDURE — D9220A PRA ANESTHESIA: Mod: ANES,,, | Performed by: ANESTHESIOLOGY

## 2020-11-05 PROCEDURE — 25000003 PHARM REV CODE 250: Performed by: SURGERY

## 2020-11-05 PROCEDURE — 36000707: Performed by: SURGERY

## 2020-11-05 PROCEDURE — 63600175 PHARM REV CODE 636 W HCPCS: Performed by: STUDENT IN AN ORGANIZED HEALTH CARE EDUCATION/TRAINING PROGRAM

## 2020-11-05 PROCEDURE — 71000044 HC DOSC ROUTINE RECOVERY FIRST HOUR: Performed by: SURGERY

## 2020-11-05 PROCEDURE — C1729 CATH, DRAINAGE: HCPCS | Performed by: SURGERY

## 2020-11-05 PROCEDURE — D9220A PRA ANESTHESIA: Mod: CRNA,,, | Performed by: NURSE ANESTHETIST, CERTIFIED REGISTERED

## 2020-11-05 PROCEDURE — 37000009 HC ANESTHESIA EA ADD 15 MINS: Performed by: SURGERY

## 2020-11-05 RX ORDER — ROCURONIUM BROMIDE 10 MG/ML
INJECTION, SOLUTION INTRAVENOUS
Status: DISCONTINUED | OUTPATIENT
Start: 2020-11-05 | End: 2020-11-05

## 2020-11-05 RX ORDER — SODIUM CHLORIDE 0.9 % (FLUSH) 0.9 %
10 SYRINGE (ML) INJECTION
Status: CANCELLED | OUTPATIENT
Start: 2020-11-05

## 2020-11-05 RX ORDER — PHENYLEPHRINE HYDROCHLORIDE 10 MG/ML
INJECTION INTRAVENOUS
Status: DISCONTINUED
Start: 2020-11-05 | End: 2020-11-05 | Stop reason: HOSPADM

## 2020-11-05 RX ORDER — FENTANYL CITRATE 50 UG/ML
INJECTION, SOLUTION INTRAMUSCULAR; INTRAVENOUS
Status: DISCONTINUED | OUTPATIENT
Start: 2020-11-05 | End: 2020-11-05

## 2020-11-05 RX ORDER — MIDAZOLAM HYDROCHLORIDE 1 MG/ML
INJECTION, SOLUTION INTRAMUSCULAR; INTRAVENOUS
Status: DISCONTINUED | OUTPATIENT
Start: 2020-11-05 | End: 2020-11-05

## 2020-11-05 RX ORDER — HEPARIN SODIUM 5000 [USP'U]/ML
5000 INJECTION, SOLUTION INTRAVENOUS; SUBCUTANEOUS ONCE
Status: COMPLETED | OUTPATIENT
Start: 2020-11-05 | End: 2020-11-05

## 2020-11-05 RX ORDER — BACITRACIN ZINC 500 [USP'U]/G
OINTMENT TOPICAL
Status: DISCONTINUED | OUTPATIENT
Start: 2020-11-05 | End: 2020-11-05 | Stop reason: HOSPADM

## 2020-11-05 RX ORDER — HYDROMORPHONE HYDROCHLORIDE 1 MG/ML
0.2 INJECTION, SOLUTION INTRAMUSCULAR; INTRAVENOUS; SUBCUTANEOUS EVERY 5 MIN PRN
Status: CANCELLED | OUTPATIENT
Start: 2020-11-05

## 2020-11-05 RX ORDER — DEXAMETHASONE SODIUM PHOSPHATE 4 MG/ML
INJECTION, SOLUTION INTRA-ARTICULAR; INTRALESIONAL; INTRAMUSCULAR; INTRAVENOUS; SOFT TISSUE
Status: DISCONTINUED | OUTPATIENT
Start: 2020-11-05 | End: 2020-11-05

## 2020-11-05 RX ORDER — SULFAMETHOXAZOLE AND TRIMETHOPRIM 800; 160 MG/1; MG/1
1 TABLET ORAL 2 TIMES DAILY
Qty: 10 TABLET | Refills: 0 | Status: SHIPPED | OUTPATIENT
Start: 2020-11-05 | End: 2020-11-10

## 2020-11-05 RX ORDER — MUPIROCIN 20 MG/G
OINTMENT TOPICAL
Status: DISCONTINUED | OUTPATIENT
Start: 2020-11-05 | End: 2020-11-05 | Stop reason: HOSPADM

## 2020-11-05 RX ORDER — SODIUM CHLORIDE 0.9 % (FLUSH) 0.9 %
10 SYRINGE (ML) INJECTION
Status: DISCONTINUED | OUTPATIENT
Start: 2020-11-05 | End: 2020-11-05 | Stop reason: HOSPADM

## 2020-11-05 RX ORDER — CLINDAMYCIN PHOSPHATE 900 MG/50ML
900 INJECTION, SOLUTION INTRAVENOUS
Status: COMPLETED | OUTPATIENT
Start: 2020-11-05 | End: 2020-11-05

## 2020-11-05 RX ORDER — PHENYLEPHRINE HYDROCHLORIDE 10 MG/ML
INJECTION INTRAVENOUS
Status: DISCONTINUED | OUTPATIENT
Start: 2020-11-05 | End: 2020-11-05

## 2020-11-05 RX ORDER — HYDROMORPHONE HYDROCHLORIDE 1 MG/ML
0.2 INJECTION, SOLUTION INTRAMUSCULAR; INTRAVENOUS; SUBCUTANEOUS EVERY 5 MIN PRN
Status: DISCONTINUED | OUTPATIENT
Start: 2020-11-05 | End: 2020-11-05 | Stop reason: HOSPADM

## 2020-11-05 RX ORDER — HYDROCODONE BITARTRATE AND ACETAMINOPHEN 5; 325 MG/1; MG/1
1 TABLET ORAL EVERY 4 HOURS PRN
Status: DISCONTINUED | OUTPATIENT
Start: 2020-11-05 | End: 2020-11-05 | Stop reason: HOSPADM

## 2020-11-05 RX ORDER — ONDANSETRON 2 MG/ML
INJECTION INTRAMUSCULAR; INTRAVENOUS
Status: DISCONTINUED | OUTPATIENT
Start: 2020-11-05 | End: 2020-11-05

## 2020-11-05 RX ORDER — PROPOFOL 10 MG/ML
VIAL (ML) INTRAVENOUS
Status: DISCONTINUED | OUTPATIENT
Start: 2020-11-05 | End: 2020-11-05

## 2020-11-05 RX ORDER — LIDOCAINE HCL/PF 100 MG/5ML
SYRINGE (ML) INTRAVENOUS
Status: DISCONTINUED | OUTPATIENT
Start: 2020-11-05 | End: 2020-11-05

## 2020-11-05 RX ORDER — ONDANSETRON 2 MG/ML
4 INJECTION INTRAMUSCULAR; INTRAVENOUS EVERY 12 HOURS PRN
Status: DISCONTINUED | OUTPATIENT
Start: 2020-11-05 | End: 2020-11-05 | Stop reason: HOSPADM

## 2020-11-05 RX ORDER — SODIUM CHLORIDE 9 MG/ML
INJECTION, SOLUTION INTRAVENOUS CONTINUOUS PRN
Status: DISCONTINUED | OUTPATIENT
Start: 2020-11-05 | End: 2020-11-05

## 2020-11-05 RX ORDER — LIDOCAINE HYDROCHLORIDE AND EPINEPHRINE 10; 10 MG/ML; UG/ML
INJECTION, SOLUTION INFILTRATION; PERINEURAL
Status: DISCONTINUED | OUTPATIENT
Start: 2020-11-05 | End: 2020-11-05 | Stop reason: HOSPADM

## 2020-11-05 RX ORDER — MUPIROCIN 20 MG/G
OINTMENT TOPICAL 2 TIMES DAILY
Status: DISCONTINUED | OUTPATIENT
Start: 2020-11-05 | End: 2020-11-05 | Stop reason: HOSPADM

## 2020-11-05 RX ORDER — LIDOCAINE HYDROCHLORIDE 10 MG/ML
1 INJECTION, SOLUTION EPIDURAL; INFILTRATION; INTRACAUDAL; PERINEURAL ONCE
Status: COMPLETED | OUTPATIENT
Start: 2020-11-05 | End: 2020-11-05

## 2020-11-05 RX ORDER — HYDROCODONE BITARTRATE AND ACETAMINOPHEN 5; 325 MG/1; MG/1
1 TABLET ORAL EVERY 6 HOURS PRN
Qty: 20 TABLET | Refills: 0 | Status: SHIPPED | OUTPATIENT
Start: 2020-11-05 | End: 2020-12-17

## 2020-11-05 RX ADMIN — ONDANSETRON 4 MG: 2 INJECTION, SOLUTION INTRAMUSCULAR; INTRAVENOUS at 09:11

## 2020-11-05 RX ADMIN — PHENYLEPHRINE HYDROCHLORIDE 100 MCG: 10 INJECTION INTRAVENOUS at 08:11

## 2020-11-05 RX ADMIN — ROCURONIUM BROMIDE 10 MG: 10 INJECTION, SOLUTION INTRAVENOUS at 08:11

## 2020-11-05 RX ADMIN — MIDAZOLAM HYDROCHLORIDE 2 MG: 1 INJECTION, SOLUTION INTRAMUSCULAR; INTRAVENOUS at 07:11

## 2020-11-05 RX ADMIN — HYDROMORPHONE HYDROCHLORIDE 0.2 MG: 1 INJECTION, SOLUTION INTRAMUSCULAR; INTRAVENOUS; SUBCUTANEOUS at 11:11

## 2020-11-05 RX ADMIN — FENTANYL CITRATE 50 MCG: 50 INJECTION, SOLUTION INTRAMUSCULAR; INTRAVENOUS at 08:11

## 2020-11-05 RX ADMIN — DEXAMETHASONE SODIUM PHOSPHATE 4 MG: 4 INJECTION, SOLUTION INTRAMUSCULAR; INTRAVENOUS at 08:11

## 2020-11-05 RX ADMIN — ROCURONIUM BROMIDE 50 MG: 10 INJECTION, SOLUTION INTRAVENOUS at 07:11

## 2020-11-05 RX ADMIN — SODIUM CHLORIDE 0.3 MCG/KG/MIN: 9 INJECTION, SOLUTION INTRAVENOUS at 08:11

## 2020-11-05 RX ADMIN — PHENYLEPHRINE HYDROCHLORIDE 200 MCG: 10 INJECTION INTRAVENOUS at 08:11

## 2020-11-05 RX ADMIN — HYDROMORPHONE HYDROCHLORIDE 0.2 MG: 1 INJECTION, SOLUTION INTRAMUSCULAR; INTRAVENOUS; SUBCUTANEOUS at 12:11

## 2020-11-05 RX ADMIN — PHENYLEPHRINE HYDROCHLORIDE 100 MCG: 10 INJECTION INTRAVENOUS at 09:11

## 2020-11-05 RX ADMIN — LIDOCAINE HYDROCHLORIDE 100 MG: 20 INJECTION, SOLUTION INTRAVENOUS at 07:11

## 2020-11-05 RX ADMIN — FENTANYL CITRATE 25 MCG: 50 INJECTION, SOLUTION INTRAMUSCULAR; INTRAVENOUS at 09:11

## 2020-11-05 RX ADMIN — SODIUM CHLORIDE: 0.9 INJECTION, SOLUTION INTRAVENOUS at 07:11

## 2020-11-05 RX ADMIN — MUPIROCIN: 20 OINTMENT TOPICAL at 06:11

## 2020-11-05 RX ADMIN — LIDOCAINE HYDROCHLORIDE 10 MG: 10 INJECTION, SOLUTION EPIDURAL; INFILTRATION; INTRACAUDAL; PERINEURAL at 06:11

## 2020-11-05 RX ADMIN — HYDROCODONE BITARTRATE AND ACETAMINOPHEN 1 TABLET: 5; 325 TABLET ORAL at 11:11

## 2020-11-05 RX ADMIN — SUGAMMADEX 200 MG: 100 INJECTION, SOLUTION INTRAVENOUS at 10:11

## 2020-11-05 RX ADMIN — PROPOFOL 150 MG: 10 INJECTION, EMULSION INTRAVENOUS at 07:11

## 2020-11-05 RX ADMIN — CLINDAMYCIN PHOSPHATE 900 MG: 900 INJECTION INTRAVENOUS at 08:11

## 2020-11-05 RX ADMIN — HEPARIN SODIUM 5000 UNITS: 5000 INJECTION INTRAVENOUS; SUBCUTANEOUS at 06:11

## 2020-11-05 RX ADMIN — SODIUM CHLORIDE, SODIUM GLUCONATE, SODIUM ACETATE, POTASSIUM CHLORIDE, MAGNESIUM CHLORIDE, SODIUM PHOSPHATE, DIBASIC, AND POTASSIUM PHOSPHATE: .53; .5; .37; .037; .03; .012; .00082 INJECTION, SOLUTION INTRAVENOUS at 09:11

## 2020-11-05 NOTE — DISCHARGE INSTRUCTIONS
Shower in 48 hours  Drain care, record daily and bring logs to clinic  Bacitracin to nipple 3x/day  Wear bra at all times except for shower  Call clinic with concerns/questions      Mastopexy (Breast Lift)     This picture shows one type of mastopexy. Talk to your surgeon about how your own surgery will be done.   Changes in breast shape and firmness often occur due to pregnancy, weight loss, or aging. Breast tissue and skin lose their elasticity (ability to expand or stretch and return to normal). This allows the breasts to droop and lose their shape. Mastopexy is a type of cosmetic surgery done to reshape the breasts. It is often called a breast lift. Having a breast lift changes the look of your breasts and may improve their appearance.  Preparing for surgery  Prepare for the surgery as you have been told. In addition:  · Tell your doctor about all medications you take. This includes herbs and other supplements. It also includes any blood thinners, such as warfarin, clopidogrel, or daily aspirin. You may need to stop taking some or all of them before surgery.  · Do not eat or drink during the 8 hours before your surgery, or as directed by your surgeon. This includes coffee, water, gum, and mints. (If you have been instructed to take medications, take them with a small sip of water.)  The day of surgery  The surgery takes about 2 to 4 hours. You may go home the same day. Or you may stay overnight.  Before the surgery begins:  · An IV line is put into a vein in your arm or hand. This line delivers fluids and medications.  · You will be given medication to keep you pain free during surgery. This may be general anesthesia, which puts you into a state like deep sleep. (A tube may be inserted into your throat to help you breathe.) Or you may have sedation, which makes you relaxed and sleepy. If you have sedation, local anesthesia will be injected to numb the area being worked on. The anesthesiologist will discuss your  options with you.  During the surgery:  · The doctor makes incisions in the skin around your breast. You and your doctor will have discussed incision sites before surgery.  · The doctor moves the breast tissue higher on the chest. Breast skin is tightened to hold the tissue in position. Any excess skin is removed.  · The areola (dark skin around the nipple) and nipple are moved higher on the breast. If the size of the areola is being decreased, skin is removed. The areola is stitched into place.  · The process is repeated on the other breast.  · The incisions are closed with sutures, surgical glue, or both. A tube (drain) may be placed into an incision before it is closed. This drains excess fluid that may build up as the wound begins to heal.  After the surgery  You will be taken to a room to recover from the anesthesia. You may feel sleepy and nauseated. If a breathing tube was used, your throat may be sore at first. Youll be given medication to control pain. When youre ready, you will be able to go home with an adult family member or friend. Or, you may be taken to a room to stay overnight.  Recovering at home  Once home, follow any instructions you are given. Your doctor will tell you when you can return to your normal routine. During your recovery:  · Take any prescribed medications exactly as directed.  · Wear the special bra or bandage you were given before discharge as directed by your doctor.  · Care for your incisions and the dressing (bandage) over them as instructed by your doctor.  · Follow your doctors guidelines for showering. Avoid swimming, bathing, using a hot tub, and other activities that cause the incisions to be covered with water until your doctor says its OK.  · When you shower, gently wash your incision sites. Then pat the incisions dry. Dont apply lotions, oils, or creams to the incisions until after they are fully healed.  · Dont raise your arms above breast level for 10 days. And  dont lift, push, or pull anything heavier than 10 pounds for at least 7 days.  · Dont drive until you are no longer taking prescription pain medication and your doctor says its OK. When riding in a car, carefully position the seatbelt so that it doesnt compress your breasts.  · Be aware that breast swelling may last for 3 to 5 weeks. If advised by your doctor, use a cold pack wrapped in a thin towel to relieve discomfort and control swelling. Its important not to leave the cold pack on for too long, or your skin could be damaged. Put the pack over your bandages for no more than 20 minutes at a time. Then, leave it off for at least 20 minutes. Repeat this as often as needed during waking hours until swelling starts to improve. Dont fall asleep with the cold pack on. If youre not sure how to safely use the cold pack, ask your doctor.  When to call your doctor  Call the doctor if you have any of the following:  · Extreme chest pain or trouble breathing (call 911 or other emergency service)  · A fever of 100.4°F (38.0°C) or higher (or as directed by your doctor)  · Bleeding or drainage through the special bra or Ace bandage  · Symptoms of infection at an incision site such as increased redness or swelling, warmth, worsening pain, or foul-smelling drainage  · Pain that is not relieved by medication  · More soreness, swelling, or bruising on one breast that the other  · Breast that is very warm to the touch   Follow-up  You will have follow-up visits so your doctor can see how well youre healing. If needed, stitches or drains will be removed during one of these visits. If you have any questions about your recovery, let your doctor know. Also, be aware that your breasts may sag again over time. If you have any concerns about your surgery results, talk to your doctor.  Risks and complications  Risks and possible complications include:  · Bleeding  · Infection  · Blood clots  · Excessive or visible  scarring  · Changes in breast or nipple sensation (temporary or permanent)  · Potential for breasts to sag again  · Breasts that are not the same shape or size  · Not being happy with cosmetic result  · Risks of anesthesia   Date Last Reviewed: 7/2/2015  © 6664-0598 [employer]. [address] [place] [address]. All rights reserved. This information is not intended as a substitute for professional medical care. Always follow your healthcare professional's instructions.            Discharge Instructions: Caring for Your Marcelo-Madrigal Drainage Tube  Your doctor discharges you with a Marcelo-Madrigal drainage tube. Doctors commonly leave this drain within the abdominal cavity after surgery. It helps drain and collect blood and body fluid after surgery. This can prevent swelling and reduces the risk for infection. The tube is held in place by a few stitches. It is covered with a bandage. Your doctor will remove the drain when he or she determines you no longer need it.  Home care  · Dont sleep on the same side as the tube.  · Secure the tube and bag inside your clothing with a safety pin. This helps keep the tube from being pulled out.  · Empty your drain at least twice a day. Empty it more often if the drain is full. Wash  and dry your hands before emptying the drain.  ¨ Lift the opening on the drain.  ¨ Drain the fluid into a measuring cup.  ¨ Record the amount of fluid each time you empty the drain. Include the date and time it was emptied. Share this information with your doctor on your next visit.  ¨ Squeeze the bulb with your hands until you hear air coming out of the bulb if your doctor has instructed you to do so (sometimes the bulb is used as a reservoir without suction). Check with your doctor about specific drain instructions.  ¨ Close the opening.  · Change the dressing around the tube every day.  ¨ Wash your hands.  ¨ Remove the old bandage.  ¨ Wash your hands again.  ¨ Wet a cotton  swab and clean the skin around the incision and tube site. Use normal saline solution (salt and water). Or, you can use warm, soapy water.  ¨ Put a new bandage on the incision and tube site. Make the bandage large enough to cover the whole incision area.  ¨ Tape the bandage in place.  · Keep the bandage and tube site dry when you shower. Ask your healthcare provider about the best way to do this.  · Stripping the tube helps keep blood clots from blocking the tube. Ask your nurse how often you should strip the tube. Stripping may not be needed, depending on where and why your doctor placed the tube. It may even be dangerous in some cases.   ¨ Hold the tubing where it leaves the skin, with one hand. This keeps it from pulling on the skin.  ¨ Pinch the tubing with the thumb and first finger of your other hand.  ¨ Slowly and firmly pull your thumb and first finger down the tubing. You may find it helpful to hold an alcohol swab between your fingers and the tube to lubricate the tubing.  ¨ If the pulling hurts or feels like its coming out of the skin, stop. Begin again more gently.  Follow-up care  Make a follow-up appointment as directed by our staff.     When to seek medical care  Call your healthcare provider right away if you have any of the following:  · New or increased pain around the tube  · Redness, swelling, or warmth around the incision or tube  · Drainage that is foul-smelling  · Vomiting  · Fever of 100.4°F (38°C)  · Fluid leaking around the tube  · Incision seems not to be healing  · Stitches become loose  · Tube falls out or breaks  · Drainage that changes from light pink to dark red  · Blood clots in the drainage bulb  · A sudden increase or decrease in the amount of drainage (over 30 mL)   Date Last Reviewed: 2/1/2017  © 6712-0919 [employer]. [address] [place] [address], [place] [address]. All rights reserved. This information is not intended as a substitute for professional medical care.  Always follow your healthcare professional's instructions.

## 2020-11-05 NOTE — ANESTHESIA POSTPROCEDURE EVALUATION
Anesthesia Post Evaluation    Patient: Silvana Quezada    Procedure(s) Performed: Procedure(s) (LRB):  MAMMOPLASTY, REDUCTION, LEFT (Left)    Final Anesthesia Type: general    Patient location during evaluation: PACU  Patient participation: Yes- Able to Participate  Level of consciousness: awake and alert, awake and oriented  Post-procedure vital signs: reviewed and stable  Pain management: adequate  Airway patency: patent    PONV status at discharge: No PONV  Anesthetic complications: no      Cardiovascular status: blood pressure returned to baseline, stable and hemodynamically stable  Respiratory status: unassisted, spontaneous ventilation and room air  Hydration status: euvolemic  Follow-up not needed.          Vitals Value Taken Time   /56 11/05/20 1216   Temp 36.6 °C (97.9 °F) 11/05/20 1017   Pulse 73 11/05/20 1220   Resp 13 11/05/20 1220   SpO2 97 % 11/05/20 1220   Vitals shown include unvalidated device data.      No case tracking events are documented in the log.      Pain/Yaakov Score: Pain Rating Prior to Med Admin: 4 (11/5/2020 12:07 PM)  Yaakov Score: 10 (11/5/2020 10:45 AM)

## 2020-11-05 NOTE — H&P
[facility]  History & Physical  Plastic Surgery    SUBJECTIVE:     Chief Complaint/Reason for Admission: Macromastia    History of Present Illness:  Silvana Quezada is a 64 y.o. year old female who presents to the Plastic Surgery Clinic on 09/16/2020 for evaluation for breast asymmetry.   Pt had right sided lumpectomy and radiation on right breast years ago, and now is unhappy with the asymmetry of both breasts.  Denies fever, chills, nausea, vomiting, or other systemic signs of infection.    No medications prior to admission.       Review of patient's allergies indicates:   Allergen Reactions    Adhesive      blisters    Keflex [cephalexin] Rash    Cephalosporins        Past Medical History:   Diagnosis Date    Bipolar 1 disorder     Breast cancer 2009    right-radiation & chemo    Breast cyst     Closed fracture of proximal end of right humerus 3/9/2015    Depression     History of right shoulder fracture     MS (multiple sclerosis)     presented as dizzines, dx vision,     Osteoporosis, unspecified     Pneumonia 3/27/2020     Past Surgical History:   Procedure Laterality Date    BRAIN SURGERY  1991    BREAST BIOPSY Left 2009    core    BREAST BIOPSY Right     BREAST LUMPECTOMY Right 2001    CYSTOSCOPY N/A 7/11/2018    Procedure: CYSTOSCOPY;  Surgeon: Jayesh Ross MD;  Location: [address];  Service: Urology;  Laterality: N/A;  30 min    CYSTOSCOPY N/A 4/10/2019    Procedure: CYSTOSCOPY;  Surgeon: Jayesh Ross MD;  Location: [address];  Service: Urology;  Laterality: N/A;  30 MIN    FOOT SURGERY  october 2013    HYSTERECTOMY      INJECTION OF BOTULINUM TOXIN TYPE A N/A 7/11/2018    Procedure: INJECTION, BOTULINUM TOXIN, TYPE A 200 UNITS;  Surgeon: Jayesh Ross MD;  Location: [address];  Service: Urology;  Laterality: N/A;    INJECTION OF BOTULINUM TOXIN TYPE A N/A 4/10/2019    Procedure: INJECTION, BOTULINUM TOXIN, TYPE A 200 UNITS;  Surgeon: Jayesh  ROX Ross MD;  [address];  Service: Urology;  Laterality: N/A;    OPEN REDUCTION AND INTERNAL FIXATION (ORIF) OF INJURY OF WRIST Left 12/27/2019    Procedure: ORIF, WRIST;  Surgeon: Albert Schroeder Jr., MD;  Location: [facility] OR;  Service: Orthopedics;  Laterality: Left;  ACUMED/ MINI C ARM Palomo Hughes notified/ Rk confirmed here in [facility] with Lacie 12/23/19 KB [id number]    TONSILLECTOMY       Family History   Problem Relation Age of Onset    Skin cancer Mother     Breast cancer Mother     Stroke Mother     Emphysema Father     Breast cancer Maternal Aunt     Dementia Brother     Diabetes Brother     Anesthesia problems Daughter     Autism Son     No Known Problems Brother     Autism Daughter      Social History     Tobacco Use    Smoking status: Never Smoker    Smokeless tobacco: Never Used   Substance Use Topics    Alcohol use: Yes     Alcohol/week: 1.7 standard drinks     Types: 2 Standard drinks or equivalent per week     Comment: occasionally    Drug use: No        Review of Systems:  Constitutional: negative for fevers  Eyes: negative visual changes  ENT: negative for hearing loss  Respiratory: negative for dyspnea  Cardiovascular: negative for chest pain  Gastrointestinal: negative for abdominal pain  Genitourinary: negative for dysuria  Neurological: negative for headaches  Behavioral/Psych: negative for hallucinations  Endocrine: negative for temperature intolerance      OBJECTIVE:     Vital Signs (Most Recent):       Physical Exam:  WD WN NAD  VSS  Normal resp effort  Breasts bilateral ptosis, right breast smaller than left    Laboratory:  none    Diagnostic Results:  reviewed    ASSESSMENT/PLAN:     64 y.o. female status post right sided lumpectomy and radiation yrs ago, now w/ breast asymmetry desiring a more symmetric look. Patient will undergo left sided breast reduction and will f/u with us as needed for scheduling surgery.           All questions were  answered.

## 2020-11-05 NOTE — TRANSFER OF CARE
"Anesthesia Transfer of Care Note    Patient: Silvana Quezada    Procedure(s) Performed: Procedure(s) (LRB):  MAMMOPLASTY, REDUCTION, LEFT (Left)    Patient location: [facility]    Anesthesia Type: general    Transport from OR: Transported from OR on 6-10 L/min O2 by face mask with adequate spontaneous ventilation    Post pain: adequate analgesia    Post assessment: no apparent anesthetic complications and tolerated procedure well    Post vital signs: stable    Level of consciousness: awake and alert    Nausea/Vomiting: no nausea/vomiting    Complications: none    Transfer of care protocol was followed      Last vitals:   Visit Vitals  /65 (BP Location: Right arm, Patient Position: Lying)   Pulse 72   Temp 36.5 °C (97.7 °F) (Oral)   Resp 19   Ht 5' 3" (1.6 m)   Wt 77.1 kg (170 lb)   LMP  (LMP Unknown)   SpO2 96%   Breastfeeding No   BMI 30.11 kg/m²     "

## 2020-11-05 NOTE — BRIEF OP NOTE
[facility]  Brief Operative Note    Surgery Date: 11/5/2020     Surgeon(s) and Role:     * Kirk Tompkins MD - Primary     * Benny Greenberg MD - Resident - Assisting     * Thiago Russell MD - Fellow        Pre-op Diagnosis:  HX: breast cancer [Z85.3]    Post-op Diagnosis:  Post-Op Diagnosis Codes:     * HX: breast cancer [Z85.3]    Procedure(s) (LRB):  MAMMOPLASTY, REDUCTION, LEFT (Left)    Anesthesia: General    Description of the findings of the procedure(s): See op note    Estimated Blood Loss: * No values recorded between 11/5/2020  8:14 AM and 11/5/2020 10:15 AM *         Specimens:   Specimen (12h ago, onward)    None            Discharge Note    OUTCOME: Patient tolerated treatment/procedure well without complication and is now ready for discharge.    DISPOSITION: Home or Self Care    FINAL DIAGNOSIS:  <principal problem not specified>    FOLLOWUP: In clinic    DISCHARGE INSTRUCTIONS:  No discharge procedures on file.

## 2020-11-05 NOTE — PLAN OF CARE
Discharge instructions given and explained to patient and family with verbalization of understanding all instructions. Prescription given and explained next time and doses of each medication. Patients v/s stable, denies n/v and tolerating po, rates pain level tolerable, IV removed, and  at bedside for patient discharge home.

## 2020-11-05 NOTE — DISCHARGE SUMMARY
[facility]  Discharge Summary  Plastic Surgery       Admit Date: 11/5/2020    Discharge Date and Time:  11/05/2020 10:21 AM    Attending Physician: Kirk Tompkins, *     Discharge Provider: Benny Greenberg    Reason for Admission: Macromastia    Procedures Performed: Procedure(s) (LRB):  MAMMOPLASTY, REDUCTION, LEFT (Left)    Hospital Course (synopsis of major diagnoses, care, treatment, and services provided during the course of the hospital stay): the patient arrived to pre-op area for proper pre-operative management.  Upon completion of pre-operative preparation, the patient was taken back to the operative theatre.  A left mammoplasty reduction was performed without complication and the patient was transported to the post anesthesia care unit in stable condition. After appropriate recovery from the anaesthetic agents used during the surgery the patient was deemed safe for DC, they were discharged home.         Consults: none    Significant Diagnostic Studies: Labs: none    Final Diagnoses:    Principal Problem: Macromastia   Secondary Diagnoses:   Active Hospital Problems    Diagnosis  POA    *Macromastia [N62]  Yes      Resolved Hospital Problems   No resolved problems to display.       Discharged Condition: stable    Disposition: Home or Self Care    Follow Up/Patient Instructions:     Medications:  Reconciled Home Medications:      Medication List      START taking these medications    HYDROcodone-acetaminophen 5-325 mg per tablet  Commonly known as: NORCO  Take 1 tablet by mouth every 6 (six) hours as needed for Pain.     sulfamethoxazole-trimethoprim 800-160mg 800-160 mg Tab  Commonly known as: BACTRIM DS  Take 1 tablet by mouth 2 (two) times daily. for 5 days        CONTINUE taking these medications    alendronate 70 MG tablet  Commonly known as: FOSAMAX  TAKE 1 TABLET BY MOUTH EVERY 7 DAYS     * buPROPion 150 MG TBSR 12 hr tablet  Commonly known as: WELLBUTRIN SR  Take 1 tablet  (150 mg total) by mouth every evening.     * buPROPion 100 MG TBSR 12 hr tablet  Commonly known as: WELLBUTRIN SR  TAKE 2 TABLETS BY MOUTH EVERY MORNING     CALCIUM 500 + D 500 mg(1,250mg) -200 unit per tablet  Generic drug: calcium-vitamin D3  Take 1 tablet by mouth 2 (two) times daily with meals.     catheter 14 Fr Misc  Commonly known as: FEMALE CATHETER  Patient is to self catheterize four times a day indefinitley using female 12 fr in and out catheter for incomplete bladder emptying.   Dispense 120 month with 11 refills or 360 every 3 months with 3 refills depending on patient's preference     CO Q-10 10 mg capsule  Generic drug: coenzyme Q10     fluphenazine 1 MG tablet  Commonly known as: PROLIXIN  Take 2 tablets (2 mg total) by mouth nightly.     folic acid 1 MG tablet  Commonly known as: FOLVITE  Take 1 tablet (1,000 mcg total) by mouth once daily.     inhalation spacing device  Use as directed for inhalation.     OXcarbazepine 150 MG Tab  Commonly known as: TRILEPTAL  Take 1 tablet (150 mg total) by mouth 2 (two) times daily.     VITAMIN B-12 1000 MCG tablet  Generic drug: cyanocobalamin         * This list has 2 medication(s) that are the same as other medications prescribed for you. Read the directions carefully, and ask your doctor or other care provider to review them with you.              Discharge Procedure Orders   Diet general     Wound care routine (specify)   Order Comments: Wound care routine: Shower in 48 hours  Drain care, record daily and bring logs to clinic  Bacitracin or triple antibiotic ointment to nipple 3x/day  Wear bra at all times except for shower  Call clinic with concerns/questions     Activity as tolerated

## 2020-11-05 NOTE — ANESTHESIA PROCEDURE NOTES
Intubation  Performed by: Kirstin March CRNA  Authorized by: Palmer Perez MD     Intubation:     Induction:  Intravenous    Intubated:  Postinduction    Mask Ventilation:  Easy mask    Attempts:  1    Attempted By:  CRNA    Method of Intubation:  Direct    Blade:  Rodriguez 2    Laryngeal View Grade: Grade I - full view of chords      Difficult Airway Encountered?: No      Complications:  None    Airway Device:  Oral endotracheal tube    Airway Device Size:  7.0    Style/Cuff Inflation:  Cuffed (inflated to minimal occlusive pressure)    Inflation Amount (mL):  4    Tube secured:  22    Secured at:  The lips    Placement Verified By:  Capnometry    Complicating Factors:  None    Findings Post-Intubation:  BS equal bilateral and atraumatic/condition of teeth unchanged

## 2020-11-05 NOTE — ANESTHESIA PREPROCEDURE EVALUATION
11/05/2020  Silvana Quezada is a 65 y.o., female.    Anesthesia Evaluation    I have reviewed the Patient Summary Reports.    I have reviewed the Nursing Notes. I have reviewed the NPO Status.   I have reviewed the Medications.     Review of Systems  Anesthesia Hx:  No problems with previous Anesthesia Denies Hx of Anesthetic complications  History of prior surgery of interest to airway management or planning: Denies Family Hx of Anesthesia complications.   Denies Personal Hx of Anesthesia complications.   Social:  Non-Smoker, No Alcohol Use    Hematology/Oncology:  Hematology Normal   Oncology Normal     EENT/Dental:EENT/Dental Normal   Cardiovascular:  Cardiovascular Normal Exercise tolerance: good     Pulmonary:  Pulmonary Normal    Renal/:  Renal/ Normal     Hepatic/GI:  Hepatic/GI Normal    Musculoskeletal:   osteoporosis   Neurological:   Multiple sclerosis   Endocrine:  Endocrine Normal    Psych:   Psychiatric History          Physical Exam  General:  Well nourished    Airway/Jaw/Neck:  Airway Findings: Mouth Opening: Normal Tongue: Normal  General Airway Assessment: Adult  Mallampati: II  Improves to II with phonation.  TM Distance: Normal, at least 6 cm      Dental:  Dental Findings: In tact   Chest/Lungs:  Chest/Lungs Findings: Clear to auscultation, Normal Respiratory Rate     Heart/Vascular:  Heart Findings: Rate: Normal  Rhythm: Regular Rhythm        Mental Status:  Mental Status Findings:  Cooperative, Alert and Oriented         Anesthesia Plan  Type of Anesthesia, risks & benefits discussed:  Anesthesia Type:  general  Patient's Preference:   Intra-op Monitoring Plan: standard ASA monitors  Intra-op Monitoring Plan Comments:   Post Op Pain Control Plan: multimodal analgesia, IV/PO Opioids PRN and peripheral nerve block  Post Op Pain Control Plan Comments:   Induction:   IV  Beta Blocker:   Patient is not currently on a Beta-Blocker (No further documentation required).       Informed Consent: Patient understands risks and agrees with Anesthesia plan.  Questions answered. Anesthesia consent signed with patient.  ASA Score: 2     Day of Surgery Review of History & Physical:    H&P update referred to the surgeon.         Ready For Surgery From Anesthesia Perspective.

## 2020-11-09 ENCOUNTER — NURSE TRIAGE (OUTPATIENT)
Dept: ADMINISTRATIVE | Facility: CLINIC | Age: 65
End: 2020-11-09

## 2020-11-09 NOTE — OP NOTE
Date of Surgery: 11/5  Pre op Diagnosis:  1 macromastia  2 chronic neck pain  3 chronic back pain  4 skin rash  5. Personal history of breast cancer  Post op Diagnosis:  same  Procedure performed: Left breast reduction  Surgeon: Dr Kirk Tompkins  Anesthesia: general  Complications none  Blood loss 150cc      The patient was evaluated in the preoperative holding area. Markings for the inferior pedicle left breast reduction were made. Patient with history of right breast lumpectomy with radiation in the past. The risks and possible complications including but not limited to; infection, bleeding, scarring, loss of sensation to the nipple, partial or total loss of the nipple, breast asymmetry, breast deformity, open wounds and need for further surgery were all explained to the patient. The patient signed the informed consent.     The patient was taken to the operating room and placed in the supine position. After adequate general endotracheal anesthesia, the patient was prepped and draped in the normal sterile fashion. The new nipple areola was marked measuring 42mm. The inferior pedicle was marked measuring 11cm at its base. The inferior pedicle was de-epithelialized. Good punctate bleeding was noted. The supra- medial and supra-lateral flaps were elevated to the clavicle superiorly, the lateral border of the sternum medially and the mid-axillary line laterally. The medial and lateral skin wedges were excise. The inferior pedicle was debulked.  The incision's were closed using interrupted 3.0 monocryl followed by a running 4.0 monocryl subcuticular suture for the infra-mammary incision. The vertical limb and nipple areolar complex were closed using interrupted 4.0 monocryl followed by a running 4.0 monocryl subcuticular suture. A similar closure was performed on the opposite side

## 2020-11-10 NOTE — TELEPHONE ENCOUNTER
Spoke with patient she states she had breast reduction surgery 2 days ago.  Patient states she wants to know when she should shower and if she can remove bra.  Reviewed discharge instructions with patient.  Per discharge instructions patient can shower in 48 hours and should wear bra except for when getting in shower.  Patient has no further questions at this time.  Advised patient to call back if further assistance is needed.  Patient verbalize understanding.     Reason for Disposition   General information question, no triage required and triager able to answer question    Protocols used: INFORMATION ONLY CALL-A-AH

## 2020-11-11 ENCOUNTER — OFFICE VISIT (OUTPATIENT)
Dept: PLASTIC SURGERY | Facility: CLINIC | Age: 65
End: 2020-11-11
Payer: MEDICARE

## 2020-11-11 VITALS
WEIGHT: 170 LBS | DIASTOLIC BLOOD PRESSURE: 58 MMHG | SYSTOLIC BLOOD PRESSURE: 126 MMHG | BODY MASS INDEX: 30.11 KG/M2 | HEART RATE: 73 BPM

## 2020-11-11 DIAGNOSIS — Z09 SURGERY FOLLOW-UP EXAMINATION: Primary | ICD-10-CM

## 2020-11-11 LAB
FINAL PATHOLOGIC DIAGNOSIS: NORMAL
Lab: NORMAL

## 2020-11-11 PROCEDURE — 99999 PR PBB SHADOW E&M-EST. PATIENT-LVL III: CPT | Mod: PBBFAC,,, | Performed by: PHYSICIAN ASSISTANT

## 2020-11-11 PROCEDURE — 99999 PR PBB SHADOW E&M-EST. PATIENT-LVL III: ICD-10-PCS | Mod: PBBFAC,,, | Performed by: PHYSICIAN ASSISTANT

## 2020-11-11 PROCEDURE — 99024 POSTOP FOLLOW-UP VISIT: CPT | Mod: S$GLB,,, | Performed by: PHYSICIAN ASSISTANT

## 2020-11-11 PROCEDURE — 99024 PR POST-OP FOLLOW-UP VISIT: ICD-10-PCS | Mod: S$GLB,,, | Performed by: PHYSICIAN ASSISTANT

## 2020-11-11 NOTE — PROGRESS NOTES
Silvana Quezada [facility]2020 for a follow up visit status post left breast reduction on 11/05/2020 by Dr. Kirk Tompkins. She is doing well today with no issues since her surgery. She was seen and evaluated by myself and Dr. Kirk Tompkins.  She denies fever, chills, nausea, vomiting or other systemic signs of infection.     Review of patient's allergies indicates:   Allergen Reactions    Adhesive      blisters    Keflex [cephalexin] Rash    Cephalosporins        Current Outpatient Medications on File Prior to Visit   Medication Sig Dispense Refill    alendronate (FOSAMAX) 70 MG tablet TAKE 1 TABLET BY MOUTH EVERY 7 DAYS 24 tablet 0    buPROPion (WELLBUTRIN SR) 100 MG TBSR 12 hr tablet TAKE 2 TABLETS BY MOUTH EVERY MORNING 180 tablet 1    buPROPion (WELLBUTRIN SR) 150 MG TBSR 12 hr tablet Take 1 tablet (150 mg total) by mouth every evening. 90 tablet 1    calcium-vitamin D3 (CALCIUM 500 + D) 500 mg(1,250mg) -200 unit per tablet Take 1 tablet by mouth 2 (two) times daily with meals.      catheter (FEMALE CATHETER) 14 Fr [facility] Patient is to self catheterize four times a day indefinitley using female 12 fr in and out catheter for incomplete bladder emptying.   Dispense 120 month with 11 refills or 360 every 3 months with 3 refills depending on patient's preference 120 each 11    coenzyme Q10 (CO Q-10) 10 mg capsule       cyanocobalamin (VITAMIN B-12) 1000 MCG tablet       fluphenazine (PROLIXIN) 1 MG tablet Take 2 tablets (2 mg total) by mouth nightly. 180 tablet 1    folic acid (FOLVITE) 1 MG tablet Take 1 tablet (1,000 mcg total) by mouth once daily. 90 tablet 1    HYDROcodone-acetaminophen (NORCO) 5-325 mg per tablet Take 1 tablet by mouth every 6 (six) hours as needed for Pain. 20 tablet 0    inhalation spacing device Use as directed for inhalation. (Patient not taking: Reported on 8/25/2020) 1 each 0    OXcarbazepine (TRILEPTAL) 150 MG Tab Take 1 tablet  (150 mg total) by mouth 2 (two) times daily. 180 tablet 1    [] sulfamethoxazole-trimethoprim 800-160mg (BACTRIM DS) 800-160 mg Tab Take 1 tablet by mouth 2 (two) times daily. for 5 days 10 tablet 0     No current facility-administered medications on file prior to visit.        Patient Active Problem List   Diagnosis    MS (multiple sclerosis)    Neurogenic bladder    Urgency-frequency syndrome    History of breast cancer    Localized osteoporosis with current pathological fracture    Incomplete bladder emptying    Overactive bladder    Thoracic aorta atherosclerosis    Bipolar affective disorder    Tortuous aorta    Bipolar I disorder, most recent episode depressed, moderate    Wrist fracture, closed, left, initial encounter    Age-related osteoporosis without current pathological fracture    Malignant (primary) neoplasm, unspecified    Shortness of breath    Urge incontinence    At low risk for fall    Muscular deconditioning    Physical deconditioning    Wrist pain, left    Macromastia       [unfilled]    Social History     Socioeconomic History    Marital status:      Spouse name: Not on file    Number of children: Not on file    Years of education: Not on file    Highest education level: Not on file   Occupational History    Occupation: [employer]   Social Needs    Financial resource strain: Not on file    Food insecurity     Worry: Not on file     Inability: Not on file    Transportation needs     Medical: Not on file     Non-medical: Not on file   Tobacco Use    Smoking status: Never Smoker    Smokeless tobacco: Never Used   Substance and Sexual Activity    Alcohol use: Yes     Alcohol/week: 1.7 standard drinks     Types: 2 Standard drinks or equivalent per week     Comment: occasionally    Drug use: No    Sexual activity: Yes     Partners: Male   Lifestyle    Physical activity     Days per week: Not on file     Minutes per session: Not on file    Stress: Not on  file   Relationships    Social connections     Talks on phone: Not on file     Gets together: Not on file     Attends Church service: Not on file     Active member of club or organization: Not on file     Attends meetings of clubs or organizations: Not on file     Relationship status: Not on file   Other Topics Concern    Are you pregnant or think you may be? Not Asked    Breast-feeding Not Asked   Social History Narrative    Original form , DARRION BAEZ majored in [employer]     [employer]     Lives with      39, 25 children 2 daughter        ROS - negative, other than stated above    PHYSICAL EXAMINATION  Vitals:    11/11/20 1619   BP: (!) 126/58   Pulse: 73     WD WN NAD  VSS  Normal respiratory effort  L breast - incision CDI, no erythema or drainage, nipple viable + sensation    ASSESSMENT/PLAN  65 y.o. F s/p bilateral breast reduction   - Doing well, no issues. Good breast symmetry.  - Left breast drain removed as she reports <15cc/24 hours.  - RTC x 2 week(s), appointment scheduled.      All questions were answered. The patient was advised to call the clinic with any questions or concerns prior to their next visit.         Margarita Avila PA-C  Certified Physician Assistant  Department of Plastic and Reconstructive Surgery  [phone] (office)

## 2020-11-23 ENCOUNTER — OFFICE VISIT (OUTPATIENT)
Dept: PLASTIC SURGERY | Facility: CLINIC | Age: 65
End: 2020-11-23
Payer: MEDICARE

## 2020-11-23 VITALS
HEART RATE: 88 BPM | HEIGHT: 63 IN | SYSTOLIC BLOOD PRESSURE: 108 MMHG | WEIGHT: 170 LBS | DIASTOLIC BLOOD PRESSURE: 79 MMHG | BODY MASS INDEX: 30.12 KG/M2

## 2020-11-23 DIAGNOSIS — Z09 SURGERY FOLLOW-UP EXAMINATION: Primary | ICD-10-CM

## 2020-11-23 PROCEDURE — 3288F PR FALLS RISK ASSESSMENT DOCUMENTED: ICD-10-PCS | Mod: CPTII,S$GLB,, | Performed by: PHYSICIAN ASSISTANT

## 2020-11-23 PROCEDURE — 3008F BODY MASS INDEX DOCD: CPT | Mod: CPTII,S$GLB,, | Performed by: PHYSICIAN ASSISTANT

## 2020-11-23 PROCEDURE — 3288F FALL RISK ASSESSMENT DOCD: CPT | Mod: CPTII,S$GLB,, | Performed by: PHYSICIAN ASSISTANT

## 2020-11-23 PROCEDURE — 1126F PR PAIN SEVERITY QUANTIFIED, NO PAIN PRESENT: ICD-10-PCS | Mod: S$GLB,,, | Performed by: PHYSICIAN ASSISTANT

## 2020-11-23 PROCEDURE — 1101F PR PT FALLS ASSESS DOC 0-1 FALLS W/OUT INJ PAST YR: ICD-10-PCS | Mod: CPTII,S$GLB,, | Performed by: PHYSICIAN ASSISTANT

## 2020-11-23 PROCEDURE — 1101F PT FALLS ASSESS-DOCD LE1/YR: CPT | Mod: CPTII,S$GLB,, | Performed by: PHYSICIAN ASSISTANT

## 2020-11-23 PROCEDURE — 99999 PR PBB SHADOW E&M-EST. PATIENT-LVL III: CPT | Mod: PBBFAC,,, | Performed by: PHYSICIAN ASSISTANT

## 2020-11-23 PROCEDURE — 99999 PR PBB SHADOW E&M-EST. PATIENT-LVL III: ICD-10-PCS | Mod: PBBFAC,,, | Performed by: PHYSICIAN ASSISTANT

## 2020-11-23 PROCEDURE — 99024 POSTOP FOLLOW-UP VISIT: CPT | Mod: S$GLB,,, | Performed by: PHYSICIAN ASSISTANT

## 2020-11-23 PROCEDURE — 1126F AMNT PAIN NOTED NONE PRSNT: CPT | Mod: S$GLB,,, | Performed by: PHYSICIAN ASSISTANT

## 2020-11-23 PROCEDURE — 1157F PR ADVANCE CARE PLAN OR EQUIV PRESENT IN MEDICAL RECORD: ICD-10-PCS | Mod: S$GLB,,, | Performed by: PHYSICIAN ASSISTANT

## 2020-11-23 PROCEDURE — 3008F PR BODY MASS INDEX (BMI) DOCUMENTED: ICD-10-PCS | Mod: CPTII,S$GLB,, | Performed by: PHYSICIAN ASSISTANT

## 2020-11-23 PROCEDURE — 1157F ADVNC CARE PLAN IN RCRD: CPT | Mod: S$GLB,,, | Performed by: PHYSICIAN ASSISTANT

## 2020-11-23 PROCEDURE — 99024 PR POST-OP FOLLOW-UP VISIT: ICD-10-PCS | Mod: S$GLB,,, | Performed by: PHYSICIAN ASSISTANT

## 2020-11-23 NOTE — PROGRESS NOTES
Silvana Quezada presents to [facility] on 11/23/2020 for a follow up visit status post left breast reduction on 11/05/2020 by Dr. Kirk Tompkins. She is doing well today with no issues since her last visit at which time the L breast drain was removed. She denies fever, chills, nausea, vomiting or other systemic signs of infection.     Review of patient's allergies indicates:   Allergen Reactions    Adhesive      blisters    Keflex [cephalexin] Rash    Cephalosporins        Current Outpatient Medications on File Prior to Visit   Medication Sig Dispense Refill    alendronate (FOSAMAX) 70 MG tablet TAKE 1 TABLET BY MOUTH EVERY 7 DAYS 24 tablet 0    buPROPion (WELLBUTRIN SR) 100 MG TBSR 12 hr tablet TAKE 2 TABLETS BY MOUTH EVERY MORNING 180 tablet 1    buPROPion (WELLBUTRIN SR) 150 MG TBSR 12 hr tablet Take 1 tablet (150 mg total) by mouth every evening. 90 tablet 1    calcium-vitamin D3 (CALCIUM 500 + D) 500 mg(1,250mg) -200 unit per tablet Take 1 tablet by mouth 2 (two) times daily with meals.      catheter (FEMALE CATHETER) 14 Fr [facility] Patient is to self catheterize four times a day indefinitley using female 12 fr in and out catheter for incomplete bladder emptying.   Dispense 120 month with 11 refills or 360 every 3 months with 3 refills depending on patient's preference 120 each 11    coenzyme Q10 (CO Q-10) 10 mg capsule       cyanocobalamin (VITAMIN B-12) 1000 MCG tablet       fluphenazine (PROLIXIN) 1 MG tablet Take 2 tablets (2 mg total) by mouth nightly. 180 tablet 1    folic acid (FOLVITE) 1 MG tablet Take 1 tablet (1,000 mcg total) by mouth once daily. 90 tablet 1    HYDROcodone-acetaminophen (NORCO) 5-325 mg per tablet Take 1 tablet by mouth every 6 (six) hours as needed for Pain. 20 tablet 0    inhalation spacing device Use as directed for inhalation. (Patient not taking: Reported on 8/25/2020) 1 each 0    OXcarbazepine (TRILEPTAL) 150 MG Tab Take 1 tablet (150 mg total) by  mouth 2 (two) times daily. 180 tablet 1     No current facility-administered medications on file prior to visit.        Patient Active Problem List   Diagnosis    MS (multiple sclerosis)    Neurogenic bladder    Urgency-frequency syndrome    History of breast cancer    Localized osteoporosis with current pathological fracture    Incomplete bladder emptying    Overactive bladder    Thoracic aorta atherosclerosis    Bipolar affective disorder    Tortuous aorta    Bipolar I disorder, most recent episode depressed, moderate    Wrist fracture, closed, left, initial encounter    Age-related osteoporosis without current pathological fracture    Malignant (primary) neoplasm, unspecified    Shortness of breath    Urge incontinence    At low risk for fall    Muscular deconditioning    Physical deconditioning    Wrist pain, left    Macromastia       Past Surgical History:   Procedure Laterality Date    BRAIN SURGERY  1991    BREAST BIOPSY Left 2009    core    BREAST BIOPSY Right     BREAST LUMPECTOMY Right 2001    CYSTOSCOPY N/A 7/11/2018    Procedure: CYSTOSCOPY;  Surgeon: Jayesh Ross MD;  Location: [address];  Service: Urology;  Laterality: N/A;  30 min    CYSTOSCOPY N/A 4/10/2019    Procedure: CYSTOSCOPY;  Surgeon: Jayesh Ross MD;  Location: [facility] OR [address] [place] [address];  Service: Urology;  Laterality: N/A;  30 MIN    FOOT SURGERY  october 2013    HYSTERECTOMY      INJECTION OF BOTULINUM TOXIN TYPE A N/A 7/11/2018    Procedure: INJECTION, BOTULINUM TOXIN, TYPE A 200 UNITS;  Surgeon: Jayesh Ross MD;  Location: [address];  Service: Urology;  Laterality: N/A;    INJECTION OF BOTULINUM TOXIN TYPE A N/A 4/10/2019    Procedure: INJECTION, BOTULINUM TOXIN, TYPE A 200 UNITS;  Surgeon: Jayesh Ross MD;  Location: [facility] OR [address] [place] [address];  Service: Urology;  Laterality: N/A;    OPEN REDUCTION AND INTERNAL FIXATION (ORIF) OF INJURY OF WRIST Left 12/27/2019    Procedure: ORIF, WRIST;  Surgeon: Albert  DAVID Schroeder Jr., MD;  Location: [facility] OR;  Service: Orthopedics;  Laterality: Left;  ACUMED/ MINI C ARM Palomo Hughes notified/ Rk confirmed here in [facility] with Lacie 12/23/19 KB [id number]    TONSILLECTOMY      TOTAL REDUCTION MAMMOPLASTY Left 11/5/2020    Procedure: MAMMOPLASTY, REDUCTION, LEFT;  Surgeon: Kirk Tompkins MD;  Location: [facility] OR [address] [place] [address];  Service: Plastics;  Laterality: Left;  Left breast tissue weighed- 49 grams.         Social History     Socioeconomic History    Marital status:      Spouse name: Not on file    Number of children: Not on file    Years of education: Not on file    Highest education level: Not on file   Occupational History    Occupation: [employer]   Social Needs    Financial resource strain: Not on file    Food insecurity     Worry: Not on file     Inability: Not on file    Transportation needs     Medical: Not on file     Non-medical: Not on file   Tobacco Use    Smoking status: Never Smoker    Smokeless tobacco: Never Used   Substance and Sexual Activity    Alcohol use: Yes     Alcohol/week: 1.7 standard drinks     Types: 2 Standard drinks or equivalent per week     Comment: occasionally    Drug use: No    Sexual activity: Yes     Partners: Male   Lifestyle    Physical activity     Days per week: Not on file     Minutes per session: Not on file    Stress: Not on file   Relationships    Social connections     Talks on phone: Not on file     Gets together: Not on file     Attends Faith service: Not on file     Active member of club or organization: Not on file     Attends meetings of clubs or organizations: Not on file     Relationship status: Not on file   Other Topics Concern    Are you pregnant or think you may be? 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, 25 children 2 daughter        ROS - negative, other than  stated above    PHYSICAL EXAMINATION  Vitals:    11/23/20 1102   BP: 108/79   Pulse: 88     WD WN NAD  VSS  Normal respiratory effort  L breast - (incisional tape removed) incision CDI, no erythema or drainage, nipple viable + sensation    ASSESSMENT/PLAN  65 y.o. F s/p bilateral breast reduction   - Doing well, no issues. Good breast symmetry.  - Surgical tape removed, Monocryl loops were clipped  - RTC x 4 week(s), appointment scheduled.      All questions were answered. The patient was advised to call the clinic with any questions or concerns prior to their next visit.         Margarita Avila PA-C  Certified Physician Assistant  Department of Plastic and Reconstructive Surgery  [phone] (office)

## 2020-12-15 ENCOUNTER — PATIENT OUTREACH (OUTPATIENT)
Dept: ADMINISTRATIVE | Facility: OTHER | Age: 65
End: 2020-12-15

## 2020-12-15 DIAGNOSIS — Z12.11 ENCOUNTER FOR FIT (FECAL IMMUNOCHEMICAL TEST) SCREENING: Primary | ICD-10-CM

## 2020-12-15 NOTE — PROGRESS NOTES
Updates were requested from care everywhere.  Chart was reviewed for overdue Proactive [facility] Encounters (VIRIDIANA) topics (CRS, Breast Cancer Screening, Eye exam)  Health Maintenance has been updated.  LINKS not responding.  Order placed for FIT kit.

## 2020-12-17 ENCOUNTER — OFFICE VISIT (OUTPATIENT)
Dept: ORTHOPEDICS | Facility: CLINIC | Age: 65
End: 2020-12-17
Payer: MEDICARE

## 2020-12-17 VITALS — BODY MASS INDEX: 30.12 KG/M2 | HEIGHT: 63 IN | WEIGHT: 170 LBS

## 2020-12-17 DIAGNOSIS — S52.502D CLOSED FRACTURE OF DISTAL END OF LEFT RADIUS WITH ROUTINE HEALING, UNSPECIFIED FRACTURE MORPHOLOGY, SUBSEQUENT ENCOUNTER: Primary | ICD-10-CM

## 2020-12-17 PROCEDURE — 1159F MED LIST DOCD IN RCRD: CPT | Mod: S$GLB,,, | Performed by: ORTHOPAEDIC SURGERY

## 2020-12-17 PROCEDURE — 99212 PR OFFICE/OUTPT VISIT, EST, LEVL II, 10-19 MIN: ICD-10-PCS | Mod: S$GLB,,, | Performed by: ORTHOPAEDIC SURGERY

## 2020-12-17 PROCEDURE — 3288F PR FALLS RISK ASSESSMENT DOCUMENTED: ICD-10-PCS | Mod: CPTII,S$GLB,, | Performed by: ORTHOPAEDIC SURGERY

## 2020-12-17 PROCEDURE — 99999 PR PBB SHADOW E&M-EST. PATIENT-LVL III: ICD-10-PCS | Mod: PBBFAC,,, | Performed by: ORTHOPAEDIC SURGERY

## 2020-12-17 PROCEDURE — 99212 OFFICE O/P EST SF 10 MIN: CPT | Mod: S$GLB,,, | Performed by: ORTHOPAEDIC SURGERY

## 2020-12-17 PROCEDURE — 1101F PR PT FALLS ASSESS DOC 0-1 FALLS W/OUT INJ PAST YR: ICD-10-PCS | Mod: CPTII,S$GLB,, | Performed by: ORTHOPAEDIC SURGERY

## 2020-12-17 PROCEDURE — 1159F PR MEDICATION LIST DOCUMENTED IN MEDICAL RECORD: ICD-10-PCS | Mod: S$GLB,,, | Performed by: ORTHOPAEDIC SURGERY

## 2020-12-17 PROCEDURE — 99999 PR PBB SHADOW E&M-EST. PATIENT-LVL III: CPT | Mod: PBBFAC,,, | Performed by: ORTHOPAEDIC SURGERY

## 2020-12-17 PROCEDURE — 1101F PT FALLS ASSESS-DOCD LE1/YR: CPT | Mod: CPTII,S$GLB,, | Performed by: ORTHOPAEDIC SURGERY

## 2020-12-17 PROCEDURE — 1126F PR PAIN SEVERITY QUANTIFIED, NO PAIN PRESENT: ICD-10-PCS | Mod: S$GLB,,, | Performed by: ORTHOPAEDIC SURGERY

## 2020-12-17 PROCEDURE — 1157F ADVNC CARE PLAN IN RCRD: CPT | Mod: S$GLB,,, | Performed by: ORTHOPAEDIC SURGERY

## 2020-12-17 PROCEDURE — 1126F AMNT PAIN NOTED NONE PRSNT: CPT | Mod: S$GLB,,, | Performed by: ORTHOPAEDIC SURGERY

## 2020-12-17 PROCEDURE — 1157F PR ADVANCE CARE PLAN OR EQUIV PRESENT IN MEDICAL RECORD: ICD-10-PCS | Mod: S$GLB,,, | Performed by: ORTHOPAEDIC SURGERY

## 2020-12-17 PROCEDURE — 3288F FALL RISK ASSESSMENT DOCD: CPT | Mod: CPTII,S$GLB,, | Performed by: ORTHOPAEDIC SURGERY

## 2020-12-17 PROCEDURE — 3008F PR BODY MASS INDEX (BMI) DOCUMENTED: ICD-10-PCS | Mod: CPTII,S$GLB,, | Performed by: ORTHOPAEDIC SURGERY

## 2020-12-17 PROCEDURE — 3008F BODY MASS INDEX DOCD: CPT | Mod: CPTII,S$GLB,, | Performed by: ORTHOPAEDIC SURGERY

## 2020-12-17 NOTE — PROGRESS NOTES
Subjective:      Patient ID: Silvana Quezada is a 65 y.o. female.    Chief Complaint: Pain of the Left Wrist and Follow-up      HPI: Silvana Quezada is here in follow-up left wrist pain s/p ORIF distal radius fracture with residual ulnar instability. She was last seen 3 months ago by Dr. Schroeder at which time she was experiencing  intermittent pain and popping sensation. She was treated with bracing and home therapy. Dr. Schroeder previously considered Darrach procedure. Today, she denies multiple times any pain or popping. She has returned to all ADLs without difficulty. She only notices she cannot abduct the small finger as far as she used to while playing the piano;     Past Medical History:   Diagnosis Date    Bipolar 1 disorder     Breast cancer 2009    right-radiation & chemo    Breast cyst     Closed fracture of proximal end of right humerus 3/9/2015    Depression     History of right shoulder fracture     MS (multiple sclerosis)     presented as dizzines, dx vision,     Osteoporosis, unspecified     Pneumonia 3/27/2020       Current Outpatient Medications:     alendronate (FOSAMAX) 70 MG tablet, TAKE 1 TABLET BY MOUTH EVERY 7 DAYS, Disp: 24 tablet, Rfl: 0    buPROPion (WELLBUTRIN SR) 100 MG TBSR 12 hr tablet, TAKE 2 TABLETS BY MOUTH EVERY MORNING, Disp: 180 tablet, Rfl: 1    buPROPion (WELLBUTRIN SR) 150 MG TBSR 12 hr tablet, Take 1 tablet (150 mg total) by mouth every evening., Disp: 90 tablet, Rfl: 1    calcium-vitamin D3 (CALCIUM 500 + D) 500 mg(1,250mg) -200 unit per tablet, Take 1 tablet by mouth 2 (two) times daily with meals., Disp: , Rfl:     catheter (FEMALE CATHETER) 14 Fr [facility], Patient is to self catheterize four times a day indefinitley using female 12 fr in and out catheter for incomplete bladder emptying.  Dispense 120 month with 11 refills or 360 every 3 months with 3 refills depending on patient's preference, Disp: 120 each, Rfl: 11    coenzyme Q10 (CO Q-10) 10 mg capsule, , Disp: ,  "Rfl:     cyanocobalamin (VITAMIN B-12) 1000 MCG tablet, , Disp: , Rfl:     fluphenazine (PROLIXIN) 1 MG tablet, Take 2 tablets (2 mg total) by mouth nightly., Disp: 180 tablet, Rfl: 1    folic acid (FOLVITE) 1 MG tablet, Take 1 tablet (1,000 mcg total) by mouth once daily., Disp: 90 tablet, Rfl: 1    OXcarbazepine (TRILEPTAL) 150 MG Tab, Take 1 tablet (150 mg total) by mouth 2 (two) times daily., Disp: 180 tablet, Rfl: 1  Review of patient's allergies indicates:   Allergen Reactions    Adhesive      blisters    Keflex [cephalexin] Rash    Cephalosporins        Ht 5' 3" (1.6 m)   Wt 77.1 kg (169 lb 15.6 oz)   LMP  (LMP Unknown)   BMI 30.11 kg/m²     Review of Systems   Constitution: Negative for chills and fever.   Cardiovascular: Negative for chest pain and palpitations.   Respiratory: Negative for shortness of breath and wheezing.    Skin: Negative for poor wound healing and rash.   Musculoskeletal: Negative for joint pain and joint swelling (bony promence ulna).   Gastrointestinal: Negative for nausea and vomiting.   Genitourinary: Negative for dysuria and hematuria.   Neurological: Negative for seizures and tremors.   Psychiatric/Behavioral: Negative for altered mental status.   Allergic/Immunologic: Negative for environmental allergies and persistent infections.         Objective:    Ortho Exam       Left hand: Prominent ulna noted. No TTP. NO clicking on exam. Pronation/supination full. ROM fingers full. Senesation intact. Pulses present.   GEN: Well developed, well nourished female. AAOX3. No acute distress.   Normocephalic, atraumatic.   DG  Breathing unlabored.  Mood and affect appropriate.     Assessment:     Imaging: No new        1. Closed fracture of distal end of left radius with routine healing, unspecified fracture morphology, subsequent encounter          Plan:           No complaints today  Continue Activity as tolerated.   If sx return  RTC for surgical consideration.     No follow-ups " on file.

## 2020-12-21 ENCOUNTER — TELEPHONE (OUTPATIENT)
Dept: UROLOGY | Facility: CLINIC | Age: 65
End: 2020-12-21

## 2020-12-21 DIAGNOSIS — N32.81 OVERACTIVE BLADDER: ICD-10-CM

## 2020-12-21 DIAGNOSIS — N39.41 URGE INCONTINENCE: Primary | ICD-10-CM

## 2020-12-21 DIAGNOSIS — Z01.818 PREOP EXAMINATION: ICD-10-CM

## 2020-12-21 DIAGNOSIS — N30.00 ACUTE CYSTITIS WITHOUT HEMATURIA: ICD-10-CM

## 2020-12-21 RX ORDER — SULFAMETHOXAZOLE AND TRIMETHOPRIM 800; 160 MG/1; MG/1
1 TABLET ORAL 2 TIMES DAILY
Qty: 14 TABLET | Refills: 0 | Status: SHIPPED | OUTPATIENT
Start: 2020-12-21 | End: 2020-12-29

## 2020-12-21 NOTE — TELEPHONE ENCOUNTER
Spoke with Ms Joness about submitting a urine culture on 12/28 and also starting her Bactrim 5 days prior to her procedure, she voiced understanding.

## 2020-12-21 NOTE — TELEPHONE ENCOUNTER
Urge incontinence  -     Case Request Operating Room: CYSTOSCOPY,WITH BOTULINUM TOXIN INJECTION 200 UNITS  -     COVID-19 Routine Screening; Future; Expected date: 01/04/2021    Overactive bladder  -     Case Request Operating Room: CYSTOSCOPY,WITH BOTULINUM TOXIN INJECTION 200 UNITS  -     COVID-19 Routine Screening; Future; Expected date: 01/04/2021    Preop examination  -     COVID-19 Routine Screening; Future; Expected date: 01/04/2021    Acute cystitis without hematuria  -     Urine culture; Future; Expected date: 12/28/2020  -     sulfamethoxazole-trimethoprim 800-160mg (BACTRIM DS) 800-160 mg Tab; Take 1 tablet by mouth 2 (two) times daily. for 7 days  Dispense: 14 tablet; Refill: 0    please have her do a home collect urine culture on 12/28/20.  Then start Bactrim 5 days before her planned surgery.

## 2020-12-23 ENCOUNTER — OFFICE VISIT (OUTPATIENT)
Dept: PLASTIC SURGERY | Facility: CLINIC | Age: 65
End: 2020-12-23
Payer: MEDICARE

## 2020-12-23 VITALS
HEART RATE: 83 BPM | TEMPERATURE: 97 F | WEIGHT: 169 LBS | SYSTOLIC BLOOD PRESSURE: 122 MMHG | BODY MASS INDEX: 29.95 KG/M2 | HEIGHT: 63 IN | DIASTOLIC BLOOD PRESSURE: 59 MMHG

## 2020-12-23 DIAGNOSIS — Z09 SURGERY FOLLOW-UP EXAMINATION: Primary | ICD-10-CM

## 2020-12-23 PROCEDURE — 1101F PT FALLS ASSESS-DOCD LE1/YR: CPT | Mod: CPTII,S$GLB,, | Performed by: SURGERY

## 2020-12-23 PROCEDURE — 1126F PR PAIN SEVERITY QUANTIFIED, NO PAIN PRESENT: ICD-10-PCS | Mod: S$GLB,,, | Performed by: SURGERY

## 2020-12-23 PROCEDURE — 99024 PR POST-OP FOLLOW-UP VISIT: ICD-10-PCS | Mod: S$GLB,,, | Performed by: SURGERY

## 2020-12-23 PROCEDURE — 1101F PR PT FALLS ASSESS DOC 0-1 FALLS W/OUT INJ PAST YR: ICD-10-PCS | Mod: CPTII,S$GLB,, | Performed by: SURGERY

## 2020-12-23 PROCEDURE — 1126F AMNT PAIN NOTED NONE PRSNT: CPT | Mod: S$GLB,,, | Performed by: SURGERY

## 2020-12-23 PROCEDURE — 3288F PR FALLS RISK ASSESSMENT DOCUMENTED: ICD-10-PCS | Mod: CPTII,S$GLB,, | Performed by: SURGERY

## 2020-12-23 PROCEDURE — 1157F PR ADVANCE CARE PLAN OR EQUIV PRESENT IN MEDICAL RECORD: ICD-10-PCS | Mod: S$GLB,,, | Performed by: SURGERY

## 2020-12-23 PROCEDURE — 99999 PR PBB SHADOW E&M-EST. PATIENT-LVL III: CPT | Mod: PBBFAC,,, | Performed by: SURGERY

## 2020-12-23 PROCEDURE — 99024 POSTOP FOLLOW-UP VISIT: CPT | Mod: S$GLB,,, | Performed by: SURGERY

## 2020-12-23 PROCEDURE — 1157F ADVNC CARE PLAN IN RCRD: CPT | Mod: S$GLB,,, | Performed by: SURGERY

## 2020-12-23 PROCEDURE — 3288F FALL RISK ASSESSMENT DOCD: CPT | Mod: CPTII,S$GLB,, | Performed by: SURGERY

## 2020-12-23 PROCEDURE — 3008F BODY MASS INDEX DOCD: CPT | Mod: CPTII,S$GLB,, | Performed by: SURGERY

## 2020-12-23 PROCEDURE — 99999 PR PBB SHADOW E&M-EST. PATIENT-LVL III: ICD-10-PCS | Mod: PBBFAC,,, | Performed by: SURGERY

## 2020-12-23 PROCEDURE — 3008F PR BODY MASS INDEX (BMI) DOCUMENTED: ICD-10-PCS | Mod: CPTII,S$GLB,, | Performed by: SURGERY

## 2020-12-29 ENCOUNTER — TELEPHONE (OUTPATIENT)
Dept: PREADMISSION TESTING | Facility: HOSPITAL | Age: 65
End: 2020-12-29

## 2020-12-29 NOTE — TELEPHONE ENCOUNTER
----- Message from Landy Noble RN sent at 12/29/2020 11:12 AM CST -----  Needs bmp at [facility]

## 2021-01-04 ENCOUNTER — LAB VISIT (OUTPATIENT)
Dept: LAB | Facility: HOSPITAL | Age: 66
End: 2021-01-04
Attending: ANESTHESIOLOGY
Payer: MEDICARE

## 2021-01-04 DIAGNOSIS — N30.00 ACUTE CYSTITIS WITHOUT HEMATURIA: Primary | ICD-10-CM

## 2021-01-04 DIAGNOSIS — Z01.818 PREOP TESTING: ICD-10-CM

## 2021-01-04 LAB
ANION GAP SERPL CALC-SCNC: 9 MMOL/L (ref 8–16)
BUN SERPL-MCNC: 18 MG/DL (ref 8–23)
CALCIUM SERPL-MCNC: 9.6 MG/DL (ref 8.7–10.5)
CHLORIDE SERPL-SCNC: 105 MMOL/L (ref 95–110)
CO2 SERPL-SCNC: 28 MMOL/L (ref 23–29)
CREAT SERPL-MCNC: 0.9 MG/DL (ref 0.5–1.4)
EST. GFR  (AFRICAN AMERICAN): >60 ML/MIN/1.73 M^2
EST. GFR  (NON AFRICAN AMERICAN): >60 ML/MIN/1.73 M^2
GLUCOSE SERPL-MCNC: 86 MG/DL (ref 70–110)
POTASSIUM SERPL-SCNC: 4.1 MMOL/L (ref 3.5–5.1)
SODIUM SERPL-SCNC: 142 MMOL/L (ref 136–145)

## 2021-01-04 PROCEDURE — 36415 COLL VENOUS BLD VENIPUNCTURE: CPT | Mod: PO

## 2021-01-04 PROCEDURE — 80048 BASIC METABOLIC PNL TOTAL CA: CPT

## 2021-01-04 RX ORDER — CIPROFLOXACIN 500 MG/1
500 TABLET ORAL 2 TIMES DAILY
Qty: 14 TABLET | Refills: 0 | Status: SHIPPED | OUTPATIENT
Start: 2021-01-04 | End: 2021-01-11

## 2021-01-05 ENCOUNTER — ANESTHESIA EVENT (OUTPATIENT)
Dept: SURGERY | Facility: HOSPITAL | Age: 66
End: 2021-01-05
Payer: MEDICARE

## 2021-01-05 ENCOUNTER — TELEPHONE (OUTPATIENT)
Dept: UROLOGY | Facility: CLINIC | Age: 66
End: 2021-01-05

## 2021-01-05 ENCOUNTER — PATIENT MESSAGE (OUTPATIENT)
Dept: ADMINISTRATIVE | Facility: HOSPITAL | Age: 66
End: 2021-01-05

## 2021-01-06 ENCOUNTER — HOSPITAL ENCOUNTER (OUTPATIENT)
Facility: HOSPITAL | Age: 66
Discharge: HOME | End: 2021-01-06
Attending: UROLOGY | Admitting: UROLOGY
Payer: MEDICARE

## 2021-01-06 ENCOUNTER — ANESTHESIA (OUTPATIENT)
Dept: SURGERY | Facility: HOSPITAL | Age: 66
End: 2021-01-06
Payer: MEDICARE

## 2021-01-06 VITALS
TEMPERATURE: 98 F | SYSTOLIC BLOOD PRESSURE: 141 MMHG | DIASTOLIC BLOOD PRESSURE: 84 MMHG | BODY MASS INDEX: 29.95 KG/M2 | OXYGEN SATURATION: 97 % | RESPIRATION RATE: 20 BRPM | HEIGHT: 63 IN | HEART RATE: 66 BPM | WEIGHT: 169 LBS

## 2021-01-06 DIAGNOSIS — N31.9 NEUROGENIC BLADDER: Primary | ICD-10-CM

## 2021-01-06 PROCEDURE — D9220A PRA ANESTHESIA: Mod: ANES,,, | Performed by: ANESTHESIOLOGY

## 2021-01-06 PROCEDURE — 25000003 PHARM REV CODE 250: Performed by: NURSE ANESTHETIST, CERTIFIED REGISTERED

## 2021-01-06 PROCEDURE — 52287 CYSTOSCOPY CHEMODENERVATION: CPT | Mod: ,,, | Performed by: UROLOGY

## 2021-01-06 PROCEDURE — D9220A PRA ANESTHESIA: ICD-10-PCS | Mod: CRNA,,, | Performed by: NURSE ANESTHETIST, CERTIFIED REGISTERED

## 2021-01-06 PROCEDURE — 63600175 PHARM REV CODE 636 W HCPCS: Mod: JG | Performed by: UROLOGY

## 2021-01-06 PROCEDURE — 63600175 PHARM REV CODE 636 W HCPCS: Performed by: STUDENT IN AN ORGANIZED HEALTH CARE EDUCATION/TRAINING PROGRAM

## 2021-01-06 PROCEDURE — 36000707: Performed by: UROLOGY

## 2021-01-06 PROCEDURE — 63600175 PHARM REV CODE 636 W HCPCS: Performed by: NURSE ANESTHETIST, CERTIFIED REGISTERED

## 2021-01-06 PROCEDURE — D9220A PRA ANESTHESIA: Mod: CRNA,,, | Performed by: NURSE ANESTHETIST, CERTIFIED REGISTERED

## 2021-01-06 PROCEDURE — D9220A PRA ANESTHESIA: ICD-10-PCS | Mod: ANES,,, | Performed by: ANESTHESIOLOGY

## 2021-01-06 PROCEDURE — 52287 PR CYSTOURETHROSCOPY WITH INJ FOR CHEMODENERVATION: ICD-10-PCS | Mod: ,,, | Performed by: UROLOGY

## 2021-01-06 PROCEDURE — 71000015 HC POSTOP RECOV 1ST HR: Performed by: UROLOGY

## 2021-01-06 PROCEDURE — 37000009 HC ANESTHESIA EA ADD 15 MINS: Performed by: UROLOGY

## 2021-01-06 PROCEDURE — 36000706: Performed by: UROLOGY

## 2021-01-06 PROCEDURE — 71000044 HC DOSC ROUTINE RECOVERY FIRST HOUR: Performed by: UROLOGY

## 2021-01-06 PROCEDURE — 37000008 HC ANESTHESIA 1ST 15 MINUTES: Performed by: UROLOGY

## 2021-01-06 RX ORDER — HYDROMORPHONE HYDROCHLORIDE 1 MG/ML
0.2 INJECTION, SOLUTION INTRAMUSCULAR; INTRAVENOUS; SUBCUTANEOUS EVERY 5 MIN PRN
Status: DISCONTINUED | OUTPATIENT
Start: 2021-01-06 | End: 2021-01-06 | Stop reason: HOSPADM

## 2021-01-06 RX ORDER — PROPOFOL 10 MG/ML
VIAL (ML) INTRAVENOUS
Status: DISCONTINUED | OUTPATIENT
Start: 2021-01-06 | End: 2021-01-06

## 2021-01-06 RX ORDER — LIDOCAINE HYDROCHLORIDE 20 MG/ML
INJECTION INTRAVENOUS
Status: DISCONTINUED | OUTPATIENT
Start: 2021-01-06 | End: 2021-01-06

## 2021-01-06 RX ORDER — SODIUM CHLORIDE 0.9 % (FLUSH) 0.9 %
10 SYRINGE (ML) INJECTION
Status: DISCONTINUED | OUTPATIENT
Start: 2021-01-06 | End: 2021-01-06 | Stop reason: HOSPADM

## 2021-01-06 RX ORDER — CIPROFLOXACIN 2 MG/ML
400 INJECTION, SOLUTION INTRAVENOUS
Status: COMPLETED | OUTPATIENT
Start: 2021-01-06 | End: 2021-01-06

## 2021-01-06 RX ORDER — MIDAZOLAM HYDROCHLORIDE 1 MG/ML
INJECTION, SOLUTION INTRAMUSCULAR; INTRAVENOUS
Status: DISCONTINUED | OUTPATIENT
Start: 2021-01-06 | End: 2021-01-06

## 2021-01-06 RX ORDER — PROPOFOL 10 MG/ML
VIAL (ML) INTRAVENOUS CONTINUOUS PRN
Status: DISCONTINUED | OUTPATIENT
Start: 2021-01-06 | End: 2021-01-06

## 2021-01-06 RX ADMIN — SODIUM CHLORIDE: 9 INJECTION, SOLUTION INTRAVENOUS at 12:01

## 2021-01-06 RX ADMIN — CIPROFLOXACIN 400 MG: 2 INJECTION, SOLUTION INTRAVENOUS at 12:01

## 2021-01-06 RX ADMIN — PROPOFOL 100 MCG/KG/MIN: 10 INJECTION, EMULSION INTRAVENOUS at 12:01

## 2021-01-06 RX ADMIN — MIDAZOLAM HYDROCHLORIDE 2 MG: 1 INJECTION, SOLUTION INTRAMUSCULAR; INTRAVENOUS at 12:01

## 2021-01-06 RX ADMIN — PROPOFOL 30 MG: 10 INJECTION, EMULSION INTRAVENOUS at 12:01

## 2021-01-06 RX ADMIN — LIDOCAINE HYDROCHLORIDE 60 MG: 20 INJECTION, SOLUTION INTRAVENOUS at 12:01

## 2021-02-05 ENCOUNTER — PATIENT MESSAGE (OUTPATIENT)
Dept: NEUROLOGY | Facility: CLINIC | Age: 66
End: 2021-02-05

## 2021-04-05 ENCOUNTER — PATIENT MESSAGE (OUTPATIENT)
Dept: ADMINISTRATIVE | Facility: HOSPITAL | Age: 66
End: 2021-04-05

## 2021-04-07 ENCOUNTER — OFFICE VISIT (OUTPATIENT)
Dept: PSYCHIATRY | Facility: CLINIC | Age: 66
End: 2021-04-07
Payer: MEDICARE

## 2021-04-07 VITALS
SYSTOLIC BLOOD PRESSURE: 108 MMHG | DIASTOLIC BLOOD PRESSURE: 73 MMHG | HEIGHT: 63 IN | HEART RATE: 84 BPM | WEIGHT: 172.31 LBS | BODY MASS INDEX: 30.53 KG/M2

## 2021-04-07 DIAGNOSIS — F31.9 BIPOLAR AFFECTIVE DISORDER, REMISSION STATUS UNSPECIFIED: ICD-10-CM

## 2021-04-07 DIAGNOSIS — F31.75 BIPOLAR I DISORDER, MOST RECENT EPISODE DEPRESSED, IN PARTIAL REMISSION: Primary | ICD-10-CM

## 2021-04-07 PROCEDURE — 99213 OFFICE O/P EST LOW 20 MIN: CPT | Mod: S$GLB,,, | Performed by: PSYCHIATRY & NEUROLOGY

## 2021-04-07 PROCEDURE — 3008F BODY MASS INDEX DOCD: CPT | Mod: CPTII,S$GLB,, | Performed by: PSYCHIATRY & NEUROLOGY

## 2021-04-07 PROCEDURE — 90833 PR PSYCHOTHERAPY W/PATIENT W/E&M, 30 MIN (ADD ON): ICD-10-PCS | Mod: S$GLB,,, | Performed by: PSYCHIATRY & NEUROLOGY

## 2021-04-07 PROCEDURE — 99999 PR PBB SHADOW E&M-EST. PATIENT-LVL III: ICD-10-PCS | Mod: PBBFAC,,, | Performed by: PSYCHIATRY & NEUROLOGY

## 2021-04-07 PROCEDURE — 90833 PSYTX W PT W E/M 30 MIN: CPT | Mod: S$GLB,,, | Performed by: PSYCHIATRY & NEUROLOGY

## 2021-04-07 PROCEDURE — 99213 PR OFFICE/OUTPT VISIT, EST, LEVL III, 20-29 MIN: ICD-10-PCS | Mod: S$GLB,,, | Performed by: PSYCHIATRY & NEUROLOGY

## 2021-04-07 PROCEDURE — 99499 UNLISTED E&M SERVICE: CPT | Mod: S$GLB,,, | Performed by: PSYCHIATRY & NEUROLOGY

## 2021-04-07 PROCEDURE — 3008F PR BODY MASS INDEX (BMI) DOCUMENTED: ICD-10-PCS | Mod: CPTII,S$GLB,, | Performed by: PSYCHIATRY & NEUROLOGY

## 2021-04-07 PROCEDURE — 99499 RISK ADDL DX/OHS AUDIT: ICD-10-PCS | Mod: S$GLB,,, | Performed by: PSYCHIATRY & NEUROLOGY

## 2021-04-07 PROCEDURE — 1159F MED LIST DOCD IN RCRD: CPT | Mod: S$GLB,,, | Performed by: PSYCHIATRY & NEUROLOGY

## 2021-04-07 PROCEDURE — 1157F ADVNC CARE PLAN IN RCRD: CPT | Mod: S$GLB,,, | Performed by: PSYCHIATRY & NEUROLOGY

## 2021-04-07 PROCEDURE — 1157F PR ADVANCE CARE PLAN OR EQUIV PRESENT IN MEDICAL RECORD: ICD-10-PCS | Mod: S$GLB,,, | Performed by: PSYCHIATRY & NEUROLOGY

## 2021-04-07 PROCEDURE — 1159F PR MEDICATION LIST DOCUMENTED IN MEDICAL RECORD: ICD-10-PCS | Mod: S$GLB,,, | Performed by: PSYCHIATRY & NEUROLOGY

## 2021-04-07 PROCEDURE — 99999 PR PBB SHADOW E&M-EST. PATIENT-LVL III: CPT | Mod: PBBFAC,,, | Performed by: PSYCHIATRY & NEUROLOGY

## 2021-04-07 RX ORDER — BUPROPION HYDROCHLORIDE 150 MG/1
150 TABLET, EXTENDED RELEASE ORAL NIGHTLY
Qty: 90 TABLET | Refills: 1 | Status: SHIPPED | OUTPATIENT
Start: 2021-04-07 | End: 2022-03-03

## 2021-04-07 RX ORDER — FLUPHENAZINE HYDROCHLORIDE 1 MG/1
2 TABLET ORAL NIGHTLY
Qty: 180 TABLET | Refills: 1 | Status: SHIPPED | OUTPATIENT
Start: 2021-04-07 | End: 2022-02-07

## 2021-04-07 RX ORDER — OXCARBAZEPINE 150 MG/1
150 TABLET, FILM COATED ORAL 2 TIMES DAILY
Qty: 180 TABLET | Refills: 1 | Status: SHIPPED | OUTPATIENT
Start: 2021-04-07 | End: 2021-06-08

## 2021-04-07 RX ORDER — FOLIC ACID 1 MG/1
1000 TABLET ORAL DAILY
Qty: 90 TABLET | Refills: 1 | Status: SHIPPED | OUTPATIENT
Start: 2021-04-07 | End: 2021-12-22 | Stop reason: SDUPTHER

## 2021-04-07 RX ORDER — BUPROPION HYDROCHLORIDE 100 MG/1
200 TABLET, EXTENDED RELEASE ORAL EVERY MORNING
Qty: 180 TABLET | Refills: 1 | Status: SHIPPED | OUTPATIENT
Start: 2021-04-07 | End: 2022-01-10

## 2021-04-09 ENCOUNTER — HOSPITAL ENCOUNTER (EMERGENCY)
Facility: HOSPITAL | Age: 66
Discharge: HOME | End: 2021-04-09
Attending: EMERGENCY MEDICINE
Payer: MEDICARE

## 2021-04-09 ENCOUNTER — NURSE TRIAGE (OUTPATIENT)
Dept: ADMINISTRATIVE | Facility: CLINIC | Age: 66
End: 2021-04-09

## 2021-04-09 VITALS
TEMPERATURE: 99 F | WEIGHT: 168 LBS | RESPIRATION RATE: 20 BRPM | DIASTOLIC BLOOD PRESSURE: 75 MMHG | OXYGEN SATURATION: 98 % | BODY MASS INDEX: 29.77 KG/M2 | SYSTOLIC BLOOD PRESSURE: 132 MMHG | HEIGHT: 63 IN | HEART RATE: 111 BPM

## 2021-04-09 DIAGNOSIS — E86.0 DEHYDRATION: Primary | ICD-10-CM

## 2021-04-09 DIAGNOSIS — R11.2 NAUSEA & VOMITING: ICD-10-CM

## 2021-04-09 LAB
ALBUMIN SERPL BCP-MCNC: 4 G/DL (ref 3.5–5.2)
ALP SERPL-CCNC: 62 U/L (ref 55–135)
ALT SERPL W/O P-5'-P-CCNC: 20 U/L (ref 10–44)
ANION GAP SERPL CALC-SCNC: 11 MMOL/L (ref 8–16)
AST SERPL-CCNC: 23 U/L (ref 10–40)
BACTERIA #/AREA URNS HPF: NORMAL /HPF
BILIRUB SERPL-MCNC: 0.5 MG/DL (ref 0.1–1)
BILIRUB UR QL STRIP: NEGATIVE
BUN SERPL-MCNC: 15 MG/DL (ref 8–23)
CALCIUM SERPL-MCNC: 8.7 MG/DL (ref 8.7–10.5)
CHLORIDE SERPL-SCNC: 105 MMOL/L (ref 95–110)
CLARITY UR: ABNORMAL
CO2 SERPL-SCNC: 24 MMOL/L (ref 23–29)
COLOR UR: YELLOW
CREAT SERPL-MCNC: 0.8 MG/DL (ref 0.5–1.4)
ERYTHROCYTE [DISTWIDTH] IN BLOOD BY AUTOMATED COUNT: 12.3 % (ref 11.5–14.5)
EST. GFR  (AFRICAN AMERICAN): >60 ML/MIN/1.73 M^2
EST. GFR  (NON AFRICAN AMERICAN): >60 ML/MIN/1.73 M^2
GLUCOSE SERPL-MCNC: 140 MG/DL (ref 70–110)
GLUCOSE UR QL STRIP: NEGATIVE
HCT VFR BLD AUTO: 41.3 % (ref 37–48.5)
HGB BLD-MCNC: 13.8 G/DL (ref 12–16)
HGB UR QL STRIP: ABNORMAL
KETONES UR QL STRIP: NEGATIVE
LEUKOCYTE ESTERASE UR QL STRIP: NEGATIVE
MCH RBC QN AUTO: 31.2 PG (ref 27–31)
MCHC RBC AUTO-ENTMCNC: 33.4 G/DL (ref 32–36)
MCV RBC AUTO: 93 FL (ref 82–98)
MICROSCOPIC COMMENT: NORMAL
NITRITE UR QL STRIP: NEGATIVE
PH UR STRIP: 7 [PH] (ref 5–8)
PLATELET # BLD AUTO: 145 K/UL (ref 150–450)
PMV BLD AUTO: 10.4 FL (ref 9.2–12.9)
POTASSIUM SERPL-SCNC: 4 MMOL/L (ref 3.5–5.1)
PROT SERPL-MCNC: 7 G/DL (ref 6–8.4)
PROT UR QL STRIP: ABNORMAL
RBC # BLD AUTO: 4.42 M/UL (ref 4–5.4)
RBC #/AREA URNS HPF: 2 /HPF (ref 0–4)
SODIUM SERPL-SCNC: 140 MMOL/L (ref 136–145)
SP GR UR STRIP: 1.01 (ref 1–1.03)
SQUAMOUS #/AREA URNS HPF: NORMAL /HPF
URN SPEC COLLECT METH UR: ABNORMAL
UROBILINOGEN UR STRIP-ACNC: NEGATIVE EU/DL
WBC # BLD AUTO: 6.28 K/UL (ref 3.9–12.7)
WBC #/AREA URNS HPF: 0 /HPF (ref 0–5)

## 2021-04-09 PROCEDURE — 93005 ELECTROCARDIOGRAM TRACING: CPT

## 2021-04-09 PROCEDURE — 80053 COMPREHEN METABOLIC PANEL: CPT | Performed by: EMERGENCY MEDICINE

## 2021-04-09 PROCEDURE — 93005 ELECTROCARDIOGRAM TRACING: CPT | Performed by: INTERNAL MEDICINE

## 2021-04-09 PROCEDURE — 99284 EMERGENCY DEPT VISIT MOD MDM: CPT | Mod: 25

## 2021-04-09 PROCEDURE — 93010 EKG 12-LEAD: ICD-10-PCS | Mod: ,,, | Performed by: INTERNAL MEDICINE

## 2021-04-09 PROCEDURE — 25000003 PHARM REV CODE 250: Performed by: EMERGENCY MEDICINE

## 2021-04-09 PROCEDURE — 85027 COMPLETE CBC AUTOMATED: CPT | Performed by: EMERGENCY MEDICINE

## 2021-04-09 PROCEDURE — 93010 ELECTROCARDIOGRAM REPORT: CPT | Mod: ,,, | Performed by: INTERNAL MEDICINE

## 2021-04-09 PROCEDURE — 81000 URINALYSIS NONAUTO W/SCOPE: CPT | Performed by: EMERGENCY MEDICINE

## 2021-04-09 RX ORDER — LOPERAMIDE HYDROCHLORIDE 2 MG/1
2 CAPSULE ORAL 4 TIMES DAILY PRN
Qty: 12 CAPSULE | Refills: 0 | Status: SHIPPED | OUTPATIENT
Start: 2021-04-09 | End: 2021-04-12

## 2021-04-09 RX ORDER — ONDANSETRON 4 MG/1
4 TABLET, FILM COATED ORAL EVERY 6 HOURS
Qty: 12 TABLET | Refills: 0 | Status: SHIPPED | OUTPATIENT
Start: 2021-04-09 | End: 2021-04-12

## 2021-04-09 RX ORDER — SODIUM CHLORIDE 9 MG/ML
INJECTION, SOLUTION INTRAVENOUS
Status: COMPLETED | OUTPATIENT
Start: 2021-04-09 | End: 2021-04-09

## 2021-04-09 RX ADMIN — SODIUM CHLORIDE: 0.9 INJECTION, SOLUTION INTRAVENOUS at 12:04

## 2021-05-31 ENCOUNTER — PATIENT MESSAGE (OUTPATIENT)
Dept: PSYCHIATRY | Facility: CLINIC | Age: 66
End: 2021-05-31

## 2021-06-14 ENCOUNTER — HOSPITAL ENCOUNTER (OUTPATIENT)
Dept: RADIOLOGY | Facility: HOSPITAL | Age: 66
Discharge: HOME | End: 2021-06-14
Attending: INTERNAL MEDICINE
Payer: MEDICARE

## 2021-06-14 ENCOUNTER — OFFICE VISIT (OUTPATIENT)
Dept: INTERNAL MEDICINE | Facility: CLINIC | Age: 66
End: 2021-06-14
Payer: MEDICARE

## 2021-06-14 VITALS
BODY MASS INDEX: 30.51 KG/M2 | RESPIRATION RATE: 18 BRPM | HEART RATE: 82 BPM | WEIGHT: 172.19 LBS | HEIGHT: 63 IN | OXYGEN SATURATION: 96 %

## 2021-06-14 DIAGNOSIS — Z12.31 ENCOUNTER FOR SCREENING MAMMOGRAM FOR BREAST CANCER: ICD-10-CM

## 2021-06-14 DIAGNOSIS — R06.09 DOE (DYSPNEA ON EXERTION): Primary | ICD-10-CM

## 2021-06-14 DIAGNOSIS — R07.9 CHEST PAIN, UNSPECIFIED TYPE: ICD-10-CM

## 2021-06-14 DIAGNOSIS — R06.09 DOE (DYSPNEA ON EXERTION): ICD-10-CM

## 2021-06-14 DIAGNOSIS — Z12.11 SCREENING FOR COLON CANCER: ICD-10-CM

## 2021-06-14 DIAGNOSIS — Z13.220 SCREENING FOR HYPERLIPIDEMIA: ICD-10-CM

## 2021-06-14 DIAGNOSIS — R79.9 ABNORMAL FINDING OF BLOOD CHEMISTRY, UNSPECIFIED: ICD-10-CM

## 2021-06-14 PROCEDURE — 1101F PR PT FALLS ASSESS DOC 0-1 FALLS W/OUT INJ PAST YR: ICD-10-PCS | Mod: CPTII,S$GLB,, | Performed by: INTERNAL MEDICINE

## 2021-06-14 PROCEDURE — 1157F PR ADVANCE CARE PLAN OR EQUIV PRESENT IN MEDICAL RECORD: ICD-10-PCS | Mod: S$GLB,,, | Performed by: INTERNAL MEDICINE

## 2021-06-14 PROCEDURE — 99499 UNLISTED E&M SERVICE: CPT | Mod: S$GLB,,, | Performed by: INTERNAL MEDICINE

## 2021-06-14 PROCEDURE — 71046 X-RAY EXAM CHEST 2 VIEWS: CPT | Mod: 26,,, | Performed by: RADIOLOGY

## 2021-06-14 PROCEDURE — 99499 RISK ADDL DX/OHS AUDIT: ICD-10-PCS | Mod: S$GLB,,, | Performed by: INTERNAL MEDICINE

## 2021-06-14 PROCEDURE — 1157F ADVNC CARE PLAN IN RCRD: CPT | Mod: S$GLB,,, | Performed by: INTERNAL MEDICINE

## 2021-06-14 PROCEDURE — 1159F MED LIST DOCD IN RCRD: CPT | Mod: S$GLB,,, | Performed by: INTERNAL MEDICINE

## 2021-06-14 PROCEDURE — 93010 EKG 12-LEAD: ICD-10-PCS | Mod: S$GLB,,, | Performed by: INTERNAL MEDICINE

## 2021-06-14 PROCEDURE — 71046 XR CHEST PA AND LATERAL: ICD-10-PCS | Mod: 26,,, | Performed by: RADIOLOGY

## 2021-06-14 PROCEDURE — 93005 ELECTROCARDIOGRAM TRACING: CPT | Mod: S$GLB,,, | Performed by: INTERNAL MEDICINE

## 2021-06-14 PROCEDURE — 71046 X-RAY EXAM CHEST 2 VIEWS: CPT | Mod: TC,FY,PO

## 2021-06-14 PROCEDURE — 3288F FALL RISK ASSESSMENT DOCD: CPT | Mod: CPTII,S$GLB,, | Performed by: INTERNAL MEDICINE

## 2021-06-14 PROCEDURE — 3008F PR BODY MASS INDEX (BMI) DOCUMENTED: ICD-10-PCS | Mod: CPTII,S$GLB,, | Performed by: INTERNAL MEDICINE

## 2021-06-14 PROCEDURE — 99999 PR PBB SHADOW E&M-EST. PATIENT-LVL IV: ICD-10-PCS | Mod: PBBFAC,,, | Performed by: INTERNAL MEDICINE

## 2021-06-14 PROCEDURE — 99999 PR PBB SHADOW E&M-EST. PATIENT-LVL IV: CPT | Mod: PBBFAC,,, | Performed by: INTERNAL MEDICINE

## 2021-06-14 PROCEDURE — 1101F PT FALLS ASSESS-DOCD LE1/YR: CPT | Mod: CPTII,S$GLB,, | Performed by: INTERNAL MEDICINE

## 2021-06-14 PROCEDURE — 99214 PR OFFICE/OUTPT VISIT, EST, LEVL IV, 30-39 MIN: ICD-10-PCS | Mod: S$GLB,,, | Performed by: INTERNAL MEDICINE

## 2021-06-14 PROCEDURE — 99214 OFFICE O/P EST MOD 30 MIN: CPT | Mod: S$GLB,,, | Performed by: INTERNAL MEDICINE

## 2021-06-14 PROCEDURE — 93010 ELECTROCARDIOGRAM REPORT: CPT | Mod: S$GLB,,, | Performed by: INTERNAL MEDICINE

## 2021-06-14 PROCEDURE — 3008F BODY MASS INDEX DOCD: CPT | Mod: CPTII,S$GLB,, | Performed by: INTERNAL MEDICINE

## 2021-06-14 PROCEDURE — 93005 EKG 12-LEAD: ICD-10-PCS | Mod: S$GLB,,, | Performed by: INTERNAL MEDICINE

## 2021-06-14 PROCEDURE — 3288F PR FALLS RISK ASSESSMENT DOCUMENTED: ICD-10-PCS | Mod: CPTII,S$GLB,, | Performed by: INTERNAL MEDICINE

## 2021-06-14 PROCEDURE — 1159F PR MEDICATION LIST DOCUMENTED IN MEDICAL RECORD: ICD-10-PCS | Mod: S$GLB,,, | Performed by: INTERNAL MEDICINE

## 2021-06-15 ENCOUNTER — TELEPHONE (OUTPATIENT)
Dept: INTERNAL MEDICINE | Facility: CLINIC | Age: 66
End: 2021-06-15

## 2021-06-15 DIAGNOSIS — R94.31 ABNORMAL ELECTROCARDIOGRAM (ECG) (EKG): ICD-10-CM

## 2021-06-21 ENCOUNTER — HOSPITAL ENCOUNTER (OUTPATIENT)
Dept: RADIOLOGY | Facility: HOSPITAL | Age: 66
Discharge: HOME | End: 2021-06-21
Attending: INTERNAL MEDICINE
Payer: MEDICARE

## 2021-06-21 ENCOUNTER — HOSPITAL ENCOUNTER (OUTPATIENT)
Dept: CARDIOLOGY | Facility: HOSPITAL | Age: 66
Discharge: HOME | End: 2021-06-21
Attending: INTERNAL MEDICINE
Payer: MEDICARE

## 2021-06-21 DIAGNOSIS — R94.31 ABNORMAL ELECTROCARDIOGRAM (ECG) (EKG): ICD-10-CM

## 2021-06-21 LAB
CV STRESS BASE HR: 66 BPM
DIASTOLIC BLOOD PRESSURE: 84 MMHG
OHS CV CPX 85 PERCENT MAX PREDICTED HEART RATE MALE: 126
OHS CV CPX MAX PREDICTED HEART RATE: 148
OHS CV CPX PATIENT IS FEMALE: 1
OHS CV CPX PATIENT IS MALE: 0
OHS CV CPX PEAK DIASTOLIC BLOOD PRESSURE: 84 MMHG
OHS CV CPX PEAK HEAR RATE: 101 BPM
OHS CV CPX PEAK RATE PRESSURE PRODUCT: NORMAL
OHS CV CPX PEAK SYSTOLIC BLOOD PRESSURE: 121 MMHG
OHS CV CPX PERCENT MAX PREDICTED HEART RATE ACHIEVED: 68
OHS CV CPX RATE PRESSURE PRODUCT PRESENTING: 7986
SYSTOLIC BLOOD PRESSURE: 121 MMHG

## 2021-06-21 PROCEDURE — 78452 HT MUSCLE IMAGE SPECT MULT: CPT | Mod: 26,,, | Performed by: RADIOLOGY

## 2021-06-21 PROCEDURE — 93016 NUCLEAR STRESS TEST (CUPID ONLY): ICD-10-PCS | Mod: ,,, | Performed by: INTERNAL MEDICINE

## 2021-06-21 PROCEDURE — 93018 NUCLEAR STRESS TEST (CUPID ONLY): ICD-10-PCS | Mod: ,,, | Performed by: INTERNAL MEDICINE

## 2021-06-21 PROCEDURE — 78452 HT MUSCLE IMAGE SPECT MULT: CPT | Mod: TC

## 2021-06-21 PROCEDURE — 93018 CV STRESS TEST I&R ONLY: CPT | Mod: ,,, | Performed by: INTERNAL MEDICINE

## 2021-06-21 PROCEDURE — A9502 TC99M TETROFOSMIN: HCPCS

## 2021-06-21 PROCEDURE — 93017 CV STRESS TEST TRACING ONLY: CPT

## 2021-06-21 PROCEDURE — 93016 CV STRESS TEST SUPVJ ONLY: CPT | Mod: ,,, | Performed by: INTERNAL MEDICINE

## 2021-06-21 PROCEDURE — 78452 NM MYOCARDIAL PERFUSION SPECT MULTI PHARM: ICD-10-PCS | Mod: 26,,, | Performed by: RADIOLOGY

## 2021-06-21 RX ORDER — REGADENOSON 0.08 MG/ML
0.4 INJECTION, SOLUTION INTRAVENOUS ONCE
Status: DISCONTINUED | OUTPATIENT
Start: 2021-06-21 | End: 2021-06-29 | Stop reason: HOSPADM

## 2021-06-29 ENCOUNTER — TELEPHONE (OUTPATIENT)
Dept: ADMINISTRATIVE | Facility: OTHER | Age: 66
End: 2021-06-29

## 2021-07-01 ENCOUNTER — HOSPITAL ENCOUNTER (OUTPATIENT)
Dept: RADIOLOGY | Facility: HOSPITAL | Age: 66
Discharge: HOME | End: 2021-07-01
Attending: INTERNAL MEDICINE
Payer: MEDICARE

## 2021-07-01 DIAGNOSIS — Z12.31 ENCOUNTER FOR SCREENING MAMMOGRAM FOR BREAST CANCER: ICD-10-CM

## 2021-07-01 PROCEDURE — 77067 SCR MAMMO BI INCL CAD: CPT | Mod: TC

## 2021-07-01 PROCEDURE — 77067 SCR MAMMO BI INCL CAD: CPT | Mod: 26,,, | Performed by: RADIOLOGY

## 2021-07-01 PROCEDURE — 77063 BREAST TOMOSYNTHESIS BI: CPT | Mod: 26,,, | Performed by: RADIOLOGY

## 2021-07-01 PROCEDURE — 77067 MAMMO DIGITAL SCREENING BILAT WITH TOMO: ICD-10-PCS | Mod: 26,,, | Performed by: RADIOLOGY

## 2021-07-01 PROCEDURE — 77063 MAMMO DIGITAL SCREENING BILAT WITH TOMO: ICD-10-PCS | Mod: 26,,, | Performed by: RADIOLOGY

## 2021-07-06 ENCOUNTER — PATIENT MESSAGE (OUTPATIENT)
Dept: ADMINISTRATIVE | Facility: HOSPITAL | Age: 66
End: 2021-07-06

## 2021-08-02 ENCOUNTER — PATIENT OUTREACH (OUTPATIENT)
Dept: ADMINISTRATIVE | Facility: OTHER | Age: 66
End: 2021-08-02

## 2021-08-02 ENCOUNTER — TELEPHONE (OUTPATIENT)
Dept: UROLOGY | Facility: CLINIC | Age: 66
End: 2021-08-02

## 2021-08-04 ENCOUNTER — OFFICE VISIT (OUTPATIENT)
Dept: UROLOGY | Facility: CLINIC | Age: 66
End: 2021-08-04
Payer: MEDICARE

## 2021-08-04 VITALS
BODY MASS INDEX: 28.16 KG/M2 | SYSTOLIC BLOOD PRESSURE: 123 MMHG | WEIGHT: 158.94 LBS | DIASTOLIC BLOOD PRESSURE: 90 MMHG | HEART RATE: 89 BPM

## 2021-08-04 DIAGNOSIS — A49.9 BACTERIAL UTI: Primary | ICD-10-CM

## 2021-08-04 DIAGNOSIS — N39.0 BACTERIAL UTI: Primary | ICD-10-CM

## 2021-08-04 PROCEDURE — 99213 OFFICE O/P EST LOW 20 MIN: CPT | Mod: 25,S$GLB,, | Performed by: PHYSICIAN ASSISTANT

## 2021-08-04 PROCEDURE — 99213 PR OFFICE/OUTPT VISIT, EST, LEVL III, 20-29 MIN: ICD-10-PCS | Mod: 25,S$GLB,, | Performed by: PHYSICIAN ASSISTANT

## 2021-08-04 PROCEDURE — 99999 PR PBB SHADOW E&M-EST. PATIENT-LVL III: CPT | Mod: PBBFAC,,, | Performed by: PHYSICIAN ASSISTANT

## 2021-08-04 PROCEDURE — 87086 URINE CULTURE/COLONY COUNT: CPT | Performed by: PHYSICIAN ASSISTANT

## 2021-08-04 PROCEDURE — 3074F PR MOST RECENT SYSTOLIC BLOOD PRESSURE < 130 MM HG: ICD-10-PCS | Mod: CPTII,S$GLB,, | Performed by: PHYSICIAN ASSISTANT

## 2021-08-04 PROCEDURE — 3008F BODY MASS INDEX DOCD: CPT | Mod: CPTII,S$GLB,, | Performed by: PHYSICIAN ASSISTANT

## 2021-08-04 PROCEDURE — 1160F PR REVIEW ALL MEDS BY PRESCRIBER/CLIN PHARMACIST DOCUMENTED: ICD-10-PCS | Mod: CPTII,S$GLB,, | Performed by: PHYSICIAN ASSISTANT

## 2021-08-04 PROCEDURE — 87186 SC STD MICRODIL/AGAR DIL: CPT | Performed by: PHYSICIAN ASSISTANT

## 2021-08-04 PROCEDURE — 87077 CULTURE AEROBIC IDENTIFY: CPT | Performed by: PHYSICIAN ASSISTANT

## 2021-08-04 PROCEDURE — 3074F SYST BP LT 130 MM HG: CPT | Mod: CPTII,S$GLB,, | Performed by: PHYSICIAN ASSISTANT

## 2021-08-04 PROCEDURE — 1157F ADVNC CARE PLAN IN RCRD: CPT | Mod: CPTII,S$GLB,, | Performed by: PHYSICIAN ASSISTANT

## 2021-08-04 PROCEDURE — 1159F MED LIST DOCD IN RCRD: CPT | Mod: CPTII,S$GLB,, | Performed by: PHYSICIAN ASSISTANT

## 2021-08-04 PROCEDURE — 99999 PR PBB SHADOW E&M-EST. PATIENT-LVL III: ICD-10-PCS | Mod: PBBFAC,,, | Performed by: PHYSICIAN ASSISTANT

## 2021-08-04 PROCEDURE — 51701 PR INSERTION OF NON-INDWELLING BLADDER CATHETERIZATION FOR RESIDUAL UR: ICD-10-PCS | Mod: S$GLB,,, | Performed by: PHYSICIAN ASSISTANT

## 2021-08-04 PROCEDURE — 1160F RVW MEDS BY RX/DR IN RCRD: CPT | Mod: CPTII,S$GLB,, | Performed by: PHYSICIAN ASSISTANT

## 2021-08-04 PROCEDURE — 3080F PR MOST RECENT DIASTOLIC BLOOD PRESSURE >= 90 MM HG: ICD-10-PCS | Mod: CPTII,S$GLB,, | Performed by: PHYSICIAN ASSISTANT

## 2021-08-04 PROCEDURE — 1157F PR ADVANCE CARE PLAN OR EQUIV PRESENT IN MEDICAL RECORD: ICD-10-PCS | Mod: CPTII,S$GLB,, | Performed by: PHYSICIAN ASSISTANT

## 2021-08-04 PROCEDURE — 51701 INSERT BLADDER CATHETER: CPT | Mod: S$GLB,,, | Performed by: PHYSICIAN ASSISTANT

## 2021-08-04 PROCEDURE — 3008F PR BODY MASS INDEX (BMI) DOCUMENTED: ICD-10-PCS | Mod: CPTII,S$GLB,, | Performed by: PHYSICIAN ASSISTANT

## 2021-08-04 PROCEDURE — 1159F PR MEDICATION LIST DOCUMENTED IN MEDICAL RECORD: ICD-10-PCS | Mod: CPTII,S$GLB,, | Performed by: PHYSICIAN ASSISTANT

## 2021-08-04 PROCEDURE — 3080F DIAST BP >= 90 MM HG: CPT | Mod: CPTII,S$GLB,, | Performed by: PHYSICIAN ASSISTANT

## 2021-08-04 PROCEDURE — 87088 URINE BACTERIA CULTURE: CPT | Performed by: PHYSICIAN ASSISTANT

## 2021-08-06 ENCOUNTER — TELEPHONE (OUTPATIENT)
Dept: UROLOGY | Facility: CLINIC | Age: 66
End: 2021-08-06

## 2021-08-06 DIAGNOSIS — G35 MS (MULTIPLE SCLEROSIS): ICD-10-CM

## 2021-08-06 DIAGNOSIS — N32.89 BLADDER SPASM: ICD-10-CM

## 2021-08-06 DIAGNOSIS — N31.9 NEUROGENIC BLADDER: Primary | ICD-10-CM

## 2021-08-07 LAB — BACTERIA UR CULT: ABNORMAL

## 2021-09-01 ENCOUNTER — PATIENT MESSAGE (OUTPATIENT)
Dept: PSYCHIATRY | Facility: CLINIC | Age: 66
End: 2021-09-01

## 2021-09-02 ENCOUNTER — PATIENT MESSAGE (OUTPATIENT)
Dept: PSYCHIATRY | Facility: CLINIC | Age: 66
End: 2021-09-02

## 2021-10-04 ENCOUNTER — PATIENT MESSAGE (OUTPATIENT)
Dept: ADMINISTRATIVE | Facility: HOSPITAL | Age: 66
End: 2021-10-04

## 2021-10-07 ENCOUNTER — TELEPHONE (OUTPATIENT)
Dept: PREADMISSION TESTING | Facility: HOSPITAL | Age: 66
End: 2021-10-07

## 2021-10-08 ENCOUNTER — LAB VISIT (OUTPATIENT)
Dept: LAB | Facility: HOSPITAL | Age: 66
End: 2021-10-08
Attending: ANESTHESIOLOGY
Payer: MEDICARE

## 2021-10-08 DIAGNOSIS — Z01.818 PREOP TESTING: ICD-10-CM

## 2021-10-08 LAB
ANION GAP SERPL CALC-SCNC: 11 MMOL/L (ref 8–16)
BUN SERPL-MCNC: 16 MG/DL (ref 8–23)
CALCIUM SERPL-MCNC: 9.1 MG/DL (ref 8.7–10.5)
CHLORIDE SERPL-SCNC: 106 MMOL/L (ref 95–110)
CO2 SERPL-SCNC: 21 MMOL/L (ref 23–29)
CREAT SERPL-MCNC: 0.8 MG/DL (ref 0.5–1.4)
EST. GFR  (AFRICAN AMERICAN): >60 ML/MIN/1.73 M^2
EST. GFR  (NON AFRICAN AMERICAN): >60 ML/MIN/1.73 M^2
GLUCOSE SERPL-MCNC: 96 MG/DL (ref 70–110)
POTASSIUM SERPL-SCNC: 4 MMOL/L (ref 3.5–5.1)
SODIUM SERPL-SCNC: 138 MMOL/L (ref 136–145)

## 2021-10-08 PROCEDURE — 80048 BASIC METABOLIC PNL TOTAL CA: CPT | Performed by: ANESTHESIOLOGY

## 2021-10-08 PROCEDURE — 36415 COLL VENOUS BLD VENIPUNCTURE: CPT | Mod: PO | Performed by: ANESTHESIOLOGY

## 2021-10-11 ENCOUNTER — LAB VISIT (OUTPATIENT)
Dept: SPORTS MEDICINE | Facility: CLINIC | Age: 66
End: 2021-10-11
Payer: MEDICARE

## 2021-10-11 DIAGNOSIS — G35 MS (MULTIPLE SCLEROSIS): ICD-10-CM

## 2021-10-11 DIAGNOSIS — N31.9 NEUROGENIC BLADDER: ICD-10-CM

## 2021-10-11 DIAGNOSIS — N32.89 BLADDER SPASM: ICD-10-CM

## 2021-10-11 PROCEDURE — U0005 INFEC AGEN DETEC AMPLI PROBE: HCPCS | Performed by: UROLOGY

## 2021-10-11 PROCEDURE — U0003 INFECTIOUS AGENT DETECTION BY NUCLEIC ACID (DNA OR RNA); SEVERE ACUTE RESPIRATORY SYNDROME CORONAVIRUS 2 (SARS-COV-2) (CORONAVIRUS DISEASE [COVID-19]), AMPLIFIED PROBE TECHNIQUE, MAKING USE OF HIGH THROUGHPUT TECHNOLOGIES AS DESCRIBED BY CMS-2020-01-R: HCPCS | Performed by: UROLOGY

## 2021-10-12 ENCOUNTER — TELEPHONE (OUTPATIENT)
Dept: UROLOGY | Facility: CLINIC | Age: 66
End: 2021-10-12

## 2021-10-12 LAB
SARS-COV-2 RNA RESP QL NAA+PROBE: NOT DETECTED
SARS-COV-2- CYCLE NUMBER: NORMAL

## 2021-10-13 ENCOUNTER — TELEPHONE (OUTPATIENT)
Dept: UROLOGY | Facility: CLINIC | Age: 66
End: 2021-10-13

## 2021-10-13 ENCOUNTER — HOSPITAL ENCOUNTER (OUTPATIENT)
Facility: HOSPITAL | Age: 66
Discharge: HOME | End: 2021-10-13
Attending: UROLOGY | Admitting: UROLOGY
Payer: MEDICARE

## 2021-10-13 VITALS
WEIGHT: 169 LBS | HEART RATE: 66 BPM | BODY MASS INDEX: 29.95 KG/M2 | SYSTOLIC BLOOD PRESSURE: 143 MMHG | OXYGEN SATURATION: 99 % | TEMPERATURE: 98 F | DIASTOLIC BLOOD PRESSURE: 67 MMHG | HEIGHT: 63 IN | RESPIRATION RATE: 16 BRPM

## 2021-10-13 DIAGNOSIS — G35 MS (MULTIPLE SCLEROSIS): ICD-10-CM

## 2021-10-13 DIAGNOSIS — N32.89 BLADDER SPASM: ICD-10-CM

## 2021-10-13 DIAGNOSIS — N31.9 NEUROGENIC BLADDER: Primary | ICD-10-CM

## 2021-10-13 DIAGNOSIS — N31.9 NEUROGENIC BLADDER: ICD-10-CM

## 2021-10-13 PROCEDURE — 87086 URINE CULTURE/COLONY COUNT: CPT | Performed by: STUDENT IN AN ORGANIZED HEALTH CARE EDUCATION/TRAINING PROGRAM

## 2021-10-13 RX ORDER — CIPROFLOXACIN 2 MG/ML
400 INJECTION, SOLUTION INTRAVENOUS
Status: DISCONTINUED | OUTPATIENT
Start: 2021-10-13 | End: 2021-10-13 | Stop reason: HOSPADM

## 2021-10-13 RX ORDER — ONDANSETRON 2 MG/ML
4 INJECTION INTRAMUSCULAR; INTRAVENOUS DAILY PRN
Status: CANCELLED | OUTPATIENT
Start: 2021-10-13

## 2021-10-13 RX ORDER — LIDOCAINE HYDROCHLORIDE 10 MG/ML
1 INJECTION, SOLUTION EPIDURAL; INFILTRATION; INTRACAUDAL; PERINEURAL ONCE
Status: DISCONTINUED | OUTPATIENT
Start: 2021-10-13 | End: 2021-10-13 | Stop reason: HOSPADM

## 2021-10-13 RX ORDER — HYDROMORPHONE HYDROCHLORIDE 1 MG/ML
0.2 INJECTION, SOLUTION INTRAMUSCULAR; INTRAVENOUS; SUBCUTANEOUS EVERY 5 MIN PRN
Status: CANCELLED | OUTPATIENT
Start: 2021-10-13

## 2021-10-13 RX ORDER — SODIUM CHLORIDE 0.9 % (FLUSH) 0.9 %
10 SYRINGE (ML) INJECTION
Status: CANCELLED | OUTPATIENT
Start: 2021-10-13

## 2021-10-13 RX ORDER — AMOXICILLIN AND CLAVULANATE POTASSIUM 875; 125 MG/1; MG/1
1 TABLET, FILM COATED ORAL EVERY 12 HOURS
Status: DISCONTINUED | OUTPATIENT
Start: 2021-10-13 | End: 2021-10-13 | Stop reason: HOSPADM

## 2021-10-14 LAB
BACTERIA UR CULT: NORMAL
BACTERIA UR CULT: NORMAL

## 2021-10-15 DIAGNOSIS — N39.41 URGE INCONTINENCE: Primary | ICD-10-CM

## 2021-10-15 RX ORDER — SULFAMETHOXAZOLE AND TRIMETHOPRIM 800; 160 MG/1; MG/1
1 TABLET ORAL 2 TIMES DAILY
Qty: 14 TABLET | Refills: 0 | Status: SHIPPED | OUTPATIENT
Start: 2021-10-15 | End: 2021-10-22

## 2021-10-20 ENCOUNTER — TELEPHONE (OUTPATIENT)
Dept: UROLOGY | Facility: CLINIC | Age: 66
End: 2021-10-20

## 2021-10-21 ENCOUNTER — HOSPITAL ENCOUNTER (OUTPATIENT)
Facility: HOSPITAL | Age: 66
Discharge: HOME | End: 2021-10-21
Attending: UROLOGY | Admitting: UROLOGY
Payer: MEDICARE

## 2021-10-21 ENCOUNTER — ANESTHESIA EVENT (OUTPATIENT)
Dept: SURGERY | Facility: HOSPITAL | Age: 66
End: 2021-10-21
Payer: MEDICARE

## 2021-10-21 ENCOUNTER — ANESTHESIA (OUTPATIENT)
Dept: SURGERY | Facility: HOSPITAL | Age: 66
End: 2021-10-21
Payer: MEDICARE

## 2021-10-21 VITALS
HEIGHT: 63 IN | TEMPERATURE: 97 F | RESPIRATION RATE: 18 BRPM | OXYGEN SATURATION: 98 % | WEIGHT: 169 LBS | BODY MASS INDEX: 29.95 KG/M2 | HEART RATE: 64 BPM | DIASTOLIC BLOOD PRESSURE: 69 MMHG | SYSTOLIC BLOOD PRESSURE: 122 MMHG

## 2021-10-21 DIAGNOSIS — N32.81 OAB (OVERACTIVE BLADDER): Primary | ICD-10-CM

## 2021-10-21 PROCEDURE — 25000003 PHARM REV CODE 250: Performed by: STUDENT IN AN ORGANIZED HEALTH CARE EDUCATION/TRAINING PROGRAM

## 2021-10-21 PROCEDURE — 63600175 PHARM REV CODE 636 W HCPCS: Mod: JG | Performed by: UROLOGY

## 2021-10-21 PROCEDURE — 25000003 PHARM REV CODE 250: Performed by: NURSE ANESTHETIST, CERTIFIED REGISTERED

## 2021-10-21 PROCEDURE — 37000008 HC ANESTHESIA 1ST 15 MINUTES: Performed by: UROLOGY

## 2021-10-21 PROCEDURE — 36000707: Performed by: UROLOGY

## 2021-10-21 PROCEDURE — 63600175 PHARM REV CODE 636 W HCPCS: Performed by: STUDENT IN AN ORGANIZED HEALTH CARE EDUCATION/TRAINING PROGRAM

## 2021-10-21 PROCEDURE — 37000009 HC ANESTHESIA EA ADD 15 MINS: Performed by: UROLOGY

## 2021-10-21 PROCEDURE — 63600175 PHARM REV CODE 636 W HCPCS: Performed by: NURSE ANESTHETIST, CERTIFIED REGISTERED

## 2021-10-21 PROCEDURE — 52287 PR CYSTOURETHROSCOPY WITH INJ FOR CHEMODENERVATION: ICD-10-PCS | Mod: ,,, | Performed by: UROLOGY

## 2021-10-21 PROCEDURE — 36000706: Performed by: UROLOGY

## 2021-10-21 PROCEDURE — 71000015 HC POSTOP RECOV 1ST HR: Performed by: UROLOGY

## 2021-10-21 PROCEDURE — 52287 CYSTOSCOPY CHEMODENERVATION: CPT | Mod: ,,, | Performed by: UROLOGY

## 2021-10-21 PROCEDURE — D9220A PRA ANESTHESIA: Mod: ,,, | Performed by: ANESTHESIOLOGY

## 2021-10-21 PROCEDURE — D9220A PRA ANESTHESIA: ICD-10-PCS | Mod: ,,, | Performed by: ANESTHESIOLOGY

## 2021-10-21 PROCEDURE — 71000044 HC DOSC ROUTINE RECOVERY FIRST HOUR: Performed by: UROLOGY

## 2021-10-21 RX ORDER — CIPROFLOXACIN 2 MG/ML
400 INJECTION, SOLUTION INTRAVENOUS
Status: COMPLETED | OUTPATIENT
Start: 2021-10-21 | End: 2021-10-21

## 2021-10-21 RX ORDER — LIDOCAINE HYDROCHLORIDE 10 MG/ML
1 INJECTION, SOLUTION EPIDURAL; INFILTRATION; INTRACAUDAL; PERINEURAL ONCE
Status: COMPLETED | OUTPATIENT
Start: 2021-10-21 | End: 2021-10-21

## 2021-10-21 RX ORDER — SODIUM CHLORIDE 0.9 % (FLUSH) 0.9 %
10 SYRINGE (ML) INJECTION
Status: DISCONTINUED | OUTPATIENT
Start: 2021-10-21 | End: 2021-10-21 | Stop reason: HOSPADM

## 2021-10-21 RX ORDER — LIDOCAINE HYDROCHLORIDE 20 MG/ML
INJECTION INTRAVENOUS
Status: DISCONTINUED | OUTPATIENT
Start: 2021-10-21 | End: 2021-10-21

## 2021-10-21 RX ORDER — PROPOFOL 10 MG/ML
VIAL (ML) INTRAVENOUS CONTINUOUS PRN
Status: DISCONTINUED | OUTPATIENT
Start: 2021-10-21 | End: 2021-10-21

## 2021-10-21 RX ORDER — ONDANSETRON 2 MG/ML
4 INJECTION INTRAMUSCULAR; INTRAVENOUS DAILY PRN
Status: DISCONTINUED | OUTPATIENT
Start: 2021-10-21 | End: 2021-10-21 | Stop reason: HOSPADM

## 2021-10-21 RX ORDER — DOXYCYCLINE HYCLATE 100 MG
100 TABLET ORAL DAILY
Qty: 3 TABLET | Refills: 0 | Status: SHIPPED | OUTPATIENT
Start: 2021-10-21 | End: 2021-10-24

## 2021-10-21 RX ORDER — MIDAZOLAM HYDROCHLORIDE 1 MG/ML
INJECTION, SOLUTION INTRAMUSCULAR; INTRAVENOUS
Status: DISCONTINUED | OUTPATIENT
Start: 2021-10-21 | End: 2021-10-21

## 2021-10-21 RX ORDER — HYDROMORPHONE HYDROCHLORIDE 1 MG/ML
0.2 INJECTION, SOLUTION INTRAMUSCULAR; INTRAVENOUS; SUBCUTANEOUS EVERY 5 MIN PRN
Status: DISCONTINUED | OUTPATIENT
Start: 2021-10-21 | End: 2021-10-21 | Stop reason: HOSPADM

## 2021-10-21 RX ORDER — PROPOFOL 10 MG/ML
VIAL (ML) INTRAVENOUS
Status: DISCONTINUED | OUTPATIENT
Start: 2021-10-21 | End: 2021-10-21

## 2021-10-21 RX ADMIN — PROPOFOL 30 MG: 10 INJECTION, EMULSION INTRAVENOUS at 08:10

## 2021-10-21 RX ADMIN — CIPROFLOXACIN 400 MG: 2 INJECTION, SOLUTION INTRAVENOUS at 08:10

## 2021-10-21 RX ADMIN — SODIUM CHLORIDE: 9 INJECTION, SOLUTION INTRAVENOUS at 07:10

## 2021-10-21 RX ADMIN — MIDAZOLAM HYDROCHLORIDE 2 MG: 1 INJECTION, SOLUTION INTRAMUSCULAR; INTRAVENOUS at 07:10

## 2021-10-21 RX ADMIN — LIDOCAINE HYDROCHLORIDE 2 MG: 10 INJECTION, SOLUTION EPIDURAL; INFILTRATION; INTRACAUDAL at 07:10

## 2021-10-21 RX ADMIN — LIDOCAINE HYDROCHLORIDE 50 MG: 20 INJECTION, SOLUTION INTRAVENOUS at 08:10

## 2021-10-21 RX ADMIN — Medication 50 MCG/KG/MIN: at 08:10

## 2021-10-25 ENCOUNTER — TELEPHONE (OUTPATIENT)
Dept: UROLOGY | Facility: CLINIC | Age: 66
End: 2021-10-25
Payer: MEDICARE

## 2021-11-08 ENCOUNTER — TELEPHONE (OUTPATIENT)
Dept: UROLOGY | Facility: CLINIC | Age: 66
End: 2021-11-08
Payer: MEDICARE

## 2021-11-08 DIAGNOSIS — N30.00 ACUTE CYSTITIS WITHOUT HEMATURIA: Primary | ICD-10-CM

## 2021-11-08 RX ORDER — CIPROFLOXACIN 500 MG/1
500 TABLET ORAL 2 TIMES DAILY
Qty: 14 TABLET | Refills: 0 | Status: SHIPPED | OUTPATIENT
Start: 2021-11-08 | End: 2021-11-15

## 2021-11-09 ENCOUNTER — LAB VISIT (OUTPATIENT)
Dept: LAB | Facility: HOSPITAL | Age: 66
End: 2021-11-09
Attending: UROLOGY
Payer: MEDICARE

## 2021-11-09 DIAGNOSIS — N30.00 ACUTE CYSTITIS WITHOUT HEMATURIA: ICD-10-CM

## 2021-11-09 PROCEDURE — 87088 URINE BACTERIA CULTURE: CPT | Performed by: UROLOGY

## 2021-11-09 PROCEDURE — 87186 SC STD MICRODIL/AGAR DIL: CPT | Performed by: UROLOGY

## 2021-11-09 PROCEDURE — 87086 URINE CULTURE/COLONY COUNT: CPT | Performed by: UROLOGY

## 2021-11-09 PROCEDURE — 87077 CULTURE AEROBIC IDENTIFY: CPT | Performed by: UROLOGY

## 2021-11-12 ENCOUNTER — TELEPHONE (OUTPATIENT)
Dept: UROLOGY | Facility: CLINIC | Age: 66
End: 2021-11-12
Payer: MEDICARE

## 2021-11-12 DIAGNOSIS — N30.90 CYSTITIS: Primary | ICD-10-CM

## 2021-11-12 LAB — BACTERIA UR CULT: ABNORMAL

## 2021-11-12 RX ORDER — NITROFURANTOIN 25; 75 MG/1; MG/1
100 CAPSULE ORAL 2 TIMES DAILY
Qty: 14 CAPSULE | Refills: 0 | Status: SHIPPED | OUTPATIENT
Start: 2021-11-12 | End: 2021-11-19

## 2021-11-23 ENCOUNTER — HOSPITAL ENCOUNTER (EMERGENCY)
Facility: HOSPITAL | Age: 66
Discharge: HOME | End: 2021-11-23
Attending: EMERGENCY MEDICINE
Payer: MEDICARE

## 2021-11-23 VITALS
OXYGEN SATURATION: 100 % | HEART RATE: 66 BPM | TEMPERATURE: 99 F | RESPIRATION RATE: 18 BRPM | WEIGHT: 170 LBS | DIASTOLIC BLOOD PRESSURE: 60 MMHG | BODY MASS INDEX: 30.11 KG/M2 | SYSTOLIC BLOOD PRESSURE: 140 MMHG

## 2021-11-23 DIAGNOSIS — M79.673 FOOT PAIN: ICD-10-CM

## 2021-11-23 DIAGNOSIS — S90.32XA CONTUSION OF LEFT FOOT, INITIAL ENCOUNTER: Primary | ICD-10-CM

## 2021-11-23 PROCEDURE — 99283 EMERGENCY DEPT VISIT LOW MDM: CPT | Mod: 25

## 2021-12-07 ENCOUNTER — PES CALL (OUTPATIENT)
Dept: ADMINISTRATIVE | Facility: CLINIC | Age: 66
End: 2021-12-07
Payer: MEDICARE

## 2021-12-14 ENCOUNTER — OFFICE VISIT (OUTPATIENT)
Dept: INTERNAL MEDICINE | Facility: CLINIC | Age: 66
End: 2021-12-14
Payer: MEDICARE

## 2021-12-14 ENCOUNTER — LAB VISIT (OUTPATIENT)
Dept: LAB | Facility: HOSPITAL | Age: 66
End: 2021-12-14
Attending: INTERNAL MEDICINE
Payer: MEDICARE

## 2021-12-14 VITALS
BODY MASS INDEX: 30.74 KG/M2 | HEART RATE: 71 BPM | SYSTOLIC BLOOD PRESSURE: 116 MMHG | HEIGHT: 63 IN | DIASTOLIC BLOOD PRESSURE: 72 MMHG | WEIGHT: 173.5 LBS | OXYGEN SATURATION: 99 %

## 2021-12-14 DIAGNOSIS — R53.83 OTHER FATIGUE: ICD-10-CM

## 2021-12-14 DIAGNOSIS — R53.83 OTHER FATIGUE: Primary | ICD-10-CM

## 2021-12-14 DIAGNOSIS — G35 MS (MULTIPLE SCLEROSIS): ICD-10-CM

## 2021-12-14 DIAGNOSIS — U07.1 COVID-19: ICD-10-CM

## 2021-12-14 DIAGNOSIS — I70.0 THORACIC AORTA ATHEROSCLEROSIS: ICD-10-CM

## 2021-12-14 DIAGNOSIS — F51.12 INSUFFICIENT SLEEP SYNDROME: ICD-10-CM

## 2021-12-14 DIAGNOSIS — R06.83 SNORING: ICD-10-CM

## 2021-12-14 DIAGNOSIS — E66.09 CLASS 1 OBESITY DUE TO EXCESS CALORIES WITHOUT SERIOUS COMORBIDITY WITH BODY MASS INDEX (BMI) OF 30.0 TO 30.9 IN ADULT: ICD-10-CM

## 2021-12-14 DIAGNOSIS — Z12.11 SCREENING FOR COLON CANCER: ICD-10-CM

## 2021-12-14 PROBLEM — C80.1 MALIGNANT (PRIMARY) NEOPLASM, UNSPECIFIED: Status: RESOLVED | Noted: 2020-03-26 | Resolved: 2021-12-14

## 2021-12-14 LAB — TSH SERPL DL<=0.005 MIU/L-ACNC: 2.67 UIU/ML (ref 0.4–4)

## 2021-12-14 PROCEDURE — 99499 RISK ADDL DX/OHS AUDIT: ICD-10-PCS | Mod: S$GLB,,, | Performed by: INTERNAL MEDICINE

## 2021-12-14 PROCEDURE — 99999 PR PBB SHADOW E&M-EST. PATIENT-LVL IV: CPT | Mod: PBBFAC,,, | Performed by: INTERNAL MEDICINE

## 2021-12-14 PROCEDURE — 99214 PR OFFICE/OUTPT VISIT, EST, LEVL IV, 30-39 MIN: ICD-10-PCS | Mod: S$GLB,,, | Performed by: INTERNAL MEDICINE

## 2021-12-14 PROCEDURE — 36415 COLL VENOUS BLD VENIPUNCTURE: CPT | Mod: PO | Performed by: INTERNAL MEDICINE

## 2021-12-14 PROCEDURE — 1157F ADVNC CARE PLAN IN RCRD: CPT | Mod: CPTII,S$GLB,, | Performed by: INTERNAL MEDICINE

## 2021-12-14 PROCEDURE — 99999 PR PBB SHADOW E&M-EST. PATIENT-LVL IV: ICD-10-PCS | Mod: PBBFAC,,, | Performed by: INTERNAL MEDICINE

## 2021-12-14 PROCEDURE — 99499 UNLISTED E&M SERVICE: CPT | Mod: S$GLB,,, | Performed by: INTERNAL MEDICINE

## 2021-12-14 PROCEDURE — 99214 OFFICE O/P EST MOD 30 MIN: CPT | Mod: S$GLB,,, | Performed by: INTERNAL MEDICINE

## 2021-12-14 PROCEDURE — 1157F PR ADVANCE CARE PLAN OR EQUIV PRESENT IN MEDICAL RECORD: ICD-10-PCS | Mod: CPTII,S$GLB,, | Performed by: INTERNAL MEDICINE

## 2021-12-14 PROCEDURE — 84443 ASSAY THYROID STIM HORMONE: CPT | Performed by: INTERNAL MEDICINE

## 2021-12-16 ENCOUNTER — TELEPHONE (OUTPATIENT)
Dept: NEUROLOGY | Facility: CLINIC | Age: 66
End: 2021-12-16
Payer: MEDICARE

## 2021-12-18 ENCOUNTER — LAB VISIT (OUTPATIENT)
Dept: LAB | Facility: HOSPITAL | Age: 66
End: 2021-12-18
Attending: INTERNAL MEDICINE
Payer: MEDICARE

## 2021-12-18 DIAGNOSIS — Z12.11 SCREENING FOR COLON CANCER: ICD-10-CM

## 2021-12-18 PROCEDURE — 82274 ASSAY TEST FOR BLOOD FECAL: CPT | Performed by: INTERNAL MEDICINE

## 2021-12-20 ENCOUNTER — TELEPHONE (OUTPATIENT)
Dept: INTERNAL MEDICINE | Facility: CLINIC | Age: 66
End: 2021-12-20
Payer: MEDICARE

## 2021-12-21 ENCOUNTER — PATIENT MESSAGE (OUTPATIENT)
Dept: NEUROLOGY | Facility: CLINIC | Age: 66
End: 2021-12-21

## 2021-12-21 ENCOUNTER — OFFICE VISIT (OUTPATIENT)
Dept: NEUROLOGY | Facility: CLINIC | Age: 66
End: 2021-12-21
Payer: MEDICARE

## 2021-12-21 ENCOUNTER — PATIENT MESSAGE (OUTPATIENT)
Dept: INTERNAL MEDICINE | Facility: CLINIC | Age: 66
End: 2021-12-21
Payer: MEDICARE

## 2021-12-21 VITALS
BODY MASS INDEX: 30.28 KG/M2 | HEART RATE: 101 BPM | DIASTOLIC BLOOD PRESSURE: 83 MMHG | SYSTOLIC BLOOD PRESSURE: 126 MMHG | HEIGHT: 63 IN | WEIGHT: 170.88 LBS

## 2021-12-21 DIAGNOSIS — R19.5 POSITIVE FECAL IMMUNOCHEMICAL TEST: Primary | ICD-10-CM

## 2021-12-21 DIAGNOSIS — G35 MULTIPLE SCLEROSIS: Primary | ICD-10-CM

## 2021-12-21 DIAGNOSIS — U09.9 POST-COVID CHRONIC DECREASED MOBILITY AND ENDURANCE: ICD-10-CM

## 2021-12-21 DIAGNOSIS — Z74.09 IMPAIRED FUNCTIONAL MOBILITY, BALANCE, GAIT, AND ENDURANCE: ICD-10-CM

## 2021-12-21 DIAGNOSIS — Z74.09 POST-COVID CHRONIC DECREASED MOBILITY AND ENDURANCE: ICD-10-CM

## 2021-12-21 LAB — HEMOCCULT STL QL IA: POSITIVE

## 2021-12-21 PROCEDURE — 99215 PR OFFICE/OUTPT VISIT, EST, LEVL V, 40-54 MIN: ICD-10-PCS | Mod: S$GLB,,, | Performed by: PSYCHIATRY & NEUROLOGY

## 2021-12-21 PROCEDURE — 99215 OFFICE O/P EST HI 40 MIN: CPT | Mod: S$GLB,,, | Performed by: PSYCHIATRY & NEUROLOGY

## 2021-12-21 PROCEDURE — 1157F PR ADVANCE CARE PLAN OR EQUIV PRESENT IN MEDICAL RECORD: ICD-10-PCS | Mod: CPTII,S$GLB,, | Performed by: PSYCHIATRY & NEUROLOGY

## 2021-12-21 PROCEDURE — 99999 PR PBB SHADOW E&M-EST. PATIENT-LVL IV: ICD-10-PCS | Mod: PBBFAC,,, | Performed by: PSYCHIATRY & NEUROLOGY

## 2021-12-21 PROCEDURE — 1157F ADVNC CARE PLAN IN RCRD: CPT | Mod: CPTII,S$GLB,, | Performed by: PSYCHIATRY & NEUROLOGY

## 2021-12-21 PROCEDURE — 99999 PR PBB SHADOW E&M-EST. PATIENT-LVL IV: CPT | Mod: PBBFAC,,, | Performed by: PSYCHIATRY & NEUROLOGY

## 2021-12-22 ENCOUNTER — TELEPHONE (OUTPATIENT)
Dept: SLEEP MEDICINE | Facility: OTHER | Age: 66
End: 2021-12-22
Payer: MEDICARE

## 2021-12-22 DIAGNOSIS — F31.9 BIPOLAR AFFECTIVE DISORDER, REMISSION STATUS UNSPECIFIED: ICD-10-CM

## 2021-12-23 RX ORDER — FOLIC ACID 1 MG/1
1000 TABLET ORAL DAILY
Qty: 90 TABLET | Refills: 1 | Status: SHIPPED | OUTPATIENT
Start: 2021-12-23 | End: 2022-04-20 | Stop reason: SDUPTHER

## 2021-12-28 ENCOUNTER — TELEPHONE (OUTPATIENT)
Dept: ENDOSCOPY | Facility: HOSPITAL | Age: 66
End: 2021-12-28
Payer: MEDICARE

## 2021-12-29 ENCOUNTER — TELEPHONE (OUTPATIENT)
Dept: SLEEP MEDICINE | Facility: OTHER | Age: 66
End: 2021-12-29
Payer: MEDICARE

## 2021-12-30 ENCOUNTER — HOSPITAL ENCOUNTER (OUTPATIENT)
Dept: SLEEP MEDICINE | Facility: OTHER | Age: 66
Discharge: HOME | End: 2021-12-30
Attending: INTERNAL MEDICINE
Payer: MEDICARE

## 2021-12-30 DIAGNOSIS — R53.83 OTHER FATIGUE: ICD-10-CM

## 2021-12-30 DIAGNOSIS — G47.33 OSA (OBSTRUCTIVE SLEEP APNEA): Primary | ICD-10-CM

## 2021-12-30 DIAGNOSIS — F51.12 INSUFFICIENT SLEEP SYNDROME: ICD-10-CM

## 2021-12-30 DIAGNOSIS — R06.83 SNORING: ICD-10-CM

## 2021-12-30 DIAGNOSIS — G35 MS (MULTIPLE SCLEROSIS): ICD-10-CM

## 2021-12-30 PROCEDURE — 95806 SLEEP STUDY UNATT&RESP EFFT: CPT | Mod: 26,,, | Performed by: INTERNAL MEDICINE

## 2021-12-30 PROCEDURE — 95806 PR SLEEP STUDY, UNATTENDED, SIMUL RECORD HR/O2 SAT/RESP FLOW/RESP EFFT: ICD-10-PCS | Mod: 26,,, | Performed by: INTERNAL MEDICINE

## 2021-12-30 PROCEDURE — 95800 SLP STDY UNATTENDED: CPT

## 2022-01-03 ENCOUNTER — LAB VISIT (OUTPATIENT)
Dept: LAB | Facility: HOSPITAL | Age: 67
End: 2022-01-03
Attending: INTERNAL MEDICINE
Payer: MEDICARE

## 2022-01-03 ENCOUNTER — CLINICAL SUPPORT (OUTPATIENT)
Dept: REHABILITATION | Facility: HOSPITAL | Age: 67
End: 2022-01-03
Attending: PSYCHIATRY & NEUROLOGY
Payer: MEDICARE

## 2022-01-03 ENCOUNTER — OFFICE VISIT (OUTPATIENT)
Dept: INTERNAL MEDICINE | Facility: CLINIC | Age: 67
End: 2022-01-03
Payer: MEDICARE

## 2022-01-03 VITALS
HEART RATE: 72 BPM | TEMPERATURE: 98 F | DIASTOLIC BLOOD PRESSURE: 74 MMHG | WEIGHT: 177.69 LBS | SYSTOLIC BLOOD PRESSURE: 106 MMHG | BODY MASS INDEX: 31.48 KG/M2

## 2022-01-03 DIAGNOSIS — Z74.09 IMPAIRED FUNCTIONAL MOBILITY, BALANCE, GAIT, AND ENDURANCE: ICD-10-CM

## 2022-01-03 DIAGNOSIS — R19.5 POSITIVE FECAL IMMUNOCHEMICAL TEST: ICD-10-CM

## 2022-01-03 DIAGNOSIS — G35 MULTIPLE SCLEROSIS: ICD-10-CM

## 2022-01-03 DIAGNOSIS — Z74.09 POST-COVID CHRONIC DECREASED MOBILITY AND ENDURANCE: ICD-10-CM

## 2022-01-03 DIAGNOSIS — Z86.16 HISTORY OF COVID-19: ICD-10-CM

## 2022-01-03 DIAGNOSIS — U09.9 COVID-19 LONG HAULER MANIFESTING CHRONIC FATIGUE: Primary | ICD-10-CM

## 2022-01-03 DIAGNOSIS — R41.89 COGNITIVE CHANGES: ICD-10-CM

## 2022-01-03 DIAGNOSIS — U09.9 POST-COVID CHRONIC DECREASED MOBILITY AND ENDURANCE: ICD-10-CM

## 2022-01-03 DIAGNOSIS — N39.490 OVERFLOW INCONTINENCE OF URINE: ICD-10-CM

## 2022-01-03 DIAGNOSIS — F31.32 BIPOLAR I DISORDER, MOST RECENT EPISODE DEPRESSED, MODERATE: ICD-10-CM

## 2022-01-03 DIAGNOSIS — G93.32 COVID-19 LONG HAULER MANIFESTING CHRONIC FATIGUE: Primary | ICD-10-CM

## 2022-01-03 DIAGNOSIS — G47.20 SLEEP PATTERN DISTURBANCE: ICD-10-CM

## 2022-01-03 PROBLEM — Z91.81 AT LOW RISK FOR FALL: Status: RESOLVED | Noted: 2020-05-20 | Resolved: 2022-01-03

## 2022-01-03 PROBLEM — R53.81 PHYSICAL DECONDITIONING: Status: RESOLVED | Noted: 2020-05-20 | Resolved: 2022-01-03

## 2022-01-03 PROBLEM — R29.898 MUSCULAR DECONDITIONING: Status: RESOLVED | Noted: 2020-05-20 | Resolved: 2022-01-03

## 2022-01-03 LAB
ALBUMIN SERPL BCP-MCNC: 3.9 G/DL (ref 3.5–5.2)
ALP SERPL-CCNC: 59 U/L (ref 55–135)
ALT SERPL W/O P-5'-P-CCNC: 23 U/L (ref 10–44)
ANION GAP SERPL CALC-SCNC: 7 MMOL/L (ref 8–16)
AST SERPL-CCNC: 15 U/L (ref 10–40)
BASOPHILS # BLD AUTO: 0.03 K/UL (ref 0–0.2)
BASOPHILS NFR BLD: 0.6 % (ref 0–1.9)
BILIRUB SERPL-MCNC: 0.3 MG/DL (ref 0.1–1)
BUN SERPL-MCNC: 18 MG/DL (ref 8–23)
CALCIUM SERPL-MCNC: 9.5 MG/DL (ref 8.7–10.5)
CHLORIDE SERPL-SCNC: 104 MMOL/L (ref 95–110)
CO2 SERPL-SCNC: 30 MMOL/L (ref 23–29)
CREAT SERPL-MCNC: 0.7 MG/DL (ref 0.5–1.4)
DIFFERENTIAL METHOD: NORMAL
EOSINOPHIL # BLD AUTO: 0.1 K/UL (ref 0–0.5)
EOSINOPHIL NFR BLD: 2.6 % (ref 0–8)
ERYTHROCYTE [DISTWIDTH] IN BLOOD BY AUTOMATED COUNT: 12.7 % (ref 11.5–14.5)
EST. GFR  (AFRICAN AMERICAN): >60 ML/MIN/1.73 M^2
EST. GFR  (NON AFRICAN AMERICAN): >60 ML/MIN/1.73 M^2
GLUCOSE SERPL-MCNC: 81 MG/DL (ref 70–110)
HCT VFR BLD AUTO: 39.9 % (ref 37–48.5)
HGB BLD-MCNC: 12.8 G/DL (ref 12–16)
IMM GRANULOCYTES # BLD AUTO: 0.01 K/UL (ref 0–0.04)
IMM GRANULOCYTES NFR BLD AUTO: 0.2 % (ref 0–0.5)
LYMPHOCYTES # BLD AUTO: 1.6 K/UL (ref 1–4.8)
LYMPHOCYTES NFR BLD: 31.2 % (ref 18–48)
MCH RBC QN AUTO: 30.8 PG (ref 27–31)
MCHC RBC AUTO-ENTMCNC: 32.1 G/DL (ref 32–36)
MCV RBC AUTO: 96 FL (ref 82–98)
MONOCYTES # BLD AUTO: 0.4 K/UL (ref 0.3–1)
MONOCYTES NFR BLD: 8.7 % (ref 4–15)
NEUTROPHILS # BLD AUTO: 2.9 K/UL (ref 1.8–7.7)
NEUTROPHILS NFR BLD: 56.7 % (ref 38–73)
NRBC BLD-RTO: 0 /100 WBC
PLATELET # BLD AUTO: 178 K/UL (ref 150–450)
PMV BLD AUTO: 10.9 FL (ref 9.2–12.9)
POTASSIUM SERPL-SCNC: 4.6 MMOL/L (ref 3.5–5.1)
PROT SERPL-MCNC: 6.5 G/DL (ref 6–8.4)
RBC # BLD AUTO: 4.16 M/UL (ref 4–5.4)
SODIUM SERPL-SCNC: 141 MMOL/L (ref 136–145)
WBC # BLD AUTO: 5.06 K/UL (ref 3.9–12.7)

## 2022-01-03 PROCEDURE — 80053 COMPREHEN METABOLIC PANEL: CPT | Performed by: INTERNAL MEDICINE

## 2022-01-03 PROCEDURE — 1157F PR ADVANCE CARE PLAN OR EQUIV PRESENT IN MEDICAL RECORD: ICD-10-PCS | Mod: CPTII,S$GLB,, | Performed by: INTERNAL MEDICINE

## 2022-01-03 PROCEDURE — 97162 PT EVAL MOD COMPLEX 30 MIN: CPT | Mod: PN

## 2022-01-03 PROCEDURE — 1159F PR MEDICATION LIST DOCUMENTED IN MEDICAL RECORD: ICD-10-PCS | Mod: CPTII,S$GLB,, | Performed by: INTERNAL MEDICINE

## 2022-01-03 PROCEDURE — 3288F FALL RISK ASSESSMENT DOCD: CPT | Mod: CPTII,S$GLB,, | Performed by: INTERNAL MEDICINE

## 2022-01-03 PROCEDURE — 3288F PR FALLS RISK ASSESSMENT DOCUMENTED: ICD-10-PCS | Mod: CPTII,S$GLB,, | Performed by: INTERNAL MEDICINE

## 2022-01-03 PROCEDURE — 3074F SYST BP LT 130 MM HG: CPT | Mod: CPTII,S$GLB,, | Performed by: INTERNAL MEDICINE

## 2022-01-03 PROCEDURE — 99215 PR OFFICE/OUTPT VISIT, EST, LEVL V, 40-54 MIN: ICD-10-PCS | Mod: S$GLB,,, | Performed by: INTERNAL MEDICINE

## 2022-01-03 PROCEDURE — 99999 PR PBB SHADOW E&M-EST. PATIENT-LVL III: CPT | Mod: PBBFAC,,, | Performed by: INTERNAL MEDICINE

## 2022-01-03 PROCEDURE — 85025 COMPLETE CBC W/AUTO DIFF WBC: CPT | Performed by: INTERNAL MEDICINE

## 2022-01-03 PROCEDURE — 99999 PR PBB SHADOW E&M-EST. PATIENT-LVL III: ICD-10-PCS | Mod: PBBFAC,,, | Performed by: INTERNAL MEDICINE

## 2022-01-03 PROCEDURE — 3008F BODY MASS INDEX DOCD: CPT | Mod: CPTII,S$GLB,, | Performed by: INTERNAL MEDICINE

## 2022-01-03 PROCEDURE — 1157F ADVNC CARE PLAN IN RCRD: CPT | Mod: CPTII,S$GLB,, | Performed by: INTERNAL MEDICINE

## 2022-01-03 PROCEDURE — 3078F PR MOST RECENT DIASTOLIC BLOOD PRESSURE < 80 MM HG: ICD-10-PCS | Mod: CPTII,S$GLB,, | Performed by: INTERNAL MEDICINE

## 2022-01-03 PROCEDURE — 3008F PR BODY MASS INDEX (BMI) DOCUMENTED: ICD-10-PCS | Mod: CPTII,S$GLB,, | Performed by: INTERNAL MEDICINE

## 2022-01-03 PROCEDURE — 99215 OFFICE O/P EST HI 40 MIN: CPT | Mod: S$GLB,,, | Performed by: INTERNAL MEDICINE

## 2022-01-03 PROCEDURE — 3078F DIAST BP <80 MM HG: CPT | Mod: CPTII,S$GLB,, | Performed by: INTERNAL MEDICINE

## 2022-01-03 PROCEDURE — 3074F PR MOST RECENT SYSTOLIC BLOOD PRESSURE < 130 MM HG: ICD-10-PCS | Mod: CPTII,S$GLB,, | Performed by: INTERNAL MEDICINE

## 2022-01-03 PROCEDURE — 1100F PR PT FALLS ASSESS DOC 2+ FALLS/FALL W/INJURY/YR: ICD-10-PCS | Mod: CPTII,S$GLB,, | Performed by: INTERNAL MEDICINE

## 2022-01-03 PROCEDURE — 97110 THERAPEUTIC EXERCISES: CPT | Mod: PN

## 2022-01-03 PROCEDURE — 1159F MED LIST DOCD IN RCRD: CPT | Mod: CPTII,S$GLB,, | Performed by: INTERNAL MEDICINE

## 2022-01-03 PROCEDURE — 1100F PTFALLS ASSESS-DOCD GE2>/YR: CPT | Mod: CPTII,S$GLB,, | Performed by: INTERNAL MEDICINE

## 2022-01-03 PROCEDURE — 36415 COLL VENOUS BLD VENIPUNCTURE: CPT | Performed by: INTERNAL MEDICINE

## 2022-01-03 NOTE — PLAN OF CARE
"[facility]  Physical Therapy Initial Evaluation  Post-COVID Recovery    Name: Silvana Quezada  Clinic Number: [id number]    Therapy Diagnosis:   Encounter Diagnoses   Name Primary?    Post-COVID chronic decreased mobility and endurance     Impaired functional mobility, balance, gait, and endurance      Physician: Carol Oneil MD    Physician Orders: PT Eval and Treat  Medical Diagnosis from Referral: Z74.09,U09.9 (ICD-10-CM) - Post-COVID chronic decreased mobility and endurance; Z74.09 (ICD-10-CM) - Impaired functional mobility, balance, gait, and endurance  Evaluation Date: 1/3/2022  Authorization Period Expiration: 01/17/2022  Plan of Care Expiration: 3/4/2022  Visit # / Visits authorized: 1/ 6    Time In: 9:40AM  Time Out: 10:30AM  Total Billable Time: 50 minutes (1 mod eval; 1 TE)    Precautions: Standard, Fall and Multiple Sclerosis (do not over-heat or over-exert) and post-Covid (monitor vitals as needed)    Subjective   Date of onset: Worsening mobility/edurance over the past 1+ years  History of current condition - Silvana reports: She was diagnosed with MS about 30 years ago and contracted Covid-19 in March of 2020. She has been experiencing significant fatigue since Covid diagnosis. "I used to walk a mile every day" and now she is unable to perform daily tasks without fatigue. She has had two falls within the last 6 month; the one that she recalls involved a slip out of the tub. She endorses history of general imbalance as well.     Medical History:   Past Medical History:   Diagnosis Date    Bipolar 1 disorder     Breast cancer 2009    right-radiation & chemo    Breast cyst     Closed fracture of proximal end of right humerus 3/9/2015    Depression     History of right shoulder fracture     MS (multiple sclerosis)     presented as dizzines, dx vision,     Osteoporosis, unspecified     Pneumonia 3/27/2020       Surgical History:   Silvana Quezada  has a past surgical " history that includes Brain surgery (1991); Foot surgery (october 2013); Cystoscopy (N/A, 7/11/2018); Injection of botulinum toxin type A (N/A, 7/11/2018); Tonsillectomy; Cystoscopy (N/A, 4/10/2019); Injection of botulinum toxin type A (N/A, 4/10/2019); Open reduction and internal fixation (ORIF) of injury of wrist (Left, 12/27/2019); Breast biopsy (Left, 2009); Breast biopsy (Right); Breast lumpectomy (Right, 2001); Total Reduction Mammoplasty (Left, 11/5/2020); Hysterectomy; Cystoscopy (10/21/2021); and Injection of botulinum toxin type A (10/21/2021).    Medications:   Silvana has a current medication list which includes the following prescription(s): alendronate, bupropion, bupropion, calcium-vitamin d3, catheter, coenzyme q10, cyanocobalamin, d-mannose, fluphenazine, folic acid, and oxcarbazepine.    Allergies:   Review of patient's allergies indicates:   Allergen Reactions    Adhesive      blisters    Keflex [cephalexin] Rash    Cephalosporins       Imaging  - X-Ray Foot Left (11/21/2021): No convincing acute displaced fracture or dislocation of the foot.    Prior Therapy: At this location for similar complaints  Social History: Lives with   Occupation:   Prior Level of Function: [place]   Current Level of Function: Mod I but with worsening endurance  Dominant hand: Right handed     Pain:  Current 0/10, worst 2/10, best 0/10   Location: left wrist   Description: Aching  Aggravating Factors: wrist movement  Easing Factors: rest    Pts goals: Patient would like to improve walking capacity    Objective   Fatigue Symptom Inventory: Not collected today; collect at follow up    Resting vitals:  BP: 119/76mmHg   HR: 72  O2 sats: 99%    Postural examination/scapula alignment: Rounded shoulder bilaterally; significant forward head posture    Coordination  -Lower extremity: ABDIRAHMAN WNL  -Upper extremity: ABDIRAHMAN WNL  -Fine Motor: Impaired L opposition>R opposition    Tone: No abnormal tone/spasticity  noted in major muscle groups of BLE    Flexibility: Decreased flexibility noted at B piriformis, hamstrings, hip flexors, gastroc soleus complex    Strength: manual muscle test grades below     Lower Extremity Strength  Right LE  Left LE    Hip Flexion: 4+/5 Hip Flexion: 4/5   Hip ER:  4+/5 Hip ER: 4+/5   Hip IR: 5/5 Hip IR: 4+/5   Hip Abduction: 4+/5 Hip Abduction 4+/5   Knee Extension: 5/5 Knee Extension: 5/5   Knee Flexion: 4/5 Knee Flexion: 4/5   Ankle Dorsiflexion: 5/5 Ankle Dorsiflexion: 4+/5     Significant core weakness/control noted per direct observation during bed mobility performed for MMT    Gait Assessment:  - AD used: Non-adjustable (wooden) SPC   - Assistance: SBA  - 2 Minute Walk Test Distance: 205 feet with SPC (several pauses needed for brief rest breaks)  - 6 Minute Walk Test Distance: Unable to complete    GAIT DEVIATIONS:  Silvana displays the following deviations with ambulation: Flat foot contact (L>R); decreased self-selected walking speed; decreased hip extension bilaterally at terminal stance; forward flexed posture throughout    Endurance Assessment:     Evaluation Reassessment Reassessment   Timed Up and Go 25.2 sec with SPC     30 Second Chair Rise   10 reps with BUE support       Table: Population Norms for TUG    Age  Average TUG    60 - 69 years  8.1 seconds    70 - 79 years  9.2 seconds    80 - 99 years  11.3 seconds      Table: Normative Data 30 Second Chair Rise (by gender & age)        CMS Impairment/Limitation/Restriction for FOTO Endocrine, Metabolic and Immunity Disorders Survey: 43% (predicted 39%)    TREATMENT   Treatment Time In: 10:21AM  Treatment Time Out: 10:30AM  Total Treatment time separate from Evaluation: 9 minutes    Silvana received therapeutic exercises to develop strength and endurance for 9 minutes including:  -- Sit to stands from chair height, instruction only  -- 3-way hip x8 each direction, each leg  -- Standing heel raises 2x10    Home Exercises and Patient  Education Provided    Education provided:   - PT plan of care  - HEP instruction  - Blood pressure normal values    Written Home Exercises Provided: yes.  Exercises were reviewed and Silvana was able to demonstrate them prior to the end of the session.  Silvana demonstrated good  understanding of the education provided.     See EMR under Patient Instructions for exercises provided 1/3/2022.    Assessment   Silvana is a 66 y.o. female referred to outpatient Physical Therapy with a medical diagnosis of (1) Post-COVID chronic decreased mobility and endurance and (2) Impaired functional mobility, balance, gait, and endurance. Patient presents with mild bilateral lower extremity strength impairments; flexibility impairments of lower quarters; poor upright posture; decreased activity tolerance; impaired cardiovascular endurance; decreased lower extremity muscular power/impaired transfer status; observed deficits in core control; objective risk for falls per Timed Up and Go; impaired self-selected walking speed; and several gait deviations. She would benefit from skilled physical therapy services to improve general activity tolerance and endurance, improve general strength for ADL completion, and decrease risk for future falls/injury.    Pt prognosis is Good.   Pt will benefit from skilled outpatient Physical Therapy to address the deficits stated above and in the chart below, provide pt/family education, and to maximize pt's level of independence.     Plan of care discussed with patient: Yes  Pt's spiritual, cultural and educational needs considered and patient is agreeable to the plan of care and goals as stated below:     Anticipated Barriers for therapy: Comorbidities    Medical Necessity is demonstrated by the following  History  Co-morbidities and personal factors that may impact the plan of care Co-morbidities:   Bipolar 1 disorder   Breast cancer   right-radiation & chemo   Breast cyst   Closed fracture of proximal end  of right humerus   Depression   History of right shoulder fracture   MS (multiple sclerosis)   presented as dizzines, dx vision,    Osteoporosis, unspecified   Pneumonia     Personal Factors:   no deficits     high   Examination  Body Structures and Functions, activity limitations and participation restrictions that may impact the plan of care Body Regions:   back  lower extremities  upper extremities  trunk    Body Systems:    gross symmetry  ROM  strength  gross coordinated movement  balance  gait  transfers  transitions  motor control  motor learning  respiratory rate    Participation Restrictions:   Decreased quality of life secondary to impairments    Activity limitations:   Learning and applying knowledge  no deficits    General Tasks and Commands  undertaking multiple tasks    Communication  no deficits    Mobility  lifting and carrying objects  fine hand use (grasping/picking up)  walking  using transportation (bus, train, plane, car)  driving (bike, car, motorcycle)    Self care  washing oneself (bathing, drying, washing hands)  looking after one's health    Domestic Life  shopping  cooking  doing house work (cleaning house, washing dishes, laundry)  assisting others    Interactions/Relationships  no deficits    Life Areas  no deficits    Community and Social Life  community life  recreation and leisure         high   Clinical Presentation evolving clinical presentation with changing clinical characteristics moderate   Decision Making/ Complexity Score: moderate     Goals:  Short Term Goals:  4 weeks  1. Pt will be compliant with HEP in order to maximize PT benefits  2. Pt will complete TUG in </= 21 seconds with least restrictive assistive device in order to reduce risk for falls and improve safety with functional mobility  3. Pt will walk >/= 275 feet on Two-Minute Walk Test indoors in order to improve endurance for home mobility     Long Term Goals: 8 weeks  4. Pt will score </= 39% on FOTO limitation  survey in order to improve self-perception of functional mobility deficits  5. Pt will improve BLE MMT grades to 5/5 in major muscle groups in order to improve strength for ADL completion  6. Pt will complete TUG in </= 17 seconds with least restrictive assistive device in order to reduce risk for falls and improve safety with functional mobility  7. Pt will score >/= 12 repetitions on 30-Second Sit to  order to improve BLE endurance and muscular power for transfers   8. Pt will walk >/= 800 feet on Six-Minute Walk Test in order to improve endurance for community mobility   9. Pt will report 0 falls from initiation of PT management  10. Pt will begin some form of home/community fitness in order to sustain progress gained in PT      Plan   Plan of care Certification: 1/3/2022 to 3/4/2022.    Complete DGI for balance assessment and FSI for fatigue assessment at early follow up appointments    Outpatient Physical Therapy 2 times weekly for 8 weeks to include the following interventions: Gait Training, Manual Therapy, Moist Heat/ Ice, Neuromuscular Re-ed, Orthotic Management and Training, Patient Education, Self Care, Therapeutic Activities, Therapeutic Exercise and Modalities PRN.     LUIS M ISSA, PT

## 2022-01-04 ENCOUNTER — CLINICAL SUPPORT (OUTPATIENT)
Dept: REHABILITATION | Facility: HOSPITAL | Age: 67
End: 2022-01-04
Attending: PSYCHIATRY & NEUROLOGY
Payer: MEDICARE

## 2022-01-04 DIAGNOSIS — U09.9 POST-COVID CHRONIC DECREASED MOBILITY AND ENDURANCE: ICD-10-CM

## 2022-01-04 DIAGNOSIS — R53.83 OTHER FATIGUE: Primary | ICD-10-CM

## 2022-01-04 DIAGNOSIS — M62.81 MUSCLE WEAKNESS (GENERALIZED): ICD-10-CM

## 2022-01-04 DIAGNOSIS — Z74.09 IMPAIRED FUNCTIONAL MOBILITY, BALANCE, GAIT, AND ENDURANCE: ICD-10-CM

## 2022-01-04 DIAGNOSIS — Z74.09 POST-COVID CHRONIC DECREASED MOBILITY AND ENDURANCE: ICD-10-CM

## 2022-01-04 DIAGNOSIS — Z01.818 PRE-OP TESTING: ICD-10-CM

## 2022-01-04 DIAGNOSIS — R27.9 LACK OF COORDINATION: ICD-10-CM

## 2022-01-04 PROCEDURE — 97166 OT EVAL MOD COMPLEX 45 MIN: CPT | Mod: PN

## 2022-01-04 NOTE — PROGRESS NOTES
Subjective:       Patient ID: Silvana Quezada is a 66 y.o. female.    Chief Complaint: Consult (Covid long hamarivel)    66-year-old nonsmoking female here for a visit for long COVID clinic.  She contracted COVID back in March of 2020.  She has a past medical history significant for remote breast cancer, multiple sclerosis diagnosed in 1991 essentially in remission since 1995. MS unfortunately presented both with overflow incontinence as well as likely bringing out bipolar disorder. She has been on medications for bipolar for over 25 years and follows closely with Psychiatry.  Her present combination is Trileptal, Prolixin in the evening and she is on Wellbutrin twice daily in order to help prevent the lows/depression of bipolar disorder.      Her major complaint post COVID is exertional fatigue where her and her  were walking a mi 4-5 times per week and now she can barely walk half a mi and has to stop to rest frequently in order to complete the shorter walk.  She does not complain of shortness of breath with exertion.   Even with simple activities of daily living she fatigues early.  She is set to have a sleep test at home to rule out obstructive sleep apnea.  In addition she just started physical therapy.    Her  also notices brain fog in the form of a slowing mentally with decreased short term memory.   Patient when questioned does not really seem to notice this as a problem.    Of note she is also set up for a colonoscopy due to a p(ositive FIT test.     Review of Systems   Constitutional: Positive for activity change and fatigue. Negative for appetite change, chills, diaphoresis, fever and unexpected weight change.   HENT: Negative for nasal congestion, ear pain, mouth sores, postnasal drip, sinus pressure/congestion, sore throat and trouble swallowing.    Eyes: Negative for pain, redness and visual disturbance.   Respiratory: Negative for apnea, cough, chest tightness, shortness of breath and  wheezing.    Cardiovascular: Negative for chest pain, palpitations and leg swelling.   Gastrointestinal: Negative for abdominal distention, abdominal pain, blood in stool, constipation, diarrhea, nausea and vomiting.   Endocrine: Negative for cold intolerance, polydipsia, polyphagia and polyuria.   Genitourinary: Negative for difficulty urinating, dysuria, flank pain, frequency, hematuria, menstrual problem, pelvic pain and urgency.   Musculoskeletal: Negative for arthralgias, back pain, joint swelling and neck pain.   Integumentary:  Negative for color change, rash and wound.   Neurological: Negative for dizziness, tremors, seizures, syncope, weakness, light-headedness, numbness and headaches.   Hematological: Negative for adenopathy. Does not bruise/bleed easily.   Psychiatric/Behavioral: Positive for decreased concentration and sleep disturbance. Negative for confusion, dysphoric mood, hallucinations, self-injury and suicidal ideas. The patient is not nervous/anxious.          Objective:      Physical Exam  Constitutional:       Appearance: Normal appearance. She is normal weight.   HENT:      Head: Normocephalic and atraumatic.   Eyes:      General: No scleral icterus.  Cardiovascular:      Rate and Rhythm: Normal rate and regular rhythm.      Heart sounds: No murmur heard.      Pulmonary:      Effort: Pulmonary effort is normal.      Breath sounds: Normal breath sounds. No rales.   Abdominal:      General: Bowel sounds are normal. There is no distension.      Palpations: Abdomen is soft.      Tenderness: There is no abdominal tenderness.   Musculoskeletal:         General: No tenderness. Normal range of motion.      Cervical back: Normal range of motion and neck supple.   Neurological:      General: No focal deficit present.      Mental Status: She is alert and oriented to person, place, and time.   Psychiatric:         Behavior: Behavior normal.         Thought Content: Thought content normal.          "Judgment: Judgment normal.         Assessment:     History of COVID-19 3/2020  COVID-19 long hauler manifesting chronic fatigue  Comments:  main symptom of long covid and presents itself with severe exertional fatigue.   no shortness of breath or body pains  started covid PT and OT this week  Will message Dr Munoz and Dr Avila about possible decreasing either trileptal or prolixin in view of increased fatigue and in line with the BEERs criteria    Cognitive changes  Comments:  new since covid - to consider neurocognitive testing    Bipolar I disorder, most recent episode depressed, moderate  Comments:  multiple sclerosis triggered in 1991  presently welbutrin prevents the "down swings"   will discuss with Dr Munoz and Dr Avila if maybe Prolixin and/or trilept    Sleep pattern disturbance  Comments:  new since covid, wakes up early and lies in bed, goes back to sleep until  patient has an appt at the end of the month with mono Pandya with sleep disorders    Positive fecal immunochemical test  Comments:  set up for colonoscopy this week  cbc normal  Orders:  -     Comprehensive Metabolic Panel; Future; Expected date: 01/03/2022  -     CBC Auto Differential; Future; Expected date: 01/03/2022    Multiple sclerosis  Comments:  in remission; follows with Dr Giordano    Overflow incontinence of urine  Comments:  self caths and recieves botox with urology , Dr Ross    Time spent with the patient was 40 min and 50 percent of that was in face to face contact        "

## 2022-01-05 ENCOUNTER — TELEPHONE (OUTPATIENT)
Dept: ENDOSCOPY | Facility: HOSPITAL | Age: 67
End: 2022-01-05
Payer: MEDICARE

## 2022-01-05 ENCOUNTER — LAB VISIT (OUTPATIENT)
Dept: PRIMARY CARE CLINIC | Facility: CLINIC | Age: 67
End: 2022-01-05
Payer: MEDICARE

## 2022-01-05 DIAGNOSIS — Z01.818 PRE-OP TESTING: ICD-10-CM

## 2022-01-05 PROCEDURE — U0005 INFEC AGEN DETEC AMPLI PROBE: HCPCS | Performed by: INTERNAL MEDICINE

## 2022-01-05 PROCEDURE — U0003 INFECTIOUS AGENT DETECTION BY NUCLEIC ACID (DNA OR RNA); SEVERE ACUTE RESPIRATORY SYNDROME CORONAVIRUS 2 (SARS-COV-2) (CORONAVIRUS DISEASE [COVID-19]), AMPLIFIED PROBE TECHNIQUE, MAKING USE OF HIGH THROUGHPUT TECHNOLOGIES AS DESCRIBED BY CMS-2020-01-R: HCPCS | Performed by: INTERNAL MEDICINE

## 2022-01-05 NOTE — PLAN OF CARE
[facility] Occupational Therapy  Initial Neurological Evaluation     Date: 1/4/2022  Patient: Silvana Quezada  Chart Number: [id number]    Therapy Diagnosis:   Encounter Diagnoses   Name Primary?    Post-COVID chronic decreased mobility and endurance     Impaired functional mobility, balance, gait, and endurance     Muscle weakness (generalized)     Lack of coordination     Other fatigue Yes     Physician: Carol Oneil MD    Physician Orders: OT eval and treat  Medical Diagnosis:   Z74.09,U09.9 (ICD-10-CM) - Post-COVID chronic decreased mobility and endurance   Z74.09 (ICD-10-CM) - Impaired functional mobility, balance, gait, and endurance     Evaluation Date: 1/4/2022  Plan of Care Expiration Period: 3/1/2022  Insurance Authorization period Expiration: 1/17/2022  Date of Return to MD: unknown  Visit # / Visits Authorized: 1 / 6  FOTO: 1/3: 1/4/2022 - 50% limitation     Time In:09:37  Time Out: 10:15  Total Billable (one on one) Time: 38 minutes    Precautions: Standard, Fall and Multiple Sclerosis, hx of Breast CA     Subjective     History of Current Condition: fatigued ever since COVID a year and a half ago. Used to walk more than 1 mile a day but now she has difficlty walking the block. diagnosied with MS in 1991, lives with . husabnd cooks, able to do ADLs, difficutly with   naila zarate for 3 days, on O2 but was sent home without.       Dominant Side: Right  Date of Onset: MS diagnosis in 1991;   Surgical Procedure: none  Imaging: none   Previous Therapy: Silvana reports she has received therapy at this clinic for various conditions (Multiple Sclerosis and recent L wrist fracture)    Pain:  Pain Related Behaviors Observed: no ; pain in her hand due to wrist fracture from a fall outside. ORIF around a year ago 12/27/2019. She fell out of the bathtub reaching for a bar of soap. Piano   Functional Pain Scale Rating 0-10:   5/10 on average  Location: L wrist   Description:  "Throbbing and Grabbing  Aggravating Factors: Extension, Flexing and Lifting  Easing Factors: relaxation and rest      Functional Limitations/Social History:    Prior Level of Function: independent   Current Level of Function:mod I with ADLs, min A for IADLs     Home/Living environment : lives with their spouse  Home Access: SH , 1 steps to enter  DME: rolling walker, single point cane and shower chair     Leisure: piano, compose music, walking in neighboring, [place]     Driving: occasion, close distances to the house - gets nervous driving       Functional Mobility:  Bed mobility: Mod I  Roll to left: I  Roll to right: I  Supine to sit: Mod I  Sit to supine: I  Transfers to bed: Mod I  Transfers to toilet: I  Car transfers: I    ADL's:  Feeding: Mod I, difficulty with cutting food   Grooming: I  Hygiene: Mod I  UB Dressing: Mod I  LB Dressing: I  Toileting: I  Bathing: Mod I    IADL's:  Homecare: Max A  Cooking: D,  does the cooking   Laundry: I  Yard work: D, she does not do yard work due to MS  Use of telephone: I  Money management: I  Medication management: I  Handwriting:I  Technology Use: I, reports she can do it but doesn't understand the "inner workings" of the computer    Past Medical History/Physical Systems Review:     Past Medical History:  Silvana Quezada  has a past medical history of Bipolar 1 disorder, Breast cancer, Breast cyst, Closed fracture of proximal end of right humerus, Depression, History of right shoulder fracture, MS (multiple sclerosis), Osteoporosis, unspecified, and Pneumonia.    Past Surgical History:  Silvana Quezada  has a past surgical history that includes Brain surgery (1991); Foot surgery (october 2013); Cystoscopy (N/A, 7/11/2018); Injection of botulinum toxin type A (N/A, 7/11/2018); Tonsillectomy; Cystoscopy (N/A, 4/10/2019); Injection of botulinum toxin type A (N/A, 4/10/2019); Open reduction and internal fixation (ORIF) of injury of wrist (Left, 12/27/2019); Breast " biopsy (Left, 2009); Breast biopsy (Right); Breast lumpectomy (Right, 2001); Total Reduction Mammoplasty (Left, 11/5/2020); Hysterectomy; Cystoscopy (10/21/2021); and Injection of botulinum toxin type A (10/21/2021).    Current Medications:  Silvana has a current medication list which includes the following prescription(s): alendronate, bupropion, bupropion, calcium-vitamin d3, catheter, coenzyme q10, cyanocobalamin, fluphenazine, folic acid, and oxcarbazepine.    Allergies:  Review of patient's allergies indicates:   Allergen Reactions    Adhesive      blisters    Keflex [cephalexin] Rash    Cephalosporins         Objective     Cognitive Exam:  Oriented Person, Place, Time and Situation   Behaviors normal, cooperative   Follows Commands/attention: Follows multistep  commands   Communication clear/fluent   Memory No Deficits noted    Safety awareness/insight to disability aware of diagnosis, treatment, and prognosis   Coping skills/emotional control Appropriate to situation     Visual/Perceptual:  Comments: wears prescribed glasses, no new concerns at this time.     Physical Exam:  Postural examination/scapula alignment: Rounded shoulder and Head forward  Joint integrity: Firm end feeling  Skin integrity: none present   Edema: none present    Palpation: no pain with palpation    B upper extremity AROM = WNL     MMS for B upper extremities = 4/5 for all planes, fatigue after resistance       Fist: normal     Strength: (NBA Dynamometer in lbs.) Average 3 trials, Position II:     1/4/2022 1/4/2022   Rung II Right Left   Trial 1 35# 20#   Trial 2 32# 19#   Trial 3 26# 16#   Average 31# 18#     Normal  Average Values  Female Right Left Male: Right Left   20-29 66 lbs 62 lbs         94 lbs 86 lbs   30-39 68 lbs 64 lbs 90 lbs 82 lbs   40-49 64 lbs 62 lbs 80 lbs 74 lbs   50-59 62 lbs 57 lbs 72 lbs 65 lbs   60-69 53 lbs 51 lbs 63 lbs 56 lbs   70+ 44 lbs 42 lbs 54 lbs 48 lbs   SD = +/- 19lbs       Pinch Strength  (Measured in lbs)     1/4/2022 1/4/2022    Right Left   Key Pinch 8 # 6 #   3pt Pinch 8 # 3 #   2pt Pinch 5 # 2 #       Fine/Gross Motor Coordination    Assessment  Right   1/4/2022 Left  1/4/2022   9 hole peg test 9 pegs in/out for time 58.53 1:18.17   Willian grooved peg board Full board for time  NT NT   Box and blocks assessment  60 seconds, as many blocks as possible  NT NT   ABDIRAHMAN (Rapid Alternating Movements)     Impaired   Impaired   Finger to Nose (5 times)    Intact   Intact   Finger Flicks (coordination moving from digit flexion to digit extension)      Intact     Impaired       Sensation:  Silvana  reports no concerns with sensation at this time     Balance:   Static Sit - GOOD-: Takes MODERATE challenges from all directions but inconsistently  Dynamic sit- GOOD-: Takes MODERATE challenges from all directions but inconsistently  Static Stand - FAIR+: Able to take MINIMAL challenges from all directions  Dynamic stand - FAIR+: Able to take MINIMAL challenges from all directions    Endurance Deficit: moderate                    Functional Status        CMS Impairment/Limitation/Restriction for FOTO  Survey    Therapist reviewed FOTO scores for Silvana Quezada on 1/4/2022.   FOTO documents entered into EPIC - see Media section.    Limitation Score: 50%  Category: Carrying         Treatment     Time In:09:37  Time Out: 10:15  Total Treatment time separate from Evaluation time:0    Home Exercises and Patient Education Provided    Education provided:   -role of OT, goals for OT, scheduling/cancellations, insurance limitations with patient.    Written Home Exercises Provided: Patient instructed to cont prior HEP.  Exercises were reviewed and Silvana was able to demonstrate them prior to the end of the session.    Silvana demonstrated good  understanding of the education provided.     See EMR under Patient Instructions for exercises provided prior visit.    Assessment     Silvana Quezada is a 66 y.o. female referred to  outpatient occupational therapy and presents with a medical diagnosis of generalized weakness following COVID-19 diagnosis, resulting in decreased muscle strength and impaired function and demonstrates limitations as described in the chart below. Following medical record review it is determined that patient will benefit from occupational therapy services in order to maximize pain free and/or functional use of bilateral upper extremity .    Patient prognosis is Good due to  High motivation to participate with therapy, ability to follow skilled instruction and progress with therapy in the past.  Patient will benefit from skilled outpatient Occupational Therapy to address the deficits stated above and in the chart below, provide patient/family education, and to maximize patient's level of independence.     Plan of care discussed with patient: Yes  Patient's spiritual, cultural and educational needs considered and patient is agreeable to the plan of care and goals as stated below:     Anticipated Barriers for therapy: co-morbidities    Medical Necessity is demonstrated by the following  Profile and History Assessment of Occupational Performance Level of Clinical Decision Making Complexity Score   Occupational Profile:   Silvana Quezada is a 66 y.o. female who lives with their spouse and is currently unemployed. Silvana Quezada has difficulty with  grooming  driving/transportation management, phone/computer use, housework/household chores and leisure activities  affecting her daily functional abilities. Her main goal for therapy is to improve strength and endurance during daily tasks.     Comorbidities:   advanced age and Multiple sclerosis, see above list in [facility]    Medical and Therapy History Review:   Expanded               Performance Deficits    Physical:  Muscle Power/Strength  Muscle Endurance   Strength  Pinch Strength  Gross Motor Coordination  Fine Motor Coordination  Postural Control    Cognitive:  No  Deficits    Psychosocial:    Habits  Routines  Rituals     Clinical Decision Making:  moderate    Assessment Process:  Detailed Assessments    Modification/Need for Assistance:  Not Necessary    Intervention Selection:  Several Treatment Options       moderate  Based on PMHX, co morbidities , data from assessments and functional level of assistance required with task and clinical presentation directly impacting function.       The following goals were discussed with the patient and patient is in agreement with them as to be addressed in the treatment plan.     Patient's Goals for Therapy: to improve overall strength and endurance to return to leisure activities     Goals:     Short Term Goals: 4 weeks  1/4/2022   Pt will report compliance with HEP to maximize strength and endurance in B UEs    Pt will tolerate 10 minutes on UBE L2 in order to improve strength and endurance during meaningful activities     Pt will demonstrate understanding of energy conservation and fall prevention techniques         Long Term Goals: 6 weeks 1/4/2022   Pt will report improved ability to play the piano with decreased rest breaks due to fatigue using energy conservation techniques     Pt will score 60% or better on the FOTO by discharge in order to improve QOL and independence with meaningful activities     Pt will improve R/L  strength to 40/20# or better in order to increase safety and participation with managing containers, opening doors and perform leisure tasks     Pt will complete 9 hole peg assessment in :45 sec with R hand and 1 min or better using L UE in order to improve overall coordination with playing piano and crocheting,     Pt will maintain stance for 10 minutes while participating in dynamic therapeutic activities to improve safety during standing ADLs        Plan   Certification Period/Plan of care expiration: 1/4/2022 to 3/1/2022.     Outpatient Occupational Therapy 2 times weekly for 6 weeks to include the  following interventions: Neuromuscular Re-ed, Patient Education, Self Care, Therapeutic Activities and Therapeutic Exercise.    Corinne Rapier, OT      I certify the need for these services furnished under this plan of treatment and while under my care.  ____________________________________ Physician/Referring Practitioner   Date of Signature

## 2022-01-05 NOTE — TELEPHONE ENCOUNTER
Spoke with patient about arrival time @ 1010  Covid test = 01/05/2022    Prep instructions reviewed: the day before the procedure, follow a clear liquid diet all day, then start the first 1/2 of prep at 5pm and take 2nd 1/2 of prep @0510  Pt must be completely NPO when prep completed @0710             Medications: Do not take Insulin or oral diabetic medications the day of the procedure.  Take as prescribed: heart, seizure and blood pressure medication in the morning with a sip of water (less than an ounce).  Take any breathing medications and bring inhalers to hospital with you Leave all valuables and jewelry at home.     Wear comfortable clothes to procedure to change into hospital gown You cannot drive for 24 hours after your procedure because you will receive sedation for your procedure to make you comfortable.  A ride must be provided at discharge.

## 2022-01-06 ENCOUNTER — TELEPHONE (OUTPATIENT)
Dept: GASTROENTEROLOGY | Facility: CLINIC | Age: 67
End: 2022-01-06
Payer: MEDICARE

## 2022-01-06 LAB — SARS-COV-2 RNA RESP QL NAA+PROBE: NOT DETECTED

## 2022-01-06 NOTE — TELEPHONE ENCOUNTER
----- Message from Chata Haines sent at 1/6/2022 10:22 AM CST -----  Type:  Patient  Call    Who Called:    Would the patient rather a call back or a response via [facility]? Call back   Best Call Back Number: [phone]   Additional Information:  wants to know if his wife can take tylenol for her headache, she has a procedure on tomorrow and wanted to make sure

## 2022-01-07 ENCOUNTER — ANESTHESIA EVENT (OUTPATIENT)
Dept: ENDOSCOPY | Facility: HOSPITAL | Age: 67
End: 2022-01-07
Payer: MEDICARE

## 2022-01-07 ENCOUNTER — HOSPITAL ENCOUNTER (OUTPATIENT)
Facility: HOSPITAL | Age: 67
Discharge: HOME | End: 2022-01-07
Attending: INTERNAL MEDICINE | Admitting: INTERNAL MEDICINE
Payer: MEDICARE

## 2022-01-07 ENCOUNTER — ANESTHESIA (OUTPATIENT)
Dept: ENDOSCOPY | Facility: HOSPITAL | Age: 67
End: 2022-01-07
Payer: MEDICARE

## 2022-01-07 VITALS
RESPIRATION RATE: 16 BRPM | DIASTOLIC BLOOD PRESSURE: 68 MMHG | OXYGEN SATURATION: 98 % | TEMPERATURE: 98 F | BODY MASS INDEX: 31.48 KG/M2 | HEIGHT: 63 IN | HEART RATE: 70 BPM | SYSTOLIC BLOOD PRESSURE: 108 MMHG

## 2022-01-07 DIAGNOSIS — R19.5 POSITIVE FIT (FECAL IMMUNOCHEMICAL TEST): ICD-10-CM

## 2022-01-07 PROCEDURE — 88305 TISSUE EXAM BY PATHOLOGIST: CPT | Mod: 59 | Performed by: PATHOLOGY

## 2022-01-07 PROCEDURE — 25000003 PHARM REV CODE 250: Performed by: INTERNAL MEDICINE

## 2022-01-07 PROCEDURE — 37000009 HC ANESTHESIA EA ADD 15 MINS: Performed by: INTERNAL MEDICINE

## 2022-01-07 PROCEDURE — 37000008 HC ANESTHESIA 1ST 15 MINUTES: Performed by: INTERNAL MEDICINE

## 2022-01-07 PROCEDURE — 88305 TISSUE EXAM BY PATHOLOGIST: CPT | Mod: 26,,, | Performed by: PATHOLOGY

## 2022-01-07 PROCEDURE — 99203 OFFICE O/P NEW LOW 30 MIN: CPT | Mod: 25,,, | Performed by: INTERNAL MEDICINE

## 2022-01-07 PROCEDURE — 45385 COLONOSCOPY W/LESION REMOVAL: CPT | Mod: ,,, | Performed by: INTERNAL MEDICINE

## 2022-01-07 PROCEDURE — 63600175 PHARM REV CODE 636 W HCPCS: Performed by: NURSE ANESTHETIST, CERTIFIED REGISTERED

## 2022-01-07 PROCEDURE — 88305 TISSUE EXAM BY PATHOLOGIST: ICD-10-PCS | Mod: 26,,, | Performed by: PATHOLOGY

## 2022-01-07 PROCEDURE — 27201089 HC SNARE, DISP (ANY): Performed by: INTERNAL MEDICINE

## 2022-01-07 PROCEDURE — 45385 PR COLONOSCOPY,REMV LESN,SNARE: ICD-10-PCS | Mod: ,,, | Performed by: INTERNAL MEDICINE

## 2022-01-07 PROCEDURE — 27200997: Performed by: INTERNAL MEDICINE

## 2022-01-07 PROCEDURE — 45385 COLONOSCOPY W/LESION REMOVAL: CPT | Performed by: INTERNAL MEDICINE

## 2022-01-07 PROCEDURE — 25000003 PHARM REV CODE 250: Performed by: NURSE ANESTHETIST, CERTIFIED REGISTERED

## 2022-01-07 PROCEDURE — 99203 PR OFFICE/OUTPT VISIT, NEW, LEVL III, 30-44 MIN: ICD-10-PCS | Mod: 25,,, | Performed by: INTERNAL MEDICINE

## 2022-01-07 RX ORDER — SODIUM CHLORIDE 0.9 % (FLUSH) 0.9 %
10 SYRINGE (ML) INJECTION
Status: DISCONTINUED | OUTPATIENT
Start: 2022-01-07 | End: 2022-01-07 | Stop reason: HOSPADM

## 2022-01-07 RX ORDER — LIDOCAINE HYDROCHLORIDE 20 MG/ML
INJECTION, SOLUTION EPIDURAL; INFILTRATION; INTRACAUDAL; PERINEURAL
Status: DISCONTINUED | OUTPATIENT
Start: 2022-01-07 | End: 2022-01-07

## 2022-01-07 RX ORDER — PROPOFOL 10 MG/ML
VIAL (ML) INTRAVENOUS CONTINUOUS PRN
Status: DISCONTINUED | OUTPATIENT
Start: 2022-01-07 | End: 2022-01-07

## 2022-01-07 RX ORDER — PROPOFOL 10 MG/ML
VIAL (ML) INTRAVENOUS
Status: DISCONTINUED | OUTPATIENT
Start: 2022-01-07 | End: 2022-01-07

## 2022-01-07 RX ORDER — SODIUM CHLORIDE 9 MG/ML
INJECTION, SOLUTION INTRAVENOUS CONTINUOUS
Status: DISCONTINUED | OUTPATIENT
Start: 2022-01-07 | End: 2022-01-07 | Stop reason: HOSPADM

## 2022-01-07 RX ADMIN — PROPOFOL 60 MG: 10 INJECTION, EMULSION INTRAVENOUS at 12:01

## 2022-01-07 RX ADMIN — LIDOCAINE HYDROCHLORIDE 50 MG: 20 INJECTION, SOLUTION EPIDURAL; INFILTRATION; INTRACAUDAL; PERINEURAL at 12:01

## 2022-01-07 RX ADMIN — SODIUM CHLORIDE: 0.9 INJECTION, SOLUTION INTRAVENOUS at 11:01

## 2022-01-07 RX ADMIN — PROPOFOL 150 MCG/KG/MIN: 10 INJECTION, EMULSION INTRAVENOUS at 12:01

## 2022-01-07 NOTE — H&P
Short Stay Endoscopy History and Physical    PCP - Dakotah Avila III, MD    Procedure - Colonoscopy  ASA - per anesthesia  Mallampati - per anesthesia  History of Anesthesia problems - no  Family history Anesthesia problems - no   Plan of anesthesia - General    HPI:  This is a 66 y.o. female here for evaluation of :   Positive FIT    Never had colonoscopy ; doesn't see blood; history of constipation. FIT test positive. No abd pain. No vomiting. No radiating symptoms.      ROS:  Constitutional: No fevers, chills, No weight loss  CV: No chest pain  Pulm: No cough, No shortness of breath  GI: see HPI  Derm: No rash    Medical History:  has a past medical history of Bipolar 1 disorder, Breast cancer (2001), Breast cyst, Closed fracture of proximal end of right humerus (3/9/2015), Depression, History of right shoulder fracture, MS (multiple sclerosis), Osteoporosis, unspecified, Pneumonia (3/27/2020), and Wrist fracture (left).    Surgical History:  has a past surgical history that includes Brain surgery (1991); Foot surgery (october 2013); Cystoscopy (N/A, 7/11/2018); Injection of botulinum toxin type A (N/A, 7/11/2018); Tonsillectomy; Cystoscopy (N/A, 4/10/2019); Injection of botulinum toxin type A (N/A, 4/10/2019); Open reduction and internal fixation (ORIF) of injury of wrist (Left, 12/27/2019); Breast biopsy (Left, 2009); Breast biopsy (Right); Breast lumpectomy (Right, 2001); Total Reduction Mammoplasty (Left, 11/5/2020); Hysterectomy; Cystoscopy (10/21/2021); and Injection of botulinum toxin type A (10/21/2021).    Family History: family history includes Anesthesia problems in her daughter; Autism in her daughter and son; Breast cancer in her maternal aunt, mother, and sister; Dementia in her brother; Diabetes in her brother; Emphysema in her father; No Known Problems in her brother; Skin cancer in her mother; Stroke in her mother.. Otherwise no colon cancer, inflammatory bowel disease, or GI  malignancies.    Social History:  reports that she has never smoked. She has never used smokeless tobacco. She reports current alcohol use of about 1.7 standard drinks of alcohol per week. She reports that she does not use drugs.    Review of patient's allergies indicates:   Allergen Reactions    Adhesive      blisters    Keflex [cephalexin] Rash    Cephalosporins        Medications:   Medications Prior to Admission   Medication Sig Dispense Refill Last Dose    buPROPion (WELLBUTRIN SR) 150 MG TBSR 12 hr tablet Take 1 tablet (150 mg total) by mouth every evening. 90 tablet 1 1/7/2022 at Unknown time    calcium-vitamin D3 (OS-IGOR 500 + D3) 500 mg-5 mcg (200 unit) per tablet Take 1 tablet by mouth 2 (two) times daily with meals.   1/7/2022 at Unknown time    catheter (FEMALE CATHETER) 14 Fr [facility] Patient is to self catheterize four times a day indefinitley using female 12 fr in and out catheter for incomplete bladder emptying.   Dispense 120 month with 11 refills or 360 every 3 months with 3 refills depending on patient's preference 120 each 11 1/6/2022 at Unknown time    coenzyme Q10 10 mg capsule    1/6/2022 at Unknown time    cyanocobalamin (VITAMIN B-12) 1000 MCG tablet    1/6/2022 at Unknown time    fluphenazine (PROLIXIN) 1 MG tablet Take 2 tablets (2 mg total) by mouth nightly. 180 tablet 1 1/6/2022 at Unknown time    folic acid (FOLVITE) 1 MG tablet Take 1 tablet (1,000 mcg total) by mouth once daily. 90 tablet 1 1/7/2022 at Unknown time    OXcarbazepine (TRILEPTAL) 150 MG Tab TAKE 1 TABLET(150 MG) BY MOUTH TWICE DAILY 180 tablet 1 1/7/2022 at Unknown time    alendronate (FOSAMAX) 70 MG tablet TAKE 1 TABLET BY MOUTH EVERY 7 DAYS 24 tablet 0 1/1/2022    buPROPion (WELLBUTRIN SR) 100 MG TBSR 12 hr tablet Take 2 tablets (200 mg total) by mouth every morning. 180 tablet 1          Physical Exam:    Vital Signs:   Vitals:    01/07/22 1137   BP: 139/72   Pulse: 78   Resp: 18   Temp: 97.7 °F (36.5 °C)        General Appearance: Well appearing in no acute distress  Eyes:    No scleral icterus  ENT: Neck supple, Lips, mucosa, and tongue normal; teeth and gums normal  Abdomen: Soft, non tender, non distended with positive bowel sounds. No hepatosplenomegaly, ascites, or mass.  Extremities: 2+ pulses, no clubbing, cyanosis or edema  Skin: No rash      Labs:  Lab Results   Component Value Date    WBC 5.06 01/03/2022    HGB 12.8 01/03/2022    HCT 39.9 01/03/2022     01/03/2022    CHOL 208 (H) 06/14/2021    TRIG 123 06/14/2021    HDL 66 06/14/2021    ALT 23 01/03/2022    AST 15 01/03/2022     01/03/2022    K 4.6 01/03/2022     01/03/2022    CREATININE 0.7 01/03/2022    BUN 18 01/03/2022    CO2 30 (H) 01/03/2022    TSH 2.669 12/14/2021    INR 1.0 12/13/2019       Open access colonoscopy for positive FIT Test.    I have explained the risks and benefits of endoscopy procedures to the patient including but not limited to bleeding, perforation, infection, and death.  The patient was asked if they understand and allowed to ask any further questions to their satisfaction.    Shiva Walsh MD

## 2022-01-07 NOTE — PLAN OF CARE
Dr Walsh @ bedside to discuss procedure findings. Discharge instructions given. All questions answered. Ambulatory at bedside. PIV removed per order. VSS. Free from nausea and pain. Follow-up care instructions given. Verbalized understanding. Wheeled to front of hospital w/ tech.

## 2022-01-07 NOTE — DISCHARGE INSTRUCTIONS
Patient Education       Colon Polypectomy Discharge Instructions   About this topic   The colon is also called the large intestine. It is a long, hollow tube at the end of your digestive tract. It absorbs water from solid waste and changes it from liquid to a solid bowel movement.  A colon polyp is a growth of extra tissue that is not normally in your colon. Colon polyps do not often cause any signs. Most colon polyps are not cancer, but some polyps may turn into cancer. The doctor takes the polyps out during a procedure called a colonoscopy and sends them to the lab for a check to make sure there is no cancer.  You may have a colon polyp or multiple polyps removed. The doctor will send them to the lab to see what type they are. If a polyp is very large, it may need to be removed by surgery.     What care is needed at home?   · Ask your doctor what you need to do when you go home. Make sure you ask questions if you do not understand what the doctor says.  · Do not drive for 24 hours after a colonoscopy.  · Take your drugs as ordered by your doctor.  · Go back to your normal diet unless your doctor has told you to make some changes in your diet.  · Rest  What follow-up care is needed?   · Your doctor may ask you to make visits to the office to check on your progress. Be sure to keep these visits.  · Your doctor may suggest you get tested regularly. People with colon polyps need to have a colonoscopy regularly to check for the growth of new polyps.  · Some polyps may not be removed and more surgery may be needed.  · The results of the polyp testing will be given to you at one of these visits.  What drugs may be needed?   The doctor may order drugs to:  · Prevent hard stools  · Help with pain  · Reduce your risk of colon polyps or colon cancer  Will physical activity be limited?   You may feel sleepy after the colonoscopy. Try to get some rest.  What can be done to prevent this health problem?   · Have regular  colonoscopies.  · Eat foods high in fiber.  · Eat foods low in fat.  · Limit your intake of beer, wine, and mixed drinks (alcohol).  · Ask your doctor or dietitian for a diet that is right for you. Include calcium in your diet. Good sources of calcium include milk, cheese, and yogurt.  When do I need to call the doctor?   · Signs of infection. These include a fever of 100.4°F (38°C) or higher, chills, and anal itching or pain.  · Bleeding from rectum that gets worse  · Belly becomes swollen and sore  · Upset stomach and throwing up continues after you return home  · Not being able to move your bowels  · Weight loss without trying  · Blood in your stool  Teach Back: Helping You Understand   The Teach Back Method helps you understand the information we are giving you. After you talk with the staff, tell them in your own words what you learned. This helps to make sure the staff has described each thing clearly. It also helps to explain things that may have been confusing. Before going home, make sure you can do these:  · I can tell you about my condition.  · I can tell you what changes I need to make with my diet.  · I can tell you what I will do if my stomach is swollen, I have belly pain, or there is blood in my stool.  Where can I learn more?   [employer]  https://www.betterhealth.p[place].gov.au/health/ConditionsAndTreatments/colonoscopy   NHS  https://www.nhs.uk/conditions/bowel-polyps/   UpToDate  https://www.uptodate.com/contents/colon-polyps-beyond-the-basics   Last Reviewed Date   2021-03-10  Consumer Information Use and Disclaimer   This information is not specific medical advice and does not replace information you receive from your health care provider. This is only a brief summary of general information. It does NOT include all information about conditions, illnesses, injuries, tests, procedures, treatments, therapies, discharge instructions or life-style choices that may apply to you. You must talk with your  health care provider for complete information about your health and treatment options. This information should not be used to decide whether or not to accept your health care providers advice, instructions or recommendations. Only your health care provider has the knowledge and training to provide advice that is right for you.  Copyright   Copyright © 2021 [employer] and its affiliates and/or licensors. All rights reserved.

## 2022-01-07 NOTE — TRANSFER OF CARE
"Anesthesia Transfer of Care Note    Patient: Silvana Quezada    Procedure(s) Performed: Procedure(s) (LRB):  COLONOSCOPY (N/A)    Patient location: GI    Anesthesia Type: MAC    Transport from OR: Transported from OR on room air with adequate spontaneous ventilation    Post pain: adequate analgesia    Post assessment: no apparent anesthetic complications and tolerated procedure well    Post vital signs: stable    Level of consciousness: awake and alert    Nausea/Vomiting: no nausea/vomiting    Complications: none    Transfer of care protocol was followed      Last vitals:   Visit Vitals  /72 (BP Location: Left arm, Patient Position: Lying)   Pulse 78   Temp 36.5 °C (97.7 °F) (Temporal)   Resp 18   Ht 5' 3" (1.6 m)   LMP  (LMP Unknown)   SpO2 97%   Breastfeeding No   BMI 31.48 kg/m²     "

## 2022-01-07 NOTE — ANESTHESIA POSTPROCEDURE EVALUATION
Anesthesia Post Evaluation    Patient: Silvana Quezada    Procedure(s) Performed: Procedure(s) (LRB):  COLONOSCOPY (N/A)    Final Anesthesia Type: MAC      Patient location during evaluation: GI PACU  Patient participation: Yes- Able to Participate  Level of consciousness: awake and alert  Post-procedure vital signs: reviewed and stable  Pain management: adequate  Airway patency: patent    PONV status at discharge: No PONV  Anesthetic complications: no      Cardiovascular status: blood pressure returned to baseline and hemodynamically stable  Respiratory status: unassisted, spontaneous ventilation and room air  Hydration status: euvolemic  Follow-up not needed.          Vitals Value Taken Time   /60 01/07/22 1310   Temp 36 01/07/22 1310   Pulse 80 01/07/22 1310   Resp 14 01/07/22 1310   SpO2 100 01/07/22 1310         No case tracking events are documented in the log.      Pain/Yaakov Score: Yaakov Score: 8 (1/7/2022  1:05 PM)

## 2022-01-07 NOTE — PROVATION PATIENT INSTRUCTIONS
Discharge Summary/Instructions after an Endoscopic Procedure  Patient Name: Silvana Quezada  Patient MRN: [id number]  Patient YOB: 1955 Friday, January 7, 2022  Shiva Walsh MD  Dear patient,  As a result of recent federal legislation (The Federal Cures Act), you may   receive lab or pathology results from your procedure in your [facility]   account before your physician is able to contact you. Your physician or   their representative will relay the results to you with their   recommendations at their soonest availability.  Thank you,  Your health is very important to us during the Covid Crisis. Following your   procedure today, you will receive a daily text for 2 weeks asking about   signs or symptoms of Covid 19.  Please respond to this text when you   receive it so we can follow up and keep you as safe as possible.   RESTRICTIONS:  During your procedure today, you received medications for sedation.  These   medications may affect your judgment, balance and coordination.  Therefore,   for 24 hours, you have the following restrictions:   - DO NOT drive a car, operate machinery, make legal/financial decisions,   sign important papers or drink alcohol.    ACTIVITY:  Today: no heavy lifting, straining or running due to procedural   sedation/anesthesia.  The following day: return to full activity including work.  DIET:  Eat and drink normally unless instructed otherwise.     TREATMENT FOR COMMON SIDE EFFECTS:  - Mild abdominal pain, nausea, belching, bloating or excessive gas:  rest,   eat lightly and use a heating pad.  - Sore Throat: treat with throat lozenges and/or gargle with warm salt   water.  - Because air was used during the procedure, expelling large amounts of air   from your rectum or belching is normal.  - If a bowel prep was taken, you may not have a bowel movement for 1-3 days.    This is normal.  SYMPTOMS TO WATCH FOR AND REPORT TO YOUR PHYSICIAN:  1. Abdominal pain or bloating, other than gas  cramps.  2. Chest pain.  3. Back pain.  4. Signs of infection such as: chills or fever occurring within 24 hours   after the procedure.  5. Rectal bleeding, which would show as bright red, maroon, or black stools.   (A tablespoon of blood from the rectum is not serious, especially if   hemorrhoids are present.)  6. Vomiting.  7. Weakness or dizziness.  GO DIRECTLY TO THE NEAREST EMERGENCY ROOM IF YOU HAVE ANY OF THE FOLLOWING:      Difficulty breathing              Chills and/or fever over 101 F   Persistent vomiting and/or vomiting blood   Severe abdominal pain   Severe chest pain   Black, tarry stools   Bleeding- more than one tablespoon   Any other symptom or condition that you feel may need urgent attention  Your doctor recommends these additional instructions:  If any biopsies were taken, your doctors clinic will contact you in 1 to 2   weeks with any results.  - Discharge patient to home.   - Patient has a contact number available for emergencies.  The signs and   symptoms of potential delayed complications were discussed with the   patient.  Return to normal activities tomorrow.  Written discharge   instructions were provided to the patient.   - Resume previous diet.   - Continue present medications.   - Await pathology results.   - Repeat colonoscopy in 3 years for surveillance.   - No aspirin, ibuprofen, naproxen, or other non-steroidal anti-inflammatory   drugs for 1 week after polyp removal (unless absolutely needed for   cardiovascular protection).  For questions, problems or results please call your physician - Shiva Walsh MD.  EMERGENCY PHONE NUMBER: [phone],  LAB RESULTS: [phone]  IF A COMPLICATION OR EMERGENCY SITUATION ARISES AND YOU ARE UNABLE TO REACH   YOUR PHYSICIAN - GO DIRECTLY TO THE EMERGENCY ROOM.  Shiva Walsh MD  1/7/2022 1:04:59 PM  This report has been verified and signed electronically.  Dear patient,  As a result of recent federal legislation (The Federal  Cures Act), you may   receive lab or pathology results from your procedure in your [facility]   account before your physician is able to contact you. Your physician or   their representative will relay the results to you with their   recommendations at their soonest availability.  Thank you,  PROVATION

## 2022-01-07 NOTE — ANESTHESIA PREPROCEDURE EVALUATION
01/07/2022  Silvana Quezada is a 66 y.o., female   Here for colonoscopy under MAC. Mets limited 2/2 MS however patient denies episodes of chest pain, no h/o MI. MS stable no new symptoms in years per patient an no new medications. + Long haul COVID per patient for last year however states limited to fatigue. NPO approp.               Past Medical History:   Diagnosis Date    Bipolar 1 disorder      Breast cancer       right    Breast cyst      Closed fracture of proximal end of right humerus 3/9/2015    Depression      History of right shoulder fracture      MS (multiple sclerosis)       presented as dizzines, dx vision,     Osteoporosis, unspecified      Pneumonia 3/27/2020      Past Surgical History:   Procedure Laterality Date    BRAIN SURGERY  1991    BREAST BIOPSY Left 2009    core    BREAST BIOPSY Right     BREAST LUMPECTOMY Right 2001    CYSTOSCOPY N/A 7/11/2018    Procedure: CYSTOSCOPY;  Surgeon: Jayesh Ross MD;  Location: [facility] OR [address] [place] [address];  Service: Urology;  Laterality: N/A;  30 min    CYSTOSCOPY N/A 4/10/2019    Procedure: CYSTOSCOPY;  Surgeon: Jayesh Ross MD;  Location: [facility] OR [address] [place] [address];  Service: Urology;  Laterality: N/A;  30 MIN    CYSTOSCOPY  10/21/2021    Procedure: CYSTOSCOPY;  Surgeon: Jayesh Ross MD;  Location: [facility] OR [address] [place] [address];  Service: Urology;;    FOOT SURGERY  october 2013    HYSTERECTOMY      partial    INJECTION OF BOTULINUM TOXIN TYPE A N/A 7/11/2018    Procedure: INJECTION, BOTULINUM TOXIN, TYPE A 200 UNITS;  Surgeon: Jayesh Ross MD;  Location: [facility] OR [address] [place] [address];  Service: Urology;  Laterality: N/A;    INJECTION OF BOTULINUM TOXIN TYPE A N/A 4/10/2019    Procedure: INJECTION, BOTULINUM TOXIN, TYPE A 200 UNITS;  Surgeon: Jayesh Ross MD;  Location: [facility] OR [address] [place] [address];  Service: Urology;  Laterality: N/A;    INJECTION OF BOTULINUM TOXIN TYPE A  10/21/2021     Procedure: INJECTION, BOTULINUM TOXIN, 200units;  Surgeon: Jayesh Ross MD;  [facility];  Service: Urology;;    OPEN REDUCTION AND INTERNAL FIXATION (ORIF) OF INJURY OF WRIST Left 12/27/2019    Procedure: ORIF, WRIST;  Surgeon: Albert Schroeder Jr., MD;  Location[facility] OR;  Service: Orthopedics;  Laterality: Left;  ACUMED/ MINI C ARM Palomo Hughes notified/ Rk confirmed here in [facility] with Lacie 12/23/19 KB [id number]    TONSILLECTOMY      TOTAL REDUCTION MAMMOPLASTY Left 11/5/2020    Procedure: MAMMOPLASTY, REDUCTION, LEFT;  Surgeon: Kirk Tompkins MD;  [facility];  Service: Plastics;  Laterality: Left;  Left breast tissue weighed- 49 grams.     Review of patient's allergies indicates:   Allergen Reactions    Adhesive      blisters    Keflex [cephalexin] Rash    Cephalosporins           Review of Systems    Anesthesia Hx:  No problems with previous Anesthesia   Family Hx of Anesthesia complications.-Daughter woke up    Social:  Social Alcohol Use, Non-Smoker    Hematology/Oncology:  Cancer in past history:Right  Breast surgery, chemotherapy and radiation Oncology Comments: 2000-right Breast-S/P-Lumpectomy     EENT/Dental:  Wears glasses   Cardiovascular:  Exercise tolerance: good Walking daily-x1 block /day & hand held weight exercises /some housework  Pulmonary:  Pneumonia Shortness of breath Covid-19 Positive -3/2020 -admitedt at [facility] x3 days admit   Renal/:  Urge incontinence   Neurological:  Neuromuscular Disease, [id number] per week for wrist fracture in 12/2019 /Headaches-tension  Psych:  Psychiatric History depression Bipolar      Mile Leal RN  10/6/21                           .    Pre-op Assessment    I have reviewed the NPO Status.      Review of Systems  Anesthesia Hx:  No problems with previous Anesthesia    Hematology/Oncology:     Oncology Normal     EENT/Dental:EENT/Dental Normal   Cardiovascular:  Cardiovascular  Normal     Pulmonary:   See above   Renal/:  Renal/ Normal     Hepatic/GI:  Hepatic/GI Normal    Neurological:   Neuromuscular Disease,    Endocrine:  Endocrine Normal    Psych:   Psychiatric History anxiety depression          Physical Exam  General:  Cachexia    Airway/Jaw/Neck:  Airway Findings: Mouth Opening: Small, but > 3cm Tongue: Normal  General Airway Assessment: Adult  Mallampati: III  TM Distance: Normal, at least 6 cm      Dental:  Dental Findings: In tact, Periodontal disease, Mild   Chest/Lungs:  Chest/Lungs Findings: Normal Respiratory Rate     Heart/Vascular:  Heart Findings: Rate: Normal        Mental Status:  Mental Status Findings:  Cooperative, Alert and Oriented         Anesthesia Plan  Type of Anesthesia, risks & benefits discussed:  Anesthesia Type:  MAC    Patient's Preference:   Plan Factors:          Intra-op Monitoring Plan: standard ASA monitors  Intra-op Monitoring Plan Comments:   Post Op Pain Control Plan: per primary service following discharge from PACU  Post Op Pain Control Plan Comments:     Induction:   IV  Beta Blocker:  Patient is not currently on a Beta-Blocker (No further documentation required).       Informed Consent: Patient understands risks and agrees with Anesthesia plan.  Questions answered. Anesthesia consent signed with patient.  ASA Score: 3     Day of Surgery Review of History & Physical:            Ready For Surgery From Anesthesia Perspective.

## 2022-01-10 ENCOUNTER — TELEPHONE (OUTPATIENT)
Dept: ENDOSCOPY | Facility: HOSPITAL | Age: 67
End: 2022-01-10
Payer: MEDICARE

## 2022-01-10 ENCOUNTER — PES CALL (OUTPATIENT)
Dept: ADMINISTRATIVE | Facility: CLINIC | Age: 67
End: 2022-01-10
Payer: MEDICARE

## 2022-01-11 ENCOUNTER — CLINICAL SUPPORT (OUTPATIENT)
Dept: REHABILITATION | Facility: HOSPITAL | Age: 67
End: 2022-01-11
Payer: MEDICARE

## 2022-01-11 DIAGNOSIS — R27.9 LACK OF COORDINATION: ICD-10-CM

## 2022-01-11 DIAGNOSIS — M62.81 MUSCLE WEAKNESS (GENERALIZED): ICD-10-CM

## 2022-01-11 LAB
FINAL PATHOLOGIC DIAGNOSIS: NORMAL
GROSS: NORMAL
Lab: NORMAL

## 2022-01-11 PROCEDURE — 97110 THERAPEUTIC EXERCISES: CPT | Mod: PN

## 2022-01-11 PROCEDURE — 97530 THERAPEUTIC ACTIVITIES: CPT | Mod: PN

## 2022-01-11 NOTE — PATIENT INSTRUCTIONS
Please walk in safe environments without treacherous ground and walk with a family member if you have balance or CV concerns.  Walk when the weather permits at the cool hours of the day in the summer and during rainy seasons.  PT recommends a HR monitor or pedometer to track levels of fitness daily and weekly.  Please do not exceed heart rate max when walking.  See AHA for Heart rate range recommendations for someone your age.  Benita

## 2022-01-11 NOTE — PROGRESS NOTES
"  Occupational Therapy Daily Treatment Note     Name: Silvana Quezada  Clinic Number: [id number]    Therapy Diagnosis:   Encounter Diagnoses   Name Primary?    Muscle weakness (generalized)     Lack of coordination      Physician: Carol Oneil MD    Visit Date: 1/11/2022   Physician Orders: OT eval and treat  Medical Diagnosis:   Z74.09,U09.9 (ICD-10-CM) - Post-COVID chronic decreased mobility and endurance   Z74.09 (ICD-10-CM) - Impaired functional mobility, balance, gait, and endurance      Evaluation Date: 1/4/2022  Plan of Care Expiration Period: 3/1/2022  Insurance Authorization period Expiration: 1/17/2022  Date of Return to MD: unknown  Visit # / Visits Authorized: 2 / 6  FOTO: 1/3: 1/4/2022 - 50% limitation      Time In: 12:35  Time Out: 1:15  Total Billable (one on one) Time: 40 minutes     Precautions: Standard, Fall and Multiple Sclerosis, hx of Breast CA       Subjective     Pt reports: "I am good. My weekend was relaxing." "I noticed that when I look at my hand, I am able to do what I need to easier"  she was compliant with home exercise program given last session.   Response to previous treatment: first treatment since eval  Functional change: first treatment since eval    Pain: 0/10  Location: bilateral shoulder      Objective     B upper extremity AROM = WNL      MMS for B upper extremities = 4/5 for all planes, fatigue after resistance     Fist: normal      Strength: (NBA Dynamometer in lbs.) Average 3 trials, Position II:       1/4/2022 1/4/2022   Rung II Right Left   Trial 1 35# 20#   Trial 2 32# 19#   Trial 3 26# 16#   Average 31# 18#      Normal  Average Values  Female Right Left Male: Right Left   20-29 66 lbs 62 lbs         94 lbs 86 lbs   30-39 68 lbs 64 lbs 90 lbs 82 lbs   40-49 64 lbs 62 lbs 80 lbs 74 lbs   50-59 62 lbs 57 lbs 72 lbs 65 lbs   60-69 53 lbs 51 lbs 63 lbs 56 lbs   70+ 44 lbs 42 lbs 54 lbs 48 lbs   SD = +/- 19lbs         Pinch Strength (Measured in lbs)       1/4/2022 " 1/4/2022     Right Left   Key Pinch 8 # 6 #   3pt Pinch 8 # 3 #   2pt Pinch 5 # 2 #         Fine/Gross Motor Coordination     Assessment   Right   1/4/2022 Left  1/4/2022   9 hole peg test 9 pegs in/out for time 58.53 1:18.17   Willian grooved peg board Full board for time  NT NT   Box and blocks assessment  60 seconds, as many blocks as possible  NT NT   ABDIRAHMAN (Rapid Alternating Movements)       Impaired    Impaired   Finger to Nose (5 times)      Intact    Intact   Finger Flicks (coordination moving from digit flexion to digit extension)         Intact       Impaired         Sensation:  Silvana  reports no concerns with sensation at this time      Balance:   Static Sit - GOOD-: Takes MODERATE challenges from all directions but inconsistently  Dynamic sit- GOOD-: Takes MODERATE challenges from all directions but inconsistently  Static Stand - FAIR+: Able to take MINIMAL challenges from all directions  Dynamic stand - FAIR+: Able to take MINIMAL challenges from all directions     Endurance Deficit: moderate    Silvana received therapeutic exercises for 45 minutes including:  UBE L1 x5 minutes    scap AROM  Elevation/retraction/  posterior rotations  x15   1# dowel  Shoulder flexion/extension, press, BW rotation  2x10   HEP  Light pink putty Key pinch, gross grasp x10 each    In hand manipulation Spheres  2 minutes B hands               Home Exercises and Education Provided     Education provided:   - light pink putty issued; scap AROM,  information on energy conservation techniques   - Progress towards goals     Written Home Exercises Provided: yes.  Exercises were reviewed and Silvana was able to demonstrate them prior to the end of the session.  Silvana demonstrated good  understanding of the HEP provided.   .   See EMR under Patient Instructions for exercises provided 1/11/2022.        Assessment     Silvana tolerated today's session well. Interventions focused on B upper extremity strength, AROM and HEP. Pt educated on HEP to  include scap AROM and putty exercises. During dowel, she exhibited L lateral trunk lean. She reports her  has told her to sit up but she gets tired and goes to that L side. Cues provided for postural alignment, biomechanics and rest breaks to prevent muscle injury/fatigue. She had difficulty with in hand manipulation using L hand compared to R hand.     Silvana is progressing well towards her goals and there are no updates to goals at this time. Pt prognosis is Excellent.     Pt will continue to benefit from skilled outpatient occupational therapy to address the deficits listed in the problem list on initial evaluation provide pt/family education and to maximize pt's level of independence in the home and community environment.     Anticipated barriers to occupational therapy: co-morbidities    Pt's spiritual, cultural and educational needs considered and pt agreeable to plan of care and goals.    Goals:    Short Term Goals: 4 weeks  1/4/2022   Pt will report compliance with HEP to maximize strength and endurance in B UEs  Progressing 1/11/2022     Pt will tolerate 10 minutes on UBE L2 in order to improve strength and endurance during meaningful activities   Progressing 1/11/2022     Pt will demonstrate understanding of energy conservation and fall prevention techniques  Progressing 1/11/2022            Long Term Goals: 6 weeks 1/4/2022   Pt will report improved ability to play the piano with decreased rest breaks due to fatigue using energy conservation techniques   Progressing 1/11/2022     Pt will score 60% or better on the FOTO by discharge in order to improve QOL and independence with meaningful activities  Progressing 1/11/2022      Pt will improve R/L  strength to 40/20# or better in order to increase safety and participation with managing containers, opening doors and perform leisure tasks  Progressing 1/11/2022      Pt will complete 9 hole peg assessment in :45 sec with R hand and 1 min or better  using L UE in order to improve overall coordination with playing piano and crocheting,  Progressing 1/11/2022      Pt will maintain stance for 10 minutes while participating in dynamic therapeutic activities to improve safety during standing ADLs Progressing 1/11/2022             Plan   Certification Period/Plan of care expiration: 1/4/2022 to 3/1/2022.      Outpatient Occupational Therapy 2 times weekly for 6 weeks to include the following interventions: Neuromuscular Re-ed, Patient Education, Self Care, Therapeutic Activities and Therapeutic Exercise.    Updates/Grading for next session: continue with POC Corinne Rapier, OT

## 2022-01-11 NOTE — PROGRESS NOTES
[facility]   Physical Therapy Treatment Note     Name: Silvana Quezada  Clinic Number: [id number]    Therapy Diagnosis:   Encounter Diagnoses   Name Primary?    Muscle weakness (generalized)     Lack of coordination      Physician: Carol Oneil MD    Visit Date: 1/11/2022    Physician Orders: PT Eval and Treat  Medical Diagnosis from Referral: Z74.09,U09.9 (ICD-10-CM) - Post-COVID chronic decreased mobility and endurance; Z74.09 (ICD-10-CM) - Impaired functional mobility, balance, gait, and endurance  Evaluation Date: 1/3/2022  Authorization Period Expiration: 01/17/2022  Plan of Care Expiration: 3/4/2022  Visit # / Visits authorized: 1/ 6      PTA Visit #: 0/5     Time In: 1:15 PM  Time Out: 2:00 PM  Total Billable Time: 45 minutes    Precautions: Standard, Fall and Multiple Sclerosis (do not over-heat or over-exert) and post-Covid (monitor vitals as needed)       SUBJECTIVE     Pt reports: taking one block walk with  last night.  She was not compliant with home exercise program.  Response to previous treatment: no change after IE  Functional change: ongoing    Pain: 0/10  Location: N/A     OBJECTIVE     Objective Measures updated at progress report unless specified.       Dynamic Gait Index    *note: Measure a distance of 20 feet (~6 meters) for this assessment    1. Gait on level surface: 2. Mild impairment: Walks 20', uses assistive devices, slower speed, mild gait deviations.   2. Change in Gait speed: 2. Mild impairment: Is able to change speed, but demonstrates mild gait deviations, or no gait deviations but unable to achieve a signifcant change in velocity, or uses an assistive device.  3. Gait with Horizontal Head Turns: 2. Mild impairment: performs head turns smoothly with slight change in gait velocity, i.e. minor distuption to smooth gait path or uses walking aid.   4. Gait with Vertical Head Turns: 2. Mild impairment: Perform task with slight change in gait  "velocity ie minor disruption in smooth gait path or uses walking aid  5. Gait and Pivot turn: 1. Moderate impairment: Turns slowly, requires verbal cuing, requries several small steps to catch balance following turn and stop.  6. Step Over Obstacle: 1. Moderate impairment: Is able to step over the box but must stop, then, step over. May requrie verbal cueing  7. Step around obstacles: 1. Moderate impairment: Is able to clear cones but must significantly slow speed to accomplish task, or requires verbal cueing.  8. Steps: 2. Mild impairment: Alternating feet, must use rail.    Score: 13/24    Cutoffs:   < 19/24 = predictive of falls in the elderly  > 22/24 = safe ambulators    MDC:  Geriatric = 2.9 points  Chronic Stroke = 1.9 points  Parkinson's = 2.9 points  Vestibular = 4      Treatment     Silvana received the treatments listed below:      therapeutic exercises to develop strength, endurance and posture for 45 minutes including:    - seated LAQ 2 x 10 reps  - seated marching 2 x 10 reps  - seated iso hip add with ball 3" hold x 2 min  - seated hip abduction with RTB 2 x 10  - sit to stand from elevated mat 5 x 3 reps  - overground walking 150' x 2 with cuing for increase stride length and upright posture  - DGI completed    neuromuscular re-education activities to improve: Balance, Coordination and Proprioception for 0 minutes. The following activities were included:      gait training to improve functional mobility and safety for 0  minutes, including:        Patient Education and Home Exercises     Home Exercises Provided and Patient Education Provided     Education provided:   - daily walking program    Written Home Exercises Provided: yes. Exercises were reviewed and Silvana was able to demonstrate them prior to the end of the session.  Silvana demonstrated fair  understanding of the education provided. See EMR under Patient Instructions for exercises provided during therapy sessions    ASSESSMENT     Silvana " tolerated session fairly well, frequent rest periods needed between activities due to fatigue. Pt noted to list to left when sitting unsupported during there ex and had difficulty following directions to complete DGI, needing to complete activities multiple times to complete assessment. Skilled PT appropriate to address functional limitations and decreased strength and endurance.    Silvana Is progressing well towards her goals.   Pt prognosis is Good.     Pt will continue to benefit from skilled outpatient physical therapy to address the deficits listed in the problem list box on initial evaluation, provide pt/family education and to maximize pt's level of independence in the home and community environment.     Pt's spiritual, cultural and educational needs considered and pt agreeable to plan of care and goals.     Anticipated barriers to physical therapy: Comorbidities       Goals:   Short Term Goals:  4 weeks  1. Pt will be compliant with HEP in order to maximize PT benefits. (PROGRESSING, NOT MET)   2. Pt will complete TUG in </= 21 seconds with least restrictive assistive device in order to reduce risk for falls and improve safety with functional mobility. (PROGRESSING, NOT MET)   3. Pt will walk >/= 275 feet on Two-Minute Walk Test indoors in order to improve endurance for home mobility. (PROGRESSING, NOT MET)       Long Term Goals: 8 weeks  4. Pt will score </= 39% on FOTO limitation survey in order to improve self-perception of functional mobility deficits. (PROGRESSING, NOT MET)   5. Pt will improve BLE MMT grades to 5/5 in major muscle groups in order to improve strength for ADL completion. (PROGRESSING, NOT MET)   6. Pt will complete TUG in </= 17 seconds with least restrictive assistive device in order to reduce risk for falls and improve safety with functional mobility. (PROGRESSING, NOT MET)   7. Pt will score >/= 12 repetitions on 30-Second Sit to  order to improve BLE endurance and muscular  power for transfers. (PROGRESSING, NOT MET)    8. Pt will walk >/= 800 feet on Six-Minute Walk Test in order to improve endurance for community mobility. (PROGRESSING, NOT MET)    9. Pt will report 0 falls from initiation of PT management. (PROGRESSING, NOT MET)   10. Pt will begin some form of home/community fitness in order to sustain progress gained in PT. (PROGRESSING, NOT MET)       PLAN     Continue present POC with focus on strength, balance and activity tolerance.     Nithya Gross, PT

## 2022-01-13 ENCOUNTER — CLINICAL SUPPORT (OUTPATIENT)
Dept: REHABILITATION | Facility: HOSPITAL | Age: 67
End: 2022-01-13
Payer: MEDICARE

## 2022-01-13 DIAGNOSIS — R27.9 LACK OF COORDINATION: ICD-10-CM

## 2022-01-13 DIAGNOSIS — M62.81 MUSCLE WEAKNESS (GENERALIZED): ICD-10-CM

## 2022-01-13 DIAGNOSIS — Z74.09 IMPAIRED FUNCTIONAL MOBILITY, BALANCE, GAIT, AND ENDURANCE: ICD-10-CM

## 2022-01-13 PROCEDURE — 97110 THERAPEUTIC EXERCISES: CPT | Mod: PN

## 2022-01-13 PROCEDURE — 97530 THERAPEUTIC ACTIVITIES: CPT | Mod: PN

## 2022-01-13 NOTE — PROGRESS NOTES
"  Occupational Therapy Daily Treatment Note     Name: Silvana Quezada  Clinic Number: [id number]    Therapy Diagnosis:   Encounter Diagnoses   Name Primary?    Muscle weakness (generalized)     Lack of coordination      Physician: Carol Oneil MD    Visit Date: 1/13/2022   Physician Orders: OT eval and treat  Medical Diagnosis:   Z74.09,U09.9 (ICD-10-CM) - Post-COVID chronic decreased mobility and endurance   Z74.09 (ICD-10-CM) - Impaired functional mobility, balance, gait, and endurance      Evaluation Date: 1/4/2022  Plan of Care Expiration Period: 3/1/2022  Insurance Authorization period Expiration: 1/17/2022  Date of Return to MD: unknown  Visit # / Visits Authorized: 3 / 6  FOTO: 1/3: 1/4/2022 - 50% limitation      Time In: 2:00  Time Out: 2:45  Total Billable (one on one) Time: 45 minutes     Precautions: Standard, Fall and Multiple Sclerosis, hx of Breast CA       Subjective     Pt reports: "My hands are feeling better."  she was compliant with home exercise program given last session.   Response to previous treatment: good   Functional change: she reports improved use of hands     Pain: 0/10  Location: bilateral shoulder      Objective     B upper extremity AROM = WNL      MMS for B upper extremities = 4/5 for all planes, fatigue after resistance     Fist: normal      Strength: (NBA Dynamometer in lbs.) Average 3 trials, Position II:       1/4/2022 1/4/2022   Rung II Right Left   Trial 1 35# 20#   Trial 2 32# 19#   Trial 3 26# 16#   Average 31# 18#      Normal  Average Values  Female Right Left Male: Right Left   20-29 66 lbs 62 lbs         94 lbs 86 lbs   30-39 68 lbs 64 lbs 90 lbs 82 lbs   40-49 64 lbs 62 lbs 80 lbs 74 lbs   50-59 62 lbs 57 lbs 72 lbs 65 lbs   60-69 53 lbs 51 lbs 63 lbs 56 lbs   70+ 44 lbs 42 lbs 54 lbs 48 lbs   SD = +/- 19lbs         Pinch Strength (Measured in lbs)       1/4/2022 1/4/2022     Right Left   Key Pinch 8 # 6 #   3pt Pinch 8 # 3 #   2pt Pinch 5 # 2 # "         Fine/Gross Motor Coordination     Assessment   Right   1/4/2022 Left  1/4/2022   9 hole peg test 9 pegs in/out for time 58.53 1:18.17   Willian grooved peg board Full board for time  NT NT   Box and blocks assessment  60 seconds, as many blocks as possible  NT NT   ABDIRAHMAN (Rapid Alternating Movements)       Impaired    Impaired   Finger to Nose (5 times)      Intact    Intact   Finger Flicks (coordination moving from digit flexion to digit extension)         Intact       Impaired         Sensation:  Silvana  reports no concerns with sensation at this time      Balance:   Static Sit - GOOD-: Takes MODERATE challenges from all directions but inconsistently  Dynamic sit- GOOD-: Takes MODERATE challenges from all directions but inconsistently  Static Stand - FAIR+: Able to take MINIMAL challenges from all directions  Dynamic stand - FAIR+: Able to take MINIMAL challenges from all directions     Endurance Deficit: moderate    Silvana received therapeutic exercises for 45 minutes including:  UBE L1.5 x6:30 minutes    scap AROM  Elevation/retraction/  posterior rotations  x15   AROM Shoulder flexion, extension, horizontal abduction/ adduction  Shoulder press, external rotation/ internal rotation, abduction/adduction, biceps flexion/extension x10    Digit taps each finger  x10   Medium grippers  B hands 3 squeezes x3 each hand     In hand manipulation  Coins 3-4 at a time         Home Exercises and Education Provided     Education provided:   - light pink putty issued; scap AROM,  information on energy conservation techniques   - Progress towards goals     Written Home Exercises Provided: yes.  Exercises were reviewed and Silvana was able to demonstrate them prior to the end of the session.  Silvana demonstrated good  understanding of the HEP provided.   .   See EMR under Patient Instructions for exercises provided 1/11/2022.        Assessment     Silvana tolerated today's session well. Interventions focused on B upper extremity  strength, AROM and fine motor coordination in B hands. Silvana required multiple rest breaks with upper extremity exercises, specifically shoulder movements. Education provided about listening to her body and respecting fatigue. Good in hand manipulation with B hands, min cues for sequencing and recall of directions.     Silvana is progressing well towards her goals and there are no updates to goals at this time. Pt prognosis is Excellent.     Pt will continue to benefit from skilled outpatient occupational therapy to address the deficits listed in the problem list on initial evaluation provide pt/family education and to maximize pt's level of independence in the home and community environment.     Anticipated barriers to occupational therapy: co-morbidities    Pt's spiritual, cultural and educational needs considered and pt agreeable to plan of care and goals.    Goals:    Short Term Goals: 4 weeks  1/4/2022   Pt will report compliance with HEP to maximize strength and endurance in B UEs  Progressing 1/13/2022     Pt will tolerate 10 minutes on UBE L2 in order to improve strength and endurance during meaningful activities   Progressing 1/13/2022     Pt will demonstrate understanding of energy conservation and fall prevention techniques  Progressing 1/13/2022            Long Term Goals: 6 weeks 1/4/2022   Pt will report improved ability to play the piano with decreased rest breaks due to fatigue using energy conservation techniques   Progressing 1/13/2022     Pt will score 60% or better on the FOTO by discharge in order to improve QOL and independence with meaningful activities  Progressing 1/13/2022      Pt will improve R/L  strength to 40/20# or better in order to increase safety and participation with managing containers, opening doors and perform leisure tasks  Progressing 1/13/2022      Pt will complete 9 hole peg assessment in :45 sec with R hand and 1 min or better using L UE in order to improve overall  coordination with playing piano and crocheting,  Progressing 1/13/2022      Pt will maintain stance for 10 minutes while participating in dynamic therapeutic activities to improve safety during standing ADLs Progressing 1/13/2022             Plan   Certification Period/Plan of care expiration: 1/4/2022 to 3/1/2022.      Outpatient Occupational Therapy 2 times weekly for 6 weeks to include the following interventions: Neuromuscular Re-ed, Patient Education, Self Care, Therapeutic Activities and Therapeutic Exercise.    Updates/Grading for next session: continue with POC Corinne Rapier, OT

## 2022-01-13 NOTE — PROGRESS NOTES
"[facility]   Physical Therapy Treatment Note     Name: Silvana Quezada  Clinic Number: [id number]    Therapy Diagnosis:   Encounter Diagnosis   Name Primary?    Impaired functional mobility, balance, gait, and endurance      Physician: Carol Oneil MD    Visit Date: 1/13/2022    Physician Orders: PT Eval and Treat  Medical Diagnosis from Referral: Z74.09,U09.9 (ICD-10-CM) - Post-COVID chronic decreased mobility and endurance; Z74.09 (ICD-10-CM) - Impaired functional mobility, balance, gait, and endurance  Evaluation Date: 1/3/2022  Authorization Period Expiration: 01/17/2022  Plan of Care Expiration: 3/4/2022  Visit # / Visits authorized: 2/ 6 + eval    PTA Visit #: 0/5     Time In: 1:20PM (arrived late)  Time Out: 2:00PM  Total Billable Time: 40 minutes (3 TE)    Precautions: Standard, Fall and Multiple Sclerosis (do not over-heat or over-exert) and post-Covid (monitor vitals as needed)      SUBJECTIVE     Pt reports: she is feeling "very nice" and no reports of pain. She got up at 9:30AM this morning, which is good for her. Notes her fatigue is still present.  She was not compliant with home exercise program. She tried accessing an electronic copy of HEP, but was unable to locate it. Does not remember why she is not using her paper copy but she still has it.  Response to previous treatment: no adverse events  Functional change: ongoing    Pain: 0/10  Location: N/A     OBJECTIVE     Objective Measures updated at progress report unless specified.     Treatment     Silvana received the treatments listed below:      therapeutic exercises to develop strength, endurance and posture for 40 minutes including:     - seated LAQ 2 x 10 reps B  - seated marching 2 x 10 reps B  - seated iso hip add with ball 3" hold x 2 min  - seated hip abduction with RTB 2 x 10  - seated 90 degrees lift with 1kg ball and cueing for upright posture to engage core; 2 x 5 reps  - sit to stand from elevated mat 5 x 3 " reps  - seated heel raises 2 x 10  - overground walking 150' x 2 with cuing for increase stride length and upright posture    neuromuscular re-education activities to improve: Balance, Coordination and Proprioception for 0 minutes. The following activities were included:      gait training to improve functional mobility and safety for 0  minutes, including:      Patient Education and Home Exercises     Home Exercises Provided and Patient Education Provided     Education provided:   - daily walking program  - importance of rest breaks    Written Home Exercises Provided: yes. Exercises were reviewed and Silvana was able to demonstrate them prior to the end of the session.  Silvana demonstrated fair  understanding of the education provided. See EMR under Patient Instructions for exercises provided during therapy sessions    ASSESSMENT     Silvana presented to clinic with no physical pain and was agreeable to physical therapy. She was given adequate rest breaks in order to minimize fatigue during session. Between interventions she presented with increased chest breathing and decreased posture. Similar to previous note, she continues to lean toward the left when sitting unsupported during there ex. Required verbal cueing to improve posture, but patient still favors the left side as she fatigues. Overall, she tolerated session fairly well. Skilled PT appropriate to address functional limitations and decreased strength and endurance.    Silvana Is progressing well towards her goals.   Pt prognosis is Good.     Pt will continue to benefit from skilled outpatient physical therapy to address the deficits listed in the problem list box on initial evaluation, provide pt/family education and to maximize pt's level of independence in the home and community environment.     Pt's spiritual, cultural and educational needs considered and pt agreeable to plan of care and goals.     Anticipated barriers to physical therapy: Comorbidities      Goals:   Short Term Goals:  4 weeks  1. Pt will be compliant with HEP in order to maximize PT benefits. (PROGRESSING, NOT MET)   2. Pt will complete TUG in </= 21 seconds with least restrictive assistive device in order to reduce risk for falls and improve safety with functional mobility. (PROGRESSING, NOT MET)   3. Pt will walk >/= 275 feet on Two-Minute Walk Test indoors in order to improve endurance for home mobility. (PROGRESSING, NOT MET)       Long Term Goals: 8 weeks  4. Pt will score </= 39% on FOTO limitation survey in order to improve self-perception of functional mobility deficits. (PROGRESSING, NOT MET)   5. Pt will improve BLE MMT grades to 5/5 in major muscle groups in order to improve strength for ADL completion. (PROGRESSING, NOT MET)   6. Pt will complete TUG in </= 17 seconds with least restrictive assistive device in order to reduce risk for falls and improve safety with functional mobility. (PROGRESSING, NOT MET)   7. Pt will score >/= 12 repetitions on 30-Second Sit to  order to improve BLE endurance and muscular power for transfers. (PROGRESSING, NOT MET)    8. Pt will walk >/= 800 feet on Six-Minute Walk Test in order to improve endurance for community mobility. (PROGRESSING, NOT MET)    9. Pt will report 0 falls from initiation of PT management. (PROGRESSING, NOT MET)   10. Pt will begin some form of home/community fitness in order to sustain progress gained in PT. (PROGRESSING, NOT MET)       PLAN     Continue present POC with focus on strength, balance and activity tolerance.     Yeimy Her, SPT

## 2022-01-14 NOTE — PROCEDURES
"The sleep study that you ordered is complete.  You have ordered sleep LAB services to perform the sleep study for Silvana Quezada.     Please find Sleep Study result in  the "Media tab" of Chart Review menu.         You can look  for the report in the  Media as a procedure document type.    As the ordering provider, you are responsible for reviewing the results and implementing a treatment plan with your patient.     If you need a Sleep Medicine provider to explain the sleep study findings and arrange treatment for the patient, please refer patient for consultation to our Sleep Clinic via [facility] with Ambulatory Consult Sleep.     To do that please place an order for an  "Ambulatory Consult Sleep" - it will go to our clinic work queue for our Medical Assistant to contact the patient for an appointment.     For any questions, please contact our clinic staff at [phone] to talk to clinical staff.     "

## 2022-01-17 DIAGNOSIS — G47.33 OSA (OBSTRUCTIVE SLEEP APNEA): Primary | ICD-10-CM

## 2022-01-18 ENCOUNTER — CLINICAL SUPPORT (OUTPATIENT)
Dept: REHABILITATION | Facility: HOSPITAL | Age: 67
End: 2022-01-18
Payer: MEDICARE

## 2022-01-18 DIAGNOSIS — Z74.09 IMPAIRED FUNCTIONAL MOBILITY, BALANCE, GAIT, AND ENDURANCE: ICD-10-CM

## 2022-01-18 DIAGNOSIS — M62.81 MUSCLE WEAKNESS (GENERALIZED): ICD-10-CM

## 2022-01-18 DIAGNOSIS — R27.9 LACK OF COORDINATION: ICD-10-CM

## 2022-01-18 PROCEDURE — 97530 THERAPEUTIC ACTIVITIES: CPT | Mod: PN

## 2022-01-18 PROCEDURE — 97110 THERAPEUTIC EXERCISES: CPT | Mod: PN,CQ

## 2022-01-18 PROCEDURE — 97110 THERAPEUTIC EXERCISES: CPT | Mod: PN

## 2022-01-18 NOTE — PROGRESS NOTES
"  Occupational Therapy Daily Treatment Note     Name: Silvana Quezada  Clinic Number: [id number]    Therapy Diagnosis:   Encounter Diagnoses   Name Primary?    Muscle weakness (generalized)     Lack of coordination      Physician: No ref. provider found    Visit Date: 1/18/2022   Physician Orders: OT eval and treat  Medical Diagnosis:   Z74.09,U09.9 (ICD-10-CM) - Post-COVID chronic decreased mobility and endurance   Z74.09 (ICD-10-CM) - Impaired functional mobility, balance, gait, and endurance      Evaluation Date: 1/4/2022  Plan of Care Expiration Period: 3/1/2022  Insurance Authorization period Expiration: 1/17/2022  Date of Return to MD: unknown  Visit # / Visits Authorized: 4 / 6  FOTO: 1/3: 1/4/2022 - 50% limitation      Time In: 2:00  Time Out: 2:45  Total Billable (one on one) Time: 45 minutes     Precautions: Standard, Fall and Multiple Sclerosis, hx of Breast CA       Subjective     Pt reports: "My hands are feeling better."  she was compliant with home exercise program given last session.   Response to previous treatment: good   Functional change: she reports improved use of hands     Pain: 0/10  Location: bilateral shoulder      Objective     B upper extremity AROM = WNL      MMS for B upper extremities = 4/5 for all planes, fatigue after resistance     Fist: normal      Strength: (NBA Dynamometer in lbs.) Average 3 trials, Position II:       1/4/2022 1/4/2022 1/18/2022 1/18/2022    Rung II Right Left Right Left   Trial 1 35# 20# 44 19   Trial 2 32# 19# 35 17   Trial 3 26# 16# 31 23   Average 31# 18#        Normal  Average Values  Female Right Left Male: Right Left   20-29 66 lbs 62 lbs         94 lbs 86 lbs   30-39 68 lbs 64 lbs 90 lbs 82 lbs   40-49 64 lbs 62 lbs 80 lbs 74 lbs   50-59 62 lbs 57 lbs 72 lbs 65 lbs   60-69 53 lbs 51 lbs 63 lbs 56 lbs   70+ 44 lbs 42 lbs 54 lbs 48 lbs   SD = +/- 19lbs         Pinch Strength (Measured in lbs)       1/4/2022 1/4/2022       Right Left     Key Pinch 8 # 6 " # 6 8   3pt Pinch 8 # 3 # 5 3   2pt Pinch 5 # 2 # 5 3         Fine/Gross Motor Coordination     Assessment   Right   1/4/2022 Left  1/4/2022 Left  1/18/2022 Right  1/18/2022   9 hole peg test 9 pegs in/out for time 58.53 1:18.17 31 sec 45 sec   Willian grooved peg board Full board for time  NT NT     Box and blocks assessment  60 seconds, as many blocks as possible  NT NT     ABDIRAHMAN (Rapid Alternating Movements)       Impaired    Impaired     Finger to Nose (5 times)      Intact    Intact     Finger Flicks (coordination moving from digit flexion to digit extension)         Intact       Impaired           Sensation:  Silvana  reports no concerns with sensation at this time      Balance:   Static Sit - GOOD-: Takes MODERATE challenges from all directions but inconsistently  Dynamic sit- GOOD-: Takes MODERATE challenges from all directions but inconsistently  Static Stand - FAIR+: Able to take MINIMAL challenges from all directions  Dynamic stand - FAIR+: Able to take MINIMAL challenges from all directions     Endurance Deficit: moderate    Silvana received therapeutic exercises/activities for 45 minutes including:  UBE L1.5 x5 minutes    Reassessment  x10 minutes     HEP/AROM  BUE exercises all planes with pre/post workout stretches  x10 each    Reviewed/re-issued energy conservation     TA Pegs and peg board stretches  23# (R) with 5:10 stand    In/out with L hand no resistance              Home Exercises and Education Provided     Education provided:   -occupational therapy tool kit packet - energy conservation, upper extremity AROM and stretches   - light pink putty issued; scap AROM,  information on energy conservation techniques   - Progress towards goals     Written Home Exercises Provided: yes.  Exercises were reviewed and Silvana was able to demonstrate them prior to the end of the session.  Silvana demonstrated good  understanding of the HEP provided.   .   See EMR under Patient Instructions for exercises provided  1/11/2022.        Assessment     Silvana tolerated today's session well. Interventions focused on B upper extremity strength, AROM and fine motor coordination in B hands. HEP issued with AROM exercises, AG to maximize endurance in B upper extremities. Pt able to stand for 5 minutes during therapeutic activity before requiring rest break due to fatigue. During reassessment, she demonstrated significant improvement with fine motor coordination as evident of 9 hole peg test scores. She continues to present decreased /pinch strength and low activity tolerance, affecting her participation with leisure and IADLs.     Silvana is progressing well towards her goals and there are no updates to goals at this time. Pt prognosis is Good.     Pt will continue to benefit from skilled outpatient occupational therapy to address the deficits listed in the problem list on initial evaluation provide pt/family education and to maximize pt's level of independence in the home and community environment.     Anticipated barriers to occupational therapy: co-morbidities    Pt's spiritual, cultural and educational needs considered and pt agreeable to plan of care and goals.    Goals:    Short Term Goals: 4 weeks  1/4/2022   Pt will report compliance with HEP to maximize strength and endurance in B UEs  Progressing 1/18/2022     Pt will tolerate 10 minutes on UBE L2 in order to improve strength and endurance during meaningful activities   Progressing 1/18/2022  L1.5 x6 minutes    Pt will demonstrate understanding of energy conservation and fall prevention techniques  Progressing 1/18/2022            Long Term Goals: 6 weeks 1/4/2022   Pt will report improved ability to play the piano with decreased rest breaks due to fatigue using energy conservation techniques   Progressing 1/18/2022     Pt will score 60% or better on the FOTO by discharge in order to improve QOL and independence with meaningful activities  Progressing 1/18/2022      Pt will  improve R/L  strength to 40/20# or better in order to increase safety and participation with managing containers, opening doors and perform leisure tasks  Progressing 1/18/2022      Pt will complete 9 hole peg assessment in :45 sec with R hand and 1 min or better using L UE in order to improve overall coordination with playing piano and crocheting,  Met 1/18/2022    Pt will maintain stance for 10 minutes while participating in dynamic therapeutic activities to improve safety during standing ADLs Progressing 1/18/2022   5 min 10 sec              Plan   Certification Period/Plan of care expiration: 1/4/2022 to 3/1/2022.      Outpatient Occupational Therapy 2 times weekly for 6 weeks to include the following interventions: Neuromuscular Re-ed, Patient Education, Self Care, Therapeutic Activities and Therapeutic Exercise.    Updates/Grading for next session: continue with POC Corinne Rapier, OT

## 2022-01-18 NOTE — PROGRESS NOTES
"[facility]   Physical Therapy Treatment Note     Name: Silvana Quezada  Clinic Number: [id number]    Therapy Diagnosis:   Encounter Diagnosis   Name Primary?    Impaired functional mobility, balance, gait, and endurance      Physician: No ref. provider found    Visit Date: 1/18/2022    Physician Orders: PT Eval and Treat  Medical Diagnosis from Referral: Z74.09,U09.9 (ICD-10-CM) - Post-COVID chronic decreased mobility and endurance; Z74.09 (ICD-10-CM) - Impaired functional mobility, balance, gait, and endurance  Evaluation Date: 1/3/2022  Authorization Period Expiration: 01/17/2022  Plan of Care Expiration: 3/4/2022  Visit # / Visits authorized: 2/ 6      PTA Visit #: 1/5     Time In: 4:15 PM  Time Out: 5:00 PM  Total Billable Time: 45 minutes    Precautions: Standard, Fall and Multiple Sclerosis (do not over-heat or over-exert) and post-Covid (monitor vitals as needed)       SUBJECTIVE     Pt reports: taking one block walk with  in evenings but requires numerous standing "preventative rest breaks" to get there and back  She was not compliant with home exercise program.  Response to previous treatment: no change after IE  Functional change: ongoing    Pain: 0/10  Location: N/A     OBJECTIVE     Objective Measures updated at progress report unless specified.       Dynamic Gait Index    *note: Measure a distance of 20 feet (~6 meters) for this assessment    1. Gait on level surface: 2. Mild impairment: Walks 20', uses assistive devices, slower speed, mild gait deviations.   2. Change in Gait speed: 2. Mild impairment: Is able to change speed, but demonstrates mild gait deviations, or no gait deviations but unable to achieve a signifcant change in velocity, or uses an assistive device.  3. Gait with Horizontal Head Turns: 2. Mild impairment: performs head turns smoothly with slight change in gait velocity, i.e. minor distuption to smooth gait path or uses walking aid.   4. Gait with " "Vertical Head Turns: 2. Mild impairment: Perform task with slight change in gait velocity ie minor disruption in smooth gait path or uses walking aid  5. Gait and Pivot turn: 1. Moderate impairment: Turns slowly, requires verbal cuing, requries several small steps to catch balance following turn and stop.  6. Step Over Obstacle: 1. Moderate impairment: Is able to step over the box but must stop, then, step over. May requrie verbal cueing  7. Step around obstacles: 1. Moderate impairment: Is able to clear cones but must significantly slow speed to accomplish task, or requires verbal cueing.  8. Steps: 2. Mild impairment: Alternating feet, must use rail.    Score: 13/24    Cutoffs:   < 19/24 = predictive of falls in the elderly  > 22/24 = safe ambulators    MDC:  Geriatric = 2.9 points  Chronic Stroke = 1.9 points  Parkinson's = 2.9 points  Vestibular = 4      Treatment     Silvana received the treatments listed below:      therapeutic exercises to develop strength, endurance and posture for 45 minutes including:    - seated LAQ 2 x 10 reps  - seated marching 2 x 10 reps  - seated iso hip add with ball 3" hold x 2 min  - seated hip abduction with RTB 2 x 10  - straight leg raise 2 x 10 B  - bridges 2 x 10  - clamshells 2 x 10 B  - sit to stand from elevated mat 5 x 3 reps  - overground walking 300' with cuing for increase stride length and upright posture    neuromuscular re-education activities to improve: Balance, Coordination and Proprioception for 0 minutes. The following activities were included:      gait training to improve functional mobility and safety for 0  minutes, including:        Patient Education and Home Exercises     Home Exercises Provided and Patient Education Provided     Education provided:   - daily walking program    Written Home Exercises Provided: yes. Exercises were reviewed and Silvana was able to demonstrate them prior to the end of the session.  Silvana demonstrated fair  understanding of the " education provided. See EMR under Patient Instructions for exercises provided during therapy sessions    ASSESSMENT     Silvana arrived to session without any complaints of pain but was agreeable to treatment.  Session consisted of BLE strengthening and improving exercise endurance and standing tolerance.  Silvana required multiple seated water breaks throughout session for what she described as preventative in nature to avoid fatigue.  She was able to ambulate 300 feet without any rest break and required verbal cuing for postural awareness and symmetrical step length.  She reported the appropriate amount of fatigue upon completion of session and denied any pain.    Silvana Is progressing well towards her goals.   Pt prognosis is Good.     Pt will continue to benefit from skilled outpatient physical therapy to address the deficits listed in the problem list box on initial evaluation, provide pt/family education and to maximize pt's level of independence in the home and community environment.     Pt's spiritual, cultural and educational needs considered and pt agreeable to plan of care and goals.     Anticipated barriers to physical therapy: Comorbidities       Goals:   Short Term Goals:  4 weeks  1. Pt will be compliant with HEP in order to maximize PT benefits. (PROGRESSING, NOT MET)   2. Pt will complete TUG in </= 21 seconds with least restrictive assistive device in order to reduce risk for falls and improve safety with functional mobility. (PROGRESSING, NOT MET)   3. Pt will walk >/= 275 feet on Two-Minute Walk Test indoors in order to improve endurance for home mobility. (PROGRESSING, NOT MET)       Long Term Goals: 8 weeks  4. Pt will score </= 39% on FOTO limitation survey in order to improve self-perception of functional mobility deficits. (PROGRESSING, NOT MET)   5. Pt will improve BLE MMT grades to 5/5 in major muscle groups in order to improve strength for ADL completion. (PROGRESSING, NOT MET)   6. Pt will  complete TUG in </= 17 seconds with least restrictive assistive device in order to reduce risk for falls and improve safety with functional mobility. (PROGRESSING, NOT MET)   7. Pt will score >/= 12 repetitions on 30-Second Sit to  order to improve BLE endurance and muscular power for transfers. (PROGRESSING, NOT MET)    8. Pt will walk >/= 800 feet on Six-Minute Walk Test in order to improve endurance for community mobility. (PROGRESSING, NOT MET)    9. Pt will report 0 falls from initiation of PT management. (PROGRESSING, NOT MET)   10. Pt will begin some form of home/community fitness in order to sustain progress gained in PT. (PROGRESSING, NOT MET)       PLAN     Continue present POC with focus on strength, balance and activity tolerance.     Kirstin Nuñez, PTA

## 2022-01-25 ENCOUNTER — CLINICAL SUPPORT (OUTPATIENT)
Dept: REHABILITATION | Facility: HOSPITAL | Age: 67
End: 2022-01-25
Payer: MEDICARE

## 2022-01-25 ENCOUNTER — DOCUMENTATION ONLY (OUTPATIENT)
Dept: REHABILITATION | Facility: HOSPITAL | Age: 67
End: 2022-01-25

## 2022-01-25 DIAGNOSIS — M62.81 MUSCLE WEAKNESS (GENERALIZED): ICD-10-CM

## 2022-01-25 DIAGNOSIS — R27.9 LACK OF COORDINATION: ICD-10-CM

## 2022-01-25 PROCEDURE — 97530 THERAPEUTIC ACTIVITIES: CPT | Mod: PN

## 2022-01-25 PROCEDURE — 97110 THERAPEUTIC EXERCISES: CPT | Mod: PN

## 2022-01-25 NOTE — PROGRESS NOTES
"  Occupational Therapy Daily Treatment Note     Name: Silvana Quezada  Clinic Number: [id number]    Therapy Diagnosis:   Encounter Diagnoses   Name Primary?    Muscle weakness (generalized)     Lack of coordination      Physician: Jhonatan Avila   Visit Date: 1/25/2022   Physician Orders: OT eval and treat  Medical Diagnosis:   Z74.09,U09.9 (ICD-10-CM) - Post-COVID chronic decreased mobility and endurance   Z74.09 (ICD-10-CM) - Impaired functional mobility, balance, gait, and endurance      Evaluation Date: 1/4/2022  Plan of Care Expiration Period: 3/1/2022  Insurance Authorization period Expiration: 1/17/2022  Date of Return to MD: unknown  Visit # / Visits Authorized: 5 / 6  FOTO: 1/3: 1/4/2022 - 50% limitation      Time In: 3:30  Time Out: 4:15  Total Billable (one on one) Time: 45 minutes     Precautions: Standard, Fall and Multiple Sclerosis, hx of Breast CA       Subjective     Pt reports: "I am doing good. I feel better"  she was compliant with home exercise program given last session.   Response to previous treatment: good   Functional change: she reports improved use of hands     Pain: 0/10  Location: bilateral shoulder      Objective     B upper extremity AROM = WNL      MMS for B upper extremities = 4/5 for all planes, fatigue after resistance     Fist: normal      Strength: (NBA Dynamometer in lbs.) Average 3 trials, Position II:       1/4/2022 1/4/2022 1/18/2022 1/18/2022    Rung II Right Left Right Left   Trial 1 35# 20# 44 19   Trial 2 32# 19# 35 17   Trial 3 26# 16# 31 23   Average 31# 18#        Normal  Average Values  Female Right Left Male: Right Left   20-29 66 lbs 62 lbs         94 lbs 86 lbs   30-39 68 lbs 64 lbs 90 lbs 82 lbs   40-49 64 lbs 62 lbs 80 lbs 74 lbs   50-59 62 lbs 57 lbs 72 lbs 65 lbs   60-69 53 lbs 51 lbs 63 lbs 56 lbs   70+ 44 lbs 42 lbs 54 lbs 48 lbs   SD = +/- 19lbs         Pinch Strength (Measured in lbs)       1/4/2022 1/4/2022 1/18/2022 1/18/2022     Right Left Right  Left "   Simon Pinch 8 # 6 # 6# 8#   3pt Pinch 8 # 3 # 5# 3#   2pt Pinch 5 # 2 # 5# 3#         Fine/Gross Motor Coordination     Assessment   Right   1/4/2022 Left  1/4/2022 Left  1/18/2022 Right  1/18/2022   9 hole peg test 9 pegs in/out for time 58.53 1:18.17 31 sec 45 sec         Sensation:  Silvana  reports no concerns with sensation at this time      Balance:   Static Sit - GOOD-: Takes MODERATE challenges from all directions but inconsistently  Dynamic sit- GOOD-: Takes MODERATE challenges from all directions but inconsistently  Static Stand - FAIR+: Able to take MINIMAL challenges from all directions  Dynamic stand - FAIR+: Able to take MINIMAL challenges from all directions     Endurance Deficit: moderate    Silvana received therapeutic exercises/activities for 45 minutes including:  UBE L2 x10 minutes    Strengthening  2# dowel shoulder flexion/extension/press/posterior row 2x10    Standing activity  velcro cards  x13 minutes                         Home Exercises and Education Provided     Education provided:   -occupational therapy tool kit packet - energy conservation, upper extremity AROM and stretches   - light pink putty issued; scap AROM,  information on energy conservation techniques   - Progress towards goals     Written Home Exercises Provided: yes.  Exercises were reviewed and Silvana was able to demonstrate them prior to the end of the session.  Silvana demonstrated good  understanding of the HEP provided.   .   See EMR under Patient Instructions for exercises provided 1/11/2022.        Assessment     Silvana tolerated today's session well. Interventions focused on B upper extremity strength, ROM and generalized activity tolerance. Silvana was able to tolerate UBE L2 x10 minutes as well as participated in a therapeutic activity while standing for 13 minutes without rest breaks indicating improved endurance. Therapeutic activity facilitated functional reach, crossing midline, visual scanning, dynamic standing  balance, pinch strength, STM recall and multi step directions to improve safety in environment. Silvana reports compliance with HEP and will continue to progress with  strength and functional activities.     Silvana is progressing well towards her goals and there are no updates to goals at this time. Pt prognosis is Good.     Pt will continue to benefit from skilled outpatient occupational therapy to address the deficits listed in the problem list on initial evaluation provide pt/family education and to maximize pt's level of independence in the home and community environment.     Anticipated barriers to occupational therapy: co-morbidities    Pt's spiritual, cultural and educational needs considered and pt agreeable to plan of care and goals.    Goals:    Short Term Goals: 4 weeks  1/4/2022   Pt will report compliance with HEP to maximize strength and endurance in B UEs Met 1/25/2022   Pt will tolerate 10 minutes on UBE L2 in order to improve strength and endurance during meaningful activities   Met 1/25/2022    Pt will demonstrate understanding of energy conservation and fall prevention techniques  Met 1/25/2022          Long Term Goals: 6 weeks 1/4/2022   Pt will report improved ability to play the piano with decreased rest breaks due to fatigue using energy conservation techniques   Progressing 1/25/2022     Pt will score 60% or better on the FOTO by discharge in order to improve QOL and independence with meaningful activities  Progressing 1/25/2022      Pt will improve R/L  strength to 40/20# or better in order to increase safety and participation with managing containers, opening doors and perform leisure tasks  Partially Met 1/25/2022      Pt will complete 9 hole peg assessment in :45 sec with R hand and 1 min or better using L UE in order to improve overall coordination with playing piano and crocheting,  Met 1/18/2022    Pt will maintain stance for 10 minutes while participating in dynamic therapeutic  activities to improve safety during standing ADLs Met 1/25/2022             Plan   Certification Period/Plan of care expiration: 1/4/2022 to 3/1/2022.      Outpatient Occupational Therapy 2 times weekly for 6 weeks to include the following interventions: Neuromuscular Re-ed, Patient Education, Self Care, Therapeutic Activities and Therapeutic Exercise.    Updates/Grading for next session: continue with POC Corinne Rapier, OT

## 2022-01-26 NOTE — PROGRESS NOTES
Physical therapist and physical therapy assistant(s) met face to face on 01/25/2022 to discuss patient's treatment plan and progress towards established goals. Patient will be seen by a physical therapist minimally every 6th visit or every 30 days.    LUIS M ISSA PT

## 2022-02-14 ENCOUNTER — TELEPHONE (OUTPATIENT)
Dept: REHABILITATION | Facility: HOSPITAL | Age: 67
End: 2022-02-14
Payer: MEDICARE

## 2022-02-14 NOTE — TELEPHONE ENCOUNTER
Patient has had to cancel several PT/OT appointments recently because her 97-year-old mother in law has been ill. She will also have to cancel this week's appointments because a sleep study was scheduled at the same time. She plans to return to therapy next week.    Samia Hill, PT, DPT

## 2022-02-16 ENCOUNTER — PATIENT OUTREACH (OUTPATIENT)
Dept: ADMINISTRATIVE | Facility: OTHER | Age: 67
End: 2022-02-16
Payer: MEDICARE

## 2022-02-16 NOTE — PROGRESS NOTES
Health Maintenance Due   Topic Date Due    TETANUS VACCINE  Never done    High Dose Statin  Never done    Shingles Vaccine (1 of 2) Never done     Updates were requested from care everywhere.  Chart was reviewed for overdue Proactive [facility] Encounters (VIRIDIANA) topics (CRS, Breast Cancer Screening, Eye exam)  Health Maintenance has been updated.  LINKS immunization registry triggered.  Immunizations were reconciled.

## 2022-02-17 ENCOUNTER — OFFICE VISIT (OUTPATIENT)
Dept: SLEEP MEDICINE | Facility: CLINIC | Age: 67
End: 2022-02-17
Payer: MEDICARE

## 2022-02-17 VITALS
BODY MASS INDEX: 30.33 KG/M2 | HEART RATE: 75 BPM | WEIGHT: 171.19 LBS | SYSTOLIC BLOOD PRESSURE: 123 MMHG | DIASTOLIC BLOOD PRESSURE: 76 MMHG | HEIGHT: 63 IN

## 2022-02-17 DIAGNOSIS — G47.33 OSA (OBSTRUCTIVE SLEEP APNEA): ICD-10-CM

## 2022-02-17 DIAGNOSIS — G35 MS (MULTIPLE SCLEROSIS): Primary | ICD-10-CM

## 2022-02-17 PROCEDURE — 3078F DIAST BP <80 MM HG: CPT | Mod: CPTII,S$GLB,, | Performed by: NURSE PRACTITIONER

## 2022-02-17 PROCEDURE — 1126F AMNT PAIN NOTED NONE PRSNT: CPT | Mod: CPTII,S$GLB,, | Performed by: NURSE PRACTITIONER

## 2022-02-17 PROCEDURE — 1159F MED LIST DOCD IN RCRD: CPT | Mod: CPTII,S$GLB,, | Performed by: NURSE PRACTITIONER

## 2022-02-17 PROCEDURE — 3074F PR MOST RECENT SYSTOLIC BLOOD PRESSURE < 130 MM HG: ICD-10-PCS | Mod: CPTII,S$GLB,, | Performed by: NURSE PRACTITIONER

## 2022-02-17 PROCEDURE — 99204 PR OFFICE/OUTPT VISIT, NEW, LEVL IV, 45-59 MIN: ICD-10-PCS | Mod: S$GLB,,, | Performed by: NURSE PRACTITIONER

## 2022-02-17 PROCEDURE — 99999 PR PBB SHADOW E&M-EST. PATIENT-LVL III: ICD-10-PCS | Mod: PBBFAC,,, | Performed by: NURSE PRACTITIONER

## 2022-02-17 PROCEDURE — 3008F PR BODY MASS INDEX (BMI) DOCUMENTED: ICD-10-PCS | Mod: CPTII,S$GLB,, | Performed by: NURSE PRACTITIONER

## 2022-02-17 PROCEDURE — 99999 PR PBB SHADOW E&M-EST. PATIENT-LVL III: CPT | Mod: PBBFAC,,, | Performed by: NURSE PRACTITIONER

## 2022-02-17 PROCEDURE — 3008F BODY MASS INDEX DOCD: CPT | Mod: CPTII,S$GLB,, | Performed by: NURSE PRACTITIONER

## 2022-02-17 PROCEDURE — 99204 OFFICE O/P NEW MOD 45 MIN: CPT | Mod: S$GLB,,, | Performed by: NURSE PRACTITIONER

## 2022-02-17 PROCEDURE — 3288F PR FALLS RISK ASSESSMENT DOCUMENTED: ICD-10-PCS | Mod: CPTII,S$GLB,, | Performed by: NURSE PRACTITIONER

## 2022-02-17 PROCEDURE — 3078F PR MOST RECENT DIASTOLIC BLOOD PRESSURE < 80 MM HG: ICD-10-PCS | Mod: CPTII,S$GLB,, | Performed by: NURSE PRACTITIONER

## 2022-02-17 PROCEDURE — 1101F PR PT FALLS ASSESS DOC 0-1 FALLS W/OUT INJ PAST YR: ICD-10-PCS | Mod: CPTII,S$GLB,, | Performed by: NURSE PRACTITIONER

## 2022-02-17 PROCEDURE — 3288F FALL RISK ASSESSMENT DOCD: CPT | Mod: CPTII,S$GLB,, | Performed by: NURSE PRACTITIONER

## 2022-02-17 PROCEDURE — 1159F PR MEDICATION LIST DOCUMENTED IN MEDICAL RECORD: ICD-10-PCS | Mod: CPTII,S$GLB,, | Performed by: NURSE PRACTITIONER

## 2022-02-17 PROCEDURE — 3074F SYST BP LT 130 MM HG: CPT | Mod: CPTII,S$GLB,, | Performed by: NURSE PRACTITIONER

## 2022-02-17 PROCEDURE — 1101F PT FALLS ASSESS-DOCD LE1/YR: CPT | Mod: CPTII,S$GLB,, | Performed by: NURSE PRACTITIONER

## 2022-02-17 PROCEDURE — 1126F PR PAIN SEVERITY QUANTIFIED, NO PAIN PRESENT: ICD-10-PCS | Mod: CPTII,S$GLB,, | Performed by: NURSE PRACTITIONER

## 2022-02-17 PROCEDURE — 1157F ADVNC CARE PLAN IN RCRD: CPT | Mod: CPTII,S$GLB,, | Performed by: NURSE PRACTITIONER

## 2022-02-17 PROCEDURE — 1157F PR ADVANCE CARE PLAN OR EQUIV PRESENT IN MEDICAL RECORD: ICD-10-PCS | Mod: CPTII,S$GLB,, | Performed by: NURSE PRACTITIONER

## 2022-02-17 NOTE — PROGRESS NOTES
"Referred by Dr. Jhonatan Avila  CHIEF COMPLAINT: KEM mgt    HISTORY OF PRESENT ILLNESS:She was diagnosed with sleep apnea by study done at home 12/2021. +snores. Denies witnessed apneic pauses. Sleeps a lot during the daytime. MS stable. Doesn't drive much. Denies am headaches. Sleep is consolidated.     Denies symptoms of restless legs     On todays [place] Sleepiness Scale the patient scores a 4/24.     FAMILY HISTORY: No known sleep disorders.   SOCIAL HISTORY:     PHYSICAL EXAM:   /76   Pulse 75   Ht 5' 3" (1.6 m)   Wt 77.7 kg (171 lb 3.2 oz)   LMP  (LMP Unknown)   BMI 30.33 kg/m²   GENERAL: Obese body habitus, well groomed     HST 12/30/21 AHI 16(RDI 22)/low sat 80%      ASSESSMENT:   KEM, moderate ,r penny to begin PAP  She has medical comorbidities of MS. Warrants further investigation for untreated sleep apnea.     PLAN:   1. apap 5-12cm THS DME, rtc 4.5- 5 wk after setup adherence   2. Discussed etiology of KEM and potential ramifications of untreated KEM, including stroke, heart disease, HTN.  We discussed potential treatment options, which could include weight loss (10-15%), continuous positive airway pressure (CPAP-definitive), mandibular advancement splint by dentis.   3. See MS provider as advised/continue meds    "

## 2022-02-18 ENCOUNTER — TELEPHONE (OUTPATIENT)
Dept: REHABILITATION | Facility: HOSPITAL | Age: 67
End: 2022-02-18
Payer: MEDICARE

## 2022-02-18 NOTE — TELEPHONE ENCOUNTER
Phoned pt about yesterday's missed OT appt. No answer to mobile number and voicemail not set up to leave a message

## 2022-02-22 ENCOUNTER — CLINICAL SUPPORT (OUTPATIENT)
Dept: REHABILITATION | Facility: HOSPITAL | Age: 67
End: 2022-02-22
Payer: MEDICARE

## 2022-02-22 DIAGNOSIS — Z74.09 IMPAIRED FUNCTIONAL MOBILITY, BALANCE, GAIT, AND ENDURANCE: Primary | ICD-10-CM

## 2022-02-22 DIAGNOSIS — M62.81 MUSCLE WEAKNESS (GENERALIZED): Primary | ICD-10-CM

## 2022-02-22 DIAGNOSIS — R27.9 LACK OF COORDINATION: ICD-10-CM

## 2022-02-22 PROCEDURE — 97530 THERAPEUTIC ACTIVITIES: CPT | Mod: PN

## 2022-02-22 PROCEDURE — 97110 THERAPEUTIC EXERCISES: CPT | Mod: PN

## 2022-02-22 NOTE — PROGRESS NOTES
"  Occupational Therapy Daily Treatment Note     Name: Silvana Quezada  Clinic Number: [id number]    Therapy Diagnosis:   Encounter Diagnoses   Name Primary?    Muscle weakness (generalized) Yes    Lack of coordination      Physician: Jhonatan Avila   Visit Date: 2/22/2022   Physician Orders: OT eval and treat  Medical Diagnosis:   Z74.09,U09.9 (ICD-10-CM) - Post-COVID chronic decreased mobility and endurance   Z74.09 (ICD-10-CM) - Impaired functional mobility, balance, gait, and endurance      Evaluation Date: 1/4/2022  Plan of Care Expiration Period: 3/1/2022  Insurance Authorization period Expiration: 1/17/2022  Date of Return to MD: unknown  Visit # / Visits Authorized: 7 / 18  FOTO: 1/3: 1/4/2022 - 50% limitation      Time In: 2:00  Time Out: 2:45  Total Billable (one on one) Time: 45 minutes     Precautions: Standard, Fall and Multiple Sclerosis, hx of Breast CA       Subjective     Pt reports: "I am doing good. I haven't done the exercises as much as I should have."  she was compliant with home exercise program given last session.   Response to previous treatment: good   Functional change: she reports improved use of hands     Pain: 0/10  Location: bilateral shoulder      Objective     B upper extremity AROM = WNL      MMS for B upper extremities = 4/5 for all planes, fatigue after resistance     Fist: normal      Strength: (NBA Dynamometer in lbs.) Average 3 trials, Position II:       1/4/2022 1/4/2022 1/18/2022 1/18/2022 2/22/2022 2/22/2022   Rung II Right Left Right Left Right Left   Trial 1 35# 20# 44 19 41# 21#   Trial 2 32# 19# 35 17 35# 20#   Trial 3 26# 16# 31 23 35# 21#   Average 31# 18# 36# 19# 37# 20#      Normal  Average Values  Female Right Left Male: Right Left   20-29 66 lbs 62 lbs         94 lbs 86 lbs   30-39 68 lbs 64 lbs 90 lbs 82 lbs   40-49 64 lbs 62 lbs 80 lbs 74 lbs   50-59 62 lbs 57 lbs 72 lbs 65 lbs   60-69 53 lbs 51 lbs 63 lbs 56 lbs   70+ 44 lbs 42 lbs 54 lbs 48 lbs   SD = +/- " 19lbs         Pinch Strength (Measured in lbs)       1/4/2022 1/4/2022 1/18/2022 1/18/2022 2/22/2022 2/22/2022     Right Left Right  Left Right Left   Key Pinch 8 # 6 # 6# 8# 8# 7#   3pt Pinch 8 # 3 # 5# 3# 6# 4#   2pt Pinch 5 # 2 # 5# 3# 5# 3#         Fine/Gross Motor Coordination     Assessment   Right   1/4/2022 Left  1/4/2022 Left  1/18/2022 Right  1/18/2022   9 hole peg test 9 pegs in/out for time 58.53 1:18.17 31 sec 45 sec         Sensation:  Silvana  reports no concerns with sensation at this time      Balance:   Static Sit - GOOD-: Takes MODERATE challenges from all directions but inconsistently  Dynamic sit- GOOD-: Takes MODERATE challenges from all directions but inconsistently  Static Stand - FAIR+: Able to take MINIMAL challenges from all directions  Dynamic stand - FAIR+: Able to take MINIMAL challenges from all directions     Endurance Deficit: moderate    Silvana received therapeutic exercises/activities for 45 minutes including:  UBE L2 x10 minutes    Reassessment  x15 minutes    Dynamic standing and reaching activity  velcro cards on/off board  x10 minute stand x1   x3 minute stand 2nd trial                        Home Exercises and Education Provided     Education provided:   -occupational therapy tool kit packet - energy conservation, upper extremity AROM and stretches   - light pink putty issued; scap AROM,  information on energy conservation techniques   - Progress towards goals     Written Home Exercises Provided: yes.  Exercises were reviewed and Silvana was able to demonstrate them prior to the end of the session.  Silvana demonstrated good  understanding of the HEP provided.   .   See EMR under Patient Instructions for exercises provided 1/11/2022.        Assessment     Silvana tolerated today's session well. Interventions focused on reassessment , discharge planning, activity tolerance and pinch strength. Due to goals, break from therapy and pt report, therapist discussed upcoming end of POC and  discharge from OT. Silvana verbalized understanding and reported no questions or concerns at this time to discharge after next OT appointment (2/24/2022). She required RBs between exercise, reassessment and 2 stands with therapeutic activity. She was able to tolerate .5# wrist weight during TA while reaching in various planes, crossing midline. Silvana did exhibit a L lateral trunk lean while standing and required cues for upright posture.     Silvana is progressing well towards her goals and there are no updates to goals at this time. Pt prognosis is Good.     Pt will continue to benefit from skilled outpatient occupational therapy to address the deficits listed in the problem list on initial evaluation provide pt/family education and to maximize pt's level of independence in the home and community environment.     Anticipated barriers to occupational therapy: co-morbidities    Pt's spiritual, cultural and educational needs considered and pt agreeable to plan of care and goals.    Goals:    Short Term Goals: 4 weeks  1/4/2022   Pt will report compliance with HEP to maximize strength and endurance in B UEs Met 1/25/2022   Pt will tolerate 10 minutes on UBE L2 in order to improve strength and endurance during meaningful activities   Met 1/25/2022    Pt will demonstrate understanding of energy conservation and fall prevention techniques  Met 1/25/2022          Long Term Goals: 6 weeks 1/4/2022   Pt will report improved ability to play the piano with decreased rest breaks due to fatigue using energy conservation techniques   Met 2/22/2022     Pt will score 60% or better on the FOTO by discharge in order to improve QOL and independence with meaningful activities  Progressing 2/22/2022      Pt will improve R/L  strength to 40/20# or better in order to increase safety and participation with managing containers, opening doors and perform leisure tasks  Partially Met 2/22/2022      Pt will complete 9 hole peg assessment in  :45 sec with R hand and 1 min or better using L UE in order to improve overall coordination with playing piano and crocheting,  Met 1/18/2022    Pt will maintain stance for 10 minutes while participating in dynamic therapeutic activities to improve safety during standing ADLs Met 1/25/2022             Plan   Certification Period/Plan of care expiration: 1/4/2022 to 3/1/2022.      Outpatient Occupational Therapy 2 times weekly for 6 weeks to include the following interventions: Neuromuscular Re-ed, Patient Education, Self Care, Therapeutic Activities and Therapeutic Exercise.    Updates/Grading for next session: continue with POC, discharge next visit      Corinne Rapier, OT

## 2022-02-22 NOTE — PROGRESS NOTES
"[facility]   Physical Therapy Treatment Note     Name: Silvana Quezada  Clinic Number: [id number]    Therapy Diagnosis:   Encounter Diagnosis   Name Primary?    Impaired functional mobility, balance, gait, and endurance Yes     Physician: Carol Oneil MD    Visit Date: 2/22/2022    Physician Orders: PT Eval and Treat  Medical Diagnosis from Referral: Z74.09,U09.9 (ICD-10-CM) - Post-COVID chronic decreased mobility and endurance; Z74.09 (ICD-10-CM) - Impaired functional mobility, balance, gait, and endurance  Evaluation Date: 1/3/2022  Authorization Period Expiration: 01/17/2022  Plan of Care Expiration: 3/4/2022  Visit # / Visits authorized: 5/18  FOTO: 5/5 (next please)    PTA Visit #: 0/5     Time In: 2:45 PM  Time Out: 3:28 PM  Total Billable Time: 43 minutes (1TA, 2TE)    Precautions: Standard, Fall and Multiple Sclerosis (do not over-heat or over-exert) and post-Covid (monitor vitals as needed)     SUBJECTIVE     Patient reports: She had to take a month off of therapy to help take care of elderly mother in law. She was not active during break from PT and did not continue HEP. She understands now how important it is to keep up with daily exercise.  She was not compliant with home exercise program.  Response to previous treatment: Last session was over a month ago  Functional change: See goals    Pain: 0/10  Location: N/A     OBJECTIVE     Objective Measures updated at progress report unless specified.     Timed Up and Go: 13.7 seconds without AD    30-Second Sit to Stand: 9 repetitions with bilateral upper extremity push off    Two-Minute Walk Test: 303 feet     Treatment     Silvana received the treatments listed below:      therapeutic exercises to develop strength, endurance and posture for 26 minutes including:    - standing hip abduction 2x10B  - standing heel raises 2x10  - step up and overs 3x30" 4" step  - objective tests / measures (see above)    neuromuscular re-education " activities to improve: Balance, Coordination and Proprioception for 0 minutes. The following activities were included:    Therapeutic activity to improve functional performance for 17 minutes, including:    Patient education on RPE Scale and appropriate exercise parameters  Patient education ACSM / CDC exercise guidelines  Walking program parameters  Sit to stands 2x10 (second set with yellow theraband around distal thighs to minimize valgum)    Patient Education and Home Exercises     Home Exercises Provided and Patient Education Provided     Education provided:   - daily walking program    Written Home Exercises Provided: yes. Exercises were reviewed and Silvana was able to demonstrate them prior to the end of the session.  Silvana demonstrated fair  understanding of the education provided. See EMR under Patient Instructions for exercises provided during therapy sessions    ASSESSMENT     Silvana still occasionally self-limits in terms of challenging her self during therapy so large portion of today's session dedicated to RPE scale education and ACSM exercise recommendations for adults. Patient receptive to education on importance of home exercise in relation to goal achievement. With encouragement, she was able to tolerate reassessment and treatment with need for only two seated rest breaks. Despite gap in care due to family emergency, she met 3 functional goals today. She would benefit from continued PT services to improve standing tolerance and endurance but ultimate success contingent on HEP compliance.    Silvana Is progressing well towards her goals.   Pt prognosis is Good.     Pt will continue to benefit from skilled outpatient physical therapy to address the deficits listed in the problem list box on initial evaluation, provide pt/family education and to maximize pt's level of independence in the home and community environment.     Pt's spiritual, cultural and educational needs considered and pt agreeable to plan  of care and goals.     Anticipated barriers to physical therapy: Comorbidities       Goals:   Short Term Goals:  4 weeks  1. Pt will be compliant with HEP in order to maximize PT benefits. (PROGRESSING, NOT MET)   2. Pt will complete TUG in </= 21 seconds with least restrictive assistive device in order to reduce risk for falls and improve safety with functional mobility. MET  3. Pt will walk >/= 275 feet on Two-Minute Walk Test indoors in order to improve endurance for home mobility. MET     Long Term Goals: 8 weeks  4. Pt will score </= 39% on FOTO limitation survey in order to improve self-perception of functional mobility deficits. (PROGRESSING, NOT MET)   5. Pt will improve BLE MMT grades to 5/5 in major muscle groups in order to improve strength for ADL completion. (PROGRESSING, NOT MET)   6. Pt will complete TUG in </= 17 seconds with least restrictive assistive device in order to reduce risk for falls and improve safety with functional mobility. MET   7. Pt will score >/= 12 repetitions on 30-Second Sit to  order to improve BLE endurance and muscular power for transfers. (PROGRESSING, NOT MET)    8. Pt will walk >/= 800 feet on Six-Minute Walk Test in order to improve endurance for community mobility. (PROGRESSING, NOT MET)    9. Pt will report 0 falls from initiation of PT management. (PROGRESSING, NOT MET)   10. Pt will begin some form of home/community fitness in order to sustain progress gained in PT. (PROGRESSING, NOT MET)     PLAN     Continue present POC with focus on strength, balance and activity tolerance.     LUIS M ISSA, PT

## 2022-02-24 ENCOUNTER — CLINICAL SUPPORT (OUTPATIENT)
Dept: REHABILITATION | Facility: HOSPITAL | Age: 67
End: 2022-02-24
Payer: MEDICARE

## 2022-02-24 DIAGNOSIS — R27.9 LACK OF COORDINATION: ICD-10-CM

## 2022-02-24 DIAGNOSIS — M62.81 MUSCLE WEAKNESS (GENERALIZED): Primary | ICD-10-CM

## 2022-02-24 DIAGNOSIS — Z74.09 IMPAIRED FUNCTIONAL MOBILITY, BALANCE, GAIT, AND ENDURANCE: Primary | ICD-10-CM

## 2022-02-24 PROCEDURE — 97110 THERAPEUTIC EXERCISES: CPT | Mod: PN

## 2022-02-24 PROCEDURE — 97530 THERAPEUTIC ACTIVITIES: CPT | Mod: PN

## 2022-02-24 NOTE — PROGRESS NOTES
"  Occupational Therapy Daily Treatment Note  Discharge Summary     Name: Silvana Quezada  Clinic Number: [id number]    Therapy Diagnosis:   Encounter Diagnoses   Name Primary?    Muscle weakness (generalized) Yes    Lack of coordination      Physician: Jhonatan Avila   Visit Date: 2/24/2022   Physician Orders: OT eval and treat  Medical Diagnosis:   Z74.09,U09.9 (ICD-10-CM) - Post-COVID chronic decreased mobility and endurance   Z74.09 (ICD-10-CM) - Impaired functional mobility, balance, gait, and endurance      Evaluation Date: 1/4/2022  Plan of Care Expiration Period: 3/1/2022  Insurance Authorization period Expiration: 1/17/2022  Date of Return to MD: unknown  Visit # / Visits Authorized: 8 / 18  FOTO:   1/4/2022 - 50% limitation   2/22/2022: 42% limitation  2/24/2022: 43% limitation      Time In: 2:03  Time Out: 2:45  Total Billable (one on one) Time: 42 minutes     Precautions: Standard, Fall and Multiple Sclerosis, hx of Breast CA     Subjective     Pt reports: "I am doing good."  she was compliant with home exercise program given last session.   Response to previous treatment: good   Functional change: she reports improved use of hands     Pain: 0/10  Location: bilateral shoulder      Objective     B upper extremity AROM = WNL      MMS for B upper extremities = 4/5 for all planes, fatigue after resistance     Fist: normal      Strength: (NBA Dynamometer in lbs.) Average 3 trials, Position II:       1/4/2022 1/4/2022 1/18/2022 1/18/2022 2/22/2022 2/22/2022 2/24/2022   Rung II Right Left Right Left Right Left Right    Trial 1 35# 20# 44 19 41# 21# 40#   Trial 2 32# 19# 35 17 35# 20# 33#   Trial 3 26# 16# 31 23 35# 21# 36#   Average 31# 18# 36# 19# 37# 20# 36#      Normal  Average Values  Female Right Left Male: Right Left   20-29 66 lbs 62 lbs         94 lbs 86 lbs   30-39 68 lbs 64 lbs 90 lbs 82 lbs   40-49 64 lbs 62 lbs 80 lbs 74 lbs   50-59 62 lbs 57 lbs 72 lbs 65 lbs   60-69 53 lbs 51 lbs 63 lbs 56 lbs "   70+ 44 lbs 42 lbs 54 lbs 48 lbs   SD = +/- 19lbs         Pinch Strength (Measured in lbs)       1/4/2022 1/4/2022 1/18/2022 1/18/2022 2/22/2022 2/22/2022     Right Left Right  Left Right Left   Key Pinch 8 # 6 # 6# 8# 8# 7#   3pt Pinch 8 # 3 # 5# 3# 6# 4#   2pt Pinch 5 # 2 # 5# 3# 5# 3#         Fine/Gross Motor Coordination     Assessment   Right   1/4/2022 Left  1/4/2022 Left  1/18/2022 Right  1/18/2022   9 hole peg test 9 pegs in/out for time 58.53 1:18.17 31 sec 45 sec         Sensation:  Silvana  reports no concerns with sensation at this time      Balance:   Static Sit - GOOD-: Takes MODERATE challenges from all directions but inconsistently  Dynamic sit- GOOD-: Takes MODERATE challenges from all directions but inconsistently  Static Stand - FAIR+: Able to take MINIMAL challenges from all directions  Dynamic stand - FAIR+: Able to take MINIMAL challenges from all directions     Endurance Deficit: moderate    Silvana received therapeutic exercises/activities for 45 minutes including:  UBE L2 x10 minutes    HEP reviewed  putty Green   x10 gross grasp     Shoulder movements all planes  1# DB   2x10    Wrist flexion/extension/RD/ UD  1# DB   2x10   reassessment x10 minutes With FOTO   In hand manipulation  Spheres   B hands x1 minute    strength  37# (R)  25# (L) x10               Home Exercises and Education Provided     Education provided:   -green putty, reviewed HEP   -occupational therapy tool kit packet - energy conservation, upper extremity AROM and stretches   - light pink putty issued; scap AROM,  information on energy conservation techniques   - Progress towards goals     Written Home Exercises Provided: yes.  Exercises were reviewed and Silvana was able to demonstrate them prior to the end of the session.  Silvana demonstrated good  understanding of the HEP provided.   .   See EMR under Patient Instructions for exercises provided 1/11/2022.      Assessment     Silvana tolerated today's session well.  Interventions focused on HEP review, strength, discharge planning and pt education. Silvana exhibits and reports good understanding of HEP for B upper extremities. Upgraded putty to green to address  strength goal and pinch. No pain with added resistance (1# DB) and no concerns end of session. She reports overall improved strength and participation with daily tasks. Silvana is aware to notify MD if she experiences a change in function. Thank you for your referral.     Anticipated barriers to occupational therapy: co-morbidities    Pt's spiritual, cultural and educational needs considered and pt agreeable to plan of care and goals.    Goals:    Short Term Goals: 4 weeks    Pt will report compliance with HEP to maximize strength and endurance in B UEs Met 1/25/2022   Pt will tolerate 10 minutes on UBE L2 in order to improve strength and endurance during meaningful activities   Met 1/25/2022    Pt will demonstrate understanding of energy conservation and fall prevention techniques  Met 1/25/2022          Long Term Goals: 6 weeks    Pt will report improved ability to play the piano with decreased rest breaks due to fatigue using energy conservation techniques   Met 2/22/2022     Pt will score 60% or better on the FOTO by discharge in order to improve QOL and independence with meaningful activities  Not met 2/24/2022   (58%, 57%)   Pt will improve R/L  strength to 40/20# or better in order to increase safety and participation with managing containers, opening doors and perform leisure tasks  Partially Met 2/22/2022      Pt will complete 9 hole peg assessment in :45 sec with R hand and 1 min or better using L UE in order to improve overall coordination with playing piano and crocheting,  Met 1/18/2022    Pt will maintain stance for 10 minutes while participating in dynamic therapeutic activities to improve safety during standing ADLs Met 1/25/2022             Plan   Certification Period/Plan of care expiration:  1/4/2022 to 3/1/2022.      Outpatient Occupational Therapy 2 times weekly for 6 weeks to include the following interventions: Neuromuscular Re-ed, Patient Education, Self Care, Therapeutic Activities and Therapeutic Exercise.    Updates/Grading for next session: pt to be discharged today       Corinne Rapier, OT   2/24/2022

## 2022-02-24 NOTE — PROGRESS NOTES
"[facility]   Physical Therapy Treatment Note     Name: Silvana Quezada  Clinic Number: [id number]    Therapy Diagnosis:   Encounter Diagnosis   Name Primary?    Impaired functional mobility, balance, gait, and endurance Yes     Physician: Carol Oneil MD    Visit Date: 2/24/2022    Physician Orders: PT Eval and Treat  Medical Diagnosis from Referral: Z74.09,U09.9 (ICD-10-CM) - Post-COVID chronic decreased mobility and endurance; Z74.09 (ICD-10-CM) - Impaired functional mobility, balance, gait, and endurance  Evaluation Date: 1/3/2022  Authorization Period Expiration: 01/17/2022  Plan of Care Expiration: 3/4/2022  Visit # / Visits authorized: 5/18  FOTO: 5/5 (next please)    PTA Visit #: 0/5     Time In: 2:45 PM  Time Out: 3:30 PM  Total Billable Time: 45 minutes (1TA, 2TE)    Precautions: Standard, Fall and Multiple Sclerosis (do not over-heat or over-exert) and post-Covid (monitor vitals as needed)     SUBJECTIVE     Patient reports: Pt feels she is still limited to walking to end of block, feels she can not push anymore.  She was not active during break from PT and did not continue HEP. She understands now how important it is to keep up with daily exercise.  She was not compliant with home exercise program.  Response to previous treatment: Last session was over a month ago  Functional change: See goals    Pain: 0/10  Location: N/A     OBJECTIVE     Objective Measures updated at progress report unless specified.       Treatment     Silvana received the treatments listed below:      therapeutic exercises to develop strength, endurance and posture for 30 minutes including:    - standing hip abduction/extension 2x10B with YTB   - standing heel raises 2x12  - mini squats 2 x 12 reps  - step up and overs 3x30" 4" step  - NuStep 8 min level 4    neuromuscular re-education activities to improve: Balance, Coordination and Proprioception for 0 minutes. The following activities were " included:    Therapeutic activity to improve functional performance for 15 minutes, including:    Over ground walking 300' x 3 with 3 minute rest break between sets  Sit to stands 2x10 (second set with yellow theraband around distal thighs to minimize valgum)    Patient Education and Home Exercises     Home Exercises Provided and Patient Education Provided     Education provided:   - daily walking program    Written Home Exercises Provided: yes. Exercises were reviewed and Silvana was able to demonstrate them prior to the end of the session.  Silvana demonstrated fair  understanding of the education provided. See EMR under Patient Instructions for exercises provided during therapy sessions    ASSESSMENT     Silvana still occasionally self-limits in terms of challenging her self during therapy but did push herself to complete two laps in gym multiple times with rest breaks.  She would benefit from continued PT services to improve standing tolerance and endurance but ultimate success contingent on HEP compliance.    Silvana Is progressing well towards her goals.   Pt prognosis is Good.     Pt will continue to benefit from skilled outpatient physical therapy to address the deficits listed in the problem list box on initial evaluation, provide pt/family education and to maximize pt's level of independence in the home and community environment.     Pt's spiritual, cultural and educational needs considered and pt agreeable to plan of care and goals.     Anticipated barriers to physical therapy: Comorbidities       Goals:   Short Term Goals:  4 weeks  1. Pt will be compliant with HEP in order to maximize PT benefits. (PROGRESSING, NOT MET)   2. Pt will complete TUG in </= 21 seconds with least restrictive assistive device in order to reduce risk for falls and improve safety with functional mobility. MET  3. Pt will walk >/= 275 feet on Two-Minute Walk Test indoors in order to improve endurance for home mobility. MET     Long Term  Goals: 8 weeks  4. Pt will score </= 39% on FOTO limitation survey in order to improve self-perception of functional mobility deficits. (PROGRESSING, NOT MET)   5. Pt will improve BLE MMT grades to 5/5 in major muscle groups in order to improve strength for ADL completion. (PROGRESSING, NOT MET)   6. Pt will complete TUG in </= 17 seconds with least restrictive assistive device in order to reduce risk for falls and improve safety with functional mobility. MET   7. Pt will score >/= 12 repetitions on 30-Second Sit to  order to improve BLE endurance and muscular power for transfers. (PROGRESSING, NOT MET)    8. Pt will walk >/= 800 feet on Six-Minute Walk Test in order to improve endurance for community mobility. (PROGRESSING, NOT MET)    9. Pt will report 0 falls from initiation of PT management. (PROGRESSING, NOT MET)   10. Pt will begin some form of home/community fitness in order to sustain progress gained in PT. (PROGRESSING, NOT MET)     PLAN     Continue present POC with focus on strength, balance and activity tolerance.     Nithya Gross, PT

## 2022-02-28 ENCOUNTER — PATIENT MESSAGE (OUTPATIENT)
Dept: PSYCHIATRY | Facility: CLINIC | Age: 67
End: 2022-02-28
Payer: MEDICARE

## 2022-03-02 ENCOUNTER — DOCUMENTATION ONLY (OUTPATIENT)
Dept: REHABILITATION | Facility: HOSPITAL | Age: 67
End: 2022-03-02
Payer: MEDICARE

## 2022-03-02 NOTE — PROGRESS NOTES
Physical therapist and physical therapy assistant(s) met face to face on 03/02/2022 to discuss patient's treatment plan and progress towards established goals. Patient will be seen by a physical therapist minimally every 6th visit or every 30 days.    LUIS M ISSA PT

## 2022-03-03 ENCOUNTER — CLINICAL SUPPORT (OUTPATIENT)
Dept: REHABILITATION | Facility: HOSPITAL | Age: 67
End: 2022-03-03
Payer: MEDICARE

## 2022-03-03 DIAGNOSIS — Z74.09 IMPAIRED FUNCTIONAL MOBILITY, BALANCE, GAIT, AND ENDURANCE: Primary | ICD-10-CM

## 2022-03-03 PROCEDURE — 97110 THERAPEUTIC EXERCISES: CPT | Mod: PN,CQ

## 2022-03-03 PROCEDURE — 97530 THERAPEUTIC ACTIVITIES: CPT | Mod: PN,CQ

## 2022-03-03 NOTE — PROGRESS NOTES
"[facility]   Physical Therapy Treatment Note     Name: Silvana Quezada  Clinic Number: [id number]    Therapy Diagnosis:   No diagnosis found.  Physician: Carol Oneil MD    Visit Date: 3/3/2022    Physician Orders: PT Eval and Treat  Medical Diagnosis from Referral: Z74.09,U09.9 (ICD-10-CM) - Post-COVID chronic decreased mobility and endurance; Z74.09 (ICD-10-CM) - Impaired functional mobility, balance, gait, and endurance  Evaluation Date: 1/3/2022  Authorization Period Expiration: 01/17/2022  Plan of Care Expiration: 3/4/2022  Visit # / Visits authorized: 6/18  FOTO: 6/5 (next please)    PTA Visit #: 1/5     Time In: 1:15 PM  Time Out: 2:00 PM  Total Billable Time: 45 minutes (1TA, 2TE)    Precautions: Standard, Fall and Multiple Sclerosis (do not over-heat or over-exert) and post-Covid (monitor vitals as needed)     SUBJECTIVE     Patient reports: Pt says things are going okay with no new complaints.  She is still performing her nightly walks to the end of the block with frequent standing rest breaks.  She was not compliant with home exercise program.  Response to previous treatment: soreness  Functional change: ongoing    Pain: 0/10  Location: N/A     OBJECTIVE     Objective Measures updated at progress report unless specified.       Treatment     Silvana received the treatments listed below:      therapeutic exercises to develop strength, endurance and posture for 30 minutes including:    - standing hip abduction/extension 2x10B with YTB   - standing heel raises 2x12  - mini squats 2 x 12 reps  - step up and overs 3x30" 4" step  - NuStep 8 min level 4    neuromuscular re-education activities to improve: Balance, Coordination and Proprioception for 0 minutes. The following activities were included:    Therapeutic activity to improve functional performance for 15 minutes, including:    Over ground walking 300' x 3 with 3 minute rest break between sets (only one lap today due to " time)  Sit to stands 2x10 (second set with yellow theraband around distal thighs to minimize valgum)    Patient Education and Home Exercises     Home Exercises Provided and Patient Education Provided     Education provided:   - daily walking program    Written Home Exercises Provided: yes. Exercises were reviewed and Silvana was able to demonstrate them prior to the end of the session.  Silvana demonstrated fair  understanding of the education provided. See EMR under Patient Instructions for exercises provided during therapy sessions    ASSESSMENT     Silvana arrived to session without any complaints of pain and was agreeable to treatment.  Session consisted of BLE strengthening and endurance training with walking trials.  She requires max encouragement to complete exercises with the least amount of seated rest breaks as possible.  Pt completed walking trial safely and with minimal verbal cuing for postural awareness and did not experience any loss of balance.  Silvana requested six seated rest breaks this session and reported the high fatigue upon completion of session.    Silvana Is progressing well towards her goals.   Pt prognosis is Good.     Pt will continue to benefit from skilled outpatient physical therapy to address the deficits listed in the problem list box on initial evaluation, provide pt/family education and to maximize pt's level of independence in the home and community environment.     Pt's spiritual, cultural and educational needs considered and pt agreeable to plan of care and goals.     Anticipated barriers to physical therapy: Comorbidities       Goals:   Short Term Goals:  4 weeks  1. Pt will be compliant with HEP in order to maximize PT benefits. (PROGRESSING, NOT MET)   2. Pt will complete TUG in </= 21 seconds with least restrictive assistive device in order to reduce risk for falls and improve safety with functional mobility. MET  3. Pt will walk >/= 275 feet on Two-Minute Walk Test indoors in order  to improve endurance for home mobility. MET     Long Term Goals: 8 weeks  4. Pt will score </= 39% on FOTO limitation survey in order to improve self-perception of functional mobility deficits. (PROGRESSING, NOT MET)   5. Pt will improve BLE MMT grades to 5/5 in major muscle groups in order to improve strength for ADL completion. (PROGRESSING, NOT MET)   6. Pt will complete TUG in </= 17 seconds with least restrictive assistive device in order to reduce risk for falls and improve safety with functional mobility. MET   7. Pt will score >/= 12 repetitions on 30-Second Sit to  order to improve BLE endurance and muscular power for transfers. (PROGRESSING, NOT MET)    8. Pt will walk >/= 800 feet on Six-Minute Walk Test in order to improve endurance for community mobility. (PROGRESSING, NOT MET)    9. Pt will report 0 falls from initiation of PT management. (PROGRESSING, NOT MET)   10. Pt will begin some form of home/community fitness in order to sustain progress gained in PT. (PROGRESSING, NOT MET)     PLAN     Continue present POC with focus on strength, balance and activity tolerance.     Kirstin Nuñez, PTA

## 2022-03-07 ENCOUNTER — CLINICAL SUPPORT (OUTPATIENT)
Dept: REHABILITATION | Facility: HOSPITAL | Age: 67
End: 2022-03-07
Payer: MEDICARE

## 2022-03-07 DIAGNOSIS — Z74.09 IMPAIRED FUNCTIONAL MOBILITY, BALANCE, GAIT, AND ENDURANCE: Primary | ICD-10-CM

## 2022-03-07 PROCEDURE — 97110 THERAPEUTIC EXERCISES: CPT | Mod: PN

## 2022-03-07 NOTE — PATIENT INSTRUCTIONS
Please walk in safe environments without treacherous ground and walk with a family member if you have balance or CV concerns.  Walk when the weather permits at the cool hours of the day in the summer and during rainy seasons.  PT recommends a HR monitor or pedometer to track levels of fitness daily and weekly.  Please do not exceed heart rate max when walking.  See AHA for Heart rate range recommendations for someone your age.

## 2022-03-07 NOTE — PROGRESS NOTES
[facility]   Physical Therapy Treatment Note     Name: Silvana Quezada  Clinic Number: [id number]    Therapy Diagnosis:   Encounter Diagnosis   Name Primary?    Impaired functional mobility, balance, gait, and endurance Yes     Physician: Carol Oneil MD    Visit Date: 3/7/2022    Physician Orders: PT Eval and Treat  Medical Diagnosis from Referral: Z74.09,U09.9 (ICD-10-CM) - Post-COVID chronic decreased mobility and endurance; Z74.09 (ICD-10-CM) - Impaired functional mobility, balance, gait, and endurance  Evaluation Date: 1/3/2022  Authorization Period Expiration: 01/17/2022  Plan of Care Expiration: 3/4/2022  Visit # / Visits authorized: 8/18  FOTO: done today (discharge)    PTA Visit #: 1/5     Time In: 2:45 PM  Time Out: 3:35 PM  Total Billable Time: 50 minutes (3TE)    Precautions: Standard, Fall and Multiple Sclerosis (do not over-heat or over-exert) and post-Covid (monitor vitals as needed)     SUBJECTIVE     Patient reports: She stated that she will continue to try to be more consistent with morning walks to the end of the block 6-10 minutes total with short rests.  She was partially compliant with home exercise program.  Response to previous treatment: soreness  Functional change: see goals and objective measures    Pain: 0/10  Location: N/A     OBJECTIVE     Objective Measures updated at progress report unless specified.      Gait Assessment:  - AD used: none used today's session   - Assistance: SBA  - 2 Minute Walk Test Distance: 225 feet without AD   - 6 Minute Walk Test Distance: 675 feet without AD    Endurance Assessment:       Evaluation Reassessment   Timed Up and Go 25.2 sec with SPC 13.4 secs     30 Second Chair Rise    10 reps with BUE support  10 reps with BUE support       Dynamic Gait Index     1. Gait on level surface: 2. Mild impairment: Walks 20', uses assistive devices, slower speed, mild gait deviations.   2. Change in Gait speed: 2. Mild impairment: Is  able to change speed, but demonstrates mild gait deviations, or no gait deviations but unable to achieve a signifcant change in velocity, or uses an assistive device.  3. Gait with Horizontal Head Turns: 2. Mild impairment: performs head turns smoothly with slight change in gait velocity, i.e. minor distuption to smooth gait path or uses walking aid.   4. Gait with Vertical Head Turns: 2. Mild impairment: Perform task with slight change in gait velocity ie minor disruption in smooth gait path or uses walking aid  5. Gait and Pivot turn:2. Mild impairment   6. Step Over Obstacle: 1. Moderate impairment: Is able to step over the box but must stop, then, step over. May requrie verbal cueing  7. Step around obstacles: 2. Moderate impairment: Is able to clear cones but must significantly slow speed to accomplish task, or requires verbal cueing.  8. Steps: 1.      Score: 14/24  Cutoffs:   < 19/24 = predictive of falls in the elderly  Treatment     Pt performed FOTO assessment and objective measures listed above as well as discharge education x 40' total.    Silvana received the treatments listed below:      therapeutic exercises to develop strength, endurance and posture for 10 minutes including:    - NuStep 8 min level 2    Patient Education and Home Exercises     Home Exercises Provided and Patient Education Provided     Education provided:   - daily walking program in the AM with SPC if needed  - d/c plan for today with walking program as HEP    Written Home Exercises Provided: yes. Exercises were reviewed and Silvana was able to demonstrate them prior to the end of the session.  Silvana demonstrated fair  understanding of the education provided. See EMR under Patient Instructions for exercises provided during therapy sessions    ASSESSMENT   Pt had had significant improvements in endurance, transfers, speed and overall mobility.  She had a slight increase in balance score and strength but overall she has returned to prior  level of function without any incidence of falls since eval.  She is also able to tolerate walking up to 6 minutes without sitting with an AD or LOB.    Silvana Is progressing well towards her goals.   Pt prognosis is Good.     Pt will continue to benefit from skilled outpatient physical therapy to address the deficits listed in the problem list box on initial evaluation, provide pt/family education and to maximize pt's level of independence in the home and community environment.     Pt's spiritual, cultural and educational needs considered and pt agreeable to plan of care and goals.     Anticipated barriers to physical therapy: Comorbidities       Goals:   Short Term Goals:  4 weeks  1. Pt will be compliant with HEP in order to maximize PT benefits. MET  2. Pt will complete TUG in </= 21 seconds with least restrictive assistive device in order to reduce risk for falls and improve safety with functional mobility. MET  3. Pt will walk >/= 275 feet on Two-Minute Walk Test indoors in order to improve endurance for home mobility. MET     Long Term Goals: 8 weeks  4. Pt will score </= 39% on FOTO limitation survey in order to improve self-perception of functional mobility deficits. Not met 42% limited   5. Pt will improve BLE MMT grades to 5/5 in major muscle groups in order to improve strength for ADL completion. NOT MET, patient has h/o MS  6. Pt will complete TUG in </= 17 seconds with least restrictive assistive device in order to reduce risk for falls and improve safety with functional mobility. MET   7. Pt will score >/= 12 repetitions on 30-Second Sit to  order to improve BLE endurance and muscular power for transfers. MET    8. Pt will walk >/= 800 feet on Six-Minute Walk Test in order to improve endurance for community mobility. Not met but able to complete 6 minutes without sitting >600 ft without AD or LOB  9. Pt will report 0 falls from initiation of PT management. MET  10. Pt will begin some form of  home/community fitness in order to sustain progress gained in PT. MET walking to the end of the block with her     PLAN     D/C today with walking program.      Iwona Vora, PT

## 2022-03-12 NOTE — PROGRESS NOTES
"    Subjective:       Patient ID: Silvana Quezada is a 66 y.o. female.    Chief Complaint: No chief complaint on file.           #Interim Hx      Saw sleep medicine , begning cpap , colosncopy , covid clinic  - and considering neuropsych testing or change in meds. Also seen by [facility] - rtc in 6/2022 Dr anabela arredondo      #Concerns Today    Feeling improving symptoms but still awaiting CPAP machine    #Chronic Problems        MS  C/b neurogenic bladder, gets botox inj with urology   Now followed by neruology   MRI stable  Started VItb12 and Q10   Plan; rtc in 6/2022      Osteopersosis cb pathologic fracture  Last dexa 6/2017 osteoperosis  Fall and wrist fracture 12/2019   Rpt DXA showed osteoperosis  Started on fosamax wihtout SE    Bipolar - previously Seen by psych     Health Maintenance Due   Topic Date Due    TETANUS VACCINE  Never done    High Dose Statin  Never done    Shingles Vaccine (1 of 2) Never done       Health Maintenance Topics with due status: Not Due       Topic Last Completion Date    DEXA Scan 03/03/2020    Lipid Panel 06/14/2021    Mammogram 07/01/2021    Colorectal Cancer Screening 01/07/2022       HPI  Review of Systems   Constitutional: Negative for activity change, chills, diaphoresis, fatigue, fever and unexpected weight change.   Eyes: Negative for redness and visual disturbance.   Respiratory: Negative for shortness of breath.    Cardiovascular: Negative for chest pain and palpitations.   Gastrointestinal: Negative for abdominal distention and blood in stool.   Genitourinary: Positive for difficulty urinating. Negative for dysuria, frequency and menstrual problem.   Neurological: Negative for syncope and headaches.       Objective:       /72 (BP Location: Right arm, Patient Position: Sitting, BP Method: Large (Manual))   Pulse 93   Ht 5' 3" (1.6 m)   Wt 79.8 kg (175 lb 14.8 oz)   LMP  (LMP Unknown)   SpO2 97%   BMI 31.16 kg/m²       Physical Exam  Vitals and nursing note " reviewed.   Constitutional:       General: She is not in acute distress.     Appearance: She is well-developed. She is not diaphoretic.   HENT:      Head: Normocephalic.      Nose: Nose normal.   Eyes:      General:         Right eye: No discharge.         Left eye: No discharge.      Conjunctiva/sclera: Conjunctivae normal.      Pupils: Pupils are equal, round, and reactive to light.   Cardiovascular:      Rate and Rhythm: Normal rate and regular rhythm.      Heart sounds: Normal heart sounds. No murmur heard.    No friction rub. No gallop.   Pulmonary:      Effort: Pulmonary effort is normal. No respiratory distress.   Abdominal:      Palpations: Abdomen is soft.   Musculoskeletal:         General: No deformity. Normal range of motion.      Cervical back: Normal range of motion and neck supple.   Skin:     General: Skin is warm.   Neurological:      Mental Status: She is alert and oriented to person, place, and time.      Cranial Nerves: No cranial nerve deficit.      Sensory: No sensory deficit.      Coordination: Coordination normal.      Deep Tendon Reflexes: Reflexes normal.           Basic Labs  CMP  BMP  Lab Results   Component Value Date     01/03/2022    K 4.6 01/03/2022     01/03/2022    CO2 30 (H) 01/03/2022    BUN 18 01/03/2022    CREATININE 0.7 01/03/2022    CALCIUM 9.5 01/03/2022    ANIONGAP 7 (L) 01/03/2022    ESTGFRAFRICA >60.0 01/03/2022    EGFRNONAA >60.0 01/03/2022     Lab Results   Component Value Date    ALT 23 01/03/2022    AST 15 01/03/2022    ALKPHOS 59 01/03/2022    BILITOT 0.3 01/03/2022         Lab Results   Component Value Date    TSH 2.669 12/14/2021         Lipids  Lab Results   Component Value Date    CHOL 208 (H) 06/14/2021     Lab Results   Component Value Date    HDL 66 06/14/2021     Lab Results   Component Value Date    LDLCALC 117.4 06/14/2021     Lab Results   Component Value Date    TRIG 123 06/14/2021     Lab Results   Component Value Date    CHOLHDL 31.7  06/14/2021           Assessment:       1. Pseudopolyp of sigmoid colon without complication    2. Thoracic aorta atherosclerosis        Plan:           Silvana was seen today for follow-up.    Diagnoses and all orders for this visit:    Pseudopolyp of sigmoid colon without complication    Thoracic aorta atherosclerosis      STILL AWAITING THE CPAP MACHINE.  MESSAGE THE SLEEP MEDICINE DOCTOR TO COORDINATE CARE HOPEFULLY WE CAN GET THE SCENE.  THE      Future Appointments   Date Time Provider Department Center   4/20/2022  1:00 PM Don Munoz Jr., MD [facility]         Medication List with Changes/Refills   Current Medications    ALENDRONATE (FOSAMAX) 70 MG TABLET    TAKE 1 TABLET BY MOUTH EVERY 7 DAYS    BUPROPION (WELLBUTRIN SR) 100 MG TBSR 12 HR TABLET    TAKE 2 TABLETS BY MOUTH EVERY MORNING    BUPROPION (WELLBUTRIN SR) 150 MG TBSR 12 HR TABLET    TAKE 1 TABLET(150 MG) BY MOUTH EVERY EVENING    CALCIUM-VITAMIN D3 (OS-IGOR 500 + D3) 500 MG-5 MCG (200 UNIT) PER TABLET    Take 1 tablet by mouth 2 (two) times daily with meals.    CATHETER (FEMALE CATHETER) 14 FR [facility]    Patient is to self catheterize four times a day indefinitley using female 12 fr in and out catheter for incomplete bladder emptying.   Dispense 120 month with 11 refills or 360 every 3 months with 3 refills depending on patient's preference    COENZYME Q10 10 MG CAPSULE        CYANOCOBALAMIN (VITAMIN B-12) 1000 MCG TABLET        FLUPHENAZINE (PROLIXIN) 1 MG TABLET    TAKE 2 TABLETS(2 MG) BY MOUTH EVERY NIGHT    FOLIC ACID (FOLVITE) 1 MG TABLET    Take 1 tablet (1,000 mcg total) by mouth once daily.    OXCARBAZEPINE (TRILEPTAL) 150 MG TAB    TAKE 1 TABLET(150 MG) BY MOUTH TWICE DAILY         Disclaimer:  This note has been generated using voice-recognition software. There may be grammatical or spelling errors that have been missed during proof-reading

## 2022-03-12 NOTE — PATIENT INSTRUCTIONS
We were happy to see you today    For your Testing  Please have your labs and/or imaging test done at your earliest convience        Please return to clinic in      6 months

## 2022-03-14 ENCOUNTER — PATIENT MESSAGE (OUTPATIENT)
Dept: INTERNAL MEDICINE | Facility: CLINIC | Age: 67
End: 2022-03-14

## 2022-03-14 ENCOUNTER — OFFICE VISIT (OUTPATIENT)
Dept: INTERNAL MEDICINE | Facility: CLINIC | Age: 67
End: 2022-03-14
Payer: MEDICARE

## 2022-03-14 ENCOUNTER — TELEPHONE (OUTPATIENT)
Dept: SLEEP MEDICINE | Facility: CLINIC | Age: 67
End: 2022-03-14
Payer: MEDICARE

## 2022-03-14 VITALS
SYSTOLIC BLOOD PRESSURE: 104 MMHG | BODY MASS INDEX: 31.17 KG/M2 | WEIGHT: 175.94 LBS | HEIGHT: 63 IN | DIASTOLIC BLOOD PRESSURE: 72 MMHG | HEART RATE: 93 BPM | OXYGEN SATURATION: 97 %

## 2022-03-14 DIAGNOSIS — K51.40 PSEUDOPOLYP OF SIGMOID COLON WITHOUT COMPLICATION: Primary | ICD-10-CM

## 2022-03-14 DIAGNOSIS — I70.0 THORACIC AORTA ATHEROSCLEROSIS: ICD-10-CM

## 2022-03-14 PROCEDURE — 99999 PR PBB SHADOW E&M-EST. PATIENT-LVL IV: ICD-10-PCS | Mod: PBBFAC,,, | Performed by: INTERNAL MEDICINE

## 2022-03-14 PROCEDURE — 3008F PR BODY MASS INDEX (BMI) DOCUMENTED: ICD-10-PCS | Mod: CPTII,S$GLB,, | Performed by: INTERNAL MEDICINE

## 2022-03-14 PROCEDURE — 99213 PR OFFICE/OUTPT VISIT, EST, LEVL III, 20-29 MIN: ICD-10-PCS | Mod: S$GLB,,, | Performed by: INTERNAL MEDICINE

## 2022-03-14 PROCEDURE — 1157F PR ADVANCE CARE PLAN OR EQUIV PRESENT IN MEDICAL RECORD: ICD-10-PCS | Mod: CPTII,S$GLB,, | Performed by: INTERNAL MEDICINE

## 2022-03-14 PROCEDURE — 3078F PR MOST RECENT DIASTOLIC BLOOD PRESSURE < 80 MM HG: ICD-10-PCS | Mod: CPTII,S$GLB,, | Performed by: INTERNAL MEDICINE

## 2022-03-14 PROCEDURE — 1126F PR PAIN SEVERITY QUANTIFIED, NO PAIN PRESENT: ICD-10-PCS | Mod: CPTII,S$GLB,, | Performed by: INTERNAL MEDICINE

## 2022-03-14 PROCEDURE — 3078F DIAST BP <80 MM HG: CPT | Mod: CPTII,S$GLB,, | Performed by: INTERNAL MEDICINE

## 2022-03-14 PROCEDURE — 1101F PR PT FALLS ASSESS DOC 0-1 FALLS W/OUT INJ PAST YR: ICD-10-PCS | Mod: CPTII,S$GLB,, | Performed by: INTERNAL MEDICINE

## 2022-03-14 PROCEDURE — 3074F PR MOST RECENT SYSTOLIC BLOOD PRESSURE < 130 MM HG: ICD-10-PCS | Mod: CPTII,S$GLB,, | Performed by: INTERNAL MEDICINE

## 2022-03-14 PROCEDURE — 3288F PR FALLS RISK ASSESSMENT DOCUMENTED: ICD-10-PCS | Mod: CPTII,S$GLB,, | Performed by: INTERNAL MEDICINE

## 2022-03-14 PROCEDURE — 99213 OFFICE O/P EST LOW 20 MIN: CPT | Mod: S$GLB,,, | Performed by: INTERNAL MEDICINE

## 2022-03-14 PROCEDURE — 3288F FALL RISK ASSESSMENT DOCD: CPT | Mod: CPTII,S$GLB,, | Performed by: INTERNAL MEDICINE

## 2022-03-14 PROCEDURE — 3008F BODY MASS INDEX DOCD: CPT | Mod: CPTII,S$GLB,, | Performed by: INTERNAL MEDICINE

## 2022-03-14 PROCEDURE — 1159F PR MEDICATION LIST DOCUMENTED IN MEDICAL RECORD: ICD-10-PCS | Mod: CPTII,S$GLB,, | Performed by: INTERNAL MEDICINE

## 2022-03-14 PROCEDURE — 99999 PR PBB SHADOW E&M-EST. PATIENT-LVL IV: CPT | Mod: PBBFAC,,, | Performed by: INTERNAL MEDICINE

## 2022-03-14 PROCEDURE — 1159F MED LIST DOCD IN RCRD: CPT | Mod: CPTII,S$GLB,, | Performed by: INTERNAL MEDICINE

## 2022-03-14 PROCEDURE — 1126F AMNT PAIN NOTED NONE PRSNT: CPT | Mod: CPTII,S$GLB,, | Performed by: INTERNAL MEDICINE

## 2022-03-14 PROCEDURE — 1101F PT FALLS ASSESS-DOCD LE1/YR: CPT | Mod: CPTII,S$GLB,, | Performed by: INTERNAL MEDICINE

## 2022-03-14 PROCEDURE — 3074F SYST BP LT 130 MM HG: CPT | Mod: CPTII,S$GLB,, | Performed by: INTERNAL MEDICINE

## 2022-03-14 PROCEDURE — 1157F ADVNC CARE PLAN IN RCRD: CPT | Mod: CPTII,S$GLB,, | Performed by: INTERNAL MEDICINE

## 2022-03-14 NOTE — TELEPHONE ENCOUNTER
Staff reached out to the pt and she did not answer. Staff was reaching out to inform her that her CPAP was just order on 02/17/2022. It takes anywhere between 4-14 weeks before she will her for the HME due to the nationwide recall

## 2022-03-15 ENCOUNTER — PATIENT MESSAGE (OUTPATIENT)
Dept: SLEEP MEDICINE | Facility: CLINIC | Age: 67
End: 2022-03-15
Payer: MEDICARE

## 2022-03-29 ENCOUNTER — PATIENT MESSAGE (OUTPATIENT)
Dept: FAMILY MEDICINE | Facility: CLINIC | Age: 67
End: 2022-03-29
Payer: MEDICARE

## 2022-04-20 ENCOUNTER — OFFICE VISIT (OUTPATIENT)
Dept: PSYCHIATRY | Facility: CLINIC | Age: 67
End: 2022-04-20
Payer: COMMERCIAL

## 2022-04-20 VITALS
DIASTOLIC BLOOD PRESSURE: 66 MMHG | HEART RATE: 92 BPM | WEIGHT: 171.94 LBS | SYSTOLIC BLOOD PRESSURE: 127 MMHG | BODY MASS INDEX: 30.46 KG/M2

## 2022-04-20 DIAGNOSIS — F31.75 BIPOLAR I DISORDER, MOST RECENT EPISODE DEPRESSED, IN PARTIAL REMISSION: Primary | ICD-10-CM

## 2022-04-20 DIAGNOSIS — F31.9 BIPOLAR AFFECTIVE DISORDER, REMISSION STATUS UNSPECIFIED: ICD-10-CM

## 2022-04-20 PROCEDURE — 3074F PR MOST RECENT SYSTOLIC BLOOD PRESSURE < 130 MM HG: ICD-10-PCS | Mod: CPTII,S$GLB,, | Performed by: PSYCHIATRY & NEUROLOGY

## 2022-04-20 PROCEDURE — 3008F BODY MASS INDEX DOCD: CPT | Mod: CPTII,S$GLB,, | Performed by: PSYCHIATRY & NEUROLOGY

## 2022-04-20 PROCEDURE — 1157F ADVNC CARE PLAN IN RCRD: CPT | Mod: CPTII,S$GLB,, | Performed by: PSYCHIATRY & NEUROLOGY

## 2022-04-20 PROCEDURE — 99213 PR OFFICE/OUTPT VISIT, EST, LEVL III, 20-29 MIN: ICD-10-PCS | Mod: S$GLB,,, | Performed by: PSYCHIATRY & NEUROLOGY

## 2022-04-20 PROCEDURE — 3078F DIAST BP <80 MM HG: CPT | Mod: CPTII,S$GLB,, | Performed by: PSYCHIATRY & NEUROLOGY

## 2022-04-20 PROCEDURE — 3078F PR MOST RECENT DIASTOLIC BLOOD PRESSURE < 80 MM HG: ICD-10-PCS | Mod: CPTII,S$GLB,, | Performed by: PSYCHIATRY & NEUROLOGY

## 2022-04-20 PROCEDURE — 90833 PR PSYCHOTHERAPY W/PATIENT W/E&M, 30 MIN (ADD ON): ICD-10-PCS | Mod: S$GLB,,, | Performed by: PSYCHIATRY & NEUROLOGY

## 2022-04-20 PROCEDURE — 3074F SYST BP LT 130 MM HG: CPT | Mod: CPTII,S$GLB,, | Performed by: PSYCHIATRY & NEUROLOGY

## 2022-04-20 PROCEDURE — 3008F PR BODY MASS INDEX (BMI) DOCUMENTED: ICD-10-PCS | Mod: CPTII,S$GLB,, | Performed by: PSYCHIATRY & NEUROLOGY

## 2022-04-20 PROCEDURE — 99999 PR PBB SHADOW E&M-EST. PATIENT-LVL II: ICD-10-PCS | Mod: PBBFAC,,, | Performed by: PSYCHIATRY & NEUROLOGY

## 2022-04-20 PROCEDURE — 1159F MED LIST DOCD IN RCRD: CPT | Mod: CPTII,S$GLB,, | Performed by: PSYCHIATRY & NEUROLOGY

## 2022-04-20 PROCEDURE — 99213 OFFICE O/P EST LOW 20 MIN: CPT | Mod: S$GLB,,, | Performed by: PSYCHIATRY & NEUROLOGY

## 2022-04-20 PROCEDURE — 1160F PR REVIEW ALL MEDS BY PRESCRIBER/CLIN PHARMACIST DOCUMENTED: ICD-10-PCS | Mod: CPTII,S$GLB,, | Performed by: PSYCHIATRY & NEUROLOGY

## 2022-04-20 PROCEDURE — 1157F PR ADVANCE CARE PLAN OR EQUIV PRESENT IN MEDICAL RECORD: ICD-10-PCS | Mod: CPTII,S$GLB,, | Performed by: PSYCHIATRY & NEUROLOGY

## 2022-04-20 PROCEDURE — 90833 PSYTX W PT W E/M 30 MIN: CPT | Mod: S$GLB,,, | Performed by: PSYCHIATRY & NEUROLOGY

## 2022-04-20 PROCEDURE — 1160F RVW MEDS BY RX/DR IN RCRD: CPT | Mod: CPTII,S$GLB,, | Performed by: PSYCHIATRY & NEUROLOGY

## 2022-04-20 PROCEDURE — 99999 PR PBB SHADOW E&M-EST. PATIENT-LVL II: CPT | Mod: PBBFAC,,, | Performed by: PSYCHIATRY & NEUROLOGY

## 2022-04-20 PROCEDURE — 1159F PR MEDICATION LIST DOCUMENTED IN MEDICAL RECORD: ICD-10-PCS | Mod: CPTII,S$GLB,, | Performed by: PSYCHIATRY & NEUROLOGY

## 2022-04-20 RX ORDER — FOLIC ACID 1 MG/1
1000 TABLET ORAL DAILY
Qty: 90 TABLET | Refills: 1 | Status: SHIPPED | OUTPATIENT
Start: 2022-04-20 | End: 2022-06-29

## 2022-04-20 RX ORDER — BUPROPION HYDROCHLORIDE 150 MG/1
150 TABLET, EXTENDED RELEASE ORAL NIGHTLY
Qty: 90 TABLET | Refills: 1 | Status: SHIPPED | OUTPATIENT
Start: 2022-04-20 | End: 2022-10-04 | Stop reason: SDUPTHER

## 2022-04-20 RX ORDER — OXCARBAZEPINE 150 MG/1
150 TABLET, FILM COATED ORAL 2 TIMES DAILY
Qty: 180 TABLET | Refills: 1 | Status: SHIPPED | OUTPATIENT
Start: 2022-04-20 | End: 2022-06-27

## 2022-04-20 RX ORDER — BUPROPION HYDROCHLORIDE 100 MG/1
200 TABLET, EXTENDED RELEASE ORAL EVERY MORNING
Qty: 180 TABLET | Refills: 1 | Status: SHIPPED | OUTPATIENT
Start: 2022-04-20 | End: 2022-10-04 | Stop reason: SDUPTHER

## 2022-04-20 RX ORDER — FLUPHENAZINE HYDROCHLORIDE 1 MG/1
2 TABLET ORAL NIGHTLY
Qty: 180 TABLET | Refills: 1 | Status: SHIPPED | OUTPATIENT
Start: 2022-04-20 | End: 2022-09-15 | Stop reason: SDUPTHER

## 2022-04-20 NOTE — PATIENT INSTRUCTIONS
Call in June to schedule an October appointment.    Call if problems arise.  Go to ER if in crisis.

## 2022-05-02 ENCOUNTER — PES CALL (OUTPATIENT)
Dept: ADMINISTRATIVE | Facility: CLINIC | Age: 67
End: 2022-05-02
Payer: MEDICARE

## 2022-05-03 ENCOUNTER — TELEPHONE (OUTPATIENT)
Dept: SLEEP MEDICINE | Facility: CLINIC | Age: 67
End: 2022-05-03
Payer: MEDICARE

## 2022-05-03 ENCOUNTER — TELEPHONE (OUTPATIENT)
Dept: ADMINISTRATIVE | Facility: CLINIC | Age: 67
End: 2022-05-03
Payer: MEDICARE

## 2022-05-03 PROBLEM — R06.02 SHORTNESS OF BREATH: Status: RESOLVED | Noted: 2020-03-26 | Resolved: 2022-05-03

## 2022-05-03 PROBLEM — F31.9 BIPOLAR AFFECTIVE DISORDER: Status: RESOLVED | Noted: 2018-05-24 | Resolved: 2022-05-03

## 2022-05-03 NOTE — TELEPHONE ENCOUNTER
----- Message from Janet Plata, CRT sent at 5/2/2022  3:40 PM CDT -----  Regarding: New CPAP set-up  Samia,    Patient was set-up today on a Jacy G3 auto CPAP. She is scheduled for a download with us on 06/08/2022.    Thanks,  Janet Plata, CRT

## 2022-05-03 NOTE — PATIENT INSTRUCTIONS
Eligibility criteria for a second booster dose include:    Individuals 50 years of age and older at least four months after receipt of a first booster dose of any approved COVID-19 vaccine  Immunocompromised individuals 12 years of age and older at least four months after receipt of a first booster dose of any approved COVID-19 vaccine.  These individuals are eligible for a second booster dose regardless of which COVID-19 vaccine they received for their initial series. While all of the available COVID-19 vaccines were approved for the first booster dose, only Pfizer and Moderna have been approved as options for the second booster dose.    Pfizer is authorized for individuals 12 years of age and older  Moderna is authorized for individuals 18 years of age and older  Second booster shot appointments can be scheduled via [facility] or by calling [phone]. Walk-ins are also accepted.    Counseling and Referral of Other Preventative  (Italic type indicates deductible and co-insurance are waived)    Patient Name: Silvana Quezada  Today's Date: 5/4/2022    Health Maintenance       Date Due Completion Date    Mammogram 07/01/2022 7/1/2021    High Dose Statin 05/18/2022 (Originally 9/25/1976) ---    TETANUS VACCINE 05/04/2023 (Originally 9/25/1972) ---    Shingles Vaccine (1 of 2) 05/04/2023 (Originally 9/25/2004) ---    COVID-19 Vaccine (3 - Booster for [place] series) 05/04/2023 (Originally 2/28/2022) 10/28/2021    DEXA Scan 03/03/2023 3/3/2020    Colorectal Cancer Screening 01/07/2025 1/7/2022    Lipid Panel 06/14/2026 6/14/2021        No orders of the defined types were placed in this encounter.    The following information is provided to all patients.  This information is to help you find resources for any of the problems found today that may be affecting your health:                Living healthy guide: www.[facility].[place].gov      Understanding Diabetes: www.diabetes.org      Eating healthy: www.cdc.gov/healthyweight       CDC home safety checklist: www.cdc.gov/steadi/patient.html      Agency on Aging: www.goea.[place].[facility]      Alcoholics anonymous (AA): www.aa.org      Physical Activity: www.kj.nih.gov/co1ibwg      Tobacco use: www.quitwithusla.org

## 2022-05-03 NOTE — PROGRESS NOTES
"  Silvana Quezada presented for a  Medicare AWV and comprehensive Health Risk Assessment today. The following components were reviewed and updated:    · Medical history  · Family History  · Social history  · Allergies and Current Medications  · Health Risk Assessment  · Health Maintenance  · Care Team         ** See Completed Assessments for Annual Wellness Visit within the encounter summary.**         The following assessments were completed:  · Living Situation  · CAGE  · Depression Screening  · Timed Get Up and Go  · Whisper Test  · Cognitive Function Screening  · Nutrition Screening  · ADL Screening  · PAQ Screening        Vitals:    05/04/22 1440   BP: 112/72   Pulse: 67   SpO2: 97%   Weight: 78.6 kg (173 lb 4.5 oz)   Height: 5' 3" (1.6 m)     Body mass index is 30.7 kg/m².  Physical Exam  Vitals and nursing note reviewed.   Constitutional:       General: She is not in acute distress.     Appearance: Normal appearance. She is obese. She is not ill-appearing.   HENT:      Head: Normocephalic and atraumatic.   Eyes:      General: No scleral icterus.        Right eye: No discharge.         Left eye: No discharge.      Extraocular Movements: Extraocular movements intact.      Conjunctiva/sclera: Conjunctivae normal.   Cardiovascular:      Rate and Rhythm: Normal rate and regular rhythm.      Heart sounds: Normal heart sounds. No murmur heard.  Pulmonary:      Effort: Pulmonary effort is normal. No respiratory distress.      Breath sounds: Normal breath sounds. No wheezing, rhonchi or rales.   Musculoskeletal:      Cervical back: Normal range of motion.      Right lower leg: No edema.      Left lower leg: No edema.   Skin:     General: Skin is warm and dry.      Findings: No rash.   Neurological:      Mental Status: She is alert and oriented to person, place, and time.   Psychiatric:         Mood and Affect: Mood normal.         Behavior: Behavior normal. Behavior is cooperative.         Cognition and Memory: Cognition " and memory normal.               Diagnoses and health risks identified today and associated recommendations/orders:    1. Encounter for preventive health examination  - Chart reviewed. Problem list updated. Discussed current medical diagnosis, current medications, medical/surgical/family/social history; updated provider list; documented vital signs; identified any cognitive impairment; and updated risk factor list. Addressed any outstanding health maintenance. Provided patient with personalized health advice. Continue to follow up with PCP and any specialists.     2. MS (multiple sclerosis)  Chronic; stable on current treatment plan; follow up with PCP and Neurology     3. Bipolar I disorder, most recent episode depressed, moderate  Chronic; stable on current treatment plan; follow up with PCP and Psychiatry   - taking wellbutrin     4. Thoracic aorta atherosclerosis  Chronic; stable on current treatment plan; follow up with PCP    5. Tortuous aorta  Chronic; stable on current treatment plan; follow up with PCP    6. Neurogenic bladder  Chronic; stable on current treatment plan; follow up with PCP and Urology   - patient self caths PRN     7. Age-related osteoporosis without current pathological fracture  Chronic; stable on current treatment plan; follow up with PCP  - taking calcium and vit D, taking alendronate     8. KEM (obstructive sleep apnea)  Chronic; stable on current treatment plan; follow up with PCP and sleep medicine   - just received CPAP machine/mask, first night sleeping with it last night went well    9. BMI 30.0-30.9,adult  - Recommendation for healthy diet and increasing exercise as tolerated with goal of 150min/week . Recommend weight loss        Provided Silvana with a 5-10 year written screening schedule and personal prevention plan. Recommendations were developed using the USPSTF age appropriate recommendations. Education, counseling, and referrals were provided as needed. After Visit Summary  printed and given to patient which includes a list of additional screenings\tests needed.    Follow up in about 1 year (around 5/4/2023) for your next annual wellness visit.    Stacey Moeller, FNP-C   Advance Care Planning         I offered to discuss advanced care planning, including how to pick a person who would make decisions for you if you were unable to make them for yourself, called a health care power of , and what kind of decisions you might make such as use of life sustaining treatments such as ventilators and tube feeding when faced with a life limiting illness recorded on a living will that they will need to know. (How you want to be cared for as you near the end of your natural life)     X  Patient has advanced directives on file, which we reviewed, and they do not wish to make changes.

## 2022-05-04 ENCOUNTER — OFFICE VISIT (OUTPATIENT)
Dept: FAMILY MEDICINE | Facility: CLINIC | Age: 67
End: 2022-05-04
Payer: MEDICARE

## 2022-05-04 VITALS
HEART RATE: 67 BPM | SYSTOLIC BLOOD PRESSURE: 112 MMHG | WEIGHT: 173.31 LBS | HEIGHT: 63 IN | BODY MASS INDEX: 30.71 KG/M2 | OXYGEN SATURATION: 97 % | DIASTOLIC BLOOD PRESSURE: 72 MMHG

## 2022-05-04 DIAGNOSIS — G47.33 OSA (OBSTRUCTIVE SLEEP APNEA): ICD-10-CM

## 2022-05-04 DIAGNOSIS — Z00.00 ENCOUNTER FOR PREVENTIVE HEALTH EXAMINATION: Primary | ICD-10-CM

## 2022-05-04 DIAGNOSIS — I77.1 TORTUOUS AORTA: ICD-10-CM

## 2022-05-04 DIAGNOSIS — G35 MS (MULTIPLE SCLEROSIS): ICD-10-CM

## 2022-05-04 DIAGNOSIS — M81.0 AGE-RELATED OSTEOPOROSIS WITHOUT CURRENT PATHOLOGICAL FRACTURE: ICD-10-CM

## 2022-05-04 DIAGNOSIS — N31.9 NEUROGENIC BLADDER: ICD-10-CM

## 2022-05-04 DIAGNOSIS — I70.0 THORACIC AORTA ATHEROSCLEROSIS: ICD-10-CM

## 2022-05-04 DIAGNOSIS — F31.32 BIPOLAR I DISORDER, MOST RECENT EPISODE DEPRESSED, MODERATE: ICD-10-CM

## 2022-05-04 PROCEDURE — 3008F PR BODY MASS INDEX (BMI) DOCUMENTED: ICD-10-PCS | Mod: CPTII,S$GLB,, | Performed by: NURSE PRACTITIONER

## 2022-05-04 PROCEDURE — G0439 PPPS, SUBSEQ VISIT: HCPCS | Mod: S$GLB,,, | Performed by: NURSE PRACTITIONER

## 2022-05-04 PROCEDURE — 1126F AMNT PAIN NOTED NONE PRSNT: CPT | Mod: CPTII,S$GLB,, | Performed by: NURSE PRACTITIONER

## 2022-05-04 PROCEDURE — 1157F PR ADVANCE CARE PLAN OR EQUIV PRESENT IN MEDICAL RECORD: ICD-10-PCS | Mod: CPTII,S$GLB,, | Performed by: NURSE PRACTITIONER

## 2022-05-04 PROCEDURE — 99999 PR PBB SHADOW E&M-EST. PATIENT-LVL IV: CPT | Mod: PBBFAC,,, | Performed by: NURSE PRACTITIONER

## 2022-05-04 PROCEDURE — 1170F PR FUNCTIONAL STATUS ASSESSED: ICD-10-PCS | Mod: CPTII,S$GLB,, | Performed by: NURSE PRACTITIONER

## 2022-05-04 PROCEDURE — 3288F PR FALLS RISK ASSESSMENT DOCUMENTED: ICD-10-PCS | Mod: CPTII,S$GLB,, | Performed by: NURSE PRACTITIONER

## 2022-05-04 PROCEDURE — 1101F PR PT FALLS ASSESS DOC 0-1 FALLS W/OUT INJ PAST YR: ICD-10-PCS | Mod: CPTII,S$GLB,, | Performed by: NURSE PRACTITIONER

## 2022-05-04 PROCEDURE — 1159F PR MEDICATION LIST DOCUMENTED IN MEDICAL RECORD: ICD-10-PCS | Mod: CPTII,S$GLB,, | Performed by: NURSE PRACTITIONER

## 2022-05-04 PROCEDURE — 1160F PR REVIEW ALL MEDS BY PRESCRIBER/CLIN PHARMACIST DOCUMENTED: ICD-10-PCS | Mod: CPTII,S$GLB,, | Performed by: NURSE PRACTITIONER

## 2022-05-04 PROCEDURE — 1101F PT FALLS ASSESS-DOCD LE1/YR: CPT | Mod: CPTII,S$GLB,, | Performed by: NURSE PRACTITIONER

## 2022-05-04 PROCEDURE — 1126F PR PAIN SEVERITY QUANTIFIED, NO PAIN PRESENT: ICD-10-PCS | Mod: CPTII,S$GLB,, | Performed by: NURSE PRACTITIONER

## 2022-05-04 PROCEDURE — 1157F ADVNC CARE PLAN IN RCRD: CPT | Mod: CPTII,S$GLB,, | Performed by: NURSE PRACTITIONER

## 2022-05-04 PROCEDURE — 1159F MED LIST DOCD IN RCRD: CPT | Mod: CPTII,S$GLB,, | Performed by: NURSE PRACTITIONER

## 2022-05-04 PROCEDURE — 3008F BODY MASS INDEX DOCD: CPT | Mod: CPTII,S$GLB,, | Performed by: NURSE PRACTITIONER

## 2022-05-04 PROCEDURE — 1160F RVW MEDS BY RX/DR IN RCRD: CPT | Mod: CPTII,S$GLB,, | Performed by: NURSE PRACTITIONER

## 2022-05-04 PROCEDURE — G0439 PR MEDICARE ANNUAL WELLNESS SUBSEQUENT VISIT: ICD-10-PCS | Mod: S$GLB,,, | Performed by: NURSE PRACTITIONER

## 2022-05-04 PROCEDURE — 3288F FALL RISK ASSESSMENT DOCD: CPT | Mod: CPTII,S$GLB,, | Performed by: NURSE PRACTITIONER

## 2022-05-04 PROCEDURE — 99999 PR PBB SHADOW E&M-EST. PATIENT-LVL IV: ICD-10-PCS | Mod: PBBFAC,,, | Performed by: NURSE PRACTITIONER

## 2022-05-04 PROCEDURE — 1170F FXNL STATUS ASSESSED: CPT | Mod: CPTII,S$GLB,, | Performed by: NURSE PRACTITIONER

## 2022-05-13 ENCOUNTER — TELEPHONE (OUTPATIENT)
Dept: UROLOGY | Facility: CLINIC | Age: 67
End: 2022-05-13
Payer: MEDICARE

## 2022-05-13 NOTE — TELEPHONE ENCOUNTER
----- Message from Alexandra Littlejohn LPN sent at 5/12/2022  4:16 PM CDT -----  Regarding: FW: pt advice  Contact: pt @ [phone]    ----- Message -----  From: Tremontana Chevalier  Sent: 5/12/2022   3:37 PM CDT  To: [facility] Staff  Subject: pt advice                                        Pt calling to speak with someone in Dr. Ross's office regarding botox injections in her bladder. Please call.

## 2022-06-02 NOTE — PROGRESS NOTES
[facility]  [address]    [address]  [address] [place], [address] [facility]  [address]  : [facility] [address] [place]    The results of your lab work / imaging test was all normal . Please let us know if you have any questions    [address] [place] [address] [employer] 

## 2022-06-17 ENCOUNTER — PATIENT OUTREACH (OUTPATIENT)
Dept: ADMINISTRATIVE | Facility: HOSPITAL | Age: 67
End: 2022-06-17
Payer: MEDICARE

## 2022-06-24 ENCOUNTER — PATIENT MESSAGE (OUTPATIENT)
Dept: PSYCHIATRY | Facility: CLINIC | Age: 67
End: 2022-06-24
Payer: MEDICARE

## 2022-07-05 ENCOUNTER — HOSPITAL ENCOUNTER (EMERGENCY)
Facility: HOSPITAL | Age: 67
Discharge: HOME | End: 2022-07-05
Attending: EMERGENCY MEDICINE
Payer: MEDICARE

## 2022-07-05 VITALS
RESPIRATION RATE: 20 BRPM | HEIGHT: 63 IN | SYSTOLIC BLOOD PRESSURE: 118 MMHG | HEART RATE: 71 BPM | TEMPERATURE: 98 F | OXYGEN SATURATION: 96 % | DIASTOLIC BLOOD PRESSURE: 58 MMHG | BODY MASS INDEX: 30.7 KG/M2

## 2022-07-05 DIAGNOSIS — S99.922A FOOT INJURY, LEFT, INITIAL ENCOUNTER: ICD-10-CM
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
## 2022-07-05 DIAGNOSIS — M79.602 LEFT ARM PAIN: ICD-10-CM

## 2022-07-05 PROCEDURE — 25000003 PHARM REV CODE 250: Performed by: PHYSICIAN ASSISTANT

## 2022-07-05 PROCEDURE — 99284 EMERGENCY DEPT VISIT MOD MDM: CPT | Mod: 25

## 2022-07-05 RX ORDER — ACETAMINOPHEN 325 MG/1
650 TABLET ORAL
Status: COMPLETED | OUTPATIENT
Start: 2022-07-05 | End: 2022-07-05

## 2022-07-05 RX ADMIN — ACETAMINOPHEN 650 MG: 325 TABLET ORAL at 11:07

## 2022-07-05 NOTE — ED PROVIDER NOTES
Encounter Date: 7/5/2022    SCRIBE #1 NOTE: I, Addi Keys, am scribing for, and in the presence of,  Anmol Cortez MD. I have scribed the following portions of the note - Other sections scribed: HPI, ROS.       History     Chief Complaint   Patient presents with    Fall     Fall this am getting out of bed, left arm abrasion and left leg pain. No loc and no blood thinners     The patient is a 66 y.o. female who presents to the ED with complaint of a fall this morning. The patient states she lost her balance while she was standing and fell backwards. She complains of pain to her left foot and left upper arm. Patient is able to bear weight on the left foot and ambulate as normal, without the use of a cane or crutches. Patient denies elbow pain, shoulder pain, dizziness, light-headedness, chest pain, headache, syncope, nausea, or vomiting.      Past medical history of Bipolar 1 disorder, Breast cancer (2001), Breast cyst, Closed fracture of proximal end of right humerus (3/9/2015), Depression, History of right shoulder fracture, MS (multiple sclerosis), Osteoporosis, and Wrist fracture (left).      The history is provided by the patient. No  was used.     Review of patient's allergies indicates:   Allergen Reactions    Adhesive      blisters    Keflex [cephalexin] Rash    Cephalosporins      Past Medical History:   Diagnosis Date    Bipolar 1 disorder     Breast cancer 2001    right lumpectomy-radiation & chemo taxol    Breast cyst     Closed fracture of proximal end of right humerus 3/9/2015    Depression     History of right shoulder fracture     MS (multiple sclerosis)     presented as dizzines, dx vision,     Osteoporosis, unspecified     Pneumonia 3/27/2020    Wrist fracture left    2019     Past Surgical History:   Procedure Laterality Date    BRAIN SURGERY  1991    BREAST BIOPSY Left 2009    core    BREAST BIOPSY Right     BREAST LUMPECTOMY Right 2001    COLONOSCOPY N/A  1/7/2022    Procedure: COLONOSCOPY;  Surgeon: Shiva Walsh MD;  [facility] ENDO;  Service: Endoscopy;  Laterality: N/A;    CYSTOSCOPY N/A 7/11/2018    Procedure: CYSTOSCOPY;  Surgeon: Jayesh Ross MD;  Location: [facility] FLR;  Service: Urology;  Laterality: N/A;  30 min    CYSTOSCOPY N/A 4/10/2019    Procedure: CYSTOSCOPY;  Surgeon: Jayesh Ross MD;  Location: [facility]LR;  Service: Urology;  Laterality: N/A;  30 MIN    CYSTOSCOPY  10/21/2021    Procedure: CYSTOSCOPY;  Surgeon: Jayesh Ross MD;  [facility];  Service: Urology;;    FOOT SURGERY  october 2013    HYSTERECTOMY      partial    INJECTION OF BOTULINUM TOXIN TYPE A N/A 7/11/2018    Procedure: INJECTION, BOTULINUM TOXIN, TYPE A 200 UNITS;  Surgeon: Jayesh Ross MD;  [facility];  Service: Urology;  Laterality: N/A;    INJECTION OF BOTULINUM TOXIN TYPE A N/A 4/10/2019    Procedure: INJECTION, BOTULINUM TOXIN, TYPE A 200 UNITS;  Surgeon: Jayesh Ross MD;  [facility];  Service: Urology;  Laterality: N/A;    INJECTION OF BOTULINUM TOXIN TYPE A  10/21/2021    Procedure: INJECTION, BOTULINUM TOXIN, 200units;  Surgeon: Jayesh Ross MD;  [facility];  Service: Urology;;    OPEN REDUCTION AND INTERNAL FIXATION (ORIF) OF INJURY OF WRIST Left 12/27/2019    Procedure: ORIF, WRIST;  Surgeon: Albert Schroeder Jr., MD;  Location: [facility] OR;  Service: Orthopedics;  Laterality: Left;  ACUMED/ MINI C ARM [facility] sliceX notified/ Rk confirmed here in [facility] with Lacie 12/23/19 KB [id number]    TONSILLECTOMY      TOTAL REDUCTION MAMMOPLASTY Left 11/5/2020    Procedure: MAMMOPLASTY, REDUCTION, LEFT;  Surgeon: Kirk Tompkins MD;  Location: [facility] OR 2ND FLR;  Service: Plastics;  Laterality: Left;  Left breast tissue weighed- 49 grams.     Family History   Problem Relation Age of Onset    Heart disease Mother     Cancer Mother         breast cancer    Skin cancer Mother      Breast cancer Mother     Stroke Mother     Emphysema Father     Dementia Brother     Diabetes Brother     No Known Problems Brother     Anesthesia problems Daughter     Autism Daughter     Autism Son     Breast cancer Maternal Aunt      Social History     Tobacco Use    Smoking status: Never Smoker    Smokeless tobacco: Never Used   Substance Use Topics    Alcohol use: Not Currently    Drug use: Never     Review of Systems   Cardiovascular: Negative for chest pain.   Gastrointestinal: Negative for diarrhea and vomiting.   Musculoskeletal: Positive for myalgias.   Neurological: Negative for dizziness, syncope, light-headedness and headaches.       Physical Exam     Initial Vitals [07/05/22 1037]   BP Pulse Resp Temp SpO2   (!) 118/58 71 20 98.2 °F (36.8 °C) 96 %      MAP       --         Physical Exam    Nursing note and vitals reviewed.  Constitutional: She is not diaphoretic. No distress.   Cardiovascular:   Pulses:       Radial pulses are 2+ on the left side.        Dorsalis pedis pulses are 2+ on the left side.        Posterior tibial pulses are 2+ on the left side.   Pulmonary/Chest: No respiratory distress.   Musculoskeletal:      Left upper arm: No swelling, deformity or tenderness.      Left ankle: No swelling or deformity. No tenderness. Normal range of motion.      Left foot: Tenderness present. No swelling or deformity.     Neurological: She is alert and oriented to person, place, and time. She has normal strength. No sensory deficit. Coordination normal. GCS score is 15. GCS eye subscore is 4. GCS verbal subscore is 5. GCS motor subscore is 6.         ED Course   Procedures  Labs Reviewed - No data to display       Imaging Results          X-Ray Humerus 2 View Left (Final result)  Result time 07/05/22 11:48:37    Final result by Sotero Song MD (07/05/22 11:48:37)                 Impression:      No displaced fracture.      Electronically signed by: Sotero Song  MD  Date:    07/05/2022  Time:    11:48             Narrative:    EXAMINATION:  XR HUMERUS 2 VIEW LEFT    CLINICAL HISTORY:  Pain in left arm.    TECHNIQUE:  Two views left humerus.    COMPARISON:  None.    FINDINGS:  No acute displaced fracture or dislocation.  No unexpected radiopaque foreign body.                               X-Ray Foot Complete Left (Final result)  Result time 07/05/22 11:47:13    Final result by Sotero Song MD (07/05/22 11:47:13)                 Impression:      No displaced fracture identified and no detrimental change when compared with 11/23/2021.      Electronically signed by: Sotero Song MD  Date:    07/05/2022  Time:    11:47             Narrative:    EXAMINATION:  XR FOOT COMPLETE 3 VIEW LEFT    CLINICAL HISTORY:  Unspecified injury of left foot, initial encounter    TECHNIQUE:  Three views of the left foot.    COMPARISON:  11/23/2021    FINDINGS:  Exam quality is limited by suboptimal positioning and multiple hammertoe deformities.  Hallux valgus deformity again noted.  Mild bony demineralization and degenerative changes.  No acute displaced fracture identified allowing for limitations.  No dislocation.  No unexpected radiopaque foreign body.                                 Medications   acetaminophen tablet 650 mg (650 mg Oral Given 7/5/22 1101)              Scribe Attestation:   Scribe #1: I performed the above scribed service and the documentation accurately describes the services I performed. I attest to the accuracy of the note.    Attending Attestation:             Attending ED Notes:   Portions of this chart were completed by the scribe by interpretive transcription of statements made by the patient as a result of my questions at the bedside. Other portions were completed by the scribe from statements made by me for direct transcription into the medical record. Following completion of the charting by the scribe, I made modifications for both correctness and proper  phrasing.               Clinical Impression:   Final diagnoses:  [S99.922A] Foot injury, left, initial encounter  [M79.602] Left arm pain  [W19.XXXA] Fall, initial encounter (Primary)          ED Disposition Condition    Discharge Stable        ED Prescriptions     None        Follow-up Information     Follow up With Specialties Details Why Contact Info    Jhonatan Avila III, MD Internal Medicine  As needed [address] [facility] [address]  [phone]      ER   Return to this ER or visit any other ER should you have any concerns that you feel need immediate attention.               Anmol Cortez III, MD  08/03/22 2007

## 2022-07-05 NOTE — PHARMACY MED REC
"  [facility]           Pharmacy  Admission Medication History     The home medication history was taken by Sarah Soliman.      Medication history obtained from Medications listed below were obtained from: Patient/family    Based on information gathered for medication list, you may go to "Admission" then "Reconcile Home Medications" tabs to review and/or act upon those items.      The home medication list has been updated by the Pharmacy department.    Please read ALL comments highlighted in yellow.    Please address this information as you see fit.     Feel free to contact us if you have any questions or require assistance.        No current facility-administered medications on file prior to encounter.     Current Outpatient Medications on File Prior to Encounter   Medication Sig Dispense Refill    alendronate (FOSAMAX) 70 MG tablet TAKE 1 TABLET BY MOUTH EVERY 7 DAYS (Patient taking differently: Take 70 mg by mouth every Saturday.) 24 tablet 0    buPROPion (WELLBUTRIN SR) 100 MG TBSR 12 hr tablet Take 2 tablets (200 mg total) by mouth every morning. 180 tablet 1    buPROPion (WELLBUTRIN SR) 150 MG TBSR 12 hr tablet Take 1 tablet (150 mg total) by mouth every evening. 90 tablet 1    calcium-vitamin D3 (OS-IGOR 500 + D3) 500 mg-5 mcg (200 unit) per tablet Take 1 tablet by mouth 2 (two) times daily with meals.      coenzyme Q10 10 mg capsule Take 10 mg by mouth once daily.      cyanocobalamin (VITAMIN B-12) 1000 MCG tablet Take 1,000 mcg by mouth once daily.      fluphenazine (PROLIXIN) 1 MG tablet Take 2 tablets (2 mg total) by mouth every evening. 180 tablet 1    folic acid (FOLVITE) 1 MG tablet TAKE 1 TABLET(1000 MCG) BY MOUTH EVERY DAY (Patient taking differently: Take 1 mg by mouth once daily.) 90 tablet 1    OXcarbazepine (TRILEPTAL) 150 MG Tab TAKE 1 TABLET(150 MG) BY MOUTH TWICE DAILY (Patient taking differently: Take 150 mg by mouth 2 (two) times daily.) 180 tablet 1    " catheter (FEMALE CATHETER) 14 Fr [facility] Patient is to self catheterize four times a day indefinitley using female 12 fr in and out catheter for incomplete bladder emptying.   Dispense 120 month with 11 refills or 360 every 3 months with 3 refills depending on patient's preference 120 each 11       Please address this information as you see fit.  Feel free to contact us if you have any questions or require assistance.    Sarah Soliman  [phone]                .

## 2022-07-05 NOTE — FIRST PROVIDER EVALUATION
Emergency Department TeleTriage Encounter Note      CHIEF COMPLAINT    Chief Complaint   Patient presents with    Fall     Fall this am getting out of bed, left arm abrasion and left leg pain. No loc and no blood thinners       VITAL SIGNS   Initial Vitals [07/05/22 1037]   BP Pulse Resp Temp SpO2   (!) 118/58 71 20 98.2 °F (36.8 °C) 96 %      MAP       --            ALLERGIES    Review of patient's allergies indicates:   Allergen Reactions    Adhesive      blisters    Keflex [cephalexin] Rash    Cephalosporins        PROVIDER TRIAGE NOTE  Patient reports fall from bed today with pain to the left upper arm and left foot.       ORDERS  Labs Reviewed - No data to display    ED Orders (720h ago, onward)    Start Ordered     Status Ordering Provider    07/05/22 1100 07/05/22 1048  acetaminophen tablet 650 mg  ED 1 Time         Ordered PANCOAST, ANTONIO H.    07/05/22 1048 07/05/22 1048  X-Ray Foot Complete Left  1 time imaging         Ordered PANCOAST, ANTONIO H.    07/05/22 1048 07/05/22 1048  X-Ray Humerus 2 View Left  1 time imaging         Ordered PANCOAST, ANTONIO H.            Virtual Visit Note: The provider triage portion of this emergency department evaluation and documentation was performed via Synference, a HIPAA-compliant telemedicine application, in concert with a tele-presenter in the room. A face to face patient evaluation with one of my colleagues will occur once the patient is placed in an emergency department room.      DISCLAIMER: This note was prepared with M*T[employer] Twitter Dashboard voice recognition transcription software. Garbled syntax, mangled pronouns, and other bizarre constructions may be attributed to that software system.

## 2022-07-08 ENCOUNTER — OFFICE VISIT (OUTPATIENT)
Dept: UROLOGY | Facility: CLINIC | Age: 67
End: 2022-07-08
Payer: MEDICARE

## 2022-07-08 ENCOUNTER — TELEPHONE (OUTPATIENT)
Dept: UROLOGY | Facility: CLINIC | Age: 67
End: 2022-07-08
Payer: MEDICARE

## 2022-07-08 VITALS
HEART RATE: 64 BPM | DIASTOLIC BLOOD PRESSURE: 72 MMHG | SYSTOLIC BLOOD PRESSURE: 108 MMHG | HEIGHT: 63 IN | BODY MASS INDEX: 30.74 KG/M2 | WEIGHT: 173.5 LBS

## 2022-07-08 DIAGNOSIS — N39.41 URGE INCONTINENCE: ICD-10-CM

## 2022-07-08 DIAGNOSIS — N39.0 ACUTE UTI: Primary | ICD-10-CM

## 2022-07-08 DIAGNOSIS — R33.9 INCOMPLETE BLADDER EMPTYING: ICD-10-CM

## 2022-07-08 DIAGNOSIS — N32.81 OVERACTIVE BLADDER: ICD-10-CM

## 2022-07-08 DIAGNOSIS — N39.41 URGE INCONTINENCE: Primary | ICD-10-CM

## 2022-07-08 PROCEDURE — 3008F PR BODY MASS INDEX (BMI) DOCUMENTED: ICD-10-PCS | Mod: CPTII,S$GLB,, | Performed by: UROLOGY

## 2022-07-08 PROCEDURE — 3288F PR FALLS RISK ASSESSMENT DOCUMENTED: ICD-10-PCS | Mod: CPTII,S$GLB,, | Performed by: UROLOGY

## 2022-07-08 PROCEDURE — 99999 PR PBB SHADOW E&M-EST. PATIENT-LVL III: CPT | Mod: PBBFAC,,, | Performed by: UROLOGY

## 2022-07-08 PROCEDURE — 99999 PR PBB SHADOW E&M-EST. PATIENT-LVL III: ICD-10-PCS | Mod: PBBFAC,,, | Performed by: UROLOGY

## 2022-07-08 PROCEDURE — 3008F BODY MASS INDEX DOCD: CPT | Mod: CPTII,S$GLB,, | Performed by: UROLOGY

## 2022-07-08 PROCEDURE — 1126F PR PAIN SEVERITY QUANTIFIED, NO PAIN PRESENT: ICD-10-PCS | Mod: CPTII,S$GLB,, | Performed by: UROLOGY

## 2022-07-08 PROCEDURE — 3074F PR MOST RECENT SYSTOLIC BLOOD PRESSURE < 130 MM HG: ICD-10-PCS | Mod: CPTII,S$GLB,, | Performed by: UROLOGY

## 2022-07-08 PROCEDURE — 1126F AMNT PAIN NOTED NONE PRSNT: CPT | Mod: CPTII,S$GLB,, | Performed by: UROLOGY

## 2022-07-08 PROCEDURE — 1157F PR ADVANCE CARE PLAN OR EQUIV PRESENT IN MEDICAL RECORD: ICD-10-PCS | Mod: CPTII,S$GLB,, | Performed by: UROLOGY

## 2022-07-08 PROCEDURE — 99214 OFFICE O/P EST MOD 30 MIN: CPT | Mod: 57,S$GLB,, | Performed by: UROLOGY

## 2022-07-08 PROCEDURE — 87088 URINE BACTERIA CULTURE: CPT | Performed by: UROLOGY

## 2022-07-08 PROCEDURE — 1101F PR PT FALLS ASSESS DOC 0-1 FALLS W/OUT INJ PAST YR: ICD-10-PCS | Mod: CPTII,S$GLB,, | Performed by: UROLOGY

## 2022-07-08 PROCEDURE — 3288F FALL RISK ASSESSMENT DOCD: CPT | Mod: CPTII,S$GLB,, | Performed by: UROLOGY

## 2022-07-08 PROCEDURE — 87186 SC STD MICRODIL/AGAR DIL: CPT | Performed by: UROLOGY

## 2022-07-08 PROCEDURE — 3074F SYST BP LT 130 MM HG: CPT | Mod: CPTII,S$GLB,, | Performed by: UROLOGY

## 2022-07-08 PROCEDURE — 1157F ADVNC CARE PLAN IN RCRD: CPT | Mod: CPTII,S$GLB,, | Performed by: UROLOGY

## 2022-07-08 PROCEDURE — 87077 CULTURE AEROBIC IDENTIFY: CPT | Performed by: UROLOGY

## 2022-07-08 PROCEDURE — 3078F PR MOST RECENT DIASTOLIC BLOOD PRESSURE < 80 MM HG: ICD-10-PCS | Mod: CPTII,S$GLB,, | Performed by: UROLOGY

## 2022-07-08 PROCEDURE — 87086 URINE CULTURE/COLONY COUNT: CPT | Performed by: UROLOGY

## 2022-07-08 PROCEDURE — 1159F PR MEDICATION LIST DOCUMENTED IN MEDICAL RECORD: ICD-10-PCS | Mod: CPTII,S$GLB,, | Performed by: UROLOGY

## 2022-07-08 PROCEDURE — 1101F PT FALLS ASSESS-DOCD LE1/YR: CPT | Mod: CPTII,S$GLB,, | Performed by: UROLOGY

## 2022-07-08 PROCEDURE — 3078F DIAST BP <80 MM HG: CPT | Mod: CPTII,S$GLB,, | Performed by: UROLOGY

## 2022-07-08 PROCEDURE — 99214 PR OFFICE/OUTPT VISIT, EST, LEVL IV, 30-39 MIN: ICD-10-PCS | Mod: 57,S$GLB,, | Performed by: UROLOGY

## 2022-07-08 PROCEDURE — 1159F MED LIST DOCD IN RCRD: CPT | Mod: CPTII,S$GLB,, | Performed by: UROLOGY

## 2022-07-08 NOTE — PROGRESS NOTES
CHIEF COMPLAINT:    Mrs. Quezada is a 66 y.o. female presenting for urine culture prior to cysto with botox injection.    PRESENTING ILLNESS:    Silvana Quezada is a 66 y.o. female  PMH of MS, neurogenic bladder who presents for urine culture prior to cysto with botox injection.     She voids on her own and performs CIC at home 4 times per day using 12fr. She stays dry between catheterizations. Botox injections keep frequency, urgency and nocturia under control.    She denies incontinence during the day.  She endorses urinary incontinence at night.   She wears a diaper at night and it usually is wet when she wakes up.  She denies frequency, urgency, dysuria.  She does not have any urinary complaints.        Last botox injection 10/21/21  Procedure(s) Performed:   1.  Cystoscopy with bladder botox injection  Findings:   1.Cystoscopy significant for significant trabeculations and multiple cellules.  2. 200 U Botox injected in 20 mL of injectable saline throughout bladder detrusor. Good wheals raised.       REVIEW OF SYSTEMS:  See HPI    PATIENT HISTORY:    Past Medical History:   Diagnosis Date    Bipolar 1 disorder     Breast cancer 2001    right lumpectomy-radiation & chemo taxol    Breast cyst     Closed fracture of proximal end of right humerus 3/9/2015    Depression     History of right shoulder fracture     MS (multiple sclerosis)     presented as dizzines, dx vision,     Osteoporosis, unspecified     Pneumonia 3/27/2020    Wrist fracture left    2019       Past Surgical History:   Procedure Laterality Date    BRAIN SURGERY  1991    BREAST BIOPSY Left 2009    core    BREAST BIOPSY Right     BREAST LUMPECTOMY Right 2001    COLONOSCOPY N/A 1/7/2022    Procedure: COLONOSCOPY;  Surgeon: Shiva Walsh MD;  Location: [facility];  Service: Endoscopy;  Laterality: N/A;    CYSTOSCOPY N/A 7/11/2018    Procedure: CYSTOSCOPY;  Surgeon: Jayesh Ross MD;  Location: [facility] OR [address] [place] [address];  Service: Urology;   Laterality: N/A;  30 min    CYSTOSCOPY N/A 4/10/2019    Procedure: CYSTOSCOPY;  Surgeon: Jayesh Ross MD;  Location: [facility] OR [address] [place] [address];  Service: Urology;  Laterality: N/A;  30 MIN    CYSTOSCOPY  10/21/2021    Procedure: CYSTOSCOPY;  Surgeon: Jayesh Ross MD;  Location: [address];  Service: Urology;;    FOOT SURGERY  october 2013    HYSTERECTOMY      partial    INJECTION OF BOTULINUM TOXIN TYPE A N/A 7/11/2018    Procedure: INJECTION, BOTULINUM TOXIN, TYPE A 200 UNITS;  Surgeon: Jayesh Ross MD;  Location: [address];  Service: Urology;  Laterality: N/A;    INJECTION OF BOTULINUM TOXIN TYPE A N/A 4/10/2019    Procedure: INJECTION, BOTULINUM TOXIN, TYPE A 200 UNITS;  Surgeon: Jayesh Ross MD;  Location: [address];  Service: Urology;  Laterality: N/A;    INJECTION OF BOTULINUM TOXIN TYPE A  10/21/2021    Procedure: INJECTION, BOTULINUM TOXIN, 200units;  Surgeon: Jayesh Ross MD;  Location: [address];  Service: Urology;;    OPEN REDUCTION AND INTERNAL FIXATION (ORIF) OF INJURY OF WRIST Left 12/27/2019    Procedure: ORIF, WRIST;  Surgeon: Albert Schroeder Jr., MD;  Location: [facility];  Service: Orthopedics;  Laterality: Left;  ACUMED/ MINI C ARM [id number] notified/ Rk confirmed here in [employer] with Lacie 12/23/19 KB [id number]    TONSILLECTOMY      TOTAL REDUCTION MAMMOPLASTY Left 11/5/2020    Procedure: MAMMOPLASTY, REDUCTION, LEFT;  Surgeon: Kirk Tompkins MD;  Location: [address];  Service: Plastics;  Laterality: Left;  Left breast tissue weighed- 49 grams.       Family History   Problem Relation Age of Onset    Heart disease Mother     Cancer Mother         breast cancer    Skin cancer Mother     Breast cancer Mother     Stroke Mother     Emphysema Father     Dementia Brother     Diabetes Brother     No Known Problems Brother     Anesthesia problems Daughter     Autism Daughter     Autism Son     Breast cancer Maternal Aunt        Social  History     Socioeconomic History    Marital status:    Occupational History    Occupation: [employer]   Tobacco Use    Smoking status: Never Smoker    Smokeless tobacco: Never Used   Substance and Sexual Activity    Alcohol use: Not Currently    Drug use: Never    Sexual activity: Yes     Partners: Male   Social History Narrative    Original form , 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     Lives with      39, 25 children 2 daughter      Social Determinants of Health     Financial Resource Strain: Low Risk     Difficulty of Paying Living Expenses: Not very hard   Food Insecurity: No Food Insecurity    Worried About Running Out of Food in the Last Year: Never true    Ran Out of Food in the Last Year: Never true   Transportation Needs: No Transportation Needs    Lack of Transportation (Medical): No    Lack of Transportation (Non-Medical): No   Physical Activity: Insufficiently Active    Days of Exercise per Week: 3 days    Minutes of Exercise per Session: 20 min   Stress: No Stress Concern Present    Feeling of Stress : Not at all   Social Connections: Socially Integrated    Frequency of Communication with Friends and Family: More than three times a week    Frequency of Social Gatherings with Friends and Family: More than three times a week    Attends Adventism Services: More than 4 times per year    Active Member of Clubs or Organizations: Yes    Attends Club or Organization Meetings: More than 4 times per year    Marital Status:    Housing Stability: Low Risk     Unable to Pay for Housing in the Last Year: No    Number of Places Lived in the Last Year: 1    Unstable Housing in the Last Year: No       Allergies:  Adhesive, Keflex [cephalexin], and Cephalosporins    Medications:    Current Outpatient Medications:     alendronate (FOSAMAX) 70 MG tablet, TAKE 1 TABLET BY MOUTH EVERY 7 DAYS (Patient taking differently: Take 70 mg by mouth every Saturday.), Disp: 24  tablet, Rfl: 0    buPROPion (WELLBUTRIN SR) 100 MG TBSR 12 hr tablet, Take 2 tablets (200 mg total) by mouth every morning., Disp: 180 tablet, Rfl: 1    buPROPion (WELLBUTRIN SR) 150 MG TBSR 12 hr tablet, Take 1 tablet (150 mg total) by mouth every evening., Disp: 90 tablet, Rfl: 1    calcium-vitamin D3 (OS-IGOR 500 + D3) 500 mg-5 mcg (200 unit) per tablet, Take 1 tablet by mouth 2 (two) times daily with meals., Disp: , Rfl:     catheter (FEMALE CATHETER) 14 Fr Misc, Patient is to self catheterize four times a day indefinitley using female 12 fr in and out catheter for incomplete bladder emptying.  Dispense 120 month with 11 refills or 360 every 3 months with 3 refills depending on patient's preference, Disp: 120 each, Rfl: 11    coenzyme Q10 10 mg capsule, Take 10 mg by mouth once daily., Disp: , Rfl:     cyanocobalamin (VITAMIN B-12) 1000 MCG tablet, Take 1,000 mcg by mouth once daily., Disp: , Rfl:     fluphenazine (PROLIXIN) 1 MG tablet, Take 2 tablets (2 mg total) by mouth every evening., Disp: 180 tablet, Rfl: 1    folic acid (FOLVITE) 1 MG tablet, TAKE 1 TABLET(1000 MCG) BY MOUTH EVERY DAY (Patient taking differently: Take 1 mg by mouth once daily.), Disp: 90 tablet, Rfl: 1    OXcarbazepine (TRILEPTAL) 150 MG Tab, TAKE 1 TABLET(150 MG) BY MOUTH TWICE DAILY (Patient taking differently: Take 150 mg by mouth 2 (two) times daily.), Disp: 180 tablet, Rfl: 1    PHYSICAL EXAMINATION:    Constitutional: She appears well-developed and well-nourished.  She is in no apparent distress.    Eyes: No scleral icterus noted bilaterally. No discharge bilaterally.    Nose: No nasal congestion    Cardiovascular: Normal rate.      Pulmonary/Chest: Effort normal. No respiratory distress.     Abdominal:  She exhibits no distension.      Neurological: She is alert and oriented to person, place, and time.     Skin: Skin is warm and dry.     Psych: Cooperative with normal affect.    Genitourinary:  Normal external female  genitalia  Urethral meatus is normal  Consent verbally obtained.  Betadine prep was applied to the urethral meatus. An in and out cath was performed after voiding.  The PVR was 60 ml.    Physical Exam      LABS:    U/a: 1.010, pH 6, + nitrite, otherwsie negative.       IMPRESSION:  Acute UTI  -     Urine culture    Urge incontinence    Overactive bladder    Incomplete bladder emptying      PLAN:    Urine culture.  Will treat based on sensitivity report prior to cysto.  She will follow up for cysto with botox injection on 7/20/22.  Faye Kesy, a surgery scheduler, notified and spoke with patient in office.      In addition, we discussed an alternative therapy such as Sacral Neuromodulation.  Pt informed me that she tried it with Dr. White many years ago and it did not work for her.  So will continue with botox injection to the bladder.    I spent 25 minutes (including face to face & non face to face time) in regards to patient's care.      Follow up in about 12 days (around 7/20/2022), or cysto botox injection 200 units.

## 2022-07-08 NOTE — TELEPHONE ENCOUNTER
Urge incontinence  -     Case Request Operating Room: CYSTOSCOPY,WITH BOTULINUM TOXIN INJECTION 200 units

## 2022-07-11 ENCOUNTER — PATIENT MESSAGE (OUTPATIENT)
Dept: UROLOGY | Facility: CLINIC | Age: 67
End: 2022-07-11
Payer: MEDICARE

## 2022-07-11 ENCOUNTER — TELEPHONE (OUTPATIENT)
Dept: UROLOGY | Facility: CLINIC | Age: 67
End: 2022-07-11
Payer: MEDICARE

## 2022-07-11 DIAGNOSIS — N30.90 CYSTITIS: Primary | ICD-10-CM

## 2022-07-11 LAB
BACTERIA UR CULT: ABNORMAL
BACTERIA UR CULT: ABNORMAL

## 2022-07-11 RX ORDER — NITROFURANTOIN 25; 75 MG/1; MG/1
100 CAPSULE ORAL 2 TIMES DAILY
Qty: 14 CAPSULE | Refills: 0 | Status: SHIPPED | OUTPATIENT
Start: 2022-07-11 | End: 2022-07-18

## 2022-07-11 NOTE — TELEPHONE ENCOUNTER
Cystitis  -     nitrofurantoin, macrocrystal-monohydrate, (MACROBID) 100 MG capsule; Take 1 capsule (100 mg total) by mouth 2 (two) times daily. Start taking  Macrobid Friday before your surgery ( 5 days before). for 7 days  Dispense: 14 capsule; Refill: 0    eRxed to the pharmacy.  Please call and advise the patient to take the medication as given.

## 2022-07-19 ENCOUNTER — PATIENT MESSAGE (OUTPATIENT)
Dept: RESEARCH | Facility: CLINIC | Age: 67
End: 2022-07-19
Payer: MEDICARE

## 2022-07-19 ENCOUNTER — TELEPHONE (OUTPATIENT)
Dept: UROLOGY | Facility: CLINIC | Age: 67
End: 2022-07-19
Payer: MEDICARE

## 2022-07-19 NOTE — TELEPHONE ENCOUNTER
Called pt to confirm arrival time of 845am for procedure on 7-20. Gave pt NPO instructions and gave pt opportunity to ask questions. Pt verbalized understanding.     Pt was informed that only 1 person would be allowed to accompany them the morning of surgery.  Pt verbalized understanding.

## 2022-07-22 ENCOUNTER — TELEPHONE (OUTPATIENT)
Dept: INTERNAL MEDICINE | Facility: CLINIC | Age: 67
End: 2022-07-22
Payer: MEDICARE

## 2022-07-22 DIAGNOSIS — Z12.31 ENCOUNTER FOR SCREENING MAMMOGRAM FOR BREAST CANCER: Primary | ICD-10-CM

## 2022-07-22 DIAGNOSIS — Z12.39 ENCOUNTER FOR SCREENING FOR MALIGNANT NEOPLASM OF BREAST, UNSPECIFIED SCREENING MODALITY: ICD-10-CM

## 2022-07-22 NOTE — TELEPHONE ENCOUNTER
----- Message from Donna Nails sent at 7/22/2022 11:36 AM CDT -----  Regarding: Call back  Contact: [phone]  Type:  Mammogram    Caller is requesting to schedule their annual mammogram appointment.  Order is not listed in EPIC.  Please enter order and contact patient to schedule.  Name of Caller: PT   Where would they like the mammogram performed? [facility]   Would the patient rather a call back or a response via [facility]? Call back   Best Call Back Number: [phone]  Additional Information:

## 2022-07-22 NOTE — TELEPHONE ENCOUNTER
Patient confirmed Mammogram appointment 08/15/2022 at 2:45 pm. [facility]. Pt voiced understanding.

## 2022-07-25 ENCOUNTER — TELEPHONE (OUTPATIENT)
Dept: UROLOGY | Facility: CLINIC | Age: 67
End: 2022-07-25
Payer: MEDICARE

## 2022-08-03 ENCOUNTER — TELEPHONE (OUTPATIENT)
Dept: UROLOGY | Facility: CLINIC | Age: 67
End: 2022-08-03
Payer: MEDICARE

## 2022-08-03 DIAGNOSIS — N39.0 RECURRENT UTI: Primary | ICD-10-CM

## 2022-08-03 NOTE — TELEPHONE ENCOUNTER
Spoke to pt, informed her she did not have surgery scheduled today. She asked for Alexandra, I informed her alexandra was out of the office today and will return tomorrow. She verbalized understanding. Will send message to alexandra to contact in am.

## 2022-08-03 NOTE — TELEPHONE ENCOUNTER
----- Message from Carli Durbin sent at 8/3/2022  8:26 AM CDT -----  Regarding: Questions and concerns  Contact: [phone] or [phone]  Patient Silvana is calling. Patient calling in ref to her procedure. Stated that it was scheduled today. Please call patient at  [phone] or [phone]    Thank You

## 2022-08-04 RX ORDER — NITROFURANTOIN 25; 75 MG/1; MG/1
100 CAPSULE ORAL 2 TIMES DAILY
Qty: 20 CAPSULE | Refills: 0 | Status: SHIPPED | OUTPATIENT
Start: 2022-08-04 | End: 2022-08-14

## 2022-08-04 NOTE — TELEPHONE ENCOUNTER
Please schedule  Cysto botox injection 200 units under MAC in OR ( 2nd floor), 30 mins, on 8/10/22 to follow.  It was cancelled from 7/27 due to UTI.    Recurrent UTI  -     nitrofurantoin, macrocrystal-monohydrate, (MACROBID) 100 MG capsule; Take 1 capsule (100 mg total) by mouth 2 (two) times daily. for 10 days  Dispense: 20 capsule; Refill: 0    Please have her start Macrobid 5 days before her surgery.

## 2022-08-15 ENCOUNTER — HOSPITAL ENCOUNTER (OUTPATIENT)
Dept: RADIOLOGY | Facility: HOSPITAL | Age: 67
Discharge: HOME | End: 2022-08-15
Attending: INTERNAL MEDICINE
Payer: MEDICARE

## 2022-08-15 DIAGNOSIS — Z12.31 ENCOUNTER FOR SCREENING MAMMOGRAM FOR BREAST CANCER: ICD-10-CM

## 2022-08-15 PROCEDURE — 77063 BREAST TOMOSYNTHESIS BI: CPT | Mod: TC

## 2022-08-15 PROCEDURE — 77063 BREAST TOMOSYNTHESIS BI: CPT | Mod: 26,,, | Performed by: RADIOLOGY

## 2022-08-15 PROCEDURE — 77063 MAMMO DIGITAL SCREENING BILAT WITH TOMO: ICD-10-PCS | Mod: 26,,, | Performed by: RADIOLOGY

## 2022-08-15 PROCEDURE — 77067 MAMMO DIGITAL SCREENING BILAT WITH TOMO: ICD-10-PCS | Mod: 26,,, | Performed by: RADIOLOGY

## 2022-08-15 PROCEDURE — 77067 SCR MAMMO BI INCL CAD: CPT | Mod: 26,,, | Performed by: RADIOLOGY

## 2022-08-15 PROCEDURE — 77067 SCR MAMMO BI INCL CAD: CPT | Mod: TC

## 2022-08-19 ENCOUNTER — TELEPHONE (OUTPATIENT)
Dept: UROLOGY | Facility: CLINIC | Age: 67
End: 2022-08-19
Payer: MEDICARE

## 2022-08-19 ENCOUNTER — LAB VISIT (OUTPATIENT)
Dept: LAB | Facility: HOSPITAL | Age: 67
End: 2022-08-19
Attending: UROLOGY
Payer: MEDICARE

## 2022-08-19 DIAGNOSIS — N39.0 RECURRENT UTI: ICD-10-CM

## 2022-08-19 DIAGNOSIS — N32.81 OVERACTIVE BLADDER: ICD-10-CM

## 2022-08-19 DIAGNOSIS — N39.41 URGE INCONTINENCE: ICD-10-CM

## 2022-08-19 DIAGNOSIS — N39.0 RECURRENT UTI: Primary | ICD-10-CM

## 2022-08-19 DIAGNOSIS — N31.9 NEUROGENIC BLADDER: ICD-10-CM

## 2022-08-19 PROCEDURE — 87088 URINE BACTERIA CULTURE: CPT | Performed by: UROLOGY

## 2022-08-19 PROCEDURE — 87186 SC STD MICRODIL/AGAR DIL: CPT | Performed by: UROLOGY

## 2022-08-19 PROCEDURE — 87077 CULTURE AEROBIC IDENTIFY: CPT | Performed by: UROLOGY

## 2022-08-19 PROCEDURE — 87086 URINE CULTURE/COLONY COUNT: CPT | Performed by: UROLOGY

## 2022-08-19 NOTE — PROGRESS NOTES
Recurrent UTI  -     Urine culture; Future; Expected date: 08/19/2022    Urge incontinence  -     Case Request Operating Room: CYSTOSCOPY,WITH BOTULINUM TOXIN INJECTION, 200 units    Overactive bladder  -     Case Request Operating Room: CYSTOSCOPY,WITH BOTULINUM TOXIN INJECTION, 200 units    Neurogenic bladder  -     Case Request Operating Room: CYSTOSCOPY,WITH BOTULINUM TOXIN INJECTION, 200 units      I asked her to do a home collect urine culture today.  Her cysto botox 200 units injection was postponed due to UTI, and was never rescheduled.    Please schedule her for cysto botox injection 200 untis in the OR ( 2nd floor) on 8/31/22 under MAC, 30 mins.

## 2022-08-19 NOTE — TELEPHONE ENCOUNTER
I called the pt and gave her the instruction.    ----- Message from Chanel Garcia MA sent at 8/19/2022  1:30 PM CDT -----  Contact: pt  Hey Ms. Morris, the patient  would like a call from you to discuss the medications.        -Gage  ----- Message -----  From: Jayesh Ross MD  Sent: 8/19/2022  12:18 PM CDT  To: Alexandra Littlejohn LPN, Chanel Garcia MA    I guess she does not have to repeat urine culture as long as she has not used Macrobid then.  She can start Macrobid 5 days before her surgery.  Please ask the pt to call Chanel Garcia at [phone] to confirm her surgery on 8/31.    Thank you    ----- Message -----  From: Alexandra Littlejohn LPN  Sent: 8/19/2022  11:06 AM CDT  To: Jayesh Ross MD    Pt was given this info on 8/3--  -     nitrofurantoin, macrocrystal-monohydrate, (MACROBID) 100 MG capsule; Take 1 capsule (100 mg total) by mouth 2 (two) times daily. for 10 days  Dispense: 20 capsule; Refill: 0     Please have her start Macrobid 5 days before her surgery.      Pt still has the antibiotics , she wants to know if she still needs to be urine cx   ----- Message -----  From: Rosario Martinez  Sent: 8/19/2022  11:00 AM CDT  To: Cody ROSALES Staff    Pt calling to say she already has antibiotics for her UTI , does she still need to take her urinalysis      Confirmed patient's contact info below:  Contact Name: Silvana Quezada  Phone Number:  [phone]

## 2022-08-19 NOTE — TELEPHONE ENCOUNTER
----- Message from Jayesh Ross MD sent at 8/19/2022 12:12 PM CDT -----  Contact: pt  I guess she does not have to repeat urine culture as long as she has not used Macrobid then.  She can start Macrobid 5 days before her surgery.  Please ask the pt to call Chanel Garcia at [phone] to confirm her surgery on 8/31.    Thank you    ----- Message -----  From: Alexandra Littlejohn LPN  Sent: 8/19/2022  11:06 AM CDT  To: Jayesh Ross MD    Pt was given this info on 8/3--  -     nitrofurantoin, macrocrystal-monohydrate, (MACROBID) 100 MG capsule; Take 1 capsule (100 mg total) by mouth 2 (two) times daily. for 10 days  Dispense: 20 capsule; Refill: 0     Please have her start Macrobid 5 days before her surgery.      Pt still has the antibiotics , she wants to know if she still needs to be urine cx   ----- Message -----  From: Rosario Martinez  Sent: 8/19/2022  11:00 AM CDT  To: Cody ROSALES Staff    Pt calling to say she already has antibiotics for her UTI , does she still need to take her urinalysis      Confirmed patient's contact info below:  Contact Name: Silvana Quezada  Phone Number:  [phone]

## 2022-08-19 NOTE — TELEPHONE ENCOUNTER
Called and spoke to pt and  to confirm surgery date of 8/31/22. Pt verbalized understanding. Pt asked for the nurse to give a call concerning medications.

## 2022-08-22 LAB — BACTERIA UR CULT: ABNORMAL

## 2022-08-30 ENCOUNTER — TELEPHONE (OUTPATIENT)
Dept: UROLOGY | Facility: CLINIC | Age: 67
End: 2022-08-30
Payer: MEDICARE

## 2022-08-30 ENCOUNTER — ANESTHESIA EVENT (OUTPATIENT)
Dept: SURGERY | Facility: HOSPITAL | Age: 67
End: 2022-08-30
Payer: MEDICARE

## 2022-08-30 NOTE — PRE-PROCEDURE INSTRUCTIONS
PreOp Instructions given:   - Verbal medication information (what to hold and what to take)   - NPO guidelines 2400  - Arrival place directions given; time to be given the day before procedure by the   Surgeon's Office [facility]  - Bathing with antibacterial soap   - Don't wear any jewelry or bring any valuables AM of surgery   - No makeup or moisturizer to face   - No perfume/cologne, powder, lotions or aftershave   Pt. verbalized understanding.   Pt denies any h/o Anesthesia/Sedation complications or side effects.  Patient does not know arrival time.  Explained that this information comes from the surgeon's office and if they haven't heard from them by 2 or 3 pm to call the office.  Patient stated an understanding.     Confirmed w/ - June - pt will arrive to [facility].

## 2022-08-30 NOTE — TELEPHONE ENCOUNTER
Called pt to confirm arrival time of 7:30 for procedure on 8/31/22. Gave pt NPO instructions and gave pt opportunity to ask questions. Pt verbalized understanding.    Pt was informed that only one person will be able to accompany them the morning of surgery. Pt verbalized understanding.

## 2022-08-30 NOTE — ANESTHESIA PREPROCEDURE EVALUATION
[facility]  Anesthesia Pre-Operative Evaluation        Patient Name: Silvana Quezada  YOB: 1955  MRN: [id number]    SUBJECTIVE:     Pre-operative Evaluation for Procedure(s) (LRB):  CYSTOSCOPY,WITH BOTULINUM TOXIN INJECTION, 200 units (N/A)     08/30/2022    Silvana Quezada is a 66 y.o. female with a PMHx significant for KME,Obesity (BMI 30.1), Multiple sclerosis, depression, Bipolar, and breast cancer.     She now presents for the above procedure(s).    Pertinent Cardiac Studies:  TTE  [facility]    No results found for this or any previous visit.   No results found for this or any previous visit.         Previous Airway: Kirstin March CRNA  Authorized by: Palmer Perez MD      Intubation:     Induction:  Intravenous    Intubated:  Postinduction    Mask Ventilation:  Easy mask    Attempts:  1    Attempted By:  CRNA    Method of Intubation:  Direct    Blade:  Rodriguez 2    Laryngeal View Grade: Grade I - full view of chords      Difficult Airway Encountered?: No      Complications:  None    Airway Device:  Oral endotracheal tube    Airway Device Size:  7.0    Style/Cuff Inflation:  Cuffed (inflated to minimal occlusive pressure)    Inflation Amount (mL):  4    Tube secured:  22    Secured at:  The lips    Placement Verified By:  Capnometry    Complicating Factors:  None    Findings Post-Intubation:  BS equal bilateral and atraumatic/condition of teeth unchanged    Patient Active Problem List   Diagnosis    MS (multiple sclerosis)    Neurogenic bladder    Urgency-frequency syndrome    History of breast cancer    Localized osteoporosis with current pathological fracture    Incomplete bladder emptying    Overactive bladder    Thoracic aorta atherosclerosis    Tortuous aorta    Bipolar I disorder, most recent episode depressed, moderate    Wrist fracture, closed, left, initial encounter    Age-related osteoporosis without current pathological fracture    Urge incontinence     Wrist pain, left    Macromastia    OAB (overactive bladder)    Other fatigue    Impaired functional mobility, balance, gait, and endurance    Positive FIT (fecal immunochemical test)    KEM (obstructive sleep apnea)       Review of patient's allergies indicates:   Allergen Reactions    Adhesive      blisters    Keflex [cephalexin] Rash    Cephalosporins        Current Outpatient Medications   Medication Instructions    alendronate (FOSAMAX) 70 MG tablet TAKE 1 TABLET BY MOUTH EVERY 7 DAYS    buPROPion (WELLBUTRIN SR) 200 mg, Oral, Every morning    buPROPion (WELLBUTRIN SR) 150 mg, Oral, Nightly    calcium-vitamin D3 (OS-IGOR 500 + D3) 500 mg-5 mcg (200 unit) per tablet 1 tablet, Oral, 2 times daily with meals    catheter (FEMALE CATHETER) 14 Fr Misc Patient is to self catheterize four times a day indefinitley using female 12 fr in and out catheter for incomplete bladder emptying.<BR> Dispense 120 month with 11 refills or 360 every 3 months with 3 refills depending on patient's preference    coenzyme Q10 10 mg, Oral, Daily    cyanocobalamin (VITAMIN B-12) 1,000 mcg, Oral, Daily    fluphenazine (PROLIXIN) 2 mg, Oral, Nightly    folic acid (FOLVITE) 1 MG tablet TAKE 1 TABLET(1000 MCG) BY MOUTH EVERY DAY    OXcarbazepine (TRILEPTAL) 150 MG Tab TAKE 1 TABLET(150 MG) BY MOUTH TWICE DAILY       Past Surgical History:   Procedure Laterality Date    BRAIN SURGERY  1991    BREAST BIOPSY Left 2009    core    BREAST BIOPSY Right     BREAST LUMPECTOMY Right 2001    COLONOSCOPY N/A 1/7/2022    Procedure: COLONOSCOPY;  Surgeon: Shiva Walsh MD;  Location: [facility];  Service: Endoscopy;  Laterality: N/A;    CYSTOSCOPY N/A 7/11/2018    Procedure: CYSTOSCOPY;  Surgeon: Jayesh Ross MD;  Location: [address];  Service: Urology;  Laterality: N/A;  30 min    CYSTOSCOPY N/A 4/10/2019    Procedure: CYSTOSCOPY;  Surgeon: Jayesh Ross MD;  Location: [facility] OR [address] [place] [address];  Service: Urology;  Laterality: N/A;   30 MIN    CYSTOSCOPY  10/21/2021    Procedure: CYSTOSCOPY;  Surgeon: Jayesh Ross MD;  Location: [facility] OR [address] [place] [address];  Service: Urology;;    FOOT SURGERY  october 2013    HYSTERECTOMY      partial    INJECTION OF BOTULINUM TOXIN TYPE A N/A 7/11/2018    Procedure: INJECTION, BOTULINUM TOXIN, TYPE A 200 UNITS;  Surgeon: Jayesh Ross MD;  Location: [address];  Service: Urology;  Laterality: N/A;    INJECTION OF BOTULINUM TOXIN TYPE A N/A 4/10/2019    Procedure: INJECTION, BOTULINUM TOXIN, TYPE A 200 UNITS;  Surgeon: Jayesh Ross MD;  Location: [facility] OR [address] [place] [address];  Service: Urology;  Laterality: N/A;    INJECTION OF BOTULINUM TOXIN TYPE A  10/21/2021    Procedure: INJECTION, BOTULINUM TOXIN, 200units;  Surgeon: Jayesh Ross MD;  Location: [facility] OR [address] [place] [address];  Service: Urology;;    OPEN REDUCTION AND INTERNAL FIXATION (ORIF) OF INJURY OF WRIST Left 12/27/2019    Procedure: ORIF, WRIST;  Surgeon: Albert Schroeder Jr., MD;  Location: [facility];  Service: Orthopedics;  Laterality: Left;  ACUMED/ MINI C ARM [employer] notified/ Rk confirmed here in Closet B with Lacie 12/23/19 KB [id number]    TONSILLECTOMY      TOTAL REDUCTION MAMMOPLASTY Left 11/5/2020    Procedure: MAMMOPLASTY, REDUCTION, LEFT;  Surgeon: Kirk Tompkins MD;  Location: [address];  Service: Plastics;  Laterality: Left;  Left breast tissue weighed- 49 grams.       Social History     Substance and Sexual Activity   Drug Use Never     Alcohol Use: Not At Risk    Frequency of Alcohol Consumption: Never    Average Number of Drinks: Patient does not drink    Frequency of Binge Drinking: Not on file     Tobacco Use: Low Risk     Smoking Tobacco Use: Never    Smokeless Tobacco Use: Never       OBJECTIVE:     Vital Signs Range (Last 24H):         Significant Labs    Heme Profile  Lab Results   Component Value Date    WBC 5.06 01/03/2022    HGB 12.8 01/03/2022    HCT 39.9 01/03/2022     01/03/2022       Coagulation  Studies  Lab Results   Component Value Date    LABPROT 10.7 12/13/2019    INR 1.0 12/13/2019       BMP  Lab Results   Component Value Date     01/03/2022    K 4.6 01/03/2022     01/03/2022    CO2 30 (H) 01/03/2022    BUN 18 01/03/2022    CREATININE 0.7 01/03/2022    MG 2.1 03/29/2020    PHOS 2.7 03/29/2020    CAION 1.30 02/26/2020       Liver Function Tests  Lab Results   Component Value Date    AST 15 01/03/2022    ALT 23 01/03/2022    ALKPHOS 59 01/03/2022    BILITOT 0.3 01/03/2022    PROT 6.5 01/03/2022    ALBUMIN 3.9 01/03/2022       Lipid Profile  Lab Results   Component Value Date    CHOL 208 (H) 06/14/2021    HDL 66 06/14/2021    TRIG 123 06/14/2021       Endocrine Profile  Lab Results   Component Value Date    TSH 2.669 12/14/2021       Diagnostic Studies    Cardiac Studies    EKG:   Results for orders placed or performed in visit on 06/14/21   EKG 12-lead    Collection Time: 06/14/21  1:06 PM    Narrative    Test Reason : R06.00,    Vent. Rate : 069 BPM     Atrial Rate : 069 BPM     P-R Int : 132 ms          QRS Dur : 074 ms      QT Int : 384 ms       P-R-T Axes : 000 -15 109 degrees     QTc Int : 411 ms    consider  Limb lead reversal      Confirmed by Krysta SHULTZ, Sotero KIM ([id number]) on 6/15/2021 8:02:58 AM    Referred By:  DR CHARLES           Confirmed By:Sotero Chaparro MD       TTE:  No results found for this or any previous visit.      JB:  No results found for this or any previous visit.      Nuclear Stress Test  Results for orders placed during the hospital encounter of 06/21/21    Nuclear Stress Test    Interpretation Summary    The EKG portion of this study is negative for ischemia.    The patient reported no chest pain during the stress test.    There were no arrhythmias during stress.    The nuclear portion of this study will be reported separately.        ASSESSMENT/PLAN:         Pre-op Assessment    I have reviewed the Patient Summary Reports.     I have reviewed the Nursing Notes.  I have reviewed the NPO Status.   I have reviewed the Medications.     Review of Systems  Anesthesia Hx:  Denies Hx of Anesthetic complications    Social:  Non-Smoker    Hematology/Oncology:  Hematology Normal   Oncology Normal     EENT/Dental:EENT/Dental Normal   Cardiovascular:  Cardiovascular Normal     Pulmonary:   Sleep Apnea    Renal/:  Renal/ Normal     Hepatic/GI:  Hepatic/GI Normal    Musculoskeletal:  Musculoskeletal Normal    Neurological:  Neurology Normal    Endocrine:  Obesity / BMI > 30  Psych:   Psychiatric History             Anesthesia Plan  Type of Anesthesia, risks & benefits discussed:    Anesthesia Type: Gen Natural Airway  Intra-op Monitoring Plan: Standard ASA Monitors  Post Op Pain Control Plan: multimodal analgesia and IV/PO Opioids PRN  Informed Consent: Informed consent signed with the Patient and all parties understand the risks and agree with anesthesia plan.  All questions answered.   ASA Score: 3  Day of Surgery Review of History & Physical: H&P Update referred to the surgeon/provider.    Ready For Surgery From Anesthesia Perspective.     .

## 2022-08-31 ENCOUNTER — HOSPITAL ENCOUNTER (OUTPATIENT)
Facility: HOSPITAL | Age: 67
Discharge: HOME | End: 2022-08-31
Attending: UROLOGY | Admitting: UROLOGY
Payer: MEDICARE

## 2022-08-31 ENCOUNTER — PATIENT OUTREACH (OUTPATIENT)
Dept: ADMINISTRATIVE | Facility: HOSPITAL | Age: 67
End: 2022-08-31
Payer: MEDICARE

## 2022-08-31 ENCOUNTER — ANESTHESIA (OUTPATIENT)
Dept: SURGERY | Facility: HOSPITAL | Age: 67
End: 2022-08-31
Payer: MEDICARE

## 2022-08-31 VITALS
HEART RATE: 58 BPM | HEIGHT: 63 IN | OXYGEN SATURATION: 100 % | RESPIRATION RATE: 15 BRPM | DIASTOLIC BLOOD PRESSURE: 72 MMHG | TEMPERATURE: 98 F | BODY MASS INDEX: 30.65 KG/M2 | SYSTOLIC BLOOD PRESSURE: 123 MMHG | WEIGHT: 173 LBS

## 2022-08-31 DIAGNOSIS — N31.9 NEUROGENIC BLADDER: Primary | ICD-10-CM

## 2022-08-31 DIAGNOSIS — N32.81 OAB (OVERACTIVE BLADDER): ICD-10-CM

## 2022-08-31 PROCEDURE — 71000044 HC DOSC ROUTINE RECOVERY FIRST HOUR: Performed by: UROLOGY

## 2022-08-31 PROCEDURE — D9220A PRA ANESTHESIA: Mod: ,,, | Performed by: STUDENT IN AN ORGANIZED HEALTH CARE EDUCATION/TRAINING PROGRAM

## 2022-08-31 PROCEDURE — 36000706: Performed by: UROLOGY

## 2022-08-31 PROCEDURE — D9220A PRA ANESTHESIA: ICD-10-PCS | Mod: ,,, | Performed by: STUDENT IN AN ORGANIZED HEALTH CARE EDUCATION/TRAINING PROGRAM

## 2022-08-31 PROCEDURE — 63600175 PHARM REV CODE 636 W HCPCS

## 2022-08-31 PROCEDURE — 71000015 HC POSTOP RECOV 1ST HR: Performed by: UROLOGY

## 2022-08-31 PROCEDURE — 25000003 PHARM REV CODE 250

## 2022-08-31 PROCEDURE — 52287 PR CYSTOURETHROSCOPY WITH INJ FOR CHEMODENERVATION: ICD-10-PCS | Mod: ,,, | Performed by: UROLOGY

## 2022-08-31 PROCEDURE — 63600175 PHARM REV CODE 636 W HCPCS: Mod: JG | Performed by: UROLOGY

## 2022-08-31 PROCEDURE — 37000009 HC ANESTHESIA EA ADD 15 MINS: Performed by: UROLOGY

## 2022-08-31 PROCEDURE — C1758 CATHETER, URETERAL: HCPCS | Performed by: UROLOGY

## 2022-08-31 PROCEDURE — 52287 CYSTOSCOPY CHEMODENERVATION: CPT | Mod: ,,, | Performed by: UROLOGY

## 2022-08-31 PROCEDURE — 37000008 HC ANESTHESIA 1ST 15 MINUTES: Performed by: UROLOGY

## 2022-08-31 PROCEDURE — 36000707: Performed by: UROLOGY

## 2022-08-31 RX ORDER — MIDAZOLAM HYDROCHLORIDE 1 MG/ML
INJECTION, SOLUTION INTRAMUSCULAR; INTRAVENOUS
Status: DISCONTINUED | OUTPATIENT
Start: 2022-08-31 | End: 2022-08-31

## 2022-08-31 RX ORDER — HALOPERIDOL 5 MG/ML
0.5 INJECTION INTRAMUSCULAR EVERY 10 MIN PRN
Status: DISCONTINUED | OUTPATIENT
Start: 2022-08-31 | End: 2022-08-31 | Stop reason: HOSPADM

## 2022-08-31 RX ORDER — ACETAMINOPHEN 500 MG
1000 TABLET ORAL ONCE
Status: COMPLETED | OUTPATIENT
Start: 2022-08-31 | End: 2022-08-31

## 2022-08-31 RX ORDER — PROPOFOL 10 MG/ML
VIAL (ML) INTRAVENOUS
Status: DISCONTINUED | OUTPATIENT
Start: 2022-08-31 | End: 2022-08-31

## 2022-08-31 RX ORDER — LIDOCAINE HYDROCHLORIDE 10 MG/ML
INJECTION, SOLUTION INTRAVENOUS
Status: DISCONTINUED | OUTPATIENT
Start: 2022-08-31 | End: 2022-08-31

## 2022-08-31 RX ORDER — TRAMADOL HYDROCHLORIDE 50 MG/1
50 TABLET ORAL EVERY 6 HOURS PRN
Qty: 6 EACH | Refills: 0 | Status: SHIPPED | OUTPATIENT
Start: 2022-08-31 | End: 2022-10-07

## 2022-08-31 RX ORDER — SODIUM CHLORIDE 0.9 % (FLUSH) 0.9 %
10 SYRINGE (ML) INJECTION
Status: DISCONTINUED | OUTPATIENT
Start: 2022-08-31 | End: 2022-08-31 | Stop reason: HOSPADM

## 2022-08-31 RX ORDER — PROPOFOL 10 MG/ML
VIAL (ML) INTRAVENOUS CONTINUOUS PRN
Status: DISCONTINUED | OUTPATIENT
Start: 2022-08-31 | End: 2022-08-31

## 2022-08-31 RX ORDER — NITROFURANTOIN 25; 75 MG/1; MG/1
100 CAPSULE ORAL 2 TIMES DAILY
Qty: 6 CAPSULE | Refills: 0 | Status: SHIPPED | OUTPATIENT
Start: 2022-08-31 | End: 2022-09-03

## 2022-08-31 RX ORDER — FENTANYL CITRATE 50 UG/ML
25 INJECTION, SOLUTION INTRAMUSCULAR; INTRAVENOUS EVERY 5 MIN PRN
Status: DISCONTINUED | OUTPATIENT
Start: 2022-08-31 | End: 2022-08-31 | Stop reason: HOSPADM

## 2022-08-31 RX ADMIN — Medication 20 MG: at 12:08

## 2022-08-31 RX ADMIN — PROPOFOL 75 MCG/KG/MIN: 10 INJECTION, EMULSION INTRAVENOUS at 12:08

## 2022-08-31 RX ADMIN — LIDOCAINE HYDROCHLORIDE 40 MG: 10 INJECTION, SOLUTION INTRAVENOUS at 12:08

## 2022-08-31 RX ADMIN — SODIUM CHLORIDE: 0.9 INJECTION, SOLUTION INTRAVENOUS at 12:08

## 2022-08-31 RX ADMIN — ACETAMINOPHEN 1000 MG: 500 TABLET ORAL at 12:08

## 2022-08-31 RX ADMIN — MIDAZOLAM HYDROCHLORIDE 1 MG: 1 INJECTION, SOLUTION INTRAMUSCULAR; INTRAVENOUS at 12:08

## 2022-08-31 NOTE — OP NOTE
[facility]  Operative Note    Date: 08/31/2022    Pre-Op Diagnosis: Overactive bladder    Post-Op Diagnosis: same    Procedure(s) Performed:   1.  Cystoscopy with bladder botox injection    Specimen(s): none    Staff Surgeon:  Jayesh Ross MD    Assistant Surgeon: Bolivar Watkins MD    Anesthesia: MAC    Indications: Silvana Quezada is a 66 y.o. female with OAB, incontinence and she self catheterizes    Findings: medium trabeculations found on the posterior and lateral walls, no concerning findings for malignancy    Estimated Blood Loss: min    Drains: none    Procedure in Detail:  After informed consent was obtained the patient was brought to the cystoscopy suite and placed in the supine position.  SCDs were applied and working.  Anesthesia was administered.  When the patient was adequately sedated she was placed in the dorsal lithotomy position and prepped and draped in the usual sterile fashion.      A rigid cystoscope in a 22 Fr sheath was introduced into the patients's bladder via the urethra.  This passed easily.  Formal cystoscopy was performed which revealed the ureteral orifices in their normal anatomic position bilaterally.  No bladder masses, trabeculations, stones or diverticuli were seen.      200 units of botox was injected into the detrusor muscle throughout the bladder.  Good wheals were raised.  The patient's bladder was drained and the cystoscope was removed.     The patient tolerated the procedure well and was transferred to the recovery room in stable condition.      Disposition:  The patient will follow up with Dr. Ross in 6 months.  She was given prescriptions for macrobid and tramadol post-operatively.  She knows how to self-cath should she have any issues with retention.      Bolivar Watkins MD      I present in the OR with the resident at the time of the surgery.

## 2022-08-31 NOTE — INTERVAL H&P NOTE
Urology ([facility]) H&P  Staff: Jayesh Ross MD    HPI: Silvana Quezada is a 66 y.o. female  PMH of MS, neurogenic bladder who presents for urine culture prior to cysto with botox injection.      She voids on her own and performs CIC at home 4 times per day using 12fr. She stays dry between catheterizations. Botox injections keep frequency, urgency and nocturia under control.    She denies incontinence during the day.  She endorses urinary incontinence at night.   She wears a diaper at night and it usually is wet when she wakes up.  She denies frequency, urgency, dysuria.  She does not have any urinary complaints.        Last botox injection 10/21/21  Procedure(s) Performed:   1.  Cystoscopy with bladder botox injection  Findings:   1.Cystoscopy significant for significant trabeculations and multiple cellules.  2. 200 U Botox injected in 20 mL of injectable saline throughout bladder detrusor. Good wheals raised.     Here today for 200u botox    ROS:  Neg except per HPI    Past Medical History:   Diagnosis Date    Bipolar 1 disorder     Breast cancer 2001    right lumpectomy-radiation & chemo taxol    Breast cyst     Closed fracture of proximal end of right humerus 3/9/2015    Depression     History of right shoulder fracture     MS (multiple sclerosis)     presented as dizzines, dx vision,     Osteoporosis, unspecified     Pneumonia 3/27/2020    Wrist fracture left    2019       Past Surgical History:   Procedure Laterality Date    BLADDER FULGURATION  8/31/2022    Procedure: FULGURATION, BLADDER;  Surgeon: Jayesh Ross MD;  Location: [address];  Service: Urology;;    BRAIN SURGERY  1991    BREAST BIOPSY Left 2009    core    BREAST BIOPSY Right     BREAST LUMPECTOMY Right 2001    COLONOSCOPY N/A 1/7/2022    Procedure: COLONOSCOPY;  Surgeon: Shiva Walsh MD;  Location: [facility];  Service: Endoscopy;  Laterality: N/A;    CYSTOSCOPY N/A 7/11/2018    Procedure: CYSTOSCOPY;  Surgeon: Jayesh Ross MD;   [facility];  Service: Urology;  Laterality: N/A;  30 min    CYSTOSCOPY N/A 4/10/2019    Procedure: CYSTOSCOPY;  Surgeon: Jayesh Ross MD;  [facility];  Service: Urology;  Laterality: N/A;  30 MIN    CYSTOSCOPY  10/21/2021    Procedure: CYSTOSCOPY;  Surgeon: Jayesh Ross MD;  Location: [facility] OR [address] [place] [address];  Service: Urology;;    FOOT SURGERY  october 2013    HYSTERECTOMY      partial    INJECTION OF BOTULINUM TOXIN TYPE A N/A 7/11/2018    Procedure: INJECTION, BOTULINUM TOXIN, TYPE A 200 UNITS;  Surgeon: Jayesh Ross MD;  [facility];  Service: Urology;  Laterality: N/A;    INJECTION OF BOTULINUM TOXIN TYPE A N/A 4/10/2019    Procedure: INJECTION, BOTULINUM TOXIN, TYPE A 200 UNITS;  Surgeon: Jayesh Ross MD;  Location: [facility] OR [address] [place] [address];  Service: Urology;  Laterality: N/A;    INJECTION OF BOTULINUM TOXIN TYPE A  10/21/2021    Procedure: INJECTION, BOTULINUM TOXIN, 200units;  Surgeon: Jayesh Ross MD;  Location: [facility] OR [address] [place] [address];  Service: Urology;;    OPEN REDUCTION AND INTERNAL FIXATION (ORIF) OF INJURY OF WRIST Left 12/27/2019    Procedure: ORIF, WRIST;  Surgeon: Albert Schroeder Jr., MD;  Location: [facility];  Service: Orthopedics;  Laterality: Left;  ACUMED/ MINI C ARM [facility] notified/ Rk confirmed here in Closet B with Lacie 12/23/19 KB [id number]    TONSILLECTOMY      TOTAL REDUCTION MAMMOPLASTY Left 11/5/2020    Procedure: MAMMOPLASTY, REDUCTION, LEFT;  Surgeon: Kirk Tompkins MD;  Location: [facility] OR [address] [place] [address];  Service: Plastics;  Laterality: Left;  Left breast tissue weighed- 49 grams.       Social History     Socioeconomic History    Marital status:    Occupational History    Occupation: [employer]   Tobacco Use    Smoking status: Never    Smokeless tobacco: Never   Substance and Sexual Activity    Alcohol use: Not Currently    Drug use: Never    Sexual activity: Yes     Partners: Male   Social History Narrative    [place] , [place]      SASHA majored in the grafter teach     Lives with      39, 25 children 2 daughter      Social Determinants of Health     Financial Resource Strain: Low Risk     Difficulty of Paying Living Expenses: Not very hard   Food Insecurity: No Food Insecurity    Worried About Running Out of Food in the Last Year: Never true    Ran Out of Food in the Last Year: Never true   Transportation Needs: No Transportation Needs    Lack of Transportation (Medical): No    Lack of Transportation (Non-Medical): No   Physical Activity: Insufficiently Active    Days of Exercise per Week: 3 days    Minutes of Exercise per Session: 20 min   Stress: No Stress Concern Present    Feeling of Stress : Not at all   Social Connections: Socially Integrated    Frequency of Communication with Friends and Family: More than three times a week    Frequency of Social Gatherings with Friends and Family: More than three times a week    Attends Congregation Services: More than 4 times per year    Active Member of Clubs or Organizations: Yes    Attends Club or Organization Meetings: More than 4 times per year    Marital Status:    Housing Stability: Low Risk     Unable to Pay for Housing in the Last Year: No    Number of Places Lived in the Last Year: 1    Unstable Housing in the Last Year: No       Family History   Problem Relation Age of Onset    Heart disease Mother     Cancer Mother         breast cancer    Skin cancer Mother     Breast cancer Mother     Stroke Mother     Emphysema Father     Dementia Brother     Diabetes Brother     No Known Problems Brother     Anesthesia problems Daughter     Autism Daughter     Autism Son     Breast cancer Maternal Aunt        Review of patient's allergies indicates:   Allergen Reactions    Adhesive      blisters    Keflex [cephalexin] Rash    Cephalosporins        No current facility-administered medications on file prior to encounter.     Current Outpatient Medications on File Prior to Encounter    Medication Sig Dispense Refill    buPROPion (WELLBUTRIN SR) 100 MG TBSR 12 hr tablet Take 2 tablets (200 mg total) by mouth every morning. 180 tablet 1    buPROPion (WELLBUTRIN SR) 150 MG TBSR 12 hr tablet Take 1 tablet (150 mg total) by mouth every evening. 90 tablet 1    calcium-vitamin D3 (OS-IGOR 500 + D3) 500 mg-5 mcg (200 unit) per tablet Take 1 tablet by mouth 2 (two) times daily with meals.      catheter (FEMALE CATHETER) 14 Fr [facility] Patient is to self catheterize four times a day indefinitley using female 12 fr in and out catheter for incomplete bladder emptying.   Dispense 120 month with 11 refills or 360 every 3 months with 3 refills depending on patient's preference 120 each 11    coenzyme Q10 10 mg capsule Take 10 mg by mouth once daily.      cyanocobalamin (VITAMIN B-12) 1000 MCG tablet Take 1,000 mcg by mouth once daily.      fluphenazine (PROLIXIN) 1 MG tablet Take 2 tablets (2 mg total) by mouth every evening. 180 tablet 1    folic acid (FOLVITE) 1 MG tablet TAKE 1 TABLET(1000 MCG) BY MOUTH EVERY DAY (Patient taking differently: Take 1 mg by mouth once daily.) 90 tablet 1    OXcarbazepine (TRILEPTAL) 150 MG Tab TAKE 1 TABLET(150 MG) BY MOUTH TWICE DAILY (Patient taking differently: Take 150 mg by mouth 2 (two) times daily.) 180 tablet 1    alendronate (FOSAMAX) 70 MG tablet TAKE 1 TABLET BY MOUTH EVERY 7 DAYS (Patient taking differently: Take 70 mg by mouth every Saturday.) 24 tablet 0       Anticoagulation:  No    Physical Exam:  General: No acute distress, well developed. AAOx3  Head: Normocephalic, Atraumatic  Eyes: Extra-occular movements intact, No discharge  Neck: supple, symmetrical, trachea midline  Lungs: normal respiratory effort, no respiratory distress, no wheezes  CV: regular rate, 2+ pulses  Abdomen: soft, non-tender, non-distended, no organomegaly  MSK: no edema, no deformities, normal ROM  Skin: skin color, texture, turgor normal.  Neurologic: no focal deficits, sensation  intact    Labs:    Urine dipstick today - currently on abx    Lab Results   Component Value Date    WBC 5.06 01/03/2022    HGB 12.8 01/03/2022    HCT 39.9 01/03/2022    MCV 96 01/03/2022     01/03/2022           BMP  Lab Results   Component Value Date     01/03/2022    K 4.6 01/03/2022     01/03/2022    CO2 30 (H) 01/03/2022    BUN 18 01/03/2022    CREATININE 0.7 01/03/2022    CALCIUM 9.5 01/03/2022    ANIONGAP 7 (L) 01/03/2022    ESTGFRAFRICA >60.0 01/03/2022    EGFRNONAA >60.0 01/03/2022       Assessment: Silvana Quezada is a 66 y.o. female with OAB and urinary incontinence    Plan:     1. To OR on 8/31/22 for 200u botox injection into the bladder  2. Consents signed   3. I have explained the risk, benefits, and alternatives of the procedure in detail. The patient voices understanding and all questions have been answered. The patient agrees to proceed as planned.       Bolivar Watkins MD

## 2022-08-31 NOTE — DISCHARGE SUMMARY
[facility]  Discharge Note  Short Stay    Admit Date: 8/31/2022    Discharge Date and Time: 08/31/2022 1:26 PM      Attending Physician: Jayesh Ross MD     Discharge Provider: Bolivar Watkins MD    Diagnoses:  There are no hospital problems to display for this patient.      Discharged Condition: stable    Hospital Course: Patient was admitted for bladder botox and tolerated the procedure well with no complications. She was discharged home in good condition on the same day.       Final Diagnoses: Same as principal problem.    Disposition: Home or Self Care    Follow up/Patient Instructions:    Medications:  Reconciled Home Medications:   Current Discharge Medication List        START taking these medications    Details   nitrofurantoin, macrocrystal-monohydrate, (MACROBID) 100 MG capsule Take 1 capsule (100 mg total) by mouth 2 (two) times daily. for 3 days  Qty: 6 capsule, Refills: 0      traMADoL (ULTRAM) 50 mg tablet Take 1 tablet (50 mg total) by mouth every 6 (six) hours as needed for Pain.  Qty: 6 each, Refills: 0    Comments: Quantity prescribed more than 7 day supply? No           CONTINUE these medications which have NOT CHANGED    Details   !! buPROPion (WELLBUTRIN SR) 100 MG TBSR 12 hr tablet Take 2 tablets (200 mg total) by mouth every morning.  Qty: 180 tablet, Refills: 1    Associated Diagnoses: Bipolar affective disorder, remission status unspecified      !! buPROPion (WELLBUTRIN SR) 150 MG TBSR 12 hr tablet Take 1 tablet (150 mg total) by mouth every evening.  Qty: 90 tablet, Refills: 1    Associated Diagnoses: Bipolar affective disorder, remission status unspecified      calcium-vitamin D3 (OS-IGOR 500 + D3) 500 mg-5 mcg (200 unit) per tablet Take 1 tablet by mouth 2 (two) times daily with meals.      catheter (FEMALE CATHETER) 14 Fr [facility] [place], [place] Patient is to self catheterize four times a day indefinitley using female 12 fr in and out catheter for incomplete bladder emptying.   Dispense 120 month  with 11 refills or 360 every 3 months with 3 refills depending on patient's preference  Qty: 120 each, Refills: 11      coenzyme Q10 10 mg capsule Take 10 mg by mouth once daily.      cyanocobalamin (VITAMIN B-12) 1000 MCG tablet Take 1,000 mcg by mouth once daily.      fluphenazine (PROLIXIN) 1 MG tablet Take 2 tablets (2 mg total) by mouth every evening.  Qty: 180 tablet, Refills: 1    Associated Diagnoses: Bipolar affective disorder, remission status unspecified      folic acid (FOLVITE) 1 MG tablet TAKE 1 TABLET(1000 MCG) BY MOUTH EVERY DAY  Qty: 90 tablet, Refills: 1    Associated Diagnoses: Bipolar affective disorder, remission status unspecified      OXcarbazepine (TRILEPTAL) 150 MG Tab TAKE 1 TABLET(150 MG) BY MOUTH TWICE DAILY  Qty: 180 tablet, Refills: 1    Associated Diagnoses: Bipolar I disorder, most recent episode depressed, in partial remission; Bipolar affective disorder, remission status unspecified      alendronate (FOSAMAX) 70 MG tablet TAKE 1 TABLET BY MOUTH EVERY 7 DAYS  Qty: 24 tablet, Refills: 0    Associated Diagnoses: Osteoporosis, unspecified osteoporosis type, unspecified pathological fracture presence       !! - Potential duplicate medications found. Please discuss with provider.        Discharge Procedure Orders   Notify your health care provider if you experience any of the following:  temperature >100.4     Notify your health care provider if you experience any of the following:  persistent nausea and vomiting or diarrhea     Notify your health care provider if you experience any of the following:  severe uncontrolled pain     Notify your health care provider if you experience any of the following:  redness, tenderness, or signs of infection (pain, swelling, redness, odor or green/yellow discharge around incision site)     Notify your health care provider if you experience any of the following:  worsening rash     Notify your health care provider if you experience any of the following:   persistent dizziness, light-headedness, or visual disturbances      Follow-up Information       Jayesh Ross MD Follow up in 6 month(s).    Specialty: Urology  Contact information:  Shiraz COONE[address]  [facility] [address] [phone]

## 2022-08-31 NOTE — PLAN OF CARE
Chart reviewed. Pre-op nursing care and safe surgery checklist complete.  at bedside. Patient belongings given to family.

## 2022-08-31 NOTE — ANESTHESIA POSTPROCEDURE EVALUATION
Anesthesia Post Evaluation    Patient: Silvana Quezada    Procedure(s) Performed: Procedure(s) (LRB):  CYSTOSCOPY,WITH BOTULINUM TOXIN INJECTION, 200 units (N/A)  FULGURATION, BLADDER    Final Anesthesia Type: general      Patient location during evaluation: PACU  Patient participation: Yes- Able to Participate  Level of consciousness: awake and alert  Post-procedure vital signs: reviewed and stable  Pain management: adequate  Airway patency: patent  KEM mitigation strategies: Multimodal analgesia  PONV status at discharge: No PONV  Anesthetic complications: no      Cardiovascular status: blood pressure returned to baseline and hemodynamically stable  Respiratory status: unassisted  Hydration status: euvolemic  Follow-up not needed.          Vitals Value Taken Time   /78 08/31/22 1331   Temp 36.5 °C (97.7 °F) 08/31/22 1320   Pulse 65 08/31/22 1343   Resp 51 08/31/22 1341   SpO2 100 % 08/31/22 1343   Vitals shown include unvalidated device data.      No case tracking events are documented in the log.      Pain/Yaakov Score: Pain Rating Prior to Med Admin: 0 (8/31/2022 12:02 PM)

## 2022-08-31 NOTE — PROGRESS NOTES
2022 Care Everywhere updates requested and reviewed.  Immunizations reconciled. Media reports reviewed.  Duplicate HM overrides and  orders removed.  Overdue HM topic chart audit and/or requested.  Overdue lab testing linked to upcoming lab appointments if applies.    Lab tawny, and Pressglue reviewed       Health Maintenance Due   Topic Date Due    High Dose Statin  Never done

## 2022-08-31 NOTE — PATIENT INSTRUCTIONS
Post Cystoscopy Instructions  Do not strain to have a bowel movement  No strenuous exercise x 7 days  No driving while you are on narcotic pain medications or if your gray  catheter is in place    You can expect:  To pass stone fragments if you had a stone procedure  Have pain when you void from your stent if you have a stent in place  See blood in your urine if you have a stent in place    If you have a catheter, please return to the ER if your catheter stops draining or you are having abdominal pain.    Call the doctor if:  Temperature is greater than 101F  Persistent vomiting and inability to keep food down  Inability to void if you do not have a catheter

## 2022-08-31 NOTE — PLAN OF CARE
Discharge instructions reviewed with pt and  at bedside. Understanding verbalized. No complaints of pain reported. Pt able to tolerate po intake and urinate. Awaiting bedside delivery of medications prior to discharge. To be transported to car by escort.

## 2022-09-14 ENCOUNTER — OFFICE VISIT (OUTPATIENT)
Dept: INTERNAL MEDICINE | Facility: CLINIC | Age: 67
End: 2022-09-14
Payer: MEDICARE

## 2022-09-14 VITALS
HEIGHT: 63 IN | BODY MASS INDEX: 30.12 KG/M2 | DIASTOLIC BLOOD PRESSURE: 76 MMHG | WEIGHT: 170 LBS | HEART RATE: 86 BPM | OXYGEN SATURATION: 98 % | SYSTOLIC BLOOD PRESSURE: 98 MMHG

## 2022-09-14 DIAGNOSIS — E74.39 GLUCOSE INTOLERANCE: ICD-10-CM

## 2022-09-14 DIAGNOSIS — R73.09 OTHER ABNORMAL GLUCOSE: ICD-10-CM

## 2022-09-14 DIAGNOSIS — E78.5 HYPERLIPIDEMIA, UNSPECIFIED HYPERLIPIDEMIA TYPE: Primary | ICD-10-CM

## 2022-09-14 DIAGNOSIS — M81.0 AGE-RELATED OSTEOPOROSIS WITHOUT CURRENT PATHOLOGICAL FRACTURE: ICD-10-CM

## 2022-09-14 PROCEDURE — 3078F PR MOST RECENT DIASTOLIC BLOOD PRESSURE < 80 MM HG: ICD-10-PCS | Mod: CPTII,S$GLB,, | Performed by: INTERNAL MEDICINE

## 2022-09-14 PROCEDURE — 3288F PR FALLS RISK ASSESSMENT DOCUMENTED: ICD-10-PCS | Mod: CPTII,S$GLB,, | Performed by: INTERNAL MEDICINE

## 2022-09-14 PROCEDURE — 3078F DIAST BP <80 MM HG: CPT | Mod: CPTII,S$GLB,, | Performed by: INTERNAL MEDICINE

## 2022-09-14 PROCEDURE — 1126F AMNT PAIN NOTED NONE PRSNT: CPT | Mod: CPTII,S$GLB,, | Performed by: INTERNAL MEDICINE

## 2022-09-14 PROCEDURE — 1157F ADVNC CARE PLAN IN RCRD: CPT | Mod: CPTII,S$GLB,, | Performed by: INTERNAL MEDICINE

## 2022-09-14 PROCEDURE — 99214 PR OFFICE/OUTPT VISIT, EST, LEVL IV, 30-39 MIN: ICD-10-PCS | Mod: S$GLB,,, | Performed by: INTERNAL MEDICINE

## 2022-09-14 PROCEDURE — 3288F FALL RISK ASSESSMENT DOCD: CPT | Mod: CPTII,S$GLB,, | Performed by: INTERNAL MEDICINE

## 2022-09-14 PROCEDURE — 3074F PR MOST RECENT SYSTOLIC BLOOD PRESSURE < 130 MM HG: ICD-10-PCS | Mod: CPTII,S$GLB,, | Performed by: INTERNAL MEDICINE

## 2022-09-14 PROCEDURE — 3008F BODY MASS INDEX DOCD: CPT | Mod: CPTII,S$GLB,, | Performed by: INTERNAL MEDICINE

## 2022-09-14 PROCEDURE — 99499 UNLISTED E&M SERVICE: CPT | Mod: S$GLB,,, | Performed by: INTERNAL MEDICINE

## 2022-09-14 PROCEDURE — 99214 OFFICE O/P EST MOD 30 MIN: CPT | Mod: S$GLB,,, | Performed by: INTERNAL MEDICINE

## 2022-09-14 PROCEDURE — 1160F RVW MEDS BY RX/DR IN RCRD: CPT | Mod: CPTII,S$GLB,, | Performed by: INTERNAL MEDICINE

## 2022-09-14 PROCEDURE — 1157F PR ADVANCE CARE PLAN OR EQUIV PRESENT IN MEDICAL RECORD: ICD-10-PCS | Mod: CPTII,S$GLB,, | Performed by: INTERNAL MEDICINE

## 2022-09-14 PROCEDURE — 1101F PT FALLS ASSESS-DOCD LE1/YR: CPT | Mod: CPTII,S$GLB,, | Performed by: INTERNAL MEDICINE

## 2022-09-14 PROCEDURE — 99999 PR PBB SHADOW E&M-EST. PATIENT-LVL III: ICD-10-PCS | Mod: PBBFAC,,, | Performed by: INTERNAL MEDICINE

## 2022-09-14 PROCEDURE — 1160F PR REVIEW ALL MEDS BY PRESCRIBER/CLIN PHARMACIST DOCUMENTED: ICD-10-PCS | Mod: CPTII,S$GLB,, | Performed by: INTERNAL MEDICINE

## 2022-09-14 PROCEDURE — 1126F PR PAIN SEVERITY QUANTIFIED, NO PAIN PRESENT: ICD-10-PCS | Mod: CPTII,S$GLB,, | Performed by: INTERNAL MEDICINE

## 2022-09-14 PROCEDURE — 3008F PR BODY MASS INDEX (BMI) DOCUMENTED: ICD-10-PCS | Mod: CPTII,S$GLB,, | Performed by: INTERNAL MEDICINE

## 2022-09-14 PROCEDURE — 99499 RISK ADDL DX/OHS AUDIT: ICD-10-PCS | Mod: S$GLB,,, | Performed by: INTERNAL MEDICINE

## 2022-09-14 PROCEDURE — 3074F SYST BP LT 130 MM HG: CPT | Mod: CPTII,S$GLB,, | Performed by: INTERNAL MEDICINE

## 2022-09-14 PROCEDURE — 1159F PR MEDICATION LIST DOCUMENTED IN MEDICAL RECORD: ICD-10-PCS | Mod: CPTII,S$GLB,, | Performed by: INTERNAL MEDICINE

## 2022-09-14 PROCEDURE — 99999 PR PBB SHADOW E&M-EST. PATIENT-LVL III: CPT | Mod: PBBFAC,,, | Performed by: INTERNAL MEDICINE

## 2022-09-14 PROCEDURE — 1159F MED LIST DOCD IN RCRD: CPT | Mod: CPTII,S$GLB,, | Performed by: INTERNAL MEDICINE

## 2022-09-14 PROCEDURE — 1101F PR PT FALLS ASSESS DOC 0-1 FALLS W/OUT INJ PAST YR: ICD-10-PCS | Mod: CPTII,S$GLB,, | Performed by: INTERNAL MEDICINE

## 2022-09-14 NOTE — PROGRESS NOTES
"Assessment:       1. Hyperlipidemia, unspecified hyperlipidemia type    2. Glucose intolerance    3. Age-related osteoporosis without current pathological fracture    4. Other abnormal glucose          Plan:         Silvana was seen today for hyperlipidemia.    Diagnoses and all orders for this visit:    Hyperlipidemia, unspecified hyperlipidemia type  -     Lipid Panel; Future    Glucose intolerance  -     Hemoglobin A1C; Future    Age-related osteoporosis without current pathological fracture  -     Comprehensive Metabolic Panel; Future    Other abnormal glucose  -     Hemoglobin A1C; Future        Subjective:       Patient ID: Silvnaa Quezada is a 66 y.o. female.    Chief Complaint: Hyperlipidemia      Interim Hx  Seen by urology for routine inj. Seen in the Ed for fall. Had follow up with psyhc. Persistent Uc. Has had CC negative. Still notes decrease sleepiness/fatiuge, has previously had nas alba state fluphenazine .     HPI    Review of Systems   All other systems reviewed and are negative.          Health Maintenance Due   Topic Date Due    High Dose Statin  Never done    Influenza Vaccine (1) 09/01/2022         Objective:     BP 98/76 (BP Location: Right arm, Patient Position: Sitting, BP Method: Large (Manual))   Pulse 86   Ht 5' 3" (1.6 m)   Wt 77.1 kg (169 lb 15.6 oz)   LMP  (LMP Unknown)   SpO2 98%   BMI 30.11 kg/m²     Vitals 9/14/2022 8/31/2022 7/8/2022 7/5/2022 5/4/2022   Height 63 63 63 63 63   Weight (lbs) 169.97 173 173.5 - 173.28   BMI (kg/m2) 30.1 30.7 30.7 - 30.7                Physical Exam  Vitals and nursing note reviewed.   Constitutional:       General: She is not in acute distress.     Appearance: She is well-developed. She is not diaphoretic.   HENT:      Head: Normocephalic.      Nose: Nose normal.   Eyes:      General:         Right eye: No discharge.         Left eye: No discharge.      Conjunctiva/sclera: Conjunctivae normal.      Pupils: Pupils are equal, round, and " reactive to light.   Cardiovascular:      Rate and Rhythm: Normal rate and regular rhythm.      Heart sounds: Normal heart sounds. No murmur heard.    No friction rub. No gallop.   Pulmonary:      Effort: Pulmonary effort is normal. No respiratory distress.   Abdominal:      General: Bowel sounds are normal. There is no distension.      Palpations: Abdomen is soft.   Musculoskeletal:         General: No deformity. Normal range of motion.      Cervical back: Normal range of motion.   Skin:     General: Skin is warm.   Neurological:      Mental Status: She is alert and oriented to person, place, and time.      Cranial Nerves: No cranial nerve deficit.           Future Appointments   Date Time Provider Department Center   9/15/2022  [facility]   9/29/2022  2:20 PM Samia Pandya NP [facility] SLE[facility]   10/4/2022 10:30 AM Don Munoz Jr., MD [facility] PSYCH [address]   3/15/2023 11:00 AM Jhonatan Avila III, MD [facility]         Medication List with Changes/Refills   Current Medications    ALENDRONATE (FOSAMAX) 70 MG TABLET    TAKE 1 TABLET BY MOUTH EVERY 7 DAYS    BUPROPION (WELLBUTRIN SR) 100 MG TBSR 12 HR TABLET    Take 2 tablets (200 mg total) by mouth every morning.    BUPROPION (WELLBUTRIN SR) 150 MG TBSR 12 HR TABLET    Take 1 tablet (150 mg total) by mouth every evening.    CALCIUM-VITAMIN D3 (OS-IGOR 500 + D3) 500 MG-5 MCG (200 UNIT) PER TABLET    Take 1 tablet by mouth 2 (two) times daily with meals.    CATHETER (FEMALE CATHETER) 14 FR [facility] [place]    Patient is to self catheterize four times a day indefinitley using female 12 fr in and out catheter for incomplete bladder emptying.   Dispense 120 month with 11 refills or 360 every 3 months with 3 refills depending on patient's preference    COENZYME Q10 10 MG CAPSULE    Take 10 mg by mouth once daily.    CYANOCOBALAMIN (VITAMIN B-12) 1000 MCG TABLET    Take 1,000 mcg by mouth once daily.    FLUPHENAZINE (PROLIXIN) 1 MG  TABLET    Take 2 tablets (2 mg total) by mouth every evening.    FOLIC ACID (FOLVITE) 1 MG TABLET    TAKE 1 TABLET(1000 MCG) BY MOUTH EVERY DAY    OXCARBAZEPINE (TRILEPTAL) 150 MG TAB    TAKE 1 TABLET(150 MG) BY MOUTH TWICE DAILY    TRAMADOL (ULTRAM) 50 MG TABLET    Take 1 tablet (50 mg total) by mouth every 6 (six) hours as needed for Pain.         Disclaimer:  This note has been generated using voice-recognition software. There may be grammatical or spelling errors that have been missed during proof-reading

## 2022-09-15 ENCOUNTER — TELEPHONE (OUTPATIENT)
Dept: PSYCHIATRY | Facility: CLINIC | Age: 67
End: 2022-09-15
Payer: MEDICARE

## 2022-09-15 ENCOUNTER — LAB VISIT (OUTPATIENT)
Dept: LAB | Facility: HOSPITAL | Age: 67
End: 2022-09-15
Attending: INTERNAL MEDICINE
Payer: MEDICARE

## 2022-09-15 DIAGNOSIS — F31.75 BIPOLAR I DISORDER, MOST RECENT EPISODE DEPRESSED, IN PARTIAL REMISSION: Primary | ICD-10-CM

## 2022-09-15 DIAGNOSIS — F31.9 BIPOLAR AFFECTIVE DISORDER, REMISSION STATUS UNSPECIFIED: ICD-10-CM

## 2022-09-15 DIAGNOSIS — E74.39 GLUCOSE INTOLERANCE: ICD-10-CM

## 2022-09-15 DIAGNOSIS — M81.0 AGE-RELATED OSTEOPOROSIS WITHOUT CURRENT PATHOLOGICAL FRACTURE: ICD-10-CM

## 2022-09-15 DIAGNOSIS — E78.5 HYPERLIPIDEMIA, UNSPECIFIED HYPERLIPIDEMIA TYPE: ICD-10-CM

## 2022-09-15 DIAGNOSIS — R73.09 OTHER ABNORMAL GLUCOSE: ICD-10-CM

## 2022-09-15 LAB
ALBUMIN SERPL BCP-MCNC: 3.9 G/DL (ref 3.5–5.2)
ALP SERPL-CCNC: 52 U/L (ref 55–135)
ALT SERPL W/O P-5'-P-CCNC: 17 U/L (ref 10–44)
ANION GAP SERPL CALC-SCNC: 8 MMOL/L (ref 8–16)
AST SERPL-CCNC: 16 U/L (ref 10–40)
BILIRUB SERPL-MCNC: 0.3 MG/DL (ref 0.1–1)
BUN SERPL-MCNC: 20 MG/DL (ref 8–23)
CALCIUM SERPL-MCNC: 9.8 MG/DL (ref 8.7–10.5)
CHLORIDE SERPL-SCNC: 107 MMOL/L (ref 95–110)
CHOLEST SERPL-MCNC: 198 MG/DL (ref 120–199)
CHOLEST/HDLC SERPL: 2.8 {RATIO} (ref 2–5)
CO2 SERPL-SCNC: 29 MMOL/L (ref 23–29)
CREAT SERPL-MCNC: 0.8 MG/DL (ref 0.5–1.4)
EST. GFR  (NO RACE VARIABLE): >60 ML/MIN/1.73 M^2
ESTIMATED AVG GLUCOSE: 97 MG/DL (ref 68–131)
GLUCOSE SERPL-MCNC: 92 MG/DL (ref 70–110)
HBA1C MFR BLD: 5 % (ref 4–5.6)
HDLC SERPL-MCNC: 70 MG/DL (ref 40–75)
HDLC SERPL: 35.4 % (ref 20–50)
LDLC SERPL CALC-MCNC: 111.6 MG/DL (ref 63–159)
NONHDLC SERPL-MCNC: 128 MG/DL
POTASSIUM SERPL-SCNC: 4 MMOL/L (ref 3.5–5.1)
PROT SERPL-MCNC: 6.4 G/DL (ref 6–8.4)
SODIUM SERPL-SCNC: 144 MMOL/L (ref 136–145)
TRIGL SERPL-MCNC: 82 MG/DL (ref 30–150)

## 2022-09-15 PROCEDURE — 83036 HEMOGLOBIN GLYCOSYLATED A1C: CPT | Performed by: INTERNAL MEDICINE

## 2022-09-15 PROCEDURE — 80053 COMPREHEN METABOLIC PANEL: CPT | Performed by: INTERNAL MEDICINE

## 2022-09-15 PROCEDURE — 36415 COLL VENOUS BLD VENIPUNCTURE: CPT | Mod: PO | Performed by: INTERNAL MEDICINE

## 2022-09-15 PROCEDURE — 80061 LIPID PANEL: CPT | Performed by: INTERNAL MEDICINE

## 2022-09-15 RX ORDER — FLUPHENAZINE HYDROCHLORIDE 1 MG/1
1 TABLET ORAL NIGHTLY
Qty: 180 TABLET | Refills: 1
Start: 2022-09-15 | End: 2022-10-04 | Stop reason: SDUPTHER

## 2022-09-15 NOTE — TELEPHONE ENCOUNTER
Called Pt and discussed fatigue she has experienced since COVID 2 years ago.      A trial reduction of fluphenazine was discussed and accepted by Pt.  She agreed to increase back to current dosage and call if paranoia or thought disorganization occurs.  She has appt here in 3 weeks to f/u in person.    SHAUNNA

## 2022-09-15 NOTE — TELEPHONE ENCOUNTER
----- Message from Jhonatan Avila III, MD sent at 9/14/2022 10:32 PM CDT -----  Regarding: possible medicaiton side effect s  [place]  Patient has had fatigue symptoms ever since covid. Discussions previously regarding decrease fluphenazine. She wanted to discuss with this with your team, would she just do 1mg ?     Thanks so much!

## 2022-09-29 ENCOUNTER — OFFICE VISIT (OUTPATIENT)
Dept: SLEEP MEDICINE | Facility: CLINIC | Age: 67
End: 2022-09-29
Payer: MEDICARE

## 2022-09-29 VITALS
DIASTOLIC BLOOD PRESSURE: 52 MMHG | WEIGHT: 169 LBS | SYSTOLIC BLOOD PRESSURE: 119 MMHG | HEART RATE: 76 BPM | BODY MASS INDEX: 29.94 KG/M2

## 2022-09-29 DIAGNOSIS — G35 MS (MULTIPLE SCLEROSIS): Primary | ICD-10-CM

## 2022-09-29 DIAGNOSIS — G47.33 OSA (OBSTRUCTIVE SLEEP APNEA): ICD-10-CM

## 2022-09-29 PROCEDURE — 1159F MED LIST DOCD IN RCRD: CPT | Mod: CPTII,S$GLB,, | Performed by: NURSE PRACTITIONER

## 2022-09-29 PROCEDURE — 3078F PR MOST RECENT DIASTOLIC BLOOD PRESSURE < 80 MM HG: ICD-10-PCS | Mod: CPTII,S$GLB,, | Performed by: NURSE PRACTITIONER

## 2022-09-29 PROCEDURE — 3074F PR MOST RECENT SYSTOLIC BLOOD PRESSURE < 130 MM HG: ICD-10-PCS | Mod: CPTII,S$GLB,, | Performed by: NURSE PRACTITIONER

## 2022-09-29 PROCEDURE — 1126F AMNT PAIN NOTED NONE PRSNT: CPT | Mod: CPTII,S$GLB,, | Performed by: NURSE PRACTITIONER

## 2022-09-29 PROCEDURE — 99214 PR OFFICE/OUTPT VISIT, EST, LEVL IV, 30-39 MIN: ICD-10-PCS | Mod: S$GLB,,, | Performed by: NURSE PRACTITIONER

## 2022-09-29 PROCEDURE — 99999 PR PBB SHADOW E&M-EST. PATIENT-LVL III: CPT | Mod: PBBFAC,,, | Performed by: NURSE PRACTITIONER

## 2022-09-29 PROCEDURE — 3044F HG A1C LEVEL LT 7.0%: CPT | Mod: CPTII,S$GLB,, | Performed by: NURSE PRACTITIONER

## 2022-09-29 PROCEDURE — 3044F PR MOST RECENT HEMOGLOBIN A1C LEVEL <7.0%: ICD-10-PCS | Mod: CPTII,S$GLB,, | Performed by: NURSE PRACTITIONER

## 2022-09-29 PROCEDURE — 1157F ADVNC CARE PLAN IN RCRD: CPT | Mod: CPTII,S$GLB,, | Performed by: NURSE PRACTITIONER

## 2022-09-29 PROCEDURE — 3074F SYST BP LT 130 MM HG: CPT | Mod: CPTII,S$GLB,, | Performed by: NURSE PRACTITIONER

## 2022-09-29 PROCEDURE — 3008F BODY MASS INDEX DOCD: CPT | Mod: CPTII,S$GLB,, | Performed by: NURSE PRACTITIONER

## 2022-09-29 PROCEDURE — 1157F PR ADVANCE CARE PLAN OR EQUIV PRESENT IN MEDICAL RECORD: ICD-10-PCS | Mod: CPTII,S$GLB,, | Performed by: NURSE PRACTITIONER

## 2022-09-29 PROCEDURE — 1101F PR PT FALLS ASSESS DOC 0-1 FALLS W/OUT INJ PAST YR: ICD-10-PCS | Mod: CPTII,S$GLB,, | Performed by: NURSE PRACTITIONER

## 2022-09-29 PROCEDURE — 3078F DIAST BP <80 MM HG: CPT | Mod: CPTII,S$GLB,, | Performed by: NURSE PRACTITIONER

## 2022-09-29 PROCEDURE — 1159F PR MEDICATION LIST DOCUMENTED IN MEDICAL RECORD: ICD-10-PCS | Mod: CPTII,S$GLB,, | Performed by: NURSE PRACTITIONER

## 2022-09-29 PROCEDURE — 3288F FALL RISK ASSESSMENT DOCD: CPT | Mod: CPTII,S$GLB,, | Performed by: NURSE PRACTITIONER

## 2022-09-29 PROCEDURE — 3288F PR FALLS RISK ASSESSMENT DOCUMENTED: ICD-10-PCS | Mod: CPTII,S$GLB,, | Performed by: NURSE PRACTITIONER

## 2022-09-29 PROCEDURE — 1126F PR PAIN SEVERITY QUANTIFIED, NO PAIN PRESENT: ICD-10-PCS | Mod: CPTII,S$GLB,, | Performed by: NURSE PRACTITIONER

## 2022-09-29 PROCEDURE — 1101F PT FALLS ASSESS-DOCD LE1/YR: CPT | Mod: CPTII,S$GLB,, | Performed by: NURSE PRACTITIONER

## 2022-09-29 PROCEDURE — 3008F PR BODY MASS INDEX (BMI) DOCUMENTED: ICD-10-PCS | Mod: CPTII,S$GLB,, | Performed by: NURSE PRACTITIONER

## 2022-09-29 PROCEDURE — 99214 OFFICE O/P EST MOD 30 MIN: CPT | Mod: S$GLB,,, | Performed by: NURSE PRACTITIONER

## 2022-09-29 PROCEDURE — 99999 PR PBB SHADOW E&M-EST. PATIENT-LVL III: ICD-10-PCS | Mod: PBBFAC,,, | Performed by: NURSE PRACTITIONER

## 2022-09-29 NOTE — PROGRESS NOTES
Cc KEM mgt    She has been setup with apap5-12cm since seen 2/2022. Doing better.  present reports she is more rested when able to keep mask on. She dislikes using it but using it. Dreamwear mask w/o chin strap. Occasional mask leaks easily fixed.   MS stable.   Compliance report rev'd 90d after setup avg 7:3h/n AHI 6, 90 %tile 7cm.     Been  46yrs!!     EXAM:   BP (!) 119/52   Pulse 76   Wt 76.7 kg (169 lb)   LMP  (LMP Unknown)   BMI 29.94 kg/m²       HST 12/30/21 AHI 16(RDI 22)/low sat 80%      ASSESSMENT:   KEM, moderate , she is adherent with  PAP, ahi 6, benefits from therapy  She has medical comorbidities of MS.      PLAN:   1. apap 5-12cm continue. [employer] DME supplies. Consider requal hst once lost 17-25# or consider Inspire   2. Discussed adequate control of Kem  3. See MS provider as advised/continue meds  Rtc otherwise 12mos, sooner if needed

## 2022-10-04 ENCOUNTER — OFFICE VISIT (OUTPATIENT)
Dept: PSYCHIATRY | Facility: CLINIC | Age: 67
End: 2022-10-04
Payer: MEDICARE

## 2022-10-04 VITALS
DIASTOLIC BLOOD PRESSURE: 60 MMHG | SYSTOLIC BLOOD PRESSURE: 129 MMHG | BODY MASS INDEX: 30.79 KG/M2 | HEART RATE: 64 BPM | WEIGHT: 173.81 LBS

## 2022-10-04 DIAGNOSIS — F31.9 BIPOLAR AFFECTIVE DISORDER, REMISSION STATUS UNSPECIFIED: ICD-10-CM

## 2022-10-04 DIAGNOSIS — F31.75 BIPOLAR I DISORDER, MOST RECENT EPISODE DEPRESSED, IN PARTIAL REMISSION: Primary | ICD-10-CM

## 2022-10-04 PROCEDURE — 3074F SYST BP LT 130 MM HG: CPT | Mod: CPTII,S$GLB,, | Performed by: PSYCHIATRY & NEUROLOGY

## 2022-10-04 PROCEDURE — 99213 OFFICE O/P EST LOW 20 MIN: CPT | Mod: S$GLB,,, | Performed by: PSYCHIATRY & NEUROLOGY

## 2022-10-04 PROCEDURE — 3078F DIAST BP <80 MM HG: CPT | Mod: CPTII,S$GLB,, | Performed by: PSYCHIATRY & NEUROLOGY

## 2022-10-04 PROCEDURE — 90833 PR PSYCHOTHERAPY W/PATIENT W/E&M, 30 MIN (ADD ON): ICD-10-PCS | Mod: S$GLB,,, | Performed by: PSYCHIATRY & NEUROLOGY

## 2022-10-04 PROCEDURE — 90833 PSYTX W PT W E/M 30 MIN: CPT | Mod: S$GLB,,, | Performed by: PSYCHIATRY & NEUROLOGY

## 2022-10-04 PROCEDURE — 1159F MED LIST DOCD IN RCRD: CPT | Mod: CPTII,S$GLB,, | Performed by: PSYCHIATRY & NEUROLOGY

## 2022-10-04 PROCEDURE — 1157F PR ADVANCE CARE PLAN OR EQUIV PRESENT IN MEDICAL RECORD: ICD-10-PCS | Mod: CPTII,S$GLB,, | Performed by: PSYCHIATRY & NEUROLOGY

## 2022-10-04 PROCEDURE — 3044F HG A1C LEVEL LT 7.0%: CPT | Mod: CPTII,S$GLB,, | Performed by: PSYCHIATRY & NEUROLOGY

## 2022-10-04 PROCEDURE — 99213 PR OFFICE/OUTPT VISIT, EST, LEVL III, 20-29 MIN: ICD-10-PCS | Mod: S$GLB,,, | Performed by: PSYCHIATRY & NEUROLOGY

## 2022-10-04 PROCEDURE — 3008F PR BODY MASS INDEX (BMI) DOCUMENTED: ICD-10-PCS | Mod: CPTII,S$GLB,, | Performed by: PSYCHIATRY & NEUROLOGY

## 2022-10-04 PROCEDURE — 1160F PR REVIEW ALL MEDS BY PRESCRIBER/CLIN PHARMACIST DOCUMENTED: ICD-10-PCS | Mod: CPTII,S$GLB,, | Performed by: PSYCHIATRY & NEUROLOGY

## 2022-10-04 PROCEDURE — 99999 PR PBB SHADOW E&M-EST. PATIENT-LVL III: ICD-10-PCS | Mod: PBBFAC,,, | Performed by: PSYCHIATRY & NEUROLOGY

## 2022-10-04 PROCEDURE — 3074F PR MOST RECENT SYSTOLIC BLOOD PRESSURE < 130 MM HG: ICD-10-PCS | Mod: CPTII,S$GLB,, | Performed by: PSYCHIATRY & NEUROLOGY

## 2022-10-04 PROCEDURE — 3008F BODY MASS INDEX DOCD: CPT | Mod: CPTII,S$GLB,, | Performed by: PSYCHIATRY & NEUROLOGY

## 2022-10-04 PROCEDURE — 99999 PR PBB SHADOW E&M-EST. PATIENT-LVL III: CPT | Mod: PBBFAC,,, | Performed by: PSYCHIATRY & NEUROLOGY

## 2022-10-04 PROCEDURE — 1157F ADVNC CARE PLAN IN RCRD: CPT | Mod: CPTII,S$GLB,, | Performed by: PSYCHIATRY & NEUROLOGY

## 2022-10-04 PROCEDURE — 1159F PR MEDICATION LIST DOCUMENTED IN MEDICAL RECORD: ICD-10-PCS | Mod: CPTII,S$GLB,, | Performed by: PSYCHIATRY & NEUROLOGY

## 2022-10-04 PROCEDURE — 3044F PR MOST RECENT HEMOGLOBIN A1C LEVEL <7.0%: ICD-10-PCS | Mod: CPTII,S$GLB,, | Performed by: PSYCHIATRY & NEUROLOGY

## 2022-10-04 PROCEDURE — 1160F RVW MEDS BY RX/DR IN RCRD: CPT | Mod: CPTII,S$GLB,, | Performed by: PSYCHIATRY & NEUROLOGY

## 2022-10-04 PROCEDURE — 3078F PR MOST RECENT DIASTOLIC BLOOD PRESSURE < 80 MM HG: ICD-10-PCS | Mod: CPTII,S$GLB,, | Performed by: PSYCHIATRY & NEUROLOGY

## 2022-10-04 RX ORDER — FLUPHENAZINE HYDROCHLORIDE 1 MG/1
1 TABLET ORAL NIGHTLY
Qty: 90 TABLET | Refills: 3 | Status: SHIPPED | OUTPATIENT
Start: 2022-10-04 | End: 2024-01-04 | Stop reason: SDUPTHER

## 2022-10-04 RX ORDER — OXCARBAZEPINE 150 MG/1
150 TABLET, FILM COATED ORAL 2 TIMES DAILY
Qty: 180 TABLET | Refills: 3 | Status: SHIPPED | OUTPATIENT
Start: 2022-10-04 | End: 2023-11-20 | Stop reason: SDUPTHER

## 2022-10-04 RX ORDER — BUPROPION HYDROCHLORIDE 150 MG/1
150 TABLET, EXTENDED RELEASE ORAL NIGHTLY
Qty: 90 TABLET | Refills: 3 | Status: SHIPPED | OUTPATIENT
Start: 2022-10-04 | End: 2023-11-20 | Stop reason: SDUPTHER

## 2022-10-04 RX ORDER — FOLIC ACID 1 MG/1
1000 TABLET ORAL DAILY
Qty: 90 TABLET | Refills: 3 | Status: SHIPPED | OUTPATIENT
Start: 2022-10-04 | End: 2023-07-24 | Stop reason: SDUPTHER

## 2022-10-04 RX ORDER — BUPROPION HYDROCHLORIDE 100 MG/1
200 TABLET, EXTENDED RELEASE ORAL EVERY MORNING
Qty: 180 TABLET | Refills: 3 | Status: SHIPPED | OUTPATIENT
Start: 2022-10-04 | End: 2023-11-20 | Stop reason: SDUPTHER

## 2022-10-04 NOTE — PROGRESS NOTES
"Outpatient Psychiatry Follow-Up Visit (MD/NP)    10/4/2022    Clinical Status of Patient:  Outpatient (Ambulatory)    Chief Complaint:  Silvana Quezada is a 67 y.o. female who presents today for follow-up of Depression and Anxiety    Met with patient.      Interval History and Content of Current Session:  Interim Events/Subjective Report/Content of Current Session:  Pt returned again in person, casually dressed and groomed, with smiling affect.  She called between visits to report excessive drowsiness which she felt could be due to residual effects of COVID.  Prolixin was reduced by phone and she reported at this visit that she has been more alert without any return of paranoia.  Decreased medication dosing with age was discussed.  She stated that she and her  sold their old home "as is" and they are happily settled in her mother's house.  Both are more relaxed now that that is done.  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    Medications, dosages, schedules, and most recent lab results were reviewed.  No changes were recommended.  She was asked to return again in 1 year, calling prn any unexpected problems before then.    Psychotherapy:  Target symptoms: mood swings  Why chosen therapy is appropriate versus another modality: relevant to diagnosis, patient responds to this modality  Outcome monitoring methods: self-report, observation  Therapeutic intervention type: insight oriented psychotherapy, supportive psychotherapy  Topics discussed/themes: stress related to medical comorbidities, symptom recognition, life stage transitional issues  The patient's response to the intervention is motivated. The patient's progress toward treatment goals is excellent.   Duration of intervention:  28 min.    Review of Systems   PSYCHIATRIC: Pertinant items are noted in the narrative.    Past Medical, Family and Social History: The patient's past medical, family " and social history have been reviewed and updated as appropriate within the electronic medical record - see encounter notes.    Compliance: yes    Side effects: None    Risk Parameters:  Patient reports no suicidal ideation  Patient reports no homicidal ideation  Patient reports no self-injurious behavior  Patient reports no violent behavior    Exam (detailed: at least 9 elements; comprehensive: all 15 elements)   Constitutional  Vitals:  Most recent vital signs, dated less than 90 days prior to this appointment, were reviewed.    Vitals:    10/04/22 1033   BP: 129/60   Pulse: 64        General:  age appropriate, well nourished, casually dressed     Musculoskeletal  Muscle Strength/Tone:  no rigidity, no dyskinesia, no dystonia, no tremor   Gait & Station:  non-ataxic     Psychiatric  Speech:  articulation error, spontaneous   Mood & Affect:  euthymic  mood-congruent   Thought Process:  goal-directed, logical   Associations:  intact   Thought Content:  normal, no suicidality, no homicidality, delusions, or paranoia   Insight:  has awareness of illness   Judgement: behavior is adequate to circumstances   Orientation:  grossly intact   Memory: intact for content of interview   Language: grossly intact   Attention Span & Concentration:  able to focus   Fund of Knowledge:  intact and appropriate to age and level of education     Assessment and Diagnosis   Status/Progress: Based on the examination today, the patient's problem(s) is/are well controlled.  New problems have not been presented today.   Comorbidities are not complicating management of the primary condition.  There are no active rule-out diagnoses for this patient at this time.    General Impression:  Pt is happy and stable, with no new problems.    Axis I: MOOD DISORDERS; Bipolar I Disorder, Most Recent Episode Manic: 6.7.6.In Full Remission (296.46).  Axis II: NO DIAGNOSIS ON AXIS II (V71.09)  Axis III: healthy  Axis IV: economic problems and problems with  primary support group  Axis V: 81-90 Absent or minimal symptoms (e.g. mild anxiety before an exam), good functioning in all areas, interested in a wide variety of activities, socially effective, generally satisfied with life, no more than everyday problems or concerns (e.g. an occasional argument with family members)    Intervention/Counseling/Treatment Plan   Medication Management: Continue current medications.        Return to Clinic:  1 year

## 2022-10-07 ENCOUNTER — HOSPITAL ENCOUNTER (EMERGENCY)
Facility: HOSPITAL | Age: 67
Discharge: HOME | End: 2022-10-07
Attending: EMERGENCY MEDICINE
Payer: MEDICARE

## 2022-10-07 VITALS
RESPIRATION RATE: 18 BRPM | SYSTOLIC BLOOD PRESSURE: 131 MMHG | DIASTOLIC BLOOD PRESSURE: 75 MMHG | TEMPERATURE: 98 F | BODY MASS INDEX: 29.94 KG/M2 | HEART RATE: 87 BPM | OXYGEN SATURATION: 97 % | WEIGHT: 169 LBS

## 2022-10-07 DIAGNOSIS — J18.9 PNEUMONIA OF LEFT LOWER LOBE DUE TO INFECTIOUS ORGANISM: Primary | ICD-10-CM

## 2022-10-07 DIAGNOSIS — N39.0 URINARY TRACT INFECTION WITHOUT HEMATURIA, SITE UNSPECIFIED: ICD-10-CM

## 2022-10-07 LAB
ANION GAP SERPL CALC-SCNC: 11 MMOL/L (ref 8–16)
BACTERIA #/AREA URNS HPF: ABNORMAL /HPF
BASOPHILS # BLD AUTO: 0.01 K/UL (ref 0–0.2)
BASOPHILS NFR BLD: 0.2 % (ref 0–1.9)
BILIRUB UR QL STRIP: NEGATIVE
BNP SERPL-MCNC: 32 PG/ML (ref 0–99)
BUN SERPL-MCNC: 16 MG/DL (ref 8–23)
CALCIUM SERPL-MCNC: 9.1 MG/DL (ref 8.7–10.5)
CHLORIDE SERPL-SCNC: 105 MMOL/L (ref 95–110)
CLARITY UR: ABNORMAL
CO2 SERPL-SCNC: 24 MMOL/L (ref 23–29)
COLOR UR: YELLOW
CREAT SERPL-MCNC: 0.9 MG/DL (ref 0.5–1.4)
CTP QC/QA: YES
D DIMER PPP IA.FEU-MCNC: 0.72 MG/L FEU
DIFFERENTIAL METHOD: ABNORMAL
EOSINOPHIL # BLD AUTO: 0 K/UL (ref 0–0.5)
EOSINOPHIL NFR BLD: 0.2 % (ref 0–8)
ERYTHROCYTE [DISTWIDTH] IN BLOOD BY AUTOMATED COUNT: 12.7 % (ref 11.5–14.5)
EST. GFR  (NO RACE VARIABLE): >60 ML/MIN/1.73 M^2
GLUCOSE SERPL-MCNC: 121 MG/DL (ref 70–110)
GLUCOSE UR QL STRIP: NEGATIVE
HCT VFR BLD AUTO: 39.8 % (ref 37–48.5)
HGB BLD-MCNC: 13.1 G/DL (ref 12–16)
HGB UR QL STRIP: ABNORMAL
IMM GRANULOCYTES # BLD AUTO: 0.01 K/UL (ref 0–0.04)
IMM GRANULOCYTES NFR BLD AUTO: 0.2 % (ref 0–0.5)
INFLUENZA A, MOLECULAR: NEGATIVE
INFLUENZA B, MOLECULAR: NEGATIVE
KETONES UR QL STRIP: NEGATIVE
LEUKOCYTE ESTERASE UR QL STRIP: ABNORMAL
LYMPHOCYTES # BLD AUTO: 0.3 K/UL (ref 1–4.8)
LYMPHOCYTES NFR BLD: 5.2 % (ref 18–48)
MCH RBC QN AUTO: 30.8 PG (ref 27–31)
MCHC RBC AUTO-ENTMCNC: 32.9 G/DL (ref 32–36)
MCV RBC AUTO: 94 FL (ref 82–98)
MICROSCOPIC COMMENT: ABNORMAL
MONOCYTES # BLD AUTO: 0.3 K/UL (ref 0.3–1)
MONOCYTES NFR BLD: 6.3 % (ref 4–15)
NEUTROPHILS # BLD AUTO: 4.7 K/UL (ref 1.8–7.7)
NEUTROPHILS NFR BLD: 87.9 % (ref 38–73)
NITRITE UR QL STRIP: POSITIVE
NRBC BLD-RTO: 0 /100 WBC
PH UR STRIP: 6 [PH] (ref 5–8)
PLATELET # BLD AUTO: 128 K/UL (ref 150–450)
PMV BLD AUTO: 10.5 FL (ref 9.2–12.9)
POTASSIUM SERPL-SCNC: 4.3 MMOL/L (ref 3.5–5.1)
PROT UR QL STRIP: ABNORMAL
RBC # BLD AUTO: 4.25 M/UL (ref 4–5.4)
RBC #/AREA URNS HPF: 15 /HPF (ref 0–4)
SARS-COV-2 RDRP RESP QL NAA+PROBE: NEGATIVE
SODIUM SERPL-SCNC: 140 MMOL/L (ref 136–145)
SP GR UR STRIP: 1.02 (ref 1–1.03)
SPECIMEN SOURCE: NORMAL
SQUAMOUS #/AREA URNS HPF: 1 /HPF
TROPONIN I SERPL DL<=0.01 NG/ML-MCNC: 0.01 NG/ML (ref 0–0.03)
URN SPEC COLLECT METH UR: ABNORMAL
UROBILINOGEN UR STRIP-ACNC: NEGATIVE EU/DL
WBC # BLD AUTO: 5.38 K/UL (ref 3.9–12.7)
WBC #/AREA URNS HPF: 40 /HPF (ref 0–5)

## 2022-10-07 PROCEDURE — 87077 CULTURE AEROBIC IDENTIFY: CPT | Performed by: EMERGENCY MEDICINE

## 2022-10-07 PROCEDURE — 84484 ASSAY OF TROPONIN QUANT: CPT | Performed by: EMERGENCY MEDICINE

## 2022-10-07 PROCEDURE — 99285 EMERGENCY DEPT VISIT HI MDM: CPT | Mod: 25

## 2022-10-07 PROCEDURE — 81000 URINALYSIS NONAUTO W/SCOPE: CPT | Performed by: EMERGENCY MEDICINE

## 2022-10-07 PROCEDURE — 80048 BASIC METABOLIC PNL TOTAL CA: CPT | Performed by: EMERGENCY MEDICINE

## 2022-10-07 PROCEDURE — 85379 FIBRIN DEGRADATION QUANT: CPT | Performed by: EMERGENCY MEDICINE

## 2022-10-07 PROCEDURE — 87635 SARS-COV-2 COVID-19 AMP PRB: CPT | Performed by: EMERGENCY MEDICINE

## 2022-10-07 PROCEDURE — 87186 SC STD MICRODIL/AGAR DIL: CPT | Performed by: EMERGENCY MEDICINE

## 2022-10-07 PROCEDURE — 93005 ELECTROCARDIOGRAM TRACING: CPT

## 2022-10-07 PROCEDURE — 83880 ASSAY OF NATRIURETIC PEPTIDE: CPT | Performed by: EMERGENCY MEDICINE

## 2022-10-07 PROCEDURE — 85025 COMPLETE CBC W/AUTO DIFF WBC: CPT | Performed by: EMERGENCY MEDICINE

## 2022-10-07 PROCEDURE — 87086 URINE CULTURE/COLONY COUNT: CPT | Performed by: EMERGENCY MEDICINE

## 2022-10-07 PROCEDURE — 93010 ELECTROCARDIOGRAM REPORT: CPT | Mod: ,,, | Performed by: INTERNAL MEDICINE

## 2022-10-07 PROCEDURE — 87502 INFLUENZA DNA AMP PROBE: CPT | Performed by: EMERGENCY MEDICINE

## 2022-10-07 PROCEDURE — 93010 EKG 12-LEAD: ICD-10-PCS | Mod: ,,, | Performed by: INTERNAL MEDICINE

## 2022-10-07 PROCEDURE — 25000003 PHARM REV CODE 250: Performed by: EMERGENCY MEDICINE

## 2022-10-07 PROCEDURE — 25500020 PHARM REV CODE 255: Performed by: EMERGENCY MEDICINE

## 2022-10-07 PROCEDURE — 87088 URINE BACTERIA CULTURE: CPT | Performed by: EMERGENCY MEDICINE

## 2022-10-07 RX ORDER — ACETAMINOPHEN 325 MG/1
650 TABLET ORAL
Status: COMPLETED | OUTPATIENT
Start: 2022-10-07 | End: 2022-10-07

## 2022-10-07 RX ORDER — NITROFURANTOIN 25; 75 MG/1; MG/1
100 CAPSULE ORAL 2 TIMES DAILY
Qty: 10 CAPSULE | Refills: 0 | Status: SHIPPED | OUTPATIENT
Start: 2022-10-07 | End: 2022-10-12

## 2022-10-07 RX ORDER — AZITHROMYCIN 250 MG/1
250 TABLET, FILM COATED ORAL DAILY
Qty: 6 TABLET | Refills: 0 | Status: SHIPPED | OUTPATIENT
Start: 2022-10-07 | End: 2023-03-15

## 2022-10-07 RX ADMIN — ACETAMINOPHEN 650 MG: 325 TABLET ORAL at 08:10

## 2022-10-07 RX ADMIN — IOHEXOL 100 ML: 350 INJECTION, SOLUTION INTRAVENOUS at 12:10

## 2022-10-07 NOTE — PHARMACY MED REC
"    [facility]           Pharmacy  Admission Medication History     The home medication history was taken by Sarah Soliman.      Medication history obtained from Medications listed below were obtained from: Patient/family    Based on information gathered for medication list, you may go to "Admission" then "Reconcile Home Medications" tabs to review and/or act upon those items.     The home medication list has been updated by the Pharmacy department.   Please read ALL comments highlighted in yellow.   Please address this information as you see fit.    Feel free to contact us if you have any questions or require assistance.    The medications listed below were removed from the home medication list.  Please reorder if appropriate:    Patient reports NOT TAKING the following medication(s):  Ultram 50mg      No current facility-administered medications on file prior to encounter.     Current Outpatient Medications on File Prior to Encounter   Medication Sig Dispense Refill    alendronate (FOSAMAX) 70 MG tablet TAKE 1 TABLET BY MOUTH EVERY 7 DAYS (Patient taking differently: Take 70 mg by mouth every Saturday.) 12 tablet 0    buPROPion (WELLBUTRIN SR) 100 MG TBSR 12 hr tablet Take 2 tablets (200 mg total) by mouth every morning. 180 tablet 3    buPROPion (WELLBUTRIN SR) 150 MG TBSR 12 hr tablet Take 1 tablet (150 mg total) by mouth every evening. 90 tablet 3    calcium-vitamin D3 (OS-IGOR 500 + D3) 500 mg-5 mcg (200 unit) per tablet Take 1 tablet by mouth 2 (two) times daily with meals.      coenzyme Q10 10 mg capsule Take 10 mg by mouth once daily.      cyanocobalamin (VITAMIN B-12) 1000 MCG tablet Take 1,000 mcg by mouth once daily.      fluphenazine (PROLIXIN) 1 MG tablet Take 1 tablet (1 mg total) by mouth every evening. 90 tablet 3    folic acid (FOLVITE) 1 MG tablet Take 1 tablet (1,000 mcg total) by mouth once daily. 90 tablet 3    OXcarbazepine (TRILEPTAL) 150 MG Tab Take 1 tablet (150 mg " total) by mouth 2 (two) times daily. 180 tablet 3    catheter (FEMALE CATHETER) 14 Fr Misc Patient is to self catheterize four times a day indefinitley using female 12 fr in and out catheter for incomplete bladder emptying.   Dispense 120 month with 11 refills or 360 every 3 months with 3 refills depending on patient's preference 120 each 11       Please address this information as you see fit.  Feel free to contact us if you have any questions or require assistance.    Sarah Soliman  [phone]              .

## 2022-10-07 NOTE — ED TRIAGE NOTES
"Chief Complaint   Patient presents with    Fatigue     Generally weak and not feeling well since waking up at 7am today. No N/V, + mild MATTA, no pain. States she has been feeling well for last few days. Presents awake, alert. Resp unlabored. Ambulatory with assistance. No distress noted.     Patient presents to the ED with generalized malaise after receiving flu and covid vaccine yesterday. Patient reports waking up with a low grade temp and dyspnea on exertion starting this morning. Patient reports not sleeping well last night due to urinary incontinence which she states "is normal for me". Denies any other complaints besides generalized body aches. Denies taking tylenol or ibuprofen prior to arrival. Placed patient in gown on continuous pulse ox and bp.   "

## 2022-10-07 NOTE — ED PROVIDER NOTES
"Encounter Date: 10/7/2022       History     Chief Complaint   Patient presents with    Fatigue     Generally weak and not feeling well since waking up at 7am today. No N/V, + mild MATTA, no pain. States she has been feeling well for last few days. Presents awake, alert. Resp unlabored. Ambulatory with assistance. No distress noted.     Patient is a 67-year-old female with a pmhx of bipolar 1 d/o, MS, depression who presents to the ED with the complaint of fatigue.   at bedside states that the patient was having "shallow breathing" early this morning. She also complained of "chest tightness" which resolved. She states that she received her influenza vaccine and COVID-19 booster yesterday prior to the onset of her symptoms. she denies any chest pain, current shortness of breath,, chills, nausea, vomiting or abdominal pain. fall signs notable for a heart rate of 102 and an oral temperature of 100.6° F    Review of patient's allergies indicates:   Allergen Reactions    Adhesive      blisters    Keflex [cephalexin] Rash    Cephalosporins      Past Medical History:   Diagnosis Date    Bipolar 1 disorder     Breast cancer 2001    right lumpectomy-radiation & chemo taxol    Breast cyst     Closed fracture of proximal end of right humerus 3/9/2015    Depression     History of right shoulder fracture     MS (multiple sclerosis)     presented as dizzines, dx vision,     Osteoporosis, unspecified     Pneumonia 3/27/2020    Wrist fracture left    2019     Past Surgical History:   Procedure Laterality Date    BLADDER FULGURATION  8/31/2022    Procedure: FULGURATION, BLADDER;  Surgeon: Jayesh Ross MD;  Location: [address];  Service: Urology;;    BRAIN SURGERY  1991    BREAST BIOPSY Left 2009    core    BREAST BIOPSY Right     BREAST LUMPECTOMY Right 2001    COLONOSCOPY N/A 1/7/2022    Procedure: COLONOSCOPY;  Surgeon: Shiva Walsh MD;  Location: [facility];  Service: Endoscopy;  Laterality: N/A;    CYSTOSCOPY N/A " 7/11/2018    Procedure: CYSTOSCOPY;  Surgeon: Jayesh Ross MD;  [facility];  Service: Urology;  Laterality: N/A;  30 min    CYSTOSCOPY N/A 4/10/2019    Procedure: CYSTOSCOPY;  Surgeon: Jayesh Ross MD;  [facility];  Service: Urology;  Laterality: N/A;  30 MIN    CYSTOSCOPY  10/21/2021    Procedure: CYSTOSCOPY;  Surgeon: Jayesh Ross MD;  Location: [facility] OR [address] [place] [address];  Service: Urology;;    FOOT SURGERY  october 2013    HYSTERECTOMY      partial    INJECTION OF BOTULINUM TOXIN TYPE A N/A 7/11/2018    Procedure: INJECTION, BOTULINUM TOXIN, TYPE A 200 UNITS;  Surgeon: Jayesh Ross MD;  Location: [facility] OR [address] [place] [address];  Service: Urology;  Laterality: N/A;    INJECTION OF BOTULINUM TOXIN TYPE A N/A 4/10/2019    Procedure: INJECTION, BOTULINUM TOXIN, TYPE A 200 UNITS;  Surgeon: Jayesh Ross MD;  Location: [facility] OR [address] [place] [address];  Service: Urology;  Laterality: N/A;    INJECTION OF BOTULINUM TOXIN TYPE A  10/21/2021    Procedure: INJECTION, BOTULINUM TOXIN, 200units;  Surgeon: Jayesh Ross MD;  Location: [facility] OR [address] [place] [address];  Service: Urology;;    OPEN REDUCTION AND INTERNAL FIXATION (ORIF) OF INJURY OF WRIST Left 12/27/2019    Procedure: ORIF, WRIST;  Surgeon: Albert Schroeder Jr., MD;  Location: [facility];  Service: Orthopedics;  Laterality: Left;  ACUMED/ MINI C ARM [facility] notified/ Rk confirmed here in [facility] with Lacie 12/23/19 KB [id number]    TONSILLECTOMY      TOTAL REDUCTION MAMMOPLASTY Left 11/5/2020    Procedure: MAMMOPLASTY, REDUCTION, LEFT;  Surgeon: Kirk Tompkins MD;  Location: [facility] OR [address] [place] [address];  Service: Plastics;  Laterality: Left;  Left breast tissue weighed- 49 grams.     Family History   Problem Relation Age of Onset    Heart disease Mother     Cancer Mother         breast cancer    Skin cancer Mother     Breast cancer Mother     Stroke Mother     Emphysema Father     Dementia Brother     Diabetes Brother     No Known Problems Brother     Anesthesia problems  Daughter     Autism Daughter     Autism Son     Breast cancer Maternal Aunt      Social History     Tobacco Use    Smoking status: Never    Smokeless tobacco: Never   Substance Use Topics    Alcohol use: Not Currently    Drug use: Never     Review of Systems   Constitutional:  Positive for fatigue. Negative for chills and fever.   Respiratory:  Positive for shortness of breath. Negative for cough.    Cardiovascular:  Positive for chest pain (Chest tightness that has resolved). Negative for palpitations and leg swelling.   Gastrointestinal:  Negative for abdominal pain, nausea and vomiting.   Genitourinary:  Negative for dysuria, flank pain, frequency and urgency.   Musculoskeletal:  Negative for back pain and myalgias.   Skin:  Negative for pallor and rash.   Neurological:  Negative for dizziness, numbness and headaches.   Psychiatric/Behavioral:  Negative for agitation and confusion.      Physical Exam     Initial Vitals [10/07/22 0801]   BP Pulse Resp Temp SpO2   130/78 102 (!) 23 (!) 100.6 °F (38.1 °C) 100 %      MAP       --         Physical Exam    Nursing note and vitals reviewed.  Constitutional: She appears well-developed and well-nourished.   HENT:   Head: Normocephalic and atraumatic.   Right Ear: External ear normal.   Left Ear: External ear normal.   Eyes: Conjunctivae and EOM are normal. Pupils are equal, round, and reactive to light.   Neck: Neck supple.   Normal range of motion.  Cardiovascular:  Regular rhythm, normal heart sounds and intact distal pulses.           Tachy    Pulmonary/Chest: Breath sounds normal.   Abdominal: Abdomen is soft. Bowel sounds are normal.   Musculoskeletal:         General: No tenderness or edema. Normal range of motion.      Cervical back: Normal range of motion and neck supple.     Neurological: She is alert and oriented to person, place, and time. She has normal strength. GCS score is 15. GCS eye subscore is 4. GCS verbal subscore is 5. GCS motor subscore is 6.   Skin:  Skin is warm. Capillary refill takes less than 2 seconds.       ED Course   Procedures  Labs Reviewed   CBC W/ AUTO DIFFERENTIAL - Abnormal; Notable for the following components:       Result Value    Platelets 128 (*)     Lymph # 0.3 (*)     Gran % 87.9 (*)     Lymph % 5.2 (*)     All other components within normal limits   BASIC METABOLIC PANEL - Abnormal; Notable for the following components:    Glucose 121 (*)     All other components within normal limits   D DIMER, QUANTITATIVE - Abnormal; Notable for the following components:    D-Dimer 0.72 (*)     All other components within normal limits   URINALYSIS, REFLEX TO URINE CULTURE - Abnormal; Notable for the following components:    Appearance, UA Hazy (*)     Protein, UA Trace (*)     Occult Blood UA 1+ (*)     Nitrite, UA Positive (*)     Leukocytes, UA 3+ (*)     All other components within normal limits    Narrative:     Specimen Source->Urine   URINALYSIS MICROSCOPIC - Abnormal; Notable for the following components:    RBC, UA 15 (*)     WBC, UA 40 (*)     All other components within normal limits    Narrative:     Specimen Source->Urine   INFLUENZA A & B BY MOLECULAR   CULTURE, URINE   TROPONIN I   B-TYPE NATRIURETIC PEPTIDE   SARS-COV-2 RDRP GENE    Narrative:     This test utilizes isothermal nucleic acid amplification   technology to detect the SARS-CoV-2 RdRp nucleic acid segment.   The analytical sensitivity (limit of detection) is 125 genome   equivalents/mL.   A POSITIVE result implies infection with the SARS-CoV-2 virus;   the patient is presumed to be contagious.     A NEGATIVE result means that SARS-CoV-2 nucleic acids are not   present above the limit of detection. A NEGATIVE result should be   treated as presumptive. It does not rule out the possibility of   COVID-19 and should not be the sole basis for treatment decisions.   If COVID-19 is strongly suspected based on clinical and exposure   history, re-testing using an alternate molecular assay  should be   considered.   This test is only for use under the Food and Drug   Administration s Emergency Use Authorization (EUA).   Commercial kits are provided by [employer].   Performance characteristics of the EUA have been independently   verified by [facility] Department of   Pathology and Laboratory Medicine.   _________________________________________________________________   The authorized Fact Sheet for Healthcare Providers and the authorized Fact   Sheet for Patients of the ID NOW COVID-19 are available on the FDA   website:     https://www.fda.gov/media/486047/download  https://www.fda.gov/media/632627/download             EKG Readings: (Independently Interpreted)   Initial Reading: No STEMI. Rhythm: Sinus Tachycardia. Heart Rate: 102.   Sinus tachycardia; no ischemic change; no STEMI   ECG Results              EKG 12-lead (In process)  Result time 10/07/22 15:07:37      In process by Interface, Lab In [facility] (10/07/22 15:07:37)                   Narrative:    Test Reason : R07.9,    Vent. Rate : 102 BPM     Atrial Rate : 102 BPM     P-R Int : 164 ms          QRS Dur : 072 ms      QT Int : 332 ms       P-R-T Axes : 054 013 040 degrees     QTc Int : 432 ms    Sinus tachycardia  Otherwise normal ECG  When compared with ECG of 14-JUN-2021 13:06,  Previous ECG has undetermined rhythm, needs review  Nonspecific T wave abnormality no longer evident in Lateral leads    Referred By: [email or web address]   SELF           Confirmed By:                                   Imaging Results              CTA Chest Non-Coronary (PE Studies) (Final result)  Result time 10/07/22 13:20:31      Final result by Roberto Alan MD (10/07/22 13:20:31)                   Impression:      1. Examination considered diagnostic to the proximal segmental pulmonary arterial level without convincing evidence of acute pulmonary embolism.  2. Geographic areas of ground-glass versus mosaic attenuation could relate to  subsegmental atelectasis, edema or infectious/inflammatory etiology versus small airways and/or small vessel disease, respectively.  3. At least 2 nonspecific solid pulmonary nodules measuring up to 4-5 mm.  For multiple solid nodules all <6 mm, Fleischner Society 2017 guidelines recommend no routine follow up for a low risk patient, or follow up with non-contrast chest CT at 12 months after discovery in a high risk patient.  4. Additional details, as provided in the body of the report.      Electronically signed by: Roberto Alan  Date:    10/07/2022  Time:    13:20               Narrative:    EXAMINATION:  CTA CHEST NON CORONARY (PE STUDIES)    CLINICAL HISTORY:  Pulmonary embolism (PE) suspected, positive D-dimer;    TECHNIQUE:  Low dose axial images, sagittal and coronal reformations were obtained from the thoracic inlet to the lung bases following the IV administration of 100 mL of Omnipaque 350.  Contrast timing was optimized to evaluate the pulmonary arteries.  MIP images were performed.    COMPARISON:  Chest radiograph 10/07/2022.    FINDINGS:  Exam quality: Examination considered diagnostic to the proximal segmental pulmonary arterial level.    Pulmonary embolism: No acute or chronic pulmonary embolism.    Central pulmonary arteries: Normal caliber.    Aorta: Normal caliber.    Heart: No right heart strain. Normal size.    Coronary arteries: Atherosclerotic calcifications present.    Pericardium: Normal. No effusion, thickening, or calcification.    Support tubes and lines: None.    Base of neck/thyroid: Normal.    Lymph nodes: No supraclavicular, axillary, internal mammary, mediastinal, or hilar adenopathy.    Esophagus: Normal.    Pleura: No effusion, thickening, or calcification.    Upper abdomen: Supra and fluid attenuation lesions are noted of hepatic lobe.    Body wall: Unremarkable    Airways: Central airways appear patent.    Lungs: Assessment of the lungs limited due to respiratory motion.   Dependent atelectasis is noted.  Geographic areas of ground-glass versus mosaic attenuation could relate to subsegmental atelectasis, edema or infectious/inflammatory etiology versus small airways and/or small vessel disease, respectively.  3 mm solid nodule right middle lobe (series 3, image 179).  4-5 mm solid nodule subpleural right middle lobe (series 3, image 189).    Bones: No definite acute abnormality.                                       X-Ray Chest AP Portable (Final result)  Result time 10/07/22 09:13:17      Final result by Roberto Alan MD (10/07/22 09:13:17)                   Impression:      Patchy opacity left lateral lung base may relate to atelectasis, aspiration or pneumonia.      Electronically signed by: Roberto Alan  Date:    10/07/2022  Time:    09:13               Narrative:    EXAMINATION:  XR CHEST AP PORTABLE    CLINICAL HISTORY:  Chest pain, unspecified    TECHNIQUE:  Single frontal view of the chest was performed.    COMPARISON:  Chest radiograph performed 06/14/2021 and prior.    FINDINGS:  Cardiomediastinal contour appears grossly unchanged.  Chronic interstitial coarsening, as seen previously.    Patchy opacity noted at the lateral left lung base.    Lucency beneath the right hemidiaphragm favored represent gas-filled loops of colon, seen on prior examinations, for example 03/26/2028 chest radiograph.    No definite acute findings the visualized abdomen.  Osseous soft tissue structures appear without acute change.                                       Medications   acetaminophen tablet 650 mg (650 mg Oral Given 10/7/22 9553)   iohexoL (OMNIPAQUE 350) injection 100 mL (100 mLs Intravenous Given 10/7/22 1214)     Medical Decision Making:   Clinical Tests:   Lab Tests: Reviewed and Ordered  Radiological Study: Ordered and Reviewed  ED Management:  - VS notable for temp of 100.6F,   - COVID 19 swab NEGATIVE  - Influenza A/B swab NEGATIVE   - CXR concerning for consolidation  to the LLL   - CBC without significant leukocytosis; H/H stable   - BMP without significant electrolyte abnormality; renal function WNL   - Troponin WNL   - BNP WNL  - D-dimer mildly elevated  - CTA chest negative for PE  - UA nitrite positive   - Fever, HR improved following administration of antipyretic  - pt not toxic; not septic  - will discharge to home with rx for abx for suspected PNA, UTI  - pt comfortable with plan for discharge  - No further intervention is indicated at this time after having taken into account the patient's history, physical exam findings, and empirical and objective data obtained during the patient's emergency department workup.   - The patient is at low risk for an emergent medical condition at this time, and I am of the belief that that it is safe to discharge the patient from the emergency department.   - The patient is instructed to follow up as outpatient as indicated on the discharge paperwork.    - I have discussed the specifics of the workup with the patient and the patient has verbalized understanding of the details of the workup, the diagnosis, the treatment plan, and the need for outpatient follow-up.    - Although the patient has no emergent etiology today this does not preclude the development of an emergent condition so, in addition, I have advised the patient that they can return to the ED and/or activate EMS at any time with worsening of their symptoms, change of their symptoms, or with any other medical complaint.    - The patient remained comfortable and stable during their visit in the ED.    - Discharge and follow-up instructions discussed with the patient who expressed understanding and willingness to comply with my recommendations.  - Results of all emergency department tests  discussed thoroughly with patient; all patient questions answered; pt in agreement with plan  - Pt instructed to follow up with PCP in 2-3 days for recheck of today's complaints  - Pt given  strict emergency department return precautions for any new or worsening of symptoms  - Pt discharged from the emergency department in stable condition, in no acute distress                            Clinical Impression:   Final diagnoses:  [J18.9] Pneumonia of left lower lobe due to infectious organism (Primary)  [N39.0] Urinary tract infection without hematuria, site unspecified      ED Disposition Condition    Discharge Stable          ED Prescriptions       Medication Sig Dispense Start Date End Date Auth. Provider    nitrofurantoin, macrocrystal-monohydrate, (MACROBID) 100 MG capsule Take 1 capsule (100 mg total) by mouth 2 (two) times daily. for 5 days 10 capsule 10/7/2022 10/12/2022 Girish Brewer MD    azithromycin (Z-PEYTON) 250 MG tablet Take 1 tablet (250 mg total) by mouth once daily. Take first 2 tablets together, then 1 every day until finished. 6 tablet 10/7/2022 -- Girish Brewer MD          Follow-up Information    None          Girish Brewer MD  10/07/22 1504       Girish Brewer MD  10/07/22 1507

## 2022-10-09 LAB — BACTERIA UR CULT: ABNORMAL

## 2022-10-17 ENCOUNTER — TELEPHONE (OUTPATIENT)
Dept: INTERNAL MEDICINE | Facility: CLINIC | Age: 67
End: 2022-10-17
Payer: MEDICARE

## 2022-10-17 NOTE — TELEPHONE ENCOUNTER
Pt informed per dr lancaster she does not need any repeat testing for pneumonia, verb understanding

## 2022-10-17 NOTE — TELEPHONE ENCOUNTER
----- Message from Janeth Rodriguez sent at 10/17/2022 12:21 PM CDT -----  Type:  Sooner Apoointment Request    Caller is requesting a sooner appointment.  Caller declined first available appointment listed below.  Caller will not accept being placed on the waitlist and is requesting a message be sent to doctor.  Name of Caller:pt   When is the first available appointment?12/30  Symptoms: 2 week follow up after diagnosis of pneumonia  Would the patient rather a call back or a response via [facility]? Call  Best Call Back Number: [phone]  Additional Information:    Pt states she is not having any symptoms. Pt states she feels better and just want to have a follow up just to make sure it is completely gone away.

## 2022-12-01 ENCOUNTER — PATIENT MESSAGE (OUTPATIENT)
Dept: PSYCHIATRY | Facility: CLINIC | Age: 67
End: 2022-12-01
Payer: MEDICARE

## 2022-12-23 ENCOUNTER — HOSPITAL ENCOUNTER (EMERGENCY)
Facility: HOSPITAL | Age: 67
Discharge: HOME | End: 2022-12-23
Attending: STUDENT IN AN ORGANIZED HEALTH CARE EDUCATION/TRAINING PROGRAM
Payer: MEDICARE

## 2022-12-23 VITALS
DIASTOLIC BLOOD PRESSURE: 91 MMHG | HEART RATE: 89 BPM | OXYGEN SATURATION: 97 % | BODY MASS INDEX: 29.94 KG/M2 | RESPIRATION RATE: 18 BRPM | SYSTOLIC BLOOD PRESSURE: 130 MMHG | WEIGHT: 169 LBS | TEMPERATURE: 98 F

## 2022-12-23 DIAGNOSIS — S09.90XA CLOSED HEAD INJURY, INITIAL ENCOUNTER: ICD-10-CM

## 2022-12-23 DIAGNOSIS — S01.01XA SCALP LACERATION, INITIAL ENCOUNTER: ICD-10-CM
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
## 2022-12-23 PROCEDURE — 99284 EMERGENCY DEPT VISIT MOD MDM: CPT | Mod: 25

## 2022-12-23 PROCEDURE — 12001 RPR S/N/AX/GEN/TRNK 2.5CM/<: CPT

## 2022-12-23 NOTE — ED PROVIDER NOTES
Encounter Date: 12/23/2022       History     Chief Complaint   Patient presents with    Fall     Patient states she got dizzy while standing in the bathroom and fell forward striking the floor with her head approximately 60 minutes prior to arrival.  Laceration noted to right forehead by hairline, with minimal drainage at present.  Patient denies LOC.  Denies neck or back pain.       67-year-old female presents for evaluation after a fall from standing in the bathroom.  Patient states she looked down and then bent over and then became lightheaded and dizzy and fell forward impacting the top of her forehead/beginning of scalp.  No loss of consciousness.  Does have some neck pain at time of presentation to myself, however denied this at time of triage.  Before this fall had been in her normal status of health.  Denies loss of consciousness, chest pain, shortness of breath, abdominal pain, nausea, vomiting, fever, chills.    The history is provided by the patient and the spouse.   Review of patient's allergies indicates:   Allergen Reactions    Adhesive      blisters    Keflex [cephalexin] Rash    Cephalosporins      Past Medical History:   Diagnosis Date    Bipolar 1 disorder     Breast cancer 2001    right lumpectomy-radiation & chemo taxol    Breast cyst     Closed fracture of proximal end of right humerus 3/9/2015    Depression     History of right shoulder fracture     MS (multiple sclerosis)     presented as dizzines, dx vision,     Osteoporosis, unspecified     Pneumonia 3/27/2020    Wrist fracture left    2019     Past Surgical History:   Procedure Laterality Date    BLADDER FULGURATION  8/31/2022    Procedure: FULGURATION, BLADDER;  Surgeon: Jayesh Ross MD;  Location: [facility] OR [address] [place] [address];  Service: Urology;;    BRAIN SURGERY  1991    BREAST BIOPSY Left 2009    core    BREAST BIOPSY Right     BREAST LUMPECTOMY Right 2001    COLONOSCOPY N/A 1/7/2022    Procedure: COLONOSCOPY;  Surgeon: Shiva Walsh MD;   Location: [facility] ENDO;  Service: Endoscopy;  Laterality: N/A;    CYSTOSCOPY N/A 7/11/2018    Procedure: CYSTOSCOPY;  Surgeon: Jayesh Ross MD;  [facility];  Service: Urology;  Laterality: N/A;  30 min    CYSTOSCOPY N/A 4/10/2019    Procedure: CYSTOSCOPY;  Surgeon: Jayesh Ross MD;  [facility];  Service: Urology;  Laterality: N/A;  30 MIN    CYSTOSCOPY  10/21/2021    Procedure: CYSTOSCOPY;  Surgeon: Jayesh Ross MD;  Location: [facility] OR [address] [place] [address];  Service: Urology;;    FOOT SURGERY  october 2013    HYSTERECTOMY      partial    INJECTION OF BOTULINUM TOXIN TYPE A N/A 7/11/2018    Procedure: INJECTION, BOTULINUM TOXIN, TYPE A 200 UNITS;  Surgeon: Jayesh Ross MD;  Location: [facility] OR [address] [place] [address];  Service: Urology;  Laterality: N/A;    INJECTION OF BOTULINUM TOXIN TYPE A N/A 4/10/2019    Procedure: INJECTION, BOTULINUM TOXIN, TYPE A 200 UNITS;  Surgeon: Jayesh Ross MD;  Location: [facility] OR [address] [place] [address];  Service: Urology;  Laterality: N/A;    INJECTION OF BOTULINUM TOXIN TYPE A  10/21/2021    Procedure: INJECTION, BOTULINUM TOXIN, 200units;  Surgeon: Jayesh Ross MD;  Location: [address];  Service: Urology;;    OPEN REDUCTION AND INTERNAL FIXATION (ORIF) OF INJURY OF WRIST Left 12/27/2019    Procedure: ORIF, WRIST;  Surgeon: Albert Schroeder Jr., MD;  Location: [facility] OR;  Service: Orthopedics;  Laterality: Left;  ACUMED/ MINI C ARM [facility] notified/ Rk confirmed here in [facility] B with Lacie 12/23/19 KB [id number]    TONSILLECTOMY      TOTAL REDUCTION MAMMOPLASTY Left 11/5/2020    Procedure: MAMMOPLASTY, REDUCTION, LEFT;  Surgeon: Kirk Tompkins MD;  Location: [address];  Service: Plastics;  Laterality: Left;  Left breast tissue weighed- 49 grams.     Family History   Problem Relation Age of Onset    Heart disease Mother     Cancer Mother         breast cancer    Skin cancer Mother     Breast cancer Mother     Stroke Mother     Emphysema Father     Dementia Brother      Diabetes Brother     No Known Problems Brother     Anesthesia problems Daughter     Autism Daughter     Autism Son     Breast cancer Maternal Aunt      Social History     Tobacco Use    Smoking status: Never    Smokeless tobacco: Never   Substance Use Topics    Alcohol use: Not Currently    Drug use: Never     Review of Systems   Constitutional:  Negative for chills and fever.   HENT:  Negative for congestion, dental problem, ear pain, facial swelling, nosebleeds and sinus pressure.    Eyes:  Negative for pain and discharge.   Respiratory:  Negative for cough, shortness of breath and stridor.    Cardiovascular:  Negative for chest pain and palpitations.   Gastrointestinal:  Negative for abdominal distention, abdominal pain, constipation, diarrhea, nausea and vomiting.   Genitourinary:  Negative for difficulty urinating, flank pain, frequency and urgency.   Musculoskeletal:  Negative for arthralgias, myalgias and neck pain.   Skin:  Negative for rash and wound.   Neurological:  Positive for dizziness and light-headedness. Negative for syncope, weakness, numbness and headaches.   Psychiatric/Behavioral:  Negative for hallucinations and suicidal ideas.    All other systems reviewed and are negative.    Physical Exam     Initial Vitals [12/23/22 0200]   BP Pulse Resp Temp SpO2   (!) 119/58 73 16 97.8 °F (36.6 °C) 98 %      MAP       --         Physical Exam    Nursing note and vitals reviewed.  Constitutional: She appears well-developed and well-nourished. No distress.   HENT:   Head: Normocephalic.       Right Ear: External ear normal.   Left Ear: External ear normal.   Nose: Nose normal.   Mouth/Throat: Oropharynx is clear and moist.   Eyes: Conjunctivae and EOM are normal. Pupils are equal, round, and reactive to light.   Neck: Neck supple.   Normal range of motion.  Cardiovascular:  Normal rate, regular rhythm, normal heart sounds and intact distal pulses.           Pulmonary/Chest: Breath sounds normal. No  stridor. No respiratory distress. She has no wheezes. She has no rhonchi. She has no rales.   Abdominal: Abdomen is soft. Bowel sounds are normal. There is no abdominal tenderness.   Musculoskeletal:         General: No tenderness or edema. Normal range of motion.      Cervical back: Normal range of motion and neck supple.     Neurological: She is alert and oriented to person, place, and time. She has normal strength. No cranial nerve deficit or sensory deficit.   Skin: Skin is warm and dry. No rash noted.   Psychiatric: She has a normal mood and affect. Thought content normal.       ED Course   Lac Repair    Date/Time: 12/23/2022 5:03 AM  Performed by: Vinny Allen MD  Authorized by: Vinny Allen MD     Consent:     Consent obtained:  Verbal    Consent given by:  Parent    Risks, benefits, and alternatives were discussed: yes      Risks discussed:  Need for additional repair, infection and pain    Alternatives discussed:  No treatment and observation  Universal protocol:     Procedure explained and questions answered to patient or proxy's satisfaction: yes      Patient identity confirmed:  Verbally with patient and hospital-assigned identification number  Anesthesia:     Anesthesia method:  None  Laceration details:     Location:  Scalp    Scalp location:  Frontal (At very beginning of hairline)    Length (cm):  1  Pre-procedure details:     Preparation:  Imaging obtained to evaluate for foreign bodies  Exploration:     Limited defect created (wound extended): no      Hemostasis achieved with:  Direct pressure    Wound exploration: wound explored through full range of motion and entire depth of wound visualized      Contaminated: no    Treatment:     Area cleansed with:  Saline    Amount of cleaning:  Standard    Irrigation method:  Pressure wash    Debridement:  None    Undermining:  None  Skin repair:     Repair method:  Tissue adhesive  Approximation:     Approximation:  Close  Repair type:     Repair type:   Simple  Post-procedure details:     Dressing:  Open (no dressing)    Procedure completion:  Tolerated well, no immediate complications  Labs Reviewed - No data to display       Imaging Results              CT Cervical Spine Without Contrast (Final result)  Result time 12/23/22 04:29:46      Final result by Navjot Sandy MD (12/23/22 04:29:46)                   Impression:      No CT evidence of acute fracture or traumatic subluxation of the cervical spine.  Multilevel degenerative changes, predominantly relating to foraminal narrowing, as above.      Electronically signed by: Navjot Sandy MD  Date:    12/23/2022  Time:    04:29               Narrative:    EXAMINATION:  CT CERVICAL SPINE WITHOUT CONTRAST    CLINICAL HISTORY:  Neck trauma (Age >= 65y);    TECHNIQUE:  Low dose axial images, sagittal and coronal reformations were performed though the cervical spine.  Contrast was not administered.    COMPARISON:  None    FINDINGS:  There is mild anterolisthesis of C3 on C4.  There is straightening of the normal cervical lordosis.  Cervical vertebral body heights appear adequately maintained.  There is stable minimal chronic anterior wedging of the T1 vertebral body.  No definite acute displaced fracture identified.  There is mild to moderate multilevel intervertebral disc space height loss and endplate degenerative change.  At the level of C3-C4, there is bilateral uncovertebral spurring and prominent right-sided facet arthropathy with severe right-sided neural foraminal narrowing.  At the level of C4-C5, there is bilateral uncovertebral spurring and facet arthropathy with moderate to severe bilateral neural foraminal narrowing.  There is mild bilateral neural foraminal narrowing at the levels of C5-C6 and C6-C7.    The prevertebral soft tissues are not significantly thickened.  The trachea is midline and patent.  Visualized lung apices are unremarkable.                                       CT Head Without  Contrast (Final result)  Result time 12/23/22 04:34:54      Final result by Navjot Sandy MD (12/23/22 04:34:54)                   Impression:      No CT evidence of acute intracranial abnormality. Clinical correlation and further evaluation as warranted.    Right frontal scalp soft tissue hematoma and laceration.  No evidence of depressed calvarial fracture.    Patchy and confluent periventricular white matter hypoattenuation, similar to prior examination, which may relate to patient's reported history of demyelinating disease.      Electronically signed by: Navjot Sandy MD  Date:    12/23/2022  Time:    04:34               Narrative:    EXAMINATION:  CT HEAD WITHOUT CONTRAST    CLINICAL HISTORY:  Head trauma, minor (Age >= 65y);    TECHNIQUE:  Low dose axial images were obtained through the head.  Coronal and sagittal reformations were also performed. Contrast was not administered.    COMPARISON:  CT head 08/14/2020    FINDINGS:  There is generalized cerebral volume loss with compensatory sulcal widening and ventricular enlargement.  There is patchy and confluent periventricular and supratentorial white matter hypoattenuation, similar to prior examination.  There is no CT evidence of acute intracranial hemorrhage.  There is no midline shift or hydrocephalus.  No definite extra-axial collections appreciated.  The basal cisterns are patent. There is partial opacification of the right anterior ethmoid air cells.  The remaining paranasal sinuses and mastoid air cells appear relatively well aerated.  There is a right frontal scalp soft tissue hematoma and small laceration.  No depressed calvarial fracture identified.                                       Medications - No data to display  Medical Decision Making:   Clinical Tests:   Radiological Study: Ordered and Reviewed  67-year-old female with fall from standing after bending over becoming lightheaded.  We discussed safety surrounding these aspects along  with orthostatic hypotension and bend apnea with patient and .  They voiced understanding with safety regarding these issues.  No acute findings on CT of head and neck.  Other medical complaints at this time.  Patient appears safe and stable for discharge and outpatient follow-up.  Return ED precautions given for worsening or changing symptoms.                        Clinical Impression:   Final diagnoses:  [W19.XXXA] Fall, initial encounter (Primary)  [S09.90XA] Closed head injury, initial encounter  [S01.01XA] Scalp laceration, initial encounter        ED Disposition Condition    Discharge Stable          ED Prescriptions    None       Follow-up Information       Follow up With Specialties Details Why Contact Info    Jhonatan Avila III, MD Internal Medicine Schedule an appointment as soon as possible for a visit in 5 days For follow-up on today's visit. [address] [facility] [address]  [phone]      [facility] - Emergency Dept Emergency Medicine Go to  As needed, If symptoms worsen [address] [facility] [id number] [phone]             Vinny Allen MD  12/23/22 8120

## 2022-12-23 NOTE — ED NOTES
Patient identifiers for Silvana Quezada checked and correct.  LOC: The patient is awake, alert and aware of environment with an appropriate affect, the patient is oriented x 3 and speaking appropriately.  APPEARANCE: Patient resting quietly and in no acute distress, patient is clean and well groomed, patient's clothing are properly fastened.  SKIN: The skin is warm and dry.  MUSKULOSKELETAL: Patient moving all extremities well, no obvious swelling or deformities noted.  RESPIRATORY: Airway is open and patent, respirations are spontaneous, patient has a normal effort and rate.  NEUROLOGIC: PERRL, facial expression is symmetrical, bilateral hand grasp equal and even, normal sensation in all extremities when touched with a finger.

## 2023-01-03 ENCOUNTER — OFFICE VISIT (OUTPATIENT)
Dept: INTERNAL MEDICINE | Facility: CLINIC | Age: 68
End: 2023-01-03
Payer: MEDICARE

## 2023-01-03 VITALS
SYSTOLIC BLOOD PRESSURE: 106 MMHG | HEART RATE: 94 BPM | BODY MASS INDEX: 30.5 KG/M2 | OXYGEN SATURATION: 97 % | WEIGHT: 172.19 LBS | DIASTOLIC BLOOD PRESSURE: 84 MMHG

## 2023-01-03 DIAGNOSIS — I77.1 TORTUOUS AORTA: ICD-10-CM

## 2023-01-03 DIAGNOSIS — F31.75 BIPOLAR I DISORDER, MOST RECENT EPISODE DEPRESSED, IN PARTIAL REMISSION: ICD-10-CM

## 2023-01-03 DIAGNOSIS — G35 MS (MULTIPLE SCLEROSIS): ICD-10-CM

## 2023-01-03 DIAGNOSIS — R55 PRE-SYNCOPE: Primary | ICD-10-CM

## 2023-01-03 PROCEDURE — 93010 ELECTROCARDIOGRAM REPORT: CPT | Mod: S$GLB,,, | Performed by: INTERNAL MEDICINE

## 2023-01-03 PROCEDURE — 1160F PR REVIEW ALL MEDS BY PRESCRIBER/CLIN PHARMACIST DOCUMENTED: ICD-10-PCS | Mod: CPTII,S$GLB,, | Performed by: INTERNAL MEDICINE

## 2023-01-03 PROCEDURE — 1159F MED LIST DOCD IN RCRD: CPT | Mod: CPTII,S$GLB,, | Performed by: INTERNAL MEDICINE

## 2023-01-03 PROCEDURE — 3079F PR MOST RECENT DIASTOLIC BLOOD PRESSURE 80-89 MM HG: ICD-10-PCS | Mod: CPTII,S$GLB,, | Performed by: INTERNAL MEDICINE

## 2023-01-03 PROCEDURE — 93005 EKG 12-LEAD: ICD-10-PCS | Mod: S$GLB,,, | Performed by: INTERNAL MEDICINE

## 2023-01-03 PROCEDURE — 1125F PR PAIN SEVERITY QUANTIFIED, PAIN PRESENT: ICD-10-PCS | Mod: CPTII,S$GLB,, | Performed by: INTERNAL MEDICINE

## 2023-01-03 PROCEDURE — 3079F DIAST BP 80-89 MM HG: CPT | Mod: CPTII,S$GLB,, | Performed by: INTERNAL MEDICINE

## 2023-01-03 PROCEDURE — 3288F FALL RISK ASSESSMENT DOCD: CPT | Mod: CPTII,S$GLB,, | Performed by: INTERNAL MEDICINE

## 2023-01-03 PROCEDURE — 3008F PR BODY MASS INDEX (BMI) DOCUMENTED: ICD-10-PCS | Mod: CPTII,S$GLB,, | Performed by: INTERNAL MEDICINE

## 2023-01-03 PROCEDURE — 3288F PR FALLS RISK ASSESSMENT DOCUMENTED: ICD-10-PCS | Mod: CPTII,S$GLB,, | Performed by: INTERNAL MEDICINE

## 2023-01-03 PROCEDURE — 99999 PR PBB SHADOW E&M-EST. PATIENT-LVL III: ICD-10-PCS | Mod: PBBFAC,,, | Performed by: INTERNAL MEDICINE

## 2023-01-03 PROCEDURE — 1125F AMNT PAIN NOTED PAIN PRSNT: CPT | Mod: CPTII,S$GLB,, | Performed by: INTERNAL MEDICINE

## 2023-01-03 PROCEDURE — 99214 OFFICE O/P EST MOD 30 MIN: CPT | Mod: S$GLB,,, | Performed by: INTERNAL MEDICINE

## 2023-01-03 PROCEDURE — 1157F ADVNC CARE PLAN IN RCRD: CPT | Mod: CPTII,S$GLB,, | Performed by: INTERNAL MEDICINE

## 2023-01-03 PROCEDURE — 3074F PR MOST RECENT SYSTOLIC BLOOD PRESSURE < 130 MM HG: ICD-10-PCS | Mod: CPTII,S$GLB,, | Performed by: INTERNAL MEDICINE

## 2023-01-03 PROCEDURE — 99214 PR OFFICE/OUTPT VISIT, EST, LEVL IV, 30-39 MIN: ICD-10-PCS | Mod: S$GLB,,, | Performed by: INTERNAL MEDICINE

## 2023-01-03 PROCEDURE — 1157F PR ADVANCE CARE PLAN OR EQUIV PRESENT IN MEDICAL RECORD: ICD-10-PCS | Mod: CPTII,S$GLB,, | Performed by: INTERNAL MEDICINE

## 2023-01-03 PROCEDURE — 99999 PR PBB SHADOW E&M-EST. PATIENT-LVL III: CPT | Mod: PBBFAC,,, | Performed by: INTERNAL MEDICINE

## 2023-01-03 PROCEDURE — 3008F BODY MASS INDEX DOCD: CPT | Mod: CPTII,S$GLB,, | Performed by: INTERNAL MEDICINE

## 2023-01-03 PROCEDURE — 1159F PR MEDICATION LIST DOCUMENTED IN MEDICAL RECORD: ICD-10-PCS | Mod: CPTII,S$GLB,, | Performed by: INTERNAL MEDICINE

## 2023-01-03 PROCEDURE — 93010 EKG 12-LEAD: ICD-10-PCS | Mod: S$GLB,,, | Performed by: INTERNAL MEDICINE

## 2023-01-03 PROCEDURE — 1100F PTFALLS ASSESS-DOCD GE2>/YR: CPT | Mod: CPTII,S$GLB,, | Performed by: INTERNAL MEDICINE

## 2023-01-03 PROCEDURE — 1160F RVW MEDS BY RX/DR IN RCRD: CPT | Mod: CPTII,S$GLB,, | Performed by: INTERNAL MEDICINE

## 2023-01-03 PROCEDURE — 3074F SYST BP LT 130 MM HG: CPT | Mod: CPTII,S$GLB,, | Performed by: INTERNAL MEDICINE

## 2023-01-03 PROCEDURE — 93005 ELECTROCARDIOGRAM TRACING: CPT | Mod: S$GLB,,, | Performed by: INTERNAL MEDICINE

## 2023-01-03 PROCEDURE — 1100F PR PT FALLS ASSESS DOC 2+ FALLS/FALL W/INJURY/YR: ICD-10-PCS | Mod: CPTII,S$GLB,, | Performed by: INTERNAL MEDICINE

## 2023-01-03 NOTE — PROGRESS NOTES
Assessment:       1. Pre-syncope    2. Bipolar I disorder, most recent episode depressed, in partial remission    3. MS (multiple sclerosis)    4. Tortuous aorta          Plan:         Silvana was seen today for fall.    Diagnoses and all orders for this visit:    Pre-syncope  -     EKG 12-lead    Bipolar I disorder, most recent episode depressed, in partial remission    MS (multiple sclerosis)    Tortuous aorta          Subjective:       Patient ID: Silvana Quezada is a 67 y.o. female.    Chief Complaint: Fall    Interim Hx    Fal from standing 12/23 seen in the ED , CTH and neck negative except hemtoma   Was also seen in the ED for fatiuge dx with UTI ,   Seen by psych and sleep medicine       Concerns  Stilll with pain on the side . No point tenderness. No pleuritic pain .       HPI    Review of Systems   All other systems reviewed and are negative.          Health Maintenance Due   Topic Date Due    High Dose Statin  Never done    Hemoglobin A1c (Diabetic Prevention Screening)  Never done         Objective:     /84 (BP Location: Right arm, Patient Position: Sitting, BP Method: Medium (Manual))   Pulse 94   Wt 78.1 kg (172 lb 2.9 oz)   LMP  (LMP Unknown)   SpO2 97%   BMI 30.50 kg/m²     Vitals 1/3/2023 12/23/2022 10/7/2022 10/4/2022 9/29/2022   Height - - - - -   Weight (lbs) 172.18 169 169 173.83 169   BMI (kg/m2) - - - - -                Physical Exam  Vitals and nursing note reviewed.   Constitutional:       General: She is not in acute distress.     Appearance: She is well-developed. She is not diaphoretic.   HENT:      Head: Normocephalic.      Nose: Nose normal.   Eyes:      General:         Right eye: No discharge.         Left eye: No discharge.      Conjunctiva/sclera: Conjunctivae normal.      Pupils: Pupils are equal, round, and reactive to light.   Cardiovascular:      Rate and Rhythm: Normal rate and regular rhythm.      Heart sounds: Normal heart sounds. No murmur heard.    No friction rub.  No gallop.   Pulmonary:      Effort: Pulmonary effort is normal. No respiratory distress.   Abdominal:      General: Bowel sounds are normal. There is no distension.      Palpations: Abdomen is soft.   Musculoskeletal:         General: No deformity. Normal range of motion.      Cervical back: Normal range of motion.   Skin:     General: Skin is warm.   Neurological:      Mental Status: She is alert and oriented to person, place, and time.      Cranial Nerves: No cranial nerve deficit.           Future Appointments   Date Time Provider Department Center   3/15/2023 11:00 AM Jhonatan Avila III, MD [facility]       Medication List with Changes/Refills   Current Medications    ALENDRONATE (FOSAMAX) 70 MG TABLET    TAKE 1 TABLET BY MOUTH EVERY 7 DAYS    AZITHROMYCIN (Z-PEYTON) 250 MG TABLET    Take 1 tablet (250 mg total) by mouth once daily. Take first 2 tablets together, then 1 every day until finished.    BUPROPION (WELLBUTRIN SR) 100 MG TBSR 12 HR TABLET    Take 2 tablets (200 mg total) by mouth every morning.    BUPROPION (WELLBUTRIN SR) 150 MG TBSR 12 HR TABLET    Take 1 tablet (150 mg total) by mouth every evening.    CALCIUM-VITAMIN D3 (OS-IGOR 500 + D3) 500 MG-5 MCG (200 UNIT) PER TABLET    Take 1 tablet by mouth 2 (two) times daily with meals.    CATHETER (FEMALE CATHETER) 14 FR [facility] [place]    Patient is to self catheterize four times a day indefinitley using female 12 fr in and out catheter for incomplete bladder emptying.   Dispense 120 month with 11 refills or 360 every 3 months with 3 refills depending on patient's preference    COENZYME Q10 10 MG CAPSULE    Take 10 mg by mouth once daily.    CYANOCOBALAMIN (VITAMIN B-12) 1000 MCG TABLET    Take 1,000 mcg by mouth once daily.    FLUPHENAZINE (PROLIXIN) 1 MG TABLET    Take 1 tablet (1 mg total) by mouth every evening.    FOLIC ACID (FOLVITE) 1 MG TABLET    Take 1 tablet (1,000 mcg total) by mouth once daily.    OXCARBAZEPINE (TRILEPTAL) 150 MG TAB    Take 1  tablet (150 mg total) by mouth 2 (two) times daily.         Disclaimer:  This note has been generated using voice-recognition software. There may be grammatical or spelling errors that have been missed during proof-reading

## 2023-01-09 ENCOUNTER — TELEPHONE (OUTPATIENT)
Dept: FAMILY MEDICINE | Facility: CLINIC | Age: 68
End: 2023-01-09
Payer: MEDICARE

## 2023-01-09 NOTE — TELEPHONE ENCOUNTER
----- Message from Alexa Toure sent at 1/9/2023  2:04 PM CST -----  Contact: [phone]  [email or web address]     Patient is calling for Medical Advice regarding:buring with urination, fever, chills    How long has patient had these symptoms: 1 day     Pharmacy name and phone#:   [facility] #[id number] - [place]  [address] [place]  [address]  [place] [address]  Phone: [phone] Fax: [phone]          Would like response via [employer]:  call    Comments:

## 2023-01-19 ENCOUNTER — TELEPHONE (OUTPATIENT)
Dept: FAMILY MEDICINE | Facility: CLINIC | Age: 68
End: 2023-01-19
Payer: MEDICARE

## 2023-01-19 NOTE — TELEPHONE ENCOUNTER
The  is seeing his dr at [facility], its the wife that's our pt who is concerned for her health because his positive

## 2023-01-19 NOTE — TELEPHONE ENCOUNTER
Pt phoned states  had a + tb test through a blood test  but he was + years ago, he is trying to get in with an inf disease dr, wife is concerned, what should she do ?

## 2023-01-19 NOTE — TELEPHONE ENCOUNTER
----- Message from Donna Nails sent at 1/19/2023 10:45 AM CST -----  Regarding: call back  Contact: [phone]  Who Called: PT     Patient is calling to get orders to have a TB test since her  had a TB test and he is positive. Please advice

## 2023-02-14 ENCOUNTER — PES CALL (OUTPATIENT)
Dept: ADMINISTRATIVE | Facility: CLINIC | Age: 68
End: 2023-02-14
Payer: MEDICARE

## 2023-02-20 ENCOUNTER — PATIENT MESSAGE (OUTPATIENT)
Dept: PSYCHIATRY | Facility: CLINIC | Age: 68
End: 2023-02-20
Payer: MEDICARE

## 2023-03-15 ENCOUNTER — OFFICE VISIT (OUTPATIENT)
Dept: INTERNAL MEDICINE | Facility: CLINIC | Age: 68
End: 2023-03-15
Payer: MEDICARE

## 2023-03-15 VITALS — BODY MASS INDEX: 31.95 KG/M2 | OXYGEN SATURATION: 98 % | HEIGHT: 63 IN | WEIGHT: 180.31 LBS | HEART RATE: 73 BPM

## 2023-03-15 DIAGNOSIS — M81.0 AGE-RELATED OSTEOPOROSIS WITHOUT CURRENT PATHOLOGICAL FRACTURE: Primary | ICD-10-CM

## 2023-03-15 DIAGNOSIS — E74.39 GLUCOSE INTOLERANCE: ICD-10-CM

## 2023-03-15 DIAGNOSIS — D69.6 THROMBOCYTOPENIA: ICD-10-CM

## 2023-03-15 PROCEDURE — 1126F PR PAIN SEVERITY QUANTIFIED, NO PAIN PRESENT: ICD-10-PCS | Mod: CPTII,S$GLB,, | Performed by: INTERNAL MEDICINE

## 2023-03-15 PROCEDURE — 1160F PR REVIEW ALL MEDS BY PRESCRIBER/CLIN PHARMACIST DOCUMENTED: ICD-10-PCS | Mod: CPTII,S$GLB,, | Performed by: INTERNAL MEDICINE

## 2023-03-15 PROCEDURE — 1101F PT FALLS ASSESS-DOCD LE1/YR: CPT | Mod: CPTII,S$GLB,, | Performed by: INTERNAL MEDICINE

## 2023-03-15 PROCEDURE — 1126F AMNT PAIN NOTED NONE PRSNT: CPT | Mod: CPTII,S$GLB,, | Performed by: INTERNAL MEDICINE

## 2023-03-15 PROCEDURE — 3008F PR BODY MASS INDEX (BMI) DOCUMENTED: ICD-10-PCS | Mod: CPTII,S$GLB,, | Performed by: INTERNAL MEDICINE

## 2023-03-15 PROCEDURE — 1160F RVW MEDS BY RX/DR IN RCRD: CPT | Mod: CPTII,S$GLB,, | Performed by: INTERNAL MEDICINE

## 2023-03-15 PROCEDURE — 99214 OFFICE O/P EST MOD 30 MIN: CPT | Mod: S$GLB,,, | Performed by: INTERNAL MEDICINE

## 2023-03-15 PROCEDURE — 1157F ADVNC CARE PLAN IN RCRD: CPT | Mod: CPTII,S$GLB,, | Performed by: INTERNAL MEDICINE

## 2023-03-15 PROCEDURE — 1101F PR PT FALLS ASSESS DOC 0-1 FALLS W/OUT INJ PAST YR: ICD-10-PCS | Mod: CPTII,S$GLB,, | Performed by: INTERNAL MEDICINE

## 2023-03-15 PROCEDURE — 1159F PR MEDICATION LIST DOCUMENTED IN MEDICAL RECORD: ICD-10-PCS | Mod: CPTII,S$GLB,, | Performed by: INTERNAL MEDICINE

## 2023-03-15 PROCEDURE — 3008F BODY MASS INDEX DOCD: CPT | Mod: CPTII,S$GLB,, | Performed by: INTERNAL MEDICINE

## 2023-03-15 PROCEDURE — 3288F FALL RISK ASSESSMENT DOCD: CPT | Mod: CPTII,S$GLB,, | Performed by: INTERNAL MEDICINE

## 2023-03-15 PROCEDURE — 99999 PR PBB SHADOW E&M-EST. PATIENT-LVL III: CPT | Mod: PBBFAC,,, | Performed by: INTERNAL MEDICINE

## 2023-03-15 PROCEDURE — 99214 PR OFFICE/OUTPT VISIT, EST, LEVL IV, 30-39 MIN: ICD-10-PCS | Mod: S$GLB,,, | Performed by: INTERNAL MEDICINE

## 2023-03-15 PROCEDURE — 1157F PR ADVANCE CARE PLAN OR EQUIV PRESENT IN MEDICAL RECORD: ICD-10-PCS | Mod: CPTII,S$GLB,, | Performed by: INTERNAL MEDICINE

## 2023-03-15 PROCEDURE — 1159F MED LIST DOCD IN RCRD: CPT | Mod: CPTII,S$GLB,, | Performed by: INTERNAL MEDICINE

## 2023-03-15 PROCEDURE — 3288F PR FALLS RISK ASSESSMENT DOCUMENTED: ICD-10-PCS | Mod: CPTII,S$GLB,, | Performed by: INTERNAL MEDICINE

## 2023-03-15 PROCEDURE — 99999 PR PBB SHADOW E&M-EST. PATIENT-LVL III: ICD-10-PCS | Mod: PBBFAC,,, | Performed by: INTERNAL MEDICINE

## 2023-03-15 RX ORDER — BNT162B2 ORIGINAL AND OMICRON BA.4/BA.5 .1125; .1125 MG/2.25ML; MG/2.25ML
INJECTION, SUSPENSION INTRAMUSCULAR
COMMUNITY
Start: 2022-10-06 | End: 2023-03-15

## 2023-03-15 RX ORDER — INFLUENZA A VIRUS A/VICTORIA/2570/2019 IVR-215 (H1N1) ANTIGEN (FORMALDEHYDE INACTIVATED), INFLUENZA A VIRUS A/DARWIN/9/2021 SAN-010 (H3N2) ANTIGEN (FORMALDEHYDE INACTIVATED), INFLUENZA B VIRUS B/PHUKET/3073/2013 ANTIGEN (FORMALDEHYDE INACTIVATED), AND INFLUENZA B VIRUS B/MICHIGAN/01/2021 ANTIGEN (FORMALDEHYDE INACTIVATED) 60; 60; 60; 60 UG/.7ML; UG/.7ML; UG/.7ML; UG/.7ML
INJECTION, SUSPENSION INTRAMUSCULAR
COMMUNITY
Start: 2022-10-06 | End: 2023-05-19 | Stop reason: ALTCHOICE

## 2023-03-15 NOTE — PROGRESS NOTES
Assessment:       1. Age-related osteoporosis without current pathological fracture    2. Glucose intolerance    3. Thrombocytopenia          Plan:         Diagnoses and all orders for this visit:    Age-related osteoporosis without current pathological fracture  Chronic  Controlled  Patient is at goal today   I have reviewed lifestyle modification to achieve/maintain goals  We will continue the current medication regimen as listed below  Patient will follow up in 6 months   -     DXA Bone Density Axial Skeleton 1 or more sites; Future  -     Basic Metabolic Panel; Standing  -     Hepatic Function Panel; Standing    Glucose intolerance  -     Basic Metabolic Panel; Standing  -     Hepatic Function Panel; Standing    Thrombocytopenia  -     CBC Without Differential; Standing          Subjective:       Patient ID: Silvana Quezada is a 67 y.o. female.    Chief Complaint: No chief complaint on file.        Interim Hx  None, last visit had syncope ekg wnl      Concerns today    None    Chronic problems     Patient Active Problem List   Diagnosis    MS (multiple sclerosis)    Neurogenic bladder    Urgency-frequency syndrome    History of breast cancer    Localized osteoporosis with current pathological fracture    Incomplete bladder emptying    Overactive bladder    Thoracic aorta atherosclerosis    Tortuous aorta    Bipolar I disorder, most recent episode depressed, moderate    Wrist fracture, closed, left, initial encounter    Age-related osteoporosis without current pathological fracture    Urge incontinence    Wrist pain, left    Macromastia    OAB (overactive bladder)    Other fatigue    Impaired functional mobility, balance, gait, and endurance    Positive FIT (fecal immunochemical test)    KEM (obstructive sleep apnea)         HPI    Review of Systems   All other systems reviewed and are negative.          Health Maintenance Due   Topic Date Due    High Dose Statin  Never done    DEXA Scan  03/03/2023  "        Objective:     Pulse 73   Ht 5' 3" (1.6 m)   Wt 81.8 kg (180 lb 5.4 oz)   LMP  (LMP Unknown)   SpO2 98%   BMI 31.95 kg/m²     Vitals 3/15/2023 1/3/2023 12/23/2022 10/7/2022 10/4/2022   Height 63 - - - -   Weight (lbs) 180.34 172.18 169 169 173.83   BMI (kg/m2) 32 - - - -            Physical Exam  Vitals and nursing note reviewed.   Constitutional:       General: She is not in acute distress.     Appearance: She is well-developed. She is not diaphoretic.   HENT:      Head: Normocephalic.      Nose: Nose normal.   Eyes:      General:         Right eye: No discharge.         Left eye: No discharge.      Conjunctiva/sclera: Conjunctivae normal.      Pupils: Pupils are equal, round, and reactive to light.   Cardiovascular:      Rate and Rhythm: Normal rate and regular rhythm.      Heart sounds: Normal heart sounds. No murmur heard.    No friction rub. No gallop.   Pulmonary:      Effort: Pulmonary effort is normal. No respiratory distress.   Abdominal:      General: Bowel sounds are normal. There is no distension.      Palpations: Abdomen is soft.   Musculoskeletal:         General: No deformity. Normal range of motion.      Cervical back: Normal range of motion.   Skin:     General: Skin is warm.   Neurological:      Mental Status: She is alert and oriented to person, place, and time.      Cranial Nerves: No cranial nerve deficit.           ASCVD  The 10-year ASCVD risk score (Beth SERRA, et al., 2019) is: 5%    Values used to calculate the score:      Age: 67 years      Sex: Female      Is Non- : No      Diabetic: No      Tobacco smoker: No      Systolic Blood Pressure: 106 mmHg      Is BP treated: No      HDL Cholesterol: 70 mg/dL      Total Cholesterol: 198 mg/dL    Basic Labs    BMP  Lab Results   Component Value Date     10/07/2022    K 4.3 10/07/2022     10/07/2022    CO2 24 10/07/2022    BUN 16 10/07/2022    CREATININE 0.9 10/07/2022    CALCIUM 9.1 10/07/2022    " ANIONGAP 11 10/07/2022    ESTGFRAFRICA >60.0 01/03/2022    EGFRNONAA >60.0 01/03/2022     Lab Results   Component Value Date    EGFRNORACEVR >60 10/07/2022       Lab Results   Component Value Date    ALT 17 09/15/2022    AST 16 09/15/2022    ALKPHOS 52 (L) 09/15/2022    BILITOT 0.3 09/15/2022         Lab Results   Component Value Date    TSH 2.669 12/14/2021     Lab Results   Component Value Date    WBC 5.38 10/07/2022    HGB 13.1 10/07/2022    HCT 39.8 10/07/2022    MCV 94 10/07/2022     (L) 10/07/2022           STD  No results found for: HIV1X2, VJL09VTMM  No results found for: RPR  Lab Results   Component Value Date    HEPCAB Negative 07/19/2019     No results found for: LABNGO, LABCHLA        Lipids  Lab Results   Component Value Date    CHOL 198 09/15/2022     Lab Results   Component Value Date    HDL 70 09/15/2022     Lab Results   Component Value Date    LDLCALC 111.6 09/15/2022     Lab Results   Component Value Date    TRIG 82 09/15/2022     Lab Results   Component Value Date    CHOLHDL 35.4 09/15/2022       DM  Lab Results   Component Value Date    HGBA1C 5.0 09/15/2022         Future Appointments   Date Time Provider Department Center   3/20/2023 10:40 AM Jayesh Ross MD [facility] URO [place]   4/28/2023  2:00 PM [facility] DEXA1 LIMIT 350 LBS [facility] BMD [facility]   9/13/2023 10:30 AM [facility] [place]   9/15/2023 10:40 AM Jhonatan Avila III, MD [facility]         Medication List with Changes/Refills   Current Medications    ALENDRONATE (FOSAMAX) 70 MG TABLET    TAKE 1 TABLET BY MOUTH EVERY 7 DAYS    BUPROPION (WELLBUTRIN SR) 100 MG TBSR 12 HR TABLET    Take 2 tablets (200 mg total) by mouth every morning.    BUPROPION (WELLBUTRIN SR) 150 MG TBSR 12 HR TABLET    Take 1 tablet (150 mg total) by mouth every evening.    CALCIUM-VITAMIN D3 (OS-IGOR 500 + D3) 500 MG-5 MCG (200 UNIT) PER TABLET    Take 1 tablet by mouth 2 (two) times daily with meals.    CATHETER (FEMALE CATHETER) 14 FR MISC     Patient is to self catheterize four times a day indefinitley using female 12 fr in and out catheter for incomplete bladder emptying.   Dispense 120 month with 11 refills or 360 every 3 months with 3 refills depending on patient's preference    COENZYME Q10 10 MG CAPSULE    Take 10 mg by mouth once daily.    CYANOCOBALAMIN (VITAMIN B-12) 1000 MCG TABLET    Take 1,000 mcg by mouth once daily.    FLUPHENAZINE (PROLIXIN) 1 MG TABLET    Take 1 tablet (1 mg total) by mouth every evening.    FLUZONE HIGHDOSE QUAD 22-23  MCG/0.7 ML SYRG        FOLIC ACID (FOLVITE) 1 MG TABLET    Take 1 tablet (1,000 mcg total) by mouth once daily.    OXCARBAZEPINE (TRILEPTAL) 150 MG TAB    Take 1 tablet (150 mg total) by mouth 2 (two) times daily.   Discontinued Medications    AZITHROMYCIN (Z-PEYTON) 250 MG TABLET    Take 1 tablet (250 mg total) by mouth once daily. Take first 2 tablets together, then 1 every day until finished.    PFIZER COVID BIVAL,12Y UP,,PF, 30 MCG/0.3 ML INJECTION             Disclaimer:  This note has been generated using voice-recognition software. There may be grammatical or spelling errors that have been missed during proof-reading

## 2023-03-20 ENCOUNTER — OFFICE VISIT (OUTPATIENT)
Dept: UROLOGY | Facility: CLINIC | Age: 68
End: 2023-03-20
Payer: MEDICARE

## 2023-03-20 VITALS
HEIGHT: 63 IN | WEIGHT: 175.94 LBS | HEART RATE: 80 BPM | BODY MASS INDEX: 31.17 KG/M2 | SYSTOLIC BLOOD PRESSURE: 119 MMHG | DIASTOLIC BLOOD PRESSURE: 81 MMHG

## 2023-03-20 DIAGNOSIS — R33.9 INCOMPLETE BLADDER EMPTYING: ICD-10-CM

## 2023-03-20 DIAGNOSIS — N39.41 URGE INCONTINENCE: Primary | ICD-10-CM

## 2023-03-20 DIAGNOSIS — N32.81 URGENCY-FREQUENCY SYNDROME: ICD-10-CM

## 2023-03-20 DIAGNOSIS — G35 MS (MULTIPLE SCLEROSIS): ICD-10-CM

## 2023-03-20 DIAGNOSIS — N31.9 NEUROGENIC BLADDER: ICD-10-CM

## 2023-03-20 PROCEDURE — 1126F AMNT PAIN NOTED NONE PRSNT: CPT | Mod: CPTII,S$GLB,, | Performed by: UROLOGY

## 2023-03-20 PROCEDURE — 99999 PR PBB SHADOW E&M-EST. PATIENT-LVL III: CPT | Mod: PBBFAC,,, | Performed by: UROLOGY

## 2023-03-20 PROCEDURE — 1101F PT FALLS ASSESS-DOCD LE1/YR: CPT | Mod: CPTII,S$GLB,, | Performed by: UROLOGY

## 2023-03-20 PROCEDURE — 1157F ADVNC CARE PLAN IN RCRD: CPT | Mod: CPTII,S$GLB,, | Performed by: UROLOGY

## 2023-03-20 PROCEDURE — 3079F DIAST BP 80-89 MM HG: CPT | Mod: CPTII,S$GLB,, | Performed by: UROLOGY

## 2023-03-20 PROCEDURE — 99999 PR PBB SHADOW E&M-EST. PATIENT-LVL III: ICD-10-PCS | Mod: PBBFAC,,, | Performed by: UROLOGY

## 2023-03-20 PROCEDURE — 3079F PR MOST RECENT DIASTOLIC BLOOD PRESSURE 80-89 MM HG: ICD-10-PCS | Mod: CPTII,S$GLB,, | Performed by: UROLOGY

## 2023-03-20 PROCEDURE — 3288F PR FALLS RISK ASSESSMENT DOCUMENTED: ICD-10-PCS | Mod: CPTII,S$GLB,, | Performed by: UROLOGY

## 2023-03-20 PROCEDURE — 3074F PR MOST RECENT SYSTOLIC BLOOD PRESSURE < 130 MM HG: ICD-10-PCS | Mod: CPTII,S$GLB,, | Performed by: UROLOGY

## 2023-03-20 PROCEDURE — 1101F PR PT FALLS ASSESS DOC 0-1 FALLS W/OUT INJ PAST YR: ICD-10-PCS | Mod: CPTII,S$GLB,, | Performed by: UROLOGY

## 2023-03-20 PROCEDURE — 3008F BODY MASS INDEX DOCD: CPT | Mod: CPTII,S$GLB,, | Performed by: UROLOGY

## 2023-03-20 PROCEDURE — 99214 OFFICE O/P EST MOD 30 MIN: CPT | Mod: S$GLB,,, | Performed by: UROLOGY

## 2023-03-20 PROCEDURE — 1159F PR MEDICATION LIST DOCUMENTED IN MEDICAL RECORD: ICD-10-PCS | Mod: CPTII,S$GLB,, | Performed by: UROLOGY

## 2023-03-20 PROCEDURE — 1126F PR PAIN SEVERITY QUANTIFIED, NO PAIN PRESENT: ICD-10-PCS | Mod: CPTII,S$GLB,, | Performed by: UROLOGY

## 2023-03-20 PROCEDURE — 1157F PR ADVANCE CARE PLAN OR EQUIV PRESENT IN MEDICAL RECORD: ICD-10-PCS | Mod: CPTII,S$GLB,, | Performed by: UROLOGY

## 2023-03-20 PROCEDURE — 1159F MED LIST DOCD IN RCRD: CPT | Mod: CPTII,S$GLB,, | Performed by: UROLOGY

## 2023-03-20 PROCEDURE — 99214 PR OFFICE/OUTPT VISIT, EST, LEVL IV, 30-39 MIN: ICD-10-PCS | Mod: S$GLB,,, | Performed by: UROLOGY

## 2023-03-20 PROCEDURE — 3008F PR BODY MASS INDEX (BMI) DOCUMENTED: ICD-10-PCS | Mod: CPTII,S$GLB,, | Performed by: UROLOGY

## 2023-03-20 PROCEDURE — 3074F SYST BP LT 130 MM HG: CPT | Mod: CPTII,S$GLB,, | Performed by: UROLOGY

## 2023-03-20 PROCEDURE — 3288F FALL RISK ASSESSMENT DOCD: CPT | Mod: CPTII,S$GLB,, | Performed by: UROLOGY

## 2023-03-20 NOTE — PROGRESS NOTES
CHIEF COMPLAINT:    Mrs. Quezada is a 67 y.o. female presenting for a follow up  PRESENTING ILLNESS:    Silvana Quezada is a 67 y.o. female  PMH of MS, neurogenic bladder.    She voids on her own and performs CIC at home 4 times per day using 12fr. She stays dry between catheterizations. Botox injections keep frequency, urgency and nocturia under control.   She denies incontinence during the day.  She endorses urinary incontinence at night.   She wears a diaper at night and it usually is wet when she wakes up.  She denies frequency, urgency, dysuria.  She does not have any urinary complaints.        Last botox injection 8/31/22  Procedure(s) Performed:   1.  Cystoscopy with bladder botox injection  Findings:   1.Cystoscopy significant for significant trabeculations and multiple cellules.  2. 200 U Botox injected in 20 mL of injectable saline throughout bladder detrusor. Good wheals raised.       REVIEW OF SYSTEMS:  See HPI    PATIENT HISTORY:    Past Medical History:   Diagnosis Date    Bipolar 1 disorder     Breast cancer 2001    right lumpectomy-radiation & chemo taxol    Breast cyst     Closed fracture of proximal end of right humerus 3/9/2015    Depression     History of right shoulder fracture     MS (multiple sclerosis)     presented as dizzines, dx vision,     Osteoporosis, unspecified     Pneumonia 3/27/2020    Wrist fracture left    2019       Past Surgical History:   Procedure Laterality Date    BLADDER FULGURATION  8/31/2022    Procedure: FULGURATION, BLADDER;  Surgeon: Jayesh Ross MD;  Location: [facility] OR [address] [place] [address];  Service: Urology;;    BRAIN SURGERY  1991    BREAST BIOPSY Left 2009    core    BREAST BIOPSY Right     BREAST LUMPECTOMY Right 2001    COLONOSCOPY N/A 1/7/2022    Procedure: COLONOSCOPY;  Surgeon: Shiva Walsh MD;  Location: [facility] ENDO;  Service: Endoscopy;  Laterality: N/A;    CYSTOSCOPY N/A 7/11/2018    Procedure: CYSTOSCOPY;  Surgeon: Jayesh Ross MD;  Location: [facility] OR [address] [place] [address];   Service: Urology;  Laterality: N/A;  30 min    CYSTOSCOPY N/A 4/10/2019    Procedure: CYSTOSCOPY;  Surgeon: Jayesh Ross MD;  Location: [address];  Service: Urology;  Laterality: N/A;  30 MIN    CYSTOSCOPY  10/21/2021    Procedure: CYSTOSCOPY;  Surgeon: Jayesh Ross MD;  Location: [address];  Service: Urology;;    FOOT SURGERY  october 2013    HYSTERECTOMY      partial    INJECTION OF BOTULINUM TOXIN TYPE A N/A 7/11/2018    Procedure: INJECTION, BOTULINUM TOXIN, TYPE A 200 UNITS;  Surgeon: Jayesh Ross MD;  Location: [address];  Service: Urology;  Laterality: N/A;    INJECTION OF BOTULINUM TOXIN TYPE A N/A 4/10/2019    Procedure: INJECTION, BOTULINUM TOXIN, TYPE A 200 UNITS;  Surgeon: Jayesh Ross MD;  Location: [address];  Service: Urology;  Laterality: N/A;    INJECTION OF BOTULINUM TOXIN TYPE A  10/21/2021    Procedure: INJECTION, BOTULINUM TOXIN, 200units;  Surgeon: Jayesh Ross MD;  Location: [address];  Service: Urology;;    OPEN REDUCTION AND INTERNAL FIXATION (ORIF) OF INJURY OF WRIST Left 12/27/2019    Procedure: ORIF, WRIST;  Surgeon: Albert cShroeder Jr., MD;  Location: [facility];  Service: Orthopedics;  Laterality: Left;  ACUMED/ MINI C ARM Palomo Page Radius notified/ Rk confirmed here in [facility] B with Lacie 12/23/19 KB [id number]    TONSILLECTOMY      TOTAL REDUCTION MAMMOPLASTY Left 11/5/2020    Procedure: MAMMOPLASTY, REDUCTION, LEFT;  Surgeon: Kikr Tompkins MD;  Location: [address];  Service: Plastics;  Laterality: Left;  Left breast tissue weighed- 49 grams.       Family History   Problem Relation Age of Onset    Heart disease Mother     Cancer Mother         breast cancer    Skin cancer Mother     Breast cancer Mother     Stroke Mother     Emphysema Father     Dementia Brother     Diabetes Brother     No Known Problems Brother     Anesthesia problems Daughter     Autism Daughter     Autism Son     Breast cancer Maternal Aunt        Social  History     Socioeconomic History    Marital status:    Occupational History    Occupation: [employer]   Tobacco Use    Smoking status: Never    Smokeless tobacco: Never   Substance and Sexual Activity    Alcohol use: Not Currently    Drug use: Never    Sexual activity: Yes     Partners: Male   Social History Narrative    Original form , [employer] majored in [employer]     [employer] teach     Lives with      40, 25 children 2 daughter      Social Determinants of Health     Financial Resource Strain: Low Risk     Difficulty of Paying Living Expenses: Not very hard   Food Insecurity: No Food Insecurity    Worried About Running Out of Food in the Last Year: Never true    Ran Out of Food in the Last Year: Never true   Transportation Needs: No Transportation Needs    Lack of Transportation (Medical): No    Lack of Transportation (Non-Medical): No   Physical Activity: Insufficiently Active    Days of Exercise per Week: 3 days    Minutes of Exercise per Session: 20 min   Stress: No Stress Concern Present    Feeling of Stress : Not at all   Social Connections: Socially Integrated    Frequency of Communication with Friends and Family: More than three times a week    Frequency of Social Gatherings with Friends and Family: More than three times a week    Attends Sabianism Services: More than 4 times per year    Active Member of Clubs or Organizations: Yes    Attends Club or Organization Meetings: More than 4 times per year    Marital Status:    Housing Stability: Low Risk     Unable to Pay for Housing in the Last Year: No    Number of Places Lived in the Last Year: 1    Unstable Housing in the Last Year: No       Allergies:  Adhesive, Keflex [cephalexin], and Cephalosporins    Medications:    Current Outpatient Medications:     alendronate (FOSAMAX) 70 MG tablet, TAKE 1 TABLET BY MOUTH EVERY 7 DAYS, Disp: 12 tablet, Rfl: 0    buPROPion (WELLBUTRIN SR) 100 MG TBSR 12 hr tablet, Take 2 tablets (200 mg total) by  mouth every morning., Disp: 180 tablet, Rfl: 3    buPROPion (WELLBUTRIN SR) 150 MG TBSR 12 hr tablet, Take 1 tablet (150 mg total) by mouth every evening., Disp: 90 tablet, Rfl: 3    calcium-vitamin D3 (OS-IGOR 500 + D3) 500 mg-5 mcg (200 unit) per tablet, Take 1 tablet by mouth 2 (two) times daily with meals., Disp: , Rfl:     catheter (FEMALE CATHETER) 14 Fr Misc, Patient is to self catheterize four times a day indefinitley using female 12 fr in and out catheter for incomplete bladder emptying.  Dispense 120 month with 11 refills or 360 every 3 months with 3 refills depending on patient's preference, Disp: 120 each, Rfl: 11    coenzyme Q10 10 mg capsule, Take 10 mg by mouth once daily., Disp: , Rfl:     cyanocobalamin (VITAMIN B-12) 1000 MCG tablet, Take 1,000 mcg by mouth once daily., Disp: , Rfl:     fluphenazine (PROLIXIN) 1 MG tablet, Take 1 tablet (1 mg total) by mouth every evening., Disp: 90 tablet, Rfl: 3    FLUZONE HIGHDOSE QUAD 22-23  mcg/0.7 mL Syrg, , Disp: , Rfl:     folic acid (FOLVITE) 1 MG tablet, Take 1 tablet (1,000 mcg total) by mouth once daily., Disp: 90 tablet, Rfl: 3    OXcarbazepine (TRILEPTAL) 150 MG Tab, Take 1 tablet (150 mg total) by mouth 2 (two) times daily., Disp: 180 tablet, Rfl: 3    PHYSICAL EXAMINATION:    Constitutional: She appears well-developed and well-nourished.  She is in no apparent distress.    Eyes: No scleral icterus noted bilaterally. No discharge bilaterally.    Nose: No nasal congestion    Cardiovascular: Normal rate.      Pulmonary/Chest: Effort normal. No respiratory distress.     Abdominal:  She exhibits no distension.      Neurological: She is alert and oriented to person, place, and time.     Skin: Skin is warm and dry.     Psych: Cooperative with normal affect.    Genitourinary:  Normal external female genitalia  Urethral meatus is normal  Consent verbally obtained.  Betadine prep was applied to the urethral meatus. An in and out cath was performed after  voiding.  The PVR was 60 ml.    Physical Exam      LABS:    U/a: 1.010, pH 6, + nitrite, otherwsie negative.       IMPRESSION:  Urge incontinence    Urgency-frequency syndrome    Incomplete bladder emptying    MS (multiple sclerosis)    Neurogenic bladder        PLAN:    S/p cysto with botox injection 200 units on 8/31/22.  Has done well.  Will see her in 6 months for a follow up.  May consider Sacral neuromodulation.  [facility] brochure given.  Pt reports that she underwent InterStim trial with Dr. White many years ago and it did not work well.  I explained potential benefits and advantages of SNM over botox injection.  If she becomes symptomatic again, will consider [facility] therapy.     I spent 25 minutes (including face to face & non face to face time) in regards to patient's care.      Follow up in about 6 months (around 9/20/2023).

## 2023-04-11 ENCOUNTER — PES CALL (OUTPATIENT)
Dept: ADMINISTRATIVE | Facility: CLINIC | Age: 68
End: 2023-04-11
Payer: MEDICARE

## 2023-05-18 ENCOUNTER — PES CALL (OUTPATIENT)
Dept: ADMINISTRATIVE | Facility: CLINIC | Age: 68
End: 2023-05-18
Payer: MEDICARE

## 2023-05-19 ENCOUNTER — OFFICE VISIT (OUTPATIENT)
Dept: FAMILY MEDICINE | Facility: CLINIC | Age: 68
End: 2023-05-19
Payer: MEDICARE

## 2023-05-19 ENCOUNTER — HOSPITAL ENCOUNTER (OUTPATIENT)
Dept: RADIOLOGY | Facility: HOSPITAL | Age: 68
Discharge: HOME | End: 2023-05-19
Attending: INTERNAL MEDICINE
Payer: MEDICARE

## 2023-05-19 VITALS
DIASTOLIC BLOOD PRESSURE: 76 MMHG | HEART RATE: 88 BPM | SYSTOLIC BLOOD PRESSURE: 114 MMHG | WEIGHT: 181.88 LBS | BODY MASS INDEX: 32.22 KG/M2 | OXYGEN SATURATION: 97 %

## 2023-05-19 DIAGNOSIS — Z00.00 ENCOUNTER FOR PREVENTIVE HEALTH EXAMINATION: Primary | ICD-10-CM

## 2023-05-19 DIAGNOSIS — I70.0 THORACIC AORTA ATHEROSCLEROSIS: ICD-10-CM

## 2023-05-19 DIAGNOSIS — D69.6 THROMBOCYTOPENIA: ICD-10-CM

## 2023-05-19 DIAGNOSIS — F31.32 BIPOLAR I DISORDER, MOST RECENT EPISODE DEPRESSED, MODERATE: ICD-10-CM

## 2023-05-19 DIAGNOSIS — N32.81 URGENCY-FREQUENCY SYNDROME: ICD-10-CM

## 2023-05-19 DIAGNOSIS — M81.0 AGE-RELATED OSTEOPOROSIS WITHOUT CURRENT PATHOLOGICAL FRACTURE: ICD-10-CM

## 2023-05-19 DIAGNOSIS — G47.33 OSA (OBSTRUCTIVE SLEEP APNEA): ICD-10-CM

## 2023-05-19 DIAGNOSIS — G35 MS (MULTIPLE SCLEROSIS): ICD-10-CM

## 2023-05-19 DIAGNOSIS — Z85.3 HISTORY OF BREAST CANCER: ICD-10-CM

## 2023-05-19 PROCEDURE — 99999 PR PBB SHADOW E&M-EST. PATIENT-LVL IV: CPT | Mod: PBBFAC,,,

## 2023-05-19 PROCEDURE — 99214 OFFICE O/P EST MOD 30 MIN: CPT | Mod: PO

## 2023-05-19 PROCEDURE — G0439 PR MEDICARE ANNUAL WELLNESS SUBSEQUENT VISIT: ICD-10-PCS | Mod: ,,,

## 2023-05-19 PROCEDURE — 1170F PR FUNCTIONAL STATUS ASSESSED: ICD-10-PCS | Mod: CPTII,,,

## 2023-05-19 PROCEDURE — 1159F MED LIST DOCD IN RCRD: CPT | Mod: CPTII,,,

## 2023-05-19 PROCEDURE — 3288F FALL RISK ASSESSMENT DOCD: CPT | Mod: CPTII,,,

## 2023-05-19 PROCEDURE — 99999 PR PBB SHADOW E&M-EST. PATIENT-LVL IV: ICD-10-PCS | Mod: PBBFAC,,,

## 2023-05-19 PROCEDURE — 77080 DXA BONE DENSITY AXIAL SKELETON 1 OR MORE SITES: ICD-10-PCS | Mod: 26,,, | Performed by: STUDENT IN AN ORGANIZED HEALTH CARE EDUCATION/TRAINING PROGRAM

## 2023-05-19 PROCEDURE — 1101F PR PT FALLS ASSESS DOC 0-1 FALLS W/OUT INJ PAST YR: ICD-10-PCS | Mod: CPTII,,,

## 2023-05-19 PROCEDURE — 1157F ADVNC CARE PLAN IN RCRD: CPT | Mod: CPTII,,,

## 2023-05-19 PROCEDURE — 3078F DIAST BP <80 MM HG: CPT | Mod: CPTII,,,

## 2023-05-19 PROCEDURE — G0439 PPPS, SUBSEQ VISIT: HCPCS | Mod: ,,,

## 2023-05-19 PROCEDURE — 77080 DXA BONE DENSITY AXIAL: CPT | Mod: 26,,, | Performed by: STUDENT IN AN ORGANIZED HEALTH CARE EDUCATION/TRAINING PROGRAM

## 2023-05-19 PROCEDURE — 1126F AMNT PAIN NOTED NONE PRSNT: CPT | Mod: CPTII,,,

## 2023-05-19 PROCEDURE — 1101F PT FALLS ASSESS-DOCD LE1/YR: CPT | Mod: CPTII,,,

## 2023-05-19 PROCEDURE — 3008F PR BODY MASS INDEX (BMI) DOCUMENTED: ICD-10-PCS | Mod: CPTII,,,

## 2023-05-19 PROCEDURE — 1160F RVW MEDS BY RX/DR IN RCRD: CPT | Mod: CPTII,,,

## 2023-05-19 PROCEDURE — 1159F PR MEDICATION LIST DOCUMENTED IN MEDICAL RECORD: ICD-10-PCS | Mod: CPTII,,,

## 2023-05-19 PROCEDURE — 1170F FXNL STATUS ASSESSED: CPT | Mod: CPTII,,,

## 2023-05-19 PROCEDURE — 3288F PR FALLS RISK ASSESSMENT DOCUMENTED: ICD-10-PCS | Mod: CPTII,,,

## 2023-05-19 PROCEDURE — 3074F PR MOST RECENT SYSTOLIC BLOOD PRESSURE < 130 MM HG: ICD-10-PCS | Mod: CPTII,,,

## 2023-05-19 PROCEDURE — 1157F PR ADVANCE CARE PLAN OR EQUIV PRESENT IN MEDICAL RECORD: ICD-10-PCS | Mod: CPTII,,,

## 2023-05-19 PROCEDURE — 1160F PR REVIEW ALL MEDS BY PRESCRIBER/CLIN PHARMACIST DOCUMENTED: ICD-10-PCS | Mod: CPTII,,,

## 2023-05-19 PROCEDURE — 77080 DXA BONE DENSITY AXIAL: CPT | Mod: TC

## 2023-05-19 PROCEDURE — 3008F BODY MASS INDEX DOCD: CPT | Mod: CPTII,,,

## 2023-05-19 PROCEDURE — 3074F SYST BP LT 130 MM HG: CPT | Mod: CPTII,,,

## 2023-05-19 PROCEDURE — 1126F PR PAIN SEVERITY QUANTIFIED, NO PAIN PRESENT: ICD-10-PCS | Mod: CPTII,,,

## 2023-05-19 PROCEDURE — 3078F PR MOST RECENT DIASTOLIC BLOOD PRESSURE < 80 MM HG: ICD-10-PCS | Mod: CPTII,,,

## 2023-05-19 RX ORDER — MULTIVITAMIN
1 TABLET ORAL DAILY
COMMUNITY

## 2023-05-19 NOTE — PROGRESS NOTES
Silvana Quezada presented for a  Medicare AWV and comprehensive Health Risk Assessment today. The following components were reviewed and updated:    Medical history  Family History  Social history  Allergies and Current Medications  Health Risk Assessment  Health Maintenance  Care Team         ** See Completed Assessments for Annual Wellness Visit within the encounter summary.**         The following assessments were completed:  Living Situation  CAGE  Depression Screening  Timed Get Up and Go  Whisper Test  Cognitive Function Screening     Nutrition Screening  ADL Screening  PAQ Screening      Review for Opioid Screening: Pt does not have Rx for Opioids      Review for Substance Use Disorders: Patient does not use substance        Current Outpatient Medications:     alendronate (FOSAMAX) 70 MG tablet, TAKE 1 TABLET BY MOUTH ONCE A WEEK, Disp: 12 tablet, Rfl: 0    buPROPion (WELLBUTRIN SR) 100 MG TBSR 12 hr tablet, Take 2 tablets (200 mg total) by mouth every morning., Disp: 180 tablet, Rfl: 3    buPROPion (WELLBUTRIN SR) 150 MG TBSR 12 hr tablet, Take 1 tablet (150 mg total) by mouth every evening., Disp: 90 tablet, Rfl: 3    calcium-vitamin D3 (OS-IGOR 500 + D3) 500 mg-5 mcg (200 unit) per tablet, Take 1 tablet by mouth 2 (two) times daily with meals., Disp: , Rfl:     catheter (FEMALE CATHETER) 14 Fr Misc, Patient is to self catheterize four times a day indefinitley using female 12 fr in and out catheter for incomplete bladder emptying.  Dispense 120 month with 11 refills or 360 every 3 months with 3 refills depending on patient's preference, Disp: 120 each, Rfl: 11    coenzyme Q10 10 mg capsule, Take 10 mg by mouth once daily., Disp: , Rfl:     cyanocobalamin (VITAMIN B-12) 1000 MCG tablet, Take 1,000 mcg by mouth once daily., Disp: , Rfl:     fluphenazine (PROLIXIN) 1 MG tablet, Take 1 tablet (1 mg total) by mouth every evening., Disp: 90 tablet, Rfl: 3    folic acid (FOLVITE) 1 MG tablet, Take 1 tablet (1,000  mcg total) by mouth once daily., Disp: 90 tablet, Rfl: 3    OXcarbazepine (TRILEPTAL) 150 MG Tab, Take 1 tablet (150 mg total) by mouth 2 (two) times daily., Disp: 180 tablet, Rfl: 3    multivitamin (ONE DAILY MULTIVITAMIN) per tablet, Take 1 tablet by mouth once daily., Disp: , Rfl:        Vitals:    05/19/23 1104   BP: 114/76   Pulse: 88   SpO2: 97%   Weight: 82.5 kg (181 lb 14.1 oz)   PainSc: 0-No pain      Physical Exam  Vitals reviewed.   Constitutional:       General: She is not in acute distress.     Appearance: Normal appearance. She is well-developed and well-groomed. She is not ill-appearing or toxic-appearing.   Eyes:      Pupils: Pupils are equal, round, and reactive to light.   Neck:      Vascular: No carotid bruit.   Cardiovascular:      Rate and Rhythm: Normal rate.      Pulses: Normal pulses.      Heart sounds: No murmur heard.  Pulmonary:      Effort: Pulmonary effort is normal. No respiratory distress.   Abdominal:      General: Bowel sounds are normal.   Musculoskeletal:      Cervical back: Normal range of motion.      Right lower leg: No edema.      Left lower leg: No edema.   Skin:     General: Skin is warm and dry.   Neurological:      General: No focal deficit present.      Mental Status: She is alert and oriented to person, place, and time.   Psychiatric:         Attention and Perception: Attention normal.         Mood and Affect: Mood normal.         Speech: Speech normal.         Behavior: Behavior is cooperative.           Diagnoses and health risks identified today and associated recommendations/orders:    1. Encounter for preventive health examination      2. MS (multiple sclerosis)  Chronic; stable. Followed by PCP.     3. Bipolar I disorder, most recent episode depressed, moderate  Chronic; stable on medications. Followed by Psychiatry.     4. Thoracic aorta atherosclerosis  Chronic; stable on diet. Followed by PCP.     5. Thrombocytopenia  Chronic; stable. Followed by PCP.     6.  Urgency-frequency syndrome  Chronic; stable- self cath 4xday, denies s/s of infections. Followed by Urology.    7. KEM (obstructive sleep apnea)  Chronic; stable, reports compliance with CPAP. Followed by PCP.     8. History of breast cancer  Chronic; stable, UTD with mammogram. Followed by PCP.       Provided Silvana with a 5-10 year written screening schedule and personal prevention plan. Recommendations were developed using the USPSTF age appropriate recommendations. Education, counseling, and referrals were provided as needed. After Visit Summary printed and given to patient which includes a list of additional screenings\tests needed.    Follow up in about 1 year (around 5/19/2024).    Angelica Jeter NP    Advance Care Planning   I offered to discuss advanced care planning, including how to pick a person who would make decisions for you if you were unable to make them for yourself, called a health care power of , and what kind of decisions you might make such as use of life sustaining treatments such as ventilators and tube feeding when faced with a life limiting illness recorded on a living will that they will need to know. (How you want to be cared for as you near the end of your natural life)     X Patient is interested in learning more about how to make advanced directives.  I provided them paperwork and offered to discuss this with them.

## 2023-05-19 NOTE — PATIENT INSTRUCTIONS
Counseling and Referral of Other Preventative  (Italic type indicates deductible and co-insurance are waived)    Patient Name: Silvana Quezada  Today's Date: 5/19/2023    Health Maintenance       Date Due Completion Date    TETANUS VACCINE Never done ---    High Dose Statin Never done ---    Shingles Vaccine (1 of 2) Never done ---    DEXA Scan 03/03/2023 3/3/2020    Mammogram 08/15/2023 8/15/2022    Colorectal Cancer Screening 01/07/2025 1/7/2022    Hemoglobin A1c (Diabetic Prevention Screening) 09/15/2025 9/15/2022    Lipid Panel 09/15/2027 9/15/2022        No orders of the defined types were placed in this encounter.    The following information is provided to all patients.  This information is to help you find resources for any of the problems found today that may be affecting your health:                Living healthy guide: www.[facility].[place].gov      Understanding Diabetes: www.diabetes.org      Eating healthy: www.cdc.gov/healthyweight      [facility] home safety checklist: www.cdc.gov/steadi/patient.html      Agency on Aging: www.goea.[place].[facility]      Alcoholics anonymous (AA): www.aa.org      Physical Activity: www.kj.nih.gov/wx4kivn      Tobacco use: www.quitwithusla.org

## 2023-07-21 ENCOUNTER — PATIENT MESSAGE (OUTPATIENT)
Dept: PSYCHIATRY | Facility: CLINIC | Age: 68
End: 2023-07-21
Payer: MEDICARE

## 2023-07-24 ENCOUNTER — TELEPHONE (OUTPATIENT)
Dept: UROLOGY | Facility: CLINIC | Age: 68
End: 2023-07-24
Payer: MEDICARE

## 2023-07-24 DIAGNOSIS — F31.9 BIPOLAR AFFECTIVE DISORDER, REMISSION STATUS UNSPECIFIED: ICD-10-CM

## 2023-07-24 DIAGNOSIS — N32.81 OVERACTIVE BLADDER: Primary | ICD-10-CM

## 2023-07-24 RX ORDER — FOLIC ACID 1 MG/1
1000 TABLET ORAL DAILY
Qty: 90 TABLET | Refills: 3 | Status: SHIPPED | OUTPATIENT
Start: 2023-07-24

## 2023-07-24 NOTE — PROGRESS NOTES
Silvana Quezada is a 67 y.o. female with OAB, incontinence and she self catheterizes  Botox injection helped her to control urinary incontinence inbetween catheterization.  She would like to schedule another botox injection.    Her last botox injection is as below:  Date: 08/31/2022  Pre-Op Diagnosis: Overactive bladder  Post-Op Diagnosis: same   Procedure(s) Performed:   1.  Cystoscopy with bladder botox injection 200 units  Findings: medium trabeculations found on the posterior and lateral walls, no concerning findings for malignancy    ASSESSMENT and PLANS;  Overactive bladder  -     Urine culture; Future; Expected date: 07/24/2023       Home collect urine culture 10 days before her surgery  Please schedule her for cysto botox injection 200 units in Cysto, 30 mis, under MAC.

## 2023-07-24 NOTE — TELEPHONE ENCOUNTER
Called and spoke to patient to offer surgery date of 8/2/23, patient accepted. Confirmed with the patient that we will need a urine culture done by tomorrow for surgery on 8/2/23 patient agreed to bring urine culture in tomorrow for 2 pm. Informed the patient I will call her the day before surgery with arrival time and pre-op instructions. Patient verbalized understanding.

## 2023-07-24 NOTE — TELEPHONE ENCOUNTER
Patient called in stating she would like to be scheduled for a botox injection with Dr. Ross. I informed the patient I will send a message to Dr. Ross to get a surgery request and then I will call her to get her scheduled. Patient verbalized understanding.

## 2023-07-24 NOTE — TELEPHONE ENCOUNTER
Overactive bladder  -     Case Request Operating Room: CYSTOSCOPY,WITH BOTULINUM TOXIN INJECTION

## 2023-07-25 ENCOUNTER — LAB VISIT (OUTPATIENT)
Dept: LAB | Facility: HOSPITAL | Age: 68
End: 2023-07-25
Attending: UROLOGY
Payer: MEDICARE

## 2023-07-25 DIAGNOSIS — N32.81 OVERACTIVE BLADDER: ICD-10-CM

## 2023-07-25 PROCEDURE — 87186 SC STD MICRODIL/AGAR DIL: CPT | Performed by: UROLOGY

## 2023-07-25 PROCEDURE — 87088 URINE BACTERIA CULTURE: CPT | Performed by: UROLOGY

## 2023-07-25 PROCEDURE — 87077 CULTURE AEROBIC IDENTIFY: CPT | Performed by: UROLOGY

## 2023-07-25 PROCEDURE — 87086 URINE CULTURE/COLONY COUNT: CPT | Performed by: UROLOGY

## 2023-07-27 ENCOUNTER — TELEPHONE (OUTPATIENT)
Dept: UROLOGY | Facility: CLINIC | Age: 68
End: 2023-07-27
Payer: MEDICARE

## 2023-07-27 DIAGNOSIS — N30.90 CYSTITIS: Primary | ICD-10-CM

## 2023-07-27 LAB — BACTERIA UR CULT: ABNORMAL

## 2023-07-27 RX ORDER — SULFAMETHOXAZOLE AND TRIMETHOPRIM 800; 160 MG/1; MG/1
1 TABLET ORAL 2 TIMES DAILY
Qty: 14 TABLET | Refills: 0 | Status: SHIPPED | OUTPATIENT
Start: 2023-07-27 | End: 2023-08-03

## 2023-07-27 NOTE — TELEPHONE ENCOUNTER
Called the patient to inform per Dr. Ross she is start the Bactrim medication on Sunday before surgery scheduled for 8/2/23. Patient verbalized understanding.

## 2023-07-27 NOTE — TELEPHONE ENCOUNTER
Cystitis  -     sulfamethoxazole-trimethoprim 800-160mg (BACTRIM DS) 800-160 mg Tab; Take 1 tablet by mouth 2 (two) times daily. for 7 days  Dispense: 14 tablet; Refill: 0     Please ask her to start Bactrim DS BID on Sunday before her surgery to treat her UTI.

## 2023-08-01 ENCOUNTER — TELEPHONE (OUTPATIENT)
Dept: UROLOGY | Facility: CLINIC | Age: 68
End: 2023-08-01
Payer: MEDICARE

## 2023-08-01 NOTE — TELEPHONE ENCOUNTER
Called pt to confirm arrival time of 1:45 pm for procedure on 8/2/23. Gave pt NPO instructions and gave pt opportunity to ask questions. Pt verbalized understanding.

## 2023-08-02 ENCOUNTER — HOSPITAL ENCOUNTER (OUTPATIENT)
Facility: HOSPITAL | Age: 68
Discharge: HOME | End: 2023-08-02
Attending: UROLOGY | Admitting: UROLOGY
Payer: MEDICARE

## 2023-08-02 ENCOUNTER — ANESTHESIA EVENT (OUTPATIENT)
Dept: SURGERY | Facility: HOSPITAL | Age: 68
End: 2023-08-02
Payer: MEDICARE

## 2023-08-02 ENCOUNTER — ANESTHESIA (OUTPATIENT)
Dept: SURGERY | Facility: HOSPITAL | Age: 68
End: 2023-08-02
Payer: MEDICARE

## 2023-08-02 VITALS
WEIGHT: 181 LBS | TEMPERATURE: 98 F | HEART RATE: 69 BPM | SYSTOLIC BLOOD PRESSURE: 115 MMHG | OXYGEN SATURATION: 99 % | BODY MASS INDEX: 32.07 KG/M2 | RESPIRATION RATE: 28 BRPM | DIASTOLIC BLOOD PRESSURE: 56 MMHG | HEIGHT: 63 IN

## 2023-08-02 DIAGNOSIS — N31.9 NEUROGENIC BLADDER: Primary | ICD-10-CM

## 2023-08-02 PROCEDURE — 63600175 PHARM REV CODE 636 W HCPCS: Performed by: NURSE ANESTHETIST, CERTIFIED REGISTERED

## 2023-08-02 PROCEDURE — 36000706: Performed by: UROLOGY

## 2023-08-02 PROCEDURE — D9220A PRA ANESTHESIA: Mod: ANES,,, | Performed by: ANESTHESIOLOGY

## 2023-08-02 PROCEDURE — 36000707: Performed by: UROLOGY

## 2023-08-02 PROCEDURE — 71000015 HC POSTOP RECOV 1ST HR: Performed by: UROLOGY

## 2023-08-02 PROCEDURE — 63600175 PHARM REV CODE 636 W HCPCS: Mod: JZ,JG | Performed by: UROLOGY

## 2023-08-02 PROCEDURE — 52287 CYSTOSCOPY CHEMODENERVATION: CPT | Mod: ,,, | Performed by: UROLOGY

## 2023-08-02 PROCEDURE — D9220A PRA ANESTHESIA: ICD-10-PCS | Mod: ANES,,, | Performed by: ANESTHESIOLOGY

## 2023-08-02 PROCEDURE — 37000009 HC ANESTHESIA EA ADD 15 MINS: Performed by: UROLOGY

## 2023-08-02 PROCEDURE — D9220A PRA ANESTHESIA: Mod: CRNA,,, | Performed by: NURSE ANESTHETIST, CERTIFIED REGISTERED

## 2023-08-02 PROCEDURE — 37000008 HC ANESTHESIA 1ST 15 MINUTES: Performed by: UROLOGY

## 2023-08-02 PROCEDURE — 63600175 PHARM REV CODE 636 W HCPCS: Performed by: STUDENT IN AN ORGANIZED HEALTH CARE EDUCATION/TRAINING PROGRAM

## 2023-08-02 PROCEDURE — 52287 PR CYSTOURETHROSCOPY WITH INJ FOR CHEMODENERVATION: ICD-10-PCS | Mod: ,,, | Performed by: UROLOGY

## 2023-08-02 PROCEDURE — 71000044 HC DOSC ROUTINE RECOVERY FIRST HOUR: Performed by: UROLOGY

## 2023-08-02 PROCEDURE — D9220A PRA ANESTHESIA: ICD-10-PCS | Mod: CRNA,,, | Performed by: NURSE ANESTHETIST, CERTIFIED REGISTERED

## 2023-08-02 PROCEDURE — 25000003 PHARM REV CODE 250: Performed by: NURSE ANESTHETIST, CERTIFIED REGISTERED

## 2023-08-02 RX ORDER — SULFAMETHOXAZOLE AND TRIMETHOPRIM 800; 160 MG/1; MG/1
1 TABLET ORAL 2 TIMES DAILY
Qty: 6 TABLET | Refills: 0 | Status: SHIPPED | OUTPATIENT
Start: 2023-08-02 | End: 2023-08-05

## 2023-08-02 RX ORDER — DROPERIDOL 2.5 MG/ML
0.62 INJECTION, SOLUTION INTRAMUSCULAR; INTRAVENOUS ONCE AS NEEDED
Status: DISCONTINUED | OUTPATIENT
Start: 2023-08-02 | End: 2023-08-02 | Stop reason: HOSPADM

## 2023-08-02 RX ORDER — PROPOFOL 10 MG/ML
VIAL (ML) INTRAVENOUS
Status: DISCONTINUED | OUTPATIENT
Start: 2023-08-02 | End: 2023-08-02

## 2023-08-02 RX ORDER — HYDROMORPHONE HYDROCHLORIDE 1 MG/ML
0.2 INJECTION, SOLUTION INTRAMUSCULAR; INTRAVENOUS; SUBCUTANEOUS EVERY 5 MIN PRN
Status: DISCONTINUED | OUTPATIENT
Start: 2023-08-02 | End: 2023-08-02 | Stop reason: HOSPADM

## 2023-08-02 RX ORDER — LIDOCAINE HYDROCHLORIDE 20 MG/ML
INJECTION INTRAVENOUS
Status: DISCONTINUED | OUTPATIENT
Start: 2023-08-02 | End: 2023-08-02

## 2023-08-02 RX ORDER — DEXMEDETOMIDINE HYDROCHLORIDE 100 UG/ML
INJECTION, SOLUTION INTRAVENOUS
Status: DISCONTINUED | OUTPATIENT
Start: 2023-08-02 | End: 2023-08-02

## 2023-08-02 RX ORDER — GENTAMICIN SULFATE 100 MG/100ML
240 INJECTION, SOLUTION INTRAVENOUS
Status: COMPLETED | OUTPATIENT
Start: 2023-08-02 | End: 2023-08-02

## 2023-08-02 RX ORDER — MIDAZOLAM HYDROCHLORIDE 1 MG/ML
INJECTION, SOLUTION INTRAMUSCULAR; INTRAVENOUS
Status: DISCONTINUED | OUTPATIENT
Start: 2023-08-02 | End: 2023-08-02

## 2023-08-02 RX ORDER — ONDANSETRON 2 MG/ML
4 INJECTION INTRAMUSCULAR; INTRAVENOUS ONCE AS NEEDED
Status: DISCONTINUED | OUTPATIENT
Start: 2023-08-02 | End: 2023-08-02 | Stop reason: HOSPADM

## 2023-08-02 RX ADMIN — SODIUM CHLORIDE: 9 INJECTION, SOLUTION INTRAVENOUS at 04:08

## 2023-08-02 RX ADMIN — LIDOCAINE HYDROCHLORIDE 60 MG: 20 INJECTION INTRAVENOUS at 04:08

## 2023-08-02 RX ADMIN — PROPOFOL 50 MG: 10 INJECTION, EMULSION INTRAVENOUS at 04:08

## 2023-08-02 RX ADMIN — DEXMEDETOMIDINE 4 MCG: 100 INJECTION, SOLUTION, CONCENTRATE INTRAVENOUS at 04:08

## 2023-08-02 RX ADMIN — PROPOFOL 100 MCG/KG/MIN: 10 INJECTION, EMULSION INTRAVENOUS at 04:08

## 2023-08-02 RX ADMIN — GENTAMICIN SULFATE 240 MG: 40 INJECTION, SOLUTION INTRAMUSCULAR; INTRAVENOUS at 03:08

## 2023-08-02 RX ADMIN — MIDAZOLAM HYDROCHLORIDE 2 MG: 1 INJECTION, SOLUTION INTRAMUSCULAR; INTRAVENOUS at 03:08

## 2023-08-02 NOTE — TRANSFER OF CARE
"Anesthesia Transfer of Care Note    Patient: Silvana Quezada    Procedure(s) Performed: Procedure(s) (LRB):  CYSTOSCOPY (N/A)  INJECTION, BOTULINUM TOXIN, TYPE A (N/A)    Patient location: PACU    Anesthesia Type: general    Transport from OR: Transported from OR on 6-10 L/min O2 by face mask with adequate spontaneous ventilation    Post pain: adequate analgesia    Post assessment: no apparent anesthetic complications    Post vital signs: stable    Level of consciousness: responds to stimulation and sedated    Nausea/Vomiting: no nausea/vomiting    Complications: none    Transfer of care protocol was followed      Last vitals:   Visit Vitals  /66   Pulse 76   Temp 37.2 °C (99 °F) (Temporal)   Resp 16   Ht 5' 3" (1.6 m)   Wt 82.1 kg (181 lb)   LMP  (LMP Unknown)   SpO2 (!) 94%   Breastfeeding No   BMI 32.06 kg/m²     "

## 2023-08-02 NOTE — OP NOTE
[facility]  Operative Note    Date: 08/02/2023    Pre-Op Diagnosis: Overactive bladder    Post-Op Diagnosis: same    Procedure(s) Performed:   1.  Cystoscopy with bladder botox injection    Specimen(s): none    Staff Surgeon:  Jayesh Ross MD    Assistant Surgeon: Bolivar Watkins MD    Anesthesia: Monitored Local Anesthesia with Sedation    Indications: Silvana Quezada is a 67 y.o. female with history of OAB and neurogenic bladder    Findings:   -200u botox successfully injected  -severe trabeculations present in the bladder    Estimated Blood Loss: min    Drains: none    Procedure in Detail:  After informed consent was obtained the patient was brought to the cystoscopy suite and placed in the supine position.  SCDs were applied and working.  Anesthesia was administered.  When the patient was adequately sedated she was placed in the dorsal lithotomy position and prepped and draped in the usual sterile fashion.      A rigid cystoscope in a 22 Fr sheath was introduced into the patients's bladder via the urethra.  This passed easily.  Formal cystoscopy was performed which revealed the ureteral orifices in their normal anatomic position bilaterally.  No bladder masses, trabeculations, stones or diverticuli were seen.      200 units of botox was injected into the detrusor muscle throughout the bladder.  Good wheals were raised.  The patient's bladder was drained and the cystoscope was removed.     The patient tolerated the procedure well and was transferred to the recovery room in stable condition.      Disposition:  The patient will follow up with Dr. Ross in 9months. She knows how to self-cath should she have any issues with retention.      Bolivar Watkins MD

## 2023-08-02 NOTE — ANESTHESIA PREPROCEDURE EVALUATION
08/02/2023  Pre-operative evaluation for Procedure(s) (LRB):  CYSTOSCOPY (N/A)  INJECTION, BOTULINUM TOXIN, TYPE A (N/A)    Silvana Quezada is a 67 y.o. female     Patient Active Problem List   Diagnosis    MS (multiple sclerosis)    Urgency-frequency syndrome    History of breast cancer    Localized osteoporosis with current pathological fracture    Incomplete bladder emptying    Overactive bladder    Thoracic aorta atherosclerosis    Tortuous aorta    Bipolar I disorder, most recent episode depressed, moderate    Wrist fracture, closed, left, initial encounter    Age-related osteoporosis without current pathological fracture    Thrombocytopenia    Urge incontinence    Wrist pain, left    Macromastia    OAB (overactive bladder)    Other fatigue    Impaired functional mobility, balance, gait, and endurance    Positive FIT (fecal immunochemical test)    KEM (obstructive sleep apnea)       Review of patient's allergies indicates:   Allergen Reactions    Adhesive      blisters    Keflex [cephalexin] Rash    Cephalosporins        No current facility-administered medications on file prior to encounter.     Current Outpatient Medications on File Prior to Encounter   Medication Sig Dispense Refill    alendronate (FOSAMAX) 70 MG tablet TAKE 1 TABLET BY MOUTH ONCE A WEEK 12 tablet 0    buPROPion (WELLBUTRIN SR) 100 MG TBSR 12 hr tablet Take 2 tablets (200 mg total) by mouth every morning. 180 tablet 3    buPROPion (WELLBUTRIN SR) 150 MG TBSR 12 hr tablet Take 1 tablet (150 mg total) by mouth every evening. 90 tablet 3    calcium-vitamin D3 (OS-IGOR 500 + D3) 500 mg-5 mcg (200 unit) per tablet Take 1 tablet by mouth 2 (two) times daily with meals.      coenzyme Q10 10 mg capsule Take 10 mg by mouth once daily.      cyanocobalamin (VITAMIN B-12) 1000 MCG tablet Take 1,000 mcg by mouth once  daily.      fluphenazine (PROLIXIN) 1 MG tablet Take 1 tablet (1 mg total) by mouth every evening. 90 tablet 3    folic acid (FOLVITE) 1 MG tablet Take 1 tablet (1,000 mcg total) by mouth once daily. 90 tablet 3    multivitamin (ONE DAILY MULTIVITAMIN) per tablet Take 1 tablet by mouth once daily.      OXcarbazepine (TRILEPTAL) 150 MG Tab Take 1 tablet (150 mg total) by mouth 2 (two) times daily. 180 tablet 3    catheter (FEMALE CATHETER) 14 Fr Misc Patient is to self catheterize four times a day indefinitley using female 12 fr in and out catheter for incomplete bladder emptying.   Dispense 120 month with 11 refills or 360 every 3 months with 3 refills depending on patient's preference 120 each 11       Past Surgical History:   Procedure Laterality Date    BLADDER FULGURATION  8/31/2022    Procedure: FULGURATION, BLADDER;  Surgeon: Jayesh Ross MD;  Location: [address];  Service: Urology;;    BRAIN SURGERY  1991    BREAST BIOPSY Left 2009    core    BREAST BIOPSY Right     BREAST LUMPECTOMY Right 2001    COLONOSCOPY N/A 1/7/2022    Procedure: COLONOSCOPY;  Surgeon: Shiva Walsh MD;  Location: [facility];  Service: Endoscopy;  Laterality: N/A;    CYSTOSCOPY N/A 7/11/2018    Procedure: CYSTOSCOPY;  Surgeon: Jayesh Ross MD;  Location: [address];  Service: Urology;  Laterality: N/A;  30 min    CYSTOSCOPY N/A 4/10/2019    Procedure: CYSTOSCOPY;  Surgeon: Jayesh Ross MD;  Location: [address];  Service: Urology;  Laterality: N/A;  30 MIN    CYSTOSCOPY  10/21/2021    Procedure: CYSTOSCOPY;  Surgeon: Jayesh Ross MD;  Location: [address];  Service: Urology;;    FOOT SURGERY  october 2013    HYSTERECTOMY      partial    INJECTION OF BOTULINUM TOXIN TYPE A N/A 7/11/2018    Procedure: INJECTION, BOTULINUM TOXIN, TYPE A 200 UNITS;  Surgeon: Jayesh Ross MD;  Location: [address];  Service: Urology;  Laterality: N/A;    INJECTION OF BOTULINUM TOXIN TYPE A N/A 4/10/2019     Procedure: INJECTION, BOTULINUM TOXIN, TYPE A 200 UNITS;  Surgeon: Jayesh Ross MD;  Location: [facility] OR 1ST FLR;  Service: Urology;  Laterality: N/A;    INJECTION OF BOTULINUM TOXIN TYPE A  10/21/2021    Procedure: INJECTION, BOTULINUM TOXIN, 200units;  Surgeon: Jayesh Ross MD;  [facility];  Service: Urology;;    OPEN REDUCTION AND INTERNAL FIXATION (ORIF) OF INJURY OF WRIST Left 12/27/2019    Procedure: ORIF, WRIST;  Surgeon: Albert Schroeder Jr., MD;  Location: [facility] OR;  Service: Orthopedics;  Laterality: Left;  ACUMED/ MINI C ARM Palomo Hughes notified/ Rk confirmed here in [facility] with Lacie 12/23/19 KB [id number]    TONSILLECTOMY      TOTAL REDUCTION MAMMOPLASTY Left 11/5/2020    Procedure: MAMMOPLASTY, REDUCTION, LEFT;  Surgeon: Kirk Tompkins MD;  Location: [facility] OR 2ND FLR;  Service: Plastics;  Laterality: Left;  Left breast tissue weighed- 49 grams.       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, 25 children 2 daughter      Social Determinants of Health     Financial Resource Strain: Low Risk  (5/19/2023)    Overall Financial Resource Strain (CARDIA)     Difficulty of Paying Living Expenses: Not hard at all   Food Insecurity: No Food Insecurity (5/19/2023)    Hunger Vital Sign     Worried About Running Out of Food in the Last Year: Never true     Ran Out of Food in the Last Year: Never true   Transportation Needs: No Transportation Needs (5/19/2023)    PRAPARE - Transportation     Lack of Transportation (Medical): No     Lack of Transportation (Non-Medical): No   Physical  Activity: Insufficiently Active (5/19/2023)    Exercise Vital Sign     Days of Exercise per Week: 3 days     Minutes of Exercise per Session: 10 min   Stress: No Stress Concern Present (5/19/2023)    [employer] - Occupational Stress Questionnaire     Feeling of Stress : Not at all   Social Connections: Socially Integrated (5/19/2023)    Social Connection and Isolation Panel [NHANES]     Frequency of Communication with Friends and Family: More than three times a week     Frequency of Social Gatherings with Friends and Family: More than three times a week     Attends Mormon Services: More than 4 times per year     Active Member of Clubs or Organizations: Yes     Attends Club or Organization Meetings: More than 4 times per year     Marital Status:    Housing Stability: Low Risk  (5/19/2023)    Housing Stability Vital Sign     Unable to Pay for Housing in the Last Year: No     Number of Places Lived in the Last Year: 1     Unstable Housing in the Last Year: No             Pre-op Assessment    I have reviewed the Patient Summary Reports.     I have reviewed the Nursing Notes. I have reviewed the NPO Status.      Review of Systems  Pulmonary:   Pneumonia Sleep Apnea    Psych:   Psychiatric History          Physical Exam    Airway:  Mouth Opening: Normal  Tongue: Normal    Chest/Lungs:  Normal Respiratory Rate    Heart:  Rhythm: Regular Rhythm        Anesthesia Plan  Type of Anesthesia, risks & benefits discussed:    Anesthesia Type: Gen Natural Airway  Intra-op Monitoring Plan: Standard ASA Monitors  Induction:  IV  Informed Consent: Informed consent signed with the Patient and all parties understand the risks and agree with anesthesia plan.  All questions answered.   ASA Score: 2    Ready For Surgery From Anesthesia Perspective.     .

## 2023-08-02 NOTE — INTERVAL H&P NOTE
The patient has been examined and the H&P has been reviewed:    I concur with the findings and no changes have occurred since H&P was written.    Surgery risks, benefits and alternative options discussed and understood by patient/family.    Patient currently on antibiotics      There are no hospital problems to display for this patient.

## 2023-08-02 NOTE — BRIEF OP NOTE
[facility] - [facility] (1st Fl)  Brief Operative Note    Surgery Date: 8/2/2023     Surgeon(s) and Role:     * Jayesh Ross MD - Primary     * Bolivar Watkins MD - Resident - Assisting        Pre-op Diagnosis:  Overactive bladder [N32.81]    Post-op Diagnosis:  Post-Op Diagnosis Codes:     * Overactive bladder [N32.81]    Procedure(s) (LRB):  CYSTOSCOPY (N/A)  INJECTION, BOTULINUM TOXIN, TYPE A (N/A)    Anesthesia: General/MAC    Operative Findings:   -200u botox successfully injected    Estimated Blood Loss: * No values recorded between 8/2/2023  4:11 PM and 8/2/2023  4:20 PM *         Specimens:   Specimen (24h ago, onward)      None              Discharge Note    OUTCOME: Patient tolerated treatment/procedure well without complication and is now ready for discharge.    DISPOSITION: Home or Self Care    FINAL DIAGNOSIS:  neurogenic bladder    FOLLOWUP: In clinic    DISCHARGE INSTRUCTIONS:  No discharge procedures on file.

## 2023-08-03 NOTE — ANESTHESIA POSTPROCEDURE EVALUATION
Anesthesia Post Evaluation    Patient: Silvana Quezada    Procedure(s) Performed: Procedure(s) (LRB):  CYSTOSCOPY (N/A)  INJECTION, BOTULINUM TOXIN, TYPE A (N/A)    Final Anesthesia Type: general      Patient location during evaluation: PACU  Patient participation: Yes- Able to Participate  Level of consciousness: awake and alert  Post-procedure vital signs: reviewed and stable  Pain management: adequate  Airway patency: patent    PONV status at discharge: No PONV  Anesthetic complications: no      Cardiovascular status: blood pressure returned to baseline  Respiratory status: unassisted  Follow-up not needed.            No case tracking events are documented in the log.      Pain/Yaakov Score: Yaakov Score: 10 (8/2/2023  4:55 PM)

## 2023-08-07 ENCOUNTER — TELEPHONE (OUTPATIENT)
Dept: FAMILY MEDICINE | Facility: CLINIC | Age: 68
End: 2023-08-07
Payer: MEDICARE

## 2023-08-07 DIAGNOSIS — Z12.31 SCREENING MAMMOGRAM, ENCOUNTER FOR: Primary | ICD-10-CM

## 2023-08-17 ENCOUNTER — HOSPITAL ENCOUNTER (OUTPATIENT)
Dept: RADIOLOGY | Facility: HOSPITAL | Age: 68
Discharge: HOME | End: 2023-08-17
Attending: INTERNAL MEDICINE
Payer: MEDICARE

## 2023-08-17 DIAGNOSIS — Z12.31 SCREENING MAMMOGRAM, ENCOUNTER FOR: ICD-10-CM

## 2023-08-17 PROCEDURE — 77067 SCR MAMMO BI INCL CAD: CPT | Mod: TC

## 2023-08-17 PROCEDURE — 77063 BREAST TOMOSYNTHESIS BI: CPT | Mod: 26,,, | Performed by: RADIOLOGY

## 2023-08-17 PROCEDURE — 77063 MAMMO DIGITAL SCREENING BILAT WITH TOMO: ICD-10-PCS | Mod: 26,,, | Performed by: RADIOLOGY

## 2023-08-17 PROCEDURE — 77067 SCR MAMMO BI INCL CAD: CPT | Mod: 26,,, | Performed by: RADIOLOGY

## 2023-08-17 PROCEDURE — 77067 MAMMO DIGITAL SCREENING BILAT WITH TOMO: ICD-10-PCS | Mod: 26,,, | Performed by: RADIOLOGY

## 2023-09-13 ENCOUNTER — LAB VISIT (OUTPATIENT)
Dept: LAB | Facility: HOSPITAL | Age: 68
End: 2023-09-13
Attending: INTERNAL MEDICINE
Payer: MEDICARE

## 2023-09-13 DIAGNOSIS — M81.0 AGE-RELATED OSTEOPOROSIS WITHOUT CURRENT PATHOLOGICAL FRACTURE: ICD-10-CM

## 2023-09-13 DIAGNOSIS — D69.6 THROMBOCYTOPENIA: ICD-10-CM

## 2023-09-13 DIAGNOSIS — E74.39 GLUCOSE INTOLERANCE: ICD-10-CM

## 2023-09-13 LAB
ALBUMIN SERPL BCP-MCNC: 3.7 G/DL (ref 3.5–5.2)
ALP SERPL-CCNC: 61 U/L (ref 55–135)
ALT SERPL W/O P-5'-P-CCNC: 36 U/L (ref 10–44)
ANION GAP SERPL CALC-SCNC: 11 MMOL/L (ref 8–16)
AST SERPL-CCNC: 21 U/L (ref 10–40)
BILIRUB DIRECT SERPL-MCNC: 0.2 MG/DL (ref 0.1–0.3)
BILIRUB SERPL-MCNC: 0.4 MG/DL (ref 0.1–1)
BUN SERPL-MCNC: 17 MG/DL (ref 8–23)
CALCIUM SERPL-MCNC: 9.6 MG/DL (ref 8.7–10.5)
CHLORIDE SERPL-SCNC: 106 MMOL/L (ref 95–110)
CO2 SERPL-SCNC: 27 MMOL/L (ref 23–29)
CREAT SERPL-MCNC: 1 MG/DL (ref 0.5–1.4)
ERYTHROCYTE [DISTWIDTH] IN BLOOD BY AUTOMATED COUNT: 13 % (ref 11.5–14.5)
EST. GFR  (NO RACE VARIABLE): >60 ML/MIN/1.73 M^2
GLUCOSE SERPL-MCNC: 155 MG/DL (ref 70–110)
HCT VFR BLD AUTO: 42.4 % (ref 37–48.5)
HGB BLD-MCNC: 13.7 G/DL (ref 12–16)
MCH RBC QN AUTO: 30.9 PG (ref 27–31)
MCHC RBC AUTO-ENTMCNC: 32.3 G/DL (ref 32–36)
MCV RBC AUTO: 96 FL (ref 82–98)
PLATELET # BLD AUTO: 179 K/UL (ref 150–450)
PMV BLD AUTO: 10.6 FL (ref 9.2–12.9)
POTASSIUM SERPL-SCNC: 4.3 MMOL/L (ref 3.5–5.1)
PROT SERPL-MCNC: 6.8 G/DL (ref 6–8.4)
RBC # BLD AUTO: 4.43 M/UL (ref 4–5.4)
SODIUM SERPL-SCNC: 144 MMOL/L (ref 136–145)
WBC # BLD AUTO: 4.56 K/UL (ref 3.9–12.7)

## 2023-09-13 PROCEDURE — 80048 BASIC METABOLIC PNL TOTAL CA: CPT | Performed by: INTERNAL MEDICINE

## 2023-09-13 PROCEDURE — 80076 HEPATIC FUNCTION PANEL: CPT | Performed by: INTERNAL MEDICINE

## 2023-09-13 PROCEDURE — 36415 COLL VENOUS BLD VENIPUNCTURE: CPT | Mod: PO | Performed by: INTERNAL MEDICINE

## 2023-09-13 PROCEDURE — 85027 COMPLETE CBC AUTOMATED: CPT | Performed by: INTERNAL MEDICINE

## 2023-09-14 NOTE — PROGRESS NOTES
"  Assessment:         1. Glucose intolerance    2. Other abnormal glucose    3. Fatigue, unspecified type          Plan:           Silvana was seen today for follow-up.    Diagnoses and all orders for this visit:    Glucose intolerance  -     Hemoglobin A1C; Standing    Other abnormal glucose  -     Hemoglobin A1C; Standing    Fatigue, unspecified type              Subjective:       Patient ID: Silvana Quezada is a 67 y.o. female.    Chief Complaint: Follow-up      Interim Hx  Saw me in march  Has been seening urology  Did AWV    Concerns today    Chronic problems   Patient Active Problem List   Diagnosis    MS (multiple sclerosis)    Urgency-frequency syndrome    History of breast cancer    Localized osteoporosis with current pathological fracture    Incomplete bladder emptying    Overactive bladder    Thoracic aorta atherosclerosis    Tortuous aorta    Bipolar I disorder, most recent episode depressed, moderate    Wrist fracture, closed, left, initial encounter    Age-related osteoporosis without current pathological fracture    Thrombocytopenia    Urge incontinence    Wrist pain, left    Macromastia    OAB (overactive bladder)    Other fatigue    Impaired functional mobility, balance, gait, and endurance    Positive FIT (fecal immunochemical test)    KEM (obstructive sleep apnea)       HPI    Review of Systems   All other systems reviewed and are negative.            Health Maintenance Due   Topic Date Due    TETANUS VACCINE  Never done    High Dose Statin  Never done    Shingles Vaccine (1 of 2) Never done    COVID-19 Vaccine (4 - Additional dose for [place] series) 02/06/2023         Objective:     /80 (BP Location: Right arm, Patient Position: Sitting, BP Method: Large (Manual))   Pulse 85   Ht 5' 3" (1.6 m)   Wt 84.5 kg (186 lb 4.6 oz)   LMP  (LMP Unknown)   SpO2 96%   BMI 33.00 kg/m²         9/15/2023    10:28 AM 8/2/2023     2:43 PM 5/19/2023    11:04 AM 3/20/2023    10:57 AM 3/15/2023    11:11 AM " "  Vitals   Height 5' 3" (1.6 m) 5' 3" (1.6 m)  5' 3" (1.6 m) 5' 3" (1.6 m)   Weight (lbs) 186.29 181 181.88 175.93 180.34   BMI (kg/m2) 33 32.1  31.2 32                Physical Exam  Vitals and nursing note reviewed.   Constitutional:       General: She is not in acute distress.     Appearance: She is well-developed. She is not diaphoretic.   HENT:      Head: Normocephalic.      Nose: Nose normal.   Eyes:      General:         Right eye: No discharge.         Left eye: No discharge.      Conjunctiva/sclera: Conjunctivae normal.      Pupils: Pupils are equal, round, and reactive to light.   Cardiovascular:      Rate and Rhythm: Normal rate and regular rhythm.      Heart sounds: Normal heart sounds. No murmur heard.     No friction rub. No gallop.   Pulmonary:      Effort: Pulmonary effort is normal. No respiratory distress.   Abdominal:      General: Bowel sounds are normal. There is no distension.      Palpations: Abdomen is soft.   Musculoskeletal:         General: No deformity. Normal range of motion.      Cervical back: Normal range of motion.   Skin:     General: Skin is warm.   Neurological:      Mental Status: She is alert and oriented to person, place, and time.      Cranial Nerves: No cranial nerve deficit.             Future Appointments   Date Time Provider Department [place]   1/8/2024  8:15 AM LAB, [facility]   1/10/2024 10:40 AM Jhonatan Avila III, MD [facility]           Medication List with Changes/Refills   Current Medications    ALENDRONATE (FOSAMAX) 70 MG TABLET    TAKE 1 TABLET BY MOUTH ONCE A WEEK    BUPROPION (WELLBUTRIN SR) 100 MG TBSR 12 HR TABLET    Take 2 tablets (200 mg total) by mouth every morning.    BUPROPION (WELLBUTRIN SR) 150 MG TBSR 12 HR TABLET    Take 1 tablet (150 mg total) by mouth every evening.    CALCIUM-VITAMIN D3 (OS-IGOR 500 + D3) 500 MG-5 MCG (200 UNIT) PER TABLET    Take 1 tablet by mouth 2 (two) times daily with meals.    CATHETER (FEMALE CATHETER) " 14 [place] [facility]    Patient is to self catheterize four times a day indefinitley using female 12 fr in and out catheter for incomplete bladder emptying.   Dispense 120 month with 11 refills or 360 every 3 months with 3 refills depending on patient's preference    COENZYME Q10 10 MG CAPSULE    Take 10 mg by mouth once daily.    CYANOCOBALAMIN (VITAMIN B-12) 1000 MCG TABLET    Take 1,000 mcg by mouth once daily.    FLUPHENAZINE (PROLIXIN) 1 MG TABLET    Take 1 tablet (1 mg total) by mouth every evening.    FOLIC ACID (FOLVITE) 1 MG TABLET    Take 1 tablet (1,000 mcg total) by mouth once daily.    MULTIVITAMIN (ONE DAILY MULTIVITAMIN) PER TABLET    Take 1 tablet by mouth once daily.    OXCARBAZEPINE (TRILEPTAL) 150 MG TAB    Take 1 tablet (150 mg total) by mouth 2 (two) times daily.         Disclaimer:  This note has been generated using voice-recognition software. There may be grammatical or spelling errors that have been missed during proof-reading

## 2023-09-15 ENCOUNTER — OFFICE VISIT (OUTPATIENT)
Dept: INTERNAL MEDICINE | Facility: CLINIC | Age: 68
End: 2023-09-15
Payer: MEDICARE

## 2023-09-15 ENCOUNTER — TELEPHONE (OUTPATIENT)
Dept: SLEEP MEDICINE | Facility: CLINIC | Age: 68
End: 2023-09-15
Payer: MEDICARE

## 2023-09-15 VITALS
HEART RATE: 85 BPM | WEIGHT: 186.31 LBS | BODY MASS INDEX: 33.01 KG/M2 | HEIGHT: 63 IN | OXYGEN SATURATION: 96 % | DIASTOLIC BLOOD PRESSURE: 80 MMHG | SYSTOLIC BLOOD PRESSURE: 132 MMHG

## 2023-09-15 DIAGNOSIS — R53.83 FATIGUE, UNSPECIFIED TYPE: ICD-10-CM

## 2023-09-15 DIAGNOSIS — R73.09 OTHER ABNORMAL GLUCOSE: ICD-10-CM

## 2023-09-15 DIAGNOSIS — E74.39 GLUCOSE INTOLERANCE: Primary | ICD-10-CM

## 2023-09-15 PROCEDURE — 1160F RVW MEDS BY RX/DR IN RCRD: CPT | Mod: CPTII,S$GLB,, | Performed by: INTERNAL MEDICINE

## 2023-09-15 PROCEDURE — 99999 PR PBB SHADOW E&M-EST. PATIENT-LVL IV: ICD-10-PCS | Mod: PBBFAC,,, | Performed by: INTERNAL MEDICINE

## 2023-09-15 PROCEDURE — 1157F ADVNC CARE PLAN IN RCRD: CPT | Mod: CPTII,S$GLB,, | Performed by: INTERNAL MEDICINE

## 2023-09-15 PROCEDURE — 1157F PR ADVANCE CARE PLAN OR EQUIV PRESENT IN MEDICAL RECORD: ICD-10-PCS | Mod: CPTII,S$GLB,, | Performed by: INTERNAL MEDICINE

## 2023-09-15 PROCEDURE — 1159F PR MEDICATION LIST DOCUMENTED IN MEDICAL RECORD: ICD-10-PCS | Mod: CPTII,S$GLB,, | Performed by: INTERNAL MEDICINE

## 2023-09-15 PROCEDURE — 3075F PR MOST RECENT SYSTOLIC BLOOD PRESS GE 130-139MM HG: ICD-10-PCS | Mod: CPTII,S$GLB,, | Performed by: INTERNAL MEDICINE

## 2023-09-15 PROCEDURE — 3288F FALL RISK ASSESSMENT DOCD: CPT | Mod: CPTII,S$GLB,, | Performed by: INTERNAL MEDICINE

## 2023-09-15 PROCEDURE — 1126F PR PAIN SEVERITY QUANTIFIED, NO PAIN PRESENT: ICD-10-PCS | Mod: CPTII,S$GLB,, | Performed by: INTERNAL MEDICINE

## 2023-09-15 PROCEDURE — 3288F PR FALLS RISK ASSESSMENT DOCUMENTED: ICD-10-PCS | Mod: CPTII,S$GLB,, | Performed by: INTERNAL MEDICINE

## 2023-09-15 PROCEDURE — 3079F PR MOST RECENT DIASTOLIC BLOOD PRESSURE 80-89 MM HG: ICD-10-PCS | Mod: CPTII,S$GLB,, | Performed by: INTERNAL MEDICINE

## 2023-09-15 PROCEDURE — 1159F MED LIST DOCD IN RCRD: CPT | Mod: CPTII,S$GLB,, | Performed by: INTERNAL MEDICINE

## 2023-09-15 PROCEDURE — 1101F PR PT FALLS ASSESS DOC 0-1 FALLS W/OUT INJ PAST YR: ICD-10-PCS | Mod: CPTII,S$GLB,, | Performed by: INTERNAL MEDICINE

## 2023-09-15 PROCEDURE — 99214 PR OFFICE/OUTPT VISIT, EST, LEVL IV, 30-39 MIN: ICD-10-PCS | Mod: S$GLB,,, | Performed by: INTERNAL MEDICINE

## 2023-09-15 PROCEDURE — 3075F SYST BP GE 130 - 139MM HG: CPT | Mod: CPTII,S$GLB,, | Performed by: INTERNAL MEDICINE

## 2023-09-15 PROCEDURE — 3008F BODY MASS INDEX DOCD: CPT | Mod: CPTII,S$GLB,, | Performed by: INTERNAL MEDICINE

## 2023-09-15 PROCEDURE — 3008F PR BODY MASS INDEX (BMI) DOCUMENTED: ICD-10-PCS | Mod: CPTII,S$GLB,, | Performed by: INTERNAL MEDICINE

## 2023-09-15 PROCEDURE — 99999 PR PBB SHADOW E&M-EST. PATIENT-LVL IV: CPT | Mod: PBBFAC,,, | Performed by: INTERNAL MEDICINE

## 2023-09-15 PROCEDURE — 99214 OFFICE O/P EST MOD 30 MIN: CPT | Mod: S$GLB,,, | Performed by: INTERNAL MEDICINE

## 2023-09-15 PROCEDURE — 1101F PT FALLS ASSESS-DOCD LE1/YR: CPT | Mod: CPTII,S$GLB,, | Performed by: INTERNAL MEDICINE

## 2023-09-15 PROCEDURE — 3079F DIAST BP 80-89 MM HG: CPT | Mod: CPTII,S$GLB,, | Performed by: INTERNAL MEDICINE

## 2023-09-15 PROCEDURE — 1126F AMNT PAIN NOTED NONE PRSNT: CPT | Mod: CPTII,S$GLB,, | Performed by: INTERNAL MEDICINE

## 2023-09-15 PROCEDURE — 1160F PR REVIEW ALL MEDS BY PRESCRIBER/CLIN PHARMACIST DOCUMENTED: ICD-10-PCS | Mod: CPTII,S$GLB,, | Performed by: INTERNAL MEDICINE

## 2023-09-15 NOTE — PATIENT INSTRUCTIONS
We were happy to see you today    For your Testing  Please have your labs and/or imaging test done  in 3 months         For your Medication   No changes   For more information about side effects please visit medlineplus.gov        For your Referrals  I will talk to your other doctors abotu the sleep      For your Vaccinations  We gave your the following vaccines    Please obtain the following vaccines at the pharmacy  Tetanus  Shignles  Flu         Please return to clinic in    Follow up for 3 months Office Visit with prelabs.        Extra resources

## 2023-09-15 NOTE — Clinical Note
Hi, everyone Patient still reports difficulty with sleep, reports fatigue sleep 8 hrs in the evening and taking 2-3 hrs long naps during the day. I'm checking a1c but I would like any input you can provide, thanks!

## 2023-09-18 DIAGNOSIS — M81.0 OSTEOPOROSIS, UNSPECIFIED OSTEOPOROSIS TYPE, UNSPECIFIED PATHOLOGICAL FRACTURE PRESENCE: ICD-10-CM

## 2023-09-18 NOTE — TELEPHONE ENCOUNTER
Refill Routing Note   Medication(s) are not appropriate for processing by [facility] for the following reason(s):      Medication outside of protocol    [facility] action(s):  Route Care Due:  None identified              Appointments  past 12m or future 3m with PCP    Date Provider   Last Visit   9/15/2023 Johnatan Avila III, MD   Next Visit   1/10/2024 Jhonatan Avila III, MD   ED visits in past 90 days: 0        Note composed:4:57 PM 09/18/2023

## 2023-09-19 RX ORDER — ALENDRONATE SODIUM 70 MG/1
70 TABLET ORAL
Qty: 12 TABLET | Refills: 0 | Status: SHIPPED | OUTPATIENT
Start: 2023-09-19 | End: 2023-12-15

## 2023-10-10 ENCOUNTER — TELEPHONE (OUTPATIENT)
Dept: NEUROLOGY | Facility: CLINIC | Age: 68
End: 2023-10-10
Payer: MEDICARE

## 2023-11-20 DIAGNOSIS — F31.75 BIPOLAR I DISORDER, MOST RECENT EPISODE DEPRESSED, IN PARTIAL REMISSION: ICD-10-CM

## 2023-11-20 DIAGNOSIS — F31.9 BIPOLAR AFFECTIVE DISORDER, REMISSION STATUS UNSPECIFIED: ICD-10-CM

## 2023-11-20 RX ORDER — BUPROPION HYDROCHLORIDE 150 MG/1
150 TABLET, EXTENDED RELEASE ORAL NIGHTLY
Qty: 30 TABLET | Refills: 3 | Status: SHIPPED | OUTPATIENT
Start: 2023-11-20 | End: 2024-02-29 | Stop reason: SDUPTHER

## 2023-11-20 RX ORDER — OXCARBAZEPINE 150 MG/1
150 TABLET, FILM COATED ORAL 2 TIMES DAILY
Qty: 60 TABLET | Refills: 3 | Status: SHIPPED | OUTPATIENT
Start: 2023-11-20 | End: 2024-02-29 | Stop reason: SDUPTHER

## 2023-11-20 RX ORDER — BUPROPION HYDROCHLORIDE 100 MG/1
TABLET, EXTENDED RELEASE ORAL
Qty: 180 TABLET | Refills: 3 | OUTPATIENT
Start: 2023-11-20

## 2023-11-20 RX ORDER — BUPROPION HYDROCHLORIDE 100 MG/1
200 TABLET, EXTENDED RELEASE ORAL EVERY MORNING
Qty: 60 TABLET | Refills: 3 | Status: SHIPPED | OUTPATIENT
Start: 2023-11-20 | End: 2024-02-29 | Stop reason: SDUPTHER

## 2023-11-20 NOTE — TELEPHONE ENCOUNTER
Former pt of Dr. Munoz requesting bupropion and oxcarbazepine refills. L/s in 10/2022 when plan made to continue meds unchanged, plan was for pt to follow up in 1 year. Has f/u with new provider KRYSTAL Macias 2/29/24. Will consult with Dr. Chun for review/approval of bridge supply of requested med.

## 2023-12-04 ENCOUNTER — LAB VISIT (OUTPATIENT)
Dept: LAB | Facility: HOSPITAL | Age: 68
End: 2023-12-04
Attending: STUDENT IN AN ORGANIZED HEALTH CARE EDUCATION/TRAINING PROGRAM
Payer: MEDICARE

## 2023-12-04 ENCOUNTER — OFFICE VISIT (OUTPATIENT)
Dept: NEUROLOGY | Facility: CLINIC | Age: 68
End: 2023-12-04
Payer: MEDICARE

## 2023-12-04 VITALS
HEART RATE: 82 BPM | SYSTOLIC BLOOD PRESSURE: 117 MMHG | DIASTOLIC BLOOD PRESSURE: 87 MMHG | BODY MASS INDEX: 32.99 KG/M2 | HEIGHT: 63 IN | WEIGHT: 186.19 LBS

## 2023-12-04 DIAGNOSIS — E53.8 DEFICIENCY OF OTHER SPECIFIED B GROUP VITAMINS: ICD-10-CM

## 2023-12-04 DIAGNOSIS — G35 MS (MULTIPLE SCLEROSIS): ICD-10-CM

## 2023-12-04 DIAGNOSIS — D64.9 FATIGUE ASSOCIATED WITH ANEMIA: ICD-10-CM

## 2023-12-04 DIAGNOSIS — G35 MS (MULTIPLE SCLEROSIS): Primary | ICD-10-CM

## 2023-12-04 DIAGNOSIS — E55.9 VITAMIN D DEFICIENCY: ICD-10-CM

## 2023-12-04 LAB
25(OH)D3+25(OH)D2 SERPL-MCNC: 51 NG/ML (ref 30–96)
FERRITIN SERPL-MCNC: 54 NG/ML (ref 20–300)
IRON SERPL-MCNC: 91 UG/DL (ref 30–160)
SATURATED IRON: 27 % (ref 20–50)
TOTAL IRON BINDING CAPACITY: 343 UG/DL (ref 250–450)
TRANSFERRIN SERPL-MCNC: 232 MG/DL (ref 200–375)
TSH SERPL DL<=0.005 MIU/L-ACNC: 2.16 UIU/ML (ref 0.4–4)

## 2023-12-04 PROCEDURE — 82607 VITAMIN B-12: CPT | Performed by: STUDENT IN AN ORGANIZED HEALTH CARE EDUCATION/TRAINING PROGRAM

## 2023-12-04 PROCEDURE — 3288F FALL RISK ASSESSMENT DOCD: CPT | Mod: CPTII,S$GLB,, | Performed by: STUDENT IN AN ORGANIZED HEALTH CARE EDUCATION/TRAINING PROGRAM

## 2023-12-04 PROCEDURE — 99215 PR OFFICE/OUTPT VISIT, EST, LEVL V, 40-54 MIN: ICD-10-PCS | Mod: S$GLB,,, | Performed by: STUDENT IN AN ORGANIZED HEALTH CARE EDUCATION/TRAINING PROGRAM

## 2023-12-04 PROCEDURE — 1159F PR MEDICATION LIST DOCUMENTED IN MEDICAL RECORD: ICD-10-PCS | Mod: CPTII,S$GLB,, | Performed by: STUDENT IN AN ORGANIZED HEALTH CARE EDUCATION/TRAINING PROGRAM

## 2023-12-04 PROCEDURE — 3008F BODY MASS INDEX DOCD: CPT | Mod: CPTII,S$GLB,, | Performed by: STUDENT IN AN ORGANIZED HEALTH CARE EDUCATION/TRAINING PROGRAM

## 2023-12-04 PROCEDURE — 84443 ASSAY THYROID STIM HORMONE: CPT | Performed by: STUDENT IN AN ORGANIZED HEALTH CARE EDUCATION/TRAINING PROGRAM

## 2023-12-04 PROCEDURE — 36415 COLL VENOUS BLD VENIPUNCTURE: CPT | Performed by: STUDENT IN AN ORGANIZED HEALTH CARE EDUCATION/TRAINING PROGRAM

## 2023-12-04 PROCEDURE — 3079F PR MOST RECENT DIASTOLIC BLOOD PRESSURE 80-89 MM HG: ICD-10-PCS | Mod: CPTII,S$GLB,, | Performed by: STUDENT IN AN ORGANIZED HEALTH CARE EDUCATION/TRAINING PROGRAM

## 2023-12-04 PROCEDURE — 3074F PR MOST RECENT SYSTOLIC BLOOD PRESSURE < 130 MM HG: ICD-10-PCS | Mod: CPTII,S$GLB,, | Performed by: STUDENT IN AN ORGANIZED HEALTH CARE EDUCATION/TRAINING PROGRAM

## 2023-12-04 PROCEDURE — 99999 PR PBB SHADOW E&M-EST. PATIENT-LVL IV: CPT | Mod: PBBFAC,,, | Performed by: STUDENT IN AN ORGANIZED HEALTH CARE EDUCATION/TRAINING PROGRAM

## 2023-12-04 PROCEDURE — 3288F PR FALLS RISK ASSESSMENT DOCUMENTED: ICD-10-PCS | Mod: CPTII,S$GLB,, | Performed by: STUDENT IN AN ORGANIZED HEALTH CARE EDUCATION/TRAINING PROGRAM

## 2023-12-04 PROCEDURE — 84466 ASSAY OF TRANSFERRIN: CPT | Performed by: STUDENT IN AN ORGANIZED HEALTH CARE EDUCATION/TRAINING PROGRAM

## 2023-12-04 PROCEDURE — 1101F PR PT FALLS ASSESS DOC 0-1 FALLS W/OUT INJ PAST YR: ICD-10-PCS | Mod: CPTII,S$GLB,, | Performed by: STUDENT IN AN ORGANIZED HEALTH CARE EDUCATION/TRAINING PROGRAM

## 2023-12-04 PROCEDURE — 1101F PT FALLS ASSESS-DOCD LE1/YR: CPT | Mod: CPTII,S$GLB,, | Performed by: STUDENT IN AN ORGANIZED HEALTH CARE EDUCATION/TRAINING PROGRAM

## 2023-12-04 PROCEDURE — 99999 PR PBB SHADOW E&M-EST. PATIENT-LVL IV: ICD-10-PCS | Mod: PBBFAC,,, | Performed by: STUDENT IN AN ORGANIZED HEALTH CARE EDUCATION/TRAINING PROGRAM

## 2023-12-04 PROCEDURE — 1157F ADVNC CARE PLAN IN RCRD: CPT | Mod: CPTII,S$GLB,, | Performed by: STUDENT IN AN ORGANIZED HEALTH CARE EDUCATION/TRAINING PROGRAM

## 2023-12-04 PROCEDURE — 83540 ASSAY OF IRON: CPT | Performed by: STUDENT IN AN ORGANIZED HEALTH CARE EDUCATION/TRAINING PROGRAM

## 2023-12-04 PROCEDURE — 82306 VITAMIN D 25 HYDROXY: CPT | Performed by: STUDENT IN AN ORGANIZED HEALTH CARE EDUCATION/TRAINING PROGRAM

## 2023-12-04 PROCEDURE — 1126F AMNT PAIN NOTED NONE PRSNT: CPT | Mod: CPTII,S$GLB,, | Performed by: STUDENT IN AN ORGANIZED HEALTH CARE EDUCATION/TRAINING PROGRAM

## 2023-12-04 PROCEDURE — 1126F PR PAIN SEVERITY QUANTIFIED, NO PAIN PRESENT: ICD-10-PCS | Mod: CPTII,S$GLB,, | Performed by: STUDENT IN AN ORGANIZED HEALTH CARE EDUCATION/TRAINING PROGRAM

## 2023-12-04 PROCEDURE — 3008F PR BODY MASS INDEX (BMI) DOCUMENTED: ICD-10-PCS | Mod: CPTII,S$GLB,, | Performed by: STUDENT IN AN ORGANIZED HEALTH CARE EDUCATION/TRAINING PROGRAM

## 2023-12-04 PROCEDURE — 1157F PR ADVANCE CARE PLAN OR EQUIV PRESENT IN MEDICAL RECORD: ICD-10-PCS | Mod: CPTII,S$GLB,, | Performed by: STUDENT IN AN ORGANIZED HEALTH CARE EDUCATION/TRAINING PROGRAM

## 2023-12-04 PROCEDURE — 3079F DIAST BP 80-89 MM HG: CPT | Mod: CPTII,S$GLB,, | Performed by: STUDENT IN AN ORGANIZED HEALTH CARE EDUCATION/TRAINING PROGRAM

## 2023-12-04 PROCEDURE — 3074F SYST BP LT 130 MM HG: CPT | Mod: CPTII,S$GLB,, | Performed by: STUDENT IN AN ORGANIZED HEALTH CARE EDUCATION/TRAINING PROGRAM

## 2023-12-04 PROCEDURE — 1159F MED LIST DOCD IN RCRD: CPT | Mod: CPTII,S$GLB,, | Performed by: STUDENT IN AN ORGANIZED HEALTH CARE EDUCATION/TRAINING PROGRAM

## 2023-12-04 PROCEDURE — 99215 OFFICE O/P EST HI 40 MIN: CPT | Mod: S$GLB,,, | Performed by: STUDENT IN AN ORGANIZED HEALTH CARE EDUCATION/TRAINING PROGRAM

## 2023-12-04 PROCEDURE — 0361U NEURFLMNT LT CHN DIG IA QUAN: CPT | Performed by: STUDENT IN AN ORGANIZED HEALTH CARE EDUCATION/TRAINING PROGRAM

## 2023-12-04 PROCEDURE — 82728 ASSAY OF FERRITIN: CPT | Performed by: STUDENT IN AN ORGANIZED HEALTH CARE EDUCATION/TRAINING PROGRAM

## 2023-12-04 RX ORDER — EPINEPHRINE 0.22MG
500 AEROSOL WITH ADAPTER (ML) INHALATION DAILY
Qty: 150 CAPSULE | Refills: 11 | Status: SHIPPED | OUTPATIENT
Start: 2023-12-04 | End: 2024-12-03

## 2023-12-04 NOTE — PROGRESS NOTES
[facility]  Follow Up Patient Visit      Disease Summary     Principle neurological diagnosis: MS    Date of symptom onset: 1991  Date of diagnosis: 1991  Disease type at diagnosis: RR  Disease type currently: SP  Previous therapy: NA  Current therapy: NA  Last MRI Brain: 7/17/2020  Last MRI C-spine: date unknown  Last MRI T-spine: date unknown  CSF: reportedly consistent with MS  JCV status: NA  Other relevant labs and tests: NA    Interval history:     She has had a relapsing course until 1995. Since then relatively relapse.   Last seen 12/21/21 by Dr. Oneil. At that time she was suffering from long COVID symptoms.  Fatigue has been worse since COVID. She struggles to walk a block whereas before she was pretty active.     She uses rollator for long distances. She walks by herself at home unassisted.     She also has trouble sleeping with frequent night time awakening due to urinary frequency, and day time sleepiness. She wears CPAP at night for KEM. Follows with sleep clinic frequently  She drinks caffeine daily. She tries to stop drinking fluids after 8 PM but still has urinary frequency at night. She received botox via urology and wears depends at night. Follows with Dr. Ross in urology frequency.     She also has bipolar disorder.     ROS:     SOCIAL HISTORY  Living arrangements - the patient lives with their family.  Social History     Socioeconomic History    Marital status:    Occupational History    Occupation: [employer]   Tobacco Use    Smoking status: Never    Smokeless tobacco: Never   Substance and Sexual Activity    Alcohol use: Not Currently    Drug use: Never    Sexual activity: Yes     Partners: Male   Social History Narrative    [place] , [place]     SASHA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, 25 children 2 daughter      Social Determinants of Health     Financial Resource Strain: Low Risk  (5/19/2023)    Overall Financial Resource Strain  (CARDIA)     Difficulty of Paying Living Expenses: Not hard at all   Food Insecurity: No Food Insecurity (5/19/2023)    Hunger Vital Sign     Worried About Running Out of Food in the Last Year: Never true     Ran Out of Food in the Last Year: Never true   Transportation Needs: No Transportation Needs (5/19/2023)    PRAPARE - Transportation     Lack of Transportation (Medical): No     Lack of Transportation (Non-Medical): No   Physical Activity: Insufficiently Active (5/19/2023)    Exercise Vital Sign     Days of Exercise per Week: 3 days     Minutes of Exercise per Session: 10 min   Stress: No Stress Concern Present (5/19/2023)    [employer] - Occupational Stress Questionnaire     Feeling of Stress : Not at all   Social Connections: Socially Integrated (5/19/2023)    Social Connection and Isolation Panel [NHANES]     Frequency of Communication with Friends and Family: More than three times a week     Frequency of Social Gatherings with Friends and Family: More than three times a week     Attends Judaism Services: More than 4 times per year     Active Member of Clubs or Organizations: Yes     Attends Club or Organization Meetings: More than 4 times per year     Marital Status:    Housing Stability: Low Risk  (5/19/2023)    Housing Stability Vital Sign     Unable to Pay for Housing in the Last Year: No     Number of Places Lived in the Last Year: 1     Unstable Housing in the Last Year: No           12/21/2021     1:56 PM   REVIEW OF SYMPTOMS   Do you feel abnormally tired on most days? Yes   Do you feel you generally sleep well? No   Do you have difficulty controlling your bladder?  Yes   Do you have difficulty controlling your bowels?  No   Do you have frequent muscle cramps, tightness or spasms in your limbs?  No   Do you have new visual symptoms?  No   Do you have worsening difficulty with your memory or thinking? No   Do you have worsening symptoms of anxiety or depression?   "No   For patients who walk, Do you have more difficulty walking?  Yes   Have you fallen since your last visit?  Yes   For patients who use wheelchairs: Do you have any skin wounds or breakdown? Not Applicable   Do you have difficulty using your hands?  No   Do you have shooting or burning pain? No   Do you have difficulty with sexual function?  No   If you are sexually active, are you using birth control? Y/N  N/A Not Applicable   Do you often choke when swallowing liquids or solid food?  No   Do you experience worsening symptoms when overheated? Yes   Do you need any new equipment such as a wheelchair, walker or shower chair? No   Do you receive co-pay financial assistance for your principal MS medicine? Not Applicable   Would you be interested in participating in an MS research trial in the future? No   For patients on Gilenya, Tecfidera, Aubagio, Rituxan, Ocrevus, Tysabri, Lemtrada or Methotrexate, are you aware that you should NOT receive live virus vaccines?  Not Applicable   Do you feel you have adequate family/friend support?  Yes   Do you have health insurance?   Yes   Are you currently employed? No   Do you receive SSDI/SSI?  No   Do you use marijuana or cannabis products? No   Have you been diagnosed with a urinary tract infection since your last visit here? Yes   Have you been diagnosed with a respiratory tract infection since your last visit here? No   Have you been to the emergency room since your last visit here? Yes   Have you been hospitalized since your last visit here?  No           12/21/2021     2:04 PM   MS JESSE-7 SCORE & INTERPRETATION   JESSE-7 SCORE  0   JESSE-7 SCORE INTERPRETATION Normal     Exam:     Vitals:    12/04/23 1220   BP: 117/87   Pulse: 82   Weight: 84.5 kg (186 lb 2.9 oz)   Height: 5' 3" (1.6 m)          In general, the patient is well nourished and appears to be in no acute distress.    MENTAL STATUS: language is fluent, normal verbal comprehension, short-term and remote memory is " intact, attention is normal, patient is alert and oriented, fund of knowlege is appropriate by vocabulary.     CRANIAL NERVE EXAM:  There is no intrernuclear ophthalmoplegia.  Extraocular muscles are intact. No facial asymmetry. Facial sensation is intact bilaterally. There is no dysarthria. Uvula is midline, and palate moves symmetrically. Shoulder shrug intact bilaterlly. Tongue protrusion is midline. Hearing is intact to finger rub bilaterally. Neck is supple with full ROM    MOTOR EXAM: Normal bulk and tone throughout UE and LE bilaterally.   R pronator drift (mild), rapid sequential movements are normal;     L wrist s/p wrist surgery    Strength:  R deltoid 5/5, L deltoid 5/5  R biceps 5/5, L biceps 5/5  R triceps 5/5, L triceps 5/5  R finger flexors 5/5, L finger flexors 5/5  R finger abductors 5/5, L finger abductors 4/5  R  hip flexors 5/5, L hip flexors 5/5  R knee extensors 5/5, L knee extensors 5/5  R knee flexors 5/5, L knee flexors 5/5  R ankle dorsiflexors 5/5, L ankle dorsiflexors 5/5  R ankle plantarflexors 5/5, L ankle plantarflexors 5/5    REFLEXES: 2+ and symmetric throughout in all four extremeties;    SENSORY EXAM: Normal to light touch, vibration throughout.    COORDINATION: Normal finger-to-nose exam (slowed on LUE). Normal heel-to-shin exam.    GAIT: Stooped posture, normal base, slowed speed.     Negative Romberg's        7/24/2020    12:00 AM 12/4/2023    12:01 AM   Timed 25 Foot Walk:   Did patient wear an AFO? No No   Was assistive device used? No No   Time for 25 Foot Walk (seconds) 9.8 9.65   Time for 25 Foot Walk (seconds) 10.3 9.28         Imaging (personally reviewed):         Results for orders placed during the hospital encounter of 07/17/20    MRI Brain Demyelinating W W/O Contrast    Impression  Findings in the cerebral white matter which are typical for multiple sclerosis.  No enhancing lesions to suggest active disease.      Electronically signed by: Steven Hay  "MD  Date:    07/17/2020  Time:    15:28    No results found for this or any previous visit.    No results found for this or any previous visit.      Labs:     Lab Results   Component Value Date    AZOXVFTR18AX 42 05/13/2020    KUKNOJMR63TE 35 02/26/2020    XOYKWPCO61RI 33 05/17/2017     No results found for: "JCVINDEX", "JCVANTIBODY"  No results found for: "SU2ZBXPQ", "ABSOLUTECD3", "AF9AIURH", "ABSOLUTECD8", "BN9LTOJL", "ABSOLUTECD4", "LABCD48"  Lab Results   Component Value Date    WBC 4.56 09/13/2023    RBC 4.43 09/13/2023    HGB 13.7 09/13/2023    HCT 42.4 09/13/2023    MCV 96 09/13/2023    MCH 30.9 09/13/2023    MCHC 32.3 09/13/2023    RDW 13.0 09/13/2023     09/13/2023    MPV 10.6 09/13/2023    GRAN 4.7 10/07/2022    GRAN 87.9 (H) 10/07/2022    LYMPH 0.3 (L) 10/07/2022    LYMPH 5.2 (L) 10/07/2022    MONO 0.3 10/07/2022    MONO 6.3 10/07/2022    EOS 0.0 10/07/2022    BASO 0.01 10/07/2022    EOSINOPHIL 0.2 10/07/2022    BASOPHIL 0.2 10/07/2022     Sodium   Date Value Ref Range Status   09/13/2023 144 136 - 145 mmol/L Final     Potassium   Date Value Ref Range Status   09/13/2023 4.3 3.5 - 5.1 mmol/L Final     Chloride   Date Value Ref Range Status   09/13/2023 106 95 - 110 mmol/L Final     CO2   Date Value Ref Range Status   09/13/2023 27 23 - 29 mmol/L Final     Glucose   Date Value Ref Range Status   09/13/2023 155 (H) 70 - 110 mg/dL Final     BUN   Date Value Ref Range Status   09/13/2023 17 8 - 23 mg/dL Final     Creatinine   Date Value Ref Range Status   09/13/2023 1.0 0.5 - 1.4 mg/dL Final     Calcium   Date Value Ref Range Status   09/13/2023 9.6 8.7 - 10.5 mg/dL Final     Total Protein   Date Value Ref Range Status   09/13/2023 6.8 6.0 - 8.4 g/dL Final     Albumin   Date Value Ref Range Status   09/13/2023 3.7 3.5 - 5.2 g/dL Final     Total Bilirubin   Date Value Ref Range Status   09/13/2023 0.4 0.1 - 1.0 mg/dL Final     Comment:     For infants and newborns, interpretation of results should be " based  on gestational age, weight and in agreement with clinical  observations.    Premature Infant recommended reference ranges:  Up to 24 hours.............<8.0 mg/dL  Up to 48 hours............<12.0 mg/dL  3-5 days..................<15.0 mg/dL  6-29 days.................<15.0 mg/dL       Alkaline Phosphatase   Date Value Ref Range Status   09/13/2023 61 55 - 135 U/L Final     AST   Date Value Ref Range Status   09/13/2023 21 10 - 40 U/L Final     ALT   Date Value Ref Range Status   09/13/2023 36 10 - 44 U/L Final     Anion Gap   Date Value Ref Range Status   09/13/2023 11 8 - 16 mmol/L Final     eGFR if    Date Value Ref Range Status   01/03/2022 >60.0 >60 mL/min/1.73 m^2 Final     eGFR if non    Date Value Ref Range Status   01/03/2022 >60.0 >60 mL/min/1.73 m^2 Final     Comment:     Calculation used to obtain the estimated glomerular filtration  rate (eGFR) is the CKD-EPI equation.                   Diagnosis/Assessment/Plan:     Multiple Sclerosis  -Assessment: Long standing history of MS, initially relapsing course, now seems to be in secondary progressive course, however, exam stable compared to 3 years ago. Her subjective worsening may be related to other illnesses associated with MS  -Imaging: Updated MRI C and T spine  -Disease Modifying Therapies: Okay to monitor off therapy, maximize symptom management  Symptom management   -Urinary frequency/incontinence: follow up with Dr. Ross regarding frequent nighttime urination   -Fatigue: labs today to rule out nutritional causes, while long COVID may have pulmonary complications, suspect her day time fatigue may be contributed by frequent night time awakening from urinary issues, she is compliant with CPAP. Trial of CoQ10 500mg.    -Check vitamin D    -Patient interested in resuming aquatic therapy, will place referral  Plan discussed and questions were answered to satisfaction.  Return to clinic in 6 months         NEURO MULTIPLE  SCLEROSIS IMPRESSION:   MS Status:     Number of relapses in the past year?:  0    Clinical Progression:  Clinically Stable    MRI Progression:  Stable  Plan:     DMT:  No change in management    Symptom Management:  Implement change in symptom management    Implement Change in Symptom Management:  Fatigue, Bladder and Adaptive Needs        Total time spent with the patient: 60 minutes, including face to face consultation, chart review and coordination of care, on the day of the visit. This includes face to face time and non-face to face time preparing to see the patient (eg, review of tests), obtaining and/or reviewing separately obtained history, documenting clinical information in the electronic or other health record, independently interpreting resultsand communicating results to the patient/family/caregiver, or care coordination.         Valencia Sanford MD, MSc  Attending neurologist

## 2023-12-04 NOTE — PATIENT INSTRUCTIONS
Aquatic therapy referral    Coenzyme Q10 - increase to 500mg a day for fatigue     Updated MRI brain and spine     Labs today

## 2023-12-05 LAB — VIT B12 SERPL-MCNC: >1400 NG/L (ref 180–914)

## 2023-12-06 ENCOUNTER — DOCUMENTATION ONLY (OUTPATIENT)
Dept: REHABILITATION | Facility: HOSPITAL | Age: 68
End: 2023-12-06
Payer: MEDICARE

## 2023-12-06 LAB — NEUROFILAMENT LIGHT CHAIN, PLASMA: 17 PG/ML

## 2023-12-06 NOTE — PROGRESS NOTES
In doing chart review for [facility] OT evaluation for Aquatics, referral noted with Diagnosis of MS.     Contacted Niesha and she confirmed that she would not be able to tolerate heated pool and would like to be able to receive services in another setting.     Supervisor notified.     CLEMENT Shrestha, OTR/L, CEAS-I  12/6/2023

## 2023-12-11 ENCOUNTER — CLINICAL SUPPORT (OUTPATIENT)
Dept: REHABILITATION | Facility: HOSPITAL | Age: 68
End: 2023-12-11
Payer: MEDICARE

## 2023-12-11 ENCOUNTER — TELEPHONE (OUTPATIENT)
Dept: INTERNAL MEDICINE | Facility: CLINIC | Age: 68
End: 2023-12-11
Payer: MEDICARE

## 2023-12-11 DIAGNOSIS — G35 MS (MULTIPLE SCLEROSIS): Primary | ICD-10-CM

## 2023-12-11 PROCEDURE — 97165 OT EVAL LOW COMPLEX 30 MIN: CPT | Mod: PN

## 2023-12-11 NOTE — PLAN OF CARE
[facility]   Occupational Therapy Initial Neurological Evaluation     Name: Silvana Quezada  Clinic Number: [id number]    Therapy Diagnosis:   Encounter Diagnosis   Name Primary?    MS (multiple sclerosis) Yes     Physician: Valencia Sanford MD    Physician Orders: Eval and Treat  Medical Diagnosis: G35 (ICD-10-CM) - MS (multiple sclerosis)   Evaluation Date: 12/11/2023  Insurance Authorization Period Expiration: 12/31/2023  Plan of Care Certification Period: 12/11/2023  Visit # / Visits authorized: 1 / 1  FOTO: 1 / 3    Precautions:  Standard    Time In: 2:32  Time Out: 3:10  Total Billable Time: 38 minutes    Subjective     Date of Onset: MS diagnosis 1995, COVID dx 2020    History of Current Condition: Niesha reports she is having difficulty with her lower extremity strength and general endurance, specifically when ambulating community distances. She states she has an upcoming trip to visit her daughter in [place] for the holidays and she and her  have been practicing stairs to prepare. Niesha has been keeping up with her home exercise program from the previous Occupational Therapy admission as well as playing piano to maintain coordination in her upper extremities. She does not notice any weakness in her arms and is performing activities of daily living and instrumental activities of daily living at baseline.     Involved Side: lower extremities  Dominant Side: Right    Imaging: MRI studies reviewed     Previous Therapy: Participated in therapy at this clinic in the past.     Pain:  She does not report pain during the eval and does not experience pain at home     Occupation:  retired - pre-     Functional Limitations/Social History:    Prior Level of Function: Independent with all ADLs.   Current Level of Function: mod I     Current Functional Status   Home/Living environment: lives with their spouse    - single story home, 1 steps to enter    - DME: Walker,  "Straight cane, Rollator, BSC, and Shower chair       Limitation of Functional Status as follows:   ADLs/IADLs:     - Feeding: mod I to set up - some assistance with opening a water bottle     - Bathing: mod I     - Dressing: mod I   - Grooming: mod I     - Home Management:  is doing instrumental activities of daily living     - Driving: not currently driving      Leisure: piano, reading,     Patient's Goals for Therapy: "I would like to have better balance, endurance with walking"     Past Medical History/Physical Systems Review:     Past Medical History:  Silvana Quezada  has a past medical history of Bipolar 1 disorder, Breast cancer, Breast cyst, Closed fracture of proximal end of right humerus, Depression, History of right shoulder fracture, MS (multiple sclerosis), Osteoporosis, unspecified, Pneumonia, and Wrist fracture.    Past Surgical History:  Silvana Quezada  has a past surgical history that includes Brain surgery (1991); Foot surgery (october 2013); Cystoscopy (N/A, 7/11/2018); Injection of botulinum toxin type A (N/A, 7/11/2018); Tonsillectomy; Cystoscopy (N/A, 4/10/2019); Injection of botulinum toxin type A (N/A, 4/10/2019); Open reduction and internal fixation (ORIF) of injury of wrist (Left, 12/27/2019); Breast biopsy (Left, 2009); Breast biopsy (Right); Breast lumpectomy (Right, 2001); Total Reduction Mammoplasty (Left, 11/5/2020); Hysterectomy; Cystoscopy (10/21/2021); Injection of botulinum toxin type A (10/21/2021); Colonoscopy (N/A, 1/7/2022); Bladder fulguration (8/31/2022); Cystoscopy (N/A, 8/2/2023); and Injection of botulinum toxin type A (N/A, 8/2/2023).    Current Medications:  Silvana has a current medication list which includes the following prescription(s): alendronate, bupropion, bupropion, calcium-vitamin d3, catheter, coenzyme q10, coenzyme q10, cyanocobalamin, fluphenazine, folic acid, multivitamin, and oxcarbazepine.    Allergies:  Review of patient's allergies indicates: "   Allergen Reactions    Adhesive      blisters    Keflex [cephalexin] Rash    Cephalosporins         Objective     Cognitive Exam:  Oriented Person, Place, Time, and Situation   Behaviors normal, cooperative   Follows Commands/attention: Follows multistep  commands   Communication clear/fluent   Memory Impaired STM as determined by 3 word recall after 1 minute and 3 minutes sock, tree, house; increased time to recall 3rd word after 1 minute; 3 cues for sock after 3 minutes    Safety awareness/insight to disability aware of diagnosis, treatment, and prognosis   Coping skills/emotional control Appropriate to situation     Physical Exam:  Postural examination/scapula alignment: Rounded shoulder, Head forward, Slouched posture, and Increased kyphosis  Joint integrity: Firm end feeling  Skin integrity: intact on eval   Edema: no edema present on eval    Palpation: no pain with palpation   bilateral active range of motion and manual muscle test within normal limits.    *WNL - Within Normal Limits  *NT = Not Tested    Fist: normal     Strength: (NBA Dynamometer in lbs.) Average 3 trials, Position II:     12/11/2023 12/11/2023   Rung II Right Left   Trial 1 40# 27#   Trial 2 45# 30#   Trial 3 35# 25#   Average 40# 27#     Normal  Average Values  Female Right Left Male: Right Left   20-29 66 lbs 62 lbs         94 lbs 86 lbs   30-39 68 lbs 64 lbs 90 lbs 82 lbs   40-49 64 lbs 62 lbs 80 lbs 74 lbs   50-59 62 lbs 57 lbs 72 lbs 65 lbs   60-69 53 lbs 51 lbs 63 lbs 56 lbs   70+ 44 lbs 42 lbs 54 lbs 48 lbs   SD = +/- 19lbs       Pinch Strength (Measured in lbs)     12/11/2023 12/11/2023    Right Left   Key Pinch 15 # 11 #   3pt Pinch 10 # 8 #   2pt Pinch 7 # 5 #       Fine/Gross Motor Coordination    Assessment  Right   12/11/2023 Left  12/11/2023   9 hole peg test 9 pegs in/out for time :41 :58   ABDIRAHMAN (Rapid Alternating Movements)    intact   intact   Finger to Nose (5 times)  intact intact   Finger Flicks (coordination moving  from digit flexion to digit extension)  intact intact   *WNL - Within Normal Limits  *NT = Not Tested    Sensation:  Silvana  reports no change in sensation,      Sensation Intact  Impaired Comments    [place]  Deep Touch (6.65) [x] []     Protective Sensation (4.56) [x] []     Light Touch (3.61) [x] []           Other Kinesthesia  [] []     Temperature [x] []     Stereognosis  [] []      Endurance Deficit: moderate           Intake Outcome Measure for FOTO Survey    Therapist reviewed FOTO scores for Silvana Quezada on 12/11/2023.   FOTO report - see Media section or FOTO account episode details.    Intake Score: 54%         Treatment     Treatment not provided this session.     Home Exercises and Patient Education Provided     Education provided:   -role of OT, goals for OT, scheduling/cancellations, insurance limitations with patient.  -Additional Education provided: eval only, physical therapy and speech therapy referrals     Written Home Exercises Provided: Patient instructed to cont prior HEP.  Exercises were reviewed and Niesha was able to demonstrate them prior to the end of the session.    Niesha demonstrated good  understanding of the education provided.     See EMR under Patient Instructions for exercises provided prior visit.    Assessment     Silvana Quezada is a 68 y.o. female referred to outpatient occupational therapy and presents with a medical diagnosis of G35 (ICD-10-CM) - MS (multiple sclerosis).  Niesha arrived to today's evaluation ambulating with no DME. She reports she has her rollator in the car when she needs to go far distances and does not report any falls recently. During evaluation, she exhibited bilateral upper extremities active range of motion, manual muscle strength, /pinch strength and fine motor coordination within normal limits. She does not endorse pain or any paresthesias at this time and is performing all self care tasks at baseline. Her main concern includes lower  extremity strength impairments, decreased activity tolerance and impaired memory/cognitive functioning. Due to patient goals, she would benefit from a physical therapy and speech therapy evaluation. At this time, skilled Occupational Therapy services are not warranted. She does report understanding of previous home exercise program issued during last Occupational Therapy admission. Niesha is agreeable to Occupational Therapy eval only and request for physical therapy and speech therapy orders. Thank you for your referral.     Anticipated barriers to occupational therapy: chronicity of impairments     Plan of care discussed with patient: Yes  Patient's spiritual, cultural and educational needs considered and patient is agreeable to the plan of care and goals as stated below:     Medical Necessity is demonstrated by the following  Occupational Profile/History  Co-morbidities and personal factors that may impact the plan of care [x] LOW: Brief chart review  [] MODERATE: Expanded chart review   [] HIGH: Extensive chart review       Examination  Performance deficits relating to physical, cognitive or psychosocial skills that result in activity limitations and/or participation restrictions  [x] LOW: addressing 1-3 Performance deficits  [] MODERATE: 3-5 Performance deficits  [] HIGH: 5+ Performance deficits (please support below)    Moderate / High Support Documentation:    Physical:  Muscle Endurance    Cognitive:  Memory    Psychosocial:    No Deficits     Treatment Options [x] LOW: Limited options  [] MODERATE: Several options  [] HIGH: Multiple options      Decision Making/ Complexity Score: low       The following goals were discussed with the patient and patient is in agreement with them as to be addressed in the treatment plan.     Goals:  Short Term Goals: 0 weeks   - Pt will report understanding of Occupational Therapy in outpatient services    Long Term Goals: 0 weeks   - Pt will report understanding of home  exercise program that was implemented during previous admission     Plan   Certification Period/Plan of care expiration: 12/11/2023 to 12/11/2023.    Eval only/one time treatment: Pt would not benefit from skilled occupational therapy services at this time. Pt demonstrates good understanding of all education topics and HEP to be performed independently at home.     Recommendations: Pt would benefit from skilled physical therapy and speech therapy evaluations/treatments to address patient goals related to balance, ambulatory endurance and cognition.     Corinne Rapier, OT        Physician's Signature: _________________________________________ Date: ________________

## 2023-12-11 NOTE — TELEPHONE ENCOUNTER
----- Message from Corinne Rapier, OT sent at 12/11/2023  4:11 PM CST -----  Regarding: PT and SLP orders  Good evening Dr. Avila,     I evaluated Ms. Silvana Quezada this afternoon for Occupational Therapy. She did well, bilateral upper extremities are within normal limits for strength and coordination. She is not having any difficulty with self care or instrumental activities of daily living and reported her main concerns are lower extremity weakness and ambulatory endurance. She also reported concerns about her memory/executive functioning skills. Due to these concerns, would you be willing to place physical therapy and speech therapy orders for her?     Thank you for your help with this!   Corinne Rapier, LOTR

## 2023-12-15 ENCOUNTER — CLINICAL SUPPORT (OUTPATIENT)
Dept: REHABILITATION | Facility: HOSPITAL | Age: 68
End: 2023-12-15
Attending: INTERNAL MEDICINE
Payer: MEDICARE

## 2023-12-15 DIAGNOSIS — M81.0 OSTEOPOROSIS, UNSPECIFIED OSTEOPOROSIS TYPE, UNSPECIFIED PATHOLOGICAL FRACTURE PRESENCE: ICD-10-CM

## 2023-12-15 DIAGNOSIS — G35 MS (MULTIPLE SCLEROSIS): ICD-10-CM

## 2023-12-15 PROCEDURE — 92523 SPEECH SOUND LANG COMPREHEN: CPT | Mod: PN

## 2023-12-15 RX ORDER — ALENDRONATE SODIUM 70 MG/1
70 TABLET ORAL
Qty: 12 TABLET | Refills: 0 | Status: SHIPPED | OUTPATIENT
Start: 2023-12-15

## 2023-12-15 NOTE — TELEPHONE ENCOUNTER
Refill Routing Note   Medication(s) are not appropriate for processing by [facility] for the following reason(s):        Outside of protocol    [facility] action(s):  Route             Appointments  past 12m or future 3m with PCP    Date Provider   Last Visit   9/15/2023 Jhonatan Avila III, MD   Next Visit   1/10/2024 Jhonatan Avila III, MD   ED visits in past 90 days: 0        Note composed:10:46 AM 12/15/2023

## 2023-12-15 NOTE — TELEPHONE ENCOUNTER
Care Due:                  Date            Visit Type   Department     Provider  --------------------------------------------------------------------------------                                EP -                              PRIMARY      [facility] INTERNAL  Last Visit: 09-      CARE ([facility])   MEDICINE       Jhonatan Avila                              [facility]                              PRIMARY      [facility] INTERNAL  Next Visit: 01-      [facility] ([facility])   MEDICINE       Jhonatan Avila                                                            Last  Test          Frequency    Reason                     Performed    Due Date  --------------------------------------------------------------------------------    Mg Level....  12 months..  alendronate..............  Not Found    Overdue    Phosphate...  12 months..  alendronate..............  Not Found    Overdue    [employer] Embedded Care Due Messages. Reference number: [id number].   12/15/2023 10:33:28 AM CST

## 2023-12-18 NOTE — PROGRESS NOTES
[facility]  Speech Therapy Evaluation ONLY -Neurological Rehabilitation    Date: 12/15/2023     Name: Silvana Quezada   MRN: [id number]    Therapy Diagnosis:   Encounter Diagnosis   Name Primary?    MS (multiple sclerosis)     Physician: Jhonatan Avila III, MD  Physician Orders: Ambulatory Referral to Speech Therapy   Medical Diagnosis: G35 (ICD-10-CM) - MS (multiple sclerosis)    Visit # / Visits Authorized:  1/1  Date of Evaluation:  12/15/2023   Insurance Authorization Period: 12/11/23 to 12/31/23  Plan of Care Certification:    12/15/2023      Time In:1350   Time Out: 1430   Total time: 40    Procedure   Speech Language Evaluation      Precautions: Fall  Subjective   Date of Onset: no particular onset is reported  History of Current Condition:  Silvana Quezada is a 68 y.o. female who presents to [facility] Outpatient Speech Therapy for evaluation secondary to multiple sclerosis. She was referred by the occupational therapist for a speech therapy evaluation due to patient reports of impaired memory and cognitive functioning. However, patient denies any cognitive deficits currently. Patient was referred to therapy by Jhonatan Avila III, MD , which is the patient's primary care provider. Patient reports no concerns for her executive functioning abilities at this time. She feels she is functioning the same as she has for the last several years. When mentioning the referral from a recent occupational therapy evaluation, patient does not endorse any memory, word finding, or ADL limitations.  Patient was accompanied to the evaluation by her  who remained in the lobby for the duration of the evaluation.   Past Medical History: Silvana Quezada  has a past medical history of Bipolar 1 disorder, Breast cancer (2001), Breast cyst, Closed fracture of proximal end of right humerus (3/9/2015), Depression, History of right shoulder fracture, MS (multiple sclerosis), Osteoporosis,  unspecified, Pneumonia (3/27/2020), and Wrist fracture (left).  Silvana Quezada  has a past surgical history that includes Brain surgery (1991); Foot surgery (october 2013); Cystoscopy (N/A, 7/11/2018); Injection of botulinum toxin type A (N/A, 7/11/2018); Tonsillectomy; Cystoscopy (N/A, 4/10/2019); Injection of botulinum toxin type A (N/A, 4/10/2019); Open reduction and internal fixation (ORIF) of injury of wrist (Left, 12/27/2019); Breast biopsy (Left, 2009); Breast biopsy (Right); Breast lumpectomy (Right, 2001); Total Reduction Mammoplasty (Left, 11/5/2020); Hysterectomy; Cystoscopy (10/21/2021); Injection of botulinum toxin type A (10/21/2021); Colonoscopy (N/A, 1/7/2022); Bladder fulguration (8/31/2022); Cystoscopy (N/A, 8/2/2023); and Injection of botulinum toxin type A (N/A, 8/2/2023).  Medical Hx and Allergies: Silvana has a current medication list which includes the following prescription(s): alendronate, bupropion, bupropion, calcium-vitamin d3, catheter, coenzyme q10, coenzyme q10, cyanocobalamin, fluphenazine, folic acid, multivitamin, and oxcarbazepine.   Review of patient's allergies indicates:   Allergen Reactions    Adhesive      blisters    Keflex [cephalexin] Rash    Cephalosporins    Imaging: CT Head 12/23/22 Impression:No CT evidence of acute intracranial abnormality. Clinical correlation and further evaluation as warranted. Right frontal scalp soft tissue hematoma and laceration.  No evidence of depressed calvarial fracture. Patchy and confluent periventricular white matter hypoattenuation, similar to prior examination, which may relate to patient's reported history of demyelinating disease.  Prior Therapy:  No prior ST  Social History:  Patient lives with her  in [place]. She has 2 adult children. Patient is not currently driving. She enjoys playing music.  Occupation:  Retired  and .  Prior Level of Function: 100% independent with activities of  "daily living   Current Level of Function: Participates in household chores, no current limitations or impairments per patient's report  Pain Scale: 0/10 on a Visual Analog Scale currently.  Pain Location: n/a  Patient's Therapy Goals:  "I'm not too sure. I'm going on a trip soon."  Objective   Patient participated in a few standardized testing stimuli to assess her cognitive-communicative abilities. Below are the subtests that were administered as well as informal measures.    Scales of Cognitive and Communicative Ability for Neurorehabilitation (SCCAN) was administered to assess cognitive and communicative functioning.        Raw Scores  Percentage Score  Oral Expression (OE)  16/19   84   Orientation (OR)   11/12   92  Memory (ME)   14/19   74    A raw score and percentage score was unable to obtained for the domains listed below due to not administering particular portions of the SCCAN assessment. However, observations and clinical judgment were utilized, computing the information below:  Speech Comprehension (SP)    Patient followed 1 and 2 step directions, both simple and complex, without difficulty. She identified pictures and objects as requested with 100% accuracy independently. In conversational speech, she understood both structured and spontaneous questions, as well as understood topics spoken about.  Reading Comprehension (RD)    Limited assessment of reading was completed during this evaluation. However, patient read sentences without difficulty and comprehended instructions presented visually with 100% accuracy independently.  Writing (WR)     Patient wrote words and a sentence to dictation, generated a written sentence as a written picture description task, as well as her first and last name with 100% accuracy independently.  Attention (AT)    Patient sustained attention to visual and verbal stimuli for considerable amounts of time. Her ability to task switch as well as maintain attention was not " impaired.  Problem Solving (PS)     Patient completed a variety of problem solving tasks, notable for decreased detail and planning for a verbal sequencing task, as well as a functional mathematical assessment for determining the sale price for an item. Patient reports assistance from her  as needed. Her safety awareness is within functional limits.    Treatment   Total Treatment Time Separate from Evaluation: not applicable   No treatment performed secondary to time to complete evaluation.     Education provided:   -role of Speech Therapy, goals/plan of care, scheduling/cancellations, insurance limitations with patient  -Additional Education provided:   Results of evaluation  Return to care fo r any changes noticed in cognitive or executive functioning    Patient and family members () expressed understanding.     Home Program: remain active in the community as able, continue activities of daily living  Assessment     Niesha presents to [facility] status post medical diagnosis of multiple sclerosis. Patient denies any recent cognitive or executive function issues and does not endorse memory impairments today. She was referred for a speech therapy evaluation following a recent occupational therapy evaluation where she reported difficulties in memory. However, today she feels she has been functioning at her baseline levels and has no concerns.     Interpretation of objective assessment: Patient participated in a variety of tasks focusing on assessing her cognitive-communicative abilities through the use of the Scales of Cognitive and Communicative Ability for Neurorehabilitation (SCCAN). Portions of this assessment were administered to the patient with good attention and interest throughout the evaluation. Through both patient reports and clinical judgment of the speech-language pathologist, patient presents with cognitive and executive functioning skills at her baseline level. Though  her memory subtest score was slightly lower than average, patient reports use of compensatory strategies in her home environment to help with this, which may be a part of the typical aging process. Patient and speech-language pathologist are in agreement that speech therapy is not indicated at this time, and she is aware to return to clinic should there be a change in her functioning.    Demonstrates impairments including limitations as described in the problem list.     Positive prognostic factors: engagement  Negative prognostic factors: n/a  Barriers to therapy: No barriers to therapy identified.     Patient's spiritual, cultural, and educational needs considered and patient agreeable to plan of care and goals.    Patient will not benefit from skilled therapy. She is currently performing at her baseline level of function.    Plan     Continue to remain active in your home and community as you are able  Return to clinic should you notice any change in your cognitive or executive function abilities    Therapist's Name:   Dori Ca MS, CCC-SLP, CBIS  Speech-Language Pathologist  Certified Brain Injury Specialist  12/18/2023

## 2023-12-18 NOTE — PLAN OF CARE
[facility]  Speech Therapy Evaluation ONLY -Neurological Rehabilitation    Date: 12/15/2023     Name: Silvana Quezada   MRN: [id number]    Therapy Diagnosis:   Encounter Diagnosis   Name Primary?    MS (multiple sclerosis)     Physician: Jhonatan Avila III, MD  Physician Orders: Ambulatory Referral to Speech Therapy   Medical Diagnosis: G35 (ICD-10-CM) - MS (multiple sclerosis)    Visit # / Visits Authorized:  1/1  Date of Evaluation:  12/15/2023   Insurance Authorization Period: 12/11/23 to 12/31/23  Plan of Care Certification:    12/15/2023      Time In:1350   Time Out: 1430   Total time: 40    Procedure   Speech Language Evaluation      Precautions: Fall  Subjective   Date of Onset: no particular onset is reported  History of Current Condition:  Silvana Quezada is a 68 y.o. female who presents to [facility] Speech Therapy for evaluation secondary to multiple sclerosis. She was referred by the occupational therapist for a speech therapy evaluation due to patient reports of impaired memory and cognitive functioning. However, patient denies any cognitive deficits currently. Patient was referred to therapy by Jhonatan Avila III, MD , which is the patient's primary care provider. Patient reports no concerns for her executive functioning abilities at this time. She feels she is functioning the same as she has for the last several years. When mentioning the referral from a recent occupational therapy evaluation, patient does not endorse any memory, word finding, or ADL limitations.  Patient was accompanied to the evaluation by her  who remained in the lobby for the duration of the evaluation.   Past Medical History: Silvana Quezada  has a past medical history of Bipolar 1 disorder, Breast cancer (2001), Breast cyst, Closed fracture of proximal end of right humerus (3/9/2015), Depression, History of right shoulder fracture, MS (multiple sclerosis), Osteoporosis,  unspecified, Pneumonia (3/27/2020), and Wrist fracture (left).  Silvana Quezada  has a past surgical history that includes Brain surgery (1991); Foot surgery (october 2013); Cystoscopy (N/A, 7/11/2018); Injection of botulinum toxin type A (N/A, 7/11/2018); Tonsillectomy; Cystoscopy (N/A, 4/10/2019); Injection of botulinum toxin type A (N/A, 4/10/2019); Open reduction and internal fixation (ORIF) of injury of wrist (Left, 12/27/2019); Breast biopsy (Left, 2009); Breast biopsy (Right); Breast lumpectomy (Right, 2001); Total Reduction Mammoplasty (Left, 11/5/2020); Hysterectomy; Cystoscopy (10/21/2021); Injection of botulinum toxin type A (10/21/2021); Colonoscopy (N/A, 1/7/2022); Bladder fulguration (8/31/2022); Cystoscopy (N/A, 8/2/2023); and Injection of botulinum toxin type A (N/A, 8/2/2023).  Medical Hx and Allergies: Silvana has a current medication list which includes the following prescription(s): alendronate, bupropion, bupropion, calcium-vitamin d3, catheter, coenzyme q10, coenzyme q10, cyanocobalamin, fluphenazine, folic acid, multivitamin, and oxcarbazepine.   Review of patient's allergies indicates:   Allergen Reactions    Adhesive      blisters    Keflex [cephalexin] Rash    Cephalosporins    Imaging: CT Head 12/23/22 Impression:No CT evidence of acute intracranial abnormality. Clinical correlation and further evaluation as warranted. Right frontal scalp soft tissue hematoma and laceration.  No evidence of depressed calvarial fracture. Patchy and confluent periventricular white matter hypoattenuation, similar to prior examination, which may relate to patient's reported history of demyelinating disease.  Prior Therapy:  No prior ST  Social History:  Patient lives with her  in [place]. She has 2 adult children. Patient is not currently driving. She enjoys playing music.  Occupation:  Retired  and .  Prior Level of Function: 100% independent with activities of  "daily living   Current Level of Function: Participates in household chores, no current limitations or impairments per patient's report  Pain Scale: 0/10 on a Visual Analog Scale currently.  Pain Location: n/a  Patient's Therapy Goals:  "I'm not too sure. I'm going on a trip soon."  Objective   Patient participated in a few standardized testing stimuli to assess her cognitive-communicative abilities. Below are the subtests that were administered as well as informal measures.    Scales of Cognitive and Communicative Ability for Neurorehabilitation (SCCAN) was administered to assess cognitive and communicative functioning.        Raw Scores  Percentage Score  Oral Expression (OE)  16/19   84   Orientation (OR)   11/12   92  Memory (ME)   14/19   74    A raw score and percentage score was unable to obtained for the domains listed below due to not administering particular portions of the SCCAN assessment. However, observations and clinical judgment were utilized, computing the information below:  Speech Comprehension (SP)    Patient followed 1 and 2 step directions, both simple and complex, without difficulty. She identified pictures and objects as requested with 100% accuracy independently. In conversational speech, she understood both structured and spontaneous questions, as well as understood topics spoken about.  Reading Comprehension (RD)    Limited assessment of reading was completed during this evaluation. However, patient read sentences without difficulty and comprehended instructions presented visually with 100% accuracy independently.  Writing (WR)     Patient wrote words and a sentence to dictation, generated a written sentence as a written picture description task, as well as her first and last name with 100% accuracy independently.  Attention (AT)    Patient sustained attention to visual and verbal stimuli for considerable amounts of time. Her ability to task switch as well as maintain attention was not " impaired.  Problem Solving (PS)     Patient completed a variety of problem solving tasks, notable for decreased detail and planning for a verbal sequencing task, as well as a functional mathematical assessment for determining the sale price for an item. Patient reports assistance from her  as needed. Her safety awareness is within functional limits.    Treatment   Total Treatment Time Separate from Evaluation: not applicable   No treatment performed secondary to time to complete evaluation.     Education provided:   -role of Speech Therapy, goals/plan of care, scheduling/cancellations, insurance limitations with patient  -Additional Education provided:   Results of evaluation  Return to care fo r any changes noticed in cognitive or executive functioning    Patient and family members () expressed understanding.     Home Program: remain active in the community as able, continue activities of daily living  Assessment     Niesha presents to [facility] status post medical diagnosis of multiple sclerosis. Patient denies any recent cognitive or executive function issues and does not endorse memory impairments today. She was referred for a speech therapy evaluation following a recent occupational therapy evaluation where she reported difficulties in memory. However, today she feels she has been functioning at her baseline levels and has no concerns.     Interpretation of objective assessment: Patient participated in a variety of tasks focusing on assessing her cognitive-communicative abilities through the use of the Scales of Cognitive and Communicative Ability for Neurorehabilitation (SCCAN). Portions of this assessment were administered to the patient with good attention and interest throughout the evaluation. Through both patient reports and clinical judgment of the speech-language pathologist, patient presents with cognitive and executive functioning skills at her baseline level. Though  her memory subtest score was slightly lower than average, patient reports use of compensatory strategies in her home environment to help with this, which may be a part of the typical aging process. Patient and speech-language pathologist are in agreement that speech therapy is not indicated at this time, and she is aware to return to clinic should there be a change in her functioning.    Demonstrates impairments including limitations as described in the problem list.     Positive prognostic factors: engagement  Negative prognostic factors: n/a  Barriers to therapy: No barriers to therapy identified.     Patient's spiritual, cultural, and educational needs considered and patient agreeable to plan of care and goals.    Patient will not benefit from skilled therapy. She is currently performing at her baseline level of function.    Plan     Continue to remain active in your home and community as you are able  Return to clinic should you notice any change in your cognitive or executive function abilities    Therapist's Name:   Dori Ca MS, CCC-SLP, CBIS  Speech-Language Pathologist  Certified Brain Injury Specialist  12/18/2023

## 2024-01-03 ENCOUNTER — CLINICAL SUPPORT (OUTPATIENT)
Dept: REHABILITATION | Facility: HOSPITAL | Age: 69
End: 2024-01-03
Attending: INTERNAL MEDICINE
Payer: MEDICARE

## 2024-01-03 DIAGNOSIS — G35 MS (MULTIPLE SCLEROSIS): ICD-10-CM

## 2024-01-03 DIAGNOSIS — Z74.09 IMPAIRED FUNCTIONAL MOBILITY, BALANCE, GAIT, AND ENDURANCE: Primary | ICD-10-CM

## 2024-01-03 PROCEDURE — 97161 PT EVAL LOW COMPLEX 20 MIN: CPT | Mod: PN

## 2024-01-03 NOTE — PLAN OF CARE
[facility]   Physical Therapy Initial Evaluation      Name: Silvana Quezada  Clinic Number: [id number]    Therapy Diagnosis:   Encounter Diagnoses   Name Primary?    MS (multiple sclerosis)     Impaired functional mobility, balance, gait, and endurance Yes        Physician: Jhonatan Avila III, MD    Physician Orders: PT Eval and Treat   Medical Diagnosis from Referral: [id number] (ICD-10-CM) - Multiple sclerosis  Evaluation Date: 1/3/2024  Authorization Period Expiration: 1/5/2024  Plan of Care Expiration: 12/31/2024  Progress Note Due: N/A  Visit # / Visits authorized: 1/ 1   FOTO: 1/ 3    Precautions:  do not overfatigue      Time In: 0855  Time Out: 09:35  Total Billable Time: 40 minutes (1 low eval)    Subjective     Date of onset: MS since 1991    History of current condition - Niesha reports: she wants to get back active in the [employer] MS program and wanted to be seen by PT prior to starting. She also reported left breast/nipple pain during session.    Falls: 0     Imaging: scheduled 1 month ago, patient did not complete    Prior Therapy: beginning of the year 2022  Social History:  lives with their spouse  Occupation: retired from teaching  Prior Level of Function: see d/c 2022  Current Level of Function: indep with all basic ADLs, unable to cook or do moderate to higher level ADLs    Pain:  Current 2/10, worst 2/10, best 0/10   Location: left breast  Description: discomfort in left nipple    Patients goals: to go back to aquatic program ASAP     Medical History:   Past Medical History:   Diagnosis Date    Bipolar 1 disorder     Breast cancer 2001    right lumpectomy-radiation & chemo taxol    Breast cyst     Closed fracture of proximal end of right humerus 3/9/2015    Depression     History of right shoulder fracture     MS (multiple sclerosis)     presented as dizzines, dx vision,     Osteoporosis, unspecified     Pneumonia 3/27/2020    Wrist fracture left    2019     Surgical  History:   Silvana Quezada  has a past surgical history that includes Brain surgery (1991); Foot surgery (october 2013); Cystoscopy (N/A, 7/11/2018); Injection of botulinum toxin type A (N/A, 7/11/2018); Tonsillectomy; Cystoscopy (N/A, 4/10/2019); Injection of botulinum toxin type A (N/A, 4/10/2019); Open reduction and internal fixation (ORIF) of injury of wrist (Left, 12/27/2019); Breast biopsy (Left, 2009); Breast biopsy (Right); Breast lumpectomy (Right, 2001); Total Reduction Mammoplasty (Left, 11/5/2020); Hysterectomy; Cystoscopy (10/21/2021); Injection of botulinum toxin type A (10/21/2021); Colonoscopy (N/A, 1/7/2022); Bladder fulguration (8/31/2022); Cystoscopy (N/A, 8/2/2023); and Injection of botulinum toxin type A (N/A, 8/2/2023).    Medications:   Silvana has a current medication list which includes the following prescription(s): alendronate, bupropion, bupropion, calcium-vitamin d3, catheter, coenzyme q10, coenzyme q10, cyanocobalamin, fluphenazine, folic acid, multivitamin, and oxcarbazepine.    Allergies:   Review of patient's allergies indicates:   Allergen Reactions    Adhesive      blisters    Keflex [cephalexin] Rash    Cephalosporins         Objective       Evaluation   30 second Chair Rise  (adults > 59 y/o) 11 completed with no arms   5 times sit-stand  (adults 18-65 y/o) 13.5 seconds  >12 sec= fall risk for general elderly  >16 sec= fall risk for Parkinson's disease  >10 sec= balance/vestibular dysfunction (<59 y/o)  >14.2 sec= balance/vestibular dysfunction (>59 y/o)  >12 sec= fall risk for CVA      Evaluation   Timed Up and Go 13.5 sec  < 20 sec safe for independent transfers, < 30 sec safe for dependent transfers/assist required   Self Selected Walking Speed 0.77 m/sec (6m/7.7s)     Dynamic Gait Index  1. Gait on level surface: 2. Mild impairment: Walks 20', uses assistive devices, slower speed, mild gait deviations.   2. Change in Gait speed: 2. Mild impairment: Is able to change speed, but  demonstrates mild gait deviations, or no gait deviations but unable to achieve a signifcant change in velocity, or uses an assistive device.  3. Gait with Horizontal Head Turns: 3. Normal: Performs head turns smoothly with no change in gait.  4. Gait with Vertical Head Turns: 3. Normal: Performs head turns with no change in gait  5. Gait and Pivot turn: 3. Normal: Pivot turns safely within 3 seconds and stops quickly with no loss of balance  6. Step Over Obstacle: 2. Mild impairment: Is able to step over box, but must slow down and adjust steps to clear box safely.  7. Step around obstacles: 2. Mild impairment: Is able to step around both cones, but must slow down and adjust steps to clear cones.  8. Steps: 1. Moderate impairment: Two feet to a stair, must use rail.    Score: 17/24    Interpretation: < 19/24 = predictive of falls in the elderly  > 22/24 = safe ambulators    - 6 Minute Walk Test Distance: 750 feet without AD with short standing rests that lasted ~5 secs max, RPE of 6/10 reported    Intake Outcome Measure for FOTO MS Survey    Therapist reviewed FOTO scores for Silvana Quezada on 1/3/2024.   FOTO report - see Media section or FOTO account episode details.    Intake Score: 54%       Treatment     No treatment indicated    Patient Education and Home Exercises     Education provided:   - start MS aquatic exercise program  - continue stair walking program with   Assessment     Silvana is a 68 y.o. female referred to outpatient Physical Therapy with a medical diagnosis of MS. Patient presents functioning at same or improved status compared to last PT episode in early 2022.  She is safe to begin aquatic program for community fitness.     Patient prognosis is Good.   No skilled PT indicated this PT episode.    Plan of care discussed with patient: Yes  Patient's spiritual, cultural and educational needs considered and patient is agreeable to the plan of care and goals as stated below:     Anticipated  Barriers for therapy: fatigue    Medical Necessity is demonstrated by the following    Medical Necessity is demonstrated by the following  History  Co-morbidities and personal factors that may impact the plan of care Co-morbidities:   Bipolar 1 disorder   Breast cancer   right-radiation & chemo   Breast cyst   Closed fracture of proximal end of right humerus   Depression   History of right shoulder fracture   MS (multiple sclerosis)   presented as dizzines, dx vision,    Osteoporosis, unspecified   Pneumonia     Personal Factors:   no deficits     high   Examination  Body Structures and Functions, activity limitations and participation restrictions that may impact the plan of care Body Regions:   lower extremities  upper extremities  trunk    Body Systems:    gross symmetry  gross coordinated movement  balance  gait  transfers  transitions  motor control  motor learning  RPE    Participation Restrictions:   Decreased quality of life secondary to impairments    Activity limitations:   Learning and applying knowledge  no deficits    General Tasks and Commands  undertaking multiple tasks    Communication  no deficits    Mobility  lifting and carrying objects  fine hand use (grasping/picking up)  walking  using transportation (bus, train, plane, car)  driving (bike, car, motorcycle)    Self care  washing oneself (bathing, drying, washing hands)  looking after one's health    Domestic Life  shopping  cooking  doing house work (cleaning house, washing dishes, laundry)  assisting others    Interactions/Relationships  no deficits    Life Areas  no deficits    Community and Social Life  community life  recreation and leisure         low   Clinical Presentation evolving clinical presentation with changing clinical characteristics low   Decision Making/ Complexity Score: low     No goals indicated  Plan     Discharge on eval, no skilled PT indicated.  PT recommends aquatic PT program for community fitness    Iwona DENIS  Vora, PT        Physician's Signature: _________________________________________ Date: ________________

## 2024-01-04 ENCOUNTER — TELEPHONE (OUTPATIENT)
Dept: PSYCHIATRY | Facility: CLINIC | Age: 69
End: 2024-01-04
Payer: MEDICARE

## 2024-01-04 DIAGNOSIS — F31.9 BIPOLAR AFFECTIVE DISORDER, REMISSION STATUS UNSPECIFIED: ICD-10-CM

## 2024-01-04 RX ORDER — FLUPHENAZINE HYDROCHLORIDE 1 MG/1
1 TABLET ORAL NIGHTLY
Qty: 30 TABLET | Refills: 1 | Status: SHIPPED | OUTPATIENT
Start: 2024-01-04

## 2024-01-04 NOTE — TELEPHONE ENCOUNTER
Former pt of deloris Munoz (retired) requesting fluphenazine refill. L/s in October 2022 when plan made to continue meds unchanged, given 1 year supply of regills. Has f/u with new provider KRYSTAL Macias 2/29/24. Will consult with Dr. Chun for review/approval, as deemed appropriate, of bridge supply of requested med.

## 2024-01-08 ENCOUNTER — LAB VISIT (OUTPATIENT)
Dept: LAB | Facility: HOSPITAL | Age: 69
End: 2024-01-08
Attending: INTERNAL MEDICINE
Payer: MEDICARE

## 2024-01-08 DIAGNOSIS — D69.6 THROMBOCYTOPENIA: ICD-10-CM

## 2024-01-08 DIAGNOSIS — R73.09 OTHER ABNORMAL GLUCOSE: ICD-10-CM

## 2024-01-08 DIAGNOSIS — M81.0 AGE-RELATED OSTEOPOROSIS WITHOUT CURRENT PATHOLOGICAL FRACTURE: ICD-10-CM

## 2024-01-08 DIAGNOSIS — E74.39 GLUCOSE INTOLERANCE: ICD-10-CM

## 2024-01-08 LAB
ALBUMIN SERPL BCP-MCNC: 3.6 G/DL (ref 3.5–5.2)
ALP SERPL-CCNC: 52 U/L (ref 55–135)
ALT SERPL W/O P-5'-P-CCNC: 21 U/L (ref 10–44)
ANION GAP SERPL CALC-SCNC: 8 MMOL/L (ref 8–16)
AST SERPL-CCNC: 15 U/L (ref 10–40)
BILIRUB DIRECT SERPL-MCNC: 0.2 MG/DL (ref 0.1–0.3)
BILIRUB SERPL-MCNC: 0.5 MG/DL (ref 0.1–1)
BUN SERPL-MCNC: 18 MG/DL (ref 8–23)
CALCIUM SERPL-MCNC: 9.3 MG/DL (ref 8.7–10.5)
CHLORIDE SERPL-SCNC: 108 MMOL/L (ref 95–110)
CO2 SERPL-SCNC: 28 MMOL/L (ref 23–29)
CREAT SERPL-MCNC: 0.9 MG/DL (ref 0.5–1.4)
ERYTHROCYTE [DISTWIDTH] IN BLOOD BY AUTOMATED COUNT: 13 % (ref 11.5–14.5)
EST. GFR  (NO RACE VARIABLE): >60 ML/MIN/1.73 M^2
ESTIMATED AVG GLUCOSE: 100 MG/DL (ref 68–131)
GLUCOSE SERPL-MCNC: 101 MG/DL (ref 70–110)
HBA1C MFR BLD: 5.1 % (ref 4–5.6)
HCT VFR BLD AUTO: 42.4 % (ref 37–48.5)
HGB BLD-MCNC: 13.5 G/DL (ref 12–16)
MCH RBC QN AUTO: 30.8 PG (ref 27–31)
MCHC RBC AUTO-ENTMCNC: 31.8 G/DL (ref 32–36)
MCV RBC AUTO: 97 FL (ref 82–98)
PLATELET # BLD AUTO: 183 K/UL (ref 150–450)
PMV BLD AUTO: 11.4 FL (ref 9.2–12.9)
POTASSIUM SERPL-SCNC: 4.2 MMOL/L (ref 3.5–5.1)
PROT SERPL-MCNC: 6.4 G/DL (ref 6–8.4)
RBC # BLD AUTO: 4.38 M/UL (ref 4–5.4)
SODIUM SERPL-SCNC: 144 MMOL/L (ref 136–145)
WBC # BLD AUTO: 4.49 K/UL (ref 3.9–12.7)

## 2024-01-08 PROCEDURE — 83036 HEMOGLOBIN GLYCOSYLATED A1C: CPT | Performed by: INTERNAL MEDICINE

## 2024-01-08 PROCEDURE — 36415 COLL VENOUS BLD VENIPUNCTURE: CPT | Mod: PO | Performed by: INTERNAL MEDICINE

## 2024-01-08 PROCEDURE — 80076 HEPATIC FUNCTION PANEL: CPT | Performed by: INTERNAL MEDICINE

## 2024-01-08 PROCEDURE — 80048 BASIC METABOLIC PNL TOTAL CA: CPT | Performed by: INTERNAL MEDICINE

## 2024-01-08 PROCEDURE — 85027 COMPLETE CBC AUTOMATED: CPT | Performed by: INTERNAL MEDICINE

## 2024-01-09 ENCOUNTER — HOSPITAL ENCOUNTER (OUTPATIENT)
Dept: RADIOLOGY | Facility: HOSPITAL | Age: 69
Discharge: HOME | End: 2024-01-09
Attending: STUDENT IN AN ORGANIZED HEALTH CARE EDUCATION/TRAINING PROGRAM
Payer: MEDICARE

## 2024-01-09 DIAGNOSIS — G35 MS (MULTIPLE SCLEROSIS): ICD-10-CM

## 2024-01-09 PROCEDURE — 70551 MRI BRAIN STEM W/O DYE: CPT | Mod: TC

## 2024-01-09 PROCEDURE — 70551 MRI BRAIN STEM W/O DYE: CPT | Mod: 26,,, | Performed by: RADIOLOGY

## 2024-01-09 PROCEDURE — 72146 MRI CHEST SPINE W/O DYE: CPT | Mod: 26,,, | Performed by: RADIOLOGY

## 2024-01-09 PROCEDURE — 72141 MRI NECK SPINE W/O DYE: CPT | Mod: 26,,, | Performed by: RADIOLOGY

## 2024-01-09 PROCEDURE — 72146 MRI CHEST SPINE W/O DYE: CPT | Mod: TC

## 2024-01-09 PROCEDURE — 72141 MRI NECK SPINE W/O DYE: CPT | Mod: TC

## 2024-01-09 NOTE — PROGRESS NOTES
Assessment:         1. Shortness of breath    2. Bipolar I disorder, most recent episode depressed, moderate    3. Thrombocytopenia    4. Thoracic aorta atherosclerosis    5. Mass of left breast, unspecified quadrant    6. Breast pain, left    7. Abnormal CT of the chest    8. Positive FIT (fecal immunochemical test)          Plan:           Silvana was seen today for follow-up.    Diagnoses and all orders for this visit:    Shortness of breath    New   Uncontrolled  Signs, symptoms consistent with unclear etiology   history or pyhsical exam do not suggest acs, vte   DDx includes but not limited to ihd, deconditionnig, ild, asthma/copd  I provided instruction on supportive care measures   Prior tesing reviewed and new testing was was given  no change   reviewed signs and symptoms that should prompt return to provider or evaluation in the ED  -     CT Chest Without Contrast; Future  -     X-Ray Chest PA And Lateral; Future    Bipolar I disorder, most recent episode depressed, moderate  chronic  stable  Continue current regimen and follow up with  psych      Thrombocytopenia  resolved    Thoracic aorta atherosclerosis  Chronic  Controlled  Patient is at goal today   I have reviewed lifestyle modification to achieve/maintain goals  We will continue the current medication regimen as listed below statin  Patient will follow up in 6 months     Mass of left breast, unspecified quadrant    New   Uncontrolled  Signs, symptoms consistent with unclear etiology   history or pyhsical exam do not suggest mastitis   DDx includes but not limited to bca, fibroademnoa  I provided instruction on supportive care measures   Prior tesing reviewed and new testing was was given   reviewed signs and symptoms that should prompt return to provider or evaluation in the ED  -     Mammo Digital Diagnostic Left with [facility]; Future  -     US Breast Left Complete; Future    -     Mammo Digital Diagnostic Left with Harlan; Future  -     US Breast Left  Complete; Future    Abnormal CT of the chest  -     CT Chest Without Contrast; Future    Positive FIT (fecal immunochemical test)  S/p normal colonscopy     CXR  Ct chest  Fu if neg fu wit pulm and cards   Fu in 6 months with me         I spent at least 40 mins with preparing to see the patient, obtaining and/or revieweing separately obtained history, preforming a examination and evaluation, counseling, communicating with other health care professional, documenting clinical information in the electronic health record,  and/or coordinating care.               Subjective:       Patient ID: Silvana Quezada is a 68 y.o. female.    Chief Complaint: Follow-up      Interim Hx  Last seen by me in September  Seen by neurology    Concerns today  Breast lump   Reports that she has been having a lump in the breast and pain in the left breast without discharge dimpling or skin changes     SOB  She and the hospital report shortness of breath , heavy breath with activity and rest  Denies any chest pain, LLE< orthopena, PND  She is using CPAP  No hematuira, melena, hematochezia  UTD on cancer screening      Lab Results   Component Value Date    WBC 4.49 01/08/2024    HGB 13.5 01/08/2024    HCT 42.4 01/08/2024    MCV 97 01/08/2024     01/08/2024       Lab Results   Component Value Date    BNP 32 10/07/2022     Lab Results   Component Value Date    TSH 2.164 12/04/2023       No results found for this or any previous visit.  Impression:     1. Subtle hypoperfusion along the anterior wall on stress images without an associated wall motion abnormality is believed to be due to soft tissue attenuation artifact.  True reversible ischemia is felt to be less likely.  2. No evidence of infarct.  3. No focal wall motion abnormalities.  4. Left ventricular ejection fraction 78% during stress and 80% during rest.        Electronically signed by: Rodríguez Stein  Date:                                            06/21/2021    Normal sinus  rhythm   Normal ECG   When compared with ECG of 07-OCT-2022 08:52,   No significant change was found   Confirmed by ANGIE ARGUELLES MD ([id number]) on 1/6/2023 8:14:02 AM       Impression:     1. Examination considered diagnostic to the proximal segmental pulmonary arterial level without convincing evidence of acute pulmonary embolism.  2. Geographic areas of ground-glass versus mosaic attenuation could relate to subsegmental atelectasis, edema or infectious/inflammatory etiology versus small airways and/or small vessel disease, respectively.  3. At least 2 nonspecific solid pulmonary nodules measuring up to 4-5 mm.  For multiple solid nodules all <6 mm, Fleischner Society 2017 guidelines recommend no routine follow up for a low risk patient, or follow up with non-contrast chest CT at 12 months after discovery in a high risk patient.  4. Additional details, as provided in the body of the report.        Electronically signed by: Roberto Alan  Date:                                            10/07/2022  Seen in the ED for pneumonia in 10/2022        Chronic problems     Patient Active Problem List   Diagnosis    MS (multiple sclerosis)    Urgency-frequency syndrome    History of breast cancer    Localized osteoporosis with current pathological fracture    Incomplete bladder emptying    Overactive bladder    Thoracic aorta atherosclerosis    Tortuous aorta    Bipolar I disorder, most recent episode depressed, moderate    Wrist fracture, closed, left, initial encounter    Age-related osteoporosis without current pathological fracture    Thrombocytopenia    Urge incontinence    Wrist pain, left    Macromastia    OAB (overactive bladder)    Other fatigue    Impaired functional mobility, balance, gait, and endurance    Positive FIT (fecal immunochemical test)    KEM (obstructive sleep apnea)         HPI    Review of Systems   All other systems reviewed and are negative.            Health Maintenance Due   Topic Date Due     "TETANUS VACCINE  Never done    High Dose Statin  Never done    Shingles Vaccine (1 of 2) Never done    RSV Vaccine (Age 60+ and Pregnant patients) (1 - 1-dose 60+ series) Never done         Objective:     /60 (BP Location: Right arm, Patient Position: Sitting, BP Method: Large (Manual))   Pulse 76   Ht 5' 3" (1.6 m)   Wt 86.6 kg (190 lb 14.7 oz)   LMP  (LMP Unknown)   SpO2 98%   BMI 33.82 kg/m²         1/10/2024    10:48 AM 12/4/2023    12:20 PM 9/15/2023    10:28 AM 8/2/2023     2:43 PM 5/19/2023    11:04 AM   Vitals   Height 5' 3" (1.6 m) 5' 3" (1.6 m) 5' 3" (1.6 m) 5' 3" (1.6 m)    Weight (lbs) 190.92 186.18 186.29 181 181.88   BMI (kg/m2) 33.8 33 33 32.1                 Physical Exam  Vitals and nursing note reviewed.   Constitutional:       General: She is not in acute distress.     Appearance: She is well-developed. She is not diaphoretic.   HENT:      Head: Normocephalic.      Nose: Nose normal.   Eyes:      General:         Right eye: No discharge.         Left eye: No discharge.      Conjunctiva/sclera: Conjunctivae normal.      Pupils: Pupils are equal, round, and reactive to light.   Cardiovascular:      Rate and Rhythm: Normal rate and regular rhythm.      Heart sounds: Normal heart sounds. No murmur heard.     No friction rub. No gallop.   Pulmonary:      Effort: Pulmonary effort is normal. No respiratory distress.      Breath sounds: No stridor. No wheezing, rhonchi or rales.   Chest:      Chest wall: No mass, swelling, tenderness, crepitus or edema.   Breasts:     Breasts are symmetrical.      Left: Normal. No swelling, bleeding, inverted nipple, mass, nipple discharge, skin change or tenderness.   Abdominal:      General: Bowel sounds are normal. There is no distension.      Palpations: Abdomen is soft.   Musculoskeletal:         General: No deformity. Normal range of motion.      Cervical back: Normal range of motion.   Lymphadenopathy:      Upper Body:      Left upper body: No " supraclavicular, axillary or pectoral adenopathy.   Skin:     General: Skin is warm.   Neurological:      Mental Status: She is alert and oriented to person, place, and time.      Cranial Nerves: No cranial nerve deficit.           Recent Results (from the past 336 hour(s))   Basic Metabolic Panel    Collection Time: 01/08/24  8:28 AM   Result Value Ref Range    Sodium 144 136 - 145 mmol/L    Potassium 4.2 3.5 - 5.1 mmol/L    Chloride 108 95 - 110 mmol/L    CO2 28 23 - 29 mmol/L    Glucose 101 70 - 110 mg/dL    BUN 18 8 - 23 mg/dL    Creatinine 0.9 0.5 - 1.4 mg/dL    Calcium 9.3 8.7 - 10.5 mg/dL    Anion Gap 8 8 - 16 mmol/L    eGFR >60.0 >60 mL/min/1.73 m^2   CBC Without Differential    Collection Time: 01/08/24  8:28 AM   Result Value Ref Range    WBC 4.49 3.90 - 12.70 K/uL    RBC 4.38 4.00 - 5.40 M/uL    Hemoglobin 13.5 12.0 - 16.0 g/dL    Hematocrit 42.4 37.0 - 48.5 %    MCV 97 82 - 98 fL    MCH 30.8 27.0 - 31.0 pg    MCHC 31.8 (L) 32.0 - 36.0 g/dL    RDW 13.0 11.5 - 14.5 %    Platelets 183 150 - 450 K/uL    MPV 11.4 9.2 - 12.9 fL   Hepatic Function Panel    Collection Time: 01/08/24  8:28 AM   Result Value Ref Range    Total Protein 6.4 6.0 - 8.4 g/dL    Albumin 3.6 3.5 - 5.2 g/dL    Total Bilirubin 0.5 0.1 - 1.0 mg/dL    Bilirubin, Direct 0.2 0.1 - 0.3 mg/dL    AST 15 10 - 40 U/L    ALT 21 10 - 44 U/L    Alkaline Phosphatase 52 (L) 55 - 135 U/L   Hemoglobin A1C    Collection Time: 01/08/24  8:28 AM   Result Value Ref Range    Hemoglobin A1C 5.1 4.0 - 5.6 %    Estimated Avg Glucose 100 68 - 131 mg/dL       Future Appointments   Date Time Provider Department Center   1/11/2024 10:30 AM Megan Montero NP [facility]   1/16/2024  1:30 PM [facility] MAMMO2 [facility] MAMMO [facility]   1/16/2024  2:00 PM [facility] US4 [facility] USOUNDO [facility]   1/16/2024  2:45 PM [facility] CT1 LIMIT 450 LBS [facility] CT SCAN [facility]   2/29/2024 11:00 AM Devi Macias, KRYSTAL [facility] PSYCH [facility]   6/5/2024  1:20 PM Valencia Sanford MD [facility] MSC  [facility]   7/10/2024 11:00 AM Jhonatan Avila III, MD [facility]         Medication List with Changes/Refills   Current Medications    ALENDRONATE (FOSAMAX) 70 MG TABLET    TAKE 1 TABLET(70 MG) BY MOUTH EVERY 7 DAYS    BUPROPION (WELLBUTRIN SR) 100 MG TBSR 12 HR TABLET    Take 2 tablets (200 mg total) by mouth every morning.    BUPROPION (WELLBUTRIN SR) 150 MG TBSR 12 HR TABLET    Take 1 tablet (150 mg total) by mouth every evening.    CALCIUM-VITAMIN D3 (OS-IGOR 500 + D3) 500 MG-5 MCG (200 UNIT) PER TABLET    Take 1 tablet by mouth 2 (two) times daily with meals.    CATHETER (FEMALE CATHETER) 14 FR [facility] [place]    Patient is to self catheterize four times a day indefinitley using female 12 fr in and out catheter for incomplete bladder emptying.   Dispense 120 month with 11 refills or 360 every 3 months with 3 refills depending on patient's preference    COENZYME Q10 10 MG CAPSULE    Take 10 mg by mouth once daily.    COENZYME Q10 100 MG CAPSULE    Take 5 capsules (500 mg total) by mouth once daily.    CYANOCOBALAMIN (VITAMIN B-12) 1000 MCG TABLET    Take 1,000 mcg by mouth once daily.    FLUPHENAZINE (PROLIXIN) 1 MG TABLET    Take 1 tablet (1 mg total) by mouth every evening.    FOLIC ACID (FOLVITE) 1 MG TABLET    Take 1 tablet (1,000 mcg total) by mouth once daily.    MULTIVITAMIN (ONE DAILY MULTIVITAMIN) PER TABLET    Take 1 tablet by mouth once daily.    OXCARBAZEPINE (TRILEPTAL) 150 MG TAB    Take 1 tablet (150 mg total) by mouth 2 (two) times daily.         Disclaimer:  This note has been generated using voice-recognition software. There may be grammatical or spelling errors that have been missed during proof-reading

## 2024-01-10 ENCOUNTER — HOSPITAL ENCOUNTER (OUTPATIENT)
Dept: RADIOLOGY | Facility: HOSPITAL | Age: 69
Discharge: HOME | End: 2024-01-10
Attending: INTERNAL MEDICINE
Payer: MEDICARE

## 2024-01-10 ENCOUNTER — OFFICE VISIT (OUTPATIENT)
Dept: INTERNAL MEDICINE | Facility: CLINIC | Age: 69
End: 2024-01-10
Payer: MEDICARE

## 2024-01-10 VITALS
HEIGHT: 63 IN | DIASTOLIC BLOOD PRESSURE: 60 MMHG | HEART RATE: 76 BPM | WEIGHT: 190.94 LBS | BODY MASS INDEX: 33.83 KG/M2 | SYSTOLIC BLOOD PRESSURE: 130 MMHG | OXYGEN SATURATION: 98 %

## 2024-01-10 DIAGNOSIS — N63.20 MASS OF LEFT BREAST, UNSPECIFIED QUADRANT: ICD-10-CM

## 2024-01-10 DIAGNOSIS — R19.5 POSITIVE FIT (FECAL IMMUNOCHEMICAL TEST): ICD-10-CM

## 2024-01-10 DIAGNOSIS — R93.89 ABNORMAL CT OF THE CHEST: ICD-10-CM

## 2024-01-10 DIAGNOSIS — R06.02 SHORTNESS OF BREATH: ICD-10-CM

## 2024-01-10 DIAGNOSIS — R06.02 SHORTNESS OF BREATH: Primary | ICD-10-CM

## 2024-01-10 DIAGNOSIS — I70.0 THORACIC AORTA ATHEROSCLEROSIS: ICD-10-CM

## 2024-01-10 DIAGNOSIS — N64.4 BREAST PAIN, LEFT: ICD-10-CM

## 2024-01-10 DIAGNOSIS — F31.32 BIPOLAR I DISORDER, MOST RECENT EPISODE DEPRESSED, MODERATE: ICD-10-CM

## 2024-01-10 PROCEDURE — 99999 PR PBB SHADOW E&M-EST. PATIENT-LVL V: CPT | Mod: PBBFAC,,, | Performed by: INTERNAL MEDICINE

## 2024-01-10 PROCEDURE — 71046 X-RAY EXAM CHEST 2 VIEWS: CPT | Mod: 26,,, | Performed by: RADIOLOGY

## 2024-01-10 PROCEDURE — 71046 X-RAY EXAM CHEST 2 VIEWS: CPT | Mod: TC,FY,PO

## 2024-01-10 PROCEDURE — 99215 OFFICE O/P EST HI 40 MIN: CPT | Mod: S$GLB,,, | Performed by: INTERNAL MEDICINE

## 2024-01-11 ENCOUNTER — TELEPHONE (OUTPATIENT)
Dept: INTERNAL MEDICINE | Facility: CLINIC | Age: 69
End: 2024-01-11
Payer: MEDICARE

## 2024-01-11 ENCOUNTER — OFFICE VISIT (OUTPATIENT)
Dept: NEUROLOGY | Facility: CLINIC | Age: 69
End: 2024-01-11
Payer: MEDICARE

## 2024-01-11 VITALS
DIASTOLIC BLOOD PRESSURE: 86 MMHG | HEART RATE: 82 BPM | BODY MASS INDEX: 36.14 KG/M2 | SYSTOLIC BLOOD PRESSURE: 125 MMHG | HEIGHT: 63 IN | WEIGHT: 203.94 LBS

## 2024-01-11 DIAGNOSIS — G35 MS (MULTIPLE SCLEROSIS): Primary | ICD-10-CM

## 2024-01-11 PROCEDURE — 99999 PR PBB SHADOW E&M-EST. PATIENT-LVL III: CPT | Mod: PBBFAC,,,

## 2024-01-11 PROCEDURE — 99215 OFFICE O/P EST HI 40 MIN: CPT | Mod: S$GLB,,,

## 2024-01-11 NOTE — PROGRESS NOTES
Can you please call the patient about the normal results.  Chest xray    The patient does not have the portal. Thanks!

## 2024-01-11 NOTE — PROGRESS NOTES
Patient ID: Silvana Quezada is a 68 y.o. female who presents today for a routine clinic visit for MS.  She was last seen by Dr. Sanford on 12/4/2023.  The history was provided by the patient. She is accompanied by her .     Principle neurological diagnosis: MS  Date of symptom onset: 1991  Date of diagnosis: 1991  Disease type at diagnosis: RR  Disease type currently: SP  Previous therapy: NA  Current therapy: NA  Last MRI Brain: 1/9/2024 - stable   Last MRI C-spine: 1/9/2024 - lesions noted  Last MRI T-spine: 1/9/2024 - lesions noted   CSF: reportedly consistent with MS  JCV status: NA  Other relevant labs and tests: NA    Subjective:     She is continuing to feel stable neurologically, but continues to have shortness of breath.  She is continuing the workup with her PCP to determine cause.  Could be associated with long COVID.      She denies weakness, sensory changes, dysphagia, dysarthria, spasticity, visual changes, cognitive changes, mood disorder, gait disturbance, incoordination, pain, heat sensitivity, fatigue, bladder and bowel dysfunction.        SOCIAL HISTORY  Living arrangements - the patient lives with their spouse.  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     Lives with      39, 25 children 2 daughter      Social Determinants of Health     Financial Resource Strain: Low Risk  (5/19/2023)    Overall Financial Resource Strain (CARDIA)     Difficulty of Paying Living Expenses: Not hard at all   Food Insecurity: No Food Insecurity (5/19/2023)    Hunger Vital Sign     Worried About Running Out of Food in the Last Year: Never true     Ran Out  of Food in the Last Year: Never true   Transportation Needs: No Transportation Needs (5/19/2023)    PRAPARE - Transportation     Lack of Transportation (Medical): No     Lack of Transportation (Non-Medical): No   Physical Activity: Insufficiently Active (5/19/2023)    Exercise Vital Sign     Days of Exercise per Week: 3 days     Minutes of Exercise per Session: 10 min   Stress: No Stress Concern Present (5/19/2023)    Citizen of [place] of Occupational Health - Occupational Stress Questionnaire     Feeling of Stress : Not at all   Social Connections: Socially Integrated (5/19/2023)    Social Connection and Isolation Panel [NHANES]     Frequency of Communication with Friends and Family: More than three times a week     Frequency of Social Gatherings with Friends and Family: More than three times a week     Attends [employer] Services: More than 4 times per year     Active Member of Clubs or Organizations: Yes     Attends Club or Organization Meetings: More than 4 times per year     Marital Status:    Housing Stability: Low Risk  (5/19/2023)    Housing Stability Vital Sign     Unable to Pay for Housing in the Last Year: No     Number of Places Lived in the Last Year: 1     Unstable Housing in the Last Year: No       Current Outpatient Medications on File Prior to Visit   Medication Sig Dispense Refill    alendronate (FOSAMAX) 70 MG tablet TAKE 1 TABLET(70 MG) BY MOUTH EVERY 7 DAYS 12 tablet 0    buPROPion (WELLBUTRIN SR) 100 MG TBSR 12 hr tablet Take 2 tablets (200 mg total) by mouth every morning. 60 tablet 3    buPROPion (WELLBUTRIN SR) 150 MG TBSR 12 hr tablet Take 1 tablet (150 mg total) by mouth every evening. 30 tablet 3    calcium-vitamin D3 (OS-IGOR 500 + D3) 500 mg-5 mcg (200 unit) per tablet Take 1 tablet by mouth 2 (two) times daily with meals.      catheter (FEMALE CATHETER) 14 Fr [facility] Patient is to self catheterize four times a day indefinitley using female 12 fr in and out catheter for incomplete  bladder emptying.   Dispense 120 month with 11 refills or 360 every 3 months with 3 refills depending on patient's preference 120 each 11    coenzyme Q10 100 mg capsule Take 5 capsules (500 mg total) by mouth once daily. 150 capsule 11    cyanocobalamin (VITAMIN B-12) 1000 MCG tablet Take 1,000 mcg by mouth once daily.      fluphenazine (PROLIXIN) 1 MG tablet Take 1 tablet (1 mg total) by mouth every evening. 30 tablet 1    folic acid (FOLVITE) 1 MG tablet Take 1 tablet (1,000 mcg total) by mouth once daily. 90 tablet 3    multivitamin (ONE DAILY MULTIVITAMIN) per tablet Take 1 tablet by mouth once daily.      OXcarbazepine (TRILEPTAL) 150 MG Tab Take 1 tablet (150 mg total) by mouth 2 (two) times daily. 60 tablet 3     No current facility-administered medications on file prior to visit.       Objective:     1. 25 foot timed walk:      7/24/2020    12:00 AM 12/4/2023    12:01 AM   Timed 25 Foot Walk:   Did patient wear an AFO? No No   Was assistive device used? No No   Time for 25 Foot Walk (seconds) 9.8 9.65   Time for 25 Foot Walk (seconds) 10.3 9.28           NEURO EXAM    In general, the patient is well nourished and appears to be in no acute distress.    MENTAL STATUS: language is fluent, normal verbal comprehension, short-term and remote memory is intact, attention is normal, patient is alert and oriented x 3, fund of knowlege is appropriate by vocabulary.     Full neuro exam deferred at this time due to her visit was to go over MRI results.     Imaging:     Personally reviewed and discussed with patient.     Results for orders placed during the hospital encounter of 01/09/24    MRI Brain Demyelinating Without Contrast    Impression  Stable white matter lesions intracranially with no new lesion appreciated.      Electronically signed by: Long Hemphill  Date:    01/09/2024  Time:    11:03    Results for orders placed during the hospital encounter of 01/09/24    MRI Cervical Spine Demyelinating Without  "Contrast    Impression  Patchy signal abnormality within the distal cervical cord identified without a prior study for comparison.  No cord expansion.      Electronically signed by: Long Hemphill  Date:    01/09/2024  Time:    11:26    Results for orders placed during the hospital encounter of 01/09/24    MRI Thoracic Spine Demyelinating Without Contrast    Impression  Focal lesion within the thoracic cord at T6-7.  There is no prior study for comparison.  No evidence of cord expansion/overt cord edema.    Nonspecific 16 mm lesion right hepatic lobe may represent a cyst but incompletely evaluated.      Electronically signed by: Long Hemphill  Date:    01/09/2024  Time:    11:37    Results for orders placed during the hospital encounter of 07/17/20    MRI Brain Demyelinating W W/O Contrast    Impression  Findings in the cerebral white matter which are typical for multiple sclerosis.  No enhancing lesions to suggest active disease.      Electronically signed by: Steven Hay MD  Date:    07/17/2020  Time:    15:28      Labs:     Lab Results   Component Value Date    WRGBRXIU42AC 51 12/04/2023    XXOPUOGD21KW 42 05/13/2020    ICOWTSFP72KA 35 02/26/2020     No results found for: "JCVINDEX", "JCVANTIBODY"  No results found for: "FK7ICHVB", "ABSOLUTECD3", "WN8JJYFX", "ABSOLUTECD8", "LX1SIIHI", "ABSOLUTECD4", "LABCD48"  Lab Results   Component Value Date    WBC 4.49 01/08/2024    RBC 4.38 01/08/2024    HGB 13.5 01/08/2024    HCT 42.4 01/08/2024    MCV 97 01/08/2024    MCH 30.8 01/08/2024    MCHC 31.8 (L) 01/08/2024    RDW 13.0 01/08/2024     01/08/2024    MPV 11.4 01/08/2024    GRAN 4.7 10/07/2022    GRAN 87.9 (H) 10/07/2022    LYMPH 0.3 (L) 10/07/2022    LYMPH 5.2 (L) 10/07/2022    MONO 0.3 10/07/2022    MONO 6.3 10/07/2022    EOS 0.0 10/07/2022    BASO 0.01 10/07/2022    EOSINOPHIL 0.2 10/07/2022    BASOPHIL 0.2 10/07/2022     Sodium   Date Value Ref Range Status   01/08/2024 144 136 - 145 mmol/L Final " "    Potassium   Date Value Ref Range Status   01/08/2024 4.2 3.5 - 5.1 mmol/L Final     Chloride   Date Value Ref Range Status   01/08/2024 108 95 - 110 mmol/L Final     CO2   Date Value Ref Range Status   01/08/2024 28 23 - 29 mmol/L Final     Glucose   Date Value Ref Range Status   01/08/2024 101 70 - 110 mg/dL Final     BUN   Date Value Ref Range Status   01/08/2024 18 8 - 23 mg/dL Final     Creatinine   Date Value Ref Range Status   01/08/2024 0.9 0.5 - 1.4 mg/dL Final     Calcium   Date Value Ref Range Status   01/08/2024 9.3 8.7 - 10.5 mg/dL Final     Total Protein   Date Value Ref Range Status   01/08/2024 6.4 6.0 - 8.4 g/dL Final     Albumin   Date Value Ref Range Status   01/08/2024 3.6 3.5 - 5.2 g/dL Final     Total Bilirubin   Date Value Ref Range Status   01/08/2024 0.5 0.1 - 1.0 mg/dL Final     Comment:     For infants and newborns, interpretation of results should be based  on gestational age, weight and in agreement with clinical  observations.    Premature Infant recommended reference ranges:  Up to 24 hours.............<8.0 mg/dL  Up to 48 hours............<12.0 mg/dL  3-5 days..................<15.0 mg/dL  6-29 days.................<15.0 mg/dL       Alkaline Phosphatase   Date Value Ref Range Status   01/08/2024 52 (L) 55 - 135 U/L Final     AST   Date Value Ref Range Status   01/08/2024 15 10 - 40 U/L Final     ALT   Date Value Ref Range Status   01/08/2024 21 10 - 44 U/L Final     Anion Gap   Date Value Ref Range Status   01/08/2024 8 8 - 16 mmol/L Final     eGFR if    Date Value Ref Range Status   01/03/2022 >60.0 >60 mL/min/1.73 m^2 Final     eGFR if non    Date Value Ref Range Status   01/03/2022 >60.0 >60 mL/min/1.73 m^2 Final     Comment:     Calculation used to obtain the estimated glomerular filtration  rate (eGFR) is the CKD-EPI equation.        No results found for: "HEPBSAG", "HEPBSAB", "HEPBCAB"        MS Impression and Plan:     NEURO MULTIPLE SCLEROSIS " IMPRESSION:   MS Status:     Number of relapses in the past year?:  0    Clinical Progression:  Clinically Stable    MRI Progression:  Stable  Plan:     DMT:  No change in management    DMT comment:  Continue to monitor off DMT.     Symptom Management:  No change in symptom management       Continue SOB workup with PCP  Follow up as scheduled with Dr. Sanford in June  Plan discussed and questions were answered to satisfaction.     Problem List Items Addressed This Visit          Neuro    MS (multiple sclerosis) - Primary       I spent a total of 45 minutes on the day of the visit.This includes face to face time and non-face to face time preparing to see the patient (eg, review of tests), obtaining and/or reviewing separately obtained history, documenting clinical information in the electronic or other health record, independently interpreting results and communicating results to the patient/family/caregiver, or care coordinator.       LEEANNE DamianC

## 2024-01-11 NOTE — TELEPHONE ENCOUNTER
----- Message from Jhonatan Avila III, MD sent at 1/11/2024  2:25 PM CST -----  Can you please call the patient about the normal results.  Chest xray    The patient does not have the portal. Thanks!

## 2024-01-22 ENCOUNTER — PATIENT MESSAGE (OUTPATIENT)
Dept: PSYCHIATRY | Facility: CLINIC | Age: 69
End: 2024-01-22
Payer: MEDICARE

## 2024-01-31 ENCOUNTER — HOSPITAL ENCOUNTER (OUTPATIENT)
Dept: RADIOLOGY | Facility: HOSPITAL | Age: 69
Discharge: HOME | End: 2024-01-31
Attending: INTERNAL MEDICINE
Payer: MEDICARE

## 2024-01-31 DIAGNOSIS — R93.89 ABNORMAL CT OF THE CHEST: ICD-10-CM

## 2024-01-31 DIAGNOSIS — N63.20 MASS OF LEFT BREAST, UNSPECIFIED QUADRANT: ICD-10-CM

## 2024-01-31 DIAGNOSIS — R06.02 SHORTNESS OF BREATH: ICD-10-CM

## 2024-01-31 DIAGNOSIS — N64.4 BREAST PAIN, LEFT: ICD-10-CM

## 2024-01-31 PROCEDURE — 77065 DX MAMMO INCL CAD UNI: CPT | Mod: 26,LT,, | Performed by: RADIOLOGY

## 2024-01-31 PROCEDURE — 71250 CT THORAX DX C-: CPT | Mod: 26,,, | Performed by: RADIOLOGY

## 2024-01-31 PROCEDURE — 77065 DX MAMMO INCL CAD UNI: CPT | Mod: TC,LT

## 2024-01-31 PROCEDURE — 77061 BREAST TOMOSYNTHESIS UNI: CPT | Mod: 26,LT,, | Performed by: RADIOLOGY

## 2024-01-31 PROCEDURE — 76642 ULTRASOUND BREAST LIMITED: CPT | Mod: TC,LT

## 2024-01-31 PROCEDURE — 77061 BREAST TOMOSYNTHESIS UNI: CPT | Mod: TC,LT

## 2024-01-31 PROCEDURE — 76642 ULTRASOUND BREAST LIMITED: CPT | Mod: 26,LT,, | Performed by: RADIOLOGY

## 2024-01-31 PROCEDURE — 71250 CT THORAX DX C-: CPT | Mod: TC

## 2024-02-02 ENCOUNTER — TELEPHONE (OUTPATIENT)
Dept: INTERNAL MEDICINE | Facility: CLINIC | Age: 69
End: 2024-02-02
Payer: MEDICARE

## 2024-02-02 DIAGNOSIS — R91.8 ABNORMAL CT SCAN OF LUNG: ICD-10-CM

## 2024-02-02 DIAGNOSIS — N63.20 MASS OF LEFT BREAST, UNSPECIFIED QUADRANT: Primary | ICD-10-CM

## 2024-02-02 DIAGNOSIS — R06.02 SOB (SHORTNESS OF BREATH): Primary | ICD-10-CM

## 2024-02-05 DIAGNOSIS — Z85.3 HISTORY OF BREAST CANCER: Primary | ICD-10-CM

## 2024-02-22 ENCOUNTER — TELEPHONE (OUTPATIENT)
Dept: UROLOGY | Facility: CLINIC | Age: 69
End: 2024-02-22
Payer: MEDICARE

## 2024-02-22 NOTE — TELEPHONE ENCOUNTER
----- Message from Alexandra Littlejohn LPN sent at 2/20/2024  2:25 PM CST -----  Regarding: RE: appt access  Hi, this pt is on the recall screen, please schedule next avaailble appt' ( prob not until May) if you need help please IM julio cesar daly   ----- Message -----  From: Maribell Jackson  Sent: 2/20/2024   2:09 PM CST  To: [facility] Staff  Subject: appt access                                      PATIENT CALL    Pt called to schedule EP appt. Please call back at [phone]

## 2024-02-29 ENCOUNTER — OFFICE VISIT (OUTPATIENT)
Dept: PSYCHIATRY | Facility: CLINIC | Age: 69
End: 2024-02-29
Payer: MEDICARE

## 2024-02-29 VITALS
DIASTOLIC BLOOD PRESSURE: 79 MMHG | BODY MASS INDEX: 33.45 KG/M2 | WEIGHT: 188.81 LBS | SYSTOLIC BLOOD PRESSURE: 144 MMHG | HEART RATE: 84 BPM

## 2024-02-29 DIAGNOSIS — F31.75 BIPOLAR I DISORDER, MOST RECENT EPISODE DEPRESSED, IN PARTIAL REMISSION: ICD-10-CM

## 2024-02-29 DIAGNOSIS — F31.9 BIPOLAR AFFECTIVE DISORDER, REMISSION STATUS UNSPECIFIED: Primary | ICD-10-CM

## 2024-02-29 PROCEDURE — 99214 OFFICE O/P EST MOD 30 MIN: CPT | Mod: S$GLB,,,

## 2024-02-29 PROCEDURE — 99999 PR PBB SHADOW E&M-EST. PATIENT-LVL II: CPT | Mod: PBBFAC,,,

## 2024-02-29 RX ORDER — BUPROPION HYDROCHLORIDE 100 MG/1
200 TABLET, EXTENDED RELEASE ORAL EVERY MORNING
Qty: 60 TABLET | Refills: 5 | Status: SHIPPED | OUTPATIENT
Start: 2024-02-29 | End: 2024-08-27

## 2024-02-29 RX ORDER — BUPROPION HYDROCHLORIDE 150 MG/1
150 TABLET, EXTENDED RELEASE ORAL NIGHTLY
Qty: 30 TABLET | Refills: 5 | Status: SHIPPED | OUTPATIENT
Start: 2024-02-29 | End: 2024-08-27

## 2024-02-29 RX ORDER — FLUPHENAZINE HYDROCHLORIDE 1 MG/1
1 TABLET ORAL NIGHTLY
Qty: 30 TABLET | Refills: 5 | Status: SHIPPED | OUTPATIENT
Start: 2024-02-29 | End: 2024-08-27

## 2024-02-29 RX ORDER — BUPROPION HYDROCHLORIDE 100 MG/1
200 TABLET, EXTENDED RELEASE ORAL EVERY MORNING
Qty: 60 TABLET | Refills: 2 | Status: SHIPPED | OUTPATIENT
Start: 2024-02-29 | End: 2024-02-29

## 2024-02-29 RX ORDER — FLUPHENAZINE HYDROCHLORIDE 1 MG/1
1 TABLET ORAL NIGHTLY
Qty: 30 TABLET | Refills: 2 | Status: SHIPPED | OUTPATIENT
Start: 2024-02-29 | End: 2024-02-29

## 2024-02-29 RX ORDER — OXCARBAZEPINE 150 MG/1
150 TABLET, FILM COATED ORAL 2 TIMES DAILY
Qty: 60 TABLET | Refills: 5 | Status: SHIPPED | OUTPATIENT
Start: 2024-02-29 | End: 2024-08-27

## 2024-02-29 RX ORDER — BUPROPION HYDROCHLORIDE 150 MG/1
150 TABLET, EXTENDED RELEASE ORAL NIGHTLY
Qty: 30 TABLET | Refills: 2 | Status: SHIPPED | OUTPATIENT
Start: 2024-02-29 | End: 2024-02-29

## 2024-02-29 RX ORDER — OXCARBAZEPINE 150 MG/1
150 TABLET, FILM COATED ORAL 2 TIMES DAILY
Qty: 60 TABLET | Refills: 2 | Status: SHIPPED | OUTPATIENT
Start: 2024-02-29 | End: 2024-02-29

## 2024-02-29 NOTE — PROGRESS NOTES
"Outpatient Psychiatry Follow-Up Visit (MD/NP)    2/29/2024    Clinical Status of Patient:  Outpatient (Ambulatory)    Chief Complaint:  Silvana Quezada is a 68 y.o. female who presents today for follow-up of mood disorder.  Met with patient. Previous patient of Dr. Munoz (retired) and last seen by him on 10/22/2024. Seeing this provider for the first time.    Last note Dr. Munoz. 10/22/23 Pt returned again in person, casually dressed and groomed, with smiling affect.  She called between visits to report excessive drowsiness which she felt could be due to residual effects of COVID.  Prolixin was reduced by phone and she reported at this visit that she has been more alert without any return of paranoia.  Decreased medication dosing with age was discussed.  She stated that she and her  sold their old home "as is" and they are happily settled in her mother's house.  Both are more relaxed now that that is done.  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     Interval History and Content of Current Session:  Interim Events/Subjective Report/Content of Current Session: Patient presents alert, casually dressed and groomed. Reports she lost her son and mother 5 years ago. States she has an autistic, and gifted daughter who is female-transgender. Reports she knows why she's transgender. States her daughter involved in an MVA and broke her pelvis. Opioids were not effective in controlling her pain and she was given testoteron and that's why she became transgender. Reports her daughter Mina (formerly Megan) is "living as a man now"with 2 other transgender friends and they are all coming to visit for [employer] [facility]    Reports she has another daughter who is "only gifted" and states the son she lost 5 years ago was also autistic and gifted.     Reports she and her  are very active in their [employer].     She her and her  exercise by going to [email or web address] and they go up the 2 flights of stairs then take the elevator down.     Reports she has MS and she self catherizes 4 times a day. She drinks a lot of caffeine but was put on Co Q 10 and vit b12.     Reports her mood as "pretty good". Overall she feels like her medications are working well and she denies any SE. She is taking her medications as prescribed.    Denies any consistent depression symptoms over the past 2 weeks including decreased interest/motivation/pleasure/energy/appetite/concentration/sleep.  States overall sleep is ok. Denies difficulty with initiating or remaining asleep. Denies any trouble with her interests or with motivation. Denies any feelings of guilt. Overall energy level is ok. Ability to concentrate is ok. Appetite is ok. Denies any fluctuations in weight. Denies psychomotor retardation or agitation. Denies suicide/homicide ideations. Denies A/V/H.      Psychiatric History:    Positive history of manic symptoms, including decreased need for sleep, increased energy, increased goal oriented behavior, risky behavior, racing thoughts. Denies any symptoms currently.    Positive history of psychotic symptoms, including AVH, paranoia, thought insertion/broadcasting/withdrawal, delusion. Denies any symptoms currently.                   Psychotherapy:  Target symptoms: mood disorder  Why chosen therapy is appropriate versus another modality: relevant to diagnosis  Outcome monitoring methods: self-report, observation  Therapeutic intervention type: insight oriented psychotherapy, supportive psychotherapy  Topics discussed/themes: building skills sets for symptom management  The patient's response to the intervention is accepting. The patient's progress toward treatment goals is good.   Duration of intervention: 7 minutes.    Review of Systems   Review of Systems   Constitutional:  Negative for fever.   HENT:  Negative for sore throat.    Eyes:  Negative for pain.   Respiratory:  " Negative for shortness of breath.    Cardiovascular:  Negative for chest pain and palpitations.   Gastrointestinal:  Negative for abdominal pain.   Musculoskeletal:  Negative for myalgias.   Skin:  Negative for rash.   Neurological:  Negative for dizziness, weakness and headaches.   All other systems reviewed and are negative.    Past Medical, Family and Social History: The patient's past medical, family and social history have been reviewed and updated as appropriate within the electronic medical record - see encounter notes.    Compliance: yes    Side effects: None    Risk Parameters:  Patient reports no suicidal ideation  Patient reports no homicidal ideation  Patient reports no self-injurious behavior  Patient reports no violent behavior    Exam (detailed: at least 9 elements; comprehensive: all 15 elements)   Constitutional  Vitals:  Most recent vital signs, dated less than 90 days prior to this appointment, were reviewed.   Vitals:    02/29/24 1039   BP: (!) 144/79   Pulse: 84   Weight: 85.6 kg (188 lb 13.2 oz)        General:  unremarkable, age appropriate, well dressed, neatly groomed, obese     Musculoskeletal  Muscle Strength/Tone:  not examined   Gait & Station:  non-ataxic     Psychiatric  Speech:  spontaneous   Mood & Affect:  euthymic  mood-congruent   Thought Process:  goal-directed, logical   Associations:  intact   Thought Content:  normal, no suicidality, no homicidality, delusions, or paranoia   Insight:  has awareness of illness   Judgement: behavior is adequate to circumstances   Orientation:  grossly intact   Memory: intact for content of interview   Language: grossly intact   Attention Span & Concentration:  able to focus   Fund of Knowledge:  intact and appropriate to age and level of education     Assessment and Diagnosis   Status/Progress: Based on the examination today, the patient's problem(s) is/are well controlled.  New problems have not been presented today.   Co-morbidities are not  complicating management of the primary condition.  There are no active rule-out diagnoses for this patient at this time.     General Impression: 68 y.o female with a histroy of bipolar l disorder with symptoms adequately and ideally controlled on current medications.     1. Bipolar affective disorder, remission status unspecified  buPROPion (WELLBUTRIN SR) 100 MG TBSR 12 hr tablet    buPROPion (WELLBUTRIN SR) 150 MG TBSR 12 hr tablet    OXcarbazepine (TRILEPTAL) 150 MG Tab    fluphenazine (PROLIXIN) 1 MG tablet      2. Bipolar I disorder, most recent episode depressed, in partial remission  OXcarbazepine (TRILEPTAL) 150 MG Tab            Intervention/Counseling/Treatment Plan   Provided information regarding diagnosis; reviewed medically reasonable alternatives for treatment; reviewed risks and benefits of each alternative and made reasonable effort to ascertain the individual understands the information.  Reviewed risks and benefits of medication; reviewed risks and benefits of not receiving the medication and made reasonable effort to ascertain the individual understands the information  Provided opportunity for individual to ask questions to gain a better understanding of the medical treatment in order to proceed or refuse and made reasonable effort to confirm the individual understands the information.    Continue bupropion (wellbutrin) 100 MG; Take 2 tablets; by mouth; every morning; To target depression (total daily dose 350 MG) Disp #60 refill-5  Continue bupropion (wellbutrin) 150 MG; Take 1 tablet; by mouth; Nightly; to target depression; (total daily dose 350 MG) Disp #30 refill-5  Continue fluphenazine (prolixin) 1 MG;Take 1 tablet; by mouth; nightly; to target psychosis; disp. 30 refills-5  Continue oxcarbazepine (trileptal) 150 MG; Take 1 tablet; by mouth; 2 times a day; to target mood; disp. 60 refills-5  Instructed client to take all medications as prescribed, do not stop taking them abruptly, and  call clinic for any issues/concerns.   Call or message provider for additional questions or concerns.     Safety Plan:   Patient has been educated on safety plan should any SI/HI, medication side effects &/or emergency arise. Patient verbalized understanding and agreement to contact a trusted friend or loved one, contact provider's office, call 911 or go to nearest ER should any emergency occur.       Return to Clinic: 6 months    RAYA Adams

## 2024-03-27 ENCOUNTER — PATIENT MESSAGE (OUTPATIENT)
Dept: PSYCHIATRY | Facility: CLINIC | Age: 69
End: 2024-03-27
Payer: MEDICARE

## 2024-04-09 ENCOUNTER — TELEPHONE (OUTPATIENT)
Dept: PULMONOLOGY | Facility: CLINIC | Age: 69
End: 2024-04-09
Payer: MEDICARE

## 2024-04-09 DIAGNOSIS — R06.02 SOB (SHORTNESS OF BREATH): Primary | ICD-10-CM

## 2024-04-11 DIAGNOSIS — M81.0 OSTEOPOROSIS, UNSPECIFIED OSTEOPOROSIS TYPE, UNSPECIFIED PATHOLOGICAL FRACTURE PRESENCE: ICD-10-CM

## 2024-04-11 RX ORDER — ALENDRONATE SODIUM 70 MG/1
70 TABLET ORAL
Qty: 12 TABLET | Refills: 0 | Status: SHIPPED | OUTPATIENT
Start: 2024-04-11 | End: 2024-04-25 | Stop reason: SDUPTHER

## 2024-04-11 NOTE — TELEPHONE ENCOUNTER
Care Due:                  Date            Visit Type   Department     Provider  --------------------------------------------------------------------------------                                EP -                              PRIMARY      [facility] INTERNAL  Last Visit: 01-      CARE ([facility])   MEDICINE       Jhonatan Avila                              [facility]                              PRIMARY      [facility] INTERNAL  Next Visit: 07-      [facility] ([facility])   MEDICINE       Jhonatan Avila                                                            Last  Test          Frequency    Reason                     Performed    Due Date  --------------------------------------------------------------------------------    Mg Level....  12 months..  alendronate..............  Not Found    Overdue    Phosphate...  12 months..  alendronate..............  Not Found    Overdue    [employer] Embedded Care Due Messages. Reference number: [id number].   4/11/2024 10:09:17 AM CDT

## 2024-04-11 NOTE — TELEPHONE ENCOUNTER
Refill Routing Note   Medication(s) are not appropriate for processing by [facility] for the following reason(s):        Outside of protocol    [facility] action(s):  Route             Appointments  past 12m or future 3m with PCP    Date Provider   Last Visit   1/10/2024 Jhonatan Avila III, MD   Next Visit   7/10/2024 Jhonatan Avila III, MD   ED visits in past 90 days: 0        Note composed:10:44 AM 04/11/2024

## 2024-04-12 ENCOUNTER — OFFICE VISIT (OUTPATIENT)
Dept: BARIATRICS | Facility: CLINIC | Age: 69
End: 2024-04-12
Payer: MEDICARE

## 2024-04-12 VITALS
WEIGHT: 181.19 LBS | HEART RATE: 88 BPM | SYSTOLIC BLOOD PRESSURE: 136 MMHG | BODY MASS INDEX: 32.1 KG/M2 | DIASTOLIC BLOOD PRESSURE: 88 MMHG | OXYGEN SATURATION: 95 %

## 2024-04-12 DIAGNOSIS — E66.09 CLASS 1 OBESITY DUE TO EXCESS CALORIES WITH SERIOUS COMORBIDITY AND BODY MASS INDEX (BMI) OF 32.0 TO 32.9 IN ADULT: Primary | ICD-10-CM

## 2024-04-12 DIAGNOSIS — Z85.3 HISTORY OF BREAST CANCER: ICD-10-CM

## 2024-04-12 DIAGNOSIS — G35 MS (MULTIPLE SCLEROSIS): ICD-10-CM

## 2024-04-12 DIAGNOSIS — Z71.3 ENCOUNTER FOR WEIGHT LOSS COUNSELING: ICD-10-CM

## 2024-04-12 DIAGNOSIS — G47.33 OSA (OBSTRUCTIVE SLEEP APNEA): ICD-10-CM

## 2024-04-12 PROCEDURE — 1160F RVW MEDS BY RX/DR IN RCRD: CPT | Mod: CPTII,S$GLB,, | Performed by: STUDENT IN AN ORGANIZED HEALTH CARE EDUCATION/TRAINING PROGRAM

## 2024-04-12 PROCEDURE — 1126F AMNT PAIN NOTED NONE PRSNT: CPT | Mod: CPTII,S$GLB,, | Performed by: STUDENT IN AN ORGANIZED HEALTH CARE EDUCATION/TRAINING PROGRAM

## 2024-04-12 PROCEDURE — 1159F MED LIST DOCD IN RCRD: CPT | Mod: CPTII,S$GLB,, | Performed by: STUDENT IN AN ORGANIZED HEALTH CARE EDUCATION/TRAINING PROGRAM

## 2024-04-12 PROCEDURE — 3044F HG A1C LEVEL LT 7.0%: CPT | Mod: CPTII,S$GLB,, | Performed by: STUDENT IN AN ORGANIZED HEALTH CARE EDUCATION/TRAINING PROGRAM

## 2024-04-12 PROCEDURE — 1157F ADVNC CARE PLAN IN RCRD: CPT | Mod: CPTII,S$GLB,, | Performed by: STUDENT IN AN ORGANIZED HEALTH CARE EDUCATION/TRAINING PROGRAM

## 2024-04-12 PROCEDURE — 99999 PR PBB SHADOW E&M-EST. PATIENT-LVL IV: CPT | Mod: PBBFAC,,, | Performed by: STUDENT IN AN ORGANIZED HEALTH CARE EDUCATION/TRAINING PROGRAM

## 2024-04-12 PROCEDURE — 3008F BODY MASS INDEX DOCD: CPT | Mod: CPTII,S$GLB,, | Performed by: STUDENT IN AN ORGANIZED HEALTH CARE EDUCATION/TRAINING PROGRAM

## 2024-04-12 PROCEDURE — 3079F DIAST BP 80-89 MM HG: CPT | Mod: CPTII,S$GLB,, | Performed by: STUDENT IN AN ORGANIZED HEALTH CARE EDUCATION/TRAINING PROGRAM

## 2024-04-12 PROCEDURE — 99204 OFFICE O/P NEW MOD 45 MIN: CPT | Mod: S$GLB,,, | Performed by: STUDENT IN AN ORGANIZED HEALTH CARE EDUCATION/TRAINING PROGRAM

## 2024-04-12 PROCEDURE — 3288F FALL RISK ASSESSMENT DOCD: CPT | Mod: CPTII,S$GLB,, | Performed by: STUDENT IN AN ORGANIZED HEALTH CARE EDUCATION/TRAINING PROGRAM

## 2024-04-12 PROCEDURE — 1101F PT FALLS ASSESS-DOCD LE1/YR: CPT | Mod: CPTII,S$GLB,, | Performed by: STUDENT IN AN ORGANIZED HEALTH CARE EDUCATION/TRAINING PROGRAM

## 2024-04-12 PROCEDURE — 3075F SYST BP GE 130 - 139MM HG: CPT | Mod: CPTII,S$GLB,, | Performed by: STUDENT IN AN ORGANIZED HEALTH CARE EDUCATION/TRAINING PROGRAM

## 2024-04-12 RX ORDER — METFORMIN HYDROCHLORIDE 500 MG/1
500 TABLET, EXTENDED RELEASE ORAL
Qty: 90 TABLET | Refills: 0 | Status: SHIPPED | OUTPATIENT
Start: 2024-04-12 | End: 2024-07-11

## 2024-04-12 NOTE — PROGRESS NOTES
Subjective     Patient ID: Silvana Quezada is a 68 y.o. female.    Chief Complaint: Consult and Obesity    Patient presents for treatment of obesity.     Co-morbidities   KEM  MS  Bipolar  H/o breast cancer  KEM on CPAP      Current Physical Activity  [employer] 2x/week  Every other day walks stairs at  [employer]    Current Eating Habits  Breakfast - bagel with cream cheese; cereal; eggs and grits  Lunch - sandwich (cold cut, PB&J)  Dinner - spaghetti and meat sauce; fish, french fries, salad; meat, vegetable, and starch  Snacks - crackers, chips  Beverages - iced tea, soft drinks (1/day)    Medical Weight Loss - unable to use [employer]  4/12/2024: 181 lbs 3.5 oz, BMI 32        Review of Systems   Constitutional:  Negative for chills and fever.   Respiratory:  Negative for shortness of breath.    Cardiovascular:  Negative for chest pain.   Gastrointestinal:  Negative for abdominal pain, nausea and vomiting.   Neurological:  Negative for dizziness and light-headedness.   Psychiatric/Behavioral:  The patient is not nervous/anxious.           Objective    Latest Reference Range & Units 01/08/24 08:28   WBC 3.90 - 12.70 K/uL 4.49   RBC 4.00 - 5.40 M/uL 4.38   Hemoglobin 12.0 - 16.0 g/dL 13.5   Hematocrit 37.0 - 48.5 % 42.4   MCV 82 - 98 fL 97   MCH 27.0 - 31.0 pg 30.8   MCHC 32.0 - 36.0 g/dL 31.8 (L)   RDW 11.5 - 14.5 % 13.0   Platelet Count 150 - 450 K/uL 183   MPV 9.2 - 12.9 fL 11.4   Sodium 136 - 145 mmol/L 144   Potassium 3.5 - 5.1 mmol/L 4.2   Chloride 95 - 110 mmol/L 108   CO2 23 - 29 mmol/L 28   Anion Gap 8 - 16 mmol/L 8   BUN 8 - 23 mg/dL 18   Creatinine 0.5 - 1.4 mg/dL 0.9   eGFR >60 mL/min/1.73 m^2 >60.0   Glucose 70 - 110 mg/dL 101   Calcium 8.7 - 10.5 mg/dL 9.3   ALP 55 - 135 U/L 52 (L)   PROTEIN TOTAL 6.0 - 8.4 g/dL 6.4   Albumin 3.5 - 5.2 g/dL 3.6   BILIRUBIN TOTAL 0.1 - 1.0 mg/dL 0.5   Bilirubin Direct 0.1 - 0.3 mg/dL 0.2   AST 10 - 40 U/L 15   ALT 10 - 44 U/L 21   Hemoglobin A1C External 4.0 - 5.6  % 5.1   Estimated Avg Glucose 68 - 131 mg/dL 100   (L): Data is abnormally low    Vitals:    04/12/24 1348   BP: 136/88   Pulse: 88         Physical Exam  Vitals reviewed.   Constitutional:       General: She is not in acute distress.     Appearance: Normal appearance. She is obese. She is not ill-appearing, toxic-appearing or diaphoretic.   HENT:      Head: Normocephalic and atraumatic.   Cardiovascular:      Rate and Rhythm: Normal rate.   Pulmonary:      Effort: Pulmonary effort is normal. No respiratory distress.   Skin:     General: Skin is warm and dry.   Neurological:      Mental Status: She is alert and oriented to person, place, and time.            Assessment and Plan     1. Class 1 obesity due to excess calories with serious comorbidity and body mass index (BMI) of 32.0 to 32.9 in adult    2. History of breast cancer    3. KEM (obstructive sleep apnea)    4. MS (multiple sclerosis)    5. Encounter for weight loss counseling    Other orders  -     metFORMIN (GLUCOPHAGE-XR) 500 MG ER 24hr tablet; Take 1 tablet (500 mg total) by mouth daily with breakfast.  Dispense: 90 tablet; Refill: 0        - Log all food and beverage intake with a daily calorie goal of 1200 calories per day    - Continue current exercise routine

## 2024-04-12 NOTE — PATIENT INSTRUCTIONS
Copyright © 2011, [facility]. For more information about The Healthy Eating Plate, please see The Nutrition Source, Department of Nutrition, [employer], www.thenutritionsource.org, and [employer], www.health.[place].edu.      Meal Planning & Grocery Shopping    Meal planning builds the foundation for healthy eating. When you have structured ideas for healthy meals and foods available at home to prepare those meals, weight control becomes easier.  If only healthy foods are available at home, then you will be much more likely to eat healthy foods. And you will be less likely to go to a restaurant or  a fast food meal, which tend to be unhealthy and higher in calories than meals prepared at home.      Take 5-10 minutes each week to plan meals for the next 7 days.  Make a grocery list based on the meal plan.    Grocery Shopping Tips:  Shop on a full stomach.  Schedule your shopping for times when you are most motivated and able to be disciplined about your purchases. For example, after a stressful day at work it may be difficult to make the healthiest choices. Shopping at other times, such as early in the morning or after dinner, may be easier.  Focus your shopping on the outside aisles of the store, which tend to contain more fresh foods and lower calorie foods. The inside aisles tend to have more processed foods.  Stick to your list. Avoid buying unhealthy items just because they are on sale.   Compare nutrition labels to check the number of calories and percentage of fat.      What to buy:    Vegetables  Fresh vegetables  Frozen vegetables with no sauce or added salt  Canned vegetables with no sauce or added salt    Protein  Lean meats, such as chicken and turkey  Limit red meats, such as beef to no more than 1x/week  Limit processed meats, such as cold cuts, whitten, sausage, and hot dogs. Look for brands that have no nitrites and are minimally  processed. Consider turkey sausage or turkey whitten.  Fish and Shellfish  Eggs  Dried beans  Canned beans (reduced sodium)    Fat  Use healthy oils, such as olive oil or canola oil, for cooking, salad dressings, etc.  Unflavored nuts and seeds  Nut butters (no added sugar)    Dairy  Yogurt (no sugar added)  Cheese  Low-fat milk  Unsweetened nondairy milks (almond milk, soy milk, etc)    Fruit  Fresh Fruit  Frozen fruit with no added sugar  Canned fruit with no added sugar  Dried fruit with no added sugar  100% fruit juice    Whole Grains  Single ingredient grains, such as oats, quinoa, brown rice  Whole-wheat pasta  Sprouted whole-grain bread    What to avoid:  - Avoid fried foods  - Avoid foods with added sugar  - Avoid sugar-sweetened beverages  - Avoid ultra-processed foods

## 2024-04-17 ENCOUNTER — OFFICE VISIT (OUTPATIENT)
Dept: PULMONOLOGY | Facility: CLINIC | Age: 69
End: 2024-04-17
Payer: MEDICARE

## 2024-04-17 VITALS
HEART RATE: 89 BPM | WEIGHT: 185.19 LBS | SYSTOLIC BLOOD PRESSURE: 124 MMHG | BODY MASS INDEX: 32.81 KG/M2 | OXYGEN SATURATION: 93 % | DIASTOLIC BLOOD PRESSURE: 78 MMHG | HEIGHT: 63 IN

## 2024-04-17 DIAGNOSIS — R06.02 SOB (SHORTNESS OF BREATH): ICD-10-CM

## 2024-04-17 DIAGNOSIS — R91.8 ABNORMAL CT SCAN OF LUNG: ICD-10-CM

## 2024-04-17 DIAGNOSIS — G47.33 OSA (OBSTRUCTIVE SLEEP APNEA): Primary | ICD-10-CM

## 2024-04-17 PROCEDURE — 99999 PR PBB SHADOW E&M-EST. PATIENT-LVL III: CPT | Mod: PBBFAC,,, | Performed by: INTERNAL MEDICINE

## 2024-04-17 PROCEDURE — 1157F ADVNC CARE PLAN IN RCRD: CPT | Mod: CPTII,S$GLB,, | Performed by: INTERNAL MEDICINE

## 2024-04-17 PROCEDURE — 1101F PT FALLS ASSESS-DOCD LE1/YR: CPT | Mod: CPTII,S$GLB,, | Performed by: INTERNAL MEDICINE

## 2024-04-17 PROCEDURE — 3008F BODY MASS INDEX DOCD: CPT | Mod: CPTII,S$GLB,, | Performed by: INTERNAL MEDICINE

## 2024-04-17 PROCEDURE — 3078F DIAST BP <80 MM HG: CPT | Mod: CPTII,S$GLB,, | Performed by: INTERNAL MEDICINE

## 2024-04-17 PROCEDURE — 3288F FALL RISK ASSESSMENT DOCD: CPT | Mod: CPTII,S$GLB,, | Performed by: INTERNAL MEDICINE

## 2024-04-17 PROCEDURE — 3044F HG A1C LEVEL LT 7.0%: CPT | Mod: CPTII,S$GLB,, | Performed by: INTERNAL MEDICINE

## 2024-04-17 PROCEDURE — 1159F MED LIST DOCD IN RCRD: CPT | Mod: CPTII,S$GLB,, | Performed by: INTERNAL MEDICINE

## 2024-04-17 PROCEDURE — 1126F AMNT PAIN NOTED NONE PRSNT: CPT | Mod: CPTII,S$GLB,, | Performed by: INTERNAL MEDICINE

## 2024-04-17 PROCEDURE — 99214 OFFICE O/P EST MOD 30 MIN: CPT | Mod: S$GLB,,, | Performed by: INTERNAL MEDICINE

## 2024-04-17 PROCEDURE — 3074F SYST BP LT 130 MM HG: CPT | Mod: CPTII,S$GLB,, | Performed by: INTERNAL MEDICINE

## 2024-04-17 NOTE — PROGRESS NOTES
"Subjective:     Reason for visit: SOB    Patient ID:  Silvana Quezada is a 68 y.o. female     History:  Ms. Quezada is a 68-year-old with multiple sclerosis, diabetes, obesity, osteoporosis, anxiety and is here for evaluation of her shortness of breath.  She did not previously have any issues with breathing or shortness a breath until she had COVID in 2020.  She did recover from that infection but has continued to have significant shortness of breath since then.  Prior to that she was able to walk up to 2 miles a day without difficulty and now she can barely walk a block without stopping to rest.  Her  is with her today and assisting with her history and says that she really enjoys singing and has also not been able to do that as much.  She has no previous lung disease and no history of asthma as a child or known exposures other than her COVID infection.  She has never been affected from a breathing standpoint by her MS in the past and really has not had any significant flares since the early 90s.      Additional Pulmonary History:  Childhood Illnesses:  None  Occupational:  Not applicable  Environmental:  None  Tobacco/Smoking:  Never smoker    Objective:     Vitals:    04/17/24 1512   BP: 124/78   BP Location: Left arm   Patient Position: Sitting   BP Method: Medium (Manual)   Pulse: 89   SpO2: (!) 93%   Weight: 84 kg (185 lb 3 oz)   Height: 5' 3" (1.6 m)         Physical Exam  Vitals reviewed.   Constitutional:       General: She is not in acute distress.     Appearance: She is ill-appearing. She is not diaphoretic.   HENT:      Head: Normocephalic and atraumatic.      Right Ear: External ear normal.      Left Ear: External ear normal.   Eyes:      Conjunctiva/sclera: Conjunctivae normal.      Pupils: Pupils are equal, round, and reactive to light.   Neck:      Trachea: No tracheal deviation.   Cardiovascular:      Rate and Rhythm: Normal rate and regular rhythm.      Heart sounds: Normal heart sounds. No " murmur heard.  Pulmonary:      Effort: Pulmonary effort is normal. No respiratory distress.      Breath sounds: Normal breath sounds. No stridor. No wheezing or rales.   Abdominal:      General: Bowel sounds are normal. There is no distension.      Palpations: Abdomen is soft.      Tenderness: There is no abdominal tenderness.   Musculoskeletal:         General: Normal range of motion.      Cervical back: Normal range of motion and neck supple.   Skin:     General: Skin is warm and dry.      Findings: No erythema.   Neurological:      Mental Status: She is alert and oriented to person, place, and time.      Gait: Gait is intact.   Psychiatric:         Mood and Affect: Mood and affect normal.         Cognition and Memory: Memory normal.         Judgment: Judgment normal.          Personal Diagnostic Review and Interpretation  Reviewed her CT scan from January 2 stable 3 mm nodules possibly very mild basilar reticulations otherwise nothing abnormal      Pertinent Studies Reviewed & Interpreted:     Pulmonary Function Tests:   pending        Assessment & Plan:       Problem List Items Addressed This Visit          Pulmonary    SOB (shortness of breath)    Current Assessment & Plan     Shortness of breath since having COVID in 2020 that has never gotten better.  She did require hospitalization and oxygen therapy when she had her COVID but did not require intubation or escalation of care.  She does have MS but has never had weakness above her bladder.  We will obtain pulmonary function tests to help determine if she would benefit from a trial of an inhaler versus evaluation for muscular weakness causing her shortness of breath.         Relevant Orders    Spirometry with/without bronchodilator    Abnormal CT scan of lung    Current Assessment & Plan     Previous CT scans with 2 3 mm nodules that has been unchanged over several years.  There was some question of an elevated hemidiaphragm but otherwise nothing that needs  further evaluation.            Other    KEM (obstructive sleep apnea) - Primary    Current Assessment & Plan     Has a sleep medicine doctor and uses her CPAP             RETURN TO CLINIC IN 3 MONTHS       Portions of the record may have been created with voice-recognition software. Occasional wrong-word or sound-a-like substitutions may have occurred due to the inherent limitations of voice-recognition software. Read the chart carefully and recognize, using context, where substitutions have occurred.    Justine Avila M.D.  Pulmonary/Critical Care

## 2024-04-17 NOTE — ASSESSMENT & PLAN NOTE
Shortness of breath since having COVID in 2020 that has never gotten better.  She did require hospitalization and oxygen therapy when she had her COVID but did not require intubation or escalation of care.  She does have MS but has never had weakness above her bladder.  We will obtain pulmonary function tests to help determine if she would benefit from a trial of an inhaler versus evaluation for muscular weakness causing her shortness of breath.

## 2024-04-17 NOTE — ASSESSMENT & PLAN NOTE
Previous CT scans with 2 3 mm nodules that has been unchanged over several years.  There was some question of an elevated hemidiaphragm but otherwise nothing that needs further evaluation.

## 2024-04-24 ENCOUNTER — HOSPITAL ENCOUNTER (OUTPATIENT)
Dept: PULMONOLOGY | Facility: CLINIC | Age: 69
Discharge: HOME | End: 2024-04-24
Payer: MEDICARE

## 2024-04-24 VITALS — BODY MASS INDEX: 32.81 KG/M2 | HEIGHT: 63 IN | WEIGHT: 185.19 LBS

## 2024-04-24 DIAGNOSIS — R06.02 SOB (SHORTNESS OF BREATH): ICD-10-CM

## 2024-04-24 LAB
DLCO SINGLE BREATH LLN: 15.08
DLCO SINGLE BREATH PRE REF: 46.2 %
DLCO SINGLE BREATH REF: 20.81
DLCOC SBVA LLN: 2.86
DLCOC SBVA REF: 4.36
DLCOC SINGLE BREATH LLN: 15.08
DLCOC SINGLE BREATH REF: 20.81
DLCOCSBVAULN: 5.87
DLCOCSINGLEBREATHULN: 26.54
DLCOSINGLEBREATHULN: 26.54
DLCOVA LLN: 2.86
DLCOVA PRE REF: 63.9 %
DLCOVA PRE: 2.79 ML/(MIN*MMHG*L) (ref 2.86–5.87)
DLCOVA REF: 4.36
DLCOVAULN: 5.87
FEF 25 75 LLN: 0.86
FEF 25 75 PRE REF: 92.8 %
FEF 25 75 REF: 1.85
FET100 CHG: -15.2 %
FEV05 LLN: 0.81
FEV05 REF: 1.67
FEV1 CHG: 1.9 %
FEV1 FVC LLN: 65
FEV1 FVC PRE REF: 102 %
FEV1 FVC REF: 78
FEV1 LLN: 1.57
FEV1 PRE REF: 81.5 %
FEV1 REF: 2.15
FEV1 VOL CHG: 0.03
FVC CHG: -5.9 %
FVC LLN: 2.02
FVC PRE REF: 79.3 %
FVC REF: 2.76
FVC VOL CHG: -0.13
IVC PRE: 2.09 L (ref 2.02–3.54)
IVC SINGLE BREATH LLN: 2.02
IVC SINGLE BREATH PRE REF: 75.8 %
IVC SINGLE BREATH REF: 2.76
IVCSINGLEBREATHULN: 3.54
PEF LLN: 3.91
PEF PRE REF: 113.1 %
PEF REF: 5.57
PHYSICIAN COMMENT: ABNORMAL
POST FEF 25 75: 2.32 L/S (ref 0.86–3.25)
POST FET 100: 5.76 SEC
POST FEV1 FVC: 86.52 % (ref 65.08–89.7)
POST FEV1: 1.78 L (ref 1.57–2.71)
POST FEV5: 1.52 L (ref 0.81–2.52)
POST FVC: 2.06 L (ref 2.02–3.54)
POST PEF: 5.69 L/S (ref 3.91–7.23)
PRE DLCO: 9.61 ML/(MIN*MMHG) (ref 15.08–26.54)
PRE FEF 25 75: 1.71 L/S (ref 0.86–3.25)
PRE FET 100: 6.8 SEC
PRE FEV05 REF: 88.5 %
PRE FEV1 FVC: 79.92 % (ref 65.08–89.7)
PRE FEV1: 1.75 L (ref 1.57–2.71)
PRE FEV5: 1.48 L (ref 0.81–2.52)
PRE FVC: 2.19 L (ref 2.02–3.54)
PRE PEF: 6.29 L/S (ref 3.91–7.23)
VA PRE: 3.45 L (ref 4.62–4.62)
VA SINGLE BREATH LLN: 4.62
VA SINGLE BREATH PRE REF: 74.6 %
VA SINGLE BREATH REF: 4.62
VASINGLEBREATHULN: 4.62

## 2024-04-24 PROCEDURE — 94060 EVALUATION OF WHEEZING: CPT | Mod: 59,S$GLB,, | Performed by: INTERNAL MEDICINE

## 2024-04-24 PROCEDURE — 94729 DIFFUSING CAPACITY: CPT | Mod: S$GLB,,, | Performed by: INTERNAL MEDICINE

## 2024-04-24 PROCEDURE — 94618 PULMONARY STRESS TESTING: CPT | Mod: S$GLB,,, | Performed by: INTERNAL MEDICINE

## 2024-04-24 NOTE — PROCEDURES
Silvana Quezada is a 68 y.o.  female patient, who presents for a 6 minute walk test ordered by MD Austin.  The diagnosis is Shortness of Breath.  The patient's BMI is 32.8 kg/m2.  Predicted distance (lower limit of normal) is 271.06 meters.      Test Results:    The test was completed with stops.  The patient stopped 2 times for a total of 77 seconds.  The total time walked was 283 seconds.  During walking, the patient reported:  Dyspnea. The patient used a wheelchair for assistance during testing.     04/24/2024---------Distance: 206.35 meters (677 feet)     O2 Sat % Supplemental Oxygen Heart Rate Blood Pressure Pauline Scale   Pre-exercise  (Resting) 97 % Room Air 68 bpm 114/70 mmHg 4   During Exercise 94 % Room Air 96 bpm 124/65 mmHg 5-6   Post-exercise  (Recovery) 99 % Room Air  74 bpm       Recovery Time: 131 seconds    Performing nurse/tech: JO Sapp      PREVIOUS STUDY:   The patient has not had a previous study.      CLINICAL INTERPRETATION:  Six minute walk distance is 206.35 meters (677 feet) with heavy dyspnea.  During exercise, there was desaturation while breathing room air.  Blood pressure remained stable and Heart rate increased significantly with walking.  The patient did not report non-pulmonary symptoms during exercise.  Significant exercise impairment is likely due to subjective symptoms.  The patient did complete the study, walking 283 seconds of the 360 second test.  No previous study performed.  Based upon age and body mass index, exercise capacity is less than predicted.

## 2024-04-25 DIAGNOSIS — M81.0 OSTEOPOROSIS, UNSPECIFIED OSTEOPOROSIS TYPE, UNSPECIFIED PATHOLOGICAL FRACTURE PRESENCE: ICD-10-CM

## 2024-04-25 RX ORDER — ALENDRONATE SODIUM 70 MG/1
70 TABLET ORAL
Qty: 12 TABLET | Refills: 0 | Status: SHIPPED | OUTPATIENT
Start: 2024-04-25

## 2024-04-25 NOTE — TELEPHONE ENCOUNTER
No care due was identified.  Health system Embedded Care Due Messages. Reference number: [id number].   4/25/2024 8:14:05 AM CDT

## 2024-04-25 NOTE — TELEPHONE ENCOUNTER
Refill Routing Note   Medication(s) are not appropriate for processing by [facility] for the following reason(s):        Outside of protocol    [facility] action(s):  Route               Appointments  past 12m or future 3m with PCP    Date Provider   Last Visit   1/10/2024 Jhonatan Avila III, MD   Next Visit   7/10/2024 Jhonatan Avila III, MD   ED visits in past 90 days: 0        Note composed:8:35 AM 04/25/2024

## 2024-05-02 ENCOUNTER — PATIENT MESSAGE (OUTPATIENT)
Dept: PSYCHIATRY | Facility: CLINIC | Age: 69
End: 2024-05-02
Payer: MEDICARE

## 2024-05-08 NOTE — TELEPHONE ENCOUNTER
Kentrell #[id number] left message to call back.    ----- Message from Tessa Blue MA sent at 5/11/2020 10:39 AM CDT -----  Contact: [phone]  pt would like to know if she can do a virtual visit with Dr Ross to discuss Botox no longer working . [phone]

## 2024-05-28 DIAGNOSIS — Z00.00 ENCOUNTER FOR MEDICARE ANNUAL WELLNESS EXAM: ICD-10-CM

## 2024-05-31 ENCOUNTER — OFFICE VISIT (OUTPATIENT)
Dept: UROLOGY | Facility: CLINIC | Age: 69
End: 2024-05-31
Payer: MEDICARE

## 2024-05-31 VITALS
HEART RATE: 79 BPM | DIASTOLIC BLOOD PRESSURE: 79 MMHG | SYSTOLIC BLOOD PRESSURE: 123 MMHG | BODY MASS INDEX: 32.11 KG/M2 | WEIGHT: 181.19 LBS | HEIGHT: 63 IN

## 2024-05-31 DIAGNOSIS — N30.90 CYSTITIS: Primary | ICD-10-CM

## 2024-05-31 PROCEDURE — 3074F SYST BP LT 130 MM HG: CPT | Mod: CPTII,S$GLB,, | Performed by: UROLOGY

## 2024-05-31 PROCEDURE — 99999 PR PBB SHADOW E&M-EST. PATIENT-LVL III: CPT | Mod: PBBFAC,,, | Performed by: UROLOGY

## 2024-05-31 PROCEDURE — 87086 URINE CULTURE/COLONY COUNT: CPT | Performed by: UROLOGY

## 2024-05-31 PROCEDURE — 1126F AMNT PAIN NOTED NONE PRSNT: CPT | Mod: CPTII,S$GLB,, | Performed by: UROLOGY

## 2024-05-31 PROCEDURE — 87077 CULTURE AEROBIC IDENTIFY: CPT | Mod: 59 | Performed by: UROLOGY

## 2024-05-31 PROCEDURE — 3044F HG A1C LEVEL LT 7.0%: CPT | Mod: CPTII,S$GLB,, | Performed by: UROLOGY

## 2024-05-31 PROCEDURE — 3288F FALL RISK ASSESSMENT DOCD: CPT | Mod: CPTII,S$GLB,, | Performed by: UROLOGY

## 2024-05-31 PROCEDURE — 99213 OFFICE O/P EST LOW 20 MIN: CPT | Mod: S$GLB,,, | Performed by: UROLOGY

## 2024-05-31 PROCEDURE — 1157F ADVNC CARE PLAN IN RCRD: CPT | Mod: CPTII,S$GLB,, | Performed by: UROLOGY

## 2024-05-31 PROCEDURE — 87088 URINE BACTERIA CULTURE: CPT | Performed by: UROLOGY

## 2024-05-31 PROCEDURE — 87186 SC STD MICRODIL/AGAR DIL: CPT | Performed by: UROLOGY

## 2024-05-31 PROCEDURE — 3078F DIAST BP <80 MM HG: CPT | Mod: CPTII,S$GLB,, | Performed by: UROLOGY

## 2024-05-31 PROCEDURE — 1159F MED LIST DOCD IN RCRD: CPT | Mod: CPTII,S$GLB,, | Performed by: UROLOGY

## 2024-05-31 PROCEDURE — 3008F BODY MASS INDEX DOCD: CPT | Mod: CPTII,S$GLB,, | Performed by: UROLOGY

## 2024-05-31 PROCEDURE — 1101F PT FALLS ASSESS-DOCD LE1/YR: CPT | Mod: CPTII,S$GLB,, | Performed by: UROLOGY

## 2024-05-31 NOTE — PROGRESS NOTES
CHIEF COMPLAINT:    Mrs. Quezada is a 68 y.o. female presenting for a follow up  PRESENTING ILLNESS:    Silvana Quezada is a 68 y.o. female  PMH of MS, neurogenic bladder.    She voids on her own and performs CIC at home 4 times per day using 12fr. She stays dry between catheterizations. Botox injections keep frequency, urgency and nocturia under control.   She denies incontinence during the day.  She endorses urinary incontinence at night.   She wears a diaper at night and it usually is wet when she wakes up.  She denies frequency, urgency, dysuria.  She does not have any urinary complaints.        Last botox injection Date: 08/02/2023   Pre-Op Diagnosis: Overactive bladder, neurogenic bladder  Post-Op Diagnosis: same   Procedure(s) Performed:   1.  Cystoscopy with bladder botox injection  Findings:   -200u botox successfully injected  -severe trabeculations present in the bladder     REVIEW OF SYSTEMS:  See HPI    PATIENT HISTORY:    Past Medical History:   Diagnosis Date    Bipolar 1 disorder     Breast cancer 2001    right lumpectomy-radiation & chemo taxol    Breast cyst     Closed fracture of proximal end of right humerus 3/9/2015    Depression     History of right shoulder fracture     MS (multiple sclerosis)     presented as dizzines, dx vision,     Osteoporosis, unspecified     Pneumonia 3/27/2020    Wrist fracture left    2019       Past Surgical History:   Procedure Laterality Date    BLADDER FULGURATION  8/31/2022    Procedure: FULGURATION, BLADDER;  Surgeon: Jayesh Ross MD;  Location: [facility] OR [address] [place] [address];  Service: Urology;;    BRAIN SURGERY  1991    BREAST BIOPSY Left 2009    core    BREAST BIOPSY Right     BREAST LUMPECTOMY Right 2001    COLONOSCOPY N/A 1/7/2022    Procedure: COLONOSCOPY;  Surgeon: Shiva Walsh MD;  Location: [facility];  Service: Endoscopy;  Laterality: N/A;    CYSTOSCOPY N/A 7/11/2018    Procedure: CYSTOSCOPY;  Surgeon: Jayesh Ross MD;  Location: [facility] OR [address] [place] [address];  Service:  Urology;  Laterality: N/A;  30 min    CYSTOSCOPY N/A 4/10/2019    Procedure: CYSTOSCOPY;  Surgeon: Jayesh Ross MD;  [facility];  Service: Urology;  Laterality: N/A;  30 MIN    CYSTOSCOPY  10/21/2021    Procedure: CYSTOSCOPY;  Surgeon: Jayesh Ross MD;  Location: [facility] OR [address] [place] [address];  Service: Urology;;    CYSTOSCOPY N/A 8/2/2023    Procedure: CYSTOSCOPY;  Surgeon: Jayesh Ross MD;  Location: [facility] OR [address] [place] [address];  Service: Urology;  Laterality: N/A;    FOOT SURGERY  october 2013    HYSTERECTOMY      partial    INJECTION OF BOTULINUM TOXIN TYPE A N/A 7/11/2018    Procedure: INJECTION, BOTULINUM TOXIN, TYPE A 200 UNITS;  Surgeon: Jayesh Ross MD;  Location: [address];  Service: Urology;  Laterality: N/A;    INJECTION OF BOTULINUM TOXIN TYPE A N/A 4/10/2019    Procedure: INJECTION, BOTULINUM TOXIN, TYPE A 200 UNITS;  Surgeon: Jayesh Ross MD;  Location: [facility] OR [address] [place] [address];  Service: Urology;  Laterality: N/A;    INJECTION OF BOTULINUM TOXIN TYPE A  10/21/2021    Procedure: INJECTION, BOTULINUM TOXIN, 200units;  Surgeon: Jayesh Ross MD;  Location: [facility] OR [address] [place] [address];  Service: Urology;;    INJECTION OF BOTULINUM TOXIN TYPE A N/A 8/2/2023    Procedure: INJECTION, BOTULINUM TOXIN, TYPE A;  Surgeon: Jayesh Ross MD;  Location: [address];  Service: Urology;  Laterality: N/A;  200units    OPEN REDUCTION AND INTERNAL FIXATION (ORIF) OF INJURY OF WRIST Left 12/27/2019    Procedure: ORIF, WRIST;  Surgeon: Albert Schroeder Jr., MD;  Location: [facility] OR;  Service: Orthopedics;  Laterality: Left;  ACUMED/ MINI C ARM [facility]/ Rk confirmed here in [facility] B with Lacie 12/23/19 KB [id number]    TONSILLECTOMY      TOTAL REDUCTION MAMMOPLASTY Left 11/5/2020    Procedure: MAMMOPLASTY, REDUCTION, LEFT;  Surgeon: Kirk Tompkins MD;  Location: [address];  Service: Plastics;  Laterality: Left;  Left breast tissue weighed- 49 grams.       Family History   Problem Relation Name  Age of Onset    Heart disease Mother      Cancer Mother          breast cancer    Skin cancer Mother      Breast cancer Mother      Stroke Mother      Emphysema Father age 70's     Dementia Brother 1/2     Diabetes Brother 1/2     No Known Problems Brother 1/2     Anesthesia problems Daughter Trans Megan/Mina     Other Daughter Trans Megan/[place]         Transgender, birth name Megan, now goes by Mina    Autism Daughter Lacie     Autism Son      Breast cancer Maternal Aunt         Social History     Socioeconomic History    Marital status:    Occupational History    Occupation: [employer]   Tobacco Use    Smoking status: Never    Smokeless tobacco: Never   Substance and Sexual Activity    Alcohol use: Yes    Drug use: Never    Sexual activity: Not Currently     Partners: Male   Social History Narrative    Original form , 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, 25 children 2 daughter      Social Determinants of Health     Financial Resource Strain: Low Risk  (5/30/2024)    Overall Financial Resource Strain (CARDIA)     Difficulty of Paying Living Expenses: Not hard at all   Food Insecurity: No Food Insecurity (5/30/2024)    Hunger Vital Sign     Worried About Running Out of Food in the Last Year: Never true     Ran Out of Food in the Last Year: Never true   Transportation Needs: No Transportation Needs (5/19/2023)    PRAPARE - Transportation     Lack of Transportation (Medical): No     Lack of Transportation (Non-Medical): No   Physical Activity: Insufficiently Active (5/30/2024)    Exercise Vital Sign     Days of Exercise per Week: 2 days     Minutes of Exercise per Session: 60 min   Stress: No Stress Concern Present (5/30/2024)    [employer] [place] [employer] - Occupational Stress Questionnaire     Feeling of Stress : Not at all   Housing Stability: Unknown (5/30/2024)    Housing Stability Vital Sign     Unable to Pay for Housing in the  Last Year: Patient declined       Allergies:  Adhesive, Keflex [cephalexin], and Cephalosporins    Medications:    Current Outpatient Medications:     alendronate (FOSAMAX) 70 MG tablet, TAKE 1 TABLET BY MOUTH EVERY 7 DAYS, Disp: 12 tablet, Rfl: 0    buPROPion (WELLBUTRIN SR) 100 MG TBSR 12 hr tablet, Take 2 tablets (200 mg total) by mouth every morning., Disp: 60 tablet, Rfl: 5    buPROPion (WELLBUTRIN SR) 150 MG TBSR 12 hr tablet, Take 1 tablet (150 mg total) by mouth every evening., Disp: 30 tablet, Rfl: 5    calcium-vitamin D3 (OS-IGOR 500 + D3) 500 mg-5 mcg (200 unit) per tablet, Take 1 tablet by mouth 2 (two) times daily with meals., Disp: , Rfl:     catheter (FEMALE CATHETER) 14 Fr Misc, Patient is to self catheterize four times a day indefinitley using female 12 fr in and out catheter for incomplete bladder emptying.  Dispense 120 month with 11 refills or 360 every 3 months with 3 refills depending on patient's preference, Disp: 120 each, Rfl: 11    coenzyme Q10 100 mg capsule, Take 5 capsules (500 mg total) by mouth once daily., Disp: 150 capsule, Rfl: 11    cyanocobalamin (VITAMIN B-12) 1000 MCG tablet, Take 1,000 mcg by mouth once daily., Disp: , Rfl:     fluphenazine (PROLIXIN) 1 MG tablet, Take 1 tablet (1 mg total) by mouth every evening., Disp: 30 tablet, Rfl: 5    folic acid (FOLVITE) 1 MG tablet, Take 1 tablet (1,000 mcg total) by mouth once daily., Disp: 90 tablet, Rfl: 3    metFORMIN (GLUCOPHAGE-XR) 500 MG ER 24hr tablet, Take 1 tablet (500 mg total) by mouth daily with breakfast., Disp: 90 tablet, Rfl: 0    multivitamin (ONE DAILY MULTIVITAMIN) per tablet, Take 1 tablet by mouth once daily., Disp: , Rfl:     OXcarbazepine (TRILEPTAL) 150 MG Tab, Take 1 tablet (150 mg total) by mouth 2 (two) times daily., Disp: 60 tablet, Rfl: 5    PHYSICAL EXAMINATION:    Constitutional: She appears well-developed and well-nourished.  She is in no apparent distress.    Eyes: No scleral icterus noted bilaterally.  No discharge bilaterally.    Nose: No nasal congestion    Cardiovascular: Normal rate.      Pulmonary/Chest: Effort normal. No respiratory distress.     Abdominal:  She exhibits no distension.      Neurological: She is alert and oriented to person, place, and time.     Skin: Skin is warm and dry.     Psych: Cooperative with normal affect.    Genitourinary:  Normal external female genitalia  Urethral meatus is normal  Consent verbally obtained.  Betadine prep was applied to the urethral meatus. An in and out cath was performed after voiding.  The PVR was 60 ml.    Physical Exam      LABS:    U/a: 1.010, pH 6, + nitrite, otherwsie negative.       IMPRESSION:  Cystitis  -     Urine culture          PLAN:    S/p cysto with botox injection 200 units on 8/2/23.  Has done well and still doing well.  Will see her in 3 months for a follow up.  May consider Sacral neuromodulation.  [facility] Therapy brochure given.  Pt reports that she underwent InterStim trial with Dr. White many years ago and it did not work well.  I explained potential benefits and advantages of SNM over botox injection.  If she becomes symptomatic again, will consider [facility] therapy or Botox injection.    I spent 15 minutes (including face to face & non face to face time) in regards to patient's care.      Follow up in about 3 months (around 8/31/2024).

## 2024-06-03 ENCOUNTER — TELEPHONE (OUTPATIENT)
Dept: UROLOGY | Facility: CLINIC | Age: 69
End: 2024-06-03
Payer: MEDICARE

## 2024-06-03 ENCOUNTER — PATIENT MESSAGE (OUTPATIENT)
Dept: UROLOGY | Facility: CLINIC | Age: 69
End: 2024-06-03
Payer: MEDICARE

## 2024-06-03 DIAGNOSIS — N30.90 CYSTITIS: Primary | ICD-10-CM

## 2024-06-03 LAB — BACTERIA UR CULT: ABNORMAL

## 2024-06-03 RX ORDER — SULFAMETHOXAZOLE AND TRIMETHOPRIM 800; 160 MG/1; MG/1
1 TABLET ORAL 2 TIMES DAILY
Qty: 14 TABLET | Refills: 0 | Status: SHIPPED | OUTPATIENT
Start: 2024-06-03 | End: 2024-06-10

## 2024-06-03 NOTE — TELEPHONE ENCOUNTER
Cystitis  -     sulfamethoxazole-trimethoprim 800-160mg (BACTRIM DS) 800-160 mg Tab; Take 1 tablet by mouth 2 (two) times daily. for 7 days  Dispense: 14 tablet; Refill: 0     eRxed to the pharmacy.  Please call and advise the patient to take the medication as given.

## 2024-06-04 ENCOUNTER — TELEPHONE (OUTPATIENT)
Dept: UROLOGY | Facility: CLINIC | Age: 69
End: 2024-06-04
Payer: MEDICARE

## 2024-06-04 NOTE — TELEPHONE ENCOUNTER
Jayesh Ross MD Ramos, Lori K, LPN         Attached Notes    Telephone Encounter by Jayesh Ross MD at 6/3/2024  4:19 PM    Author: Jayesh Ross MD Service: -- Author Type: Physician   Filed: 6/3/2024  4:20 PM Encounter Date: 6/3/2024 Note Type: Telephone Encounter   Status: Signed : Jayesh Ross MD (Physician)   Cystitis  -     sulfamethoxazole-trimethoprim 800-160mg (BACTRIM DS) 800-160 mg Tab; Take 1 tablet by mouth 2 (two) times daily. for 7 days  Dispense: 14 tablet; Refill: 0      eRxed to the pharmacy.  Please call and advise the patient to take the medication as given.       Routing History

## 2024-06-04 NOTE — TELEPHONE ENCOUNTER
Patient notified    [General Appearance - Alert] : alert [General Appearance - In No Acute Distress] : in no acute distress [Sclera] : the sclera and conjunctiva were normal [Examination Of The Oral Cavity] : the lips and gums were normal [Oropharynx] : the oropharynx was normal [Neck Appearance] : the appearance of the neck was normal [] : no respiratory distress [Exaggerated Use Of Accessory Muscles For Inspiration] : no accessory muscle use [Heart Rate And Rhythm] : heart rate was normal and rhythm regular [Edema] : there was no peripheral edema [Abdomen Soft] : soft [No Spinal Tenderness] : no spinal tenderness [Motor Exam] : the motor exam was normal [Oriented To Time, Place, And Person] : oriented to person, place, and time [Impaired Insight] : insight and judgment were intact [FreeTextEntry1] : thickened erythematous plaque over LE

## 2024-06-24 ENCOUNTER — OFFICE VISIT (OUTPATIENT)
Dept: BARIATRICS | Facility: CLINIC | Age: 69
End: 2024-06-24
Payer: MEDICARE

## 2024-06-24 VITALS
OXYGEN SATURATION: 95 % | BODY MASS INDEX: 31.59 KG/M2 | HEART RATE: 82 BPM | SYSTOLIC BLOOD PRESSURE: 140 MMHG | WEIGHT: 178.31 LBS | DIASTOLIC BLOOD PRESSURE: 72 MMHG | HEIGHT: 63 IN

## 2024-06-24 DIAGNOSIS — G35 MS (MULTIPLE SCLEROSIS): ICD-10-CM

## 2024-06-24 DIAGNOSIS — E66.09 CLASS 1 OBESITY DUE TO EXCESS CALORIES WITH SERIOUS COMORBIDITY AND BODY MASS INDEX (BMI) OF 31.0 TO 31.9 IN ADULT: Primary | ICD-10-CM

## 2024-06-24 DIAGNOSIS — Z71.3 ENCOUNTER FOR WEIGHT LOSS COUNSELING: ICD-10-CM

## 2024-06-24 DIAGNOSIS — G47.33 OSA (OBSTRUCTIVE SLEEP APNEA): ICD-10-CM

## 2024-06-24 DIAGNOSIS — Z85.3 HISTORY OF BREAST CANCER: ICD-10-CM

## 2024-06-24 PROCEDURE — 99999 PR PBB SHADOW E&M-EST. PATIENT-LVL III: CPT | Mod: PBBFAC,,, | Performed by: STUDENT IN AN ORGANIZED HEALTH CARE EDUCATION/TRAINING PROGRAM

## 2024-06-24 PROCEDURE — 3044F HG A1C LEVEL LT 7.0%: CPT | Mod: CPTII,S$GLB,, | Performed by: STUDENT IN AN ORGANIZED HEALTH CARE EDUCATION/TRAINING PROGRAM

## 2024-06-24 PROCEDURE — 99213 OFFICE O/P EST LOW 20 MIN: CPT | Mod: S$GLB,,, | Performed by: STUDENT IN AN ORGANIZED HEALTH CARE EDUCATION/TRAINING PROGRAM

## 2024-06-24 PROCEDURE — 3008F BODY MASS INDEX DOCD: CPT | Mod: CPTII,S$GLB,, | Performed by: STUDENT IN AN ORGANIZED HEALTH CARE EDUCATION/TRAINING PROGRAM

## 2024-06-24 PROCEDURE — 1126F AMNT PAIN NOTED NONE PRSNT: CPT | Mod: CPTII,S$GLB,, | Performed by: STUDENT IN AN ORGANIZED HEALTH CARE EDUCATION/TRAINING PROGRAM

## 2024-06-24 PROCEDURE — 1160F RVW MEDS BY RX/DR IN RCRD: CPT | Mod: CPTII,S$GLB,, | Performed by: STUDENT IN AN ORGANIZED HEALTH CARE EDUCATION/TRAINING PROGRAM

## 2024-06-24 PROCEDURE — 1157F ADVNC CARE PLAN IN RCRD: CPT | Mod: CPTII,S$GLB,, | Performed by: STUDENT IN AN ORGANIZED HEALTH CARE EDUCATION/TRAINING PROGRAM

## 2024-06-24 PROCEDURE — 1101F PT FALLS ASSESS-DOCD LE1/YR: CPT | Mod: CPTII,S$GLB,, | Performed by: STUDENT IN AN ORGANIZED HEALTH CARE EDUCATION/TRAINING PROGRAM

## 2024-06-24 PROCEDURE — 3078F DIAST BP <80 MM HG: CPT | Mod: CPTII,S$GLB,, | Performed by: STUDENT IN AN ORGANIZED HEALTH CARE EDUCATION/TRAINING PROGRAM

## 2024-06-24 PROCEDURE — 3077F SYST BP >= 140 MM HG: CPT | Mod: CPTII,S$GLB,, | Performed by: STUDENT IN AN ORGANIZED HEALTH CARE EDUCATION/TRAINING PROGRAM

## 2024-06-24 PROCEDURE — 1159F MED LIST DOCD IN RCRD: CPT | Mod: CPTII,S$GLB,, | Performed by: STUDENT IN AN ORGANIZED HEALTH CARE EDUCATION/TRAINING PROGRAM

## 2024-06-24 PROCEDURE — 3288F FALL RISK ASSESSMENT DOCD: CPT | Mod: CPTII,S$GLB,, | Performed by: STUDENT IN AN ORGANIZED HEALTH CARE EDUCATION/TRAINING PROGRAM

## 2024-06-24 RX ORDER — METFORMIN HYDROCHLORIDE 500 MG/1
500 TABLET, EXTENDED RELEASE ORAL 2 TIMES DAILY WITH MEALS
Qty: 180 TABLET | Refills: 0 | Status: SHIPPED | OUTPATIENT
Start: 2024-06-24 | End: 2024-09-22

## 2024-06-24 NOTE — PROGRESS NOTES
Subjective     Patient ID: Silvana Quezada is a 68 y.o. female.    Chief Complaint: Follow-up, Obesity, and Weight Check    Patient presents for treatment of obesity.     Co-morbidities   KEM  MS  Bipolar  H/o breast cancer  KEM on CPAP      Current Physical Activity  [employer] 2x/week,  fitness center  Every other day walks stairs at  [employer]    Current Eating Habits  Breakfast - oatmeal; cereal; eggs and grits  Lunch - sandwich (cold cut, PB&J)  Dinner - spaghetti and meat sauce; fish, french fries, salad; meat, vegetable, and starch  Snacks - crackers, chips  Beverages - iced tea,  SF soft drinks (1/day)    Medical Weight Loss - unable to use [employer]  4/12/2024: 181 lbs 3.5 oz, BMI 32  6/24/2024: 178.3 lbs, BMI 31.6, BFP 46.9%, BFM 83.5 lbs, SMM 51.1 lbs, BMR 1298 kcal        Review of Systems   Constitutional:  Negative for chills and fever.   Respiratory:  Negative for shortness of breath.    Cardiovascular:  Negative for chest pain.   Gastrointestinal:  Negative for abdominal pain, nausea and vomiting.   Neurological:  Negative for dizziness and light-headedness.   Psychiatric/Behavioral:  The patient is not nervous/anxious.           Objective    Latest Reference Range & Units 01/08/24 08:28   WBC 3.90 - 12.70 K/uL 4.49   RBC 4.00 - 5.40 M/uL 4.38   Hemoglobin 12.0 - 16.0 g/dL 13.5   Hematocrit 37.0 - 48.5 % 42.4   MCV 82 - 98 fL 97   MCH 27.0 - 31.0 pg 30.8   MCHC 32.0 - 36.0 g/dL 31.8 (L)   RDW 11.5 - 14.5 % 13.0   Platelet Count 150 - 450 K/uL 183   MPV 9.2 - 12.9 fL 11.4   Sodium 136 - 145 mmol/L 144   Potassium 3.5 - 5.1 mmol/L 4.2   Chloride 95 - 110 mmol/L 108   CO2 23 - 29 mmol/L 28   Anion Gap 8 - 16 mmol/L 8   BUN 8 - 23 mg/dL 18   Creatinine 0.5 - 1.4 mg/dL 0.9   eGFR >60 mL/min/1.73 m^2 >60.0   Glucose 70 - 110 mg/dL 101   Calcium 8.7 - 10.5 mg/dL 9.3   ALP 55 - 135 U/L 52 (L)   PROTEIN TOTAL 6.0 - 8.4 g/dL 6.4   Albumin 3.5 - 5.2 g/dL 3.6   BILIRUBIN TOTAL 0.1 - 1.0 mg/dL 0.5    Bilirubin Direct 0.1 - 0.3 mg/dL 0.2   AST 10 - 40 U/L 15   ALT 10 - 44 U/L 21   Hemoglobin A1C External 4.0 - 5.6 % 5.1   Estimated Avg Glucose 68 - 131 mg/dL 100   (L): Data is abnormally low    Vitals:    06/24/24 1514   BP: (!) 140/72   Pulse: 82         Physical Exam  Vitals reviewed.   Constitutional:       General: She is not in acute distress.     Appearance: Normal appearance. She is obese. She is not ill-appearing, toxic-appearing or diaphoretic.   HENT:      Head: Normocephalic and atraumatic.   Cardiovascular:      Rate and Rhythm: Normal rate.   Pulmonary:      Effort: Pulmonary effort is normal. No respiratory distress.   Skin:     General: Skin is warm and dry.   Neurological:      Mental Status: She is alert and oriented to person, place, and time.            Assessment and Plan     1. Class 1 obesity due to excess calories with serious comorbidity and body mass index (BMI) of 31.0 to 31.9 in adult    2. MS (multiple sclerosis)    3. History of breast cancer    4. KEM (obstructive sleep apnea)    5. Encounter for weight loss counseling    Other orders  -     metFORMIN (GLUCOPHAGE-XR) 500 MG ER 24hr tablet; Take 1 tablet (500 mg total) by mouth 2 (two) times daily with meals.  Dispense: 180 tablet; Refill: 0          - Log all food and beverage intake with a daily calorie goal of 1200 calories per day    - Continue current exercise routine

## 2024-07-01 ENCOUNTER — TELEPHONE (OUTPATIENT)
Dept: INTERNAL MEDICINE | Facility: CLINIC | Age: 69
End: 2024-07-01
Payer: MEDICARE

## 2024-07-01 ENCOUNTER — OFFICE VISIT (OUTPATIENT)
Dept: NEUROLOGY | Facility: CLINIC | Age: 69
End: 2024-07-01
Payer: MEDICARE

## 2024-07-01 VITALS
BODY MASS INDEX: 31.92 KG/M2 | SYSTOLIC BLOOD PRESSURE: 136 MMHG | WEIGHT: 180.13 LBS | HEART RATE: 72 BPM | HEIGHT: 63 IN | DIASTOLIC BLOOD PRESSURE: 93 MMHG

## 2024-07-01 DIAGNOSIS — G35 MS (MULTIPLE SCLEROSIS): Primary | ICD-10-CM

## 2024-07-01 PROCEDURE — 3044F HG A1C LEVEL LT 7.0%: CPT | Mod: CPTII,S$GLB,,

## 2024-07-01 PROCEDURE — 1126F AMNT PAIN NOTED NONE PRSNT: CPT | Mod: CPTII,S$GLB,,

## 2024-07-01 PROCEDURE — 3075F SYST BP GE 130 - 139MM HG: CPT | Mod: CPTII,S$GLB,,

## 2024-07-01 PROCEDURE — 3288F FALL RISK ASSESSMENT DOCD: CPT | Mod: CPTII,S$GLB,,

## 2024-07-01 PROCEDURE — 1101F PT FALLS ASSESS-DOCD LE1/YR: CPT | Mod: CPTII,S$GLB,,

## 2024-07-01 PROCEDURE — 99999 PR PBB SHADOW E&M-EST. PATIENT-LVL IV: CPT | Mod: PBBFAC,,,

## 2024-07-01 PROCEDURE — 1160F RVW MEDS BY RX/DR IN RCRD: CPT | Mod: CPTII,S$GLB,,

## 2024-07-01 PROCEDURE — 99215 OFFICE O/P EST HI 40 MIN: CPT | Mod: S$GLB,,,

## 2024-07-01 PROCEDURE — 3080F DIAST BP >= 90 MM HG: CPT | Mod: CPTII,S$GLB,,

## 2024-07-01 PROCEDURE — 1157F ADVNC CARE PLAN IN RCRD: CPT | Mod: CPTII,S$GLB,,

## 2024-07-01 PROCEDURE — 1159F MED LIST DOCD IN RCRD: CPT | Mod: CPTII,S$GLB,,

## 2024-07-01 PROCEDURE — G2211 COMPLEX E/M VISIT ADD ON: HCPCS | Mod: S$GLB,,,

## 2024-07-01 PROCEDURE — 3008F BODY MASS INDEX DOCD: CPT | Mod: CPTII,S$GLB,,

## 2024-07-01 NOTE — PROGRESS NOTES
Patient ID: Silvana Quezada is a 68 y.o. female who presents today for a routine clinic visit for MS.  She was last seen on 1/11/2024.  The history was provided by the patient. She is accompanied by her .     Principle neurological diagnosis: MS  Date of symptom onset: 1991  Date of diagnosis: 1991  Disease type at diagnosis: RR  Disease type currently: SP  Previous therapy: NA  Current therapy: NA  Last MRI Brain: 1/9/2024 - stable   Last MRI C-spine: 1/9/2024 - lesions noted  Last MRI T-spine: 1/9/2024 - lesions noted   CSF: reportedly consistent with MS  JCV status: NA  Other relevant labs and tests: NA    Subjective:     She stated she is feeling stable with no new symptoms.  She continues to have symptoms of neurogenic bladder and is followed by Dr. Ross.  She continues to have accidents with self cathing 4 times a day and having bladder botox.     She is no longer having the issues of shortness of breath.  She has also started a probiotic which has helped with her constipation.     She has started metformin by her bariatric doctor for weight loss.      She does find she is needing to ask her  to open jars more often and she does find herself messing up when playing the piano a little more than normal.  She says that she has not been playing it as much as she used to though.      She does have a history of bipolar disorder which is follows psychiatry.  She states it is pretty much under control, but she will have days of sadness from time to time.      She is doing MS aquatics twice a week and goes to the [employer] to walk the stairs 2-3 times a week.     She does use a rollator out of the house and a wheelchair only for longer distances when they are in a hurry.     She denies falls.  Her  does state that she does continue to take small choppy steps, but she works on taking better/longer strides.     She denies new weakness, sensory changes, dysphagia, dysarthria, spasticity, visual  changes, cognitive changes, mood disorder, gait disturbance, incoordination, pain, heat sensitivity, fatigue, bladder and bowel dysfunction.           SOCIAL HISTORY  Living arrangements - the patient lives with their spouse.  Social History     Socioeconomic History    Marital status:    Occupational History    Occupation: [employer]   Tobacco Use    Smoking status: Never    Smokeless tobacco: Never   Substance and Sexual Activity    Alcohol use: Yes    Drug use: Never    Sexual activity: Not Currently     Partners: Male   Social History Narrative    [employer] , 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, 25 children 2 daughter      Social Determinants of Health     Financial Resource Strain: Low Risk  (5/30/2024)    Overall Financial Resource Strain (CARDIA)     Difficulty of Paying Living Expenses: Not hard at all   Food Insecurity: No Food Insecurity (5/30/2024)    Hunger Vital Sign     Worried About Running Out of Food in the Last Year: Never true     Ran Out of Food in the Last Year: Never true   Transportation Needs: No Transportation Needs (5/19/2023)    PRAPARE - Transportation     Lack of Transportation (Medical): No     Lack of Transportation (Non-Medical): No   Physical Activity: Insufficiently Active (5/30/2024)    Exercise Vital Sign     Days of Exercise per Week: 2 days     Minutes of Exercise per Session: 60 min   Stress: No Stress Concern Present (5/30/2024)    [place] [employer] - Occupational Stress Questionnaire     Feeling of Stress : Not at all   Housing Stability: Unknown (5/30/2024)    Housing Stability Vital Sign     Unable to Pay for Housing in the Last Year: Patient declined       Current Outpatient Medications on File Prior to Visit   Medication Sig Dispense Refill    alendronate (FOSAMAX) 70 MG tablet TAKE 1 TABLET BY MOUTH EVERY 7 DAYS 12 tablet 0    buPROPion (WELLBUTRIN SR) 100 MG TBSR 12 hr tablet Take 2 tablets (200  mg total) by mouth every morning. 60 tablet 5    buPROPion (WELLBUTRIN SR) 150 MG TBSR 12 hr tablet Take 1 tablet (150 mg total) by mouth every evening. 30 tablet 5    calcium-vitamin D3 (OS-IGOR 500 + D3) 500 mg-5 mcg (200 unit) per tablet Take 1 tablet by mouth 2 (two) times daily with meals.      catheter (FEMALE CATHETER) 14 Fr [facility] [place] Patient is to self catheterize four times a day indefinitley using female 12 fr in and out catheter for incomplete bladder emptying.   Dispense 120 month with 11 refills or 360 every 3 months with 3 refills depending on patient's preference 120 each 11    coenzyme Q10 100 mg capsule Take 5 capsules (500 mg total) by mouth once daily. 150 capsule 11    cyanocobalamin (VITAMIN B-12) 1000 MCG tablet Take 1,000 mcg by mouth once daily.      fluphenazine (PROLIXIN) 1 MG tablet Take 1 tablet (1 mg total) by mouth every evening. 30 tablet 5    metFORMIN (GLUCOPHAGE-XR) 500 MG ER 24hr tablet Take 1 tablet (500 mg total) by mouth 2 (two) times daily with meals. 180 tablet 0    multivitamin (ONE DAILY MULTIVITAMIN) per tablet Take 1 tablet by mouth once daily.      OXcarbazepine (TRILEPTAL) 150 MG Tab Take 1 tablet (150 mg total) by mouth 2 (two) times daily. 60 tablet 5    folic acid (FOLVITE) 1 MG tablet Take 1 tablet (1,000 mcg total) by mouth once daily. (Patient not taking: Reported on 7/1/2024) 90 tablet 3     No current facility-administered medications on file prior to visit.       Objective:     1. 25 foot timed walk:      12/4/2023    12:01 AM 7/1/2024    12:01 AM   Timed 25 Foot Walk:   Did patient wear an AFO? No No   Was assistive device used? No No   Time for 25 Foot Walk (seconds) 9.65 7.04   Time for 25 Foot Walk (seconds) 9.28 6.46           NEURO EXAM    In general, the patient is well nourished and appears to be in no acute distress.    MENTAL STATUS: language is fluent, normal verbal comprehension, short-term and remote memory is intact, attention is normal, patient is alert  and oriented x 3, fund of knowlege is appropriate by vocabulary.     CRANIAL NERVE EXAM:  There is no internuclear ophthalmoplegia.  Extraocular muscles are intact. Facial sensation is intact bilaterally. There is no dysarthria. Uvula is midline, and palate moves symmetrically. Shoulder shrug intact bilaterlly. Tongue protrusion is midline. Hearing is intact to finger rub bilaterally. Neck is supple with full ROM    MOTOR EXAM: Normal bulk and tone throughout UE and LE bilaterally. Rapid sequential movements are slowed and dysmetric; Strength is  5/5 in all groups in the lower extremities and upper extremities    SENSORY EXAM: Normal to light touch and vibration throughout.    COORDINATION: slowed finger-to-nose exam.     GAIT: wide based but stable       Imaging:     Personally reviewed.     Results for orders placed during the hospital encounter of 01/09/24    MRI Brain Demyelinating Without Contrast    Impression  Stable white matter lesions intracranially with no new lesion appreciated.      Electronically signed by: Long Hemphill  Date:    01/09/2024  Time:    11:03    Results for orders placed during the hospital encounter of 01/09/24    MRI Cervical Spine Demyelinating Without Contrast    Impression  Patchy signal abnormality within the distal cervical cord identified without a prior study for comparison.  No cord expansion.      Electronically signed by: Long Hemphill  Date:    01/09/2024  Time:    11:26    Results for orders placed during the hospital encounter of 01/09/24    MRI Thoracic Spine Demyelinating Without Contrast    Impression  Focal lesion within the thoracic cord at T6-7.  There is no prior study for comparison.  No evidence of cord expansion/overt cord edema.    Nonspecific 16 mm lesion right hepatic lobe may represent a cyst but incompletely evaluated.      Electronically signed by: Long Hemphill  Date:    01/09/2024  Time:    11:37    Results for orders placed during the hospital  "encounter of 07/17/20    MRI Brain Demyelinating W W/O Contrast    Impression  Findings in the cerebral white matter which are typical for multiple sclerosis.  No enhancing lesions to suggest active disease.      Electronically signed by: Steven Hay MD  Date:    07/17/2020  Time:    15:28    No results found for this or any previous visit.    No results found for this or any previous visit.        Labs:     Lab Results   Component Value Date    XUFLOWPZ94MU 51 12/04/2023    TETNWJPI09HK 42 05/13/2020    CIPJJHVE40VR 35 02/26/2020     No results found for: "JCVINDEX", "JCVANTIBODY"  No results found for: "IB0JZSWP", "ABSOLUTECD3", "PI4JZEWX", "ABSOLUTECD8", "LX2XBQXE", "ABSOLUTECD4", "LABCD48"  Lab Results   Component Value Date    WBC 4.49 01/08/2024    RBC 4.38 01/08/2024    HGB 13.5 01/08/2024    HCT 42.4 01/08/2024    MCV 97 01/08/2024    MCH 30.8 01/08/2024    MCHC 31.8 (L) 01/08/2024    RDW 13.0 01/08/2024     01/08/2024    MPV 11.4 01/08/2024    GRAN 4.7 10/07/2022    GRAN 87.9 (H) 10/07/2022    LYMPH 0.3 (L) 10/07/2022    LYMPH 5.2 (L) 10/07/2022    MONO 0.3 10/07/2022    MONO 6.3 10/07/2022    EOS 0.0 10/07/2022    BASO 0.01 10/07/2022    EOSINOPHIL 0.2 10/07/2022    BASOPHIL 0.2 10/07/2022     Sodium   Date Value Ref Range Status   01/08/2024 144 136 - 145 mmol/L Final     Potassium   Date Value Ref Range Status   01/08/2024 4.2 3.5 - 5.1 mmol/L Final     Chloride   Date Value Ref Range Status   01/08/2024 108 95 - 110 mmol/L Final     CO2   Date Value Ref Range Status   01/08/2024 28 23 - 29 mmol/L Final     Glucose   Date Value Ref Range Status   01/08/2024 101 70 - 110 mg/dL Final     BUN   Date Value Ref Range Status   01/08/2024 18 8 - 23 mg/dL Final     Creatinine   Date Value Ref Range Status   01/08/2024 0.9 0.5 - 1.4 mg/dL Final     Calcium   Date Value Ref Range Status   01/08/2024 9.3 8.7 - 10.5 mg/dL Final     Total Protein   Date Value Ref Range Status   01/08/2024 6.4 6.0 - 8.4 " "g/dL Final     Albumin   Date Value Ref Range Status   01/08/2024 3.6 3.5 - 5.2 g/dL Final     Total Bilirubin   Date Value Ref Range Status   01/08/2024 0.5 0.1 - 1.0 mg/dL Final     Comment:     For infants and newborns, interpretation of results should be based  on gestational age, weight and in agreement with clinical  observations.    Premature Infant recommended reference ranges:  Up to 24 hours.............<8.0 mg/dL  Up to 48 hours............<12.0 mg/dL  3-5 days..................<15.0 mg/dL  6-29 days.................<15.0 mg/dL       Alkaline Phosphatase   Date Value Ref Range Status   01/08/2024 52 (L) 55 - 135 U/L Final     AST   Date Value Ref Range Status   01/08/2024 15 10 - 40 U/L Final     ALT   Date Value Ref Range Status   01/08/2024 21 10 - 44 U/L Final     Anion Gap   Date Value Ref Range Status   01/08/2024 8 8 - 16 mmol/L Final     eGFR if    Date Value Ref Range Status   01/03/2022 >60.0 >60 mL/min/1.73 m^2 Final     eGFR if non    Date Value Ref Range Status   01/03/2022 >60.0 >60 mL/min/1.73 m^2 Final     Comment:     Calculation used to obtain the estimated glomerular filtration  rate (eGFR) is the CKD-EPI equation.        No results found for: "HEPBSAG", "HEPBSAB", "HEPBCAB"        MS Impression and Plan:     NEURO MULTIPLE SCLEROSIS IMPRESSION:   Number of relapses in the past year?:  0  Clinical Progression:  Improved  Clinical Progression comment:  Significantly faster walk time.  Gait mechanics improved from last reported. Standing more upright and not as slumped over and steps are not as choppy.   MRI Progression:  Stable  MS Classification:  Secondarily Progressive MS  DMT:  No change in management  DMT comment:  Continue to monitor off DMT and monitor clinically   Symptom Management:  No change in symptom management   Updated brain and spine MRIs in January  Follow up with Dr. Sanford after MRIs.   Plan discussed and questions were answered to " satisfaction.     Problem List Items Addressed This Visit          Neuro    MS (multiple sclerosis) - Primary    Relevant Orders    MRI Brain Demyelinating Without Contrast    MRI Cervical Spine Demyelinating Without Contrast    MRI Thoracic Spine Demyelinating Without Contrast       I spent a total of 46 minutes on the day of the visit.This includes face to face time and non-face to face time preparing to see the patient (eg, review of tests), obtaining and/or reviewing separately obtained history, documenting clinical information in the electronic or other health record, independently interpreting results and communicating results to the patient/family/caregiver, or care coordinator.       Megan Montero, NATALY-C

## 2024-07-01 NOTE — TELEPHONE ENCOUNTER
Called and spoke with pt in regard to appointment originally scheduled on 07/10/24 . Pt agreed to reschedule appt to 07/22/24

## 2024-07-05 ENCOUNTER — TELEPHONE (OUTPATIENT)
Dept: INTERNAL MEDICINE | Facility: CLINIC | Age: 69
End: 2024-07-05
Payer: MEDICARE

## 2024-07-05 NOTE — TELEPHONE ENCOUNTER
Called and spoke with pt in regard to recent changes to upcoming appt , pt agreed to schedule to 07/10/24 at 320pm

## 2024-07-09 NOTE — PROGRESS NOTES
"Pt due for 6 month mammogram , orders already placed, please schedule , thanks !    Assessment:         1. Glucose intolerance          Plan:           Silvana Oliveira" was seen today for follow-up.    Diagnoses and all orders for this visit:    Glucose intolerance  -     Basic Metabolic Panel; Standing        Due for mammogram early rpt at 6 months   FU in 6 month with prelabs        Subjective:       Patient ID: Silvana Quezada is a 68 y.o. female.    Chief Complaint: Follow-up      Interim Hx  Seen by neurology  Seen by obseity  Seen by urology  Seen by   Concerns today        Chronic problems      HPI    Review of Systems   All other systems reviewed and are negative.            Health Maintenance Due   Topic Date Due    TETANUS VACCINE  Never done    High Dose Statin  Never done    Shingles Vaccine (1 of 2) Never done    RSV Vaccine (Age 60+ and Pregnant patients) (1 - 1-dose 60+ series) Never done    COVID-19 Vaccine (5 - 2023-24 season) 03/02/2024         Objective:     /84 (BP Location: Right arm, Patient Position: Sitting, BP Method: Large (Manual))   Pulse 96   Ht 5' 3" (1.6 m)   Wt 81.9 kg (180 lb 8.9 oz)   LMP  (LMP Unknown)   SpO2 97%   BMI 31.98 kg/m²         7/10/2024     3:20 PM 7/1/2024    10:42 AM 6/24/2024     3:14 PM 5/31/2024     8:31 AM 4/24/2024     2:57 PM   Vitals   Height 5' 3" (1.6 m) 5' 3" (1.6 m) 5' 3" (1.6 m) 5' 3" (1.6 m) 5' 3" (1.6 m)   Weight (lbs) 180.56 180.12 178.3 181.22 185.19   BMI (kg/m2) 32 31.9 31.6 32.1 32.8                Physical Exam  Vitals and nursing note reviewed.   Constitutional:       General: She is not in acute distress.     Appearance: She is well-developed. She is not diaphoretic.   HENT:      Head: Normocephalic.      Nose: Nose normal.   Eyes:      General:         Right eye: No discharge.         Left eye: No discharge.      Conjunctiva/sclera: Conjunctivae normal.      Pupils: Pupils are equal, round, and reactive to light.   Cardiovascular:      " Rate and Rhythm: Normal rate and regular rhythm.      Heart sounds: Normal heart sounds. No murmur heard.     No friction rub. No gallop.   Pulmonary:      Effort: Pulmonary effort is normal. No respiratory distress.      Breath sounds: No stridor. No wheezing, rhonchi or rales.   Chest:      Chest wall: No mass, swelling, tenderness, crepitus or edema.   Breasts:     Breasts are symmetrical.      Left: Normal. No swelling, bleeding, inverted nipple, mass, nipple discharge, skin change or tenderness.   Abdominal:      General: Bowel sounds are normal. There is no distension.      Palpations: Abdomen is soft.   Musculoskeletal:         General: No deformity. Normal range of motion.      Cervical back: Normal range of motion.   Lymphadenopathy:      Upper Body:      Left upper body: No supraclavicular, axillary or pectoral adenopathy.   Skin:     General: Skin is warm.   Neurological:      Mental Status: She is alert and oriented to person, place, and time.      Cranial Nerves: No cranial nerve deficit.           No results found for this or any previous visit (from the past 336 hour(s)).      Future Appointments   Date Time Provider Department Center   7/29/2024 11:30 AM Justine Avila MD [facility] PULMSVC [facility]   8/5/2024  9:00 AM Stacey Solano FNP-C [facility]   8/12/2024  4:00 PM Kyree Lehman, SALONI [facility] PODIAT [facility]   9/6/2024  9:20 AM Jayesh Ross MD [facility] UROLOGY [facility]   9/23/2024 10:00 AM Jodi Blackwell MD [facility] BARIAT [facility]   1/6/2025  9:00 AM LAB, [facility] [place]   1/8/2025 10:45 AM [facility] OIC-MRI3 [facility]   1/8/2025 11:15 AM [facility] OI-MRI2 [facility]   1/8/2025 11:45 AM [facility] OIC-MRI3 [facility]   1/10/2025  2:20 PM Jhonatan Avila III, MD [facility]   1/15/2025 12:40 PM Valencia Sanford MD [facility]         Medication List with Changes/Refills   Current Medications    ALENDRONATE (FOSAMAX) 70 MG TABLET     TAKE 1 TABLET BY MOUTH EVERY 7 DAYS    BUPROPION (WELLBUTRIN SR) 100 MG TBSR 12 HR TABLET    Take 2 tablets (200 mg total) by mouth every morning.    BUPROPION (WELLBUTRIN SR) 150 MG TBSR 12 HR TABLET    Take 1 tablet (150 mg total) by mouth every evening.    CALCIUM-VITAMIN D3 (OS-IGOR 500 + D3) 500 MG-5 MCG (200 UNIT) PER TABLET    Take 1 tablet by mouth 2 (two) times daily with meals.    CATHETER (FEMALE CATHETER) 14 [place] [facility] [place] [facility] [place]    Patient is to self catheterize four times a day indefinitley using female 12 fr in and out catheter for incomplete bladder emptying.   Dispense 120 month with 11 refills or 360 every 3 months with 3 refills depending on patient's preference    COENZYME Q10 100 MG CAPSULE    Take 5 capsules (500 mg total) by mouth once daily.    CYANOCOBALAMIN (VITAMIN B-12) 1000 MCG TABLET    Take 1,000 mcg by mouth once daily.    FLUPHENAZINE (PROLIXIN) 1 MG TABLET    Take 1 tablet (1 mg total) by mouth every evening.    FOLIC ACID (FOLVITE) 1 MG TABLET    Take 1 tablet (1,000 mcg total) by mouth once daily.    METFORMIN (GLUCOPHAGE-XR) 500 MG ER 24HR TABLET    Take 1 tablet (500 mg total) by mouth 2 (two) times daily with meals.    MULTIVITAMIN (ONE DAILY MULTIVITAMIN) PER TABLET    Take 1 tablet by mouth once daily.    OXCARBAZEPINE (TRILEPTAL) 150 MG TAB    Take 1 tablet (150 mg total) by mouth 2 (two) times daily.         Disclaimer:  This note has been generated using voice-recognition software. There may be grammatical or spelling errors that have been missed during proof-reading   Visit complexity inherent to evaluation and management associated with medical care services that serve as the continuing focal point for all needed health care services and/or with medical care services that are part of ongoing care related to a patient's single, serious condition or a complex condition

## 2024-07-10 ENCOUNTER — OFFICE VISIT (OUTPATIENT)
Dept: INTERNAL MEDICINE | Facility: CLINIC | Age: 69
End: 2024-07-10
Payer: MEDICARE

## 2024-07-10 VITALS
BODY MASS INDEX: 31.99 KG/M2 | HEIGHT: 63 IN | SYSTOLIC BLOOD PRESSURE: 130 MMHG | OXYGEN SATURATION: 97 % | HEART RATE: 96 BPM | DIASTOLIC BLOOD PRESSURE: 84 MMHG | WEIGHT: 180.56 LBS

## 2024-07-10 DIAGNOSIS — E74.39 GLUCOSE INTOLERANCE: Primary | ICD-10-CM

## 2024-07-10 PROBLEM — D69.6 THROMBOCYTOPENIA: Status: RESOLVED | Noted: 2020-03-27 | Resolved: 2024-07-10

## 2024-07-10 PROCEDURE — 3044F HG A1C LEVEL LT 7.0%: CPT | Mod: CPTII,S$GLB,, | Performed by: INTERNAL MEDICINE

## 2024-07-10 PROCEDURE — 1126F AMNT PAIN NOTED NONE PRSNT: CPT | Mod: CPTII,S$GLB,, | Performed by: INTERNAL MEDICINE

## 2024-07-10 PROCEDURE — 1101F PT FALLS ASSESS-DOCD LE1/YR: CPT | Mod: CPTII,S$GLB,, | Performed by: INTERNAL MEDICINE

## 2024-07-10 PROCEDURE — 3008F BODY MASS INDEX DOCD: CPT | Mod: CPTII,S$GLB,, | Performed by: INTERNAL MEDICINE

## 2024-07-10 PROCEDURE — 3288F FALL RISK ASSESSMENT DOCD: CPT | Mod: CPTII,S$GLB,, | Performed by: INTERNAL MEDICINE

## 2024-07-10 PROCEDURE — 3079F DIAST BP 80-89 MM HG: CPT | Mod: CPTII,S$GLB,, | Performed by: INTERNAL MEDICINE

## 2024-07-10 PROCEDURE — 1159F MED LIST DOCD IN RCRD: CPT | Mod: CPTII,S$GLB,, | Performed by: INTERNAL MEDICINE

## 2024-07-10 PROCEDURE — 99214 OFFICE O/P EST MOD 30 MIN: CPT | Mod: S$GLB,,, | Performed by: INTERNAL MEDICINE

## 2024-07-10 PROCEDURE — 1160F RVW MEDS BY RX/DR IN RCRD: CPT | Mod: CPTII,S$GLB,, | Performed by: INTERNAL MEDICINE

## 2024-07-10 PROCEDURE — 1157F ADVNC CARE PLAN IN RCRD: CPT | Mod: CPTII,S$GLB,, | Performed by: INTERNAL MEDICINE

## 2024-07-10 PROCEDURE — 99999 PR PBB SHADOW E&M-EST. PATIENT-LVL IV: CPT | Mod: PBBFAC,,, | Performed by: INTERNAL MEDICINE

## 2024-07-10 PROCEDURE — G2211 COMPLEX E/M VISIT ADD ON: HCPCS | Mod: S$GLB,,, | Performed by: INTERNAL MEDICINE

## 2024-07-10 PROCEDURE — 3075F SYST BP GE 130 - 139MM HG: CPT | Mod: CPTII,S$GLB,, | Performed by: INTERNAL MEDICINE

## 2024-07-12 ENCOUNTER — TELEPHONE (OUTPATIENT)
Dept: FAMILY MEDICINE | Facility: CLINIC | Age: 69
End: 2024-07-12
Payer: MEDICARE

## 2024-07-12 NOTE — TELEPHONE ENCOUNTER
----- Message from Kostas Monterroso sent at 7/12/2024 11:29 AM CDT -----  Contact: Patient  Type:  Patient Call          Who Called: Patient         Does the patient know what this is regarding?: Requesting a call back pt said that she have a concern area on her left nipple that's painful to touch ; please advise           Would the patient rather a call back or a response via [facility]?call           Best Call Back Number: [phone]          Additional Information:Symptom: Breast Symptoms  Outcome: Schedule an appointment to be seen within 24 hours.  Reason: Caller denied all higher acuity questions

## 2024-07-12 NOTE — TELEPHONE ENCOUNTER
Patient stated she's having pain in left breast close to the nipple. No lump hurts at touch. Set patient up for an appointment with another provider Monday due to Dr Avila not having any availability Monday. Patient accepted appointment.

## 2024-07-15 ENCOUNTER — OFFICE VISIT (OUTPATIENT)
Dept: INTERNAL MEDICINE | Facility: CLINIC | Age: 69
End: 2024-07-15
Payer: MEDICARE

## 2024-07-15 VITALS
OXYGEN SATURATION: 97 % | HEIGHT: 63 IN | DIASTOLIC BLOOD PRESSURE: 72 MMHG | TEMPERATURE: 97 F | HEART RATE: 78 BPM | RESPIRATION RATE: 16 BRPM | WEIGHT: 179 LBS | SYSTOLIC BLOOD PRESSURE: 96 MMHG | BODY MASS INDEX: 31.71 KG/M2

## 2024-07-15 DIAGNOSIS — N64.4 NIPPLE PAIN: Primary | ICD-10-CM

## 2024-07-15 PROCEDURE — 1160F RVW MEDS BY RX/DR IN RCRD: CPT | Mod: CPTII,S$GLB,, | Performed by: NURSE PRACTITIONER

## 2024-07-15 PROCEDURE — 3288F FALL RISK ASSESSMENT DOCD: CPT | Mod: CPTII,S$GLB,, | Performed by: NURSE PRACTITIONER

## 2024-07-15 PROCEDURE — 3044F HG A1C LEVEL LT 7.0%: CPT | Mod: CPTII,S$GLB,, | Performed by: NURSE PRACTITIONER

## 2024-07-15 PROCEDURE — 1159F MED LIST DOCD IN RCRD: CPT | Mod: CPTII,S$GLB,, | Performed by: NURSE PRACTITIONER

## 2024-07-15 PROCEDURE — 3008F BODY MASS INDEX DOCD: CPT | Mod: CPTII,S$GLB,, | Performed by: NURSE PRACTITIONER

## 2024-07-15 PROCEDURE — 99213 OFFICE O/P EST LOW 20 MIN: CPT | Mod: S$GLB,,, | Performed by: NURSE PRACTITIONER

## 2024-07-15 PROCEDURE — 1126F AMNT PAIN NOTED NONE PRSNT: CPT | Mod: CPTII,S$GLB,, | Performed by: NURSE PRACTITIONER

## 2024-07-15 PROCEDURE — 1157F ADVNC CARE PLAN IN RCRD: CPT | Mod: CPTII,S$GLB,, | Performed by: NURSE PRACTITIONER

## 2024-07-15 PROCEDURE — 3074F SYST BP LT 130 MM HG: CPT | Mod: CPTII,S$GLB,, | Performed by: NURSE PRACTITIONER

## 2024-07-15 PROCEDURE — 99999 PR PBB SHADOW E&M-EST. PATIENT-LVL IV: CPT | Mod: PBBFAC,,, | Performed by: NURSE PRACTITIONER

## 2024-07-15 PROCEDURE — 1101F PT FALLS ASSESS-DOCD LE1/YR: CPT | Mod: CPTII,S$GLB,, | Performed by: NURSE PRACTITIONER

## 2024-07-15 PROCEDURE — 3078F DIAST BP <80 MM HG: CPT | Mod: CPTII,S$GLB,, | Performed by: NURSE PRACTITIONER

## 2024-07-15 NOTE — PROGRESS NOTES
Subjective:       Patient ID: Silvana Quezada is a 68 y.o. female.    Chief Complaint: Breast Pain (Left nipple )    Patient is a 68 y.o. female who traditionally follows with Jhonatan Avila III, MD presenting today for nipple pain.     Patient noted nipple pain began 3 days ago and has improved today. She denies trauma. Has history of right breast cancer with lumpectomy with diagnostic MMG already scheduled for next month.     Review of patient's allergies indicates:   Allergen Reactions    Adhesive      blisters    Keflex [cephalexin] Rash    Cephalosporins        Medication List with Changes/Refills   Current Medications    ALENDRONATE (FOSAMAX) 70 MG TABLET    TAKE 1 TABLET BY MOUTH EVERY 7 DAYS    BUPROPION (WELLBUTRIN SR) 100 MG TBSR 12 HR TABLET    Take 2 tablets (200 mg total) by mouth every morning.    BUPROPION (WELLBUTRIN SR) 150 MG TBSR 12 HR TABLET    Take 1 tablet (150 mg total) by mouth every evening.    CALCIUM-VITAMIN D3 (OS-IGOR 500 + D3) 500 MG-5 MCG (200 UNIT) PER TABLET    Take 1 tablet by mouth 2 (two) times daily with meals.    CATHETER (FEMALE CATHETER) 14 FR [facility]    Patient is to self catheterize four times a day indefinitley using female 12 fr in and out catheter for incomplete bladder emptying.   Dispense 120 month with 11 refills or 360 every 3 months with 3 refills depending on patient's preference    COENZYME Q10 100 MG CAPSULE    Take 5 capsules (500 mg total) by mouth once daily.    CYANOCOBALAMIN (VITAMIN B-12) 1000 MCG TABLET    Take 1,000 mcg by mouth once daily.    FLUPHENAZINE (PROLIXIN) 1 MG TABLET    Take 1 tablet (1 mg total) by mouth every evening.    FOLIC ACID (FOLVITE) 1 MG TABLET    Take 1 tablet (1,000 mcg total) by mouth once daily.    METFORMIN (GLUCOPHAGE-XR) 500 MG ER 24HR TABLET    Take 1 tablet (500 mg total) by mouth 2 (two) times daily with meals.    MULTIVITAMIN (ONE DAILY MULTIVITAMIN) PER TABLET    Take 1 tablet by mouth once daily.    OXCARBAZEPINE (TRILEPTAL)  "150 MG TAB    Take 1 tablet (150 mg total) by mouth 2 (two) times daily.    UNABLE TO FIND    Paolo [facility]     Medical, social and surgical history has been reviewed with the patient.     Review of Systems   Integumentary:  Positive for breast tenderness.   Breast: Positive for tenderness.     Objective:   BP 96/72 (BP Location: Right arm, Patient Position: Sitting, BP Method: Medium (Manual))   Pulse 78   Temp 97.1 °F (36.2 °C) (Temporal)   Resp 16   Ht 5' 3" (1.6 m)   Wt 81.2 kg (179 lb 0.2 oz)   LMP  (LMP Unknown)   SpO2 97%   BMI 31.71 kg/m²     Physical Exam  Vitals reviewed.   Constitutional:       Appearance: Normal appearance.   HENT:      Head: Normocephalic and atraumatic.   Pulmonary:      Effort: Pulmonary effort is normal.   Chest:   Breasts:     Right: No mass, nipple discharge or skin change.      Left: No mass, nipple discharge or skin change.      Comments: Small blood blister noted to left areola just inferior to the nipple.  Skin:     General: Skin is warm and dry.   Neurological:      Mental Status: She is alert and oriented to person, place, and time.       Assessment and Plan:     1. Nipple pain    Patient advised to call with any persisting pain but in the interim to wear a bra or place something between breast and shirt to prevent friction. Will try to move up diagnostic MMG.    "

## 2024-07-23 ENCOUNTER — HOSPITAL ENCOUNTER (OUTPATIENT)
Dept: RADIOLOGY | Facility: HOSPITAL | Age: 69
Discharge: HOME | End: 2024-07-23
Attending: INTERNAL MEDICINE
Payer: MEDICARE

## 2024-07-23 DIAGNOSIS — Z85.3 HISTORY OF BREAST CANCER: ICD-10-CM

## 2024-07-23 PROCEDURE — 77062 BREAST TOMOSYNTHESIS BI: CPT | Mod: TC

## 2024-07-23 PROCEDURE — 77062 BREAST TOMOSYNTHESIS BI: CPT | Mod: 26,,, | Performed by: RADIOLOGY

## 2024-07-23 PROCEDURE — 77066 DX MAMMO INCL CAD BI: CPT | Mod: 26,,, | Performed by: RADIOLOGY

## 2024-07-25 ENCOUNTER — PATIENT MESSAGE (OUTPATIENT)
Dept: PSYCHIATRY | Facility: CLINIC | Age: 69
End: 2024-07-25
Payer: MEDICARE

## 2024-07-29 ENCOUNTER — OFFICE VISIT (OUTPATIENT)
Dept: PULMONOLOGY | Facility: CLINIC | Age: 69
End: 2024-07-29
Payer: MEDICARE

## 2024-07-29 VITALS
WEIGHT: 175.94 LBS | BODY MASS INDEX: 31.17 KG/M2 | HEART RATE: 99 BPM | SYSTOLIC BLOOD PRESSURE: 108 MMHG | OXYGEN SATURATION: 97 % | DIASTOLIC BLOOD PRESSURE: 68 MMHG | HEIGHT: 63 IN

## 2024-07-29 DIAGNOSIS — R91.8 ABNORMAL CT SCAN OF LUNG: ICD-10-CM

## 2024-07-29 DIAGNOSIS — R06.02 SOB (SHORTNESS OF BREATH): Primary | ICD-10-CM

## 2024-07-29 PROCEDURE — 3074F SYST BP LT 130 MM HG: CPT | Mod: CPTII,S$GLB,, | Performed by: INTERNAL MEDICINE

## 2024-07-29 PROCEDURE — 1101F PT FALLS ASSESS-DOCD LE1/YR: CPT | Mod: CPTII,S$GLB,, | Performed by: INTERNAL MEDICINE

## 2024-07-29 PROCEDURE — 99999 PR PBB SHADOW E&M-EST. PATIENT-LVL III: CPT | Mod: PBBFAC,,, | Performed by: INTERNAL MEDICINE

## 2024-07-29 PROCEDURE — 3288F FALL RISK ASSESSMENT DOCD: CPT | Mod: CPTII,S$GLB,, | Performed by: INTERNAL MEDICINE

## 2024-07-29 PROCEDURE — 3078F DIAST BP <80 MM HG: CPT | Mod: CPTII,S$GLB,, | Performed by: INTERNAL MEDICINE

## 2024-07-29 PROCEDURE — 3044F HG A1C LEVEL LT 7.0%: CPT | Mod: CPTII,S$GLB,, | Performed by: INTERNAL MEDICINE

## 2024-07-29 PROCEDURE — 99213 OFFICE O/P EST LOW 20 MIN: CPT | Mod: S$GLB,,, | Performed by: INTERNAL MEDICINE

## 2024-07-29 PROCEDURE — 1159F MED LIST DOCD IN RCRD: CPT | Mod: CPTII,S$GLB,, | Performed by: INTERNAL MEDICINE

## 2024-07-29 PROCEDURE — 3008F BODY MASS INDEX DOCD: CPT | Mod: CPTII,S$GLB,, | Performed by: INTERNAL MEDICINE

## 2024-07-29 PROCEDURE — 1157F ADVNC CARE PLAN IN RCRD: CPT | Mod: CPTII,S$GLB,, | Performed by: INTERNAL MEDICINE

## 2024-07-29 NOTE — PROGRESS NOTES
"Subjective:     Reason for visit: SOB    Patient ID:  Silvana Quezada is a 68 y.o. female    Interval History   Since our last visit Ms. Goldman symptoms have not significantly changed.  She still has difficulty with shortness of breath that has not really changed since having COVID in 2020.  She did complete her PFTs that did not show obstruction or significant restriction but did show a decreased DLCO.  She had the CT scan back in January that did not show any evidence of ILD.  She has never had a echo or other heart evaluation.  Her mom did diaphragm congestive heart failure but that was at the age of 95.  She has never personally had a heart attack or other heart issues.     History:  Ms. Quezada is a 68-year-old with multiple sclerosis, diabetes, obesity, osteoporosis, anxiety and is here for evaluation of her shortness of breath.  She did not previously have any issues with breathing or shortness a breath until she had COVID in 2020.  She did recover from that infection but has continued to have significant shortness of breath since then.  Prior to that she was able to walk up to 2 miles a day without difficulty and now she can barely walk a block without stopping to rest.  Her  is with her today and assisting with her history and says that she really enjoys singing and has also not been able to do that as much.  She has no previous lung disease and no history of asthma as a child or known exposures other than her COVID infection.  She has never been affected from a breathing standpoint by her MS in the past and really has not had any significant flares since the early 90s.      Additional Pulmonary History:  Childhood Illnesses:  None  Occupational:  Not applicable  Environmental:  None  Tobacco/Smoking:  Never smoker    Objective:     Vitals:    07/29/24 1113   BP: 108/68   BP Location: Right arm   Patient Position: Sitting   Pulse: 99   SpO2: 97%   Weight: 79.8 kg (175 lb 14.8 oz)   Height: 5' 3" (1.6 m) "         Physical Exam  Vitals reviewed.   Constitutional:       General: She is not in acute distress.     Appearance: She is not ill-appearing or diaphoretic.   HENT:      Head: Normocephalic and atraumatic.      Right Ear: External ear normal.      Left Ear: External ear normal.   Eyes:      Conjunctiva/sclera: Conjunctivae normal.      Pupils: Pupils are equal, round, and reactive to light.   Neck:      Trachea: No tracheal deviation.   Cardiovascular:      Rate and Rhythm: Normal rate and regular rhythm.      Heart sounds: Normal heart sounds. No murmur heard.  Pulmonary:      Effort: Pulmonary effort is normal. No respiratory distress.      Breath sounds: Normal breath sounds. No stridor. No wheezing or rales.   Abdominal:      General: Bowel sounds are normal. There is no distension.      Palpations: Abdomen is soft.      Tenderness: There is no abdominal tenderness.   Musculoskeletal:         General: Normal range of motion.      Cervical back: Normal range of motion and neck supple.   Skin:     General: Skin is warm and dry.      Findings: No erythema.   Neurological:      Mental Status: She is alert and oriented to person, place, and time.      Gait: Gait is intact.   Psychiatric:         Mood and Affect: Mood and affect normal.         Cognition and Memory: Memory normal.         Judgment: Judgment normal.          Personal Diagnostic Review and Interpretation  Reviewed her CT scan from January 2 stable 3 mm nodules possibly very mild basilar reticulations otherwise nothing abnormal      Pertinent Studies Reviewed & Interpreted:     Pulmonary Function Tests:   pending        Assessment & Plan:       Problem List Items Addressed This Visit          Pulmonary    SOB (shortness of breath) - Primary    Current Assessment & Plan     PFTs only showed a decreased DLCO, no evidence of lung disease as the primary cause of her shortness of breath so we will obtain an echo to rule out heart involvement.          Relevant Orders    Echo    Abnormal CT scan of lung    Current Assessment & Plan     Previous CT scans with 2 3 mm nodules that has been unchanged over several years.  There was some question of an elevated hemidiaphragm but otherwise nothing that needs further evaluation.             RETURN TO CLINIC PRN based on echo results.        Portions of the record may have been created with voice-recognition software. Occasional wrong-word or sound-a-like substitutions may have occurred due to the inherent limitations of voice-recognition software. Read the chart carefully and recognize, using context, where substitutions have occurred.    Justine Avila M.D.  Pulmonary/Critical Care

## 2024-07-29 NOTE — ASSESSMENT & PLAN NOTE
PFTs only showed a decreased DLCO, no evidence of lung disease as the primary cause of her shortness of breath so we will obtain an echo to rule out heart involvement.

## 2024-08-02 ENCOUNTER — HOSPITAL ENCOUNTER (OUTPATIENT)
Dept: CARDIOLOGY | Facility: HOSPITAL | Age: 69
Discharge: HOME | End: 2024-08-02
Attending: INTERNAL MEDICINE
Payer: MEDICARE

## 2024-08-02 VITALS — HEIGHT: 63 IN | WEIGHT: 175 LBS | BODY MASS INDEX: 31.01 KG/M2

## 2024-08-02 DIAGNOSIS — R06.02 SOB (SHORTNESS OF BREATH): ICD-10-CM

## 2024-08-02 LAB
ASCENDING AORTA: 3.3 CM
AV INDEX (PROSTH): 0.93
AV MEAN GRADIENT: 2 MMHG
AV PEAK GRADIENT: 4 MMHG
AV VALVE AREA BY VELOCITY RATIO: 2.63 CM²
AV VALVE AREA: 2.84 CM²
AV VELOCITY RATIO: 0.86
BSA FOR ECHO PROCEDURE: 1.88 M2
CV ECHO LV RWT: 0.44 CM
DOP CALC AO PEAK VEL: 1.03 M/S
DOP CALC AO VTI: 19.37 CM
DOP CALC LVOT AREA: 3 CM2
DOP CALC LVOT DIAMETER: 1.97 CM
DOP CALC LVOT PEAK VEL: 0.89 M/S
DOP CALC LVOT STROKE VOLUME: 55.02 CM3
DOP CALCLVOT PEAK VEL VTI: 18.06 CM
E WAVE DECELERATION TIME: 276.23 MSEC
E/A RATIO: 0.67
E/E' RATIO: 7 M/S
ECHO LV POSTERIOR WALL: 0.87 CM (ref 0.6–1.1)
EJECTION FRACTION: 65 %
FRACTIONAL SHORTENING: 7 % (ref 28–44)
INTERVENTRICULAR SEPTUM: 1.1 CM (ref 0.6–1.1)
LA MAJOR: 3.38 CM
LA MINOR: 2.96 CM
LA WIDTH: 2.55 CM
LEFT ATRIUM SIZE: 2.99 CM
LEFT ATRIUM VOLUME INDEX MOD: 12.3 ML/M2
LEFT ATRIUM VOLUME INDEX: 11.2 ML/M2
LEFT ATRIUM VOLUME MOD: 22.43 CM3
LEFT ATRIUM VOLUME: 20.45 CM3
LEFT INTERNAL DIMENSION IN SYSTOLE: 3.65 CM (ref 2.1–4)
LEFT VENTRICLE DIASTOLIC VOLUME INDEX: 37 ML/M2
LEFT VENTRICLE DIASTOLIC VOLUME: 67.71 ML
LEFT VENTRICLE MASS INDEX: 66 G/M2
LEFT VENTRICLE SYSTOLIC VOLUME INDEX: 30.7 ML/M2
LEFT VENTRICLE SYSTOLIC VOLUME: 56.24 ML
LEFT VENTRICULAR INTERNAL DIMENSION IN DIASTOLE: 3.94 CM (ref 3.5–6)
LEFT VENTRICULAR MASS: 121.46 G
LV LATERAL E/E' RATIO: 6 M/S
LV SEPTAL E/E' RATIO: 8.4 M/S
MV PEAK A VEL: 0.63 M/S
MV PEAK E VEL: 0.42 M/S
MV STENOSIS PRESSURE HALF TIME: 80.11 MS
MV VALVE AREA P 1/2 METHOD: 2.75 CM2
RA MAJOR: 2.91 CM
RA PRESSURE ESTIMATED: 3 MMHG
RA WIDTH: 2.75 CM
RIGHT VENTRICLE DIASTOLIC BASEL DIMENSION: 3.5 CM
SINUS: 3.37 CM
STJ: 2.87 CM
TDI LATERAL: 0.07 M/S
TDI SEPTAL: 0.05 M/S
TDI: 0.06 M/S
TRICUSPID ANNULAR PLANE SYSTOLIC EXCURSION: 1.04 CM
Z-SCORE OF LEFT VENTRICULAR DIMENSION IN END DIASTOLE: -2.52
Z-SCORE OF LEFT VENTRICULAR DIMENSION IN END SYSTOLE: 1.21

## 2024-08-02 PROCEDURE — 93306 TTE W/DOPPLER COMPLETE: CPT

## 2024-08-02 PROCEDURE — 93306 TTE W/DOPPLER COMPLETE: CPT | Mod: 26,,, | Performed by: INTERNAL MEDICINE

## 2024-08-05 ENCOUNTER — PATIENT MESSAGE (OUTPATIENT)
Dept: PSYCHIATRY | Facility: CLINIC | Age: 69
End: 2024-08-05
Payer: MEDICARE

## 2024-08-05 ENCOUNTER — OFFICE VISIT (OUTPATIENT)
Dept: FAMILY MEDICINE | Facility: CLINIC | Age: 69
End: 2024-08-05
Payer: MEDICARE

## 2024-08-05 VITALS
SYSTOLIC BLOOD PRESSURE: 114 MMHG | OXYGEN SATURATION: 97 % | DIASTOLIC BLOOD PRESSURE: 82 MMHG | WEIGHT: 175 LBS | HEIGHT: 63 IN | BODY MASS INDEX: 31.01 KG/M2 | HEART RATE: 77 BPM

## 2024-08-05 DIAGNOSIS — I70.0 THORACIC AORTA ATHEROSCLEROSIS: ICD-10-CM

## 2024-08-05 DIAGNOSIS — G35 MS (MULTIPLE SCLEROSIS): ICD-10-CM

## 2024-08-05 DIAGNOSIS — M81.0 AGE-RELATED OSTEOPOROSIS WITHOUT CURRENT PATHOLOGICAL FRACTURE: ICD-10-CM

## 2024-08-05 DIAGNOSIS — I77.1 TORTUOUS AORTA: ICD-10-CM

## 2024-08-05 DIAGNOSIS — G47.33 OSA (OBSTRUCTIVE SLEEP APNEA): ICD-10-CM

## 2024-08-05 DIAGNOSIS — Z00.00 ENCOUNTER FOR PREVENTIVE HEALTH EXAMINATION: Primary | ICD-10-CM

## 2024-08-05 DIAGNOSIS — N32.81 OVERACTIVE BLADDER: ICD-10-CM

## 2024-08-05 DIAGNOSIS — F31.32 BIPOLAR I DISORDER, MOST RECENT EPISODE DEPRESSED, MODERATE: ICD-10-CM

## 2024-08-05 DIAGNOSIS — Z00.00 ENCOUNTER FOR MEDICARE ANNUAL WELLNESS EXAM: ICD-10-CM

## 2024-08-05 DIAGNOSIS — Z85.3 HISTORY OF BREAST CANCER: ICD-10-CM

## 2024-08-05 PROBLEM — S62.102A WRIST FRACTURE, CLOSED, LEFT, INITIAL ENCOUNTER: Status: RESOLVED | Noted: 2019-12-27 | Resolved: 2024-08-05

## 2024-08-05 PROCEDURE — 1160F RVW MEDS BY RX/DR IN RCRD: CPT | Mod: CPTII,S$GLB,, | Performed by: NURSE PRACTITIONER

## 2024-08-05 PROCEDURE — 1170F FXNL STATUS ASSESSED: CPT | Mod: CPTII,S$GLB,, | Performed by: NURSE PRACTITIONER

## 2024-08-05 PROCEDURE — G0439 PPPS, SUBSEQ VISIT: HCPCS | Mod: S$GLB,,, | Performed by: NURSE PRACTITIONER

## 2024-08-05 PROCEDURE — 1123F ACP DISCUSS/DSCN MKR DOCD: CPT | Mod: CPTII,S$GLB,, | Performed by: NURSE PRACTITIONER

## 2024-08-05 PROCEDURE — 3288F FALL RISK ASSESSMENT DOCD: CPT | Mod: CPTII,S$GLB,, | Performed by: NURSE PRACTITIONER

## 2024-08-05 PROCEDURE — 1159F MED LIST DOCD IN RCRD: CPT | Mod: CPTII,S$GLB,, | Performed by: NURSE PRACTITIONER

## 2024-08-05 PROCEDURE — 1101F PT FALLS ASSESS-DOCD LE1/YR: CPT | Mod: CPTII,S$GLB,, | Performed by: NURSE PRACTITIONER

## 2024-08-05 PROCEDURE — 3074F SYST BP LT 130 MM HG: CPT | Mod: CPTII,S$GLB,, | Performed by: NURSE PRACTITIONER

## 2024-08-05 PROCEDURE — 1126F AMNT PAIN NOTED NONE PRSNT: CPT | Mod: CPTII,S$GLB,, | Performed by: NURSE PRACTITIONER

## 2024-08-05 PROCEDURE — 3044F HG A1C LEVEL LT 7.0%: CPT | Mod: CPTII,S$GLB,, | Performed by: NURSE PRACTITIONER

## 2024-08-05 PROCEDURE — 3079F DIAST BP 80-89 MM HG: CPT | Mod: CPTII,S$GLB,, | Performed by: NURSE PRACTITIONER

## 2024-08-05 PROCEDURE — 99999 PR PBB SHADOW E&M-EST. PATIENT-LVL V: CPT | Mod: PBBFAC,,, | Performed by: NURSE PRACTITIONER

## 2024-08-12 ENCOUNTER — OFFICE VISIT (OUTPATIENT)
Dept: PODIATRY | Facility: CLINIC | Age: 69
End: 2024-08-12
Payer: MEDICARE

## 2024-08-12 VITALS
HEIGHT: 63 IN | HEART RATE: 68 BPM | BODY MASS INDEX: 31.01 KG/M2 | SYSTOLIC BLOOD PRESSURE: 132 MMHG | DIASTOLIC BLOOD PRESSURE: 87 MMHG | WEIGHT: 175 LBS

## 2024-08-12 DIAGNOSIS — R29.898 WEAKNESS OF EXTREMITY: ICD-10-CM

## 2024-08-12 DIAGNOSIS — B35.1 ONYCHOMYCOSIS: Primary | ICD-10-CM

## 2024-08-12 DIAGNOSIS — G35 MS (MULTIPLE SCLEROSIS): ICD-10-CM

## 2024-08-12 PROCEDURE — 87101 SKIN FUNGI CULTURE: CPT | Performed by: PODIATRIST

## 2024-08-12 PROCEDURE — 3079F DIAST BP 80-89 MM HG: CPT | Mod: CPTII,S$GLB,, | Performed by: PODIATRIST

## 2024-08-12 PROCEDURE — 3288F FALL RISK ASSESSMENT DOCD: CPT | Mod: CPTII,S$GLB,, | Performed by: PODIATRIST

## 2024-08-12 PROCEDURE — 3008F BODY MASS INDEX DOCD: CPT | Mod: CPTII,S$GLB,, | Performed by: PODIATRIST

## 2024-08-12 PROCEDURE — 99999 PR PBB SHADOW E&M-EST. PATIENT-LVL IV: CPT | Mod: PBBFAC,,, | Performed by: PODIATRIST

## 2024-08-12 PROCEDURE — 1159F MED LIST DOCD IN RCRD: CPT | Mod: CPTII,S$GLB,, | Performed by: PODIATRIST

## 2024-08-12 PROCEDURE — 11721 DEBRIDE NAIL 6 OR MORE: CPT | Mod: Q9,S$GLB,, | Performed by: PODIATRIST

## 2024-08-12 PROCEDURE — 3044F HG A1C LEVEL LT 7.0%: CPT | Mod: CPTII,S$GLB,, | Performed by: PODIATRIST

## 2024-08-12 PROCEDURE — 99203 OFFICE O/P NEW LOW 30 MIN: CPT | Mod: 25,S$GLB,, | Performed by: PODIATRIST

## 2024-08-12 PROCEDURE — 3075F SYST BP GE 130 - 139MM HG: CPT | Mod: CPTII,S$GLB,, | Performed by: PODIATRIST

## 2024-08-12 PROCEDURE — 1157F ADVNC CARE PLAN IN RCRD: CPT | Mod: CPTII,S$GLB,, | Performed by: PODIATRIST

## 2024-08-12 PROCEDURE — 1126F AMNT PAIN NOTED NONE PRSNT: CPT | Mod: CPTII,S$GLB,, | Performed by: PODIATRIST

## 2024-08-12 PROCEDURE — 1160F RVW MEDS BY RX/DR IN RCRD: CPT | Mod: CPTII,S$GLB,, | Performed by: PODIATRIST

## 2024-08-12 PROCEDURE — 1101F PT FALLS ASSESS-DOCD LE1/YR: CPT | Mod: CPTII,S$GLB,, | Performed by: PODIATRIST

## 2024-08-12 NOTE — PROCEDURES
"Routine Foot Care    Date/Time: 8/12/2024 4:00 PM    Performed by: Kyree Lehman DPM  Authorized by: Kyree Lehman DPM    Time out: Immediately prior to procedure a "time out" was called to verify the correct patient, procedure, equipment, support staff and site/side marked as required.    Consent Done?:  Yes (Verbal)  Hyperkeratotic Skin Lesions?: No      Nail Care Type:  Debride  Location(s): All  (Left 1st Toe, Left 3rd Toe, Left 2nd Toe, Left 4th Toe, Left 5th Toe, Right 1st Toe, Right 2nd Toe, Right 3rd Toe, Right 4th Toe and Right 5th Toe)  Patient tolerance:  Patient tolerated the procedure well with no immediate complications     Used sterile nail nipper. Assisted by Antonia Saleh DPM PGY 3    "

## 2024-08-12 NOTE — PROGRESS NOTES
"Subjective:     Patient ID: Silvana Quezada is a 68 y.o. female.    Chief Complaint: Nail Problem (Bilateral great toe nails (thick and discolored))    Silvana is a 68 y.o. female who presents to the clinic for evaluation and treatment of high risk feet. Silvana has a past medical history of Bipolar 1 disorder, Breast cancer (2001), Breast cyst, Closed fracture of proximal end of right humerus (03/09/2015), Depression, History of right shoulder fracture, MS (multiple sclerosis), Osteoporosis, unspecified, Pneumonia (03/27/2020), Wrist fracture (left), and Wrist fracture, closed, left, initial encounter (12/27/2019). The patient's chief complaint is long, thick toenails. This patient has documented high risk feet requiring routine maintenance secondary to peripheral neuropathy.    PCP: Jhonatan Avila III, MD    Date Last Seen by PCP:  07/10/2024    Current shoe gear:  Affected Foot: Casual shoes     Unaffected Foot: Casual shoes    Hemoglobin A1C   Date Value Ref Range Status   01/08/2024 5.1 4.0 - 5.6 % Final     Comment:     ADA Screening Guidelines:  5.7-6.4%  Consistent with prediabetes  >or=6.5%  Consistent with diabetes    High levels of fetal hemoglobin interfere with the HbA1C  assay. Heterozygous hemoglobin variants (HbS, HgC, etc)do  not significantly interfere with this assay.   However, presence of multiple variants may affect accuracy.     09/15/2022 5.0 4.0 - 5.6 % Final     Comment:     ADA Screening Guidelines:  5.7-6.4%  Consistent with prediabetes  >or=6.5%  Consistent with diabetes    High levels of fetal hemoglobin interfere with the HbA1C  assay. Heterozygous hemoglobin variants (HbS, HgC, etc)do  not significantly interfere with this assay.   However, presence of multiple variants may affect accuracy.       Vitals:    08/12/24 1557   BP: 132/87   Pulse: 68   Weight: 79.4 kg (175 lb)   Height: 5' 3" (1.6 m)   PainSc: 0-No pain      Past Medical History:   Diagnosis Date    Bipolar 1 disorder     " Breast cancer 2001    right lumpectomy-radiation & chemo taxol    Breast cyst     Closed fracture of proximal end of right humerus 03/09/2015    Depression     History of right shoulder fracture     MS (multiple sclerosis)     presented as dizzines, dx vision,     Osteoporosis, unspecified     Pneumonia 03/27/2020    Wrist fracture left    2019    Wrist fracture, closed, left, initial encounter 12/27/2019       Past Surgical History:   Procedure Laterality Date    BLADDER FULGURATION  8/31/2022    Procedure: FULGURATION, BLADDER;  Surgeon: Jayesh Ross MD;  Location: [facility] OR 2ND FLR;  Service: Urology;;    BRAIN SURGERY  1991    BREAST BIOPSY Left 2009    core    BREAST BIOPSY Right     BREAST LUMPECTOMY Right 2001    COLONOSCOPY N/A 1/7/2022    Procedure: COLONOSCOPY;  Surgeon: Shiva Walsh MD;  Location: [facility];  Service: Endoscopy;  Laterality: N/A;    CYSTOSCOPY N/A 7/11/2018    Procedure: CYSTOSCOPY;  Surgeon: Jayesh Ross MD;  Location: [facility] OR [address] [place] [address];  Service: Urology;  Laterality: N/A;  30 min    CYSTOSCOPY N/A 4/10/2019    Procedure: CYSTOSCOPY;  Surgeon: Jayesh Ross MD;  [facility];  Service: Urology;  Laterality: N/A;  30 MIN    CYSTOSCOPY  10/21/2021    Procedure: CYSTOSCOPY;  Surgeon: Jayesh Ross MD;  Location: [facility] OR [address] [place] [address];  Service: Urology;;    CYSTOSCOPY N/A 8/2/2023    Procedure: CYSTOSCOPY;  Surgeon: Jayesh Ross MD;  Location: [address];  Service: Urology;  Laterality: N/A;    FOOT SURGERY  october 2013    HYSTERECTOMY      partial    INJECTION OF BOTULINUM TOXIN TYPE A N/A 7/11/2018    Procedure: INJECTION, BOTULINUM TOXIN, TYPE A 200 UNITS;  Surgeon: Jayesh Ross MD;  [facility];  Service: Urology;  Laterality: N/A;    INJECTION OF BOTULINUM TOXIN TYPE A N/A 4/10/2019    Procedure: INJECTION, BOTULINUM TOXIN, TYPE A 200 UNITS;  Surgeon: Jayesh Ross MD;  Location: [facility] OR [address] [place] [address];  Service: Urology;  Laterality: N/A;    INJECTION  OF BOTULINUM TOXIN TYPE A  10/21/2021    Procedure: INJECTION, BOTULINUM TOXIN, 200units;  Surgeon: Jayesh Ross MD;  Location: [facility] OR 1ST FLR;  Service: Urology;;    INJECTION OF BOTULINUM TOXIN TYPE A N/A 8/2/2023    Procedure: INJECTION, BOTULINUM TOXIN, TYPE A;  Surgeon: Jayesh Ross MD;  Location: [facility] OR 1ST FLR;  Service: Urology;  Laterality: N/A;  200units    OPEN REDUCTION AND INTERNAL FIXATION (ORIF) OF INJURY OF WRIST Left 12/27/2019    Procedure: ORIF, WRIST;  Surgeon: Albert Schroeder Jr., MD;  Location: [facility] OR;  Service: Orthopedics;  Laterality: Left;  ACUMED/ MINI C ARM Palomo Girongiovanna notified/ Rk confirmed here in [facility] B with Lacie 12/23/19 KB [id number]    TONSILLECTOMY      TOTAL REDUCTION MAMMOPLASTY Left 11/5/2020    Procedure: MAMMOPLASTY, REDUCTION, LEFT;  Surgeon: Kirk Tompkins MD;  Location: [facility] OR 2ND FLR;  Service: Plastics;  Laterality: Left;  Left breast tissue weighed- 49 grams.       Family History   Problem Relation Name Age of Onset    Heart disease Mother      Cancer Mother          breast cancer    Skin cancer Mother      Breast cancer Mother      Stroke Mother      Emphysema Father age 70's     Dementia Brother 1/2 dad     Diabetes Brother 1/2 dad     No Known Problems Brother 1/2 dad     Anesthesia problems Daughter Trans Megan/Mina     Other Daughter Trans Megan/Mina         Transgender, birth name Megan, now goes by Mina    Asperger's syndrome Daughter Trans Megan/[place]     No Known Problems Daughter Lacie     Autism Son      Seizures Son      Cancer Maternal Aunt      Breast cancer Maternal Aunt      Breast cancer Maternal Cousin      Cancer Maternal Cousin         Social History     Socioeconomic History    Marital status:    Occupational History    Occupation: [employer]   Tobacco Use    Smoking status: Never    Smokeless tobacco: Never   Substance and Sexual Activity    Alcohol use: Yes     Comment: rare    Drug  use: Never    Sexual activity: Not Currently     Partners: Male   Social History Narrative    Original form , 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, 25 children 2 daughter      Social Determinants of Health     Financial Resource Strain: Low Risk  (8/5/2024)    Overall Financial Resource Strain (CARDIA)     Difficulty of Paying Living Expenses: Not hard at all   Food Insecurity: No Food Insecurity (8/5/2024)    Hunger Vital Sign     Worried About Running Out of Food in the Last Year: Never true     Ran Out of Food in the Last Year: Never true   Transportation Needs: No Transportation Needs (8/5/2024)    PRAPARE - Transportation     Lack of Transportation (Medical): No     Lack of Transportation (Non-Medical): No   Physical Activity: Sufficiently Active (8/5/2024)    Exercise Vital Sign     Days of Exercise per Week: 5 days     Minutes of Exercise per Session: 60 min   Recent Concern: Physical Activity - Insufficiently Active (5/30/2024)    Exercise Vital Sign     Days of Exercise per Week: 2 days     Minutes of Exercise per Session: 60 min   Stress: No Stress Concern Present (8/5/2024)    [employer] - Occupational Stress Questionnaire     Feeling of Stress : Not at all   Housing Stability: Low Risk  (8/5/2024)    Housing Stability Vital Sign     Unable to Pay for Housing in the Last Year: No     Homeless in the Last Year: No       Current Outpatient Medications   Medication Sig Dispense Refill    alendronate (FOSAMAX) 70 MG tablet TAKE 1 TABLET BY MOUTH EVERY 7 DAYS 12 tablet 0    buPROPion (WELLBUTRIN SR) 100 MG TBSR 12 hr tablet Take 2 tablets (200 mg total) by mouth every morning. 60 tablet 5    buPROPion (WELLBUTRIN SR) 150 MG TBSR 12 hr tablet Take 1 tablet (150 mg total) by mouth every evening. 30 tablet 5    calcium-vitamin D3 (OS-IGOR 500 + D3) 500 mg-5 mcg (200 unit) per tablet Take 1 tablet by mouth 2 (two) times daily with meals.       catheter (FEMALE CATHETER) 14 Fr [facility] Patient is to self catheterize four times a day indefinitley using female 12 fr in and out catheter for incomplete bladder emptying.   Dispense 120 month with 11 refills or 360 every 3 months with 3 refills depending on patient's preference 120 each 11    coenzyme Q10 100 mg capsule Take 5 capsules (500 mg total) by mouth once daily. 150 capsule 11    cyanocobalamin (VITAMIN B-12) 1000 MCG tablet Take 1,000 mcg by mouth once daily.      fluphenazine (PROLIXIN) 1 MG tablet Take 1 tablet (1 mg total) by mouth every evening. 30 tablet 5    folic acid (FOLVITE) 1 MG tablet Take 1 tablet (1,000 mcg total) by mouth once daily. 90 tablet 3    metFORMIN (GLUCOPHAGE-XR) 500 MG ER 24hr tablet Take 1 tablet (500 mg total) by mouth 2 (two) times daily with meals. 180 tablet 0    multivitamin (ONE DAILY MULTIVITAMIN) per tablet Take 1 tablet by mouth once daily.      OXcarbazepine (TRILEPTAL) 150 MG Tab Take 1 tablet (150 mg total) by mouth 2 (two) times daily. 60 tablet 5    UNABLE TO FIND [facility]       No current facility-administered medications for this visit.       Review of patient's allergies indicates:   Allergen Reactions    Adhesive      blisters    Keflex [cephalexin] Rash    Cephalosporins        Review of Systems   Constitutional: Negative for chills and fever.   HENT:  Negative for congestion and hearing loss.    Respiratory:  Negative for cough and shortness of breath.    Skin:  Positive for color change and nail changes.   Musculoskeletal:  Positive for muscle weakness.   Gastrointestinal:  Negative for nausea and vomiting.   Neurological:  Negative for numbness.   Psychiatric/Behavioral:  Negative for altered mental status.         Objective:     Physical Exam  Constitutional:       General: She is not in acute distress.     Appearance: She is not ill-appearing.   Cardiovascular:      Pulses:           Dorsalis pedis pulses are 2+ on the right side and 2+ on the  left side.        Posterior tibial pulses are 1+ on the right side and 1+ on the left side.      Comments: Mild nonpitting edema to lower extremity bilateral.  Multiple varicosities noted to lower extremity bilateral.  No hair growth follow extremity.  Skin temp is warm to foot bilateral.  Musculoskeletal:      Right foot: Deformity present.      Left foot: Deformity present.      Comments: Rigid flexion contractures of the PIPJ and DIPJ involving toes 3-5 bilateral foot.  Severely track bound hallux abductovalgus bilateral foot underlapping the 2nd toe.    Rigid pes planus foot structure bilateral foot.  No pain with attempted range motion or manual muscle strength testing bilateral foot and ankle.   Feet:      Right foot:      Protective Sensation: 10 sites tested.  10 sites sensed.      Skin integrity: No ulcer, blister, skin breakdown, erythema, warmth, callus, dry skin or fissure.      Toenail Condition: Right toenails are abnormally thick and long. Fungal disease present.     Left foot:      Protective Sensation: 10 sites tested.  10 sites sensed.      Skin integrity: Callus present. No ulcer, blister, skin breakdown, erythema, warmth, dry skin or fissure.      Toenail Condition: Left toenails are abnormally thick and long. Fungal disease present.  Skin:     General: Skin is warm.      Capillary Refill: Capillary refill takes 2 to 3 seconds.      Findings: No ecchymosis or erythema.      Nails: There is no clubbing.      Comments: Nails 1-5 bilateral foot are moderately elongated.  Hallux nail bilateral severely dystrophic with a central hump and prominent nailbed with loosening and underlying debris containing white discoloration of the nails.  Lesser toes 2-5 on the left are also thickened, dystrophic with loosening and underlying debris while the lesser toenails 2-4 arch is mildly thickened elongated.  The right 5th toenail is also dystrophic with discoloration and loosening.   Neurological:      Mental  "Status: She is alert and oriented to person, place, and time.      Sensory: Sensory deficit present.      Motor: Weakness present.      Comments: Decreased vibratory sensation bilateral foot.                 Assessment:      Encounter Diagnoses   Name Primary?    Onychomycosis Yes    MS (multiple sclerosis)     Weakness of extremity      Plan:     Silvana Oliveira" was seen today for nail problem.    Diagnoses and all orders for this visit:    Onychomycosis  -     CULTURE, FUNGUS - SKIN, HAIR, OR NAILS  -     Routine Foot Care    MS (multiple sclerosis)  -     Routine Foot Care    Weakness of extremity      I counseled the patient on her conditions, their implications and medical management.    Shoe inspection. Patient instructed on proper foot hygeine. We discussed wearing proper shoe gear, daily foot inspections, never walking without protective shoe gear, never putting sharp instruments to feet.      Routine foot care per attached note. Patient relates relief following the procedure. She will continue to monitor the areas daily, inspect her feet, wear protective shoe gear when ambulatory, moisturizer to maintain skin integrity.    Patient considered intermediate risk secondary to her history of MS significant weakness impacting her extremities.    Patient has prior history of unknown antifungal topical treatment greater than 5 years ago.  We discussed obtaining a nailbed/plate biopsy in order to determine the causative organism.  We discussed monitoring the results and prescribing a topical compound antifungal nail solution to be utilized twice a day for year more.    RTC within 3 months to re-evaluate or p.r.n. as discussed.    Assisted by Antonia Saleh DPM PGY 3    A portion of this note was generated by voice recognition software and may contain spelling and grammar errors.      "

## 2024-09-05 ENCOUNTER — TELEPHONE (OUTPATIENT)
Dept: UROLOGY | Facility: CLINIC | Age: 69
End: 2024-09-05
Payer: MEDICARE

## 2024-09-05 NOTE — TELEPHONE ENCOUNTER
Attempted to contact pt to confirm appt on 9/6/24 at 9:20 am w/Dr Ross. No answer, left detailed voicemail for pt informing her of the time and date and location ([facility]) for this appt.

## 2024-09-06 ENCOUNTER — OFFICE VISIT (OUTPATIENT)
Dept: UROLOGY | Facility: CLINIC | Age: 69
End: 2024-09-06
Payer: MEDICARE

## 2024-09-06 ENCOUNTER — PATIENT MESSAGE (OUTPATIENT)
Dept: UROLOGY | Facility: CLINIC | Age: 69
End: 2024-09-06

## 2024-09-06 ENCOUNTER — TELEPHONE (OUTPATIENT)
Dept: UROLOGY | Facility: CLINIC | Age: 69
End: 2024-09-06

## 2024-09-06 VITALS
WEIGHT: 174.19 LBS | HEART RATE: 89 BPM | BODY MASS INDEX: 30.86 KG/M2 | HEIGHT: 63 IN | DIASTOLIC BLOOD PRESSURE: 95 MMHG | SYSTOLIC BLOOD PRESSURE: 140 MMHG

## 2024-09-06 DIAGNOSIS — R35.1 NOCTURIA: ICD-10-CM

## 2024-09-06 DIAGNOSIS — N32.81 OAB (OVERACTIVE BLADDER): Primary | ICD-10-CM

## 2024-09-06 DIAGNOSIS — N39.0 RECURRENT UTI: ICD-10-CM

## 2024-09-06 DIAGNOSIS — N32.81 OAB (OVERACTIVE BLADDER): ICD-10-CM

## 2024-09-06 DIAGNOSIS — R33.9 INCOMPLETE BLADDER EMPTYING: ICD-10-CM

## 2024-09-06 DIAGNOSIS — N31.9 NEUROGENIC BLADDER: Primary | ICD-10-CM

## 2024-09-06 DIAGNOSIS — N39.41 URGE INCONTINENCE: ICD-10-CM

## 2024-09-06 PROCEDURE — 99999 PR PBB SHADOW E&M-EST. PATIENT-LVL III: CPT | Mod: PBBFAC,,, | Performed by: UROLOGY

## 2024-09-06 RX ORDER — CATHETER 8 FR
EACH MISCELLANEOUS
Qty: 120 EACH | Refills: 11 | Status: SHIPPED | OUTPATIENT
Start: 2024-09-06

## 2024-09-06 NOTE — PROGRESS NOTES
CHIEF COMPLAINT:    Mrs. Quezada is a 68 y.o. female presenting for a follow up  PRESENTING ILLNESS:    Silvana Quezada is a 68 y.o. female  PMH of MS, neurogenic bladder.    She voids on her own and performs CIC at home 4 times per day using 12fr. She stays dry between catheterizations. Botox injections keep frequency, urgency and nocturia under control.   She denies incontinence during the day.  She endorses urinary incontinence at night.   She wears a diaper at night and it usually is wet when she wakes up.  She denies frequency, urgency, dysuria.  She does not have any urinary complaints.        Last botox injection Date: 08/02/2023   Pre-Op Diagnosis: Overactive bladder, neurogenic bladder  Post-Op Diagnosis: same   Procedure(s) Performed:   1.  Cystoscopy with bladder botox injection  Findings:   -200u botox successfully injected  -severe trabeculations present in the bladder     REVIEW OF SYSTEMS:  See HPI    PATIENT HISTORY:    Past Medical History:   Diagnosis Date    Bipolar 1 disorder     Breast cancer 2001    right lumpectomy-radiation & chemo taxol    Breast cyst     Closed fracture of proximal end of right humerus 03/09/2015    Depression     History of right shoulder fracture     MS (multiple sclerosis)     presented as dizzines, dx vision,     Osteoporosis, unspecified     Pneumonia 03/27/2020    Wrist fracture left    2019    Wrist fracture, closed, left, initial encounter 12/27/2019       Past Surgical History:   Procedure Laterality Date    BLADDER FULGURATION  8/31/2022    Procedure: FULGURATION, BLADDER;  Surgeon: Jayesh Ross MD;  Location: [address];  Service: Urology;;    BRAIN SURGERY  1991    BREAST BIOPSY Left 2009    core    BREAST BIOPSY Right     BREAST LUMPECTOMY Right 2001    COLONOSCOPY N/A 1/7/2022    Procedure: COLONOSCOPY;  Surgeon: Shiva Walsh MD;  Location: [facility];  Service: Endoscopy;  Laterality: N/A;    CYSTOSCOPY N/A 7/11/2018    Procedure: CYSTOSCOPY;   Surgeon: Jayesh Ross MD;  Location: [address];  Service: Urology;  Laterality: N/A;  30 min    CYSTOSCOPY N/A 4/10/2019    Procedure: CYSTOSCOPY;  Surgeon: Jayesh Ross MD;  Location: [address];  Service: Urology;  Laterality: N/A;  30 MIN    CYSTOSCOPY  10/21/2021    Procedure: CYSTOSCOPY;  Surgeon: Jayesh Ross MD;  Location: [address];  Service: Urology;;    CYSTOSCOPY N/A 8/2/2023    Procedure: CYSTOSCOPY;  Surgeon: Jayesh Ross MD;  Location: [address];  Service: Urology;  Laterality: N/A;    FOOT SURGERY  october 2013    HYSTERECTOMY      partial    INJECTION OF BOTULINUM TOXIN TYPE A N/A 7/11/2018    Procedure: INJECTION, BOTULINUM TOXIN, TYPE A 200 UNITS;  Surgeon: Jayesh Ross MD;  Location: [address];  Service: Urology;  Laterality: N/A;    INJECTION OF BOTULINUM TOXIN TYPE A N/A 4/10/2019    Procedure: INJECTION, BOTULINUM TOXIN, TYPE A 200 UNITS;  Surgeon: Jayesh Ross MD;  Location: [address];  Service: Urology;  Laterality: N/A;    INJECTION OF BOTULINUM TOXIN TYPE A  10/21/2021    Procedure: INJECTION, BOTULINUM TOXIN, 200units;  Surgeon: Jayesh Ross MD;  Location: [address];  Service: Urology;;    INJECTION OF BOTULINUM TOXIN TYPE A N/A 8/2/2023    Procedure: INJECTION, BOTULINUM TOXIN, TYPE A;  Surgeon: Jayesh Ross MD;  Location: [address];  Service: Urology;  Laterality: N/A;  200units    OPEN REDUCTION AND INTERNAL FIXATION (ORIF) OF INJURY OF WRIST Left 12/27/2019    Procedure: ORIF, WRIST;  Surgeon: Albert Schroeder Jr., MD;  Location: [facility];  Service: Orthopedics;  Laterality: Left;  ACUMED/ MINI C ARM [facility] [employer] notified/ Rk confirmed here in Closet B with Lacie 12/23/19 KB [id number]    TONSILLECTOMY      TOTAL REDUCTION MAMMOPLASTY Left 11/5/2020    Procedure: MAMMOPLASTY, REDUCTION, LEFT;  Surgeon: Kirk Tompkins MD;  Location: [facility] OR [address] [place] [address];  Service: Plastics;  Laterality: Left;  Left breast tissue  weighed- 49 grams.       Family History   Problem Relation Name Age of Onset    Heart disease Mother      Cancer Mother          breast cancer    Skin cancer Mother      Breast cancer Mother      Stroke Mother      Emphysema Father age 70's     Dementia Brother 1/2 dad     Diabetes Brother 1/2 dad     No Known Problems Brother 1/2 dad     Anesthesia problems Daughter Trans Megan/Alplaus     Other Daughter Trans Megan/Mina         Transgender, birth name Megan, now goes by Mina    Asperger's syndrome Daughter Trans Megan/Alplaus     No Known Problems Daughter Lacie     Autism Son      Seizures Son      Cancer Maternal Aunt      Breast cancer Maternal Aunt      Breast cancer Maternal Cousin      Cancer Maternal Cousin         Social History     Socioeconomic History    Marital status:    Occupational History    Occupation: [employer]   Tobacco Use    Smoking status: Never    Smokeless tobacco: Never   Substance and Sexual Activity    Alcohol use: Yes     Comment: rare    Drug use: Never    Sexual activity: Not Currently     Partners: Male   Social History Narrative    Original form , 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, 25 children 2 daughter      Social Determinants of Health     Financial Resource Strain: Low Risk  (8/5/2024)    Overall Financial Resource Strain (CARDIA)     Difficulty of Paying Living Expenses: Not hard at all   Food Insecurity: No Food Insecurity (8/5/2024)    Hunger Vital Sign     Worried About Running Out of Food in the Last Year: Never true     Ran Out of Food in the Last Year: Never true   Transportation Needs: No Transportation Needs (8/5/2024)    PRAPARE - Transportation     Lack of Transportation (Medical): No     Lack of Transportation (Non-Medical): No   Physical Activity: Sufficiently Active (8/5/2024)    Exercise Vital Sign     Days of Exercise per Week: 5 days     Minutes of Exercise per Session: 60 min   Recent  Concern: Physical Activity - Insufficiently Active (5/30/2024)    Exercise Vital Sign     Days of Exercise per Week: 2 days     Minutes of Exercise per Session: 60 min   Stress: No Stress Concern Present (8/5/2024)    [place] [place] [employer] - Occupational Stress Questionnaire     Feeling of Stress : Not at all   Housing Stability: Low Risk  (8/5/2024)    Housing Stability Vital Sign     Unable to Pay for Housing in the Last Year: No     Homeless in the Last Year: No       Allergies:  Adhesive, Keflex [cephalexin], and Cephalosporins    Medications:    Current Outpatient Medications:     alendronate (FOSAMAX) 70 MG tablet, TAKE 1 TABLET BY MOUTH EVERY 7 DAYS, Disp: 12 tablet, Rfl: 0    calcium-vitamin D3 (OS-IGOR 500 + D3) 500 mg-5 mcg (200 unit) per tablet, Take 1 tablet by mouth 2 (two) times daily with meals., Disp: , Rfl:     metFORMIN (GLUCOPHAGE-XR) 500 MG ER 24hr tablet, Take 1 tablet (500 mg total) by mouth 2 (two) times daily with meals., Disp: 180 tablet, Rfl: 0    multivitamin (ONE DAILY MULTIVITAMIN) per tablet, Take 1 tablet by mouth once daily., Disp: , Rfl:     UNABLE TO FIND, [facility], Disp: , Rfl:     buPROPion (WELLBUTRIN SR) 150 MG TBSR 12 hr tablet, Take 1 tablet (150 mg total) by mouth every evening., Disp: 30 tablet, Rfl: 5    catheter (FEMALE CATHETER) 14 Fr Misc, Patient is to self catheterize four times a day indefinitley using female 12 fr in and out catheter for incomplete bladder emptying.  Dispense 120 month with 11 refills or 360 every 3 months with 3 refills depending on patient's preference, Disp: 120 each, Rfl: 11    coenzyme Q10 100 mg capsule, Take 5 capsules (500 mg total) by mouth once daily., Disp: 150 capsule, Rfl: 11    cyanocobalamin (VITAMIN B-12) 1000 MCG tablet, Take 1,000 mcg by mouth once daily., Disp: , Rfl:     fluphenazine (PROLIXIN) 1 MG tablet, Take 1 tablet (1 mg total) by mouth every evening., Disp: 30 tablet, Rfl: 5    folic acid  (FOLVITE) 1 MG tablet, Take 1 tablet (1,000 mcg total) by mouth once daily., Disp: 90 tablet, Rfl: 3    OXcarbazepine (TRILEPTAL) 150 MG Tab, Take 1 tablet (150 mg total) by mouth 2 (two) times daily., Disp: 60 tablet, Rfl: 5    PHYSICAL EXAMINATION:    Constitutional: She appears well-developed and well-nourished.  She is in no apparent distress.    Eyes: No scleral icterus noted bilaterally. No discharge bilaterally.    Nose: No nasal congestion    Cardiovascular: Normal rate.      Pulmonary/Chest: Effort normal. No respiratory distress.     Abdominal:  She exhibits no distension.      Neurological: She is alert and oriented to person, place, and time.     Skin: Skin is warm and dry.     Psych: Cooperative with normal affect.    Genitourinary:  Normal external female genitalia  Urethral meatus is normal  Consent verbally obtained.  Betadine prep was applied to the urethral meatus. An in and out cath was performed after voiding.  The PVR was 60 ml.    Physical Exam      LABS:    U/a: 1.010, pH 6, + nitrite, otherwsie negative.       IMPRESSION:  Neurogenic bladder  -     catheter (FEMALE CATHETER) 14 Fr Misc; Patient is to self catheterize four times a day indefinitley using female 12 fr in and out catheter for incomplete bladder emptying.   Dispense 120 month with 11 refills or 360 every 3 months with 3 refills depending on patient's preference  Dispense: 120 each; Refill: 11    OAB (overactive bladder)    Urge incontinence    Nocturia    Recurrent UTI  -     catheter (FEMALE CATHETER) 14 Fr Misc; Patient is to self catheterize four times a day indefinitley using female 12 fr in and out catheter for incomplete bladder emptying.   Dispense 120 month with 11 refills or 360 every 3 months with 3 refills depending on patient's preference  Dispense: 120 each; Refill: 11    Incomplete bladder emptying  -     catheter (FEMALE CATHETER) 14 Fr Misc; Patient is to self catheterize four times a day indefinitley using female  12 fr in and out catheter for incomplete bladder emptying.   Dispense 120 month with 11 refills or 360 every 3 months with 3 refills depending on patient's preference  Dispense: 120 each; Refill: 11          PLAN:    S/p cysto with botox injection 200 units on 8/2/23.  Has done well until 3 months ago.  Will plan cysto botox injection 200 units.  Urine culture today.  Will cover her with abx prior to her surgeyr.  Also asked her to keep a voiding diary BID to determine what kind of bladder capacity is noted.  Continue CIC 4 x a day indefinitely as before.    Pt reports that she underwent InterStim trial with Dr. White many years ago and it did not work well.      I spent 25 minutes (including face to face & non face to face time) in regards to patient's care.      Follow up in 19 days (on 9/25/2024), or cysto botox injection 200 units.

## 2024-09-06 NOTE — H&P (VIEW-ONLY)
CHIEF COMPLAINT:    Mrs. Quezada is a 68 y.o. female presenting for a follow up  PRESENTING ILLNESS:    Silvana Quezada is a 68 y.o. female  PMH of MS, neurogenic bladder.    She voids on her own and performs CIC at home 4 times per day using 12fr. She stays dry between catheterizations. Botox injections keep frequency, urgency and nocturia under control.   She denies incontinence during the day.  She endorses urinary incontinence at night.   She wears a diaper at night and it usually is wet when she wakes up.  She denies frequency, urgency, dysuria.  She does not have any urinary complaints.        Last botox injection Date: 08/02/2023   Pre-Op Diagnosis: Overactive bladder, neurogenic bladder  Post-Op Diagnosis: same   Procedure(s) Performed:   1.  Cystoscopy with bladder botox injection  Findings:   -200u botox successfully injected  -severe trabeculations present in the bladder     REVIEW OF SYSTEMS:  See HPI    PATIENT HISTORY:    Past Medical History:   Diagnosis Date    Bipolar 1 disorder     Breast cancer 2001    right lumpectomy-radiation & chemo taxol    Breast cyst     Closed fracture of proximal end of right humerus 03/09/2015    Depression     History of right shoulder fracture     MS (multiple sclerosis)     presented as dizzines, dx vision,     Osteoporosis, unspecified     Pneumonia 03/27/2020    Wrist fracture left    2019    Wrist fracture, closed, left, initial encounter 12/27/2019       Past Surgical History:   Procedure Laterality Date    BLADDER FULGURATION  8/31/2022    Procedure: FULGURATION, BLADDER;  Surgeon: Jayesh Ross MD;  Location: [address];  Service: Urology;;    BRAIN SURGERY  1991    BREAST BIOPSY Left 2009    core    BREAST BIOPSY Right     BREAST LUMPECTOMY Right 2001    COLONOSCOPY N/A 1/7/2022    Procedure: COLONOSCOPY;  Surgeon: Shiva Walsh MD;  Location: [facility];  Service: Endoscopy;  Laterality: N/A;    CYSTOSCOPY N/A 7/11/2018    Procedure: CYSTOSCOPY;   Surgeon: Jayesh Ross MD;  Location: [address];  Service: Urology;  Laterality: N/A;  30 min    CYSTOSCOPY N/A 4/10/2019    Procedure: CYSTOSCOPY;  Surgeon: Jayesh Ross MD;  Location: [address];  Service: Urology;  Laterality: N/A;  30 MIN    CYSTOSCOPY  10/21/2021    Procedure: CYSTOSCOPY;  Surgeon: Jayesh Ross MD;  Location: [address];  Service: Urology;;    CYSTOSCOPY N/A 8/2/2023    Procedure: CYSTOSCOPY;  Surgeon: Jayesh Ross MD;  Location: [address];  Service: Urology;  Laterality: N/A;    FOOT SURGERY  october 2013    HYSTERECTOMY      partial    INJECTION OF BOTULINUM TOXIN TYPE A N/A 7/11/2018    Procedure: INJECTION, BOTULINUM TOXIN, TYPE A 200 UNITS;  Surgeon: Jayesh Ross MD;  Location: [address];  Service: Urology;  Laterality: N/A;    INJECTION OF BOTULINUM TOXIN TYPE A N/A 4/10/2019    Procedure: INJECTION, BOTULINUM TOXIN, TYPE A 200 UNITS;  Surgeon: Jayesh Ross MD;  Location: [address];  Service: Urology;  Laterality: N/A;    INJECTION OF BOTULINUM TOXIN TYPE A  10/21/2021    Procedure: INJECTION, BOTULINUM TOXIN, 200units;  Surgeon: Jayesh Ross MD;  Location: [address];  Service: Urology;;    INJECTION OF BOTULINUM TOXIN TYPE A N/A 8/2/2023    Procedure: INJECTION, BOTULINUM TOXIN, TYPE A;  Surgeon: Jayesh Ross MD;  Location: [address];  Service: Urology;  Laterality: N/A;  200units    OPEN REDUCTION AND INTERNAL FIXATION (ORIF) OF INJURY OF WRIST Left 12/27/2019    Procedure: ORIF, WRIST;  Surgeon: Albert Schroeder Jr., MD;  Location: [facility];  Service: Orthopedics;  Laterality: Left;  ACUMED/ MINI C ARM [facility] [employer] [employer] notified/ Rk confirmed here in Closet B with Lacie 12/23/19 KB [id number]    TONSILLECTOMY      TOTAL REDUCTION MAMMOPLASTY Left 11/5/2020    Procedure: MAMMOPLASTY, REDUCTION, LEFT;  Surgeon: Kirk Tompkins MD;  Location: [facility] OR [address] [place] [address];  Service: Plastics;  Laterality: Left;  Left breast tissue  weighed- 49 grams.       Family History   Problem Relation Name Age of Onset    Heart disease Mother      Cancer Mother          breast cancer    Skin cancer Mother      Breast cancer Mother      Stroke Mother      Emphysema Father age 70's     Dementia Brother 1/2 dad     Diabetes Brother 1/2 dad     No Known Problems Brother 1/2 dad     Anesthesia problems Daughter Trans Megan/Clarion     Other Daughter Trans Megan/Mina         Transgender, birth name Megan, now goes by Mina    Asperger's syndrome Daughter Trans Megan/Clarion     No Known Problems Daughter Lacie     Autism Son      Seizures Son      Cancer Maternal Aunt      Breast cancer Maternal Aunt      Breast cancer Maternal Cousin      Cancer Maternal Cousin         Social History     Socioeconomic History    Marital status:    Occupational History    Occupation: [employer]   Tobacco Use    Smoking status: Never    Smokeless tobacco: Never   Substance and Sexual Activity    Alcohol use: Yes     Comment: rare    Drug use: Never    Sexual activity: Not Currently     Partners: Male   Social History Narrative    Original form , 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, 25 children 2 daughter      Social Determinants of Health     Financial Resource Strain: Low Risk  (8/5/2024)    Overall Financial Resource Strain (CARDIA)     Difficulty of Paying Living Expenses: Not hard at all   Food Insecurity: No Food Insecurity (8/5/2024)    Hunger Vital Sign     Worried About Running Out of Food in the Last Year: Never true     Ran Out of Food in the Last Year: Never true   Transportation Needs: No Transportation Needs (8/5/2024)    PRAPARE - Transportation     Lack of Transportation (Medical): No     Lack of Transportation (Non-Medical): No   Physical Activity: Sufficiently Active (8/5/2024)    Exercise Vital Sign     Days of Exercise per Week: 5 days     Minutes of Exercise per Session: 60 min   Recent  Concern: Physical Activity - Insufficiently Active (5/30/2024)    Exercise Vital Sign     Days of Exercise per Week: 2 days     Minutes of Exercise per Session: 60 min   Stress: No Stress Concern Present (8/5/2024)    [place] [employer] - Occupational Stress Questionnaire     Feeling of Stress : Not at all   Housing Stability: Low Risk  (8/5/2024)    Housing Stability Vital Sign     Unable to Pay for Housing in the Last Year: No     Homeless in the Last Year: No       Allergies:  Adhesive, Keflex [cephalexin], and Cephalosporins    Medications:    Current Outpatient Medications:     alendronate (FOSAMAX) 70 MG tablet, TAKE 1 TABLET BY MOUTH EVERY 7 DAYS, Disp: 12 tablet, Rfl: 0    calcium-vitamin D3 (OS-IGOR 500 + D3) 500 mg-5 mcg (200 unit) per tablet, Take 1 tablet by mouth 2 (two) times daily with meals., Disp: , Rfl:     metFORMIN (GLUCOPHAGE-XR) 500 MG ER 24hr tablet, Take 1 tablet (500 mg total) by mouth 2 (two) times daily with meals., Disp: 180 tablet, Rfl: 0    multivitamin (ONE DAILY MULTIVITAMIN) per tablet, Take 1 tablet by mouth once daily., Disp: , Rfl:     UNABLE TO FIND, [facility], Disp: , Rfl:     buPROPion (WELLBUTRIN SR) 150 MG TBSR 12 hr tablet, Take 1 tablet (150 mg total) by mouth every evening., Disp: 30 tablet, Rfl: 5    catheter (FEMALE CATHETER) 14 Fr Misc, Patient is to self catheterize four times a day indefinitley using female 12 fr in and out catheter for incomplete bladder emptying.  Dispense 120 month with 11 refills or 360 every 3 months with 3 refills depending on patient's preference, Disp: 120 each, Rfl: 11    coenzyme Q10 100 mg capsule, Take 5 capsules (500 mg total) by mouth once daily., Disp: 150 capsule, Rfl: 11    cyanocobalamin (VITAMIN B-12) 1000 MCG tablet, Take 1,000 mcg by mouth once daily., Disp: , Rfl:     fluphenazine (PROLIXIN) 1 MG tablet, Take 1 tablet (1 mg total) by mouth every evening., Disp: 30 tablet, Rfl: 5    folic acid  (FOLVITE) 1 MG tablet, Take 1 tablet (1,000 mcg total) by mouth once daily., Disp: 90 tablet, Rfl: 3    OXcarbazepine (TRILEPTAL) 150 MG Tab, Take 1 tablet (150 mg total) by mouth 2 (two) times daily., Disp: 60 tablet, Rfl: 5    PHYSICAL EXAMINATION:    Constitutional: She appears well-developed and well-nourished.  She is in no apparent distress.    Eyes: No scleral icterus noted bilaterally. No discharge bilaterally.    Nose: No nasal congestion    Cardiovascular: Normal rate.      Pulmonary/Chest: Effort normal. No respiratory distress.     Abdominal:  She exhibits no distension.      Neurological: She is alert and oriented to person, place, and time.     Skin: Skin is warm and dry.     Psych: Cooperative with normal affect.    Genitourinary:  Normal external female genitalia  Urethral meatus is normal  Consent verbally obtained.  Betadine prep was applied to the urethral meatus. An in and out cath was performed after voiding.  The PVR was 60 ml.    Physical Exam      LABS:    U/a: 1.010, pH 6, + nitrite, otherwsie negative.       IMPRESSION:  Neurogenic bladder  -     catheter (FEMALE CATHETER) 14 Fr Misc; Patient is to self catheterize four times a day indefinitley using female 12 fr in and out catheter for incomplete bladder emptying.   Dispense 120 month with 11 refills or 360 every 3 months with 3 refills depending on patient's preference  Dispense: 120 each; Refill: 11    OAB (overactive bladder)    Urge incontinence    Nocturia    Recurrent UTI  -     catheter (FEMALE CATHETER) 14 Fr Misc; Patient is to self catheterize four times a day indefinitley using female 12 fr in and out catheter for incomplete bladder emptying.   Dispense 120 month with 11 refills or 360 every 3 months with 3 refills depending on patient's preference  Dispense: 120 each; Refill: 11    Incomplete bladder emptying  -     catheter (FEMALE CATHETER) 14 Fr Misc; Patient is to self catheterize four times a day indefinitley using female  12 fr in and out catheter for incomplete bladder emptying.   Dispense 120 month with 11 refills or 360 every 3 months with 3 refills depending on patient's preference  Dispense: 120 each; Refill: 11          PLAN:    S/p cysto with botox injection 200 units on 8/2/23.  Has done well until 3 months ago.  Will plan cysto botox injection 200 units.  Urine culture today.  Will cover her with abx prior to her surgeyr.  Also asked her to keep a voiding diary BID to determine what kind of bladder capacity is noted.  Continue CIC 4 x a day indefinitely as before.    Pt reports that she underwent InterStim trial with Dr. White many years ago and it did not work well.      I spent 25 minutes (including face to face & non face to face time) in regards to patient's care.      Follow up in 19 days (on 9/25/2024), or cysto botox injection 200 units.

## 2024-09-10 ENCOUNTER — TELEPHONE (OUTPATIENT)
Dept: PODIATRY | Facility: CLINIC | Age: 69
End: 2024-09-10
Payer: MEDICARE

## 2024-09-10 ENCOUNTER — PATIENT MESSAGE (OUTPATIENT)
Dept: PODIATRY | Facility: CLINIC | Age: 69
End: 2024-09-10
Payer: MEDICARE

## 2024-09-10 NOTE — TELEPHONE ENCOUNTER
Faxed request for topical nail solution recs to [facility] as requested per Dr Lehman.

## 2024-09-10 NOTE — TELEPHONE ENCOUNTER
Spoke with Ms Quezada to inform her that per Dr Lehman he is recommending that her toe nail culture be sent to [facility]. Reviewed phone number and that a representative from that company will be reaching out to her,and that per Dr Lehman you can apply tea tree oil tea tree twice a day as directed as an alternative. Verbalized understanding with no other needs voiced at this time. Call back encouraged if needed. No other needs voiced at this time.

## 2024-09-10 NOTE — TELEPHONE ENCOUNTER
Please notify the patient that her fungal culture did not grow any organisms. We can try a broad spectrum topical antifungal nail solution from [facility] if she wants to attempt treatment or she can try over the counter tea tree oil applied to her toenails twice a day for a year or more. Another option is also getting another nail sample and sending it for PCR analysis to an outside company which will be more accurate but may not be covered by insurance.

## 2024-09-17 NOTE — ANESTHESIA PAT ROS NOTE
09/17/2024  Silvana Quezada is a 68 y.o., female.      Pre-op Assessment    I have reviewed the NPO Status.   I have reviewed the Medications.     Review of Systems  Anesthesia Hx:  No problems with previous Anesthesia             Family Hx of Anesthesia complications:  Family Anesthesia Complications are Pt said her daughter once woke up during surgery  Denies Personal Hx of Anesthesia complications.                    Social:  Non-Smoker, Social Alcohol Use       Hematology/Oncology:       -- Anemia:                    --  Cancer in past history:       Breast    right   chemotherapy, radiation and surgery   Oncology Comments: Pt h/o right breast cancer s/p lumpectomy, chemo, radiation     EENT/Dental:  EENT/Dental Normal           Cardiovascular:  Cardiovascular Normal Exercise tolerance: good   Denies Pacemaker.  Denies Hypertension.   Denies MI.        Denies Angina.                                  Pulmonary:       Denies Shortness of breath.  Sleep Apnea, CPAP                Renal/:   Renal Symptoms/Infections/Stones: frequency, urgency, retention, nocturia.  Urinary Tract Infection (UTI)  OAB, neurogenic bladder, nocturia, incomplete bladder emptying, recurrent UTI, h/o multiple cysto procedures and botox injections          Hepatic/GI:  Hepatic/GI Normal     Denies Liver Disease.            Musculoskeletal:  Musculoskeletal Normal                Neurological:  Denies TIA.  Denies CVA. Neuromuscular Disease,   Denies Seizures.                              Neuromuscular Disease, Multiple Sclerosis   Endocrine:        Obesity / BMI > 30  Dermatological:  Skin Normal    Psych:  Psychiatric History  depression       Psychotic Disorder and Bipolar disorder.               Anesthesia Assessment: Preoperative EQUATION    Planned Procedure: Procedure(s) (LRB):  CYSTOSCOPY,WITH BOTULINUM TOXIN INJECTION  (N/A)  Requested Anesthesia Type:General/MAC  Surgeon: Jayesh Ross MD  Service: Urology  Known or anticipated Date of Surgery:9/25/2024    Surgeon notes: reviewed    Electronic QUestionnaire Assessment completed via nurse interview with patient.        Triage considerations:     The patient has no apparent active cardiac condition (No unstable coronary Syndrome such as severe unstable angina or recent [<1 month] myocardial infarction, decompensated CHF, severe valvular   disease or significant arrhythmia)    Previous anesthesia records: General Anesthesia, No ett, No problems  08/02/23 CYSTOSCOPY (Bladder), INJECTION, BOTULINUM TOXIN, TYPE A (Bladder), gen anes, ASA 2     Last PCP note: within 3 months , within [facility]   Subspecialty notes: Urology    Other important co-morbidities: Obesity, KEM, and OAB, neurogenic bladder, recurrent UTI, nocturia, incomplete bladder emptying, multiple uro procedures, MS, bipolar 1, depression, h/o rt breast cancer s/p lumpectomy/chemo/rad, breast cyst, h/o rt shoulder surgery, osteoporosis, h/o partial hysterectomy, thoracic aorta atherosclerosis, tortuous aorta       Tests already available:  Available tests,  within 3 months , 6-12 months ago , within [facility] .   08/02/24 TTE (EF 60-65%)  01/08/24 A1C (5.1)            Instructions given. (See in Nurse's note)    Optimization:  Anesthesia Preop Clinic Assessment Indicated:      --phone screen done        Plan:    Testing:  BMP and Hematology Profile preop dosc       Navigation:  Straight Line to surgery.               No tests, anesthesia preop clinic visit, or consult required.

## 2024-09-19 ENCOUNTER — TELEPHONE (OUTPATIENT)
Dept: UROLOGY | Facility: CLINIC | Age: 69
End: 2024-09-19
Payer: MEDICARE

## 2024-09-19 ENCOUNTER — LAB VISIT (OUTPATIENT)
Dept: LAB | Facility: HOSPITAL | Age: 69
End: 2024-09-19
Attending: UROLOGY
Payer: MEDICARE

## 2024-09-19 DIAGNOSIS — N39.0 RECURRENT UTI: ICD-10-CM

## 2024-09-19 DIAGNOSIS — N39.0 RECURRENT UTI: Primary | ICD-10-CM

## 2024-09-19 PROCEDURE — 87088 URINE BACTERIA CULTURE: CPT | Performed by: UROLOGY

## 2024-09-19 PROCEDURE — 87186 SC STD MICRODIL/AGAR DIL: CPT | Performed by: UROLOGY

## 2024-09-19 PROCEDURE — 87086 URINE CULTURE/COLONY COUNT: CPT | Performed by: UROLOGY

## 2024-09-19 NOTE — TELEPHONE ENCOUNTER
----- Message from Verna Alas sent at 9/19/2024  9:30 AM CDT -----  Regarding: Advise  Contact: [phone]  Patient is calling stating that she seen provider on 9/6/24 and was told by provider that she had a UTI. She states provider stated that he would prescribe antibiotics that she needs to take for a week prior to procedure. Pt states she still hasn't received antibiotics and her procedure is scheduled on 9/25/24. Pt would like a call from office. Please give pt a call.

## 2024-09-19 NOTE — TELEPHONE ENCOUNTER
Order placed today and pt notified to go to any [facility] lab today to submit a urine for culture. Please check for results on Monday -surgery is Wednesday

## 2024-09-21 LAB — BACTERIA UR CULT: ABNORMAL

## 2024-09-23 ENCOUNTER — TELEPHONE (OUTPATIENT)
Dept: UROLOGY | Facility: CLINIC | Age: 69
End: 2024-09-23
Payer: MEDICARE

## 2024-09-23 ENCOUNTER — OFFICE VISIT (OUTPATIENT)
Dept: BARIATRICS | Facility: CLINIC | Age: 69
End: 2024-09-23
Payer: MEDICARE

## 2024-09-23 VITALS
WEIGHT: 174.88 LBS | DIASTOLIC BLOOD PRESSURE: 87 MMHG | BODY MASS INDEX: 30.98 KG/M2 | SYSTOLIC BLOOD PRESSURE: 137 MMHG | HEART RATE: 80 BPM | OXYGEN SATURATION: 97 %

## 2024-09-23 DIAGNOSIS — Z71.3 ENCOUNTER FOR WEIGHT LOSS COUNSELING: ICD-10-CM

## 2024-09-23 DIAGNOSIS — E66.09 CLASS 1 OBESITY DUE TO EXCESS CALORIES WITH SERIOUS COMORBIDITY AND BODY MASS INDEX (BMI) OF 30.0 TO 30.9 IN ADULT: Primary | ICD-10-CM

## 2024-09-23 DIAGNOSIS — N30.90 CYSTITIS: Primary | ICD-10-CM

## 2024-09-23 DIAGNOSIS — G47.33 OSA (OBSTRUCTIVE SLEEP APNEA): ICD-10-CM

## 2024-09-23 DIAGNOSIS — G35 MS (MULTIPLE SCLEROSIS): ICD-10-CM

## 2024-09-23 DIAGNOSIS — Z85.3 HISTORY OF BREAST CANCER: ICD-10-CM

## 2024-09-23 PROCEDURE — 99999 PR PBB SHADOW E&M-EST. PATIENT-LVL III: CPT | Mod: PBBFAC,,, | Performed by: STUDENT IN AN ORGANIZED HEALTH CARE EDUCATION/TRAINING PROGRAM

## 2024-09-23 RX ORDER — SULFAMETHOXAZOLE AND TRIMETHOPRIM 800; 160 MG/1; MG/1
1 TABLET ORAL 2 TIMES DAILY
Qty: 14 TABLET | Refills: 0 | Status: SHIPPED | OUTPATIENT
Start: 2024-09-23 | End: 2024-09-30

## 2024-09-23 RX ORDER — METFORMIN HYDROCHLORIDE 500 MG/1
500 TABLET, EXTENDED RELEASE ORAL 2 TIMES DAILY WITH MEALS
Qty: 180 TABLET | Refills: 0 | Status: SHIPPED | OUTPATIENT
Start: 2024-09-23 | End: 2024-12-22

## 2024-09-23 NOTE — TELEPHONE ENCOUNTER
Attempted to contact pt per Dr Ross. No answer, left detailed voicemail informing her per Dr Ross, Please have her start Bactim DS now for her upcoming surgery. Informed pt that the medication has been sent to her pharmacy.

## 2024-09-23 NOTE — TELEPHONE ENCOUNTER
Cystitis  -     sulfamethoxazole-trimethoprim 800-160mg (BACTRIM DS) 800-160 mg Tab; Take 1 tablet by mouth 2 (two) times daily. for 7 days  Dispense: 14 tablet; Refill: 0       She is scheduled for cysto botox injection on 9/25.  Please have her start Bactim DS now for her upcoming surgery.

## 2024-09-23 NOTE — PROGRESS NOTES
Subjective     Patient ID: Silvana Quezada is a 68 y.o. female.    Chief Complaint: Follow-up, Obesity, and Weight Check    Patient presents for treatment of obesity.     Co-morbidities   KEM  MS  Bipolar  H/o breast cancer  KEM on CPAP      Current Physical Activity  [employer] 2x/week,  fitness center  Every other day walks stairs at  [employer]    Current Eating Habits  Breakfast - oatmeal; cereal; eggs and grits  Lunch - sandwich (cold cut, PB&J)  Dinner - spaghetti and meat sauce; fish, french fries, salad; meat, vegetable, and starch  Snacks - crackers, chips  Beverages - iced tea,  SF soft drinks (1/day)    Medical Weight Loss   4/12/2024: 181 lbs 3.5 oz, BMI 32  6/24/2024: 178.3 lbs, BMI 31.6, BFP 46.9%, BFM 83.5 lbs, SMM 51.1 lbs, BMR 1298 kcal  9/23/2024: 174.9 lbs, BMI 31, BFP 46.8%, BFM 81.9 lbs, SMM 49.6 lbs, BMR 1282 kcal        Review of Systems   Constitutional:  Negative for chills and fever.   Respiratory:  Negative for shortness of breath.    Cardiovascular:  Negative for chest pain.   Gastrointestinal:  Negative for abdominal pain, nausea and vomiting.   Neurological:  Negative for dizziness and light-headedness.   Psychiatric/Behavioral:  The patient is not nervous/anxious.           Objective    Latest Reference Range & Units 01/08/24 08:28   WBC 3.90 - 12.70 K/uL 4.49   RBC 4.00 - 5.40 M/uL 4.38   Hemoglobin 12.0 - 16.0 g/dL 13.5   Hematocrit 37.0 - 48.5 % 42.4   MCV 82 - 98 fL 97   MCH 27.0 - 31.0 pg 30.8   MCHC 32.0 - 36.0 g/dL 31.8 (L)   RDW 11.5 - 14.5 % 13.0   Platelet Count 150 - 450 K/uL 183   MPV 9.2 - 12.9 fL 11.4   Sodium 136 - 145 mmol/L 144   Potassium 3.5 - 5.1 mmol/L 4.2   Chloride 95 - 110 mmol/L 108   CO2 23 - 29 mmol/L 28   Anion Gap 8 - 16 mmol/L 8   BUN 8 - 23 mg/dL 18   Creatinine 0.5 - 1.4 mg/dL 0.9   eGFR >60 mL/min/1.73 m^2 >60.0   Glucose 70 - 110 mg/dL 101   Calcium 8.7 - 10.5 mg/dL 9.3   ALP 55 - 135 U/L 52 (L)   PROTEIN TOTAL 6.0 - 8.4 g/dL 6.4   Albumin  3.5 - 5.2 g/dL 3.6   BILIRUBIN TOTAL 0.1 - 1.0 mg/dL 0.5   Bilirubin Direct 0.1 - 0.3 mg/dL 0.2   AST 10 - 40 U/L 15   ALT 10 - 44 U/L 21   Hemoglobin A1C External 4.0 - 5.6 % 5.1   Estimated Avg Glucose 68 - 131 mg/dL 100   (L): Data is abnormally low    Vitals:    09/23/24 1007   BP: 137/87   Pulse: 80         Physical Exam  Vitals reviewed.   Constitutional:       General: She is not in acute distress.     Appearance: Normal appearance. She is obese. She is not ill-appearing, toxic-appearing or diaphoretic.   HENT:      Head: Normocephalic and atraumatic.   Cardiovascular:      Rate and Rhythm: Normal rate.   Pulmonary:      Effort: Pulmonary effort is normal. No respiratory distress.   Skin:     General: Skin is warm and dry.   Neurological:      Mental Status: She is alert and oriented to person, place, and time.            Assessment and Plan     1. Class 1 obesity due to excess calories with serious comorbidity and body mass index (BMI) of 30.0 to 30.9 in adult    2. History of breast cancer    3. KEM (obstructive sleep apnea)    4. MS (multiple sclerosis)    5. Encounter for weight loss counseling    Other orders  -     metFORMIN (GLUCOPHAGE-XR) 500 MG ER 24hr tablet; Take 1 tablet (500 mg total) by mouth 2 (two) times daily with meals.  Dispense: 180 tablet; Refill: 0    - Metformin  mg twice daily    - Log all food and beverage intake with a daily calorie goal of 1200 calories per day    - Continue current exercise routine

## 2024-09-24 ENCOUNTER — TELEPHONE (OUTPATIENT)
Dept: UROLOGY | Facility: CLINIC | Age: 69
End: 2024-09-24
Payer: MEDICARE

## 2024-09-24 NOTE — TELEPHONE ENCOUNTER
Called pt to give arrival time for 9/25/24. Informed pt her arrival time is 12:00 pm at the [facility]. Gave pt instructions for surgery. No eating or drinking after midnight, she can brush her teeth do not swallow the water. Someone must bring her and pick her up from the surgery. Stop taking ASA products today. Take a shower or bath tonight and in the morning with Hibiclens or anti-bacterial soap. Wear comfortable clothing and leave all valuables at home. Do not apply any deodorant, perfume, powder, or body lotions. Pt voiced understanding.

## 2024-09-25 ENCOUNTER — ANESTHESIA (OUTPATIENT)
Dept: SURGERY | Facility: HOSPITAL | Age: 69
End: 2024-09-25
Payer: MEDICARE

## 2024-09-25 ENCOUNTER — HOSPITAL ENCOUNTER (OUTPATIENT)
Facility: HOSPITAL | Age: 69
Discharge: HOME | End: 2024-09-25
Attending: UROLOGY | Admitting: UROLOGY
Payer: MEDICARE

## 2024-09-25 ENCOUNTER — ANESTHESIA EVENT (OUTPATIENT)
Dept: SURGERY | Facility: HOSPITAL | Age: 69
End: 2024-09-25
Payer: MEDICARE

## 2024-09-25 VITALS
OXYGEN SATURATION: 99 % | TEMPERATURE: 97 F | RESPIRATION RATE: 20 BRPM | SYSTOLIC BLOOD PRESSURE: 112 MMHG | HEART RATE: 68 BPM | DIASTOLIC BLOOD PRESSURE: 60 MMHG

## 2024-09-25 DIAGNOSIS — N31.9 NEUROGENIC BLADDER: ICD-10-CM

## 2024-09-25 DIAGNOSIS — Z01.818 PREOP TESTING: Primary | ICD-10-CM

## 2024-09-25 PROCEDURE — 63600175 PHARM REV CODE 636 W HCPCS: Mod: JZ,JG | Performed by: UROLOGY

## 2024-09-25 PROCEDURE — 71000015 HC POSTOP RECOV 1ST HR: Performed by: UROLOGY

## 2024-09-25 PROCEDURE — 36000706: Performed by: UROLOGY

## 2024-09-25 PROCEDURE — 25000003 PHARM REV CODE 250: Performed by: NURSE ANESTHETIST, CERTIFIED REGISTERED

## 2024-09-25 PROCEDURE — 71000044 HC DOSC ROUTINE RECOVERY FIRST HOUR: Performed by: UROLOGY

## 2024-09-25 PROCEDURE — 63600175 PHARM REV CODE 636 W HCPCS

## 2024-09-25 PROCEDURE — 63600175 PHARM REV CODE 636 W HCPCS: Performed by: NURSE ANESTHETIST, CERTIFIED REGISTERED

## 2024-09-25 PROCEDURE — 37000009 HC ANESTHESIA EA ADD 15 MINS: Performed by: UROLOGY

## 2024-09-25 PROCEDURE — 37000008 HC ANESTHESIA 1ST 15 MINUTES: Performed by: UROLOGY

## 2024-09-25 PROCEDURE — 52287 CYSTOSCOPY CHEMODENERVATION: CPT | Mod: ,,, | Performed by: UROLOGY

## 2024-09-25 PROCEDURE — 36000707: Performed by: UROLOGY

## 2024-09-25 RX ORDER — PROPOFOL 10 MG/ML
VIAL (ML) INTRAVENOUS
Status: DISCONTINUED | OUTPATIENT
Start: 2024-09-25 | End: 2024-09-25

## 2024-09-25 RX ORDER — CIPROFLOXACIN 2 MG/ML
400 INJECTION, SOLUTION INTRAVENOUS
Status: COMPLETED | OUTPATIENT
Start: 2024-09-25 | End: 2024-09-25

## 2024-09-25 RX ORDER — LIDOCAINE HYDROCHLORIDE 20 MG/ML
INJECTION, SOLUTION EPIDURAL; INFILTRATION; INTRACAUDAL; PERINEURAL
Status: DISCONTINUED | OUTPATIENT
Start: 2024-09-25 | End: 2024-09-25

## 2024-09-25 RX ORDER — ONDANSETRON HYDROCHLORIDE 2 MG/ML
INJECTION, SOLUTION INTRAVENOUS
Status: DISCONTINUED | OUTPATIENT
Start: 2024-09-25 | End: 2024-09-25

## 2024-09-25 RX ORDER — HALOPERIDOL 5 MG/ML
0.5 INJECTION INTRAMUSCULAR EVERY 10 MIN PRN
Status: DISCONTINUED | OUTPATIENT
Start: 2024-09-25 | End: 2024-09-25 | Stop reason: HOSPADM

## 2024-09-25 RX ORDER — GLUCAGON 1 MG
1 KIT INJECTION
Status: DISCONTINUED | OUTPATIENT
Start: 2024-09-25 | End: 2024-09-25 | Stop reason: HOSPADM

## 2024-09-25 RX ORDER — SODIUM CHLORIDE 0.9 % (FLUSH) 0.9 %
3 SYRINGE (ML) INJECTION
Status: DISCONTINUED | OUTPATIENT
Start: 2024-09-25 | End: 2024-09-25 | Stop reason: HOSPADM

## 2024-09-25 RX ORDER — FENTANYL CITRATE 50 UG/ML
25 INJECTION, SOLUTION INTRAMUSCULAR; INTRAVENOUS EVERY 5 MIN PRN
Status: DISCONTINUED | OUTPATIENT
Start: 2024-09-25 | End: 2024-09-25 | Stop reason: HOSPADM

## 2024-09-25 RX ADMIN — CIPROFLOXACIN 400 MG: 2 INJECTION, SOLUTION INTRAVENOUS at 01:09

## 2024-09-25 RX ADMIN — ONDANSETRON 4 MG: 2 INJECTION INTRAMUSCULAR; INTRAVENOUS at 01:09

## 2024-09-25 RX ADMIN — LIDOCAINE HYDROCHLORIDE 100 MG: 20 INJECTION, SOLUTION EPIDURAL; INFILTRATION; INTRACAUDAL; PERINEURAL at 01:09

## 2024-09-25 RX ADMIN — SODIUM CHLORIDE: 9 INJECTION, SOLUTION INTRAVENOUS at 01:09

## 2024-09-25 RX ADMIN — PROPOFOL 50 MG: 10 INJECTION, EMULSION INTRAVENOUS at 01:09

## 2024-09-25 NOTE — ANESTHESIA PREPROCEDURE EVALUATION
09/25/2024  Silvana Quezada is a 69 y.o., female.      Pre-op Assessment    I have reviewed the NPO Status.   I have reviewed the Medications.     Review of Systems  Anesthesia Hx:  No problems with previous Anesthesia             Family Hx of Anesthesia complications:  Family Anesthesia Complications are Pt said her daughter once woke up during surgery  Denies Personal Hx of Anesthesia complications.                    Social:  Non-Smoker, Social Alcohol Use       Hematology/Oncology:       -- Anemia:                    --  Cancer in past history:       Breast    right   chemotherapy, radiation and surgery   Oncology Comments: Pt h/o right breast cancer s/p lumpectomy, chemo, radiation     EENT/Dental:  EENT/Dental Normal           Cardiovascular:  Cardiovascular Normal Exercise tolerance: good   Denies Pacemaker.  Denies Hypertension.   Denies MI.        Denies Angina.                                  Pulmonary:       Denies Shortness of breath.  Sleep Apnea, CPAP                Renal/:   Renal Symptoms/Infections/Stones: frequency, urgency, retention, nocturia.  Urinary Tract Infection (UTI)  OAB, neurogenic bladder, nocturia, incomplete bladder emptying, recurrent UTI, h/o multiple cysto procedures and botox injections          Hepatic/GI:  Hepatic/GI Normal     Denies Liver Disease.            Musculoskeletal:  Musculoskeletal Normal                Neurological:  Denies TIA.  Denies CVA. Neuromuscular Disease,   Denies Seizures.                              Neuromuscular Disease, Multiple Sclerosis   Endocrine:        Obesity / BMI > 30  Dermatological:  Skin Normal    Psych:  Psychiatric History  depression       Psychotic Disorder and Bipolar disorder.          Physical Exam  General: Well nourished, Cooperative, Alert and Oriented    Airway:  Mallampati: II   Mouth Opening: Normal  TM Distance:  Normal  Tongue: Normal  Neck ROM: Normal ROM    Dental:  Intact          Anesthesia Assessment: Preoperative EQUATION    Planned Procedure: Procedure(s) (LRB):  CYSTOSCOPY,WITH BOTULINUM TOXIN INJECTION (N/A)  Requested Anesthesia Type:General/MAC  Surgeon: Jayesh Ross MD  Service: Urology  Known or anticipated Date of Surgery:9/25/2024    Surgeon notes: reviewed    Electronic QUestionnaire Assessment completed via nurse interview with patient.        Triage considerations:     The patient has no apparent active cardiac condition (No unstable coronary Syndrome such as severe unstable angina or recent [<1 month] myocardial infarction, decompensated CHF, severe valvular   disease or significant arrhythmia)    Previous anesthesia records: General Anesthesia, No ett, No problems  08/02/23 CYSTOSCOPY (Bladder), INJECTION, BOTULINUM TOXIN, TYPE A (Bladder), gen anes, ASA 2     Last PCP note: within 3 months , within [facility]   Subspecialty notes: Urology    Other important co-morbidities: Obesity, KEM, and OAB, neurogenic bladder, recurrent UTI, nocturia, incomplete bladder emptying, multiple uro procedures, MS, bipolar 1, depression, h/o rt breast cancer s/p lumpectomy/chemo/rad, breast cyst, h/o rt shoulder surgery, osteoporosis, h/o partial hysterectomy, thoracic aorta atherosclerosis, tortuous aorta       Tests already available:  Available tests,  within 3 months , 6-12 months ago , within [facility] .   08/02/24 TTE (EF 60-65%)  01/08/24 A1C (5.1)            Instructions given. (See in Nurse's note)    Optimization:  Anesthesia Preop Clinic Assessment Indicated:      --phone screen done        Plan:    Testing:  BMP and Hematology Profile preop dosc       Navigation:  Straight Line to surgery.               No tests, anesthesia preop clinic visit, or consult required.    Anesthesia Plan  Type of Anesthesia, risks & benefits discussed:    Anesthesia Type: Gen Natural Airway  Intra-op Monitoring Plan: Standard ASA  Monitors  Post Op Pain Control Plan: IV/PO Opioids PRN and multimodal analgesia  Induction:  IV  Informed Consent: Informed consent signed with the Patient and all parties understand the risks and agree with anesthesia plan.  All questions answered.   ASA Score: 2  Day of Surgery Review of History & Physical: H&P Update referred to the surgeon/provider.    Ready For Surgery From Anesthesia Perspective.     .

## 2024-09-25 NOTE — TRANSFER OF CARE
Anesthesia Transfer of Care Note    Patient: Silvana Quezada    Procedure(s) Performed: Procedure(s):  INJECTION, BOTULINUM TOXIN, 200 UNITS  CYSTOSCOPY    Patient location: PACU    Transport from OR: Transported from OR on 6-10 L/min O2 by face mask with adequate spontaneous ventilation    Post pain: adequate analgesia    Post assessment: no apparent anesthetic complications    Post vital signs: stable    Level of consciousness: awake and sedated    Nausea/Vomiting: no nausea/vomiting    Complications: none    Transfer of care protocol was followed      Last vitals: Visit Vitals  BP (!) 125/57 (BP Location: Left arm, Patient Position: Lying)   Pulse 74   Temp 36.6 °C (97.9 °F) (Temporal)   Resp 16   LMP  (LMP Unknown)   SpO2 96%   Breastfeeding No

## 2024-09-25 NOTE — OP NOTE
[facility]  Operative Note    Date: 09/25/2024    Pre-Op Diagnosis: Neurogenic bladder    Post-Op Diagnosis: same    Procedure(s) Performed:   1.  Cystoscopy with bladder botox injection    Specimen(s): none    Staff Surgeon:  Jayesh Ross MD    Assistant Surgeon: Elian Galarza MD    Anesthesia: General endotracheal anesthesia    Indications: Silvana Quezada is a 69 y.o. female with neurogenic bladder. Presents for Botox injection.    Findings:   Grade 3 trabeculations throughout  Uncomplicated injection 200 U Botox    Estimated Blood Loss: min    Drains: None    Procedure in Detail:  After informed consent was obtained the patient was brought to the cystoscopy suite and placed in the supine position.  SCDs were applied and working.  Anesthesia was administered.  When the patient was adequately sedated she was placed in the dorsal lithotomy position and prepped and draped in the usual sterile fashion.      A rigid cystoscope in a 22 Fr sheath was introduced into the patients's bladder via the urethra.  This passed easily.  Formal cystoscopy was performed which revealed the ureteral orifices in their normal anatomic position bilaterally.  No bladder masses, trabeculations, stones or diverticuli were seen.      200 units of botox was injected into the detrusor muscle throughout the bladder.  Good wheals were raised.  The patient's bladder was drained and the cystoscope was removed.     The patient tolerated the procedure well and was transferred to the recovery room in stable condition.      Disposition:  The patient will follow up with Dr. Ross in clinic.    Elian Galarza MD

## 2024-09-25 NOTE — PLAN OF CARE
Discharge instructions reviewed with pt and pt's  at bedside. Verbalized understanding. Packet given.

## 2024-09-25 NOTE — ANESTHESIA POSTPROCEDURE EVALUATION
Anesthesia Post Evaluation    Patient: Silvana Quezada    Procedure(s) Performed: Procedure(s):  INJECTION, BOTULINUM TOXIN, 200 UNITS  CYSTOSCOPY    Final Anesthesia Type: general      Patient location during evaluation: PACU  Patient participation: Yes- Able to Participate  Level of consciousness: awake and alert and oriented  Post-procedure vital signs: reviewed and stable  Pain management: adequate  Airway patency: patent    PONV status at discharge: No PONV  Anesthetic complications: no      Cardiovascular status: blood pressure returned to baseline  Respiratory status: unassisted, room air and spontaneous ventilation  Hydration status: euvolemic  Follow-up not needed.              Vitals Value Taken Time   /59 09/25/24 1402   Temp 37 09/25/24 1409   Pulse 69 09/25/24 1408   Resp 20 09/25/24 1348   SpO2 96 % 09/25/24 1408   Vitals shown include unfiled device data.      No case tracking events are documented in the log.      Pain/Yaakov Score: Yaakov Score: 8 (9/25/2024  1:45 PM)

## 2024-09-25 NOTE — BRIEF OP NOTE
[facility] [facility] - Surgery (1st Fl)  Brief Operative Note    Surgery Date: 9/25/2024     Surgeons and Role:     * Jayesh Ross MD - Primary     * Elian Galarza MD - Resident - Assisting        Pre-op Diagnosis:  OAB (overactive bladder) [N32.81]  Recurrent UTI [N39.0]    Post-op Diagnosis:  Post-Op Diagnosis Codes:     * OAB (overactive bladder) [N32.81]     * Recurrent UTI [N39.0]    Procedure(s):  INJECTION, BOTULINUM TOXIN, 200 UNITS  CYSTOSCOPY    Anesthesia: General/MAC    Operative Findings:   Uncomplicated botox injection    Estimated Blood Loss: * No values recorded between 9/25/2024  1:22 PM and 9/25/2024  1:33 PM *         Specimens:   Specimen (24h ago, onward)      None              Discharge Note    OUTCOME: Patient tolerated treatment/procedure well without complication and is now ready for discharge.    DISPOSITION: Home or Self Care    FINAL DIAGNOSIS:  Neurogenic bladder    FOLLOWUP: In clinic    DISCHARGE INSTRUCTIONS:  No discharge procedures on file.

## 2024-10-02 DIAGNOSIS — M81.0 OSTEOPOROSIS, UNSPECIFIED OSTEOPOROSIS TYPE, UNSPECIFIED PATHOLOGICAL FRACTURE PRESENCE: ICD-10-CM

## 2024-10-02 NOTE — TELEPHONE ENCOUNTER
Care Due:                  Date            Visit Type   Department     Provider  --------------------------------------------------------------------------------                                EP -                              PRIMARY      [facility] INTERNAL  Last Visit: 07-      CARE ([facility])   MEDICINE       Jhonatan Avila                              [facility]                              PRIMARY      [facility] INTERNAL  Next Visit: 01-      [facility] ([facility])   MEDICINE       Jhonatan Avila                                                            Last  Test          Frequency    Reason                     Performed    Due Date  --------------------------------------------------------------------------------    Mg Level....  12 months..  alendronate..............  Not Found    Overdue    Phosphate...  12 months..  alendronate..............  Not Found    Overdue    [employer] Embedded Care Due Messages. Reference number: [id number].   10/02/2024 10:57:28 AM CDT

## 2024-10-02 NOTE — TELEPHONE ENCOUNTER
Refill Routing Note   Medication(s) are not appropriate for processing by [facility] for the following reason(s):        Outside of protocol    [facility] action(s):  Route        Medication Therapy Plan: FLOS 01/06/25      Appointments  past 12m or future 3m with PCP    Date Provider   Last Visit   7/10/2024 Jhonatan Avila III, MD   Next Visit   1/10/2025 Jhonatan Avila III, MD   ED visits in past 90 days: 0        Note composed:6:46 PM 10/02/2024

## 2024-10-03 ENCOUNTER — PATIENT MESSAGE (OUTPATIENT)
Dept: PSYCHIATRY | Facility: CLINIC | Age: 69
End: 2024-10-03
Payer: MEDICARE

## 2024-10-03 RX ORDER — ALENDRONATE SODIUM 70 MG/1
70 TABLET ORAL
Qty: 12 TABLET | Refills: 0 | Status: SHIPPED | OUTPATIENT
Start: 2024-10-03

## 2024-10-08 ENCOUNTER — OFFICE VISIT (OUTPATIENT)
Dept: PSYCHIATRY | Facility: CLINIC | Age: 69
End: 2024-10-08
Payer: COMMERCIAL

## 2024-10-08 ENCOUNTER — PATIENT MESSAGE (OUTPATIENT)
Dept: PSYCHIATRY | Facility: CLINIC | Age: 69
End: 2024-10-08
Payer: MEDICARE

## 2024-10-08 VITALS
WEIGHT: 175.81 LBS | SYSTOLIC BLOOD PRESSURE: 132 MMHG | HEART RATE: 75 BPM | BODY MASS INDEX: 31.14 KG/M2 | DIASTOLIC BLOOD PRESSURE: 77 MMHG

## 2024-10-08 DIAGNOSIS — F31.61 BIPOLAR DISORDER, CURRENT EPISODE MIXED, MILD: Primary | ICD-10-CM

## 2024-10-08 DIAGNOSIS — F31.9 BIPOLAR AFFECTIVE DISORDER, REMISSION STATUS UNSPECIFIED: ICD-10-CM

## 2024-10-08 PROCEDURE — 1160F RVW MEDS BY RX/DR IN RCRD: CPT | Mod: CPTII,S$GLB,,

## 2024-10-08 PROCEDURE — 1159F MED LIST DOCD IN RCRD: CPT | Mod: CPTII,S$GLB,,

## 2024-10-08 PROCEDURE — 3008F BODY MASS INDEX DOCD: CPT | Mod: CPTII,S$GLB,,

## 2024-10-08 PROCEDURE — 1157F ADVNC CARE PLAN IN RCRD: CPT | Mod: CPTII,S$GLB,,

## 2024-10-08 PROCEDURE — 99214 OFFICE O/P EST MOD 30 MIN: CPT | Mod: S$GLB,,,

## 2024-10-08 PROCEDURE — 3075F SYST BP GE 130 - 139MM HG: CPT | Mod: CPTII,S$GLB,,

## 2024-10-08 PROCEDURE — 99999 PR PBB SHADOW E&M-EST. PATIENT-LVL II: CPT | Mod: PBBFAC,,,

## 2024-10-08 PROCEDURE — 3078F DIAST BP <80 MM HG: CPT | Mod: CPTII,S$GLB,,

## 2024-10-08 PROCEDURE — 90833 PSYTX W PT W E/M 30 MIN: CPT | Mod: S$GLB,,,

## 2024-10-08 PROCEDURE — 3044F HG A1C LEVEL LT 7.0%: CPT | Mod: CPTII,S$GLB,,

## 2024-10-08 RX ORDER — BUPROPION HYDROCHLORIDE 150 MG/1
150 TABLET, EXTENDED RELEASE ORAL NIGHTLY
Qty: 30 TABLET | Refills: 5 | Status: SHIPPED | OUTPATIENT
Start: 2024-10-08 | End: 2025-04-06

## 2024-10-08 RX ORDER — BUPROPION HYDROCHLORIDE 100 MG/1
200 TABLET, EXTENDED RELEASE ORAL EVERY MORNING
Qty: 60 TABLET | Refills: 5 | Status: SHIPPED | OUTPATIENT
Start: 2024-10-08

## 2024-10-08 RX ORDER — FLUPHENAZINE HYDROCHLORIDE 1 MG/1
1 TABLET ORAL NIGHTLY
Qty: 30 TABLET | Refills: 5 | Status: SHIPPED | OUTPATIENT
Start: 2024-10-08

## 2024-10-08 RX ORDER — OXCARBAZEPINE 150 MG/1
150 TABLET, FILM COATED ORAL 2 TIMES DAILY
Qty: 60 TABLET | Refills: 5 | Status: SHIPPED | OUTPATIENT
Start: 2024-10-08

## 2024-10-08 NOTE — PROGRESS NOTES
"Outpatient Psychiatry Follow-Up Visit (MD/NP)    10/8/2024    Clinical Status of Patient:  Outpatient (Ambulatory)    Chief Complaint:  Silvana Quezada is a 69 y.o. female who presents today for follow-up of mood disorder.  Met with patient.   Interval History and Content of Current Session:  Interim Events/Subjective Report/Content of Current Session:   Social/medical updates -- States she has been attending [facility] twice a week for her MS. After aquatics she does trreadmill and works out on machines until her  is finished his routine.     She recently reunited with her best friend from high school who lives out of [facility].  They have remained in touch since she moved away but she came to [place] for a celebration and they got to see each other. Her best friends sister is  to Kyle Thomas brother.    Her and her  just celebrated their 46th year wedding anniversary.     She started seeing a bariatric and podietry provider for obesity and toenail issues. Metformin was started for obesity.    She recently had a birthday and turned 69 years old.     Continues to sing in [employer] choir    Botox on her bladder has made a big improvement on bladder control. No longer wakes up during the night due to her bladder.     Current psychotropic Medications:  Oxcarbazepine 150 mg bid--takes consistently and states "Sometimes I get manic when I can't sleep and then the day after I can't sleep I get derpessed. But we use to that." States she cannot tolerate her face mask of her CPAP machine.    Previous Medication Trials:    Psychiatric Review of Systems (is patient experiencing or having changes in):    Mood -- "pretty good"  Anxiety -- no  Attention -- no  Psychosis -- Denies A/V/H or paranoia  Sleep -- yes; 2-3 times a month she experiences trouble sleeping and has manic depressive symtpoms becasuue of it. Hx of KEM and cannot tolerate face mask of CPAP machine; has trouble " "initiating sleep  Energy -- "lately it's been good. fatigue has been a big problem for me until a few weeks ago when I started [facility]."   Appetite -- no    Access to a gun: Yes; but I don't know if it's shootable; my late mother had a gun [place].     Denies suicidal or homicidal ideations      AIMS-N/A    -No concerns    Substance use: Rarely drinks alcohol. Denies any other illicit substance use.        2/29/2024:Patient presents alert, casually dressed and groomed. Reports she lost her son and mother 5 years ago. States she has an autistic, and gifted daughter who is female-transgender. Reports she knows why she's transgender. States her daughter involved in an MVA and broke her pelvis. Opioids were not effective in controlling her pain and she was given testoteron and that's why she became transgender. Reports her daughter Mina (formerly Megan) is "living as a man now"with 2 other transgender friends and they are all coming to visit for [facility].    Reports she has another daughter who is "only gifted" and states the son she lost 5 years ago was also autistic and gifted.     Reports she and her  are very active in their Taoist.     She her and her  exercise by going to [employer] and they go up the 2 flights of stairs then take the elevator down.     Reports she has MS and she self catherizes 4 times a day. She drinks a lot of caffeine but was put on Co Q 10 and vit b12.     Reports her mood as "pretty good". Overall she feels like her medications are working well and she denies any SE. She is taking her medications as prescribed.    Denies any consistent depression symptoms over the past 2 weeks including decreased interest/motivation/pleasure/energy/appetite/concentration/sleep.  States overall sleep is ok. Denies difficulty with initiating or remaining asleep. Denies any trouble with her interests or with motivation. Denies any " feelings of guilt. Overall energy level is ok. Ability to concentrate is ok. Appetite is ok. Denies any fluctuations in weight. Denies psychomotor retardation or agitation. Denies suicide/homicide ideations. Denies A/V/H.      Psychiatric History:    History of manic symptoms, including decreased need for sleep, increased energy, increased goal oriented behavior, risky behavior, racing thoughts. Denies any symptoms currently.    History of psychotic symptoms, including AVH, paranoia, thought insertion/broadcasting/withdrawal, delusion. Denies any symptoms currently.    Psychotherapy:  Target symptoms: mood disorder  Why chosen therapy is appropriate versus another modality: relevant to diagnosis  Outcome monitoring methods: self-report, observation  Therapeutic intervention type: insight oriented psychotherapy, supportive psychotherapy  Topics discussed/themes: building skills sets for symptom management  The patient's response to the intervention is accepting. The patient's progress toward treatment goals is good.   Duration of intervention: 16 minutes.    Review of Systems   PSYCHIATRIC: Pertinant items are noted in the narrative.    Past Medical, Family and Social History: The patient's past medical, family and social history have been reviewed and updated as appropriate within the electronic medical record - see encounter notes.    Compliance: yes    Side effects: None    Risk Parameters:  Patient reports no suicidal ideation  Patient reports no homicidal ideation  Patient reports no self-injurious behavior  Patient reports no violent behavior    Exam (detailed: at least 9 elements; comprehensive: all 15 elements)   Constitutional  Vitals:  Most recent vital signs, dated less than 90 days prior to this appointment, were reviewed.   There were no vitals filed for this visit.    /77   Pulse 75   Wt 79.8 kg (175 lb 13.1 oz)   LMP  (LMP Unknown)   BMI 31.14 kg/m²     General:  unremarkable, age  appropriate, well dressed, neatly groomed, obese     Musculoskeletal  Muscle Strength/Tone:  not examined   Gait & Station:  non-ataxic     Psychiatric  Speech:  spontaneous   Mood & Affect:  euthymic  mood-congruent   Thought Process:  goal-directed, logical   Associations:  intact   Thought Content:  normal, no suicidality, no homicidality, delusions, or paranoia   Insight:  has awareness of illness   Judgement: behavior is adequate to circumstances   Orientation:  grossly intact   Memory: intact for content of interview   Language: grossly intact   Attention Span & Concentration:  able to focus   Fund of Knowledge:  intact and appropriate to age and level of education     Assessment and Diagnosis   Status/Progress: Based on the examination today, the patient's problem(s) is/are adequately but not ideally controlled.  New problems have not been presented today.   Co-morbidities are complicating management of the primary condition.  There are no active rule-out diagnoses for this patient at this time.       General Impression: 68 y.o female with a psychiatric histroy of bipolar l disorder and pertinent medical history of KEM. Currently mood symptoms are adequately but not ideally controlled on current medications/doses. She has a history of KEM and not using her CPAP machine. Currently symptoms of insomnia are causing mild manic depressive symptoms that is manageable 2-3 times a month. Last seen by sleep provider 9/29/2022 (HX of MS and KEM).     1. Bipolar disorder, current episode mixed, mild              Intervention/Counseling/Treatment Plan   Medication Management: Continue current medications. The risks and benefits of medication were discussed with the patient.  Labs, Diagnostic Studies: reviewed    Discussed with patient informed consent including diagnosis, risks and benefits of proposed treatment above vs. alternative treatments vs. no treatment, as well as serious and common side effects of these  treatments, and the inherent unpredictability of individual responses to these treatments. The patient expresses understanding of the above and displays the capacity to agree with this current plan. Patient also agrees that, currently, the benefits outweigh the risks and would like to pursue treatment at this time, and had no other questions.    Continue bupropion (wellbutrin) 100 MG tablet; Take 2 tablets (200 mg);every morning; in addition to bupropion 150 MG tablet nightly; for a total daily dose (350 MG) target depression.  Continue fluphenazine (prolixin) 1 MG tablet;Take 1 tablet (1 mg) nightly to target psychosis  Continue oxcarbazepine (trileptal) 150 MG tablet; Take 1 tablet (150 mg) 2 times a day to target mood  Instructed client to take all medications as prescribed, do not stop taking them abruptly, and call clinic for any issues/concerns.   Call or message provider for additional questions or concerns.   Follow up with sleep provider    Safety Plan:   Patient has been educated on safety plan should any SI/HI, medication side effects &/or emergency arise. Patient verbalized understanding and agreement to contact a trusted friend or loved one, contact provider's office, call 911 or go to nearest ER should any emergency occur.       Return to Clinic: 6 months    RAYA Adams

## 2024-11-11 ENCOUNTER — OFFICE VISIT (OUTPATIENT)
Dept: PODIATRY | Facility: CLINIC | Age: 69
End: 2024-11-11
Payer: MEDICARE

## 2024-11-11 VITALS — BODY MASS INDEX: 31.17 KG/M2 | WEIGHT: 175.94 LBS | HEIGHT: 63 IN

## 2024-11-11 DIAGNOSIS — L84 CORN OR CALLUS: ICD-10-CM

## 2024-11-11 DIAGNOSIS — G35 MS (MULTIPLE SCLEROSIS): ICD-10-CM

## 2024-11-11 DIAGNOSIS — R29.898 WEAKNESS OF EXTREMITY: ICD-10-CM

## 2024-11-11 DIAGNOSIS — B35.1 ONYCHOMYCOSIS: Primary | ICD-10-CM

## 2024-11-11 PROCEDURE — 99999 PR PBB SHADOW E&M-EST. PATIENT-LVL III: CPT | Mod: PBBFAC,,, | Performed by: PODIATRIST

## 2024-11-11 NOTE — PROGRESS NOTES
"Subjective:     Patient ID: Silvana Quezada is a 69 y.o. female.    Chief Complaint: Nail Care    Silvana is a 69 y.o. female who presents to the clinic for evaluation and treatment of high risk feet. Silvana has a past medical history of Bipolar 1 disorder, Breast cancer (2001), Breast cyst, Closed fracture of proximal end of right humerus (03/09/2015), Depression, History of right shoulder fracture, MS (multiple sclerosis), Osteoporosis, unspecified, Pneumonia (03/27/2020), Wrist fracture (left), and Wrist fracture, closed, left, initial encounter (12/27/2019). The patient's chief complaint is long, thick toenails. This patient has documented high risk feet requiring routine maintenance secondary to peripheral neuropathy.    11/11/2024: Returns for routine foot care.  No other concerns noted today.    PCP: Jhonatan Avila III, MD    Date Last Seen by PCP:  07/10/2024    Current shoe gear:  Affected Foot: Casual shoes     Unaffected Foot: Casual shoes    Hemoglobin A1C   Date Value Ref Range Status   01/08/2024 5.1 4.0 - 5.6 % Final     Comment:     ADA Screening Guidelines:  5.7-6.4%  Consistent with prediabetes  >or=6.5%  Consistent with diabetes    High levels of fetal hemoglobin interfere with the HbA1C  assay. Heterozygous hemoglobin variants (HbS, HgC, etc)do  not significantly interfere with this assay.   However, presence of multiple variants may affect accuracy.     09/15/2022 5.0 4.0 - 5.6 % Final     Comment:     ADA Screening Guidelines:  5.7-6.4%  Consistent with prediabetes  >or=6.5%  Consistent with diabetes    High levels of fetal hemoglobin interfere with the HbA1C  assay. Heterozygous hemoglobin variants (HbS, HgC, etc)do  not significantly interfere with this assay.   However, presence of multiple variants may affect accuracy.       Vitals:    11/11/24 1527   Weight: 79.8 kg (175 lb 14.8 oz)   Height: 5' 3" (1.6 m)   PainSc: 0-No pain      Past Medical History:   Diagnosis Date    Bipolar 1 disorder  "    Breast cancer 2001    right lumpectomy-radiation & chemo taxol    Breast cyst     Closed fracture of proximal end of right humerus 03/09/2015    Depression     History of right shoulder fracture     MS (multiple sclerosis)     presented as dizzines, dx vision,     Osteoporosis, unspecified     Pneumonia 03/27/2020    Wrist fracture left    2019    Wrist fracture, closed, left, initial encounter 12/27/2019       Past Surgical History:   Procedure Laterality Date    BLADDER FULGURATION  8/31/2022    Procedure: FULGURATION, BLADDER;  Surgeon: Jayesh Ross MD;  Location: [facility] OR 2ND FLR;  Service: Urology;;    BRAIN SURGERY  1991    BREAST BIOPSY Left 2009    core    BREAST BIOPSY Right     BREAST LUMPECTOMY Right 2001    COLONOSCOPY N/A 1/7/2022    Procedure: COLONOSCOPY;  Surgeon: Shiva Walsh MD;  Location: [facility];  Service: Endoscopy;  Laterality: N/A;    CYSTOSCOPY N/A 7/11/2018    Procedure: CYSTOSCOPY;  Surgeon: Jayesh Ross MD;  [facility];  Service: Urology;  Laterality: N/A;  30 min    CYSTOSCOPY N/A 4/10/2019    Procedure: CYSTOSCOPY;  Surgeon: Jayesh Ross MD;  [facility];  Service: Urology;  Laterality: N/A;  30 MIN    CYSTOSCOPY  10/21/2021    Procedure: CYSTOSCOPY;  Surgeon: Jayesh Ross MD;  [facility];  Service: Urology;;    CYSTOSCOPY N/A 8/2/2023    Procedure: CYSTOSCOPY;  Surgeon: Jayesh Ross MD;  Location: [address];  Service: Urology;  Laterality: N/A;    CYSTOSCOPY  9/25/2024    Procedure: CYSTOSCOPY;  Surgeon: Jayesh Ross MD;  Location: [facility] OR [address] [place] [address];  Service: Urology;;    FOOT SURGERY  october 2013    HYSTERECTOMY      partial    INJECTION OF BOTULINUM TOXIN TYPE A N/A 7/11/2018    Procedure: INJECTION, BOTULINUM TOXIN, TYPE A 200 UNITS;  Surgeon: Jayesh Ross MD;  [facility];  Service: Urology;  Laterality: N/A;    INJECTION OF BOTULINUM TOXIN TYPE A N/A 4/10/2019    Procedure: INJECTION, BOTULINUM  TOXIN, TYPE A 200 UNITS;  Surgeon: Jayesh Ross MD;  [facility];  Service: Urology;  Laterality: N/A;    INJECTION OF BOTULINUM TOXIN TYPE A  10/21/2021    Procedure: INJECTION, BOTULINUM TOXIN, 200units;  Surgeon: Jayesh Ross MD;  [facility];  Service: Urology;;    INJECTION OF BOTULINUM TOXIN TYPE A N/A 8/2/2023    Procedure: INJECTION, BOTULINUM TOXIN, TYPE A;  Surgeon: Jayesh Ross MD;  [facility];  Service: Urology;  Laterality: N/A;  200units    INJECTION OF BOTULINUM TOXIN TYPE A  9/25/2024    Procedure: INJECTION, BOTULINUM TOXIN, 200 UNITS;  Surgeon: Jayesh Ross MD;  Location: [facility] OR [address] [place] [address];  Service: Urology;;    OPEN REDUCTION AND INTERNAL FIXATION (ORIF) OF INJURY OF WRIST Left 12/27/2019    Procedure: ORIF, WRIST;  Surgeon: Albert Schroeder Jr., MD;  Location: [facility];  Service: Orthopedics;  Laterality: Left;  ACUMED/ MINI C ARM [facility] notified/ Rk confirmed here in [facility] B with Lacie 12/23/19 KB [id number]    TONSILLECTOMY      TOTAL REDUCTION MAMMOPLASTY Left 11/5/2020    Procedure: MAMMOPLASTY, REDUCTION, LEFT;  Surgeon: Kirk Tompkins MD;  Location: [facility] OR [address] [place] [address];  Service: Plastics;  Laterality: Left;  Left breast tissue weighed- 49 grams.       Family History   Problem Relation Name Age of Onset    Heart disease Mother      Cancer Mother          breast cancer    Skin cancer Mother      Breast cancer Mother      Stroke Mother      Emphysema Father age 70's     Dementia Brother 1/2 dad     Diabetes Brother 1/2 dad     No Known Problems Brother 1/2 dad     Anesthesia problems Daughter Trans Megan/Lowman     Other Daughter Trans Megan/Mina         Transgender, birth name Megan, now goes by Mina    Asperger's syndrome Daughter Trans Megan/Lowman     No Known Problems Daughter Lacie     Autism Son      Seizures Son      Cancer Maternal Aunt      Breast cancer Maternal Aunt      Breast cancer  Maternal Cousin      Cancer Maternal Cousin         Social History     Socioeconomic History    Marital status:    Occupational History    Occupation: [employer]   Tobacco Use    Smoking status: Never    Smokeless tobacco: Never   Substance and Sexual Activity    Alcohol use: Yes     Comment: rare    Drug use: Never    Sexual activity: Not Currently     Partners: Male   Social History Narrative    Original form , 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     Lives with      40, 25 children 2 daughter      Social Drivers of Health     Financial Resource Strain: Low Risk  (8/5/2024)    Overall Financial Resource Strain (CARDIA)     Difficulty of Paying Living Expenses: Not hard at all   Food Insecurity: No Food Insecurity (8/5/2024)    Hunger Vital Sign     Worried About Running Out of Food in the Last Year: Never true     Ran Out of Food in the Last Year: Never true   Transportation Needs: No Transportation Needs (8/5/2024)    PRAPARE - Transportation     Lack of Transportation (Medical): No     Lack of Transportation (Non-Medical): No   Physical Activity: Sufficiently Active (8/5/2024)    Exercise Vital Sign     Days of Exercise per Week: 5 days     Minutes of Exercise per Session: 60 min   Recent Concern: Physical Activity - Insufficiently Active (5/30/2024)    Exercise Vital Sign     Days of Exercise per Week: 2 days     Minutes of Exercise per Session: 60 min   Stress: No Stress Concern Present (8/5/2024)    [employer] [place] [employer] - Occupational Stress Questionnaire     Feeling of Stress : Not at all   Housing Stability: Low Risk  (8/5/2024)    Housing Stability Vital Sign     Unable to Pay for Housing in the Last Year: No     Homeless in the Last Year: No       Current Outpatient Medications   Medication Sig Dispense Refill    alendronate (FOSAMAX) 70 MG tablet TAKE 1 TABLET BY MOUTH EVERY 7 DAYS 12 tablet 0    buPROPion (WELLBUTRIN SR) 100 MG TBSR 12 hr tablet Take 2 tablets  (200 mg total) by mouth every morning. 60 tablet 5    buPROPion (WELLBUTRIN SR) 150 MG TBSR 12 hr tablet Take 1 tablet (150 mg total) by mouth every evening. 30 tablet 5    calcium-vitamin D3 (OS-IGOR 500 + D3) 500 mg-5 mcg (200 unit) per tablet Take 1 tablet by mouth 2 (two) times daily with meals.      catheter (FEMALE CATHETER) 14 Fr [facility] Patient is to self catheterize four times a day indefinitley using female 12 fr in and out catheter for incomplete bladder emptying.   Dispense 120 month with 11 refills or 360 every 3 months with 3 refills depending on patient's preference 120 each 11    fluphenazine (PROLIXIN) 1 MG tablet Take 1 tablet (1 mg total) by mouth every evening. 30 tablet 5    metFORMIN (GLUCOPHAGE-XR) 500 MG ER 24hr tablet Take 1 tablet (500 mg total) by mouth 2 (two) times daily with meals. 180 tablet 0    multivitamin (ONE DAILY MULTIVITAMIN) per tablet Take 1 tablet by mouth once daily.      OXcarbazepine (TRILEPTAL) 150 MG Tab Take 1 tablet (150 mg total) by mouth 2 (two) times daily. 60 tablet 5    UNABLE TO FIND Paolo Hall       No current facility-administered medications for this visit.       Review of patient's allergies indicates:   Allergen Reactions    Adhesive      blisters    Keflex [cephalexin] Rash    Cephalosporins        Review of Systems   Constitutional: Negative for chills and fever.   HENT:  Negative for congestion and hearing loss.    Respiratory:  Negative for cough and shortness of breath.    Skin:  Positive for color change and nail changes.   Musculoskeletal:  Positive for muscle weakness.   Gastrointestinal:  Negative for nausea and vomiting.   Neurological:  Negative for numbness.   Psychiatric/Behavioral:  Negative for altered mental status.         Objective:     Physical Exam  Constitutional:       General: She is not in acute distress.     Appearance: She is not ill-appearing.   Cardiovascular:      Pulses:           Dorsalis pedis pulses are 2+ on the right  side and 2+ on the left side.        Posterior tibial pulses are 1+ on the right side and 1+ on the left side.      Comments: Mild nonpitting edema to lower extremity bilateral.  Multiple varicosities noted to lower extremity bilateral.  No hair growth follow extremity.  Skin temp is warm to foot bilateral.  Musculoskeletal:      Right foot: Deformity present.      Left foot: Deformity present.      Comments: Rigid flexion contractures of the PIPJ and DIPJ involving toes 3-5 bilateral foot.  Severely track bound hallux abductovalgus bilateral foot underlapping the 2nd toe.    Rigid pes planus foot structure bilateral foot.  No pain with attempted range motion or manual muscle strength testing bilateral foot and ankle.   Feet:      Right foot:      Protective Sensation: 10 sites tested.  10 sites sensed.      Skin integrity: No ulcer, blister, skin breakdown, erythema, warmth, callus, dry skin or fissure.      Toenail Condition: Right toenails are abnormally thick and long. Fungal disease present.     Left foot:      Protective Sensation: 10 sites tested.  10 sites sensed.      Skin integrity: Callus present. No ulcer, blister, skin breakdown, erythema, warmth, dry skin or fissure.      Toenail Condition: Left toenails are abnormally thick and long. Fungal disease present.  Skin:     General: Skin is warm.      Capillary Refill: Capillary refill takes 2 to 3 seconds.      Findings: No ecchymosis or erythema.      Nails: There is no clubbing.      Comments: Nails 1-5 bilateral foot are moderately elongated.  Hallux nail bilateral severely dystrophic with a central hump and prominent nailbed with loosening and underlying debris containing white discoloration of the nails.  Lesser toes 2-5 on the left are also thickened, dystrophic with loosening and underlying debris while the lesser toenails 2-4 arch is mildly thickened elongated.  The right 5th toenail is also dystrophic with discoloration and loosening.    Raised  "hyperkeratotic skin sub 2nd met head left forefoot.   Neurological:      Mental Status: She is alert and oriented to person, place, and time.      Sensory: Sensory deficit present.      Motor: Weakness present.      Comments: Decreased vibratory sensation bilateral foot.           Assessment:      Encounter Diagnoses   Name Primary?    Onychomycosis Yes    MS (multiple sclerosis)     Weakness of extremity      Plan:     Silvana Oliveira" was seen today for nail care.    Diagnoses and all orders for this visit:    Onychomycosis  -     Routine Foot Care    MS (multiple sclerosis)  -     Routine Foot Care    Weakness of extremity      I counseled the patient on her conditions, their implications and medical management.    Shoe inspection. Patient instructed on proper foot hygeine. We discussed wearing proper shoe gear, daily foot inspections, never walking without protective shoe gear, never putting sharp instruments to feet.      Routine foot care per attached note. Patient relates relief following the procedure. She will continue to monitor the areas daily, inspect her feet, wear protective shoe gear when ambulatory, moisturizer to maintain skin integrity.    Patient considered intermediate risk secondary to her history of MS significant weakness impacting her extremities.    Patient relates [facility] antifungal nail solution in his using it twice a day.  She does not recall the name of the medication but will send a i[employer] message.    RTC within 3 months to re-evaluate or p.r.n. as discussed.     Assisted by Soraida Maria DPM PGY 2    A portion of this note was generated by voice recognition software and may contain spelling and grammar errors.        "

## 2024-11-11 NOTE — PROCEDURES
"Routine Foot Care    Date/Time: 11/11/2024 3:00 PM    Performed by: Kyree Lehman DPM  Authorized by: Kyree Lehman DPM    Time out: Immediately prior to procedure a "time out" was called to verify the correct patient, procedure, equipment, support staff and site/side marked as required.    Consent Done?:  Yes (Verbal)  Hyperkeratotic Skin Lesions?: Yes    Number of trimmed lesions:  1  Location(s):  Left 2nd Metatarsal Head    Nail Care Type:  Debride  Location(s): All  (Left 1st Toe, Left 3rd Toe, Left 2nd Toe, Left 4th Toe, Left 5th Toe, Right 1st Toe, Right 2nd Toe, Right 3rd Toe, Right 4th Toe and Right 5th Toe)  Patient tolerance:  Patient tolerated the procedure well with no immediate complications     Used sterile nail nipper and #15 scalpel.  Assisted by Soraida Maria DPM PGY 2    "

## 2024-11-13 ENCOUNTER — TELEPHONE (OUTPATIENT)
Dept: PODIATRY | Facility: HOSPITAL | Age: 69
End: 2024-11-13
Payer: MEDICARE

## 2024-11-13 NOTE — TELEPHONE ENCOUNTER
----- Message from Frida Randolph sent at 11/13/2024  9:00 AM CST -----  Dr Lehman:    When you saw Ms Quezada yesterday she had mentioned that you wanted her to send a picture of her medication for her nails.    Were you referring to the medication from [facility]?    She is currently using CMPD Itraconazole/Ciprofloxacin/Vancomycin 3/2/2% nail polish #15 ml up to 2 mls/day (or at least that was what was dispensed).      If this was not the medication that you were referring to, please let me know.      Thank you    Frida  ----- Message -----  From: Frida Randolph RN  Sent: 11/11/2024   4:02 PM CST  To: Frida Randolph RN    Call PAP and find out what nail solution she is taking

## 2024-12-06 ENCOUNTER — PATIENT OUTREACH (OUTPATIENT)
Dept: ADMINISTRATIVE | Facility: HOSPITAL | Age: 69
End: 2024-12-06
Payer: MEDICARE

## 2024-12-06 ENCOUNTER — PATIENT MESSAGE (OUTPATIENT)
Dept: ADMINISTRATIVE | Facility: HOSPITAL | Age: 69
End: 2024-12-06
Payer: MEDICARE

## 2024-12-06 NOTE — PROGRESS NOTES
12/06/2024  VB chart audit performed. Care Everywhere updates requested and reviewed  Overdue HM topic chart audit and/or requested. LINKS triggered and reconciled. Media reviewed Lvm/portal sent regarding overdue health topics

## 2024-12-16 ENCOUNTER — PATIENT MESSAGE (OUTPATIENT)
Dept: PSYCHIATRY | Facility: CLINIC | Age: 69
End: 2024-12-16
Payer: MEDICARE

## 2024-12-18 DIAGNOSIS — M81.0 OSTEOPOROSIS, UNSPECIFIED OSTEOPOROSIS TYPE, UNSPECIFIED PATHOLOGICAL FRACTURE PRESENCE: ICD-10-CM

## 2024-12-18 RX ORDER — ALENDRONATE SODIUM 70 MG/1
70 TABLET ORAL
Qty: 12 TABLET | Refills: 0 | Status: SHIPPED | OUTPATIENT
Start: 2024-12-18

## 2024-12-18 NOTE — TELEPHONE ENCOUNTER
Care Due:                  Date            Visit Type   Department     Provider  --------------------------------------------------------------------------------                                EP -                              PRIMARY      [facility] INTERNAL  Last Visit: 07-      CARE ([facility])   MEDICINE       Jhonatan Avila                              [facility]                              PRIMARY      [facility] INTERNAL  Next Visit: 01-      [facility] ([facility])   MEDICINE       Jhonatan Avila                                                            Last  Test          Frequency    Reason                     Performed    Due Date  --------------------------------------------------------------------------------    CMP.........  12 months..  alendronate..............  Not Found    Overdue    Mg Level....  12 months..  alendronate..............  Not Found    Overdue    Phosphate...  12 months..  alendronate..............  Not Found    Overdue    [employer] Embedded Care Due Messages. Reference number: [id number].   12/18/2024 8:14:04 AM CST

## 2024-12-31 RX ORDER — METFORMIN HYDROCHLORIDE 500 MG/1
500 TABLET, EXTENDED RELEASE ORAL 2 TIMES DAILY WITH MEALS
Qty: 180 TABLET | Refills: 0 | Status: SHIPPED | OUTPATIENT
Start: 2024-12-31

## 2025-01-06 ENCOUNTER — LAB VISIT (OUTPATIENT)
Dept: LAB | Facility: HOSPITAL | Age: 70
End: 2025-01-06
Attending: INTERNAL MEDICINE
Payer: MEDICARE

## 2025-01-06 DIAGNOSIS — E74.39 GLUCOSE INTOLERANCE: ICD-10-CM

## 2025-01-06 LAB
ANION GAP SERPL CALC-SCNC: 8 MMOL/L (ref 8–16)
BUN SERPL-MCNC: 26 MG/DL (ref 8–23)
CALCIUM SERPL-MCNC: 10.1 MG/DL (ref 8.7–10.5)
CHLORIDE SERPL-SCNC: 106 MMOL/L (ref 95–110)
CO2 SERPL-SCNC: 29 MMOL/L (ref 23–29)
CREAT SERPL-MCNC: 0.8 MG/DL (ref 0.5–1.4)
EST. GFR  (NO RACE VARIABLE): >60 ML/MIN/1.73 M^2
GLUCOSE SERPL-MCNC: 92 MG/DL (ref 70–110)
POTASSIUM SERPL-SCNC: 3.9 MMOL/L (ref 3.5–5.1)
SODIUM SERPL-SCNC: 143 MMOL/L (ref 136–145)

## 2025-01-06 PROCEDURE — 36415 COLL VENOUS BLD VENIPUNCTURE: CPT | Mod: PO | Performed by: INTERNAL MEDICINE

## 2025-01-06 PROCEDURE — 80048 BASIC METABOLIC PNL TOTAL CA: CPT | Performed by: INTERNAL MEDICINE

## 2025-01-08 ENCOUNTER — HOSPITAL ENCOUNTER (OUTPATIENT)
Dept: RADIOLOGY | Facility: HOSPITAL | Age: 70
Discharge: HOME | End: 2025-01-08
Payer: MEDICARE

## 2025-01-08 DIAGNOSIS — G35 MS (MULTIPLE SCLEROSIS): ICD-10-CM

## 2025-01-08 PROCEDURE — 72141 MRI NECK SPINE W/O DYE: CPT | Mod: TC

## 2025-01-08 PROCEDURE — 72141 MRI NECK SPINE W/O DYE: CPT | Mod: 26,,, | Performed by: RADIOLOGY

## 2025-01-08 PROCEDURE — 72146 MRI CHEST SPINE W/O DYE: CPT | Mod: TC

## 2025-01-08 PROCEDURE — 70551 MRI BRAIN STEM W/O DYE: CPT | Mod: TC

## 2025-01-08 PROCEDURE — 72146 MRI CHEST SPINE W/O DYE: CPT | Mod: 26,,, | Performed by: RADIOLOGY

## 2025-01-08 PROCEDURE — 70551 MRI BRAIN STEM W/O DYE: CPT | Mod: 26,,, | Performed by: RADIOLOGY

## 2025-01-09 ENCOUNTER — TELEPHONE (OUTPATIENT)
Dept: INTERNAL MEDICINE | Facility: CLINIC | Age: 70
End: 2025-01-09
Payer: MEDICARE

## 2025-01-09 NOTE — TELEPHONE ENCOUNTER
Called and spoke with pt in regards to rescheduling appt scheduled tomorrow for 220pm , pt stated that she is bringing her car in the shop tomorrow and will call back to reschedule appt

## 2025-01-13 ENCOUNTER — TELEPHONE (OUTPATIENT)
Dept: INTERNAL MEDICINE | Facility: CLINIC | Age: 70
End: 2025-01-13
Payer: MEDICARE

## 2025-01-13 NOTE — TELEPHONE ENCOUNTER
Called and spoke with pt in regards to scheduling appt , pt stated that she was ready to schedule an appt and agreed to 01/22/25 at 8am

## 2025-01-13 NOTE — TELEPHONE ENCOUNTER
----- Message from Jhonatan Avila MD sent at 1/13/2025  2:27 PM CST -----  Hi  Patient has labs done , but no follow up visit? Can we get them scheduled for the follow up   Thanks!

## 2025-01-15 ENCOUNTER — OFFICE VISIT (OUTPATIENT)
Dept: NEUROLOGY | Facility: CLINIC | Age: 70
End: 2025-01-15
Payer: MEDICARE

## 2025-01-15 ENCOUNTER — LAB VISIT (OUTPATIENT)
Dept: LAB | Facility: HOSPITAL | Age: 70
End: 2025-01-15
Attending: STUDENT IN AN ORGANIZED HEALTH CARE EDUCATION/TRAINING PROGRAM
Payer: MEDICARE

## 2025-01-15 VITALS
DIASTOLIC BLOOD PRESSURE: 77 MMHG | WEIGHT: 174.5 LBS | HEART RATE: 70 BPM | BODY MASS INDEX: 30.92 KG/M2 | SYSTOLIC BLOOD PRESSURE: 138 MMHG | HEIGHT: 63 IN

## 2025-01-15 DIAGNOSIS — E55.9 VITAMIN D DEFICIENCY: ICD-10-CM

## 2025-01-15 DIAGNOSIS — G35 MS (MULTIPLE SCLEROSIS): ICD-10-CM

## 2025-01-15 DIAGNOSIS — G35 MS (MULTIPLE SCLEROSIS): Primary | ICD-10-CM

## 2025-01-15 LAB — 25(OH)D3+25(OH)D2 SERPL-MCNC: 58 NG/ML (ref 30–96)

## 2025-01-15 PROCEDURE — 36415 COLL VENOUS BLD VENIPUNCTURE: CPT | Performed by: STUDENT IN AN ORGANIZED HEALTH CARE EDUCATION/TRAINING PROGRAM

## 2025-01-15 PROCEDURE — 1159F MED LIST DOCD IN RCRD: CPT | Mod: CPTII,S$GLB,, | Performed by: STUDENT IN AN ORGANIZED HEALTH CARE EDUCATION/TRAINING PROGRAM

## 2025-01-15 PROCEDURE — 3078F DIAST BP <80 MM HG: CPT | Mod: CPTII,S$GLB,, | Performed by: STUDENT IN AN ORGANIZED HEALTH CARE EDUCATION/TRAINING PROGRAM

## 2025-01-15 PROCEDURE — G2211 COMPLEX E/M VISIT ADD ON: HCPCS | Mod: S$GLB,,, | Performed by: STUDENT IN AN ORGANIZED HEALTH CARE EDUCATION/TRAINING PROGRAM

## 2025-01-15 PROCEDURE — 0361U NEURFLMNT LT CHN DIG IA QUAN: CPT | Performed by: STUDENT IN AN ORGANIZED HEALTH CARE EDUCATION/TRAINING PROGRAM

## 2025-01-15 PROCEDURE — 1157F ADVNC CARE PLAN IN RCRD: CPT | Mod: CPTII,S$GLB,, | Performed by: STUDENT IN AN ORGANIZED HEALTH CARE EDUCATION/TRAINING PROGRAM

## 2025-01-15 PROCEDURE — 82306 VITAMIN D 25 HYDROXY: CPT | Performed by: STUDENT IN AN ORGANIZED HEALTH CARE EDUCATION/TRAINING PROGRAM

## 2025-01-15 PROCEDURE — 83520 IMMUNOASSAY QUANT NOS NONAB: CPT | Performed by: STUDENT IN AN ORGANIZED HEALTH CARE EDUCATION/TRAINING PROGRAM

## 2025-01-15 PROCEDURE — 3075F SYST BP GE 130 - 139MM HG: CPT | Mod: CPTII,S$GLB,, | Performed by: STUDENT IN AN ORGANIZED HEALTH CARE EDUCATION/TRAINING PROGRAM

## 2025-01-15 PROCEDURE — 99999 PR PBB SHADOW E&M-EST. PATIENT-LVL III: CPT | Mod: PBBFAC,,, | Performed by: STUDENT IN AN ORGANIZED HEALTH CARE EDUCATION/TRAINING PROGRAM

## 2025-01-15 PROCEDURE — 99214 OFFICE O/P EST MOD 30 MIN: CPT | Mod: S$GLB,,, | Performed by: STUDENT IN AN ORGANIZED HEALTH CARE EDUCATION/TRAINING PROGRAM

## 2025-01-15 PROCEDURE — 1160F RVW MEDS BY RX/DR IN RCRD: CPT | Mod: CPTII,S$GLB,, | Performed by: STUDENT IN AN ORGANIZED HEALTH CARE EDUCATION/TRAINING PROGRAM

## 2025-01-15 PROCEDURE — 3008F BODY MASS INDEX DOCD: CPT | Mod: CPTII,S$GLB,, | Performed by: STUDENT IN AN ORGANIZED HEALTH CARE EDUCATION/TRAINING PROGRAM

## 2025-01-20 LAB — NEUROFILAMENT LIGHT CHAIN, PLASMA: 11.7 PG/ML

## 2025-01-21 ENCOUNTER — PATIENT MESSAGE (OUTPATIENT)
Dept: INTERNAL MEDICINE | Facility: CLINIC | Age: 70
End: 2025-01-21
Payer: MEDICARE

## 2025-01-21 ENCOUNTER — TELEPHONE (OUTPATIENT)
Dept: INTERNAL MEDICINE | Facility: CLINIC | Age: 70
End: 2025-01-21
Payer: MEDICARE

## 2025-01-21 LAB — GFAP, PLASMA: 53.3 PG/ML (ref 0–186)

## 2025-01-21 NOTE — TELEPHONE ENCOUNTER
LVM for Pt. Relayed that the clinic is closed tomorrow. Offered if tomorrow with Dr Avila Virtual is ok or to reschedule.

## 2025-01-31 NOTE — PROGRESS NOTES
[facility]  Follow Up Patient Visit      Disease Summary     Principle neurological diagnosis: MS  Date of symptom onset: 1991  Date of diagnosis: 1992  Disease type at diagnosis: RR  Disease type currently: SP  Previous therapy: NA  Current therapy: NA  Last MRI Brain:1/8/25 stable    Last MRI C-spine: 1/8/25 stable   Last MRI T-spine: 1/8/25 stable   CSF: reportedly consistent with MS  JCV status: NA  Vit D:   Lab Results   Component Value Date    DFSGTDTI78IW 51 12/04/2023    ZDFRSBSK01YG 42 05/13/2020    ABNFRDIU41SI 35 02/26/2020       Interval history:     She is still participating in her choir, participating in [employer] show this Friday.  Still participating in aquatics 2x/week.  She also climb stairs with her  weekly.     She sleeps with CPAP at night. She has frequent night time awakening due to frequent urination, she does endorse drinking fluid right before before.   She only uses rollator for walking long distances, normally walks okay on own for shorter distances.     Denies infections. Denies new symptoms.     ROS:     SOCIAL HISTORY  Living arrangements - the patient lives with their family.  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     Lives with      40, 25 children 2 daughter      Social Drivers of Health     Financial Resource Strain: Low Risk  (8/5/2024)    Overall Financial Resource Strain (CARDIA)     Difficulty of Paying Living Expenses: Not hard at all   Food Insecurity: No Food Insecurity (8/5/2024)    Hunger Vital Sign     Worried About Running Out of Food in the Last Year: Never true      Ran Out of Food in the Last Year: Never true   Transportation Needs: No Transportation Needs (8/5/2024)    PRAPARE - Transportation     Lack of Transportation (Medical): No     Lack of Transportation (Non-Medical): No   Physical Activity: Sufficiently Active (8/5/2024)    Exercise Vital Sign     Days of Exercise per Week: 5 days     Minutes of Exercise per Session: 60 min   Recent Concern: Physical Activity - Insufficiently Active (5/30/2024)    Exercise Vital Sign     Days of Exercise per Week: 2 days     Minutes of Exercise per Session: 60 min   Stress: No Stress Concern Present (8/5/2024)    [employer] [employer] - Occupational Stress Questionnaire     Feeling of Stress : Not at all   Housing Stability: Low Risk  (8/5/2024)    Housing Stability Vital Sign     Unable to Pay for Housing in the Last Year: No     Homeless in the Last Year: No       Patient Employment       Status   Retired                   12/21/2021     1:56 PM   REVIEW OF SYMPTOMS   Do you feel abnormally tired on most days? Yes    Do you feel you generally sleep well? No    Do you have difficulty controlling your bladder?  Yes    Do you have difficulty controlling your bowels?  No    Do you have frequent muscle cramps, tightness or spasms in your limbs?  No    Do you have new visual symptoms?  No    Do you have worsening difficulty with your memory or thinking? No    Do you have worsening symptoms of anxiety or depression?  No    For patients who walk, Do you have more difficulty walking?  Yes    Have you fallen since your last visit?  Yes    For patients who use wheelchairs: Do you have any skin wounds or breakdown? Not Applicable    Do you have difficulty using your hands?  No    Do you have shooting or burning pain? No    Do you have difficulty with sexual function?  No    If you are sexually active, are you using birth control? Y/N  N/A Not Applicable    Do you often choke when swallowing liquids or solid food?  No   "  Do you experience worsening symptoms when overheated? Yes    Do you need any new equipment such as a wheelchair, walker or shower chair? No    Do you receive co-pay financial assistance for your principal MS medicine? Not Applicable    Would you be interested in participating in an MS research trial in the future? No    For patients on Gilenya, Tecfidera, Aubagio, Rituxan, Ocrevus, Tysabri, Lemtrada or Methotrexate, are you aware that you should NOT receive live virus vaccines?  Not Applicable    Do you feel you have adequate family/friend support?  Yes    Do you have health insurance?   Yes    Are you currently employed? No    Do you receive SSDI/SSI?  No    Do you use marijuana or cannabis products? No    Have you been diagnosed with a urinary tract infection since your last visit here? Yes    Have you been diagnosed with a respiratory tract infection since your last visit here? No    Have you been to the emergency room since your last visit here? Yes    Have you been hospitalized since your last visit here?  No        Proxy-reported           12/21/2021     2:04 PM   MS JESSE-7 SCORE & INTERPRETATION   JESSE-7 SCORE  0   JESSE-7 SCORE INTERPRETATION Normal           Exam:     Vitals:    01/15/25 1259   BP: 138/77   Pulse: 70   Weight: 79.2 kg (174 lb 7.9 oz)   Height: 5' 3" (1.6 m)          In general, the patient is well nourished and appears to be in no acute distress.    MENTAL STATUS: language is fluent, normal verbal comprehension, short-term and remote memory is intact, attention is normal, patient is alert and oriented x 3, fund of knowlege is appropriate by vocabulary. Behavior and judgment appropriate.     CRANIAL NERVE EXAM:  There is no intrernuclear ophthalmoplegia.  Extraocular muscles are intact. Pupils are equal, round, and reactive to light. No facial asymmetry. Facial sensation is intact bilaterally. There is no dysarthria. Uvula is midline, and palate moves symmetrically. Shoulder shrug intact " bilaterlly. Tongue protrusion is midline. Hearing is intact to finger rub bilaterally. Neck is supple with full ROM    MOTOR EXAM: Normal bulk and tone throughout UE and LE bilaterally.   No pronator drift; rapid sequential movements are normal;     Strength:  R deltoid 5/5, L deltoid 5/5  R biceps 5/5, L biceps 5/5  R triceps 5/5, L triceps 5/5  R finger flexors 5/5, L finger flexors 5/5  R finger extensors 5/5, L finger extensors 5/5  R finger abductors 5/5, L finger abductors 5/5  R  hip flexors 5/5, L hip flexors 5/5  R  hip extensors 5/5, L hip extensors 5/5  R knee extensors 5/5, L knee extensors 5/5  R knee flexors 5/5, L knee flexors 5/5  R ankle dorsiflexors 5/5, L ankle dorsiflexors 5/5  R ankle plantarflexors 5/5, L ankle plantarflexors 5/5    SENSORY EXAM: Normal to light touch throughout.    COORDINATION: No dysmetria seen on UE and LE     GAIT: Narrow based, reduced right arm swing.             12/4/2023    12:00 PM 7/1/2024     9:30 AM 1/15/2025    12:40 PM   Timed 25 Foot Walk:   Did patient wear an AFO? No No No   Was assistive device used? No No No   Time for 25 Foot Walk (seconds) 9.65 7.04 6.95   Time for 25 Foot Walk (seconds) 9.28 6.46 6.83       Imaging (personally reviewed):     Results for orders placed during the hospital encounter of 01/08/25    MRI Brain Demyelinating Without Contrast    Impression  Stable white matter lesions intracranially when compared to the prior study, consistent with the history of demyelinating disease.  No new lesion.    Patchy signal abnormality within the distal cervical and midthoracic cord grossly similar to the prior study with no definite new spinal cord lesion.    Electronically signed by resident: Augie Underwood  Date:    01/08/2025  Time:    13:08    Electronically signed by: Long Hemphill  Date:    01/08/2025  Time:    14:20    Results for orders placed during the hospital encounter of 01/08/25    MRI Cervical Spine Demyelinating Without  "Contrast    Impression  Stable white matter lesions intracranially when compared to the prior study, consistent with the history of demyelinating disease.  No new lesion.    Patchy signal abnormality within the distal cervical and midthoracic cord grossly similar to the prior study with no definite new spinal cord lesion.    Electronically signed by resident: Augie Underwood  Date:    01/08/2025  Time:    13:08    Electronically signed by: Long Hemphill  Date:    01/08/2025  Time:    14:20    Results for orders placed during the hospital encounter of 01/08/25    MRI Thoracic Spine Demyelinating Without Contrast    Impression  Stable white matter lesions intracranially when compared to the prior study, consistent with the history of demyelinating disease.  No new lesion.    Patchy signal abnormality within the distal cervical and midthoracic cord grossly similar to the prior study with no definite new spinal cord lesion.    Electronically signed by resident: Augie Underwood  Date:    01/08/2025  Time:    13:08    Electronically signed by: Long Hemphill  Date:    01/08/2025  Time:    14:20    Results for orders placed during the hospital encounter of 07/17/20    MRI Brain Demyelinating W W/O Contrast    Impression  Findings in the cerebral white matter which are typical for multiple sclerosis.  No enhancing lesions to suggest active disease.      Electronically signed by: Steven Hay MD  Date:    07/17/2020  Time:    15:28    No results found for this or any previous visit.    No results found for this or any previous visit.      Labs:     Lab Results   Component Value Date    RAQQJOQQ68ZH 51 12/04/2023    IMYRWPIZ52WB 42 05/13/2020    OPLANMCW57PN 35 02/26/2020     No results found for: "JCVINDEX", "JCVANTIBODY"  No results found for: "PS7BJNFX", "ABSOLUTECD3", "DN8TWAOO", "ABSOLUTECD8", "MI3GAQJI", "ABSOLUTECD4", "LABCD48"  Lab Results   Component Value Date    WBC 4.49 01/08/2024    RBC 4.38 01/08/2024    HGB 13.5 " 01/08/2024    HCT 42.4 01/08/2024    MCV 97 01/08/2024    MCH 30.8 01/08/2024    MCHC 31.8 (L) 01/08/2024    RDW 13.0 01/08/2024     01/08/2024    MPV 11.4 01/08/2024    GRAN 4.7 10/07/2022    GRAN 87.9 (H) 10/07/2022    LYMPH 0.3 (L) 10/07/2022    LYMPH 5.2 (L) 10/07/2022    MONO 0.3 10/07/2022    MONO 6.3 10/07/2022    EOS 0.0 10/07/2022    BASO 0.01 10/07/2022    EOSINOPHIL 0.2 10/07/2022    BASOPHIL 0.2 10/07/2022     Sodium   Date Value Ref Range Status   01/06/2025 143 136 - 145 mmol/L Final     Potassium   Date Value Ref Range Status   01/06/2025 3.9 3.5 - 5.1 mmol/L Final     Chloride   Date Value Ref Range Status   01/06/2025 106 95 - 110 mmol/L Final     CO2   Date Value Ref Range Status   01/06/2025 29 23 - 29 mmol/L Final     Glucose   Date Value Ref Range Status   01/06/2025 92 70 - 110 mg/dL Final     BUN   Date Value Ref Range Status   01/06/2025 26 (H) 8 - 23 mg/dL Final     Creatinine   Date Value Ref Range Status   01/06/2025 0.8 0.5 - 1.4 mg/dL Final     Calcium   Date Value Ref Range Status   01/06/2025 10.1 8.7 - 10.5 mg/dL Final     Total Protein   Date Value Ref Range Status   01/08/2024 6.4 6.0 - 8.4 g/dL Final     Albumin   Date Value Ref Range Status   01/08/2024 3.6 3.5 - 5.2 g/dL Final     Total Bilirubin   Date Value Ref Range Status   01/08/2024 0.5 0.1 - 1.0 mg/dL Final     Comment:     For infants and newborns, interpretation of results should be based  on gestational age, weight and in agreement with clinical  observations.    Premature Infant recommended reference ranges:  Up to 24 hours.............<8.0 mg/dL  Up to 48 hours............<12.0 mg/dL  3-5 days..................<15.0 mg/dL  6-29 days.................<15.0 mg/dL       Alkaline Phosphatase   Date Value Ref Range Status   01/08/2024 52 (L) 55 - 135 U/L Final     AST   Date Value Ref Range Status   01/08/2024 15 10 - 40 U/L Final     ALT   Date Value Ref Range Status   01/08/2024 21 10 - 44 U/L Final     Anion Gap    Date Value Ref Range Status   01/06/2025 8 8 - 16 mmol/L Final     eGFR if    Date Value Ref Range Status   01/03/2022 >60.0 >60 mL/min/1.73 m^2 Final     eGFR if non    Date Value Ref Range Status   01/03/2022 >60.0 >60 mL/min/1.73 m^2 Final     Comment:     Calculation used to obtain the estimated glomerular filtration  rate (eGFR) is the CKD-EPI equation.          Diagnosis/Assessment/Plan:     Multiple Sclerosis  -Assessment: SP stable radiographically and clinically.   -Imaging:As needed, will check biomarkers today, if remains low, can skip next year's MRI.   -Disease Modifying Therapies:Monitor off DMT  Symptom management   -Nighttime urination: discussed holding off fluids 2 hour before bed, ok to cath right before bedtime.    -Check vit D today  Plan discussed and questions were answered to satisfaction.  Return to clinic in 6 months .        NEURO MULTIPLE SCLEROSIS IMPRESSION:   Number of relapses in the past year?:  0  Clinical Progression:  Clinically Stable  MRI Progression:  Stable  MS Classification:  Secondarily Progressive MS  Current DMT: none  DMT:  No change in management  Symptom Management:  No change in symptom management        Total time spent with the patient: 35 minutes, including face to face consultation, chart review and coordination of care, on the day of the visit. This includes face to face time and non-face to face time preparing to see the patient (eg, review of tests), obtaining and/or reviewing separately obtained history, documenting clinical information in the electronic or other health record, independently interpreting results and communicating results to the patient/family/caregiver, or care coordination.         Valencia Sanford MD, MSc  Attending neurologist            General Sunscreen Counseling: I recommended a broad spectrum sunscreen with a SPF of 30 or higher.  I explained that SPF 30 sunscreens block approximately 97 percent of the sun's harmful rays.  Sunscreens should be applied at least 15 minutes prior to expected sun exposure and then every 2 hours after that as long as sun exposure continues. If swimming or exercising sunscreen should be reapplied every 45 minutes to an hour after getting wet or sweating.  One ounce, or the equivalent of a shot glass full of sunscreen, is adequate to protect the skin not covered by a bathing suit. I also recommended a lip balm with a sunscreen as well. Sun protective clothing can be used in lieu of sunscreen but must be worn the entire time you are exposed to the sun's rays. Detail Level: Generalized

## 2025-02-12 ENCOUNTER — TELEPHONE (OUTPATIENT)
Dept: PODIATRY | Facility: CLINIC | Age: 70
End: 2025-02-12
Payer: MEDICARE

## 2025-02-12 NOTE — TELEPHONE ENCOUNTER
----- Message from Nicolás sent at 2/12/2025 10:00 AM CST -----  Contact: PT  Type:  Sooner Apoointment Request    Caller is requesting a sooner appointment.  Caller declined first available appointment listed below.  Caller will not accept being placed on the waitlist and is requesting a message be sent to doctor.  Name of Caller:PT   When is the first available appointment? April   Symptoms:F/U  Would the patient rather a call back or a response via [facility]? Call   Best Call Back Number:[phone]   Additional Information:

## 2025-02-12 NOTE — TELEPHONE ENCOUNTER
Spoke with Ms Quezada to offer her assistance with rescheduling her nail care appt with Dr Lehman. Accepted appt date and time of 3/13/25 at 8:30 am. No other needs voiced. Call back encouraged.

## 2025-03-03 ENCOUNTER — TELEPHONE (OUTPATIENT)
Dept: UROLOGY | Facility: CLINIC | Age: 70
End: 2025-03-03
Payer: MEDICARE

## 2025-03-03 DIAGNOSIS — M81.0 OSTEOPOROSIS, UNSPECIFIED OSTEOPOROSIS TYPE, UNSPECIFIED PATHOLOGICAL FRACTURE PRESENCE: ICD-10-CM

## 2025-03-03 NOTE — TELEPHONE ENCOUNTER
No care due was identified.  [facility] Embedded Care Due Messages. Reference number: [id number].   3/03/2025 4:45:21 PM CST

## 2025-03-03 NOTE — TELEPHONE ENCOUNTER
Called pt in regards to message in portal. Pt states that she would like to see Dr Ross to discuss surgery. Informed pt that she is scheduled on 5/23/25 w/Dr Ross. Informed her that the appt has also been placed on the wait list. Pt verbalized understanding.

## 2025-03-05 RX ORDER — ALENDRONATE SODIUM 70 MG/1
70 TABLET ORAL
Qty: 12 TABLET | Refills: 0 | Status: SHIPPED | OUTPATIENT
Start: 2025-03-05

## 2025-03-05 NOTE — TELEPHONE ENCOUNTER
Refill Routing Note   Medication(s) are not appropriate for processing by [facility] for the following reason(s):        Outside of protocol    [facility] action(s):  Route               Appointments  past 12m or future 3m with PCP    Date Provider   Last Visit   7/10/2024 Jhontaan Avila III, MD   Next Visit   4/28/2025 Jhonatan Avila III, MD   ED visits in past 90 days: 0        Note composed:8:32 AM 03/05/2025

## 2025-03-07 ENCOUNTER — PATIENT MESSAGE (OUTPATIENT)
Dept: PSYCHIATRY | Facility: CLINIC | Age: 70
End: 2025-03-07
Payer: MEDICARE

## 2025-03-13 ENCOUNTER — OFFICE VISIT (OUTPATIENT)
Dept: PODIATRY | Facility: CLINIC | Age: 70
End: 2025-03-13
Payer: MEDICARE

## 2025-03-13 VITALS — DIASTOLIC BLOOD PRESSURE: 80 MMHG | HEART RATE: 61 BPM | SYSTOLIC BLOOD PRESSURE: 139 MMHG

## 2025-03-13 DIAGNOSIS — G35 MS (MULTIPLE SCLEROSIS): Primary | ICD-10-CM

## 2025-03-13 DIAGNOSIS — B35.1 ONYCHOMYCOSIS: ICD-10-CM

## 2025-03-13 DIAGNOSIS — L84 CORN OR CALLUS: ICD-10-CM

## 2025-03-13 PROCEDURE — 99999 PR PBB SHADOW E&M-EST. PATIENT-LVL III: CPT | Mod: PBBFAC,,, | Performed by: PODIATRIST

## 2025-03-13 NOTE — PROCEDURES
"Routine Foot Care    Date/Time: 3/13/2025 8:30 AM    Performed by: Kyree Lehman DPM  Authorized by: Kyree Lehman DPM    Time out: Immediately prior to procedure a "time out" was called to verify the correct patient, procedure, equipment, support staff and site/side marked as required.    Consent Done?:  Yes (Verbal)  Hyperkeratotic Skin Lesions?: Yes    Number of trimmed lesions:  2  Location(s):  Left 2nd Toe and Left 3rd Metatarsal Head    Nail Care Type:  Debride  Location(s): All  (Left 1st Toe, Left 3rd Toe, Left 2nd Toe, Left 4th Toe, Left 5th Toe, Right 1st Toe, Right 2nd Toe, Right 3rd Toe, Right 4th Toe and Right 5th Toe)  Patient tolerance:  Patient tolerated the procedure well with no immediate complications     Used sterile nail nipper and #15 scalpel. Assisted per Juan Rouse DPM PGY 2    "

## 2025-03-13 NOTE — PROGRESS NOTES
Subjective:     Patient ID: Silvana Quezada is a 69 y.o. female.    Chief Complaint: Nail Care (3 month nail care f/u)    Silvana is a 69 y.o. female who presents to the clinic for evaluation and treatment of high risk feet. Silvana has a past medical history of Bipolar 1 disorder, Breast cancer (2001), Breast cyst, Closed fracture of proximal end of right humerus (03/09/2015), Depression, History of right shoulder fracture, MS (multiple sclerosis), Osteoporosis, unspecified, Pneumonia (03/27/2020), Wrist fracture (left), and Wrist fracture, closed, left, initial encounter (12/27/2019). The patient's chief complaint is long, thick toenails. This patient has documented high risk feet requiring routine maintenance secondary to peripheral neuropathy.    11/11/2024: Returns for routine foot care.  No other concerns noted today.    03/13/2025:  Returns for routine foot care.  No new concerns.    PCP: Jhonatan Avila III, MD    Date Last Seen by PCP:  07/10/2024 pending on 04/28/2025    Current shoe gear:  Affected Foot: Casual shoes     Unaffected Foot: Casual shoes    Hemoglobin A1C   Date Value Ref Range Status   01/08/2024 5.1 4.0 - 5.6 % Final     Comment:     ADA Screening Guidelines:  5.7-6.4%  Consistent with prediabetes  >or=6.5%  Consistent with diabetes    High levels of fetal hemoglobin interfere with the HbA1C  assay. Heterozygous hemoglobin variants (HbS, HgC, etc)do  not significantly interfere with this assay.   However, presence of multiple variants may affect accuracy.     09/15/2022 5.0 4.0 - 5.6 % Final     Comment:     ADA Screening Guidelines:  5.7-6.4%  Consistent with prediabetes  >or=6.5%  Consistent with diabetes    High levels of fetal hemoglobin interfere with the HbA1C  assay. Heterozygous hemoglobin variants (HbS, HgC, etc)do  not significantly interfere with this assay.   However, presence of multiple variants may affect accuracy.       Vitals:    03/13/25 0939   BP: 139/80   Pulse: 61   PainSc:  0-No pain      Past Medical History:   Diagnosis Date    Bipolar 1 disorder     Breast cancer 2001    right lumpectomy-radiation & chemo taxol    Breast cyst     Closed fracture of proximal end of right humerus 03/09/2015    Depression     History of right shoulder fracture     MS (multiple sclerosis)     presented as dizzines, dx vision,     Osteoporosis, unspecified     Pneumonia 03/27/2020    Wrist fracture left    2019    Wrist fracture, closed, left, initial encounter 12/27/2019       Past Surgical History:   Procedure Laterality Date    BLADDER FULGURATION  8/31/2022    Procedure: FULGURATION, BLADDER;  Surgeon: Jayesh Ross MD;  Location: [facility] OR 2ND FLR;  Service: Urology;;    BRAIN SURGERY  1991    BREAST BIOPSY Left 2009    core    BREAST BIOPSY Right     BREAST LUMPECTOMY Right 2001    COLONOSCOPY N/A 1/7/2022    Procedure: COLONOSCOPY;  Surgeon: Shiva Walsh MD;  Location: [facility];  Service: Endoscopy;  Laterality: N/A;    CYSTOSCOPY N/A 7/11/2018    Procedure: CYSTOSCOPY;  Surgeon: Jayesh Ross MD;  Location: [facility] OR 1ST FLR;  Service: Urology;  Laterality: N/A;  30 min    CYSTOSCOPY N/A 4/10/2019    Procedure: CYSTOSCOPY;  Surgeon: Jayesh Ross MD;  Location: [facility] OR 1ST FLR;  Service: Urology;  Laterality: N/A;  30 MIN    CYSTOSCOPY  10/21/2021    Procedure: CYSTOSCOPY;  Surgeon: Jayesh Ross MD;  Location: [facility] OR 1ST FLR;  Service: Urology;;    CYSTOSCOPY N/A 8/2/2023    Procedure: CYSTOSCOPY;  Surgeon: Jayesh Ross MD;  Location: [facility] OR 1ST FLR;  Service: Urology;  Laterality: N/A;    CYSTOSCOPY  9/25/2024    Procedure: CYSTOSCOPY;  Surgeon: Jayesh Ross MD;  [facility];  Service: Urology;;    FOOT SURGERY  october 2013    HYSTERECTOMY      partial    INJECTION OF BOTULINUM TOXIN TYPE A N/A 7/11/2018    Procedure: INJECTION, BOTULINUM TOXIN, TYPE A 200 UNITS;  Surgeon: Jayesh Ross MD;  Location: [facility] OR 1ST FLR;  Service: Urology;  Laterality: N/A;     INJECTION OF BOTULINUM TOXIN TYPE A N/A 4/10/2019    Procedure: INJECTION, BOTULINUM TOXIN, TYPE A 200 UNITS;  Surgeon: Jayesh Ross MD;  [facility];  Service: Urology;  Laterality: N/A;    INJECTION OF BOTULINUM TOXIN TYPE A  10/21/2021    Procedure: INJECTION, BOTULINUM TOXIN, 200units;  Surgeon: Jayesh Ross MD;  [facility];  Service: Urology;;    INJECTION OF BOTULINUM TOXIN TYPE A N/A 8/2/2023    Procedure: INJECTION, BOTULINUM TOXIN, TYPE A;  Surgeon: Jayesh Ross MD;  [facility];  Service: Urology;  Laterality: N/A;  200units    INJECTION OF BOTULINUM TOXIN TYPE A  9/25/2024    Procedure: INJECTION, BOTULINUM TOXIN, 200 UNITS;  Surgeon: Jayesh Ross MD;  [facility];  Service: Urology;;    OPEN REDUCTION AND INTERNAL FIXATION (ORIF) OF INJURY OF WRIST Left 12/27/2019    Procedure: ORIF, WRIST;  Surgeon: Albert Schroeder Jr., MD;  Location: [facility];  Service: Orthopedics;  Laterality: Left;  ACUMED/ MINI C ARM [place] Hublished notified/ Rk confirmed here in [facility] with Lacie 12/23/19 KB [id number]    TONSILLECTOMY      TOTAL REDUCTION MAMMOPLASTY Left 11/5/2020    Procedure: MAMMOPLASTY, REDUCTION, LEFT;  Surgeon: Kirk Tompkins MD;  Location: [facility] OR [address] [place] [address];  Service: Plastics;  Laterality: Left;  Left breast tissue weighed- 49 grams.       Family History   Problem Relation Name Age of Onset    Heart disease Mother      Cancer Mother          breast cancer    Skin cancer Mother      Breast cancer Mother      Stroke Mother      Emphysema Father age 70's     Dementia Brother 1/2 dad     Diabetes Brother 1/2 dad     No Known Problems Brother 1/2 dad     Anesthesia problems Daughter Trans Megan/Mina     Other Daughter Trans Megan/[place]e         Transgender, birth name Megan, now goes by [place]    Asperger's syndrome Daughter Trans Megan/Mina     No Known Problems Daughter Lacie     Autism Son      Seizures  Son      Cancer Maternal Aunt      Breast cancer Maternal Aunt      Breast cancer Maternal Cousin      Cancer Maternal Cousin         Social History     Socioeconomic History    Marital status:    Occupational History    Occupation: [employer]   Tobacco Use    Smoking status: Never    Smokeless tobacco: Never   Substance and Sexual Activity    Alcohol use: Yes     Comment: rare    Drug use: Never    Sexual activity: Not Currently     Partners: Male   Social History Narrative    Original form , DARRION BAEZ 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     Lives with      39, 25 children 2 daughter      Social Drivers of Health     Financial Resource Strain: Low Risk  (8/5/2024)    Overall Financial Resource Strain (CARDIA)     Difficulty of Paying Living Expenses: Not hard at all   Food Insecurity: No Food Insecurity (8/5/2024)    Hunger Vital Sign     Worried About Running Out of Food in the Last Year: Never true     Ran Out of Food in the Last Year: Never true   Transportation Needs: No Transportation Needs (8/5/2024)    PRAPARE - Transportation     Lack of Transportation (Medical): No     Lack of Transportation (Non-Medical): No   Physical Activity: Sufficiently Active (8/5/2024)    Exercise Vital Sign     Days of Exercise per Week: 5 days     Minutes of Exercise per Session: 60 min   Recent Concern: Physical Activity - Insufficiently Active (5/30/2024)    Exercise Vital Sign     Days of Exercise per Week: 2 days     Minutes of Exercise per Session: 60 min   Stress: No Stress Concern Present (8/5/2024)    [employer] [place] [employer] - Occupational Stress Questionnaire     Feeling of Stress : Not at all   Housing Stability: Unknown (8/5/2024)    Housing Stability Vital Sign     Unable to Pay for Housing in the Last Year: No     Homeless in the Last Year: No       Current Outpatient Medications   Medication Sig Dispense Refill    alendronate (FOSAMAX) 70 MG tablet TAKE 1 TABLET BY MOUTH EVERY 7 DAYS  12 tablet 0    buPROPion (WELLBUTRIN SR) 100 MG TBSR 12 hr tablet Take 2 tablets (200 mg total) by mouth every morning. 60 tablet 5    buPROPion (WELLBUTRIN SR) 150 MG TBSR 12 hr tablet Take 1 tablet (150 mg total) by mouth every evening. 30 tablet 5    calcium-vitamin D3 (OS-IGOR 500 + D3) 500 mg-5 mcg (200 unit) per tablet Take 1 tablet by mouth 2 (two) times daily with meals.      catheter (FEMALE CATHETER) 14 Fr [facility] Patient is to self catheterize four times a day indefinitley using female 12 fr in and out catheter for incomplete bladder emptying.   Dispense 120 month with 11 refills or 360 every 3 months with 3 refills depending on patient's preference 120 each 11    fluphenazine (PROLIXIN) 1 MG tablet Take 1 tablet (1 mg total) by mouth every evening. 30 tablet 5    metFORMIN (GLUCOPHAGE-XR) 500 MG ER 24hr tablet TAKE 1 TABLET BY MOUTH TWICE DAILY WITH MEALS. 180 tablet 0    multivitamin (ONE DAILY MULTIVITAMIN) per tablet Take 1 tablet by mouth once daily.      OXcarbazepine (TRILEPTAL) 150 MG Tab Take 1 tablet (150 mg total) by mouth 2 (two) times daily. 60 tablet 5    UNABLE TO FIND [facility]       No current facility-administered medications for this visit.       Review of patient's allergies indicates:   Allergen Reactions    Adhesive      blisters    Keflex [cephalexin] Rash    Cephalosporins        Review of Systems   Constitutional: Negative for chills and fever.   HENT:  Negative for congestion and hearing loss.    Respiratory:  Negative for cough and shortness of breath.    Skin:  Positive for color change and nail changes.   Musculoskeletal:  Positive for muscle weakness.   Gastrointestinal:  Negative for nausea and vomiting.   Neurological:  Negative for numbness.   Psychiatric/Behavioral:  Negative for altered mental status.         Objective:     Physical Exam  Constitutional:       General: She is not in acute distress.     Appearance: She is not ill-appearing.   Cardiovascular:       Pulses:           Dorsalis pedis pulses are 2+ on the right side and 2+ on the left side.        Posterior tibial pulses are 1+ on the right side and 1+ on the left side.      Comments: Mild nonpitting edema to lower extremity bilateral.  Multiple varicosities noted to lower extremity bilateral.  No hair growth follow extremity.  Skin temp is warm to foot bilateral.  Musculoskeletal:      Right foot: Deformity present.      Left foot: Deformity present.      Comments: Rigid flexion contractures of the PIPJ and DIPJ involving toes 3-5 bilateral foot.  Severely track bound hallux abductovalgus bilateral foot underlapping the 2nd toe.    Rigid pes planus foot structure bilateral foot.  No pain with attempted range motion or manual muscle strength testing bilateral foot and ankle.   Feet:      Right foot:      Protective Sensation: 10 sites tested.  10 sites sensed.      Skin integrity: No ulcer, blister, skin breakdown, erythema, warmth, callus, dry skin or fissure.      Toenail Condition: Right toenails are abnormally thick and long. Fungal disease present.     Left foot:      Protective Sensation: 10 sites tested.  10 sites sensed.      Skin integrity: Callus present. No ulcer, blister, skin breakdown, erythema, warmth, dry skin or fissure.      Toenail Condition: Left toenails are abnormally thick and long. Fungal disease present.  Skin:     General: Skin is warm.      Capillary Refill: Capillary refill takes 2 to 3 seconds.      Findings: No ecchymosis or erythema.      Nails: There is no clubbing.      Comments: Nails 1-5 bilateral foot are moderately elongated.  Hallux nail bilateral severely dystrophic with a central hump and prominent nailbed with loosening and underlying debris containing white discoloration of the nails.  Lesser toes 2-5 on the left are also thickened, dystrophic with loosening and underlying debris while the lesser toenails 2-4 arch is mildly thickened elongated.  The right 5th toenail is  "also dystrophic with discoloration and loosening.    Raised hyperkeratotic skin sub 3rd met head left forefoot and distal digital pulp left 2nd toe.   Neurological:      Mental Status: She is alert and oriented to person, place, and time.      Sensory: Sensory deficit present.      Motor: Weakness present.      Comments: Decreased vibratory sensation bilateral foot.           Assessment:      Encounter Diagnoses   Name Primary?    MS (multiple sclerosis) Yes    Corn or callus     Onychomycosis      Plan:     Silvana Oliveira" was seen today for nail care.    Diagnoses and all orders for this visit:    MS (multiple sclerosis)  -     Routine Foot Care    [place] or callus  -     Routine Foot Care    Onychomycosis  -     Routine Foot Care      I counseled the patient on her conditions, their implications and medical management.    Shoe inspection. Patient instructed on proper foot hygeine. We discussed wearing proper shoe gear, daily foot inspections, never walking without protective shoe gear, never putting sharp instruments to feet.      Routine foot care per attached note. Patient relates relief following the procedure. She will continue to monitor the areas daily, inspect her feet, wear protective shoe gear when ambulatory, moisturizer to maintain skin integrity.    Patient considered intermediate risk secondary to her history of MS significant weakness impacting her extremities.    RTC within 3 months to re-evaluate or p.r.n. as discussed.    Assisted per Juan Rouse DPM PGY 2    A portion of this note was generated by voice recognition software and may contain spelling and grammar errors.          "

## 2025-03-18 ENCOUNTER — RESULTS FOLLOW-UP (OUTPATIENT)
Dept: NEUROLOGY | Facility: CLINIC | Age: 70
End: 2025-03-18

## 2025-04-17 DIAGNOSIS — F31.9 BIPOLAR AFFECTIVE DISORDER, REMISSION STATUS UNSPECIFIED: ICD-10-CM

## 2025-04-18 RX ORDER — OXCARBAZEPINE 150 MG/1
150 TABLET, FILM COATED ORAL 2 TIMES DAILY
Qty: 60 TABLET | Refills: 1 | Status: SHIPPED | OUTPATIENT
Start: 2025-04-18

## 2025-04-21 DIAGNOSIS — F31.9 BIPOLAR AFFECTIVE DISORDER, REMISSION STATUS UNSPECIFIED: ICD-10-CM

## 2025-04-21 RX ORDER — BUPROPION HYDROCHLORIDE 100 MG/1
200 TABLET, EXTENDED RELEASE ORAL EVERY MORNING
Qty: 60 TABLET | Refills: 2 | Status: SHIPPED | OUTPATIENT
Start: 2025-04-21

## 2025-04-28 ENCOUNTER — TELEPHONE (OUTPATIENT)
Dept: INTERNAL MEDICINE | Facility: CLINIC | Age: 70
End: 2025-04-28
Payer: MEDICARE

## 2025-04-28 NOTE — TELEPHONE ENCOUNTER
Attempted to contact pt in regards to missed appt this afternoon at 140pm left pt message to call back and reschedule appt   hypoxia, lethargy

## 2025-04-30 ENCOUNTER — OFFICE VISIT (OUTPATIENT)
Dept: PSYCHIATRY | Facility: CLINIC | Age: 70
End: 2025-04-30
Payer: MEDICARE

## 2025-04-30 VITALS
DIASTOLIC BLOOD PRESSURE: 79 MMHG | HEART RATE: 65 BPM | SYSTOLIC BLOOD PRESSURE: 118 MMHG | WEIGHT: 176.31 LBS | BODY MASS INDEX: 31.23 KG/M2

## 2025-04-30 DIAGNOSIS — F31.9 BIPOLAR AFFECTIVE DISORDER, REMISSION STATUS UNSPECIFIED: ICD-10-CM

## 2025-04-30 DIAGNOSIS — F31.75 BIPOLAR I DISORDER, MOST RECENT EPISODE DEPRESSED, IN PARTIAL REMISSION: Primary | ICD-10-CM

## 2025-04-30 PROCEDURE — 3078F DIAST BP <80 MM HG: CPT | Mod: CPTII,S$GLB,,

## 2025-04-30 PROCEDURE — 99999 PR PBB SHADOW E&M-EST. PATIENT-LVL I: CPT | Mod: PBBFAC,,,

## 2025-04-30 PROCEDURE — 99214 OFFICE O/P EST MOD 30 MIN: CPT | Mod: S$GLB,,,

## 2025-04-30 PROCEDURE — 3008F BODY MASS INDEX DOCD: CPT | Mod: CPTII,S$GLB,,

## 2025-04-30 PROCEDURE — 3074F SYST BP LT 130 MM HG: CPT | Mod: CPTII,S$GLB,,

## 2025-04-30 PROCEDURE — 1157F ADVNC CARE PLAN IN RCRD: CPT | Mod: CPTII,S$GLB,,

## 2025-04-30 RX ORDER — BUPROPION HYDROCHLORIDE 100 MG/1
200 TABLET, EXTENDED RELEASE ORAL EVERY MORNING
Qty: 60 TABLET | Refills: 5 | Status: SHIPPED | OUTPATIENT
Start: 2025-04-30

## 2025-04-30 RX ORDER — BUPROPION HYDROCHLORIDE 150 MG/1
150 TABLET, EXTENDED RELEASE ORAL NIGHTLY
Qty: 30 TABLET | Refills: 5 | Status: SHIPPED | OUTPATIENT
Start: 2025-04-30

## 2025-04-30 RX ORDER — FLUPHENAZINE HYDROCHLORIDE 1 MG/1
1 TABLET ORAL NIGHTLY
Qty: 30 TABLET | Refills: 5 | Status: SHIPPED | OUTPATIENT
Start: 2025-04-30

## 2025-04-30 RX ORDER — OXCARBAZEPINE 150 MG/1
150 TABLET, FILM COATED ORAL 2 TIMES DAILY
Qty: 60 TABLET | Refills: 5 | Status: SHIPPED | OUTPATIENT
Start: 2025-04-30

## 2025-04-30 NOTE — PROGRESS NOTES
"Outpatient Psychiatry Follow-Up Visit (MD/NP)    4/30/2025    Clinical Status of Patient:  Outpatient (Ambulatory)    Chief Complaint:  Silvana Quezada is a 69 y.o. female who presents today for follow-up of mood disorder.  Met with patient.      Plan of care previous visit:   Continue bupropion (wellbutrin) 100 MG tablet; Take 2 tablets (200 mg);every morning; in addition to bupropion 150 MG tablet nightly; for a total daily dose (350 MG) target depression.  Continue fluphenazine (prolixin) 1 MG tablet;Take 1 tablet (1 mg) nightly to target psychosis  Continue oxcarbazepine (trileptal) 150 MG tablet; Take 1 tablet (150 mg) 2 times a day to target mood      Interval History and Content of Current Session:  Interim Events/Subjective Report/Content of Current Session:   History of Present Illness    CHIEF COMPLAINT:  Patient presents for follow-up to review her current psychiatric medication regimen, last seen on October 8, 2024.    HPI:  Patient has been maintaining her current medication regimen, which includes bupropion 350 mg, fluphenazine (Prolixin) 1 mg, and oxcarbazepine 150 mg twice daily. She reports that these medications are working well for her.    Patient reports waking up multiple times during the night to urinate, stating, "I wake up quite a few times, but it comes in handy because I have to pee a lot." She is able to fall back asleep after using the bathroom.    Patient continues to engage in activities she enjoys, including singing in her [employer] choir, playing the piano, and reading. She expresses interest in many things but notes that she is physically limited in her ability to pursue some of them.    Patient denies experiencing any extreme mood fluctuations, including manic or depressive episodes. She reports good concentration when needed and maintains a stable appetite, though she humorously notes it's "too good."    Patient is currently practicing driving to regain her independence. She mentions " driving on side streets to nearby locations like [employer] and is working on gradually increasing her driving distance.    Patient reports an upcoming three-week trip to [place] and [place] with her family in June, which she is looking forward to. She denies experiencing thoughts of self-harm, suicidal ideation, hallucinations, manic episodes, and depressive episodes.    MEDICATIONS:  Patient is on Bupropion, taking a total of 350 mg daily, split into 200 mg in the morning (2 tablets of 100 mg) and 150 mg at night (1 tablet). She is also on Fluphenazine (Prolixin) 1 mg, taking 1 tablet nightly. She takes Oxcarbazepine (Trileptal) 150 mg, 1 tablet twice daily. All medications are taken orally.    LIFESTYLE:  Patient is actively involved in her [employer] choir, where she sings and enjoys spiritual and gospel music. Her hobbies include playing the piano, reading extensively, and writing campaign songs. She is planning a three-week family trip to [place] and [place] in June with her , both daughters, and her daughter's boyfriend. To regain independence, she practices driving and is currently able to drive to [employer] using side streets.        Psychotherapy:  Target symptoms: mood disorder  Why chosen therapy is appropriate versus another modality: relevant to diagnosis  Outcome monitoring methods: self-report, observation  Therapeutic intervention type: insight oriented psychotherapy  Topics discussed/themes: building skills sets for symptom management, symptom recognition  The patient's response to the intervention is accepting. The patient's progress toward treatment goals is good.   Duration of intervention: 6 minutes.    Review of Systems   PSYCHIATRIC: Pertinant items are noted in the narrative.    Past Medical, Family and Social History: The patient's past medical, family and social history have been reviewed and updated as appropriate within the electronic medical record - see encounter  notes.    Compliance: yes    Side effects: None    Risk Parameters:  Patient reports no suicidal ideation  Patient reports no homicidal ideation  Patient reports no self-injurious behavior  Patient reports no violent behavior    Exam (detailed: at least 9 elements; comprehensive: all 15 elements)   Constitutional  Vitals:  Most recent vital signs, dated less than 90 days prior to this appointment, were reviewed.   Vitals:    04/30/25 1358   BP: 118/79   Pulse: 65   Weight: 80 kg (176 lb 5.2 oz)        General:  unremarkable, age appropriate     Musculoskeletal  Muscle Strength/Tone:  not examined   Gait & Station:  non-ataxic     Psychiatric  Speech:  no latency; no press, spontaneous   Mood & Affect:  euthymic  congruent and appropriate   Thought Process:  normal and logical   Associations:  intact   Thought Content:  normal, no suicidality, no homicidality, delusions, or paranoia   Insight:  intact, has awareness of illness   Judgement: behavior is adequate to circumstances   Orientation:  grossly intact   Memory: intact for content of interview   Language: grossly intact   Attention Span & Concentration:  able to focus   Fund of Knowledge:  intact and appropriate to age and level of education     Assessment and Diagnosis   Status/Progress: Based on the examination today, the patient's problem(s) is/are well controlled.  New problems have not been presented today.   Co-morbidities are not complicating management of the primary condition.  There are no active rule-out diagnoses for this patient at this time.     Assessment & Plan    IMPRESSION:  - Continued current medication regimen without changes, as patient remains stable and well-controlled on current doses.  - Sleep, mood, motivation, and potential for self-harm or hallucinations without concerning symptoms.  - Ability to manage extended time between appointments given stable condition.    ACTION ITEMS/LIFESTYLE:  - Patient to continue practicing driving to  gradually increase independence, starting with side streets and working up to longer distances.           ICD-10-CM ICD-9-CM   1. Bipolar I disorder, most recent episode depressed, in partial remission  F31.75 296.55       Intervention/Counseling/Treatment Plan   -Medication Management: Continue current medications. The risks and benefits of medication were discussed with the patient.  -Labs, Diagnostic Studies: reviewed most recent reviewed    MEDICATIONS:  - Continued fluphenazine (Prolixin) 1 mg by mouth nightly.  - Continued bupropion 100 mg, take 2 tablets by mouth every morning and bupropion 150 mg, take 1 tablet by mouth at night.  - Continued oxcarbazepine 150 mg, take 1 tablet by mouth twice daily.    FOLLOW UP:  - Follow up in 6 months.  - Contact the office in July to schedule next appointment for November.  - Contact the office if any changes occur before the scheduled follow-up.     Safety: Patient has been educated on safety plan should any SI/HI, medication side effects &/or emergency arise. Patient verbalized understanding and agreement to contact a trusted friend or loved one, call 911 or go to nearest ER should any emergency occur.     RAYA Adams    This note was generated with the assistance of ambient listening technology. Verbal consent was obtained by the patient and accompanying visitor(s) for the recording of patient appointment to facilitate this note. I attest to having reviewed and edited the generated note for accuracy, though some syntax or spelling errors may persist. Please contact the author of this note for any clarification.

## 2025-05-13 ENCOUNTER — PATIENT MESSAGE (OUTPATIENT)
Dept: PSYCHIATRY | Facility: CLINIC | Age: 70
End: 2025-05-13
Payer: MEDICARE

## 2025-05-23 ENCOUNTER — OFFICE VISIT (OUTPATIENT)
Dept: UROLOGY | Facility: CLINIC | Age: 70
End: 2025-05-23
Payer: MEDICARE

## 2025-05-23 DIAGNOSIS — N32.81 OAB (OVERACTIVE BLADDER): ICD-10-CM

## 2025-05-23 DIAGNOSIS — N39.41 URGE INCONTINENCE: ICD-10-CM

## 2025-05-23 DIAGNOSIS — R33.9 INCOMPLETE BLADDER EMPTYING: ICD-10-CM

## 2025-05-23 DIAGNOSIS — N39.41 URGE INCONTINENCE: Primary | ICD-10-CM

## 2025-05-23 DIAGNOSIS — Z01.818 PREOP EXAMINATION: ICD-10-CM

## 2025-05-23 DIAGNOSIS — N31.9 NEUROGENIC BLADDER: ICD-10-CM

## 2025-05-23 DIAGNOSIS — N30.90 RECURRENT CYSTITIS: ICD-10-CM

## 2025-05-23 DIAGNOSIS — N39.0 RECURRENT UTI: ICD-10-CM

## 2025-05-23 DIAGNOSIS — N31.9 NEUROGENIC BLADDER: Primary | ICD-10-CM

## 2025-05-23 PROCEDURE — 87086 URINE CULTURE/COLONY COUNT: CPT | Performed by: UROLOGY

## 2025-05-23 PROCEDURE — 99999 PR PBB SHADOW E&M-EST. PATIENT-LVL III: CPT | Mod: PBBFAC,,, | Performed by: UROLOGY

## 2025-05-23 RX ORDER — CATHETER 8 FR
EACH MISCELLANEOUS
Qty: 120 EACH | Refills: 11 | Status: SHIPPED | OUTPATIENT
Start: 2025-05-23 | End: 2025-05-23

## 2025-05-23 NOTE — H&P (VIEW-ONLY)
CHIEF COMPLAINT:    Mrs. Quezada is a 69 y.o. female presenting for urge incontinecne.    PRESENTING ILLNESS:    Silvana Quezada is a 69 y.o. female  PMH of MS, neurogenic bladder.    She voids on her own and performs CIC at home 4 times per day using 12fr.   She stays dry between catheterizations. Botox injections keep frequency, urgency and nocturia under control.   She denies incontinence during the day.  She endorses urinary incontinence at night.   She wears a diaper at night and it usually is wet when she wakes up.  She denies frequency, urgency, dysuria.  She does not have any urinary complaints.        Last botox injection:  Date: 09/25/2024   Pre-Op Diagnosis: Neurogenic bladder   Post-Op Diagnosis: same   Procedure(s) Performed:   1.  Cystoscopy with bladder botox injection  Findings:   Grade 3 trabeculations throughout  Uncomplicated injection 200 U Botox     REVIEW OF SYSTEMS:  See HPI    PATIENT HISTORY:    Past Medical History:   Diagnosis Date    Bipolar 1 disorder     Breast cancer 2001    right lumpectomy-radiation & chemo taxol    Breast cyst     Closed fracture of proximal end of right humerus 03/09/2015    Depression     History of right shoulder fracture     MS (multiple sclerosis)     presented as dizzines, dx vision,     Osteoporosis, unspecified     Pneumonia 03/27/2020    Wrist fracture left    2019    Wrist fracture, closed, left, initial encounter 12/27/2019       Past Surgical History:   Procedure Laterality Date    BLADDER FULGURATION  8/31/2022    Procedure: FULGURATION, BLADDER;  Surgeon: Jayesh Ross MD;  Location: [address];  Service: Urology;;    BRAIN SURGERY  1991    BREAST BIOPSY Left 2009    core    BREAST BIOPSY Right     BREAST LUMPECTOMY Right 2001    COLONOSCOPY N/A 1/7/2022    Procedure: COLONOSCOPY;  Surgeon: Shiva Walsh MD;  Location: [facility] ENDO;  Service: Endoscopy;  Laterality: N/A;    CYSTOSCOPY N/A 7/11/2018    Procedure: CYSTOSCOPY;  Surgeon: Jayesh  ROX Ross MD;  Location: [facility] OR [facility];  Service: Urology;  Laterality: N/A;  30 min    CYSTOSCOPY N/A 4/10/2019    Procedure: CYSTOSCOPY;  Surgeon: Jayesh Ross MD;  Location: [facility] OR [facility];  Service: Urology;  Laterality: N/A;  30 MIN    CYSTOSCOPY  10/21/2021    Procedure: CYSTOSCOPY;  Surgeon: Jayesh Ross MD;  [facility];  Service: Urology;;    CYSTOSCOPY N/A 8/2/2023    Procedure: CYSTOSCOPY;  Surgeon: Jayesh Ross MD;  [facility];  Service: Urology;  Laterality: N/A;    CYSTOSCOPY  9/25/2024    Procedure: CYSTOSCOPY;  Surgeon: Jayesh Ross MD;  [facility];  Service: Urology;;    FOOT SURGERY  october 2013    HYSTERECTOMY      partial    INJECTION OF BOTULINUM TOXIN TYPE A N/A 7/11/2018    Procedure: INJECTION, BOTULINUM TOXIN, TYPE A 200 UNITS;  Surgeon: Jayesh Ross MD;  [facility];  Service: Urology;  Laterality: N/A;    INJECTION OF BOTULINUM TOXIN TYPE A N/A 4/10/2019    Procedure: INJECTION, BOTULINUM TOXIN, TYPE A 200 UNITS;  Surgeon: Jayesh Ross MD;  [facility];  Service: Urology;  Laterality: N/A;    INJECTION OF BOTULINUM TOXIN TYPE A  10/21/2021    Procedure: INJECTION, BOTULINUM TOXIN, 200units;  Surgeon: Jayesh Ross MD;  [facility];  Service: Urology;;    INJECTION OF BOTULINUM TOXIN TYPE A N/A 8/2/2023    Procedure: INJECTION, BOTULINUM TOXIN, TYPE A;  Surgeon: Jayesh Ross MD;  [facility];  Service: Urology;  Laterality: N/A;  200units    INJECTION OF BOTULINUM TOXIN TYPE A  9/25/2024    Procedure: INJECTION, BOTULINUM TOXIN, 200 UNITS;  Surgeon: Jayesh Ross MD;  [facility];  Service: Urology;;    OPEN REDUCTION AND INTERNAL FIXATION (ORIF) OF INJURY OF WRIST Left 12/27/2019    Procedure: ORIF, WRIST;  Surgeon: Albert Schroeder Jr., MD;  Location: [facility];  Service: Orthopedics;  Laterality: Left;  ACUMED/ MINI C ARM Palomo Chowdhury [facility] notified/  Rk confirmed here in Closet B with Lacie 12/23/19 KB [id number]    TONSILLECTOMY      TOTAL REDUCTION MAMMOPLASTY Left 11/5/2020    Procedure: MAMMOPLASTY, REDUCTION, LEFT;  Surgeon: Kirk Tompkins MD;  Location: [facility] OR [address] [place] [address];  Service: Plastics;  Laterality: Left;  Left breast tissue weighed- 49 grams.       Family History   Problem Relation Name Age of Onset    Heart disease Mother      Cancer Mother          breast cancer    Skin cancer Mother      Breast cancer Mother      Stroke Mother      Emphysema Father age 70's     Dementia Brother 1/2 dad     Diabetes Brother 1/2 dad     No Known Problems Brother 1/2 dad     Anesthesia problems Daughter Trans Megan/[place]     Other Daughter Trans Megan/Mina         Transgender, birth name Megan, now goes by Mina    Asperger's syndrome Daughter Trans Megan/Mina     No Known Problems Daughter Lacie     Autism Son      Seizures Son      Cancer Maternal Aunt      Breast cancer Maternal Aunt      Breast cancer Maternal Cousin      Cancer Maternal Cousin         Social History     Socioeconomic History    Marital status:    Occupational History    Occupation: [employer]   Tobacco Use    Smoking status: Never    Smokeless tobacco: Never   Substance and Sexual Activity    Alcohol use: Yes     Comment: rare    Drug use: Never    Sexual activity: Not Currently     Partners: Male   Social History Narrative    Original form , 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, 25 children 2 daughter      Social Drivers of Health     Financial Resource Strain: Low Risk  (8/5/2024)    Overall Financial Resource Strain (CARDIA)     Difficulty of Paying Living Expenses: Not hard at all   Food Insecurity: No Food Insecurity (8/5/2024)    Hunger Vital Sign     Worried About Running Out of Food in the Last Year: Never true     Ran Out of Food in the Last Year: Never true   Transportation Needs: No Transportation Needs  (8/5/2024)    PRAPARE - Transportation     Lack of Transportation (Medical): No     Lack of Transportation (Non-Medical): No   Physical Activity: Sufficiently Active (8/5/2024)    Exercise Vital Sign     Days of Exercise per Week: 5 days     Minutes of Exercise per Session: 60 min   Recent Concern: Physical Activity - Insufficiently Active (5/30/2024)    Exercise Vital Sign     Days of Exercise per Week: 2 days     Minutes of Exercise per Session: 60 min   Stress: No Stress Concern Present (8/5/2024)    [place] [employer] - Occupational Stress Questionnaire     Feeling of Stress : Not at all   Housing Stability: Unknown (8/5/2024)    Housing Stability Vital Sign     Unable to Pay for Housing in the Last Year: No     Homeless in the Last Year: No       Allergies:  Adhesive, Keflex [cephalexin], and Cephalosporins    Medications:    Current Outpatient Medications:     alendronate (FOSAMAX) 70 MG tablet, TAKE 1 TABLET BY MOUTH EVERY 7 DAYS, Disp: 12 tablet, Rfl: 0    buPROPion (WELLBUTRIN SR) 100 MG TBSR 12 hr tablet, Take 2 tablets (200 mg total) by mouth every morning., Disp: 60 tablet, Rfl: 5    buPROPion (WELLBUTRIN SR) 150 MG TBSR 12 hr tablet, Take 1 tablet (150 mg total) by mouth every evening., Disp: 30 tablet, Rfl: 5    calcium-vitamin D3 (OS-IGOR 500 + D3) 500 mg-5 mcg (200 unit) per tablet, Take 1 tablet by mouth 2 (two) times daily with meals., Disp: , Rfl:     fluphenazine (PROLIXIN) 1 MG tablet, Take 1 tablet (1 mg total) by mouth every evening., Disp: 30 tablet, Rfl: 5    metFORMIN (GLUCOPHAGE-XR) 500 MG ER 24hr tablet, TAKE 1 TABLET BY MOUTH TWICE DAILY WITH MEALS., Disp: 180 tablet, Rfl: 0    multivitamin (ONE DAILY MULTIVITAMIN) per tablet, Take 1 tablet by mouth once daily., Disp: , Rfl:     OXcarbazepine (TRILEPTAL) 150 MG Tab, Take 1 tablet (150 mg total) by mouth 2 (two) times daily., Disp: 60 tablet, Rfl: 5    UNABLE TO FIND, Paolo Hall, Disp: , Rfl:     catheter 12  Fr Misc, 1 Units by Misc.(Non-Drug; Combo Route) route 4 (four) times daily., Disp: 120 each, Rfl: 99    PHYSICAL EXAMINATION:    Constitutional: She appears well-developed and well-nourished.  She is in no apparent distress.    Eyes: No scleral icterus noted bilaterally. No discharge bilaterally.    Nose: No nasal congestion    Cardiovascular: Normal rate.      Pulmonary/Chest: Effort normal. No respiratory distress.     Abdominal:  She exhibits no distension.      Neurological: She is alert and oriented to person, place, and time.     Skin: Skin is warm and dry.     Psych: Cooperative with normal affect.    Genitourinary:  Normal external female genitalia  Urethral meatus is normal  Consent verbally obtained.  Betadine prep was applied to the urethral meatus. An in and out cath was performed after voiding.  The PVR was 60 ml.    Physical Exam      LABS:  Cath urine performed: 40 ml   U/A: 1.010, pH 6, + nitrite, otherwsie negative.       IMPRESSION:  Urge incontinence  -     Basic Metabolic Panel; Future; Expected date: 05/23/2025    OAB (overactive bladder)    Recurrent cystitis  -     Urine Culture High Risk    Preop examination  -     EKG 12-lead; Future  -     Basic Metabolic Panel; Future; Expected date: 05/23/2025    Neurogenic bladder  -     Discontinue: catheter (FEMALE CATHETER) 14 Fr Misc; Patient is to self catheterize four times a day indefinitley using female 12 fr in and out catheter for incomplete bladder emptying.   Dispense 120 month with 11 refills or 360 every 3 months with 3 refills depending on patient's preference  Dispense: 120 each; Refill: 11  -     catheter 12 Fr Misc; 1 Units by Misc.(Non-Drug; Combo Route) route 4 (four) times daily.  Dispense: 120 each; Refill: 99    Recurrent UTI  -     Discontinue: catheter (FEMALE CATHETER) 14 Fr Misc; Patient is to self catheterize four times a day indefinitley using female 12 fr in and out catheter for incomplete bladder emptying.   Dispense 120  month with 11 refills or 360 every 3 months with 3 refills depending on patient's preference  Dispense: 120 each; Refill: 11  -     catheter 12 Fr Misc; 1 Units by Misc.(Non-Drug; Combo Route) route 4 (four) times daily.  Dispense: 120 each; Refill: 99    Incomplete bladder emptying  -     Discontinue: catheter (FEMALE CATHETER) 14 Fr Misc; Patient is to self catheterize four times a day indefinitley using female 12 fr in and out catheter for incomplete bladder emptying.   Dispense 120 month with 11 refills or 360 every 3 months with 3 refills depending on patient's preference  Dispense: 120 each; Refill: 11  -     catheter 12 Fr Misc; 1 Units by Misc.(Non-Drug; Combo Route) route 4 (four) times daily.  Dispense: 120 each; Refill: 99          PLAN:    S/p cysto with botox injection 200 units on 9/25/24.    Will plan cysto botox injection 200 units on 6/4/25.  Urine culture today.  Will cover her with abx prior to her surgeyr.  Continue CIC 4 x a day using 12 F catheter indefinitely as before.    Pt reports that she underwent InterStim trial with Dr. White many years ago and it did not work well.      I spent 25 minutes in regards to patient's care.      Follow up in about 12 days (around 6/4/2025), or cysto botox injection 200 units.

## 2025-05-23 NOTE — PROGRESS NOTES
Neurogenic bladder  -     Case Request Operating Room: CYSTOSCOPY,WITH BOTULINUM TOXIN INJECTION    Urge incontinence  -     Case Request Operating Room: CYSTOSCOPY,WITH BOTULINUM TOXIN INJECTION

## 2025-05-23 NOTE — PROGRESS NOTES
CHIEF COMPLAINT:    Mrs. Quezada is a 69 y.o. female presenting for urge incontinecne.    PRESENTING ILLNESS:    Silvana Quezada is a 69 y.o. female  PMH of MS, neurogenic bladder.    She voids on her own and performs CIC at home 4 times per day using 12fr.   She stays dry between catheterizations. Botox injections keep frequency, urgency and nocturia under control.   She denies incontinence during the day.  She endorses urinary incontinence at night.   She wears a diaper at night and it usually is wet when she wakes up.  She denies frequency, urgency, dysuria.  She does not have any urinary complaints.        Last botox injection:  Date: 09/25/2024   Pre-Op Diagnosis: Neurogenic bladder   Post-Op Diagnosis: same   Procedure(s) Performed:   1.  Cystoscopy with bladder botox injection  Findings:   Grade 3 trabeculations throughout  Uncomplicated injection 200 U Botox     REVIEW OF SYSTEMS:  See HPI    PATIENT HISTORY:    Past Medical History:   Diagnosis Date    Bipolar 1 disorder     Breast cancer 2001    right lumpectomy-radiation & chemo taxol    Breast cyst     Closed fracture of proximal end of right humerus 03/09/2015    Depression     History of right shoulder fracture     MS (multiple sclerosis)     presented as dizzines, dx vision,     Osteoporosis, unspecified     Pneumonia 03/27/2020    Wrist fracture left    2019    Wrist fracture, closed, left, initial encounter 12/27/2019       Past Surgical History:   Procedure Laterality Date    BLADDER FULGURATION  8/31/2022    Procedure: FULGURATION, BLADDER;  Surgeon: Jayesh Ross MD;  Location: [address];  Service: Urology;;    BRAIN SURGERY  1991    BREAST BIOPSY Left 2009    core    BREAST BIOPSY Right     BREAST LUMPECTOMY Right 2001    COLONOSCOPY N/A 1/7/2022    Procedure: COLONOSCOPY;  Surgeon: Shiva Walsh MD;  Location: [facility] ENDO;  Service: Endoscopy;  Laterality: N/A;    CYSTOSCOPY N/A 7/11/2018    Procedure: CYSTOSCOPY;  Surgeon: Jayesh  ROX Ross MD;  Location: [facility] OR [facility];  Service: Urology;  Laterality: N/A;  30 min    CYSTOSCOPY N/A 4/10/2019    Procedure: CYSTOSCOPY;  Surgeon: Jayesh Ross MD;  Location: [facility] OR [facility];  Service: Urology;  Laterality: N/A;  30 MIN    CYSTOSCOPY  10/21/2021    Procedure: CYSTOSCOPY;  Surgeon: Jayesh Ross MD;  [facility];  Service: Urology;;    CYSTOSCOPY N/A 8/2/2023    Procedure: CYSTOSCOPY;  Surgeon: Jayesh Ross MD;  [facility];  Service: Urology;  Laterality: N/A;    CYSTOSCOPY  9/25/2024    Procedure: CYSTOSCOPY;  Surgeon: Jayesh Ross MD;  [facility];  Service: Urology;;    FOOT SURGERY  october 2013    HYSTERECTOMY      partial    INJECTION OF BOTULINUM TOXIN TYPE A N/A 7/11/2018    Procedure: INJECTION, BOTULINUM TOXIN, TYPE A 200 UNITS;  Surgeon: Jayesh Ross MD;  [facility];  Service: Urology;  Laterality: N/A;    INJECTION OF BOTULINUM TOXIN TYPE A N/A 4/10/2019    Procedure: INJECTION, BOTULINUM TOXIN, TYPE A 200 UNITS;  Surgeon: Jayesh Ross MD;  [facility];  Service: Urology;  Laterality: N/A;    INJECTION OF BOTULINUM TOXIN TYPE A  10/21/2021    Procedure: INJECTION, BOTULINUM TOXIN, 200units;  Surgeon: Jayesh Ross MD;  [facility];  Service: Urology;;    INJECTION OF BOTULINUM TOXIN TYPE A N/A 8/2/2023    Procedure: INJECTION, BOTULINUM TOXIN, TYPE A;  Surgeon: Jayesh Ross MD;  [facility];  Service: Urology;  Laterality: N/A;  200units    INJECTION OF BOTULINUM TOXIN TYPE A  9/25/2024    Procedure: INJECTION, BOTULINUM TOXIN, 200 UNITS;  Surgeon: Jayesh Ross MD;  [facility];  Service: Urology;;    OPEN REDUCTION AND INTERNAL FIXATION (ORIF) OF INJURY OF WRIST Left 12/27/2019    Procedure: ORIF, WRIST;  Surgeon: Albert Schroeder Jr., MD;  Location: [facility];  Service: Orthopedics;  Laterality: Left;  ACUMED/ MINI C ARM Palomo Chowdhury [facility] notified/  Rk confirmed here in Closet B with Lacie 12/23/19 KB [id number]    TONSILLECTOMY      TOTAL REDUCTION MAMMOPLASTY Left 11/5/2020    Procedure: MAMMOPLASTY, REDUCTION, LEFT;  Surgeon: Kirk Tompkins MD;  Location: [facility] OR [address] [place] [address];  Service: Plastics;  Laterality: Left;  Left breast tissue weighed- 49 grams.       Family History   Problem Relation Name Age of Onset    Heart disease Mother      Cancer Mother          breast cancer    Skin cancer Mother      Breast cancer Mother      Stroke Mother      Emphysema Father age 70's     Dementia Brother 1/2 dad     Diabetes Brother 1/2 dad     No Known Problems Brother 1/2 dad     Anesthesia problems Daughter Trans Megan/[place]     Other Daughter Trans Megan/Mina         Transgender, birth name Megan, now goes by Mina    Asperger's syndrome Daughter Trans Megan/Mina     No Known Problems Daughter Lacie     Autism Son      Seizures Son      Cancer Maternal Aunt      Breast cancer Maternal Aunt      Breast cancer Maternal Cousin      Cancer Maternal Cousin         Social History     Socioeconomic History    Marital status:    Occupational History    Occupation: [employer]   Tobacco Use    Smoking status: Never    Smokeless tobacco: Never   Substance and Sexual Activity    Alcohol use: Yes     Comment: rare    Drug use: Never    Sexual activity: Not Currently     Partners: Male   Social History Narrative    Original form , 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, 25 children 2 daughter      Social Drivers of Health     Financial Resource Strain: Low Risk  (8/5/2024)    Overall Financial Resource Strain (CARDIA)     Difficulty of Paying Living Expenses: Not hard at all   Food Insecurity: No Food Insecurity (8/5/2024)    Hunger Vital Sign     Worried About Running Out of Food in the Last Year: Never true     Ran Out of Food in the Last Year: Never true   Transportation Needs: No Transportation Needs  (8/5/2024)    PRAPARE - Transportation     Lack of Transportation (Medical): No     Lack of Transportation (Non-Medical): No   Physical Activity: Sufficiently Active (8/5/2024)    Exercise Vital Sign     Days of Exercise per Week: 5 days     Minutes of Exercise per Session: 60 min   Recent Concern: Physical Activity - Insufficiently Active (5/30/2024)    Exercise Vital Sign     Days of Exercise per Week: 2 days     Minutes of Exercise per Session: 60 min   Stress: No Stress Concern Present (8/5/2024)    [place] [employer] - Occupational Stress Questionnaire     Feeling of Stress : Not at all   Housing Stability: Unknown (8/5/2024)    Housing Stability Vital Sign     Unable to Pay for Housing in the Last Year: No     Homeless in the Last Year: No       Allergies:  Adhesive, Keflex [cephalexin], and Cephalosporins    Medications:    Current Outpatient Medications:     alendronate (FOSAMAX) 70 MG tablet, TAKE 1 TABLET BY MOUTH EVERY 7 DAYS, Disp: 12 tablet, Rfl: 0    buPROPion (WELLBUTRIN SR) 100 MG TBSR 12 hr tablet, Take 2 tablets (200 mg total) by mouth every morning., Disp: 60 tablet, Rfl: 5    buPROPion (WELLBUTRIN SR) 150 MG TBSR 12 hr tablet, Take 1 tablet (150 mg total) by mouth every evening., Disp: 30 tablet, Rfl: 5    calcium-vitamin D3 (OS-IGOR 500 + D3) 500 mg-5 mcg (200 unit) per tablet, Take 1 tablet by mouth 2 (two) times daily with meals., Disp: , Rfl:     fluphenazine (PROLIXIN) 1 MG tablet, Take 1 tablet (1 mg total) by mouth every evening., Disp: 30 tablet, Rfl: 5    metFORMIN (GLUCOPHAGE-XR) 500 MG ER 24hr tablet, TAKE 1 TABLET BY MOUTH TWICE DAILY WITH MEALS., Disp: 180 tablet, Rfl: 0    multivitamin (ONE DAILY MULTIVITAMIN) per tablet, Take 1 tablet by mouth once daily., Disp: , Rfl:     OXcarbazepine (TRILEPTAL) 150 MG Tab, Take 1 tablet (150 mg total) by mouth 2 (two) times daily., Disp: 60 tablet, Rfl: 5    UNABLE TO FIND, Paolo Hall, Disp: , Rfl:     catheter 12  Fr Misc, 1 Units by Misc.(Non-Drug; Combo Route) route 4 (four) times daily., Disp: 120 each, Rfl: 99    PHYSICAL EXAMINATION:    Constitutional: She appears well-developed and well-nourished.  She is in no apparent distress.    Eyes: No scleral icterus noted bilaterally. No discharge bilaterally.    Nose: No nasal congestion    Cardiovascular: Normal rate.      Pulmonary/Chest: Effort normal. No respiratory distress.     Abdominal:  She exhibits no distension.      Neurological: She is alert and oriented to person, place, and time.     Skin: Skin is warm and dry.     Psych: Cooperative with normal affect.    Genitourinary:  Normal external female genitalia  Urethral meatus is normal  Consent verbally obtained.  Betadine prep was applied to the urethral meatus. An in and out cath was performed after voiding.  The PVR was 60 ml.    Physical Exam      LABS:  Cath urine performed: 40 ml   U/A: 1.010, pH 6, + nitrite, otherwsie negative.       IMPRESSION:  Urge incontinence  -     Basic Metabolic Panel; Future; Expected date: 05/23/2025    OAB (overactive bladder)    Recurrent cystitis  -     Urine Culture High Risk    Preop examination  -     EKG 12-lead; Future  -     Basic Metabolic Panel; Future; Expected date: 05/23/2025    Neurogenic bladder  -     Discontinue: catheter (FEMALE CATHETER) 14 Fr Misc; Patient is to self catheterize four times a day indefinitley using female 12 fr in and out catheter for incomplete bladder emptying.   Dispense 120 month with 11 refills or 360 every 3 months with 3 refills depending on patient's preference  Dispense: 120 each; Refill: 11  -     catheter 12 Fr Misc; 1 Units by Misc.(Non-Drug; Combo Route) route 4 (four) times daily.  Dispense: 120 each; Refill: 99    Recurrent UTI  -     Discontinue: catheter (FEMALE CATHETER) 14 Fr Misc; Patient is to self catheterize four times a day indefinitley using female 12 fr in and out catheter for incomplete bladder emptying.   Dispense 120  month with 11 refills or 360 every 3 months with 3 refills depending on patient's preference  Dispense: 120 each; Refill: 11  -     catheter 12 Fr Misc; 1 Units by Misc.(Non-Drug; Combo Route) route 4 (four) times daily.  Dispense: 120 each; Refill: 99    Incomplete bladder emptying  -     Discontinue: catheter (FEMALE CATHETER) 14 Fr Misc; Patient is to self catheterize four times a day indefinitley using female 12 fr in and out catheter for incomplete bladder emptying.   Dispense 120 month with 11 refills or 360 every 3 months with 3 refills depending on patient's preference  Dispense: 120 each; Refill: 11  -     catheter 12 Fr Misc; 1 Units by Misc.(Non-Drug; Combo Route) route 4 (four) times daily.  Dispense: 120 each; Refill: 99          PLAN:    S/p cysto with botox injection 200 units on 9/25/24.    Will plan cysto botox injection 200 units on 6/4/25.  Urine culture today.  Will cover her with abx prior to her surgeyr.  Continue CIC 4 x a day using 12 F catheter indefinitely as before.    Pt reports that she underwent InterStim trial with Dr. White many years ago and it did not work well.      I spent 25 minutes in regards to patient's care.      Follow up in about 12 days (around 6/4/2025), or cysto botox injection 200 units.

## 2025-05-26 ENCOUNTER — TELEPHONE (OUTPATIENT)
Dept: UROLOGY | Facility: CLINIC | Age: 70
End: 2025-05-26
Payer: MEDICARE

## 2025-05-26 ENCOUNTER — HOSPITAL ENCOUNTER (OUTPATIENT)
Dept: CARDIOLOGY | Facility: CLINIC | Age: 70
Discharge: HOME | End: 2025-05-26
Payer: MEDICARE

## 2025-05-26 ENCOUNTER — RESULTS FOLLOW-UP (OUTPATIENT)
Dept: UROLOGY | Facility: CLINIC | Age: 70
End: 2025-05-26

## 2025-05-26 ENCOUNTER — LAB VISIT (OUTPATIENT)
Dept: LAB | Facility: HOSPITAL | Age: 70
End: 2025-05-26
Attending: UROLOGY
Payer: MEDICARE

## 2025-05-26 DIAGNOSIS — N30.90 RECURRENT CYSTITIS: Primary | ICD-10-CM

## 2025-05-26 DIAGNOSIS — Z01.818 PREOP EXAMINATION: ICD-10-CM

## 2025-05-26 DIAGNOSIS — N39.41 URGE INCONTINENCE: ICD-10-CM

## 2025-05-26 LAB
ANION GAP (OHS): 9 MMOL/L (ref 8–16)
BUN SERPL-MCNC: 18 MG/DL (ref 8–23)
CALCIUM SERPL-MCNC: 9.2 MG/DL (ref 8.7–10.5)
CHLORIDE SERPL-SCNC: 106 MMOL/L (ref 95–110)
CO2 SERPL-SCNC: 28 MMOL/L (ref 23–29)
CREAT SERPL-MCNC: 0.8 MG/DL (ref 0.5–1.4)
GFR SERPLBLD CREATININE-BSD FMLA CKD-EPI: >60 ML/MIN/1.73/M2
GLUCOSE SERPL-MCNC: 100 MG/DL (ref 70–110)
OHS QRS DURATION: 78 MS
OHS QTC CALCULATION: 423 MS
POTASSIUM SERPL-SCNC: 3.8 MMOL/L (ref 3.5–5.1)
SODIUM SERPL-SCNC: 143 MMOL/L (ref 136–145)

## 2025-05-26 PROCEDURE — 93010 ELECTROCARDIOGRAM REPORT: CPT | Mod: S$GLB,,, | Performed by: INTERNAL MEDICINE

## 2025-05-26 PROCEDURE — 93005 ELECTROCARDIOGRAM TRACING: CPT | Mod: S$GLB,,, | Performed by: UROLOGY

## 2025-05-26 PROCEDURE — 80048 BASIC METABOLIC PNL TOTAL CA: CPT

## 2025-05-26 PROCEDURE — 36415 COLL VENOUS BLD VENIPUNCTURE: CPT

## 2025-05-26 RX ORDER — SULFAMETHOXAZOLE AND TRIMETHOPRIM 800; 160 MG/1; MG/1
1 TABLET ORAL 2 TIMES DAILY
Qty: 14 TABLET | Refills: 0 | Status: SHIPPED | OUTPATIENT
Start: 2025-05-26 | End: 2025-06-02

## 2025-05-26 NOTE — TELEPHONE ENCOUNTER
Recurrent cystitis  -     sulfamethoxazole-trimethoprim 800-160mg (BACTRIM DS) 800-160 mg Tab; Take 1 tablet by mouth 2 (two) times daily. for 7 days  Dispense: 14 tablet; Refill: 0      Please have her start Bactrim DS 5 days before her surgery next week.

## 2025-05-26 NOTE — PRE-PROCEDURE INSTRUCTIONS
PreOp and Medication Instructions given and reviewed:   - Verbal medication instructions (instructed to hold vitamins, herbal supplements and NSAIDS one week prior to surgery)  - NPO guidelines, unless otherwise instructed by Surgeon's Office  - Arrival place and time to be given by the Surgeon's Office   - Shower with antibacterial soap   - No makeup or moisturizer to face   - No perfume/cologne, powder, lotions, deodorant or aftershave   - Leave all jewelry, including body piercings, and valuables at home.  - Arrange for someone to drive you home following surgery; will not be allowed to leave the surgical facility alone or drive self home following sedation and anesthesia.     Pt verbalized understanding.  Pt instructed to call POC with any questions or changes.

## 2025-05-26 NOTE — ANESTHESIA PAT ROS NOTE
05/26/2025  Silvana Quezada is a 69 y.o., female.      Pre-op Assessment    I have reviewed the NPO Status.   I have reviewed the Medications.     Review of Systems  Anesthesia Hx:  No problems with previous Anesthesia             Family Hx of Anesthesia complications:  Family Anesthesia Complications are Reports her daughter woke up during surgery once  Denies Personal Hx of Anesthesia complications.                    Social:  Non-Smoker, Social Alcohol Use       Hematology/Oncology:       -- Anemia:                    --  Cancer in past history:       Breast    right   chemotherapy, surgery and radiation   Oncology Comments: Hx right breast cancer s/p 2001 lumpectomy/chemo/radiation     EENT/Dental:   Hx of tonsillectomy          Cardiovascular:  Exercise tolerance: good   Denies Pacemaker.  Denies Hypertension.   Denies MI.      Denies Dysrhythmias.   Denies Angina.         Hx of tortuous aorta, h/o thoracic aortic aneurysm Patient not on beta blockers                          Pulmonary:    Denies COPD.  Denies Asthma.   Denies Shortness of breath.  Sleep Apnea, CPAP Hx of PNA               Renal/:   Renal Symptoms/Infections/Stones: urgency, incontinence, nocturia.  Urinary Tract Infection (UTI), Cystitis, Chronic Urinary Infection  Urge incontinence, neurogenic bladder 2/2 MS, OAB, voids on own and performs CIC 4 times daily, remains dry during day but does wet diaper at night; h/o recurrent cystitis, h/o multiple cysto procedures and botox injections          Hepatic/GI:  Hepatic/GI Normal                    Musculoskeletal:     Hx right shoulder fx/right proximal humerus fx (closed) 2015, h/o left wrist fx s/p ORIF 2019     Bone Disorders:    Osteoporosis        OB/GYN/PEDS:  Hx of partial hysterectomy, h/o breast cyst, h/o left total reduction mammoplasty 2020           Neurological:  Denies TIA.   Denies CVA.    Denies Seizures.          Peripheral Neuropathy                        Neuromuscular Disease, Multiple Sclerosis   Endocrine:        Obesity / BMI > 30  Dermatological:  Skin Normal    Psych:  Psychiatric History  depression Bipolar I disorder                    Anesthesia Assessment: Preoperative EQUATION    Planned Procedure: Procedure(s) (LRB):  CYSTOSCOPY,WITH BOTULINUM TOXIN INJECTION (N/A)  Requested Anesthesia Type:Monitor Anesthesia Care  Surgeon: Jayesh Ross MD  Service: Urology  Known or anticipated Date of Surgery:6/4/2025    Surgeon notes: reviewed    Electronic QUestionnaire Assessment completed via nurse interview with patient.        Triage considerations:     The patient has no apparent active cardiac condition (No unstable coronary Syndrome such as severe unstable angina or recent [<1 month] myocardial infarction, decompensated CHF, severe valvular   disease or significant arrhythmia)    Previous anesthesia records:Gen Anes, No ett, No problems  09/25/24 INJECTION, BOTULINUM TOXIN, 200 UNITS (Bladder) CYSTOSCOPY (Bladder), gen anes, ASA 2    Last PCP note: 6-12 months ago , within [facility]   Subspecialty notes: Neurology, Urology    Other important co-morbidities: Obesity, KEM, and neurogenic bladder, OAB, urge incontinence, h/o multiple cysto botox injections, h/o bladder fulguration, MS (dx 1991), h/o right breast cancer s/p 2001 right lumpectomy/chemo/radiation, h/o breast cyst, h/o 2020 left total reduction mammoplasty, depression, Bipolar I disorder, osteoporosis, h/o right shoulder/proximal humerus fx (closed) 2015, h/o left wrist fx s/p ORIF 2019, h/o foot surgery, h/o brain surgery 1991, h/o partial hysterectomy, h/o tonsillectomy, peripheral neuropathy, cpap use, tortuous aorta, TAA      Tests already available:  Available tests,  within 1 month , within [facility] .   05/26/25 BMP, EKG  05/23/25 URINE CX  (01/08/24 A1C 5.1)            Instructions given. (See in Nurse's  note)    Optimization:  Anesthesia Preop Clinic Assessment Indicated:    --phone screen      Plan:    Testing:  None Needed     Navigation:  Straight Line to surgery.               No tests, anesthesia preop clinic visit, or consult required.

## 2025-05-27 ENCOUNTER — TELEPHONE (OUTPATIENT)
Dept: UROLOGY | Facility: CLINIC | Age: 70
End: 2025-05-27
Payer: MEDICARE

## 2025-06-03 ENCOUNTER — TELEPHONE (OUTPATIENT)
Dept: UROLOGY | Facility: CLINIC | Age: 70
End: 2025-06-03
Payer: MEDICARE

## 2025-06-03 ENCOUNTER — ANESTHESIA EVENT (OUTPATIENT)
Dept: SURGERY | Facility: HOSPITAL | Age: 70
End: 2025-06-03
Payer: MEDICARE

## 2025-06-03 ENCOUNTER — PATIENT MESSAGE (OUTPATIENT)
Dept: UROLOGY | Facility: CLINIC | Age: 70
End: 2025-06-03
Payer: MEDICARE

## 2025-06-04 ENCOUNTER — ANESTHESIA (OUTPATIENT)
Dept: SURGERY | Facility: HOSPITAL | Age: 70
End: 2025-06-04
Payer: MEDICARE

## 2025-06-04 ENCOUNTER — HOSPITAL ENCOUNTER (OUTPATIENT)
Facility: HOSPITAL | Age: 70
Discharge: HOME | End: 2025-06-04
Attending: UROLOGY | Admitting: STUDENT IN AN ORGANIZED HEALTH CARE EDUCATION/TRAINING PROGRAM
Payer: MEDICARE

## 2025-06-04 VITALS
BODY MASS INDEX: 31.71 KG/M2 | TEMPERATURE: 98 F | RESPIRATION RATE: 19 BRPM | SYSTOLIC BLOOD PRESSURE: 130 MMHG | HEART RATE: 66 BPM | WEIGHT: 179 LBS | OXYGEN SATURATION: 99 % | DIASTOLIC BLOOD PRESSURE: 63 MMHG | HEIGHT: 63 IN

## 2025-06-04 DIAGNOSIS — N32.81 OAB (OVERACTIVE BLADDER): ICD-10-CM

## 2025-06-04 PROCEDURE — 25000003 PHARM REV CODE 250: Performed by: NURSE ANESTHETIST, CERTIFIED REGISTERED

## 2025-06-04 PROCEDURE — 63600175 PHARM REV CODE 636 W HCPCS: Mod: JZ,TB | Performed by: UROLOGY

## 2025-06-04 PROCEDURE — 63600175 PHARM REV CODE 636 W HCPCS: Performed by: NURSE ANESTHETIST, CERTIFIED REGISTERED

## 2025-06-04 PROCEDURE — 71000044 HC DOSC ROUTINE RECOVERY FIRST HOUR: Performed by: UROLOGY

## 2025-06-04 PROCEDURE — 37000008 HC ANESTHESIA 1ST 15 MINUTES: Performed by: UROLOGY

## 2025-06-04 PROCEDURE — 36000706: Performed by: UROLOGY

## 2025-06-04 PROCEDURE — 71000015 HC POSTOP RECOV 1ST HR: Performed by: UROLOGY

## 2025-06-04 PROCEDURE — 36000707: Performed by: UROLOGY

## 2025-06-04 PROCEDURE — 37000009 HC ANESTHESIA EA ADD 15 MINS: Performed by: UROLOGY

## 2025-06-04 PROCEDURE — 63600175 PHARM REV CODE 636 W HCPCS: Performed by: STUDENT IN AN ORGANIZED HEALTH CARE EDUCATION/TRAINING PROGRAM

## 2025-06-04 PROCEDURE — 52287 CYSTOSCOPY CHEMODENERVATION: CPT | Mod: ,,, | Performed by: UROLOGY

## 2025-06-04 RX ORDER — SODIUM CHLORIDE 0.9 % (FLUSH) 0.9 %
3 SYRINGE (ML) INJECTION
Status: DISCONTINUED | OUTPATIENT
Start: 2025-06-04 | End: 2025-06-04 | Stop reason: HOSPADM

## 2025-06-04 RX ORDER — PROPOFOL 10 MG/ML
VIAL (ML) INTRAVENOUS CONTINUOUS PRN
Status: DISCONTINUED | OUTPATIENT
Start: 2025-06-04 | End: 2025-06-04

## 2025-06-04 RX ORDER — ACETAMINOPHEN 500 MG
500 TABLET ORAL EVERY 6 HOURS PRN
Qty: 30 TABLET | Refills: 0 | Status: SHIPPED | OUTPATIENT
Start: 2025-06-04

## 2025-06-04 RX ORDER — GLUCAGON 1 MG
1 KIT INJECTION
Status: DISCONTINUED | OUTPATIENT
Start: 2025-06-04 | End: 2025-06-04 | Stop reason: HOSPADM

## 2025-06-04 RX ORDER — FENTANYL CITRATE 50 UG/ML
25 INJECTION, SOLUTION INTRAMUSCULAR; INTRAVENOUS EVERY 5 MIN PRN
Status: DISCONTINUED | OUTPATIENT
Start: 2025-06-04 | End: 2025-06-04 | Stop reason: HOSPADM

## 2025-06-04 RX ORDER — PROPOFOL 10 MG/ML
VIAL (ML) INTRAVENOUS
Status: DISCONTINUED | OUTPATIENT
Start: 2025-06-04 | End: 2025-06-04

## 2025-06-04 RX ORDER — OXYCODONE HYDROCHLORIDE 5 MG/1
5 TABLET ORAL EVERY 4 HOURS PRN
Qty: 5 TABLET | Refills: 0 | Status: SHIPPED | OUTPATIENT
Start: 2025-06-04 | End: 2025-06-07

## 2025-06-04 RX ORDER — CEFTRIAXONE 1 G/1
1 INJECTION, POWDER, FOR SOLUTION INTRAMUSCULAR; INTRAVENOUS
Status: DISCONTINUED | OUTPATIENT
Start: 2025-06-04 | End: 2025-06-04 | Stop reason: HOSPADM

## 2025-06-04 RX ORDER — LIDOCAINE HYDROCHLORIDE 20 MG/ML
INJECTION INTRAVENOUS
Status: DISCONTINUED | OUTPATIENT
Start: 2025-06-04 | End: 2025-06-04

## 2025-06-04 RX ORDER — MIDAZOLAM HYDROCHLORIDE 1 MG/ML
INJECTION INTRAMUSCULAR; INTRAVENOUS
Status: DISCONTINUED | OUTPATIENT
Start: 2025-06-04 | End: 2025-06-04

## 2025-06-04 RX ORDER — SULFAMETHOXAZOLE AND TRIMETHOPRIM 400; 80 MG/1; MG/1
1 TABLET ORAL 2 TIMES DAILY
COMMUNITY

## 2025-06-04 RX ORDER — SULFAMETHOXAZOLE AND TRIMETHOPRIM 800; 160 MG/1; MG/1
1 TABLET ORAL 2 TIMES DAILY
Qty: 6 TABLET | Refills: 0 | Status: SHIPPED | OUTPATIENT
Start: 2025-06-04 | End: 2025-06-07

## 2025-06-04 RX ADMIN — PROPOFOL 75 MCG/KG/MIN: 10 INJECTION, EMULSION INTRAVENOUS at 12:06

## 2025-06-04 RX ADMIN — PROPOFOL 40 MG: 10 INJECTION, EMULSION INTRAVENOUS at 12:06

## 2025-06-04 RX ADMIN — CEFTRIAXONE 1 G: 1 INJECTION, POWDER, FOR SOLUTION INTRAMUSCULAR; INTRAVENOUS at 12:06

## 2025-06-04 RX ADMIN — MIDAZOLAM HYDROCHLORIDE 1 MG: 2 INJECTION, SOLUTION INTRAMUSCULAR; INTRAVENOUS at 12:06

## 2025-06-04 RX ADMIN — LIDOCAINE HYDROCHLORIDE 60 MG: 20 INJECTION INTRAVENOUS at 12:06

## 2025-06-04 RX ADMIN — SODIUM CHLORIDE: 0.9 INJECTION, SOLUTION INTRAVENOUS at 12:06

## 2025-06-04 NOTE — INTERVAL H&P NOTE
The patient has been examined and the H&P has been reviewed:    I concur with the findings and no changes have occurred since H&P was written.    Surgery risks, benefits and alternative options discussed and understood by patient/family.    The patient reports that she has been taking Bactrim as prescribed.       There are no hospital problems to display for this patient.

## 2025-06-04 NOTE — DISCHARGE SUMMARY
[facility] (1st Fl)  Discharge Note  Short Stay    Procedure(s) (LRB):  CYSTOSCOPY,WITH BOTULINUM TOXIN INJECTION (N/A)      OUTCOME: Patient tolerated treatment/procedure well without complication and is now ready for discharge.    DISPOSITION: Home or Self Care    FINAL DIAGNOSIS:  <principal problem not specified>    FOLLOWUP: In clinic    DISCHARGE INSTRUCTIONS:  No discharge procedures on file.     TIME SPENT ON DISCHARGE: 15 minutes

## 2025-06-04 NOTE — OP NOTE
[facility]  Operative Note    Date: 06/04/2025    Pre-Op Diagnosis: Overactive bladder    Post-Op Diagnosis: same    Procedure(s) Performed:   1.  Cystoscopy with bladder botox injection    Specimen(s): None    Staff Surgeon:  Jayesh Ross MD    Assistant Surgeon: Chayo Byers MD    Anesthesia: Monitored Local Anesthesia with Sedation    Indications: Silvana Quezada is a 69 y.o. female with a past medical history of MS and neurogenic bladder.     Findings: Grade 2 trabeculations.     Estimated Blood Loss: min    Drains: None    Procedure in Detail:  After informed consent was obtained the patient was brought to the cystoscopy suite and placed in the supine position.  SCDs were applied and working.  Anesthesia was administered.  When the patient was adequately sedated she was placed in the dorsal lithotomy position and prepped and draped in the usual sterile fashion.      A rigid cystoscope in a 22 Fr sheath was introduced into the patients's bladder via the urethra.  This passed easily.  Formal cystoscopy was performed which revealed the ureteral orifices in their normal anatomic position bilaterally.  No bladder masses, trabeculations, stones or diverticuli were seen.      200 units of botox was injected into the detrusor muscle throughout the bladder.  Good wheals were raised.  The patient's bladder was drained and the cystoscope was removed.     The patient tolerated the procedure well and was transferred to the recovery room in stable condition.      Disposition:  The patient will follow up with Dr. Ross in 6 months. She was given prescriptions for three days of antibiotics and pain medication.      Chayo Byers MD

## 2025-06-09 ENCOUNTER — TELEPHONE (OUTPATIENT)
Dept: UROLOGY | Facility: CLINIC | Age: 70
End: 2025-06-09
Payer: MEDICARE

## 2025-06-19 ENCOUNTER — PATIENT MESSAGE (OUTPATIENT)
Dept: PSYCHIATRY | Facility: CLINIC | Age: 70
End: 2025-06-19
Payer: MEDICARE

## 2025-07-07 ENCOUNTER — OFFICE VISIT (OUTPATIENT)
Dept: PODIATRY | Facility: CLINIC | Age: 70
End: 2025-07-07
Payer: MEDICARE

## 2025-07-07 DIAGNOSIS — M21.611 HALLUX ABDUCTOVALGUS WITH BUNIONS, RIGHT: ICD-10-CM

## 2025-07-07 DIAGNOSIS — L84 CORN OR CALLUS: ICD-10-CM

## 2025-07-07 DIAGNOSIS — M20.5X1 ACQUIRED CLAW TOE OF RIGHT FOOT: ICD-10-CM

## 2025-07-07 DIAGNOSIS — M79.674 TOE PAIN, RIGHT: Primary | ICD-10-CM

## 2025-07-07 DIAGNOSIS — M20.11 HALLUX ABDUCTOVALGUS WITH BUNIONS, RIGHT: ICD-10-CM

## 2025-07-07 DIAGNOSIS — G35 MS (MULTIPLE SCLEROSIS): ICD-10-CM

## 2025-07-07 DIAGNOSIS — B35.1 ONYCHOMYCOSIS: ICD-10-CM

## 2025-07-07 PROCEDURE — 1160F RVW MEDS BY RX/DR IN RCRD: CPT | Mod: CPTII,S$GLB,, | Performed by: PODIATRIST

## 2025-07-07 PROCEDURE — 3288F FALL RISK ASSESSMENT DOCD: CPT | Mod: CPTII,S$GLB,, | Performed by: PODIATRIST

## 2025-07-07 PROCEDURE — 11721 DEBRIDE NAIL 6 OR MORE: CPT | Mod: XS,Q9,S$GLB, | Performed by: PODIATRIST

## 2025-07-07 PROCEDURE — 1157F ADVNC CARE PLAN IN RCRD: CPT | Mod: CPTII,S$GLB,, | Performed by: PODIATRIST

## 2025-07-07 PROCEDURE — 1125F AMNT PAIN NOTED PAIN PRSNT: CPT | Mod: CPTII,S$GLB,, | Performed by: PODIATRIST

## 2025-07-07 PROCEDURE — 1101F PT FALLS ASSESS-DOCD LE1/YR: CPT | Mod: CPTII,S$GLB,, | Performed by: PODIATRIST

## 2025-07-07 PROCEDURE — 99213 OFFICE O/P EST LOW 20 MIN: CPT | Mod: 25,S$GLB,, | Performed by: PODIATRIST

## 2025-07-07 PROCEDURE — 1159F MED LIST DOCD IN RCRD: CPT | Mod: CPTII,S$GLB,, | Performed by: PODIATRIST

## 2025-07-07 PROCEDURE — 99999 PR PBB SHADOW E&M-EST. PATIENT-LVL III: CPT | Mod: PBBFAC,,, | Performed by: PODIATRIST

## 2025-07-07 PROCEDURE — 11055 PARING/CUTG B9 HYPRKER LES 1: CPT | Mod: Q9,S$GLB,, | Performed by: PODIATRIST

## 2025-07-07 NOTE — PROCEDURES
"Routine Foot Care    Date/Time: 7/7/2025 1:30 PM    Performed by: Kyree Lehman DPM  Authorized by: Kyree Lehman DPM    Time out: Immediately prior to procedure a "time out" was called to verify the correct patient, procedure, equipment, support staff and site/side marked as required.    Consent Done?:  Yes (Verbal)  Hyperkeratotic Skin Lesions?: Yes    Number of trimmed lesions:  1  Location(s):  Right 4th Toe and Right 5th Toe    Nail Care Type:  Debride  Location(s): All  (Left 1st Toe, Left 3rd Toe, Left 2nd Toe, Left 4th Toe, Left 5th Toe, Right 1st Toe, Right 2nd Toe, Right 3rd Toe, Right 4th Toe and Right 5th Toe)  Patient tolerance:  Patient tolerated the procedure well with no immediate complications     Used sterile nail nipper and #15 scalpel. Assisted by Shanice Connell DPM PGY 2        "

## 2025-07-07 NOTE — PROGRESS NOTES
Subjective:     Patient ID: Silvana Quezada is a 69 y.o. female.    Chief Complaint: Toe Pain (Reports toe pain to right 4th toe; reports just came back from a vacation and had been wearing Vionics)    Silvana is a 69 y.o. female who presents to the clinic for evaluation and treatment of high risk feet. Silvana has a past medical history of Bipolar 1 disorder, Breast cancer (2001), Breast cyst, Closed fracture of proximal end of right humerus (03/09/2015), Depression, History of right shoulder fracture, MS (multiple sclerosis), Osteoporosis, unspecified, Pneumonia (03/27/2020), Wrist fracture (left), and Wrist fracture, closed, left, initial encounter (12/27/2019). The patient's chief complaint is long, thick toenails. This patient has documented high risk feet requiring routine maintenance secondary to peripheral neuropathy.    11/11/2024: Returns for routine foot care.  No other concerns noted today.    03/13/2025:  Returns for routine foot care.  No new concerns.    07/07/2025: States that she developed pain around the right 4th and 5th toe while out hiking and wearing a different pair shoes within the past month.  States that the pain has improved since she changed her shoes in his wearing casual tennis shoes today.  Returns for routine foot care.    PCP: Jhonatan Avila III, MD    Date Last Seen by PCP:  07/10/2024 pending on 04/28/2025    Current shoe gear:  Affected Foot: Casual shoes     Unaffected Foot: Casual shoes    Hemoglobin A1C   Date Value Ref Range Status   01/08/2024 5.1 4.0 - 5.6 % Final     Comment:     ADA Screening Guidelines:  5.7-6.4%  Consistent with prediabetes  >or=6.5%  Consistent with diabetes    High levels of fetal hemoglobin interfere with the HbA1C  assay. Heterozygous hemoglobin variants (HbS, HgC, etc)do  not significantly interfere with this assay.   However, presence of multiple variants may affect accuracy.     09/15/2022 5.0 4.0 - 5.6 % Final     Comment:     ADA Screening  Guidelines:  5.7-6.4%  Consistent with prediabetes  >or=6.5%  Consistent with diabetes    High levels of fetal hemoglobin interfere with the HbA1C  assay. Heterozygous hemoglobin variants (HbS, HgC, etc)do  not significantly interfere with this assay.   However, presence of multiple variants may affect accuracy.       Vitals:    07/07/25 1339   PainSc:   8   PainLoc: Toe      Past Medical History:   Diagnosis Date    Bipolar 1 disorder     Breast cancer 2001    right lumpectomy-radiation & chemo taxol    Breast cyst     Closed fracture of proximal end of right humerus 03/09/2015    Depression     History of right shoulder fracture     MS (multiple sclerosis)     presented as dizzines, dx vision,     Osteoporosis, unspecified     Pneumonia 03/27/2020    Wrist fracture left    2019    Wrist fracture, closed, left, initial encounter 12/27/2019       Past Surgical History:   Procedure Laterality Date    BLADDER FULGURATION  8/31/2022    Procedure: FULGURATION, BLADDER;  Surgeon: Jayesh Ross MD;  Location: [address];  Service: Urology;;    BRAIN SURGERY  1991    BREAST BIOPSY Left 2009    core    BREAST BIOPSY Right     BREAST LUMPECTOMY Right 2001    COLONOSCOPY N/A 1/7/2022    Procedure: COLONOSCOPY;  Surgeon: Shiva Walsh MD;  Location: [facility];  Service: Endoscopy;  Laterality: N/A;    CYSTOSCOPY N/A 7/11/2018    Procedure: CYSTOSCOPY;  Surgeon: Jayesh Ross MD;  Location: [address];  Service: Urology;  Laterality: N/A;  30 min    CYSTOSCOPY N/A 4/10/2019    Procedure: CYSTOSCOPY;  Surgeon: Jayesh Ross MD;  Location: [address];  Service: Urology;  Laterality: N/A;  30 MIN    CYSTOSCOPY  10/21/2021    Procedure: CYSTOSCOPY;  Surgeon: Jayesh Ross MD;  Location: [address];  Service: Urology;;    CYSTOSCOPY N/A 8/2/2023    Procedure: CYSTOSCOPY;  Surgeon: Jayesh Ross MD;  Location: [address];  Service: Urology;  Laterality: N/A;    CYSTOSCOPY  9/25/2024    Procedure:  CYSTOSCOPY;  Surgeon: Jayesh Ross MD;  Location: [address];  Service: Urology;;    CYSTOSCOPY,WITH BOTULINUM TOXIN INJECTION N/A 6/4/2025    Procedure: CYSTOSCOPY,WITH BOTULINUM TOXIN INJECTION;  Surgeon: Jayesh Ross MD;  Location: [address];  Service: Urology;  Laterality: N/A;  botox 200u    FOOT SURGERY  october 2013    HYSTERECTOMY      partial    INJECTION OF BOTULINUM TOXIN TYPE A N/A 7/11/2018    Procedure: INJECTION, BOTULINUM TOXIN, TYPE A 200 UNITS;  Surgeon: Jayesh Ross MD;  Location: [address];  Service: Urology;  Laterality: N/A;    INJECTION OF BOTULINUM TOXIN TYPE A N/A 4/10/2019    Procedure: INJECTION, BOTULINUM TOXIN, TYPE A 200 UNITS;  Surgeon: Jayesh Ross MD;  Location: [address];  Service: Urology;  Laterality: N/A;    INJECTION OF BOTULINUM TOXIN TYPE A  10/21/2021    Procedure: INJECTION, BOTULINUM TOXIN, 200units;  Surgeon: Jayesh Ross MD;  Location: [address];  Service: Urology;;    INJECTION OF BOTULINUM TOXIN TYPE A N/A 8/2/2023    Procedure: INJECTION, BOTULINUM TOXIN, TYPE A;  Surgeon: Jayesh Ross MD;  Location: [address];  Service: Urology;  Laterality: N/A;  200units    INJECTION OF BOTULINUM TOXIN TYPE A  9/25/2024    Procedure: INJECTION, BOTULINUM TOXIN, 200 UNITS;  Surgeon: Jayesh Ross MD;  Location: [address];  Service: Urology;;    OPEN REDUCTION AND INTERNAL FIXATION (ORIF) OF INJURY OF WRIST Left 12/27/2019    Procedure: ORIF, WRIST;  Surgeon: Albert Schroeder Jr., MD;  Location: [facility];  Service: Orthopedics;  Laterality: Left;  [facility] Rk confirmed here in [facility] with Lacie 12/23/19 KB [id number]    TONSILLECTOMY      TOTAL REDUCTION MAMMOPLASTY Left 11/5/2020    Procedure: MAMMOPLASTY, REDUCTION, LEFT;  Surgeon: Kirk Tompkins MD;  Location: [address];  Service: Plastics;  Laterality: Left;  Left breast tissue weighed- 49 grams.       Family History    Problem Relation Name Age of Onset    Heart disease Mother      Cancer Mother          breast cancer    Skin cancer Mother      Breast cancer Mother      Stroke Mother      Emphysema Father age 70's     Dementia Brother 1/2 dad     Diabetes Brother 1/2 dad     No Known Problems Brother 1/2 dad     Anesthesia problems Daughter Trans Megan/Mina     Other Daughter Trans Megan/Mina         Transgender, birth name Megan, now goes by McCune    Asperger's syndrome Daughter Trans Megan/Mina     No Known Problems Daughter Lacie     Autism Son      Seizures Son      Cancer Maternal Aunt      Breast cancer Maternal Aunt      Breast cancer Maternal Cousin      Cancer Maternal Cousin         Social History     Socioeconomic History    Marital status:    Occupational History    Occupation: [employer]   Tobacco Use    Smoking status: Never    Smokeless tobacco: Never   Substance and Sexual Activity    Alcohol use: Yes     Comment: rare    Drug use: Never    Sexual activity: Not Currently     Partners: Male   Social History Narrative    Original form , 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, 25 children 2 daughter      Social Drivers of Health     Financial Resource Strain: Low Risk  (8/5/2024)    Overall Financial Resource Strain (CARDIA)     Difficulty of Paying Living Expenses: Not hard at all   Food Insecurity: No Food Insecurity (8/5/2024)    Hunger Vital Sign     Worried About Running Out of Food in the Last Year: Never true     Ran Out of Food in the Last Year: Never true   Transportation Needs: No Transportation Needs (8/5/2024)    PRAPARE - Transportation     Lack of Transportation (Medical): No     Lack of Transportation (Non-Medical): No   Physical Activity: Sufficiently Active (8/5/2024)    Exercise Vital Sign     Days of Exercise per Week: 5 days     Minutes of Exercise per Session: 60 min   Recent Concern: Physical Activity - Insufficiently  Active (5/30/2024)    Exercise Vital Sign     Days of Exercise per Week: 2 days     Minutes of Exercise per Session: 60 min   Stress: No Stress Concern Present (8/5/2024)    [employer] [place] [employer] - Occupational Stress Questionnaire     Feeling of Stress : Not at all   Housing Stability: Unknown (8/5/2024)    Housing Stability Vital Sign     Unable to Pay for Housing in the Last Year: No     Homeless in the Last Year: No       Current Outpatient Medications   Medication Sig Dispense Refill    alendronate (FOSAMAX) 70 MG tablet TAKE 1 TABLET BY MOUTH EVERY 7 DAYS 12 tablet 0    buPROPion (WELLBUTRIN SR) 100 MG TBSR 12 hr tablet Take 2 tablets (200 mg total) by mouth every morning. 60 tablet 5    buPROPion (WELLBUTRIN SR) 150 MG TBSR 12 hr tablet Take 1 tablet (150 mg total) by mouth every evening. 30 tablet 5    calcium-vitamin D3 (OS-IGOR 500 + D3) 500 mg-5 mcg (200 unit) per tablet Take 1 tablet by mouth 2 (two) times daily with meals.      catheter 12 Fr Misc 1 Units by Misc.(Non-Drug; Combo Route) route 4 (four) times daily. 120 each 99    fluphenazine (PROLIXIN) 1 MG tablet Take 1 tablet (1 mg total) by mouth every evening. 30 tablet 5    metFORMIN (GLUCOPHAGE-XR) 500 MG ER 24hr tablet TAKE 1 TABLET BY MOUTH TWICE DAILY WITH MEALS. 180 tablet 0    multivitamin (ONE DAILY MULTIVITAMIN) per tablet Take 1 tablet by mouth once daily.      OXcarbazepine (TRILEPTAL) 150 MG Tab Take 1 tablet (150 mg total) by mouth 2 (two) times daily. 60 tablet 5    UNABLE TO FIND [facility] Culturelle (Probiotic)      acetaminophen (TYLENOL) 500 MG tablet Take 1 tablet (500 mg total) by mouth every 6 (six) hours as needed. 30 tablet 0    sulfamethoxazole-trimethoprim 400-80mg (BACTRIM,SEPTRA) 400-80 mg per tablet Take 1 tablet by mouth 2 (two) times daily.       No current facility-administered medications for this visit.       Review of patient's allergies indicates:   Allergen Reactions    Adhesive      blisters     Keflex [cephalexin] Rash    Cephalosporins        Review of Systems   Constitutional: Negative for chills and fever.   HENT:  Negative for congestion and hearing loss.    Respiratory:  Negative for cough and shortness of breath.    Skin:  Positive for color change and nail changes.   Musculoskeletal:  Positive for muscle weakness.   Gastrointestinal:  Negative for nausea and vomiting.   Neurological:  Negative for numbness.   Psychiatric/Behavioral:  Negative for altered mental status.         Objective:     Physical Exam  Constitutional:       General: She is not in acute distress.     Appearance: She is not ill-appearing.   Cardiovascular:      Pulses:           Dorsalis pedis pulses are 2+ on the right side and 2+ on the left side.        Posterior tibial pulses are 1+ on the right side and 1+ on the left side.      Comments: Mild nonpitting edema to lower extremity bilateral.  Multiple varicosities noted to lower extremity bilateral.  No hair growth follow extremity.  Skin temp is warm to foot bilateral.  Musculoskeletal:      Right foot: Deformity present.      Left foot: Deformity present.      Comments: Rigid flexion contractures of the PIPJ and DIPJ involving toes 3-5 bilateral foot.  Severely track bound hallux abductovalgus bilateral foot underlapping the 2nd toe right greater than left.    Rigid pes planus foot structure bilateral foot.  No pain with attempted range motion or manual muscle strength testing bilateral foot and ankle.   Feet:      Right foot:      Protective Sensation: 10 sites tested.  10 sites sensed.      Skin integrity: No ulcer, blister, skin breakdown, erythema, warmth, callus, dry skin or fissure.      Toenail Condition: Right toenails are abnormally thick and long. Fungal disease present.     Left foot:      Protective Sensation: 10 sites tested.  10 sites sensed.      Skin integrity: Callus present. No ulcer, blister, skin breakdown, erythema, warmth, dry skin or fissure.       "Toenail Condition: Left toenails are abnormally thick and long. Fungal disease present.  Skin:     General: Skin is warm.      Capillary Refill: Capillary refill takes 2 to 3 seconds.      Findings: No ecchymosis or erythema.      Nails: There is no clubbing.      Comments: Nails 1-5 bilateral foot are elongated.  Hallux nail bilateral severely dystrophic with a central hump and prominent nailbed with loosening and underlying debris containing white discoloration of the nails.  Lesser toes 2-5 on the left are also thickened, dystrophic with loosening and underlying debris while the lesser toenails 2-4 arch is mildly thickened elongated.  The right 5th toenail is also dystrophic with discoloration and loosening.    Raised hyperkeratotic skin to the distal lateral aspect of the right 4th toe and distal medial aspect of the right 5th toe.   Neurological:      Mental Status: She is alert and oriented to person, place, and time.      Sensory: Sensory deficit present.      Motor: Weakness present.      Comments: Decreased vibratory sensation bilateral foot.           Assessment:      Encounter Diagnoses   Name Primary?    Toe pain, right Yes    Acquired claw toe of right foot     Hallux abductovalgus with bunions, right     MS (multiple sclerosis)     Corn or callus     Onychomycosis      Plan:     Silvana Oliveira" was seen today for toe pain.    Diagnoses and all orders for this visit:    Toe pain, right    Acquired claw toe of right foot    Hallux abductovalgus with bunions, right    MS (multiple sclerosis)  -     Routine Foot Care    Brooks or callus  -     Routine Foot Care    Onychomycosis  -     Routine Foot Care      I counseled the patient on her conditions, their implications and medical management.    Shoe inspection. Patient instructed on proper foot hygeine. We discussed wearing proper shoe gear, daily foot inspections, never walking without protective shoe gear, never putting sharp instruments to feet.  "     Routine foot care per attached note. Patient relates relief following the procedure. She will continue to monitor the areas daily, inspect her feet, wear protective shoe gear when ambulatory, moisturizer to maintain skin integrity.    Patient considered intermediate risk secondary to her history of MS significant weakness impacting her extremities.    We discussed that we could potentially consider a flexor tenotomy to the right 4th and 5th toes to help reduce the extent of the contracture but elongating of the toe this could affect the way that she wears shoe gear.  She has a moderate rigid pes planovalgus foot type which causes the foot to abduct in create pressure along the lateral aspect of the shoe.  In order to prevent any issues she would need more extensive reconstruction such as possible 1 MTP arthrodesis with PIPJ and possibly DIPJ arthrodesis of the lesser toes 2-5.  Risks, benefits anticipated postop course briefly discussed.  Patient would like to continue conservative care and avoid any surgical intervention at this time.    We had also discussed importance of wearing shoes with adequate room in the toe box and support to prevent any rubbing or irritation to her toes.    RTC within 3 months or p.r.n. as discussed.    Assisted by Shanice Connell DPM PGY 2    A portion of this note was generated by voice recognition software and may contain spelling and grammar errors.

## 2025-07-25 ENCOUNTER — OFFICE VISIT (OUTPATIENT)
Dept: FAMILY MEDICINE | Facility: CLINIC | Age: 70
End: 2025-07-25
Payer: MEDICARE

## 2025-07-25 ENCOUNTER — TELEPHONE (OUTPATIENT)
Dept: ENDOSCOPY | Facility: HOSPITAL | Age: 70
End: 2025-07-25

## 2025-07-25 ENCOUNTER — CLINICAL SUPPORT (OUTPATIENT)
Dept: ENDOSCOPY | Facility: HOSPITAL | Age: 70
End: 2025-07-25
Attending: INTERNAL MEDICINE
Payer: MEDICARE

## 2025-07-25 ENCOUNTER — HOSPITAL ENCOUNTER (OUTPATIENT)
Dept: RADIOLOGY | Facility: HOSPITAL | Age: 70
Discharge: HOME | End: 2025-07-25
Attending: INTERNAL MEDICINE
Payer: MEDICARE

## 2025-07-25 VITALS — WEIGHT: 175 LBS | HEIGHT: 63 IN | BODY MASS INDEX: 31.01 KG/M2

## 2025-07-25 VITALS
SYSTOLIC BLOOD PRESSURE: 124 MMHG | HEART RATE: 76 BPM | WEIGHT: 175.94 LBS | OXYGEN SATURATION: 98 % | BODY MASS INDEX: 31.17 KG/M2 | DIASTOLIC BLOOD PRESSURE: 68 MMHG | HEIGHT: 63 IN

## 2025-07-25 DIAGNOSIS — Z12.31 ENCOUNTER FOR SCREENING MAMMOGRAM FOR BREAST CANCER: ICD-10-CM

## 2025-07-25 DIAGNOSIS — G35 MS (MULTIPLE SCLEROSIS): Primary | ICD-10-CM

## 2025-07-25 DIAGNOSIS — M81.0 AGE-RELATED OSTEOPOROSIS WITHOUT CURRENT PATHOLOGICAL FRACTURE: ICD-10-CM

## 2025-07-25 DIAGNOSIS — Z12.11 SCREENING FOR COLON CANCER: ICD-10-CM

## 2025-07-25 DIAGNOSIS — Z85.3 HISTORY OF BREAST CANCER: ICD-10-CM

## 2025-07-25 DIAGNOSIS — G47.33 OSA (OBSTRUCTIVE SLEEP APNEA): ICD-10-CM

## 2025-07-25 DIAGNOSIS — Z78.0 POST-MENOPAUSAL: ICD-10-CM

## 2025-07-25 DIAGNOSIS — F31.32 BIPOLAR I DISORDER, MOST RECENT EPISODE DEPRESSED, MODERATE: ICD-10-CM

## 2025-07-25 DIAGNOSIS — N32.81 OVERACTIVE BLADDER: ICD-10-CM

## 2025-07-25 DIAGNOSIS — I70.0 THORACIC AORTA ATHEROSCLEROSIS: ICD-10-CM

## 2025-07-25 PROCEDURE — 77067 SCR MAMMO BI INCL CAD: CPT | Mod: TC

## 2025-07-25 PROCEDURE — 77063 BREAST TOMOSYNTHESIS BI: CPT | Mod: 26,,, | Performed by: RADIOLOGY

## 2025-07-25 PROCEDURE — 77067 SCR MAMMO BI INCL CAD: CPT | Mod: 26,,, | Performed by: RADIOLOGY

## 2025-07-25 PROCEDURE — 99999 PR PBB SHADOW E&M-EST. PATIENT-LVL V: CPT | Mod: PBBFAC,,, | Performed by: INTERNAL MEDICINE

## 2025-07-25 NOTE — PLAN OF CARE
Patient is scheduled for a Colonoscopy on 09/04/2025 with Dr. HARSHIL Walsh  Referral for procedure from PAT appointment

## 2025-07-25 NOTE — TELEPHONE ENCOUNTER
Spoke with patient and spouse.   Patient is scheduled for a Colonoscopy on 09/04/2025 with Dr. HARSHIL Walsh  Referral for procedure from PAT appointment

## 2025-07-28 DIAGNOSIS — Z00.00 ENCOUNTER FOR MEDICARE ANNUAL WELLNESS EXAM: ICD-10-CM

## 2025-07-29 ENCOUNTER — HOSPITAL ENCOUNTER (OUTPATIENT)
Dept: RADIOLOGY | Facility: HOSPITAL | Age: 70
Discharge: HOME | End: 2025-07-29
Attending: INTERNAL MEDICINE
Payer: MEDICARE

## 2025-07-29 DIAGNOSIS — Z78.0 POST-MENOPAUSAL: ICD-10-CM

## 2025-07-29 PROCEDURE — 77080 DXA BONE DENSITY AXIAL: CPT | Mod: TC

## 2025-07-29 PROCEDURE — 77080 DXA BONE DENSITY AXIAL: CPT | Mod: 26,,, | Performed by: RADIOLOGY

## 2025-07-30 ENCOUNTER — PATIENT MESSAGE (OUTPATIENT)
Dept: PSYCHIATRY | Facility: CLINIC | Age: 70
End: 2025-07-30
Payer: MEDICARE

## 2025-08-01 ENCOUNTER — PATIENT MESSAGE (OUTPATIENT)
Dept: PSYCHIATRY | Facility: CLINIC | Age: 70
End: 2025-08-01
Payer: MEDICARE

## 2025-09-02 ENCOUNTER — TELEPHONE (OUTPATIENT)
Dept: ENDOSCOPY | Facility: HOSPITAL | Age: 70
End: 2025-09-02
Payer: MEDICARE

## 2025-09-03 ENCOUNTER — TELEPHONE (OUTPATIENT)
Dept: ENDOSCOPY | Facility: HOSPITAL | Age: 70
End: 2025-09-03
Payer: MEDICARE

## 2025-09-03 ENCOUNTER — TELEPHONE (OUTPATIENT)
Dept: GASTROENTEROLOGY | Facility: HOSPITAL | Age: 70
End: 2025-09-03
Payer: MEDICARE

## 2025-09-03 DIAGNOSIS — Z12.11 SCREEN FOR COLON CANCER: Primary | ICD-10-CM

## 2025-09-03 RX ORDER — POLYETHYLENE GLYCOL 3350, SODIUM SULFATE ANHYDROUS, SODIUM BICARBONATE, SODIUM CHLORIDE, POTASSIUM CHLORIDE 236; 22.74; 6.74; 5.86; 2.97 G/4L; G/4L; G/4L; G/4L; G/4L
4 POWDER, FOR SOLUTION ORAL ONCE
Qty: 4000 ML | Refills: 0 | Status: SHIPPED | OUTPATIENT
Start: 2025-09-03 | End: 2025-09-03

## (undated) DEVICE — SEE MEDLINE ITEM 152512

## (undated) DEVICE — PAD CAST SPECIALIST STRL 3

## (undated) DEVICE — SYR 10CC LUER LOCK

## (undated) DEVICE — PACK CYSTO

## (undated) DEVICE — SOL IRR WATER STRL 3000 ML

## (undated) DEVICE — BANDAGE ELASTIC 3X5 VELCRO ST

## (undated) DEVICE — SYR SLIP TIP 1CC

## (undated) DEVICE — NDL SPINAL SPINOCAN 22GX3.5

## (undated) DEVICE — SEE MEDLINE ITEM 152522

## (undated) DEVICE — GAUZE SPONGE XRAY 4X4

## (undated) DEVICE — BIT DRILL QUICK RELEASE 2.8MM

## (undated) DEVICE — CLOSURE SKIN STERI STRIP 1/2X4

## (undated) DEVICE — SYR 50ML CATH TIP

## (undated) DEVICE — ADAPTER HOSE 10FT 8MM

## (undated) DEVICE — GOWN X-LG STERILE BACK

## (undated) DEVICE — NDL WILLIAMS CYSTOSCOPIC

## (undated) DEVICE — GOWN SURGICAL X-LARGE

## (undated) DEVICE — BLADE SCALP OPHTL BEVEL STR

## (undated) DEVICE — SET CYSTO IRRIGATION UNIV SPIK

## (undated) DEVICE — DRAPE MINI C-ARM 54 X 64

## (undated) DEVICE — BANDAGE KERLIX P/P 2.25IN STER

## (undated) DEVICE — SOL NACL IRR 3000ML

## (undated) DEVICE — SYR SALINE  FLUSH PREFIL 10ML

## (undated) DEVICE — SEE MEDLINE ITEM 157181

## (undated) DEVICE — SYR SALINE FLSH PRFL STRL 10ML

## (undated) DEVICE — PACK CYSTOSCOPY III SIRUS

## (undated) DEVICE — SEE MEDLINE ITEM 154981

## (undated) DEVICE — MANIFOLD 4 PORT

## (undated) DEVICE — BLADE SURG #15 CARBON STEEL

## (undated) DEVICE — SEE MEDLINE ITEM 146313

## (undated) DEVICE — SUT MONOCRYL 3-0 PS-2 UND

## (undated) DEVICE — BLADE SURG CARBON STEEL #10

## (undated) DEVICE — TOWEL OR DISP STRL BLUE 4/PK

## (undated) DEVICE — PACK PERI/GYN OPTIMA

## (undated) DEVICE — PACK UNIVERSAL SPLIT II

## (undated) DEVICE — SEE L#120831

## (undated) DEVICE — DRAPE STERI INSTRUMENT 1018

## (undated) DEVICE — SEE MEDLINE ITEM 152186

## (undated) DEVICE — UNDERGLOVES BIOGEL PI SZ 6 LF

## (undated) DEVICE — SLING ARM LARGE

## (undated) DEVICE — TRAY MINOR GEN SURG

## (undated) DEVICE — BIT DRILL QUICK RELEASE 2.0MM

## (undated) DEVICE — TRAY CYSTO BASIN

## (undated) DEVICE — SOL IRR NACL .9% 3000ML

## (undated) DEVICE — NDL 25GA 5FR 35MM

## (undated) DEVICE — PAD ABD 8X10 STERILE

## (undated) DEVICE — SPONGE LAP 18X18 PREWASHED

## (undated) DEVICE — SET IRR URLGY 2LINE UNIV SPIKE

## (undated) DEVICE — GLOVE BIOGEL 7.5

## (undated) DEVICE — PADDING CAST 4IN DELTA ROLL

## (undated) DEVICE — PACK BASIC

## (undated) DEVICE — GOWN AERO CHROME W/ TOWEL XL

## (undated) DEVICE — SEE MEDLINE ITEM 157117

## (undated) DEVICE — DRESSING XEROFORM FOIL PK 1X8

## (undated) DEVICE — SYS PRINEO SKIN CLOSURE

## (undated) DEVICE — SUT MCRYL PLUS 4-0 PS2 27IN

## (undated) DEVICE — PADDING CAST 3 X 4YD

## (undated) DEVICE — SEE MEDLINE ITEM 157173

## (undated) DEVICE — APPLICATOR CHLORAPREP ORN 26ML

## (undated) DEVICE — TRAY CYSTO BASIN OMC

## (undated) DEVICE — SEE MEDLINE ITEM 152622

## (undated) DEVICE — NDL BLUNT TIP 16GX1/2

## (undated) DEVICE — GOWN SMARTGOWN LVL4 X-LONG XL

## (undated) DEVICE — ADHESIVE MASTISOL VIAL 48/BX

## (undated) DEVICE — BANDAGE ELASTIC 2X5 VELCRO ST

## (undated) DEVICE — SEE MEDLINE ITEM 156955

## (undated) DEVICE — SEE MEDLINE ITEM 152514

## (undated) DEVICE — NDL HYPODERMIC BLUNT 18G 1.5IN

## (undated) DEVICE — EVACUATOR WOUND BULB 100CC

## (undated) DEVICE — SYR 30CC LUER LOCK

## (undated) DEVICE — PAD CAST 2 IN X 4YDS STERILE

## (undated) DEVICE — SEE MEDLINE ITEM 157128

## (undated) DEVICE — ELECTRODE REM PLYHSV RETURN 9

## (undated) DEVICE — SYR SALINE PREFILLED FLSH 10ML

## (undated) DEVICE — DRAIN CHANNEL ROUND 15FR

## (undated) DEVICE — SPONGE DERMACEA GAUZE 4X4

## (undated) DEVICE — UNDERGLOVES BIOGEL PI SIZE 7.5

## (undated) DEVICE — TRAY FOLEY 16FR INFECTION CONT

## (undated) DEVICE — DRILL QUICK RELEASE 2.8MM 5IN

## (undated) DEVICE — SUT SILK 2-0 PS 18IN BLACK

## (undated) DEVICE — TUBING SUC UNIV W/CONN 12FT

## (undated) DEVICE — BOVIE SUCTION

## (undated) DEVICE — STAPLER SKIN ROTATING HEAD

## (undated) DEVICE — ALCOHOL 70% ISOP W/GREEN 16OZ

## (undated) DEVICE — LIDOCAINE HCI JLLY2%5ML UROJET

## (undated) DEVICE — SEE MEDLINE ITEM 152515

## (undated) DEVICE — GAUZE SPONGE 4X4 12PLY

## (undated) DEVICE — SET Y-TYPE TUR IRRIGATION

## (undated) DEVICE — CATH POLLACK OPEN-END FLEXI-TI

## (undated) DEVICE — SYRINGE 0.9% NACL 10MIL PREFIL

## (undated) DEVICE — DRAPE UINDERBUT GRAD PCH

## (undated) DEVICE — COVER OVERHEAD SURG LT BLUE

## (undated) DEVICE — SKINMARKER & RULER REGULAR X-F

## (undated) DEVICE — TRAY SKIN SCRUB WET PREMIUM

## (undated) DEVICE — SEE MEDLINE ITEM 146417

## (undated) DEVICE — SEE MEDLINE ITEM 157116

## (undated) DEVICE — GLOVE SURG BIOGEL LATEX SZ 7.5

## (undated) DEVICE — BRA SURGICAL LG 38-40